# Patient Record
Sex: MALE | Race: WHITE | NOT HISPANIC OR LATINO | Employment: FULL TIME | URBAN - METROPOLITAN AREA
[De-identification: names, ages, dates, MRNs, and addresses within clinical notes are randomized per-mention and may not be internally consistent; named-entity substitution may affect disease eponyms.]

---

## 2017-05-02 ENCOUNTER — RADIANT APPOINTMENT (OUTPATIENT)
Dept: GENERAL RADIOLOGY | Facility: CLINIC | Age: 57
End: 2017-05-02
Attending: INTERNAL MEDICINE
Payer: COMMERCIAL

## 2017-05-02 ENCOUNTER — OFFICE VISIT (OUTPATIENT)
Dept: FAMILY MEDICINE | Facility: CLINIC | Age: 57
End: 2017-05-02
Payer: COMMERCIAL

## 2017-05-02 VITALS
HEART RATE: 76 BPM | OXYGEN SATURATION: 97 % | BODY MASS INDEX: 26.95 KG/M2 | DIASTOLIC BLOOD PRESSURE: 86 MMHG | SYSTOLIC BLOOD PRESSURE: 139 MMHG | WEIGHT: 199 LBS | HEIGHT: 72 IN | TEMPERATURE: 98.1 F

## 2017-05-02 DIAGNOSIS — M25.461 PAIN AND SWELLING OF KNEE, RIGHT: Primary | ICD-10-CM

## 2017-05-02 DIAGNOSIS — M25.561 PAIN AND SWELLING OF KNEE, RIGHT: Primary | ICD-10-CM

## 2017-05-02 DIAGNOSIS — M54.50 ACUTE MIDLINE LOW BACK PAIN WITHOUT SCIATICA: ICD-10-CM

## 2017-05-02 PROCEDURE — 72100 X-RAY EXAM L-S SPINE 2/3 VWS: CPT

## 2017-05-02 PROCEDURE — 73562 X-RAY EXAM OF KNEE 3: CPT | Mod: RT

## 2017-05-02 PROCEDURE — 99214 OFFICE O/P EST MOD 30 MIN: CPT | Performed by: INTERNAL MEDICINE

## 2017-05-02 RX ORDER — PIROXICAM 10 MG/1
10 CAPSULE ORAL
Qty: 30 CAPSULE | Refills: 5 | Status: SHIPPED | OUTPATIENT
Start: 2017-05-02 | End: 2017-11-07

## 2017-05-02 RX ORDER — HYDROCODONE BITARTRATE AND ACETAMINOPHEN 5; 325 MG/1; MG/1
1 TABLET ORAL DAILY PRN
Qty: 30 TABLET | Refills: 0 | Status: SHIPPED | OUTPATIENT
Start: 2017-05-02 | End: 2018-01-17

## 2017-05-02 RX ORDER — METHYLPREDNISOLONE 4 MG
TABLET, DOSE PACK ORAL
Qty: 21 TABLET | Refills: 1 | Status: SHIPPED | OUTPATIENT
Start: 2017-05-02 | End: 2017-08-28

## 2017-05-02 RX ORDER — BACLOFEN 10 MG/1
10 TABLET ORAL AT BEDTIME
Qty: 90 TABLET | Refills: 5 | Status: SHIPPED | OUTPATIENT
Start: 2017-05-02 | End: 2017-11-07

## 2017-05-02 NOTE — PATIENT INSTRUCTIONS
This summary includes the important diagnoses, test, medications and other important parts of your medical history.  Below are a few good we sites you can use to learn more about these.     Www.[email or web address] : Up to date and easily searchable information on multiple topics.  [email or web address] : [facility] $4.99 medications  Www.medlineplus.gov : medication info, interactive tutorials, watch real surgeries online  Www.familydoctor.org : good info from the Academy of Family Physicians  Www.[facility].com : good info from the [facility]  Www.cdc.gov : public health info, travel advisories, epidemics (H1N1)  Www.aap.org : children's health info, normal development, vaccinations  Www.health.[facility].[place].us : [place] dept of heat, public health issues in [place], N1N1    Based on your medical history and these are the current health maintenance or preventive care services that you are due for (some may have been done at this visit:)  Health Maintenance Due   Topic Date Due     ADVANCE DIRECTIVE PLANNING Q5 YRS (NO INBASKET)  09/09/1978     =================================================================================  Normal Values   Blood pressure  <140/90 for most adults    <130/80 for some chronic diseases (ask your care team about yours)    BMI (body mass index)  18.5-25 kg/m2 (based on height and weight)     Thank you for visiting [facility]    Normal or non-critical lab and imaging results will be communicated to you by MyChart, letter or phone within 7 days.  If you do not hear from us within 10 days, please call the clinic. If you have a critical or abnormal lab result, we will notify you by phone as soon as possible.     If you have any questions regarding your visit please contact:     Team Comfort:   Clinic Hours Telephone Number   Dr. Lázaro Minor   7am-5pm  Monday - Friday  [phone]  [facility]   Pharmacy 8am-8pm Monday-Thursday      8am-6pm Friday  9am-5pm Saturday-Sunday [phone]   Urgent Care 11am-8pm Monday-Friday        9am-5pm Saturday-Sunday [phone]     After hours, weekend or if you need to make an appointment with your primary provider please call [phone].   After Hours nurse advise: call [facility]: [phone]    Medication Refills:  Call your pharmacy and they will forward the refill to us. Please allow 3 business days for your refills to be completed.    Use HitchedPic (secure email communication and access to your chart) to send your primary care provider a message or make an appointment. Ask someone on your Team how to sign up for HitchedPic. To log on to [employer] or for more information in [employer] please visit the website at [email or web address].  As of October 8, 2013, all password changes, disabled accounts, or ID changes in HitchedPic/MyHealth will be done by our Access Services Department.   If you need help with your account or password, call: [phone]. Clinic staff no longer has the ability to change passwords.     Back Care Tips    Caring for your back  These are things you can do to prevent a recurrence of acute back pain and to reduce symptoms from chronic back pain:    Maintain a healthy weight. If you are overweight, losing weight will help most types of back pain.    Exercise is an important part of recovery from most types of back pain. The back is supported by the muscles behind and in front of the spine. This means both the back muscles and the abdominal muscles must be strengthened to provide better support for your spine.     Swimming and brisk walking are good overall exercises to improve your fitness level.    Practice safe lifting methods (below).    Practice good posture when sitting, standing and walking. Avoid prolonged sitting. This puts more stress on the lower back than standing or walking.    Wear quality  shoes with sufficient arch support. Foot and ankle alignment can affect back symptoms. Women should avoid high heels.    Therapeutic massage can help  relax the back muscles without stretching them.    During the first 24 to 72 hours after an acute injury or flare-up of chronic back pain, apply an ice pack to the painful area for 20 minutes and then remove it for 20 minutes over a period of 60 to 90 minutes or several times a day. As a safety precaution, do not use a heating pad at bedtime. Sleeping on a heating pad can lead to skin burns or tissue damage.    Ice and heat therapies can be alternated.  Medications  Talk to your doctor before using medications, especially if you have other medical problems or are taking other medicines.    You may use acetaminophen or ibuprofen to control pain, unless other pain medicine was prescribed. If you have chronic conditions like diabetes, liver or kidney disease, stomach ulcers or gastrointestinal bleeding, or are taking blood thinners, talk with your doctor before taking any meidcations.    Be careful if you are given prescription pain medicines, narcotics, or medication for muscle spasm. They can cause drowsiness, affect your coordination, reflexes and judgment. Do not drive or operate heavy machinery.  Lumbar stretch  Here is a simple stretching exercise that will help relax muscle spasm and keep your back more limber. If exercise makes your back pain worse, don t do it.    Lie on your back with your knees bent and both feet on the ground.    Slowly raise your left knee to your chest as you flatten your lower back against the floor. Hold for 5 seconds.    Relax and repeat the exercise with your right knee.    Do 10 of these exercises for each leg.  Safe lifting method    Don t bend over at the waist to lift an object off the floor.  Instead, bend your knees and hips in a squat.     Keep your back and head upright    Hold the object close to your body, directly in front of  you.    Straighten your legs to lift the object.     Lower the object to the floor in the reverse fashion.    If you must slide something across the floor, push it.  Posture tips  Sitting  Sit in chairs with straight backs or low-back support. rKeep your k nees lower than your hip, with your feet flat on the floor.  When driving, sit up straight. Adjust the seat forward so you are not leaning toward the steering wheel.  A small pillow or rolled towel behind your lower back may help if you are driving long distances.   Standing  When standing for long periods, shift most of your weight to one leg at a time. Alternate legs every few minutes.   Sleeping  The best way to sleep is on your side with your knees bent. Put a low pillow under your head to support your neck in a neutral spine position. Avoid thick pillows that bend your neck to one side. Put a pillow between your legs to further relax your lower back. If you sleep on your back, put pillows under your knees to support your legs in a slightly flexed position. Use a firm mattress. If your mattress sags, replace it, or use a 1/2-inch plywood board under the mattress to add support.  Follow-up care  Follow up with your health care provider or as directed by our staff.  If X-rays, a CT scan or an MRI scan were taken, they will be reviewed by a radiologist. You will be notified of any new findings that may affect your care.  Call 911  Seek emergency medical care if any of the following occur:    Trouble breathing    Confusion    Very drowsy or trouble breathing    Fainting or loss of consciousness    Rapid or very slow heart rate    Loss of  bwel or bladder control  When to seek medical care  Call your health care provider if any of the following occur:    Pain becomes worse or spreads to your arms or legs    Weakness or numbness in one or both arms or legs    Numbness in the groin area    5510-9838 The [facility]. [address] [place] [address], [place]  [address]. All rights reserved. This information is not intended as a substitute for professional medical care. Always follow your healthcare professional's instructions.        Back Basics: A Healthy Spine  A healthy spine supports the body while letting it move freely. It does this with the help of three natural curves. Strong, flexible muscles help, too. They support the spine by keeping its curves properly aligned. The disks that cushion the bones of your spine also play a role in back fitness.    Three natural curves  The spine is made of bones (vertebrae) and pads of soft tissue (disks). These parts are arranged in three curves: cervical, thoracic, and lumbar. When properly aligned, these curves keep your body balanced. They also support your body when you move. By distributing your weight throughout your spine, the curves make back injuries less likely.  Strong, flexible muscles  Strong, flexible back muscles help support the three curves of the spine. They do so by holding the vertebrae and disks in proper alignment. Strong, flexible abdominal, hip, and leg muscles also reduce strain on the back.  The lumbar curve  The lumbar curve is the hardest-working part of the spine. It carries more weight and moves the most. Aligning this curve helps prevent damage to vertebrae, disks, and other parts of the spine.  Cushioning disks  Disks are the soft pads of tissue between the vertebrae. The disks absorb shock caused by movement. Each disk has a spongy center (nucleus) and a tougher outer ring (annulus). Movement within the nucleus allows the vertebrae to rock back and forth on the disks. This provides the flexibility needed to bend and move.         2566-6365 The [employer]. [address] [place] [address], [place] [address]. All rights reserved. This information is not intended as a substitute for professional medical care. Always follow your healthcare professional's instructions.        Back Exercises: Lower Back  Stretch                        To start, sit in a chair with your feet flat on the floor. Shift your weight slightly forward. Relax, and keep your ears, shoulders, and hips aligned.    Sit with your feet well apart.    Bend forward and touch the floor with the backs of your hands. Relax and let your body drop.    Hold for 20 seconds. Return to starting position.    Repeat 2 times.     7021-8450 The [employer]. [address] [place] [address]. All rights reserved. This information is not intended as a substitute for professional medical care. Always follow your healthcare professional's instructions.        Back Exercises: Lower Back Rotation    To start, lie on your back with your knees bent and feet flat on the floor. Don t press your neck or lower back to the floor. Breathe deeply. You should feel comfortable and relaxed in this position.    Drop both knees to one side. Turn your head to the other side. Keep your shoulders flat on the floor.    Do not push through pain.    Hold for 20 seconds.    Slowly switch sides.    Repeat 2 to 5 times.    6043-8525 [employer]. [address] [place] [address]. All rights reserved. This information is not intended as a substitute for professional medical care. Always follow your healthcare professional's instructions.        Back Exercises: Seated Rotation  To start, sit in a chair with your feet flat on the floor. Shift your weight slightly forward to avoid rounding your back. Relax, and keep your ears, shoulders, and hips aligned:    Fold your arms and elbows just below shoulder height.    Turn from the waist with hips forward. Turn your head last. Do not push through the pain.    Hold for a count of 10 to 30. Return to starting position.    Repeat 3 to 5 times on one side. Then switch sides.      6878-3241 [employer]. [address] [place] [address]. All rights reserved. This information is not intended  as a substitute for professional medical care. Always follow your healthcare professional's instructions.        Back Exercises: Side Stretch      To start, sit in a chair with your feet flat on the floor. Shift your weight slightly forward to avoid rounding your back. Relax. Keep your ears, shoulders, and hips aligned:    Stretch your right arm overhead.    Slowly bend to the left. Don t twist your torso. Stay within your pain limits.    Hold for 20 seconds. Return to starting position.    Repeat 2 to 5 times. Then, switch to the other side.    3915-1482 [employer]. [address] [place] [address]. All rights reserved. This information is not intended as a substitute for professional medical care. Always follow your healthcare professional's instructions.        Quad Set for Leg and Knee    This exercise is designed to stretch and strengthen your knee. Before beginning, read through all the instructions. While exercising, breathe normally and use smooth movements. If you feel any pain, stop the exercise. If pain persists, call your healthcare provider.  1.  Sit on the floor with one leg straight, the other bent.  2.  Flex the foot of your straight leg by pointing your toes toward you. Press the back of your knee into the floor while tightening the muscle on the top of your thigh. Hold for ______ seconds. Then relax.  3.  Repeat ______ times. Do ______ sets a day.  Caution    Don t arch your back.    Don t hunch your shoulders.    3983-1944 The [employer]. [address] [place] [address]. All rights reserved. This information is not intended as a substitute for professional medical care. Always follow your healthcare professional's instructions.        Reducing Knee Pain and Swelling    Many treatments can help reduce pain and swelling in your knee. Your healthcare provider or physical therapist may suggest one or more of the following treatments:    Icing your knee  helps reduce swelling. You may be asked to ice your knee once a day or more. Apply ice for about 15 to 20 minutes at a time, with at least 40 minutes between sessions. Always keep a towel between the ice and your skin.     Keeping your leg raised above your heart helps excess fluid flow out of your knee joint. This reduces swelling.    Compression means wrapping an elastic bandage or neoprene sleeve snugly around your knees. This keeps fluid from collecting in your knee joint.    Electrical stimulation, done by a physical therapist or , can help reduce excess fluid in your knee joint.    Anti-inflammatory medicines may be prescribed by your healthcare provider. You may take pills or receive injections in your knee.    Isometric (iesha) exercises strengthen the muscles that support your knee joint. They also help reduce excess fluid in your knee.    Massage helps fluid drain away from your knee.    4804-8937 The [employer]. [address] [place] [address]. All rights reserved. This information is not intended as a substitute for professional medical care. Always follow your healthcare professional's instructions.        Knee Pain with Possible Torn Meniscus    The meniscus is a tough cartilage pad that cushions the inside of the knee joint. It helps absorb the shock from movement. It also spreads the weight of your body evenly across the knee joint. This prevents excess wear and tear to the bones of that joint.  The most common causes of meniscal tears are injuries, especially related to sports and degenerative disease that happens with aging.  A meniscus tear commonly happens during a twisting injury when the knee is bent. This causes pain, swelling, reduced movement of the knee, and trouble walking. There may be popping, clicking, joint locking or inability to completely straighten the knee. Ligaments of the knee may also be injured.  A torn meniscus is diagnosed by  physical exam and X-rays. In the case of a severe injury, the knee may be too painful to examine fully. A more accurate exam can be done after the initial swelling goes down. An MRI may be ordered to make a final diagnosis.  If your healthcare provider suspects a meniscal injury, you will treat your knee with ice and rest and preventing movement of the knee. A splint or knee brace that keeps your leg straight may be put on to protect the joint. Depending on the severity of the injury, surgery may be needed. A cartilage injury may take 4-12 weeks to heal depending on how bad it is.  Home care    Stay off the injured leg as much as possible until you can walk on it without pain. If you have a lot of pain when walking, crutches or a walker may be prescribed. (These can be rented or bought at many pharmacies and surgical or orthopedic supply stores). Follow your healthcare provider's advice about when to begin putting weight on that leg.    Keep your leg elevated to reduce pain and swelling. When sleeping, place a pillow under the injured leg. When sitting, support the injured leg so it is level with your waist. This is very important during the first 48 hours.    Apply an ice pack over the injured area for 15 to 20 minutes every 3 to 6 hours. You should do this for the first 24 to 48 hours. You can make an ice pack by filling a plastic bag that seals at the top with ice cubes and then wrapping it with a thin towel. Continue to use ice packs for relief of pain and swelling as needed. As the ice melts, be careful to avoid getting your wrap, splint, or cast wet. After 48 hours, apply heat (warm shower or warm bath) for 15 to 20 minutes several times a day, or alternate ice and heat. You can place the ice pack directly over the splint. If you have to wear a hook-and-loop knee brace, you can open it to apply the ice pack, or heat, directly to the knee. Never put ice directly on the skin. Always wrap the ice in a towel or  other type of cloth.    You may use over-the-counter pain medicine to control pain, unless another pain medicine was prescribed. If you have chronic liver or kidney disease or ever had a stomach ulcer, talk with your healthcare provider before using these medicines.    If you were given a splint, keep it dry at all times. Bathe with your splint out of the water. Protect it with a large plastic bag that is rubber-banded at the top end. If a fiberglass splint gets wet, you can dry it with a hair dryer. If you have a hook-and-loop knee brace, you can remove this to bathe, unless told otherwise.    Check with your healthcare provider before returning to sports or full work duties.  Follow-up care  Follow up with your healthcare provider, or as advised. This is usually within 1-2 weeks. Further testing may be required to check the extent of your injury.  If X-rays were taken, you will be told of any new findings that may affect your care.  Call 911  Call 911 if you have:     Shortness of breath    Chest pain  When to seek medical advice  Call your healthcare provider right away if any of these occur:    Toes or foot gets swollen, cold, blue, numb, or tingly    Pain or swelling spreads over the knee or calf    Warmth or redness appears over the knee or calf    Fever of 100.4 F (38 C) or above lasting for 24 to 48 hours    5401-4348 [employer]. [address] [place] [address]. All rights reserved. This information is not intended as a substitute for professional medical care. Always follow your healthcare professional's instructions.        Knee Pain of Uncertain Cause    There are several common causes for knee pain. These can include:    A sprain of the ligaments that support the joint    An injury to the cartilage lining of the joint    Arthritis from wear-and-tear or inflammation  There are other causes as well. There may also be swelling, reduced movement of the knee joint, and pain with  walking. A definite diagnosis will still need to be made. If your symptoms do not improve, further follow-up and testing may be needed.  Home care    Stay off the injured leg as much as possible until pain improves.    Apply an ice pack over the injured area for 15 to 20 minutes every 3 to 6 hours. You should do this for the first 24 to 48 hours. You can make an ice pack by filling a plastic bag that seals at the top with ice cubes and then wrapping it with a thin towel. Continue to use ice packs for relief of pain and swelling as needed. As the ice melts, be careful to avoid getting your wrap, splint, or cast wet. After 48 hours, apply heat (warm shower or warm bath) for 15 to 20 minutes several times a day, or alternate ice and heat. If you have to wear a hook-and-loop knee brace, you can open it to apply the ice pack, or heat, directly to the knee. Never put ice directly on the skin. Always wrap the ice in a towel or other type of cloth.    You may use over-the-counter pain medicine to control pain, unless another pain medicine was prescribed. If you have chronic liver or kidney disease or ever had a stomach ulcer or GI bleeding, talk with your healthcare provider using these medicines.    If crutches or a walker have been recommended, do not put weight on the injured leg until you can do so without pain. Check with your healthcare provider before returning to sports or full work duties.    If you have a hook-and-loop knee brace, you can remove it to bathe and sleep, unless told otherwise.  Follow-up care  Follow up with your healthcare provider as advised. This is usually within 1-2 weeks.  If X-rays were taken, you will be told of any new findings that may affect your care.  Call 911  Call 911 if you have:     Shortness of breath    Chest pain  When to seek medical advice  Call your healthcare provider right away if any of these occur:    Toes or foot becomes swollen, cold, blue, numb, or tingly    Pain or  swelling spreads over the knee or calf    Warmth or redness appears over the knee or calf    Other joints become painful    Rash appears    Fever of 100.4 F (38 C) or above lasting for 24 to 48 hours    7296-6215 [employer]. [address] [place] [address], [place] [address]. All rights reserved. This information is not intended as a substitute for professional medical care. Always follow your healthcare professional's instructions.

## 2017-05-02 NOTE — MR AVS SNAPSHOT
After Visit Summary   5/2/2017    Geovanni Jasso    MRN: [id number]           Patient Information     Date Of Birth          1960        Visit Information        Provider Department      5/2/2017 5:00 PM Paddy Holly MD [facility]        Today's Diagnoses     Pain and swelling of knee, right    -  1    Acute midline low back pain without sciatica          Care Instructions    This summary includes the important diagnoses, test, medications and other important parts of your medical history.  Below are a few good we sites you can use to learn more about these.     Www.Leaky.org : Up to date and easily searchable information on multiple topics.  [email or web address] : [facility] $4.99 medications  Www.medlineAzure Solutions.gov : medication info, interactive tutorials, watch real surgeries online  Www.familydoctor.org : good info from the Academy of Family Physicians  Www.KiteBit.CardioVIP : good info from the [facility]  Www.cdc.gov : public health info, travel advisories, epidemics (H1N1)  Www.aap.org : children's health info, normal development, vaccinations  Www.health.state.[place].us : [place] dept of heatlh, public health issues in [place], N1N1    Based on your medical history and these are the current health maintenance or preventive care services that you are due for (some may have been done at this visit:)  Health Maintenance Due   Topic Date Due     ADVANCE DIRECTIVE PLANNING Q5 YRS (NO INBASKET)  09/09/1976     =================================================================================  Normal Values   Blood pressure  <140/90 for most adults    <130/80 for some chronic diseases (ask your care team about yours)    BMI (body mass index)  18.5-25 kg/m2 (based on height and weight)     Thank you for visiting [facility]    Normal or non-critical lab and imaging results will be communicated to you by MyChart, letter or phone within 7  days.  If you do not hear from us within 10 days, please call the clinic. If you have a critical or abnormal lab result, we will notify you by phone as soon as possible.     If you have any questions regarding your visit please contact:     Team Comfort:   Clinic Hours Telephone Number   Dr. Lázaro Roy  [facility]   7am-5pm  Monday - Friday [phone]  [facility]   Pharmacy 8am-8pm Monday-Thursday      8am-6pm Friday  9am-5pm Saturday-Sunday [phone]   Urgent Care 11am-8pm Monday-Friday        9am-5pm Saturday-Sunday [phone]     After hours, weekend or if you need to make an appointment with your primary provider please call [phone].   After Hours nurse advise: call [facility]: [phone]    Medication Refills:  Call your pharmacy and they will forward the refill to us. Please allow 3 business days for your refills to be completed.    Use Respi (secure email communication and access to your chart) to send your primary care provider a message or make an appointment. Ask someone on your Team how to sign up for Respi. To log on to Stilnest or for more information in Networked Insights please visit the website at [email or web address].  As of October 8, 2013, all password changes, disabled accounts, or ID changes in Respi/MyHealth will be done by our Access Services Department.   If you need help with your account or password, call: [phone]. Clinic staff no longer has the ability to change passwords.     Back Care Tips    Caring for your back  These are things you can do to prevent a recurrence of acute back pain and to reduce symptoms from chronic back pain:    Maintain a healthy weight. If you are overweight, losing weight will help most types of back pain.    Exercise is an important part of recovery from most types of back pain. The back is supported by the muscles behind and in front of the spine. This means  both the back muscles and the abdominal muscles must be strengthened to provide better support for your spine.     Swimming and brisk walking are good overall exercises to improve your fitness level.    Practice safe lifting methods (below).    Practice good posture when sitting, standing and walking. Avoid prolonged sitting. This puts more stress on the lower back than standing or walking.    Wear quality shoes with sufficient arch support. Foot and ankle alignment can affect back symptoms. Women should avoid high heels.    Therapeutic massage can help  relax the back muscles without stretching them.    During the first 24 to 72 hours after an acute injury or flare-up of chronic back pain, apply an ice pack to the painful area for 20 minutes and then remove it for 20 minutes over a period of 60 to 90 minutes or several times a day. As a safety precaution, do not use a heating pad at bedtime. Sleeping on a heating pad can lead to skin burns or tissue damage.    Ice and heat therapies can be alternated.  Medications  Talk to your doctor before using medications, especially if you have other medical problems or are taking other medicines.    You may use acetaminophen or ibuprofen to control pain, unless other pain medicine was prescribed. If you have chronic conditions like diabetes, liver or kidney disease, stomach ulcers or gastrointestinal bleeding, or are taking blood thinners, talk with your doctor before taking any meidcations.    Be careful if you are given prescription pain medicines, narcotics, or medication for muscle spasm. They can cause drowsiness, affect your coordination, reflexes and judgment. Do not drive or operate heavy machinery.  Lumbar stretch  Here is a simple stretching exercise that will help relax muscle spasm and keep your back more limber. If exercise makes your back pain worse, don t do it.    Lie on your back with your knees bent and both feet on the ground.    Slowly raise your left knee  to your chest as you flatten your lower back against the floor. Hold for 5 seconds.    Relax and repeat the exercise with your right knee.    Do 10 of these exercises for each leg.  Safe lifting method    Don t bend over at the waist to lift an object off the floor.  Instead, bend your knees and hips in a squat.     Keep your back and head upright    Hold the object close to your body, directly in front of you.    Straighten your legs to lift the object.     Lower the object to the floor in the reverse fashion.    If you must slide something across the floor, push it.  Posture tips  Sitting  Sit in chairs with straight backs or low-back support. rKeep your k nees lower than your hip, with your feet flat on the floor.  When driving, sit up straight. Adjust the seat forward so you are not leaning toward the steering wheel.  A small pillow or rolled towel behind your lower back may help if you are driving long distances.   Standing  When standing for long periods, shift most of your weight to one leg at a time. Alternate legs every few minutes.   Sleeping  The best way to sleep is on your side with your knees bent. Put a low pillow under your head to support your neck in a neutral spine position. Avoid thick pillows that bend your neck to one side. Put a pillow between your legs to further relax your lower back. If you sleep on your back, put pillows under your knees to support your legs in a slightly flexed position. Use a firm mattress. If your mattress sags, replace it, or use a 1/2-inch plywood board under the mattress to add support.  Follow-up care  Follow up with your health care provider or as directed by our staff.  If X-rays, a CT scan or an MRI scan were taken, they will be reviewed by a radiologist. You will be notified of any new findings that may affect your care.  Call 911  Seek emergency medical care if any of the following occur:    Trouble breathing    Confusion    Very drowsy or trouble  breathing    Fainting or loss of consciousness    Rapid or very slow heart rate    Loss of  bwel or bladder control  When to seek medical care  Call your health care provider if any of the following occur:    Pain becomes worse or spreads to your arms or legs    Weakness or numbness in one or both arms or legs    Numbness in the groin area    3460-8343 [employer]. [address] [place] [address]. All rights reserved. This information is not intended as a substitute for professional medical care. Always follow your healthcare professional's instructions.        Back Basics: A Healthy Spine  A healthy spine supports the body while letting it move freely. It does this with the help of three natural curves. Strong, flexible muscles help, too. They support the spine by keeping its curves properly aligned. The disks that cushion the bones of your spine also play a role in back fitness.    Three natural curves  The spine is made of bones (vertebrae) and pads of soft tissue (disks). These parts are arranged in three curves: cervical, thoracic, and lumbar. When properly aligned, these curves keep your body balanced. They also support your body when you move. By distributing your weight throughout your spine, the curves make back injuries less likely.  Strong, flexible muscles  Strong, flexible back muscles help support the three curves of the spine. They do so by holding the vertebrae and disks in proper alignment. Strong, flexible abdominal, hip, and leg muscles also reduce strain on the back.  The lumbar curve  The lumbar curve is the hardest-working part of the spine. It carries more weight and moves the most. Aligning this curve helps prevent damage to vertebrae, disks, and other parts of the spine.  Cushioning disks  Disks are the soft pads of tissue between the vertebrae. The disks absorb shock caused by movement. Each disk has a spongy center (nucleus) and a tougher outer ring (annulus).  Movement within the nucleus allows the vertebrae to rock back and forth on the disks. This provides the flexibility needed to bend and move.         6664-0473 [employer]. [address] [place] [address]. All rights reserved. This information is not intended as a substitute for professional medical care. Always follow your healthcare professional's instructions.        Back Exercises: Lower Back Stretch                        To start, sit in a chair with your feet flat on the floor. Shift your weight slightly forward. Relax, and keep your ears, shoulders, and hips aligned.    Sit with your feet well apart.    Bend forward and touch the floor with the backs of your hands. Relax and let your body drop.    Hold for 20 seconds. Return to starting position.    Repeat 2 times.     1292-7412 [employer]. [address] [place] [address]. All rights reserved. This information is not intended as a substitute for professional medical care. Always follow your healthcare professional's instructions.        Back Exercises: Lower Back Rotation    To start, lie on your back with your knees bent and feet flat on the floor. Don t press your neck or lower back to the floor. Breathe deeply. You should feel comfortable and relaxed in this position.    Drop both knees to one side. Turn your head to the other side. Keep your shoulders flat on the floor.    Do not push through pain.    Hold for 20 seconds.    Slowly switch sides.    Repeat 2 to 5 times.    2815-4611 [employer]. [address] [place] [address]. All rights reserved. This information is not intended as a substitute for professional medical care. Always follow your healthcare professional's instructions.        Back Exercises: Seated Rotation  To start, sit in a chair with your feet flat on the floor. Shift your weight slightly forward to avoid rounding your back. Relax, and keep your ears, shoulders,  and hips aligned:    Fold your arms and elbows just below shoulder height.    Turn from the waist with hips forward. Turn your head last. Do not push through the pain.    Hold for a count of 10 to 30. Return to starting position.    Repeat 3 to 5 times on one side. Then switch sides.      8740-1066 [employer]. [address] [place] [address]. All rights reserved. This information is not intended as a substitute for professional medical care. Always follow your healthcare professional's instructions.        Back Exercises: Side Stretch      To start, sit in a chair with your feet flat on the floor. Shift your weight slightly forward to avoid rounding your back. Relax. Keep your ears, shoulders, and hips aligned:    Stretch your right arm overhead.    Slowly bend to the left. Don t twist your torso. Stay within your pain limits.    Hold for 20 seconds. Return to starting position.    Repeat 2 to 5 times. Then, switch to the other side.    1410-2725 [employer]. [address] [place] [address]. All rights reserved. This information is not intended as a substitute for professional medical care. Always follow your healthcare professional's instructions.        Quad Set for Leg and Knee    This exercise is designed to stretch and strengthen your knee. Before beginning, read through all the instructions. While exercising, breathe normally and use smooth movements. If you feel any pain, stop the exercise. If pain persists, call your healthcare provider.  1.  Sit on the floor with one leg straight, the other bent.  2.  Flex the foot of your straight leg by pointing your toes toward you. Press the back of your knee into the floor while tightening the muscle on the top of your thigh. Hold for ______ seconds. Then relax.  3.  Repeat ______ times. Do ______ sets a day.  Caution    Don t arch your back.    Don t hunch your shoulders.    3636-9850 [employer]. [facility] [place] [address]. All rights reserved. This information is not intended as a substitute for professional medical care. Always follow your healthcare professional's instructions.              Follow-ups after your visit        Additional Services     ORTHOPEDICS ADULT REFERRAL       Your provider has referred you to: ERING: [facility] [phone]   [email or web address]    Please be aware that coverage of these services is subject to the terms and limitations of your health insurance plan.  Call member services at your health plan with any benefit or coverage questions.      Please bring the following to your appointment:    >>   Any x-rays, CTs or MRIs which have been performed.  Contact the facility where they were done to arrange for  prior to your scheduled appointment.    >>   List of current medications   >>   This referral request   >>   Any documents/labs given to you for this referral            PHYSICAL THERAPY REFERRAL       *This therapy referral will be filtered to a centralized scheduling office at [facility] and the patient will receive a call to schedule an appointment at a [place] location most convenient for them. *     [facility] provides Physical Therapy evaluation and treatment and many specialty services across the [place] system.  If requesting a specialty program, please choose from the list below.    If you have not heard from the scheduling office within 2 business days, please call [phone] for all locations, with the exception of [place], please call [phone].  Treatment: Evaluation & Treatment  Special Instructions/Modalities: Alleviate low back pain  Special Programs: None    Please be aware that coverage of these services is subject to the terms and limitations of your health insurance plan.  Call member services at your health [facility] with any benefit  or coverage questions.      **Note to Provider:  If you are referring outside of [place] for the therapy appointment, please list the name of the location in the  special instructions  above, print the referral and give to the patient to schedule the appointment.                  Your next 10 appointments already scheduled     May 02, 2017  6:00 PM CDT   XR KNEE RIGHT 3 VIEWS with [id number]   [facility]    [address] [place] [id number] [phone]           Please bring a list of your current medicines to your exam. (Include vitamins, minerals and over-thecounter medicines.) Leave your valuables at home.  Tell your doctor if there is a chance you may be pregnant.  You do not need to do anything special for this exam.              Future tests that were ordered for you today     Open Future Orders        Priority Expected Expires Ordered    PHYSICAL THERAPY REFERRAL Routine  5/2/2018 5/2/2017    MR Knee Right w/o Contrast Routine  5/2/2018 5/2/2017    ORTHOPEDICS ADULT REFERRAL Routine  5/2/2018 5/2/2017            Who to contact     If you have questions or need follow up information about today's clinic visit or your schedule please contact [facility] directly at [phone].  Normal or non-critical lab and imaging results will be communicated to you by PeerSpacehart, letter or phone within 4 business days after the clinic has received the results. If you do not hear from us within 7 days, please contact the clinic through PeerSpacehart or phone. If you have a critical or abnormal lab result, we will notify you by phone as soon as possible.  Submit refill requests through ReDigi or call your pharmacy and they will forward the refill request to us. Please allow 3 business days for your refill to be completed.          Additional Information About Your Visit        ReDigi Information     ReDigi gives you secure access to your  electronic health record. If you see a primary care provider, you can also send messages to your care team and make appointments. If you have questions, please call your primary care clinic.  If you do not have a primary care provider, please call [phone] and they will assist you.        Care EveryWhere ID     This is your Care EveryWhere ID. This could be used by other organizations to access your [place] medical records  [id number]        Your Vitals Were     Pulse Temperature Height Pulse Oximetry BMI (Body Mass Index)       76 98.1  F (36.7  C) (Oral) 6' (1.829 m) 97% 26.99 kg/m2        Blood Pressure from Last 3 Encounters:   05/02/17 139/86   11/30/16 116/72   11/22/16 128/69    Weight from Last 3 Encounters:   05/02/17 199 lb (90.3 kg)   11/30/16 199 lb 9.6 oz (90.5 kg)   11/22/16 202 lb 6.4 oz (91.8 kg)              We Performed the Following     XR Knee Right 3 Views     XR Lumbar Spine 2/3 Views          Today's Medication Changes          These changes are accurate as of: 5/2/17  5:57 PM.  If you have any questions, ask your nurse or doctor.               Start taking these medicines.        Dose/Directions    baclofen 10 MG tablet   Commonly known as:  LIORESAL   Used for:  Acute midline low back pain without sciatica   Started by:  Paddy Holly MD        Dose:  10 mg   Take 1 tablet (10 mg) by mouth At Bedtime   Quantity:  90 tablet   Refills:  5       HYDROcodone-acetaminophen 5-325 MG per tablet   Commonly known as:  NORCO   Used for:  Acute midline low back pain without sciatica   Started by:  Paddy Holly MD        Dose:  1 tablet   Take 1 tablet by mouth daily as needed for moderate to severe pain   Quantity:  30 tablet   Refills:  0       methylPREDNISolone 4 MG tablet   Commonly known as:  MEDROL DOSEPAK   Used for:  Pain and swelling of knee, right, Acute midline low back pain without sciatica   Started by:  Paddy Holly MD        Follow package instructions    Quantity:  21 tablet   Refills:  1       piroxicam 10 MG capsule   Commonly known as:  FELDENE   Used for:  Pain and swelling of knee, right, Acute midline low back pain without sciatica   Started by:  Paddy Holly MD        Dose:  10 mg   Take 1 capsule (10 mg) by mouth daily (with breakfast)   Quantity:  30 capsule   Refills:  5            Where to get your medicines      These medications were sent to [id number] - [place], [place] - [address]  [address] [address]     Phone:  [phone]     baclofen 10 MG tablet    methylPREDNISolone 4 MG tablet    piroxicam 10 MG capsule         Some of these will need a paper prescription and others can be bought over the counter.  Ask your nurse if you have questions.     Bring a paper prescription for each of these medications     HYDROcodone-acetaminophen 5-325 MG per tablet                Primary Care Provider Office Phone # Fax #    Madhavi Ishmael García [phone]       [address] [address]        Thank you!     Thank you for choosing [facility]  for your care. Our goal is always to provide you with excellent care. Hearing back from our patients is one way we can continue to improve our services. Please take a few minutes to complete the written survey that you may receive in the mail after your visit with us. Thank you!             Your Updated Medication List - Protect others around you: Learn how to safely use, store and throw away your medicines at www.disposemymeds.org.          This list is accurate as of: 5/2/17  5:57 PM.  Always use your most recent med list.                   Brand Name Dispense Instructions for use    acetaminophen 325 MG tablet    TYLENOL     Take 325-650 mg by mouth 4 times daily as needed       baclofen 10 MG tablet    LIORESAL    90 tablet    Take 1 tablet (10 mg) by mouth At Bedtime        FEXOFENADINE HCL PO      Daily       fluticasone 50 MCG/ACT spray    FLONASE    16 g    Spray 1-2 sprays into both nostrils daily       HYDROcodone-acetaminophen 5-325 MG per tablet    NORCO    30 tablet    Take 1 tablet by mouth daily as needed for moderate to severe pain       ibuprofen 200 MG tablet    ADVIL/MOTRIN     Take 200 mg by mouth every 6 hours as needed       methylPREDNISolone 4 MG tablet    MEDROL DOSEPAK    21 tablet    Follow package instructions       omeprazole 20 MG tablet     90 tablet    Take 1 tablet (20 mg) by mouth daily Take 30-60 minutes before a meal.       piroxicam 10 MG capsule    FELDENE    30 capsule    Take 1 capsule (10 mg) by mouth daily (with breakfast)

## 2017-05-02 NOTE — PROGRESS NOTES
SUBJECTIVE:                                                    Geovanni Jasso is a 56 year old male who presents to clinic today for the following health issues:    Joint Pain     Onset: 1 week    Description:   Location: right knee  Character: Sharp and Dull ache    Intensity: moderate, severe    Progression of Symptoms: worse    Accompanying Signs & Symptoms:  Other symptoms: swelling   History:   Previous similar pain: YES  Knee surgery in the past 1992    Precipitating factors:   Trauma or overuse: no     Alleviating factors:  Improved by: nothing       Therapies Tried and outcome: advil and tylenol, little relief      Back Pain  Duration of complaint: acute   Specific cause: unknown  Description:   Location of pain: low back bilateral  Character of pain: dull ache  Pain radiation:none  Intensity: moderate  Accompanying Signs & Symptoms:  Fever: no   Numbness or weakness in legs:  no   Dysuria or Hematuria: no   Bowel or bladder incontinence: no   History:   Any injury (lifting, bending, twisting): no   Work Injury: no  History of back problems: no prior back problems  Any previous MRI or X-rays: no  Any history of back surgery: no   Any cancer history: no   Precipitating factors:   Worsened by: Lying Flat.  Alleviating factors:  Improved by: none  Therapies Tried and outcome: NSAIDS    Problem list and histories reviewed & adjusted, as indicated.  Additional history: as documented    Allergies   Allergen Reactions     Penicillins      BP Readings from Last 3 Encounters:   05/02/17 139/86   11/30/16 116/72   11/22/16 128/69    Wt Readings from Last 3 Encounters:   05/02/17 199 lb (90.3 kg)   11/30/16 199 lb 9.6 oz (90.5 kg)   11/22/16 202 lb 6.4 oz (91.8 kg)                    ROS:  C: NEGATIVE for fever, chills, change in weight  I: NEGATIVE for worrisome rashes, moles or lesions  E: NEGATIVE for vision changes or irritation  E/M: NEGATIVE for ear, mouth and throat problems  R: NEGATIVE for significant cough  or SOB  CV: NEGATIVE for chest pain, palpitations or peripheral edema  GI: NEGATIVE for nausea, abdominal pain, heartburn, or change in bowel habits  : NEGATIVE for frequency, dysuria, or hematuria  MUSCULOSKELETAL:As above.  N: NEGATIVE for weakness, dizziness or paresthesias  E: NEGATIVE for temperature intolerance, skin/hair changes  H: NEGATIVE for bleeding problems  P: NEGATIVE for changes in mood or affect    OBJECTIVE:                                                    /86 (BP Location: Left arm, Patient Position: Chair, Cuff Size: Adult Regular)  Pulse 76  Temp 98.1  F (36.7  C) (Oral)  Ht 6' (1.829 m)  Wt 199 lb (90.3 kg)  SpO2 97%  BMI 26.99 kg/m2  Body mass index is 26.99 kg/(m^2).  GENERAL: healthy, alert and no distress  EYES: Eyes grossly normal to inspection  MS: Swelling of right knee.  SKIN: no suspicious lesions, no rashes  NEURO: strength and tone- normal, sensory exam- grossly normal, mentation- intact, speech- normal, reflexes- symmetric  Comprehensive back pain exam:  Tenderness of midline low back area., Range of motion not limited by pain, Lower extremity strength functional and equal on both sides, Lower extremity reflexes within normal limits bilaterally, Lower extremity sensation normal and equal on both sides and Straight leg raise negative bilaterally  PSYCH: Alert and oriented times 3; speech- coherent , normal rate and volume; able to articulate logical thoughts, able to abstract reason, no tangential thoughts, no hallucinations or delusions, affect- normal    Diagnostic test results:  Results for orders placed or performed in visit on 05/02/17   XR Lumbar Spine 2/3 Views    Narrative    XR LUMBAR SPINE 2-3 VIEWS 5/2/2017 6:04 PM    HISTORY: Low back pain.    COMPARISON: None.    FINDINGS: Lumbar alignment is anatomic. Vertebral body heights and  disc spaces are preserved.      Impression    IMPRESSION: Normal lumbar spine.    SUKI AG MD   XR Knee Right 3 Views     Narrative    KNEE RIGHT THREE VIEW  5/2/2017 6:03 PM      HISTORY: Pain in right knee. Effusion, right knee.    COMPARISON: None.      Impression    IMPRESSION: No acute fracture or dislocation. Previous ACL repair.  Mild osteoarthritis. Joint effusion.    SIMEON RAMOS MD        ASSESSMENT/PLAN:                                                        ICD-10-CM    1. Pain and swelling of knee, right M25.561 XR Knee Right 3 Views    M25.461 methylPREDNISolone (MEDROL DOSEPAK) 4 MG tablet     piroxicam (FELDENE) 10 MG capsule     MR Knee Right w/o Contrast     ORTHOPEDICS ADULT REFERRAL   2. Acute midline low back pain without sciatica M54.5 XR Lumbar Spine 2/3 Views     methylPREDNISolone (MEDROL DOSEPAK) 4 MG tablet     HYDROcodone-acetaminophen (NORCO) 5-325 MG per tablet     piroxicam (FELDENE) 10 MG capsule     baclofen (LIORESAL) 10 MG tablet     PHYSICAL THERAPY REFERRAL       Follow-up visit if condition worsens.    Paddy Holly MD  [facility]

## 2017-05-02 NOTE — NURSING NOTE
Chief Complaint   Patient presents with     Musculoskeletal Problem     right knee pain       Initial /86 (BP Location: Left arm, Patient Position: Chair, Cuff Size: Adult Regular)  Pulse 76  Temp 98.1  F (36.7  C) (Oral)  Ht 6' (1.829 m)  Wt 199 lb (90.3 kg)  SpO2 97%  BMI 26.99 kg/m2 Estimated body mass index is 26.99 kg/(m^2) as calculated from the following:    Height as of this encounter: 6' (1.829 m).    Weight as of this encounter: 199 lb (90.3 kg).  Medication Reconciliation: complete     Jake Ayala MA

## 2017-05-05 ENCOUNTER — RADIANT APPOINTMENT (OUTPATIENT)
Dept: MRI IMAGING | Facility: CLINIC | Age: 57
End: 2017-05-05
Attending: INTERNAL MEDICINE
Payer: COMMERCIAL

## 2017-05-05 DIAGNOSIS — M25.461 PAIN AND SWELLING OF KNEE, RIGHT: ICD-10-CM

## 2017-05-05 DIAGNOSIS — M25.561 PAIN AND SWELLING OF KNEE, RIGHT: ICD-10-CM

## 2017-05-05 PROCEDURE — 73721 MRI JNT OF LWR EXTRE W/O DYE: CPT | Mod: RT | Performed by: RADIOLOGY

## 2017-05-07 PROBLEM — M25.461 PAIN AND SWELLING OF KNEE, RIGHT: Status: ACTIVE | Noted: 2017-05-07

## 2017-05-07 PROBLEM — M25.561 PAIN AND SWELLING OF KNEE, RIGHT: Status: ACTIVE | Noted: 2017-05-07

## 2017-05-07 PROBLEM — M54.50 ACUTE MIDLINE LOW BACK PAIN WITHOUT SCIATICA: Status: ACTIVE | Noted: 2017-05-07

## 2017-05-09 ENCOUNTER — OFFICE VISIT (OUTPATIENT)
Dept: ORTHOPEDICS | Facility: CLINIC | Age: 57
End: 2017-05-09
Payer: COMMERCIAL

## 2017-05-09 VITALS — RESPIRATION RATE: 18 BRPM | TEMPERATURE: 98.4 F | WEIGHT: 198 LBS | HEIGHT: 72 IN | BODY MASS INDEX: 26.82 KG/M2

## 2017-05-09 DIAGNOSIS — Z98.890 S/P ACL RECONSTRUCTION: ICD-10-CM

## 2017-05-09 DIAGNOSIS — M22.41 CHONDROMALACIA PATELLAE, RIGHT: Primary | ICD-10-CM

## 2017-05-09 PROCEDURE — 99243 OFF/OP CNSLTJ NEW/EST LOW 30: CPT | Performed by: ORTHOPAEDIC SURGERY

## 2017-05-09 NOTE — PROGRESS NOTES
Geovanni Jasso is a 56 year old male who is seen in consultation at the request of Dr. Paddy Holly  for right knee pain.    Past Medical History:   Diagnosis Date     Chronic rhinitis 10/23/2015     Esophageal reflux 12/26/2013       History reviewed. No pertinent surgical history.    History reviewed. No pertinent family history.    Social History     Social History     Marital status:      Spouse name: N/A     Number of children: N/A     Years of education: N/A     Occupational History     Not on file.     Social History Main Topics     Smoking status: Never Smoker     Smokeless tobacco: Not on file     Alcohol use Yes      Comment: occasionally     Drug use: No     Sexual activity: Yes     Other Topics Concern     Not on file     Social History Narrative       Current Outpatient Prescriptions   Medication Sig Dispense Refill     methylPREDNISolone (MEDROL DOSEPAK) 4 MG tablet Follow package instructions 21 tablet 1     HYDROcodone-acetaminophen (NORCO) 5-325 MG per tablet Take 1 tablet by mouth daily as needed for moderate to severe pain 30 tablet 0     piroxicam (FELDENE) 10 MG capsule Take 1 capsule (10 mg) by mouth daily (with breakfast) 30 capsule 5     baclofen (LIORESAL) 10 MG tablet Take 1 tablet (10 mg) by mouth At Bedtime 90 tablet 5     omeprazole 20 MG tablet Take 1 tablet (20 mg) by mouth daily Take 30-60 minutes before a meal. 90 tablet 1     fluticasone (FLONASE) 50 MCG/ACT nasal spray Spray 1-2 sprays into both nostrils daily 16 g 11     FEXOFENADINE HCL PO Daily       acetaminophen (TYLENOL) 325 MG tablet Take 325-650 mg by mouth 4 times daily as needed       ibuprofen (ADVIL,MOTRIN) 200 MG tablet Take 200 mg by mouth every 6 hours as needed         Allergies   Allergen Reactions     Penicillins        REVIEW OF SYSTEMS:  CONSTITUTIONAL:  NEGATIVE for fever, chills, change in weight, not feeling tired  SKIN:  NEGATIVE for worrisome rashes, no skin lumps, no skin ulcers and no non-healing  wounds  EYES:  NEGATIVE for vision changes or irritation.  ENT/MOUTH:  NEGATIVE.  No hearing loss, no hoarseness, no difficulty swallowing.  RESP:  NEGATIVE. No cough or shortness of breath.  CV:  NEGATIVE for chest pain, palpitations or peripheral edema  GI:  NEGATIVE for nausea, abdominal pain, heartburn, or change in bowel habits  :  Negative. No dysuria, no hematuria  MUSCULOSKELETAL:  See HPI above  NEURO:  NEGATIVE . No headaches, no dizziness,  no numbness  ENDOCRINE:  NEGATIVE for temperature intolerance, skin/hair changes  HEME/ALLERGY/IMMUNE:  NEGATIVE for bleeding problems  PSYCHIATRIC:  NEGATIVE. no anxiety, no depression.      Exam:  Vitals: Temp 98.4  F (36.9  C)  Resp 18  Ht 1.829 m (6')  Wt 89.8 kg (198 lb)  BMI 26.85 kg/m2  BMI= Body mass index is 26.85 kg/(m^2).  Constitutional:  healthy, alert and no distress  Neuro: Alert and Oriented x 3, Gait normal. Sensation grossly WNL.  HEENT:  Atraumatic, EOMI  Neck:  Neck supple with no tenderness.  Psych: Affect normal   Respiratory: Breathing not labored.  Cardiovascular: normal peripheral pulses  Lymph: no adenopathy  Skin: No rashes,worrisome lesions or skin problems  Spine: straight, no straight leg raising pain.  Hips show full range of motion.  There is no tenderness over the sacro-iliac joints, sciatic notch, or greater trochanters.

## 2017-05-09 NOTE — NURSING NOTE
Chief Complaint   Patient presents with     Consult     Right knee pain since March 7-8th of 2017 pivoted his knee to get into a little car in [place]. Pain level 7/10 shooting and occasional. Nothing helps the pain and bending and pressure makes the pain worse. Patient states he had arthroscopic surgery in 1988 and in 1992 he had ACL repair.       Initial Temp 98.4  F (36.9  C)  Resp 18  Ht 1.829 m (6')  Wt 89.8 kg (198 lb)  BMI 26.85 kg/m2 Estimated body mass index is 26.85 kg/(m^2) as calculated from the following:    Height as of this encounter: 1.829 m (6').    Weight as of this encounter: 89.8 kg (198 lb).  Medication Reconciliation: pancho Reddy MA

## 2017-05-09 NOTE — PROGRESS NOTES
DATE OF VISIT:  05/09/2017.      HISTORY OF PRESENT ILLNESS:  Mr. Geovanni Jasso is a 56-year-old male referred from Dr. Holly for evaluation of right knee pain.  He has a history of ACL tear in 1988, had reconstruction in 1992 with a patellar tendon graft.  He has done well over the years with that but has not really done strengthening exercises; is having some difficulty with his back and is due to have physical therapy for this.  Recently in March 7-8, he was getting into a car which was low and bending and twisting to get into the car strained his right knee, has had pain in the knee since.  This happened in [place].  He is used to getting in and out of an SUV, but had been using a car for an extended time.  This with 11th day of getting in and out of it.  He now has occasional shooting pain rated 7/10, worse with bending the knee and putting weight on the leg.  MRI scan has been performed of the right knee.  There is considerable metal artifact from the screws in the femur and tibia but this shows no evident tear of the medial or lateral meniscus.  The anterior cruciate ligament appears intact.  There was narrowing and thinning of the cartilage on the patella.  There was a possible cyst at the proximal tib-fib joint and no other pathology was noted.      I have reviewed the MRI scan and x-rays with him.  The cartilage of the medial and lateral femur and tibia look good to me.  It is showing some thinning of the patellofemoral cartilage.  The lateral meniscus looks good.  Medial meniscus for me is harder to assess.  I cannot rule out a meniscus tear.      PHYSICAL EXAMINATION:  Shows a good range of motion of both knees.  He has no effusion.  He has some pain with full flexion on the right knee.  He has no significant patellofemoral pain with grind test or patellar apprehension.  He has mild laxity of ACL on both sides but is fairly symmetric.  No laxity of MCL or LCL.  He had negative medial and lateral Clint  on both knees.  There is no increased warmth or erythema.  Sensation and circulation are intact.      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  We discussed options and will have him work on quad strengthening, hip abduction strengthening, avoid kneeling and crawling and consider glucosamine and chondroitin sulfate.  RTC melodie TILLMAN MD             D: 2017 10:01   T: 2017 10:33   MT: TARA      Name:     KOREY ZAZUETA   MRN:      [id number]        Account:      [id number]   :      1960           Visit Date:   2017      Document: [id number]

## 2017-05-09 NOTE — LETTER
5/9/2017       RE: Geovanni Jasso  [address]  [facility] [address]           Dear Colleague,    Thank you for referring your patient, Geovanni Jasso, to the [facility]. Please see a copy of my visit note below.    Geovanni Jasso is a 56 year old male who is seen in consultation at the request of Dr. Paddy Holyl  for right knee pain.    Past Medical History:   Diagnosis Date     Chronic rhinitis 10/23/2015     Esophageal reflux 12/26/2013       History reviewed. No pertinent surgical history.    History reviewed. No pertinent family history.    Social History     Social History     Marital status:      Spouse name: N/A     Number of children: N/A     Years of education: N/A     Occupational History     Not on file.     Social History Main Topics     Smoking status: Never Smoker     Smokeless tobacco: Not on file     Alcohol use Yes      Comment: occasionally     Drug use: No     Sexual activity: Yes     Other Topics Concern     Not on file     Social History Narrative       Current Outpatient Prescriptions   Medication Sig Dispense Refill     methylPREDNISolone (MEDROL DOSEPAK) 4 MG tablet Follow package instructions 21 tablet 1     HYDROcodone-acetaminophen (NORCO) 5-325 MG per tablet Take 1 tablet by mouth daily as needed for moderate to severe pain 30 tablet 0     piroxicam (FELDENE) 10 MG capsule Take 1 capsule (10 mg) by mouth daily (with breakfast) 30 capsule 5     baclofen (LIORESAL) 10 MG tablet Take 1 tablet (10 mg) by mouth At Bedtime 90 tablet 5     omeprazole 20 MG tablet Take 1 tablet (20 mg) by mouth daily Take 30-60 minutes before a meal. 90 tablet 1     fluticasone (FLONASE) 50 MCG/ACT nasal spray Spray 1-2 sprays into both nostrils daily 16 g 11     FEXOFENADINE HCL PO Daily       acetaminophen (TYLENOL) 325 MG tablet Take 325-650 mg by mouth 4 times daily as needed       ibuprofen (ADVIL,MOTRIN) 200 MG tablet Take 200 mg by mouth every 6 hours as needed          Allergies   Allergen Reactions     Penicillins        REVIEW OF SYSTEMS:  CONSTITUTIONAL:  NEGATIVE for fever, chills, change in weight, not feeling tired  SKIN:  NEGATIVE for worrisome rashes, no skin lumps, no skin ulcers and no non-healing wounds  EYES:  NEGATIVE for vision changes or irritation.  ENT/MOUTH:  NEGATIVE.  No hearing loss, no hoarseness, no difficulty swallowing.  RESP:  NEGATIVE. No cough or shortness of breath.  CV:  NEGATIVE for chest pain, palpitations or peripheral edema  GI:  NEGATIVE for nausea, abdominal pain, heartburn, or change in bowel habits  :  Negative. No dysuria, no hematuria  MUSCULOSKELETAL:  See HPI above  NEURO:  NEGATIVE . No headaches, no dizziness,  no numbness  ENDOCRINE:  NEGATIVE for temperature intolerance, skin/hair changes  HEME/ALLERGY/IMMUNE:  NEGATIVE for bleeding problems  PSYCHIATRIC:  NEGATIVE. no anxiety, no depression.      Exam:  Vitals: Temp 98.4  F (36.9  C)  Resp 18  Ht 1.829 m (6')  Wt 89.8 kg (198 lb)  BMI 26.85 kg/m2  BMI= Body mass index is 26.85 kg/(m^2).  Constitutional:  healthy, alert and no distress  Neuro: Alert and Oriented x 3, Gait normal. Sensation grossly WNL.  HEENT:  Atraumatic, EOMI  Neck:  Neck supple with no tenderness.  Psych: Affect normal   Respiratory: Breathing not labored.  Cardiovascular: normal peripheral pulses  Lymph: no adenopathy  Skin: No rashes,worrisome lesions or skin problems  Spine: straight, no straight leg raising pain.  Hips show full range of motion.  There is no tenderness over the sacro-iliac joints, sciatic notch, or greater trochanters.       Again, thank you for allowing me to participate in the care of your patient.        Sincerely,              Laron Cowan MD

## 2017-05-09 NOTE — MR AVS SNAPSHOT
After Visit Summary   5/9/2017    Geovanni Jasso    MRN: [id number]           Patient Information     Date Of Birth          1960        Visit Information        Provider Department      5/9/2017 8:45 AM Laron Cowan MD [facility] Instructions    Diagnosis  Chondromalacia patella.  Glucosamine 1500 mg/day and chondroitin sulfate 1200 mg/day advised.  Avoid kneeling and crawling.  Do quadriceps strengthening (especially VMO) .  Do hip abduction strengthening.  Use anti-inflammatories as needed.                    Patellofemoral Pain Syndrome (Runner's Knee)             What is patellofemoral pain syndrome?   Patellofemoral pain syndrome is pain behind the kneecap. It may also be called patellofemoral disorder, patellar malalignment, runner's knee, and chondromalacia.   How does it occur?   Patellofemoral pain syndrome can occur from overuse of the knee in sports and activities such as running, walking, jumping, or bicycling.   The kneecap (patella) is attached to the large group of muscles in the thigh called the quadriceps. It is also attached to the shin bone by the patellar tendon. The kneecap fits into grooves in the end of the thigh bone (femur) called the femoral condyle. With repeated bending and straightening of the knee, you can irritate the inside surface of the kneecap and cause pain.   Patellofemoral pain syndrome also may result from the way your hips, legs, knees, or feet are aligned. For example, if you have wide hips or underdeveloped thigh muscles, or if you are knock-kneed You may also have this problem if your foot flattens too much when you walk or run (a condition called over-pronation).   What are the symptoms?   The main symptom is pain behind the kneecap. You may have pain when you walk, run, or sit for a long time. The pain is usually worse when you walk downhill or down stairs. Your knee may swell at times. You may feel or hear  snapping, popping, or grinding in the knee.   How is it diagnosed?   Your healthcare provider will review your symptoms and examine your knee. You will have knee X-rays. You may have an MRI to check for damage to the surface of the patella or femur or another injury.   How is it treated?   Treatment includes the following:   Put an ice pack, gel pack, or package of frozen vegetables, wrapped in a cloth on the area every 3 to 4 hours, for up to 20 minutes at a time.   Raise the knee on a pillow when you sit or lie down.   Take an anti-inflammatory medicine such as ibuprofen, or other medicine as directed by your provider. Nonsteroidal anti-inflammatory medicines (NSAIDs) may cause stomach bleeding and other problems. These risks increase with age. Read the label and take as directed. Unless recommended by your healthcare provider, do not take for more than 10 days.   Follow your provider's instructions for doing exercises to help you recover. Your healthcare provider will show you exercises to help decrease the pain behind your kneecap.   If you over-pronate, your healthcare provider may recommend shoe inserts, called orthotics. You can buy orthotics at a pharmacy or athletic shoe store or they can be custom-made.   Use an infrapatellar strap, a strap placed below the kneecap over the patellar tendon.   Wear a neoprene knee sleeve, which will give support to your knee and patella.   While you recover from your injury, you will need to change your sport or activity to one that does not make your condition worse. For example, you may need to bicycle or swim instead of run.   In cases of severe patellofemoral pain syndrome, surgery may be recommended.   How long will the effects last?   Patellofemoral pain often lasts a long time and can come back after symptoms were better for a while. Treatment requires proper rehabilitation exercises that are done regularly.   When can I return to my normal activities?   Everyone  recovers from an injury at a different rate. Return to your activities depends on how soon your knee recovers, not by how many days or weeks it has been since your injury has occurred. In general, the longer you have symptoms before you start treatment, the longer it will take to get better. The goal is to return you to your normal activities as soon as is safely possible. If you return too soon you may worsen your injury.   You may safely return to your normal activities when, starting from the top of the list and progressing to the end, each of the following is true:   Your injured knee can be fully straightened and bent without pain.   Your knee and leg have regained normal strength compared to the uninjured knee and leg.   You are able to walk, bend, and squat without pain.   How can I prevent runner's knee?   Runner's knee can best be prevented by strengthening your thigh muscles, particularly the inside part of this muscle group. It is also important to wear shoes that fit well and that have good arch supports.     Published by [employer].  This content is reviewed periodically and is subject to change as new health information becomes available. The information is intended to inform and educate and is not a replacement for medical evaluation, advice, diagnosis or treatment by a healthcare professional.   Written by Roshan Harmon MD, for [employer]   ? 2010 [employer] and/or its affiliates. All Rights Reserved.   Copyright   [employer] 2011  Adult Health Advisor    Patellofemoral Pain Syndrome (Runner's Knee) Rehabilitation Exercises              You can do the hamstring stretch right away. When the pain in your knee has decreased, you can do the quadriceps stretch and start strengthening the thigh muscles using the rest of the exercises.   Standing hamstring stretch: Put the heel of the leg on your injured side on a stool about 15 inches high. Keep your leg straight. Lean forward, bending  at the hips, until you feel a mild stretch in the back of your thigh. Make sure you don't roll your shoulders or bend at the waist when doing this or you will stretch your lower back instead of your leg. Hold the stretch for 15 to 30 seconds. Repeat 3 times.   Quadriceps stretch: Stand an arm's length away from the wall with your injured leg farthest from the wall. Facing straight ahead, brace yourself by keeping one hand against the wall. With your other hand, grasp the ankle of your injured leg and pull your heel toward your buttocks. Don't arch or twist your back. Keep your knees together. Hold this stretch for 15 to 30 seconds.   Side-lying leg lift: Lie on your uninjured side. Tighten the front thigh muscles on your injured leg and lift that leg 8 to 10 inches away from the other leg. Keep the leg straight and lower it slowly. Do 3 sets of 10.   Quad sets: Sit on the floor with your injured leg straight and your other leg bent. Press the back of the knee of your injured leg against the floor by tightening the muscles on the top of your thigh. Hold this position 10 seconds. Relax. Do 3 sets of 10.   Straight leg raise: Lie on your back with your legs straight out in front of you. Bend the knee on your uninjured side and place the foot flat on the floor. Tighten the thigh muscle on your injured side and lift your leg about 8 inches off the floor. Keep your leg straight and your thigh muscle tight. Slowly lower your leg back down to the floor. Do 3 sets of 10.   Step-up: Stand with the foot of your injured leg on a support 3 to 5 inches high (like a small step or block of wood). Keep your other foot flat on the floor. Shift your weight onto the injured leg on the support. Straighten your injured leg as the other leg comes off the floor. Return to the starting position by bending your injured leg and slowly lowering your uninjured leg back to the floor. Do 3 sets of 10.   Wall squat with a ball: Stand with your  back, shoulders, and head against a wall. Look straight ahead. Keep your shoulders relaxed and your feet 3 feet from the wall and shoulder's width apart. Place a soccer or basketball-sized ball behind your back. Keeping your back against the wall, slowly squat down to a 45-degree angle. Your thighs will not yet be parallel to the floor. Hold this position for 10 seconds and then slowly slide back up the wall. Repeat 10 times. Build up to 3 sets of 10.   Knee stabilization: Wrap a piece of elastic tubing around the ankle of the uninjured leg. Tie a knot in the other end of the tubing and close it in a door.   0. Stand facing the door on the leg without tubing and bend your knee slightly, keeping your thigh muscles tight. While maintaining this position, move the leg with the tubing straight back behind you. Do 3 sets of 10.   0. Turn 90 degrees so the leg without tubing is closest to the door. Move the leg with tubing away from your body. Do 3 sets of 10.   0. Turn 90 degrees again so your back is to the door. Move the leg with tubing straight out in front of you. Do 3 sets of 10.   0. Turn your body 90 degrees again so the leg with tubing is closest to the door. Move the leg with tubing across your body. Do 3 sets of 10.   Hold onto a chair if you need help balancing. This exercise can be made even more challenging by standing on a pillow while you move the leg with tubing.   Resisted terminal knee extension: Make a loop from a piece of elastic tubing by tying a knot in both ends. Close both knots in a door. Step into the loop so the tubing is around the back of your injured leg. Lift the other foot off the ground. Hold onto a chair for balance, if needed. Bend the knee on the leg with tubing about 45 degrees. Slowly straighten your leg, keeping your thigh muscle tight as you do this. Do this 10 times. Do 3 sets. An easier way to do this is to stand on both legs for better support while you do the exercise.    Standing calf stretch: Stand facing a wall with your hands on the wall at about eye level. Keep your injured leg back with your heel on the floor. Keep the other leg forward with the knee bent. Turn your back foot slightly inward (as if you were pigeon-toed). Slowly lean into the wall until you feel a stretch in the back of your calf. Hold the stretch for 15 to 30 seconds. Return to the starting position. Repeat 3 times. Do this exercise several times each day.   Clam exercise: Lie on your uninjured side with your hips and knees bent and feet together. Slowly raise your top leg toward the ceiling while keeping your heels touching each other. Hold for 2 seconds and lower slowly. Do 3 sets of 10 repetitions.   Iliotibial band stretch: Side-bending: Cross one leg in front of the other leg and lean in the opposite direction from the front leg. Reach the arm on the side of the back leg over your head while you do this. Hold this position for 15 to 30 seconds. Return to the starting position. Repeat 3 times and then switch legs and repeat the exercise.   Published by [employer].  This content is reviewed periodically and is subject to change as new health information becomes available. The information is intended to inform and educate and is not a replacement for medical evaluation, advice, diagnosis or treatment by a healthcare professional.   Written by Georgia Polk MS, PT, and Lesa Coley PT, [facility], [facility], for [employer].   ? 2010 [facility] and/or its affiliates. All Rights Reserved.   Copyright   [employer] Reference Systems 2011  Adult Health Advisor                                                    Follow-ups after your visit        Who to contact     If you have questions or need follow up information about today's clinic visit or your schedule please contact [facility] directly at [phone].  Normal or non-critical lab and imaging results will be communicated to you by [employer], letter  or phone within 4 business days after the clinic has received the results. If you do not hear from us within 7 days, please contact the clinic through [employer] or phone. If you have a critical or abnormal lab result, we will notify you by phone as soon as possible.  Submit refill requests through [employer] or call your pharmacy and they will forward the refill request to us. Please allow 3 business days for your refill to be completed.          Additional Information About Your Visit        [facility] Information     [employer] gives you secure access to your electronic health record. If you see a primary care provider, you can also send messages to your care team and make appointments. If you have questions, please call your primary care clinic.  If you do not have a primary care provider, please call [phone] and they will assist you.        Care EveryWhere ID     This is your Care EveryWhere ID. This could be used by other organizations to access your [facility] medical records  [id number]        Your Vitals Were     Temperature Respirations Height BMI (Body Mass Index)          98.4  F (36.9  C) 18 1.829 m (6') 26.85 kg/m2         Blood Pressure from Last 3 Encounters:   05/02/17 139/86   11/30/16 116/72   11/22/16 128/69    Weight from Last 3 Encounters:   05/09/17 89.8 kg (198 lb)   05/02/17 90.3 kg (199 lb)   11/30/16 90.5 kg (199 lb 9.6 oz)              Today, you had the following     No orders found for display       Primary Care Provider Office Phone # Fax #    Madhavi Ishmael García [phone]       [address] [address]        Thank you!     Thank you for choosing [facility]  for your care. Our goal is always to provide you with excellent care. Hearing back from our patients is one way we can continue to improve our services. Please take a few minutes to complete the written survey that you may receive in the mail after  your visit with us. Thank you!             Your Updated Medication List - Protect others around you: Learn how to safely use, store and throw away your medicines at www.disposemymeds.org.          This list is accurate as of: 5/9/17  9:26 AM.  Always use your most recent med list.                   Brand Name Dispense Instructions for use    acetaminophen 325 MG tablet    TYLENOL     Take 325-650 mg by mouth 4 times daily as needed       baclofen 10 MG tablet    LIORESAL    90 tablet    Take 1 tablet (10 mg) by mouth At Bedtime       FEXOFENADINE HCL PO      Daily       fluticasone 50 MCG/ACT spray    FLONASE    16 g    Spray 1-2 sprays into both nostrils daily       HYDROcodone-acetaminophen 5-325 MG per tablet    NORCO    30 tablet    Take 1 tablet by mouth daily as needed for moderate to severe pain       ibuprofen 200 MG tablet    ADVIL/MOTRIN     Take 200 mg by mouth every 6 hours as needed       methylPREDNISolone 4 MG tablet    MEDROL DOSEPAK    21 tablet    Follow package instructions       omeprazole 20 MG tablet     90 tablet    Take 1 tablet (20 mg) by mouth daily Take 30-60 minutes before a meal.       piroxicam 10 MG capsule    FELDENE    30 capsule    Take 1 capsule (10 mg) by mouth daily (with breakfast)

## 2017-05-09 NOTE — PATIENT INSTRUCTIONS
Diagnosis  Chondromalacia patella.  Glucosamine 1500 mg/day and chondroitin sulfate 1200 mg/day advised.  Avoid kneeling and crawling.  Do quadriceps strengthening (especially VMO) .  Do hip abduction strengthening.  Use anti-inflammatories as needed.                    Patellofemoral Pain Syndrome (Runner's Knee)             What is patellofemoral pain syndrome?   Patellofemoral pain syndrome is pain behind the kneecap. It may also be called patellofemoral disorder, patellar malalignment, runner's knee, and chondromalacia.   How does it occur?   Patellofemoral pain syndrome can occur from overuse of the knee in sports and activities such as running, walking, jumping, or bicycling.   The kneecap (patella) is attached to the large group of muscles in the thigh called the quadriceps. It is also attached to the shin bone by the patellar tendon. The kneecap fits into grooves in the end of the thigh bone (femur) called the femoral condyle. With repeated bending and straightening of the knee, you can irritate the inside surface of the kneecap and cause pain.   Patellofemoral pain syndrome also may result from the way your hips, legs, knees, or feet are aligned. For example, if you have wide hips or underdeveloped thigh muscles, or if you are knock-kneed You may also have this problem if your foot flattens too much when you walk or run (a condition called over-pronation).   What are the symptoms?   The main symptom is pain behind the kneecap. You may have pain when you walk, run, or sit for a long time. The pain is usually worse when you walk downhill or down stairs. Your knee may swell at times. You may feel or hear snapping, popping, or grinding in the knee.   How is it diagnosed?   Your healthcare provider will review your symptoms and examine your knee. You will have knee X-rays. You may have an MRI to check for damage to the surface of the patella or femur or another injury.   How is it treated?   Treatment includes  the following:   Put an ice pack, gel pack, or package of frozen vegetables, wrapped in a cloth on the area every 3 to 4 hours, for up to 20 minutes at a time.   Raise the knee on a pillow when you sit or lie down.   Take an anti-inflammatory medicine such as ibuprofen, or other medicine as directed by your provider. Nonsteroidal anti-inflammatory medicines (NSAIDs) may cause stomach bleeding and other problems. These risks increase with age. Read the label and take as directed. Unless recommended by your healthcare provider, do not take for more than 10 days.   Follow your provider's instructions for doing exercises to help you recover. Your healthcare provider will show you exercises to help decrease the pain behind your kneecap.   If you over-pronate, your healthcare provider may recommend shoe inserts, called orthotics. You can buy orthotics at a pharmacy or athletic shoe store or they can be custom-made.   Use an infrapatellar strap, a strap placed below the kneecap over the patellar tendon.   Wear a neoprene knee sleeve, which will give support to your knee and patella.   While you recover from your injury, you will need to change your sport or activity to one that does not make your condition worse. For example, you may need to bicycle or swim instead of run.   In cases of severe patellofemoral pain syndrome, surgery may be recommended.   How long will the effects last?   Patellofemoral pain often lasts a long time and can come back after symptoms were better for a while. Treatment requires proper rehabilitation exercises that are done regularly.   When can I return to my normal activities?   Everyone recovers from an injury at a different rate. Return to your activities depends on how soon your knee recovers, not by how many days or weeks it has been since your injury has occurred. In general, the longer you have symptoms before you start treatment, the longer it will take to get better. The goal is to  return you to your normal activities as soon as is safely possible. If you return too soon you may worsen your injury.   You may safely return to your normal activities when, starting from the top of the list and progressing to the end, each of the following is true:   Your injured knee can be fully straightened and bent without pain.   Your knee and leg have regained normal strength compared to the uninjured knee and leg.   You are able to walk, bend, and squat without pain.   How can I prevent runner's knee?   Runner's knee can best be prevented by strengthening your thigh muscles, particularly the inside part of this muscle group. It is also important to wear shoes that fit well and that have good arch supports.     Published by [employer].  This content is reviewed periodically and is subject to change as new health information becomes available. The information is intended to inform and educate and is not a replacement for medical evaluation, advice, diagnosis or treatment by a healthcare professional.   Written by Roshan Harmon MD, for [employer]   ? 2010 [employer] and/or its affiliates. All Rights Reserved.   Copyright   [employer] 2011  Adult Health Advisor    Patellofemoral Pain Syndrome (Runner's Knee) Rehabilitation Exercises              You can do the hamstring stretch right away. When the pain in your knee has decreased, you can do the quadriceps stretch and start strengthening the thigh muscles using the rest of the exercises.   Standing hamstring stretch: Put the heel of the leg on your injured side on a stool about 15 inches high. Keep your leg straight. Lean forward, bending at the hips, until you feel a mild stretch in the back of your thigh. Make sure you don't roll your shoulders or bend at the waist when doing this or you will stretch your lower back instead of your leg. Hold the stretch for 15 to 30 seconds. Repeat 3 times.   Quadriceps stretch: Stand an arm's length away  from the wall with your injured leg farthest from the wall. Facing straight ahead, brace yourself by keeping one hand against the wall. With your other hand, grasp the ankle of your injured leg and pull your heel toward your buttocks. Don't arch or twist your back. Keep your knees together. Hold this stretch for 15 to 30 seconds.   Side-lying leg lift: Lie on your uninjured side. Tighten the front thigh muscles on your injured leg and lift that leg 8 to 10 inches away from the other leg. Keep the leg straight and lower it slowly. Do 3 sets of 10.   Quad sets: Sit on the floor with your injured leg straight and your other leg bent. Press the back of the knee of your injured leg against the floor by tightening the muscles on the top of your thigh. Hold this position 10 seconds. Relax. Do 3 sets of 10.   Straight leg raise: Lie on your back with your legs straight out in front of you. Bend the knee on your uninjured side and place the foot flat on the floor. Tighten the thigh muscle on your injured side and lift your leg about 8 inches off the floor. Keep your leg straight and your thigh muscle tight. Slowly lower your leg back down to the floor. Do 3 sets of 10.   Step-up: Stand with the foot of your injured leg on a support 3 to 5 inches high (like a small step or block of wood). Keep your other foot flat on the floor. Shift your weight onto the injured leg on the support. Straighten your injured leg as the other leg comes off the floor. Return to the starting position by bending your injured leg and slowly lowering your uninjured leg back to the floor. Do 3 sets of 10.   Wall squat with a ball: Stand with your back, shoulders, and head against a wall. Look straight ahead. Keep your shoulders relaxed and your feet 3 feet from the wall and shoulder's width apart. Place a soccer or basketball-sized ball behind your back. Keeping your back against the wall, slowly squat down to a 45-degree angle. Your thighs will not  yet be parallel to the floor. Hold this position for 10 seconds and then slowly slide back up the wall. Repeat 10 times. Build up to 3 sets of 10.   Knee stabilization: Wrap a piece of elastic tubing around the ankle of the uninjured leg. Tie a knot in the other end of the tubing and close it in a door.   0. Stand facing the door on the leg without tubing and bend your knee slightly, keeping your thigh muscles tight. While maintaining this position, move the leg with the tubing straight back behind you. Do 3 sets of 10.   0. Turn 90 degrees so the leg without tubing is closest to the door. Move the leg with tubing away from your body. Do 3 sets of 10.   0. Turn 90 degrees again so your back is to the door. Move the leg with tubing straight out in front of you. Do 3 sets of 10.   0. Turn your body 90 degrees again so the leg with tubing is closest to the door. Move the leg with tubing across your body. Do 3 sets of 10.   Hold onto a chair if you need help balancing. This exercise can be made even more challenging by standing on a pillow while you move the leg with tubing.   Resisted terminal knee extension: Make a loop from a piece of elastic tubing by tying a knot in both ends. Close both knots in a door. Step into the loop so the tubing is around the back of your injured leg. Lift the other foot off the ground. Hold onto a chair for balance, if needed. Bend the knee on the leg with tubing about 45 degrees. Slowly straighten your leg, keeping your thigh muscle tight as you do this. Do this 10 times. Do 3 sets. An easier way to do this is to stand on both legs for better support while you do the exercise.   Standing calf stretch: Stand facing a wall with your hands on the wall at about eye level. Keep your injured leg back with your heel on the floor. Keep the other leg forward with the knee bent. Turn your back foot slightly inward (as if you were pigeon-toed). Slowly lean into the wall until you feel a stretch in  the back of your calf. Hold the stretch for 15 to 30 seconds. Return to the starting position. Repeat 3 times. Do this exercise several times each day.   Clam exercise: Lie on your uninjured side with your hips and knees bent and feet together. Slowly raise your top leg toward the ceiling while keeping your heels touching each other. Hold for 2 seconds and lower slowly. Do 3 sets of 10 repetitions.   Iliotibial band stretch: Side-bending: Cross one leg in front of the other leg and lean in the opposite direction from the front leg. Reach the arm on the side of the back leg over your head while you do this. Hold this position for 15 to 30 seconds. Return to the starting position. Repeat 3 times and then switch legs and repeat the exercise.   Published by [employer].  This content is reviewed periodically and is subject to change as new health information becomes available. The information is intended to inform and educate and is not a replacement for medical evaluation, advice, diagnosis or treatment by a healthcare professional.   Written by Georgia Polk, MS, PT, and Lesa Coley PT, [facility], [facility], for [employer].   ? 2010 [employer] [facility] and/or its affiliates. All Rights Reserved.   Copyright   [employer] 2011  Adult Health Advisor

## 2017-05-15 DIAGNOSIS — K21.9 GASTROESOPHAGEAL REFLUX DISEASE, ESOPHAGITIS PRESENCE NOT SPECIFIED: ICD-10-CM

## 2017-05-15 NOTE — TELEPHONE ENCOUNTER
omeprazole 20 MG tablet      Last Written Prescription Date: 11/21/16  Last Fill Quantity: 90,  # refills: 1   Last Office Visit with [facility] or [facility] prescribing provider: 5/9/17 Dr. García

## 2017-05-16 ENCOUNTER — MYC REFILL (OUTPATIENT)
Dept: FAMILY MEDICINE | Facility: CLINIC | Age: 57
End: 2017-05-16

## 2017-05-16 DIAGNOSIS — K21.9 GASTROESOPHAGEAL REFLUX DISEASE, ESOPHAGITIS PRESENCE NOT SPECIFIED: ICD-10-CM

## 2017-05-16 RX ORDER — NICOTINE POLACRILEX 4 MG/1
20 GUM, CHEWING ORAL DAILY
Qty: 90 TABLET | Refills: 1 | Status: CANCELLED | OUTPATIENT
Start: 2017-05-16

## 2017-05-16 NOTE — TELEPHONE ENCOUNTER
omeprazole 20 MG tablet      Last Written Prescription Date: 11/21/16  Last Fill Quantity: 90,  # refills: 1   Last Office Visit with [facility], [facility] or [facility] prescribing provider: 5/2/17

## 2017-05-16 NOTE — TELEPHONE ENCOUNTER
[facility] from [facility]:  Original authorizing provider: MD Bill Wong Louisa would like a refill of the following medications:  omeprazole 20 MG tablet [Madhavi García MD]    Preferred pharmacy: [id number] - [facility] [address]    Comment:  Dr. García, I have one tablet remaining and the [facility] is waiting on a call from you to fill the prescription. will you please contact the [facility] on [address]? Thank you, Bill

## 2017-05-18 RX ORDER — NICOTINE POLACRILEX 4 MG/1
20 GUM, CHEWING ORAL DAILY
Qty: 90 TABLET | Refills: 1 | Status: SHIPPED | OUTPATIENT
Start: 2017-05-18 | End: 2017-11-14

## 2017-05-18 NOTE — TELEPHONE ENCOUNTER
See 5/15/17 Rx that was sent to pharmacy.    Daniela Rincon RN, [facility]

## 2017-05-18 NOTE — TELEPHONE ENCOUNTER
Prescription approved per [facility] Refill Protocol.  Jacqueline Hall RN

## 2017-06-15 ENCOUNTER — MYC REFILL (OUTPATIENT)
Dept: FAMILY MEDICINE | Facility: CLINIC | Age: 57
End: 2017-06-15

## 2017-06-15 DIAGNOSIS — J31.0 CHRONIC RHINITIS: ICD-10-CM

## 2017-06-15 RX ORDER — FLUTICASONE PROPIONATE 50 MCG
1-2 SPRAY, SUSPENSION (ML) NASAL DAILY
Qty: 16 G | Refills: 11 | Status: CANCELLED | OUTPATIENT
Start: 2017-06-15

## 2017-06-15 NOTE — TELEPHONE ENCOUNTER
Message from [facility]:  Original authorizing provider: JAGUAR Bethe would like a refill of the following medications:  fluticasone (FLONASE) 50 MCG/ACT nasal spray [Benita Jordan PA-C]    Preferred pharmacy: [id number] - [facility] [address]    Comment:

## 2017-06-15 NOTE — TELEPHONE ENCOUNTER
fluticasone (FLONASE) 50 MCG/ACT nasal spray      Last Written Prescription Date: 10/23/15  Last Fill Quantity: 16g, # refills: 11  Last Office Visit with [facility], [facility] or [facility] prescribing provider: 11/30/16       BP Readings from Last 3 Encounters:   05/02/17 139/86   11/30/16 116/72   11/22/16 128/69     Date of last Breast Exam:

## 2017-06-17 ENCOUNTER — MYC MEDICAL ADVICE (OUTPATIENT)
Dept: FAMILY MEDICINE | Facility: CLINIC | Age: 57
End: 2017-06-17

## 2017-06-17 DIAGNOSIS — J31.0 CHRONIC RHINITIS: ICD-10-CM

## 2017-06-19 RX ORDER — FLUTICASONE PROPIONATE 50 MCG
1-2 SPRAY, SUSPENSION (ML) NASAL DAILY
Qty: 16 G | Refills: 11 | Status: SHIPPED | OUTPATIENT
Start: 2017-06-19 | End: 2018-08-31

## 2017-06-19 NOTE — TELEPHONE ENCOUNTER
Routing refill request to provider for review/approval because:  A break in medication  Ryann Bob RN

## 2017-06-19 NOTE — TELEPHONE ENCOUNTER
fluticasone (FLONASE) 50 MCG/ACT nasal spray      Last Written Prescription Date: 10/26/15  Last Fill Quantity: 16g,  # refills: 11   Last Office Visit with [facility], [facility] or [facility] prescribing provider: 5/2/17

## 2017-08-28 ENCOUNTER — OFFICE VISIT (OUTPATIENT)
Dept: FAMILY MEDICINE | Facility: CLINIC | Age: 57
End: 2017-08-28
Payer: COMMERCIAL

## 2017-08-28 VITALS
BODY MASS INDEX: 27.09 KG/M2 | TEMPERATURE: 97.3 F | SYSTOLIC BLOOD PRESSURE: 116 MMHG | WEIGHT: 200 LBS | HEIGHT: 72 IN | OXYGEN SATURATION: 96 % | DIASTOLIC BLOOD PRESSURE: 72 MMHG | HEART RATE: 69 BPM

## 2017-08-28 DIAGNOSIS — M54.50 RIGHT-SIDED LOW BACK PAIN WITHOUT SCIATICA, UNSPECIFIED CHRONICITY: Primary | ICD-10-CM

## 2017-08-28 PROCEDURE — 99214 OFFICE O/P EST MOD 30 MIN: CPT | Performed by: INTERNAL MEDICINE

## 2017-08-28 RX ORDER — HYDROCODONE BITARTRATE AND IBUPROFEN 7.5; 2 MG/1; MG/1
1 TABLET, FILM COATED ORAL
Qty: 30 TABLET | Refills: 0 | Status: SHIPPED | OUTPATIENT
Start: 2017-08-28 | End: 2018-01-17

## 2017-08-28 NOTE — PROGRESS NOTES
[facility] Internal Medicine Progress Note           Assessment and Plan:       (M54.5) Right-sided low back pain without sciatica, unspecified chronicity  (primary encounter diagnosis)  Comment: No alarming symptoms such as motor weakness, loss of urinary/bowel control nor saddle anesthesia. No radicular pain. Use of mild opiates justified to severe pain. Once MRI of lumbar spine is available, then specific treatment plan will be initiated. The patient will be referred accordingly.  Plan: HYDROcodone-ibuprofen (VICOPROFEN) 7.5-200 MG         per tablet, MR Lumbar Spine w/o Contrast           Interval History:   Reason for visit: Low back pain  Onset: Four days  Description: Sharp, right,  lower back pain that is not relieved by any change in position while supine.  Course: Worse  Intensity: Severe  Associated symptoms: Denies any lower extremity weakness, urinary incontinence or saddle anesthesia.  History: YES, seen by this provider 3 months ago.  Evaluation: X-ray of lumbar spine last 5/2/2017 is normal.  Precipitating factor: Unknown.  Alleviating factor: None  Complications: None  Therapies tried and outcome: Tylenol (not effective)            Significant Problems:   Patient Active Problem List   Diagnosis     Esophageal reflux     CARDIOVASCULAR SCREENING; LDL GOAL LESS THAN 160     Chronic rhinitis     Acute midline low back pain without sciatica     Pain and swelling of knee, right     Chondromalacia patellae, right     S/P ACL reconstruction              Review of Systems:   C: NEGATIVE for fever, chills, change in weight  I: NEGATIVE for worrisome rashes, moles or lesions  E: NEGATIVE for vision changes or irritation  E/M: NEGATIVE for ear, mouth and throat problems  R: NEGATIVE for significant cough or SOB  CV: NEGATIVE for chest pain, palpitations or peripheral edema  GI: NEGATIVE for nausea, abdominal pain, heartburn, or change in bowel habits  : NEGATIVE for frequency, dysuria, or  hematuria  MUSCULOSKELETAL:As above.  N: NEGATIVE for weakness, dizziness or paresthesias  E: NEGATIVE for temperature intolerance, skin/hair changes  H: NEGATIVE for bleeding problems  P: NEGATIVE for changes in mood or affect            Medications:     Current Outpatient Prescriptions   Medication Sig     HYDROcodone-ibuprofen (VICOPROFEN) 7.5-200 MG per tablet Take 1 tablet by mouth nightly as needed for moderate to severe pain     fluticasone (FLONASE) 50 MCG/ACT spray Spray 1-2 sprays into both nostrils daily     omeprazole 20 MG tablet Take 1 tablet (20 mg) by mouth daily Take 30-60 minutes before a meal.     HYDROcodone-acetaminophen (NORCO) 5-325 MG per tablet Take 1 tablet by mouth daily as needed for moderate to severe pain     piroxicam (FELDENE) 10 MG capsule Take 1 capsule (10 mg) by mouth daily (with breakfast)     FEXOFENADINE HCL PO Daily     acetaminophen (TYLENOL) 325 MG tablet Take 325-650 mg by mouth 4 times daily as needed     ibuprofen (ADVIL,MOTRIN) 200 MG tablet Take 200 mg by mouth every 6 hours as needed     baclofen (LIORESAL) 10 MG tablet Take 1 tablet (10 mg) by mouth At Bedtime (Patient not taking: Reported on 8/28/2017)     No current facility-administered medications for this visit.              Physical Exam:   /72 (BP Location: Left arm, Patient Position: Chair, Cuff Size: Adult Regular)  Pulse 69  Temp 97.3  F (36.3  C) (Oral)  Ht 6' (1.829 m)  Wt 200 lb (90.7 kg)  SpO2 96%  BMI 27.12 kg/m2  Constitutional: awake, alert, cooperative, no apparent distress, and appears stated age  Eyes: sclera clear and conjunctiva normal  ENT: normocepalic, without obvious abnormality  Back: No tenderness on palpation of lumbar nor sacroiliac area; negative straight leg test.  Musculoskeletal: No redness, warmth, or swelling of the joints.  Full range of motion noted.  Motor strength is 5 out of 5 all extremities bilaterally.  Tone is normal.  Neurologic: Awake, alert, oriented to name,  place and time.  Cranial nerves II-XII are grossly intact.  Motor is 5 out of 5 bilaterally.   Sensory is intact.   and gait is normal.  Neuropsychiatric: Normal affect, mood, orientation, memory and insight.  Skin: No rashes, erythema, pallor, petechia or purpura.            Data:   XR LUMBAR SPINE 2-3 VIEWS 5/2/2017 6:04 PM     HISTORY: Low back pain.     COMPARISON: None.     FINDINGS: Lumbar alignment is anatomic. Vertebral body heights and  disc spaces are preserved.         IMPRESSION: Normal lumbar spine.     SUKI AG MD       Disposition: Follow-up visit if condition worsens.      Paddy Holly MD  Internal Medicine  [facility] Team

## 2017-08-28 NOTE — MR AVS SNAPSHOT
After Visit Summary   8/28/2017    Geovanni Jasso    MRN: [id number]           Patient Information     Date Of Birth          1960        Visit Information        Provider Department      8/28/2017 8:00 AM Paddy Holly MD [facility]        Today's Diagnoses     Right-sided low back pain without sciatica, unspecified chronicity    -  1      Care Instructions    This summary includes the important diagnoses, test, medications and other important parts of your medical history.  Below are a few good we sites you can use to learn more about these.     Www.Intergloss.org : Up to date and easily searchable information on multiple topics.  Www.[facility].[employer]/Pharmacy/c_[email or web address].asp : [facility] $4.99 medications  Www.[employer].gov : medication info, interactive tutorials, watch real surgeries online  Www.familydoctor.org : good info from the Academy of Family Physicians  Www.[employer]ura.[place] : good info from the [facility]  Www.cdc.gov : public health info, travel advisories, epidemics (H1N1)  Www.aap.org : children's health info, normal development, vaccinations  Www.health.[facility].[place].us : [place] dept of heatlh, public health issues in [place], N1N1    Based on your medical history and these are the current health maintenance or preventive care services that you are due for (some may have been done at this visit:)  Health Maintenance Due   Topic Date Due     ADVANCE DIRECTIVE PLANNING Q5 YRS  09/09/2013     =================================================================================  Normal Values   Blood pressure  <140/90 for most adults    <130/80 for some chronic diseases (ask your care team about yours)    BMI (body mass index)  18.5-25 kg/m2 (based on height and weight)     Thank you for visiting [facility]    Normal or non-critical lab and imaging results will be communicated to you by MyChart, letter or phone within 7 days.  If you do not hear from us  within 10 days, please call the clinic. If you have a critical or abnormal lab result, we will notify you by phone as soon as possible.     If you have any questions regarding your visit please contact:     Team Comfort:   Clinic Hours Telephone Number   Dr. Lázaro Roy  [facility]   7am-5pm  Monday - Friday [phone]  [facility]   Pharmacy 8am-8pm Monday-Thursday      8am-6pm Friday  9am-5pm Saturday-Sunday [phone]   Urgent Care 11am-8pm Monday-Friday        9am-5pm Saturday-Sunday [phone]     After hours, weekend or if you need to make an appointment with your primary provider please call [phone].   After Hours nurse advise: call [facility]: [phone]    Medication Refills:  Call your pharmacy and they will forward the refill to us. Please allow 3 business days for your refills to be completed.    Use [email or web address] (secure email communication and access to your chart) to send your primary care provider a message or make an appointment. Ask someone on your Team how to sign up for [email or web address]. To log on to [employer] or for more information in [employer] please visit the website at [email or web address].  As of October 8, 2013, all password changes, disabled accounts, or ID changes in [email or web address] will be done by our Access Services Department.   If you need help with your account or password, call: [phone]. Clinic staff no longer has the ability to change passwords.     What Is Lumbar Epidural Injection?  A lumbar epidural injection, which contains a numbing medicine and a steroid, won t stop all low back and leg pain. But it can reduce pain and break the pain cycle. This cycle may begin when back pain makes it hard to move. Lack of movement can then slow down the healing process. By getting you back on your feet, the injection can help speed your recovery. Some people may feel more relief from an injection than  others. And some people may need more than one injection to get relief. Usually, no more than 3 injections are done in a 12 month period. If the first injection did not help, it is less likely that another one will help.  A tool for diagnosis  An injection can help locate the source of pain. Also called a selective nerve block or a selective epidural, it numbs the roots of specific nerves. The effect lasts only briefly, but if you feel relief, it may indicate the source of the pain. If you feel no relief, it may mean that the pain s source is at another level in your spine. Or it may mean that something other than inflammation is causing the pain. Injection results also may be used to help plan back surgery, if needed.  Possible risks and complications  Here are some risks of lumbar epidural:    Spinal headache    Bleeding (rare)    Infection (rare)   Date Last Reviewed: 10/31/2015    4171-4224 [employer]. [address] [place] [address]. All rights reserved. This information is not intended as a substitute for professional medical care. Always follow your healthcare professional's instructions.        Back Pain (Acute or Chronic)    Back pain is one of the most common problems. The good news is that most people feel better in 1 to 2 weeks, and most of the rest in 1 to 2 months. Most people can remain active.  People experience and describe pain differently; not everyone is the same.    The pain can be sharp, stabbing, shooting, aching, cramping or burning.    Movement, standing, bending, lifting, sitting, or walking may worsen pain.    It can be localized to one spot or area, or it can be more generalized.    It can spread or radiate upwards, to the front, or go down your arms or legs (sciatica).    It can cause muscle spasm.  Most of the time, mechanical problems with the muscles or spine cause the pain. Mechanical problems are usually caused by an injury to the muscles or ligaments. While  illness can cause back pain, it is usually not caused by a serious illness. Mechanical problems include:     Physical activity such as sports, exercise, work, or normal activity    Overexertion, lifting, pushing, pulling incorrectly or too aggressively    Sudden twisting, bending, or stretching from an accident, or accidental movement    Poor posture    Stretching or moving wrong, without noticing pain at the time    Poor coordination, lack of regular exercise (check with your doctor about this)    Spinal disc disease or arthritis    Stress  Pain can also be related to pregnancy, or illness like appendicitis, bladder or kidney infections, pelvic infections, and many other things.  Acute back pain usually gets better in 1 to 2 weeks. Back pain related to disk disease, arthritis in the spinal joints or spinal stenosis (narrowing of the spinal canal) can become chronic and last for months or years.  Unless you had a physical injury (for example, a car accident or fall) X-rays are usually not needed for the initial evaluation of back pain. If pain continues and does not respond to medical treatment, X-rays and other tests may be needed.  Home care  Try these home care recommendations:    When in bed, try to find a position of comfort. A firm mattress is best. Try lying flat on your back with pillows under your knees. You can also try lying on your side with your knees bent up towards your chest and a pillow between your knees.    At first, do not try to stretch out the sore spots. If there is a strain, it is not like the good soreness you get after exercising without an injury. In this case, stretching may make it worse.    Avoid prolong sitting, long car rides, or travel. This puts more stress on the lower back than standing or walking.    During the first 24 to 72 hours after an acute injury or flare up of chronic back pain, apply an ice pack to the painful area for 20 minutes and then remove it for 20 minutes. Do  this over a period of 60 to 90 minutes or several times a day. This will reduce swelling and pain. Wrap the ice pack in a thin towel or plastic to protect your skin.    You can start with ice, then switch to heat. Heat (hot shower, hot bath, or heating pad) reduces pain and works well for muscle spasms. Heat can be applied to the painful area for 20 minutes then remove it for 20 minutes. Do this over a period of 60 to 90 minutes or several times a day. Do not sleep on a heating pad. It can lead to skin burns or tissue damage.    You can alternate ice and heat therapy. Talk with your doctor about the best treatment for your back pain.    Therapeutic massage can help relax the back muscles without stretching them.    Be aware of safe lifting methods and do not lift anything without stretching first.  Medicines  Talk to your doctor before using medicine, especially if you have other medical problems or are taking other medicines.    You may use over-the-counter medicine as directed on the bottle to control pain, unless another pain medicine was prescribed. If you have chronic conditions like diabetes, liver or kidney disease, stomach ulcers, or gastrointestinal bleeding, or are taking blood thinners, talk to your doctor before taking any medicine.    Be careful if you are given a prescription medicines, narcotics, or medicine for muscle spasms. They can cause drowsiness, affect your coordination, reflexes, and judgement. Do not drive or operate heavy machinery.  Follow-up care  Follow up with your healthcare provider, or as advised.   A radiologist will review any X-rays that were taken. Your provide will notify you of any new findings that may affect your care.  Call 911  Call emergency services if any of the following occur:    Trouble breathing    Confusion    Very drowsy or trouble awakening    Fainting or loss of consciousness    Rapid or very slow heart rate    Loss of bowel or bladder control  When to seek  medical advice  Call your healthcare provider right away if any of these occur:     Pain becomes worse or spreads to your legs    Weakness or numbness in one or both legs    Numbness in the groin or genital area  Date Last Reviewed: 7/1/2016 2000-2017 [employer]. [address] [place] [address]. All rights reserved. This information is not intended as a substitute for professional medical care. Always follow your healthcare professional's instructions.                Follow-ups after your visit        Future tests that were ordered for you today     Open Future Orders        Priority Expected Expires Ordered    MR Lumbar Spine w/o Contrast Routine  8/28/2018 8/28/2017            Who to contact     If you have questions or need follow up information about today's clinic visit or your schedule please contact [facility] directly at [phone].  Normal or non-critical lab and imaging results will be communicated to you by LilyMediahart, letter or phone within 4 business days after the clinic has received the results. If you do not hear from us within 7 days, please contact the clinic through LilyMediahart or phone. If you have a critical or abnormal lab result, we will notify you by phone as soon as possible.  Submit refill requests through [facility] or call your pharmacy and they will forward the refill request to us. Please allow 3 business days for your refill to be completed.          Additional Information About Your Visit        LilyMediahart Information     [employer]s gives you secure access to your electronic health record. If you see a primary care provider, you can also send messages to your care team and make appointments. If you have questions, please call your primary care clinic.  If you do not have a primary care provider, please call [phone] and they will assist you.        Care EveryWhere ID     This is your Care EveryWhere ID. This could be used by other organizations to  access your [facility] medical records  [id number]        Your Vitals Were     Pulse Temperature Height Pulse Oximetry BMI (Body Mass Index)       69 97.3  F (36.3  C) (Oral) 6' (1.829 m) 96% 27.12 kg/m2        Blood Pressure from Last 3 Encounters:   08/28/17 116/72   05/02/17 139/86   11/30/16 116/72    Weight from Last 3 Encounters:   08/28/17 200 lb (90.7 kg)   05/09/17 198 lb (89.8 kg)   05/02/17 199 lb (90.3 kg)                 Today's Medication Changes          These changes are accurate as of: 8/28/17  8:33 AM.  If you have any questions, ask your nurse or doctor.               Start taking these medicines.        Dose/Directions    HYDROcodone-ibuprofen 7.5-200 MG per tablet   Commonly known as:  VICOPROFEN   Used for:  Right-sided low back pain without sciatica, unspecified chronicity   Started by:  Paddy Holly MD        Dose:  1 tablet   Take 1 tablet by mouth nightly as needed for moderate to severe pain   Quantity:  30 tablet   Refills:  0            Where to get your medicines      Some of these will need a paper prescription and others can be bought over the counter.  Ask your nurse if you have questions.     Bring a paper prescription for each of these medications     HYDROcodone-ibuprofen 7.5-200 MG per tablet                Primary Care Provider Office Phone # Fax #    Madhavi Ishamel García [phone] [address] [facility] [address]        Equal Access to Services     [facility] AH: Hadii nelsy ku hadasho Soomaali, waaxda luqadaha, qaybta kaalmada adeegyada, waxay idiin hayaan adeeg kharash la'aan . So [facility] [phone].    ATENCIÓN: Si habla español, tiene a fisher disposición servicios gratuitos de asistencia lingüística. Llame al [phone].    We comply with applicable federal civil rights laws and [place] laws. We do not discriminate on the basis of race, color, national origin, age, disability sex, sexual orientation or gender identity.             Thank you!     Thank you for choosing [facility]  for your care. Our goal is always to provide you with excellent care. Hearing back from our patients is one way we can continue to improve our services. Please take a few minutes to complete the written survey that you may receive in the mail after your visit with us. Thank you!             Your Updated Medication List - Protect others around you: Learn how to safely use, store and throw away your medicines at www.disposemymeds.org.          This list is accurate as of: 8/28/17  8:33 AM.  Always use your most recent med list.                   Brand Name Dispense Instructions for use Diagnosis    acetaminophen 325 MG tablet    TYLENOL     Take 325-650 mg by mouth 4 times daily as needed        baclofen 10 MG tablet    LIORESAL    90 tablet    Take 1 tablet (10 mg) by mouth At Bedtime    Acute midline low back pain without sciatica       FEXOFENADINE HCL PO      Daily        fluticasone 50 MCG/ACT spray    FLONASE    16 g    Spray 1-2 sprays into both nostrils daily    Chronic rhinitis       HYDROcodone-acetaminophen 5-325 MG per tablet    NORCO    30 tablet    Take 1 tablet by mouth daily as needed for moderate to severe pain    Acute midline low back pain without sciatica       HYDROcodone-ibuprofen 7.5-200 MG per tablet    VICOPROFEN    30 tablet    Take 1 tablet by mouth nightly as needed for moderate to severe pain    Right-sided low back pain without sciatica, unspecified chronicity       ibuprofen 200 MG tablet    ADVIL/MOTRIN     Take 200 mg by mouth every 6 hours as needed        omeprazole 20 MG tablet     90 tablet    Take 1 tablet (20 mg) by mouth daily Take 30-60 minutes before a meal.    Gastroesophageal reflux disease, esophagitis presence not specified       piroxicam 10 MG capsule    FELDENE    30 capsule    Take 1 capsule (10 mg) by mouth daily (with breakfast)    Pain and swelling of knee, right, Acute midline low back pain  without sciatica

## 2017-08-28 NOTE — PATIENT INSTRUCTIONS
This summary includes the important diagnoses, test, medications and other important parts of your medical history.  Below are a few good we sites you can use to learn more about these.     Www.Beisen.org : Up to date and easily searchable information on multiple topics.  Www.Beisen.org/Pharmacy/c_[email or web address].asp : [facility] $4.99 medications  Www.medlineplus.gov : medication info, interactive tutorials, watch real surgeries online  Www.familydoctor.org : good info from the Academy of Family Physicians  Www.mayoO[facility]otinic.com : good info from the [facility]  Www.cdc.gov : public health info, travel advisories, epidemics (H1N1)  Www.aap.org : children's health info, normal development, vaccinations  Www.health.[facility].[place].us : [place] dept of heat, public health issues in [place], N1N1    Based on your medical history and these are the current health maintenance or preventive care services that you are due for (some may have been done at this visit:)  Health Maintenance Due   Topic Date Due     ADVANCE DIRECTIVE PLANNING Q5 YRS  09/09/2013     =================================================================================  Normal Values   Blood pressure  <140/90 for most adults    <130/80 for some chronic diseases (ask your care team about yours)    BMI (body mass index)  18.5-25 kg/m2 (based on height and weight)     Thank you for visiting [facility]    Normal or non-critical lab and imaging results will be communicated to you by MyChart, letter or phone within 7 days.  If you do not hear from us within 10 days, please call the clinic. If you have a critical or abnormal lab result, we will notify you by phone as soon as possible.     If you have any questions regarding your visit please contact:     Team Comfort:   Clinic Hours Telephone Number   Dr. Lázaro Minor   7am-5pm  Monday - Friday [phone]  [facility]    Pharmacy 8am-8pm Monday-Thursday      8am-6pm Friday  9am-5pm Saturday-Sunday [phone]   Urgent Care 11am-8pm Monday-Friday        9am-5pm Saturday-Sunday [phone]     After hours, weekend or if you need to make an appointment with your primary provider please call [phone].   After Hours nurse advise: call [facility]: [phone]    Medication Refills:  Call your pharmacy and they will forward the refill to us. Please allow 3 business days for your refills to be completed.    Use [employer] (secure email communication and access to your chart) to send your primary care provider a message or make an appointment. Ask someone on your Team how to sign up for [employer]. To log on to [employer]odo or for more information in [employer] please visit the website at [email or web address].  As of October 8, 2013, all password changes, disabled accounts, or ID changes in [employer]/MyHealth will be done by our Access Services Department.   If you need help with your account or password, call: [phone]. Clinic staff no longer has the ability to change passwords.     What Is Lumbar Epidural Injection?  A lumbar epidural injection, which contains a numbing medicine and a steroid, won t stop all low back and leg pain. But it can reduce pain and break the pain cycle. This cycle may begin when back pain makes it hard to move. Lack of movement can then slow down the healing process. By getting you back on your feet, the injection can help speed your recovery. Some people may feel more relief from an injection than others. And some people may need more than one injection to get relief. Usually, no more than 3 injections are done in a 12 month period. If the first injection did not help, it is less likely that another one will help.  A tool for diagnosis  An injection can help locate the source of pain. Also called a selective nerve block or a selective epidural, it numbs the roots of specific nerves. The effect  lasts only briefly, but if you feel relief, it may indicate the source of the pain. If you feel no relief, it may mean that the pain s source is at another level in your spine. Or it may mean that something other than inflammation is causing the pain. Injection results also may be used to help plan back surgery, if needed.  Possible risks and complications  Here are some risks of lumbar epidural:    Spinal headache    Bleeding (rare)    Infection (rare)   Date Last Reviewed: 10/31/2015    7299-5341 The [employer]. [address] [place] [address]. All rights reserved. This information is not intended as a substitute for professional medical care. Always follow your healthcare professional's instructions.        Back Pain (Acute or Chronic)    Back pain is one of the most common problems. The good news is that most people feel better in 1 to 2 weeks, and most of the rest in 1 to 2 months. Most people can remain active.  People experience and describe pain differently; not everyone is the same.    The pain can be sharp, stabbing, shooting, aching, cramping or burning.    Movement, standing, bending, lifting, sitting, or walking may worsen pain.    It can be localized to one spot or area, or it can be more generalized.    It can spread or radiate upwards, to the front, or go down your arms or legs (sciatica).    It can cause muscle spasm.  Most of the time, mechanical problems with the muscles or spine cause the pain. Mechanical problems are usually caused by an injury to the muscles or ligaments. While illness can cause back pain, it is usually not caused by a serious illness. Mechanical problems include:     Physical activity such as sports, exercise, work, or normal activity    Overexertion, lifting, pushing, pulling incorrectly or too aggressively    Sudden twisting, bending, or stretching from an accident, or accidental movement    Poor posture    Stretching or moving wrong, without noticing  pain at the time    Poor coordination, lack of regular exercise (check with your doctor about this)    Spinal disc disease or arthritis    Stress  Pain can also be related to pregnancy, or illness like appendicitis, bladder or kidney infections, pelvic infections, and many other things.  Acute back pain usually gets better in 1 to 2 weeks. Back pain related to disk disease, arthritis in the spinal joints or spinal stenosis (narrowing of the spinal canal) can become chronic and last for months or years.  Unless you had a physical injury (for example, a car accident or fall) X-rays are usually not needed for the initial evaluation of back pain. If pain continues and does not respond to medical treatment, X-rays and other tests may be needed.  Home care  Try these home care recommendations:    When in bed, try to find a position of comfort. A firm mattress is best. Try lying flat on your back with pillows under your knees. You can also try lying on your side with your knees bent up towards your chest and a pillow between your knees.    At first, do not try to stretch out the sore spots. If there is a strain, it is not like the good soreness you get after exercising without an injury. In this case, stretching may make it worse.    Avoid prolong sitting, long car rides, or travel. This puts more stress on the lower back than standing or walking.    During the first 24 to 72 hours after an acute injury or flare up of chronic back pain, apply an ice pack to the painful area for 20 minutes and then remove it for 20 minutes. Do this over a period of 60 to 90 minutes or several times a day. This will reduce swelling and pain. Wrap the ice pack in a thin towel or plastic to protect your skin.    You can start with ice, then switch to heat. Heat (hot shower, hot bath, or heating pad) reduces pain and works well for muscle spasms. Heat can be applied to the painful area for 20 minutes then remove it for 20 minutes. Do this over  a period of 60 to 90 minutes or several times a day. Do not sleep on a heating pad. It can lead to skin burns or tissue damage.    You can alternate ice and heat therapy. Talk with your doctor about the best treatment for your back pain.    Therapeutic massage can help relax the back muscles without stretching them.    Be aware of safe lifting methods and do not lift anything without stretching first.  Medicines  Talk to your doctor before using medicine, especially if you have other medical problems or are taking other medicines.    You may use over-the-counter medicine as directed on the bottle to control pain, unless another pain medicine was prescribed. If you have chronic conditions like diabetes, liver or kidney disease, stomach ulcers, or gastrointestinal bleeding, or are taking blood thinners, talk to your doctor before taking any medicine.    Be careful if you are given a prescription medicines, narcotics, or medicine for muscle spasms. They can cause drowsiness, affect your coordination, reflexes, and judgement. Do not drive or operate heavy machinery.  Follow-up care  Follow up with your healthcare provider, or as advised.   A radiologist will review any X-rays that were taken. Your provide will notify you of any new findings that may affect your care.  Call 911  Call emergency services if any of the following occur:    Trouble breathing    Confusion    Very drowsy or trouble awakening    Fainting or loss of consciousness    Rapid or very slow heart rate    Loss of bowel or bladder control  When to seek medical advice  Call your healthcare provider right away if any of these occur:     Pain becomes worse or spreads to your legs    Weakness or numbness in one or both legs    Numbness in the groin or genital area  Date Last Reviewed: 7/1/2016 2000-2017 [employer]. [address] [place] [address], [place] [address]. All rights reserved. This information is not intended as a substitute for  professional medical care. Always follow your healthcare professional's instructions.

## 2017-08-28 NOTE — NURSING NOTE
Chief Complaint   Patient presents with     Back Pain       Initial /72 (BP Location: Left arm, Patient Position: Chair, Cuff Size: Adult Regular)  Pulse 69  Temp 97.3  F (36.3  C) (Oral)  Ht 6' (1.829 m)  Wt 200 lb (90.7 kg)  SpO2 96%  BMI 27.12 kg/m2 Estimated body mass index is 27.12 kg/(m^2) as calculated from the following:    Height as of this encounter: 6' (1.829 m).    Weight as of this encounter: 200 lb (90.7 kg).  Medication Reconciliation: complete     Jake Ayala MA

## 2017-09-06 ENCOUNTER — RADIANT APPOINTMENT (OUTPATIENT)
Dept: MRI IMAGING | Facility: CLINIC | Age: 57
End: 2017-09-06
Attending: INTERNAL MEDICINE
Payer: COMMERCIAL

## 2017-09-06 DIAGNOSIS — M54.50 RIGHT-SIDED LOW BACK PAIN WITHOUT SCIATICA, UNSPECIFIED CHRONICITY: ICD-10-CM

## 2017-09-06 PROCEDURE — 72148 MRI LUMBAR SPINE W/O DYE: CPT | Performed by: RADIOLOGY

## 2017-10-17 ENCOUNTER — OFFICE VISIT (OUTPATIENT)
Dept: OTOLARYNGOLOGY | Facility: CLINIC | Age: 57
End: 2017-10-17
Payer: COMMERCIAL

## 2017-10-17 ENCOUNTER — OFFICE VISIT (OUTPATIENT)
Dept: AUDIOLOGY | Facility: CLINIC | Age: 57
End: 2017-10-17
Payer: COMMERCIAL

## 2017-10-17 VITALS — RESPIRATION RATE: 12 BRPM | BODY MASS INDEX: 27.09 KG/M2 | HEIGHT: 72 IN | WEIGHT: 200 LBS

## 2017-10-17 DIAGNOSIS — H90.3 BILATERAL SENSORINEURAL HEARING LOSS: Primary | ICD-10-CM

## 2017-10-17 DIAGNOSIS — H90.3 SENSORINEURAL HEARING LOSS, BILATERAL: Primary | ICD-10-CM

## 2017-10-17 PROCEDURE — 92557 COMPREHENSIVE HEARING TEST: CPT | Performed by: AUDIOLOGIST

## 2017-10-17 PROCEDURE — 99207 ZZC NO CHARGE LOS: CPT | Performed by: AUDIOLOGIST

## 2017-10-17 PROCEDURE — 99204 OFFICE O/P NEW MOD 45 MIN: CPT | Performed by: OTOLARYNGOLOGY

## 2017-10-17 PROCEDURE — 92567 TYMPANOMETRY: CPT | Performed by: AUDIOLOGIST

## 2017-10-17 NOTE — MR AVS SNAPSHOT
After Visit Summary   10/17/2017    Geovanni Jasso    MRN: [id number]           Patient Information     Date Of Birth          1960        Visit Information        Provider Department      10/17/2017 8:30 AM Freddy Juarez MD [facility]        Today's Diagnoses     Bilateral sensorineural hearing loss    -  1      Care Instructions    Scheduling Information  To schedule your CT/MRI scan, please contact [facility] at [phone] OR [facility] at [phone]    To schedule your Surgery, please contact our Specialty Schedulers at [phone]      ENT Clinic Locations Clinic Hours Telephone Number     [facility] [place]  [address]  [place] [address]   Monday:           1:00pm -- 5:00pm    Friday:              8:00am - 12:00pm   To schedule/reschedule an appointment with   Dr. Juarez,   please contact our   Specialty Scheduling Department at:     [phone]       [facility]  [address] [place] [address] Tuesday:          8:00am -- 2:00pm         Urgent Care Locations Clinic Hours Telephone Numbers     [facility]  [address] [place] [address]     Monday-Friday:     11:00am - 9:00pm    Saturday-Sunday:  9:00am - 5:00pm   [phone]     [facility]  [address] [place] [address]     Monday-Friday:      5:00pm - 9:00pm     Saturday-Sunday:  9:00am - 5:00pm   [phone]                 Follow-ups after your visit        Additional Services     AUDIOLOGY ADULT REFERRAL       ENT visit                  Who to contact     If you have questions or need follow up information about today's clinic visit or your schedule please contact [facility] directly at [phone].  Normal or non-critical lab and imaging results will be communicated to you by MyChart, letter or phone within 4 business days after the clinic has received the results. If you do not hear from us within  7 days, please contact the clinic through Attendify or phone. If you have a critical or abnormal lab result, we will notify you by phone as soon as possible.  Submit refill requests through Attendify or call your pharmacy and they will forward the refill request to us. Please allow 3 business days for your refill to be completed.          Additional Information About Your Visit        [facility] Labshart Information     [employer] gives you secure access to your electronic health record. If you see a primary care provider, you can also send messages to your care team and make appointments. If you have questions, please call your primary care clinic.  If you do not have a primary care provider, please call [phone] and they will assist you.        Care EveryWhere ID     This is your Care EveryWhere ID. This could be used by other organizations to access your [facility] medical records  [id number]        Your Vitals Were     Respirations Height BMI (Body Mass Index)             12 1.829 m (6') 27.12 kg/m2          Blood Pressure from Last 3 Encounters:   08/28/17 116/72   05/02/17 139/86   11/30/16 116/72    Weight from Last 3 Encounters:   10/17/17 90.7 kg (200 lb)   08/28/17 90.7 kg (200 lb)   05/09/17 89.8 kg (198 lb)              We Performed the Following     AUDIOLOGY ADULT REFERRAL        Primary Care Provider Office Phone # Fax #    Madhavi Ishmael García [phone] [address] [facility] [address]        Equal Access to Services     [facility]: Hadii aad ku hadasho Soomaali, waaxda luqadaha, qaybta kaalmada adeegyada, christiano presley . So [facility] [phone].    ATENCIÓN: Si habla español, tiene a fisher disposición servicios gratuitos de asistencia lingüística. Llame al [phone].    We comply with applicable federal civil rights laws and [place] laws. We do not discriminate on the basis of race, color, national origin, age, disability, sex, sexual orientation,  or gender identity.            Thank you!     Thank you for choosing [facility]  for your care. Our goal is always to provide you with excellent care. Hearing back from our patients is one way we can continue to improve our services. Please take a few minutes to complete the written survey that you may receive in the mail after your visit with us. Thank you!             Your Updated Medication List - Protect others around you: Learn how to safely use, store and throw away your medicines at www.disposemymeds.org.          This list is accurate as of: 10/17/17  9:15 AM.  Always use your most recent med list.                   Brand Name Dispense Instructions for use Diagnosis    acetaminophen 325 MG tablet    TYLENOL     Take 325-650 mg by mouth 4 times daily as needed        baclofen 10 MG tablet    LIORESAL    90 tablet    Take 1 tablet (10 mg) by mouth At Bedtime    Acute midline low back pain without sciatica       FEXOFENADINE HCL PO      Daily        fluticasone 50 MCG/ACT spray    FLONASE    16 g    Spray 1-2 sprays into both nostrils daily    Chronic rhinitis       HYDROcodone-acetaminophen 5-325 MG per tablet    NORCO    30 tablet    Take 1 tablet by mouth daily as needed for moderate to severe pain    Acute midline low back pain without sciatica       HYDROcodone-ibuprofen 7.5-200 MG per tablet    VICOPROFEN    30 tablet    Take 1 tablet by mouth nightly as needed for moderate to severe pain    Right-sided low back pain without sciatica, unspecified chronicity       ibuprofen 200 MG tablet    ADVIL/MOTRIN     Take 200 mg by mouth every 6 hours as needed        omeprazole 20 MG tablet     90 tablet    Take 1 tablet (20 mg) by mouth daily Take 30-60 minutes before a meal.    Gastroesophageal reflux disease, esophagitis presence not specified       piroxicam 10 MG capsule    FELDENE    30 capsule    Take 1 capsule (10 mg) by mouth daily (with breakfast)    Pain and swelling of knee, right,  Acute midline low back pain without sciatica

## 2017-10-17 NOTE — PROGRESS NOTES
AUDIOLOGY REPORT     SUMMARY: Audiology visit completed. See audiogram for results.     RECOMMENDATIONS: Follow-up with ENT    Dayne Bustos Licensed Audiologist #[id number]

## 2017-10-17 NOTE — MR AVS SNAPSHOT
After Visit Summary   10/17/2017    Geovanni Jasso    MRN: [id number]           Patient Information     Date Of Birth          1960        Visit Information        Provider Department      10/17/2017 8:00 AM Alexandra Ortiz AuD [facility]        Today's Diagnoses     Sensorineural hearing loss, bilateral    -  1       Follow-ups after your visit        Who to contact     If you have questions or need follow up information about today's clinic visit or your schedule please contact [facility] directly at [phone].  Normal or non-critical lab and imaging results will be communicated to you by Solutohart, letter or phone within 4 business days after the clinic has received the results. If you do not hear from us within 7 days, please contact the clinic through Solutohart or phone. If you have a critical or abnormal lab result, we will notify you by phone as soon as possible.  Submit refill requests through [email or web address] or call your pharmacy and they will forward the refill request to us. Please allow 3 business days for your refill to be completed.          Additional Information About Your Visit        MyChart Information     [email or web address] gives you secure access to your electronic health record. If you see a primary care provider, you can also send messages to your care team and make appointments. If you have questions, please call your primary care clinic.  If you do not have a primary care provider, please call [phone] and they will assist you.        Care EveryWhere ID     This is your Care EveryWhere ID. This could be used by other organizations to access your [facility] medical records  [id number]         Blood Pressure from Last 3 Encounters:   08/28/17 116/72   05/02/17 139/86   11/30/16 116/72    Weight from Last 3 Encounters:   10/17/17 200 lb (90.7 kg)   08/28/17 200 lb (90.7 kg)   05/09/17 198 lb (89.8 kg)              We Performed the Following      AUDIOGRAM/TYMPANOGRAM - INTERFACE     COMPREHENSIVE HEARING TEST     TYMPANOMETRY        Primary Care Provider Office Phone # Fax #    Madhavi Ishmael García [phone] [address] [facility] [address]        Equal Access to Services     LIUDMILA MAYS : Hadii nelsy ku hadpranavo Soomaali, waaxda luqadaha, qaybta kaalmada adeegyada, waxkyle idiin hayaan adeanil bonilla sakina maradiaga. So [facility] [phone].    ATENCIÓN: Si habla español, tiene a fisher disposición servicios gratuitos de asistencia lingüística. Llame al [phone].    We comply with applicable federal civil rights laws and [place] laws. We do not discriminate on the basis of race, color, national origin, age, disability, sex, sexual orientation, or gender identity.            Thank you!     Thank you for choosing [facility]  for your care. Our goal is always to provide you with excellent care. Hearing back from our patients is one way we can continue to improve our services. Please take a few minutes to complete the written survey that you may receive in the mail after your visit with us. Thank you!             Your Updated Medication List - Protect others around you: Learn how to safely use, store and throw away your medicines at www.disposemymeds.org.          This list is accurate as of: 10/17/17  9:57 AM.  Always use your most recent med list.                   Brand Name Dispense Instructions for use Diagnosis    acetaminophen 325 MG tablet    TYLENOL     Take 325-650 mg by mouth 4 times daily as needed        baclofen 10 MG tablet    LIORESAL    90 tablet    Take 1 tablet (10 mg) by mouth At Bedtime    Acute midline low back pain without sciatica       FEXOFENADINE HCL PO      Daily        fluticasone 50 MCG/ACT spray    FLONASE    16 g    Spray 1-2 sprays into both nostrils daily    Chronic rhinitis       HYDROcodone-acetaminophen 5-325 MG per tablet    NORCO    30 tablet    Take 1 tablet by mouth daily as  needed for moderate to severe pain    Acute midline low back pain without sciatica       HYDROcodone-ibuprofen 7.5-200 MG per tablet    VICOPROFEN    30 tablet    Take 1 tablet by mouth nightly as needed for moderate to severe pain    Right-sided low back pain without sciatica, unspecified chronicity       ibuprofen 200 MG tablet    ADVIL/MOTRIN     Take 200 mg by mouth every 6 hours as needed        omeprazole 20 MG tablet     90 tablet    Take 1 tablet (20 mg) by mouth daily Take 30-60 minutes before a meal.    Gastroesophageal reflux disease, esophagitis presence not specified       piroxicam 10 MG capsule    FELDENE    30 capsule    Take 1 capsule (10 mg) by mouth daily (with breakfast)    Pain and swelling of knee, right, Acute midline low back pain without sciatica

## 2017-10-17 NOTE — PATIENT INSTRUCTIONS
Scheduling Information  To schedule your CT/MRI scan, please contact [facility] at [phone] OR [facility] at [phone]    To schedule your Surgery, please contact our Specialty Schedulers at [phone]      ENT Clinic Locations Clinic Hours Telephone Number     [facility]  [address] [place] [address]   Monday:           1:00pm -- 5:00pm    Friday:              8:00am - 12:00pm   To schedule/reschedule an appointment with   Dr. Juarez,   please contact our   Specialty Scheduling Department at:     [phone]       [facility]  [address] [place] [address] Tuesday:          8:00am -- 2:00pm         Urgent Care Locations Clinic Hours Telephone Numbers     [facility]  [address] [place] [address]     Monday-Friday:     11:00am - 9:00pm    Saturday-Sunday:  9:00am - 5:00pm   [phone]     [facility]  [address] [place] [address]     Monday-Friday:      5:00pm - 9:00pm     Saturday-Sunday:  9:00am - 5:00pm   [phone]

## 2017-10-17 NOTE — PROGRESS NOTES
History of Present Illness - Geovanni Jasso is a 57 year old male is here to see me for the first time due to hearing loss.    His history goes back a long time.  In 1978, he had a sudden sensorineural hearing loss of the RIGHT ear following a flu.  It barely recovered, and he has had profound sensorineural hearing loss in the RIGHT ear.  He has historically been followed by Dr. Howard's group, and had exploratory surgery by him without benefit, and has tried a CROS hearing aid in the RIGHT side as well.    He continues to have tinnitus in the RIGHT ear.  But otherwise, no pain or infections, and he had no previous history of ear disease.  He has gotten to the point where there is some hearing loss in the LEFT ear that is really starting to effect his ability to listen and communicate.    Past Medical History -   Patient Active Problem List   Diagnosis     Esophageal reflux     CARDIOVASCULAR SCREENING; LDL GOAL LESS THAN 160     Chronic rhinitis     Acute midline low back pain without sciatica     Pain and swelling of knee, right     Chondromalacia patellae, right     S/P ACL reconstruction       Current Medications -   Current Outpatient Prescriptions:      HYDROcodone-ibuprofen (VICOPROFEN) 7.5-200 MG per tablet, Take 1 tablet by mouth nightly as needed for moderate to severe pain, Disp: 30 tablet, Rfl: 0     fluticasone (FLONASE) 50 MCG/ACT spray, Spray 1-2 sprays into both nostrils daily, Disp: 16 g, Rfl: 11     omeprazole 20 MG tablet, Take 1 tablet (20 mg) by mouth daily Take 30-60 minutes before a meal., Disp: 90 tablet, Rfl: 1     HYDROcodone-acetaminophen (NORCO) 5-325 MG per tablet, Take 1 tablet by mouth daily as needed for moderate to severe pain, Disp: 30 tablet, Rfl: 0     piroxicam (FELDENE) 10 MG capsule, Take 1 capsule (10 mg) by mouth daily (with breakfast), Disp: 30 capsule, Rfl: 5     baclofen (LIORESAL) 10 MG tablet, Take 1 tablet (10 mg) by mouth At Bedtime (Patient not taking: Reported on  8/28/2017), Disp: 90 tablet, Rfl: 5     FEXOFENADINE HCL PO, Daily, Disp: , Rfl:      acetaminophen (TYLENOL) 325 MG tablet, Take 325-650 mg by mouth 4 times daily as needed, Disp: , Rfl:      ibuprofen (ADVIL,MOTRIN) 200 MG tablet, Take 200 mg by mouth every 6 hours as needed, Disp: , Rfl:     Allergies -   Allergies   Allergen Reactions     Penicillins        Social History -   Social History     Social History     Marital status:      Spouse name: N/A     Number of children: N/A     Years of education: N/A     Social History Main Topics     Smoking status: Never Smoker     Smokeless tobacco: Never Used     Alcohol use Yes      Comment: occasionally     Drug use: No     Sexual activity: Yes     Other Topics Concern     Not on file     Social History Narrative       Family History - No family history on file.    Review of Systems - As per HPI and PMHx, otherwise 10+ system review of the head and neck, and general constitution is negative.    Physical Exam  Resp 12  Ht 1.829 m (6')  Wt 90.7 kg (200 lb)  BMI 27.12 kg/m2    General - The patient is well nourished and well developed, and appears to have good nutritional status.  Alert and oriented to person and place, answers questions and cooperates with examination appropriately.   Head and Face - Normocephalic and atraumatic, with no gross asymmetry noted of the contour of the facial features.  The facial nerve is intact, with strong symmetric movements.  Voice and Breathing - The patient was breathing comfortably without the use of accessory muscles. There was no wheezing, stridor, or stertor.  The patients voice was clear and strong, and had appropriate pitch and quality.  Ears - The tympanic membranes are normal in appearance, bony landmarks are intact.  No retraction, perforation, or masses.  Normal mobility on valsalva maneuver, with no reports of dizziness on insuflation.  No fluid or purulence was seen in the external canal or the middle ear. No  evidence of infection of the middle ear or external canal, cerumen was normal in appearance.  Eyes - Extraocular movements intact, and the pupils were reactive to light.  Sclera were not icteric or injected, conjunctiva were pink and moist.  Mouth - Examination of the oral cavity showed pink, healthy oral mucosa. No lesions or ulcerations noted.  The tongue was mobile and midline, and the dentition were in good condition.    Throat - The walls of the oropharynx were smooth, pink, moist, symmetric, and had no lesions or ulcerations.  The tonsillar pillars and soft palate were symmetric.  The uvula was midline on elevation.    Neck - Normal midline excursion of the laryngotracheal complex during swallowing.  Full range of motion on passive movement.  Palpation of the occipital, submental, submandibular, internal jugular chain, and supraclavicular nodes did not demonstrate any abnormal lymph nodes or masses.  The carotid pulse was palpable bilaterally.  Palpation of the thyroid was soft and smooth, with no nodules or goiter appreciated.  The trachea was mobile and midline.  Nose - External contour is symmetric, no gross deflection or scars.  Nasal mucosa is pink and moist with no abnormal mucus.  The septum was midline and non-obstructive, turbinates of normal size and position.  No polyps, masses, or purulence noted on examination.    Audiologic Studies - An audiogram and tympanogram were performed today as part of the evaluation and personally reviewed. The tympanogram shows a normal Type A curve, with normal canal volume and middle ear pressure.  There is no sign of eustachian tube dysfunction or middle ear effusion.  The audiogram shows a profound RIGHT ear sensorineural hearing loss.  The LEFT ear shows moderate downsloping LEFT sensorineural hearing loss.  No conductive hearing loss bilaterally.      A/P - Geovanni Jasso is a 57 year old male  (H90.3) Bilateral sensorineural hearing loss  (primary encounter  diagnosis)  Comment: Bill is definitely at a point of hearing disability with the natural degradation of the LEFT ear.  The RIGHT ear is a dead ear.    I think that he should consider either the newer versions of CROS aids, or at the very least try a hearing aid in the LEFT ear.

## 2017-10-17 NOTE — NURSING NOTE
Chief Complaint   Patient presents with     Consult     hearing loss       Initial Resp 12  Ht 1.829 m (6')  Wt 90.7 kg (200 lb)  BMI 27.12 kg/m2 Estimated body mass index is 27.12 kg/(m^2) as calculated from the following:    Height as of this encounter: 1.829 m (6').    Weight as of this encounter: 90.7 kg (200 lb).  Medication Reconciliation: complete     Shaquille Kelly, CMA

## 2017-10-25 ENCOUNTER — OFFICE VISIT (OUTPATIENT)
Dept: AUDIOLOGY | Facility: CLINIC | Age: 57
End: 2017-10-25
Payer: COMMERCIAL

## 2017-10-25 DIAGNOSIS — H90.3 SNHL (SENSORY-NEURAL HEARING LOSS), ASYMMETRICAL: Primary | ICD-10-CM

## 2017-10-25 PROCEDURE — 92591 HC HEARING AID EXAM BINAURAL: CPT | Performed by: AUDIOLOGIST

## 2017-10-25 NOTE — PROGRESS NOTES
AUDIOLOGY REPORT    SUBJECTIVE: Geovanni Jasso is a 57 year old male was seen in the Audiology Clinic at  [facility] on 10/25/17 to discuss concerns with hearing and functional communication difficulties. Geovanni has been seen previously on 10/17/17, and results revealed an asymmetrical sensorineural hearing loss with much worse hearing in the right ear.  The patient was medically evaluated and determined to be cleared for binaural hearing aids by Dr. Juarez. Geovanni notes difficulty with communication in a variety of listening situations.    OBJECTIVE:  Patient is a hearing aid candidate. Patient would like to move forward with a hearing aid evaluation today. Therefore, the patient was presented with different options for amplification to help aid in communication. Discussed styles, levels of technology and monaural vs. binaural fitting.     The hearing aid(s) mutually chosen were:  Binaural: Phonak BiCROS BTE (Right: CROS B 312, Left: [place] )  COLOR: silver gray  BATTERY SIZE: 312  CANAL/ LENGTH: 1    ASSESSMENT:     ICD-10-CM    1. SNHL (sensory-neural hearing loss), asymmetrical H90.5 HEARING AID EXAM BINAURAL       Reviewed purchase information and warranty information with patient. The 45 day trial period was explained to patient. The patient was given a copy of the [place] Department of Health consumer brochure on purchasing hearing instruments. Patient risk factors have been provided to the patient in writing prior to the sale of the hearing aid per FDA regulation. The risk factors are also available in the User Instructional Booklet to be presented on the day of the hearing aid fitting. Hearing aid(s) ordered. Hearing aid evaluation completed.    PLAN: Geovanni is scheduled to return in 2-3 weeks for a hearing aid fitting and programming. Purchase agreement will be completed on that date. Please contact this clinic with any questions or concerns.      Ray Matamoros,  [place]  Doctor of Audiology  [place] License # [id number]

## 2017-10-25 NOTE — MR AVS SNAPSHOT
After Visit Summary   10/25/2017    Geovanni Jasso    MRN: [id number]           Patient Information     Date Of Birth          1960        Visit Information        Provider Department      10/25/2017 8:00 AM Ray Matamoros AuD M [facility]        Today's Diagnoses     SNHL (sensory-neural hearing loss), asymmetrical    -  1       Follow-ups after your visit        Your next 10 appointments already scheduled     Nov 07, 2017  9:40 AM CST   PHYSICAL with Paddy Holly MD   [facility]    [address] [place] [id number]   [phone]            Nov 07, 2017 11:00 AM CST   Return Visit with Sandra Galeana [facility] ([facility])    [address] [place] [id number]   [phone]            Nov 08, 2017  8:00 AM CST   Return Visit with Sandra Galeana [facility] ([facility])    [address] [place] [id number]   [phone]            Dec 06, 2017  8:30 AM CST   New Visit with Sandra Ramirez   [facility]    [address] [place] [id number] [phone]              Who to contact     If you have questions or need follow up information about today's clinic visit or your schedule please contact [facility] directly at [phone].  Normal or non-critical lab and imaging results will be communicated to you by MyChart, letter or phone within 4 business days after the clinic has received the results. If you do not hear from us within 7 days, please contact the clinic through MyChart or phone. If you have a critical or abnormal lab result, we will notify you by phone as soon as possible.  Submit refill requests through VisualOn or call your pharmacy and they will forward the refill request to us. Please allow 3  business days for your refill to be completed.          Additional Information About Your Visit        avolutionhart Information     HyperWeek gives you secure access to your electronic health record. If you see a primary care provider, you can also send messages to your care team and make appointments. If you have questions, please call your primary care clinic.  If you do not have a primary care provider, please call [phone] and they will assist you.      HyperWeek is an electronic gateway that provides easy, online access to your medical records. With HyperWeek, you can request a clinic appointment, read your test results, renew a prescription or communicate with your care team.     To access your existing account, please contact your [facility] or call [phone] for assistance.        Care EveryWhere ID     This is your Care EveryWhere ID. This could be used by other organizations to access your [facility] medical records  [id number]         Blood Pressure from Last 3 Encounters:   08/28/17 116/72   05/02/17 139/86   11/30/16 116/72    Weight from Last 3 Encounters:   10/17/17 200 lb (90.7 kg)   08/28/17 200 lb (90.7 kg)   05/09/17 198 lb (89.8 kg)              We Performed the Following     HEARING AID EXAM BINAURAL        Primary Care Provider Office Phone # Fax #    Madhavi Ishmael García [phone] [address] [facility] [address]        Equal Access to Services     : Hadii aad ku hadasho Soomaali, waaxda luqadaha, qaybta kaalmada adeegyada, waxay rogelio hayjasvir presley . So [facility] [phone].    ATENCIÓN: Si habla español, tiene a fisher disposición servicios gratuitos de asistencia lingüística. Llame al [phone].    We comply with applicable federal civil rights laws and [place] laws. We do not discriminate on the basis of race, color, national origin, age, disability, sex, sexual orientation, or gender identity.             Thank you!     Thank you for choosing [facility]  for your care. Our goal is always to provide you with excellent care. Hearing back from our patients is one way we can continue to improve our services. Please take a few minutes to complete the written survey that you may receive in the mail after your visit with us. Thank you!             Your Updated Medication List - Protect others around you: Learn how to safely use, store and throw away your medicines at www.disposemymeds.org.          This list is accurate as of: 10/25/17  9:06 AM.  Always use your most recent med list.                   Brand Name Dispense Instructions for use Diagnosis    acetaminophen 325 MG tablet    TYLENOL     Take 325-650 mg by mouth 4 times daily as needed        baclofen 10 MG tablet    LIORESAL    90 tablet    Take 1 tablet (10 mg) by mouth At Bedtime    Acute midline low back pain without sciatica       FEXOFENADINE HCL PO      Daily        fluticasone 50 MCG/ACT spray    FLONASE    16 g    Spray 1-2 sprays into both nostrils daily    Chronic rhinitis       HYDROcodone-acetaminophen 5-325 MG per tablet    NORCO    30 tablet    Take 1 tablet by mouth daily as needed for moderate to severe pain    Acute midline low back pain without sciatica       HYDROcodone-ibuprofen 7.5-200 MG per tablet    VICOPROFEN    30 tablet    Take 1 tablet by mouth nightly as needed for moderate to severe pain    Right-sided low back pain without sciatica, unspecified chronicity       ibuprofen 200 MG tablet    ADVIL/MOTRIN     Take 200 mg by mouth every 6 hours as needed        omeprazole 20 MG tablet     90 tablet    Take 1 tablet (20 mg) by mouth daily Take 30-60 minutes before a meal.    Gastroesophageal reflux disease, esophagitis presence not specified       piroxicam 10 MG capsule    FELDENE    30 capsule    Take 1 capsule (10 mg) by mouth daily (with breakfast)    Pain and swelling of knee, right, Acute midline low back pain  without sciatica

## 2017-10-30 ENCOUNTER — DOCUMENTATION ONLY (OUTPATIENT)
Dept: FAMILY MEDICINE | Facility: CLINIC | Age: 57
End: 2017-10-30

## 2017-10-30 DIAGNOSIS — Z12.5 SCREENING FOR PROSTATE CANCER: ICD-10-CM

## 2017-10-30 DIAGNOSIS — Z13.6 CARDIOVASCULAR SCREENING; LDL GOAL LESS THAN 160: ICD-10-CM

## 2017-10-30 DIAGNOSIS — Z13.6 CARDIOVASCULAR SCREENING; LDL GOAL LESS THAN 160: Primary | ICD-10-CM

## 2017-10-30 LAB
CHOLEST SERPL-MCNC: 211 MG/DL
GLUCOSE SERPL-MCNC: 99 MG/DL (ref 70–99)
HDLC SERPL-MCNC: 37 MG/DL
LDLC SERPL CALC-MCNC: 146 MG/DL
NONHDLC SERPL-MCNC: 174 MG/DL
PSA SERPL-ACNC: 1.11 UG/L (ref 0–4)
TRIGL SERPL-MCNC: 139 MG/DL

## 2017-10-30 PROCEDURE — 80061 LIPID PANEL: CPT | Performed by: FAMILY MEDICINE

## 2017-10-30 PROCEDURE — 82947 ASSAY GLUCOSE BLOOD QUANT: CPT | Performed by: FAMILY MEDICINE

## 2017-10-30 PROCEDURE — G0103 PSA SCREENING: HCPCS | Performed by: FAMILY MEDICINE

## 2017-10-30 PROCEDURE — 36415 COLL VENOUS BLD VENIPUNCTURE: CPT | Performed by: FAMILY MEDICINE

## 2017-11-07 ENCOUNTER — OFFICE VISIT (OUTPATIENT)
Dept: FAMILY MEDICINE | Facility: CLINIC | Age: 57
End: 2017-11-07
Payer: COMMERCIAL

## 2017-11-07 VITALS
HEART RATE: 60 BPM | SYSTOLIC BLOOD PRESSURE: 123 MMHG | TEMPERATURE: 98 F | HEIGHT: 72 IN | DIASTOLIC BLOOD PRESSURE: 74 MMHG | BODY MASS INDEX: 27.09 KG/M2 | WEIGHT: 200 LBS | OXYGEN SATURATION: 96 %

## 2017-11-07 DIAGNOSIS — E78.5 HYPERLIPIDEMIA LDL GOAL <70: ICD-10-CM

## 2017-11-07 DIAGNOSIS — R06.09 EXERTIONAL DYSPNEA: ICD-10-CM

## 2017-11-07 DIAGNOSIS — I25.10 ATHEROSCLEROSIS OF NATIVE CORONARY ARTERY OF NATIVE HEART WITHOUT ANGINA PECTORIS: ICD-10-CM

## 2017-11-07 DIAGNOSIS — L82.1 SEBORRHEIC KERATOSIS: ICD-10-CM

## 2017-11-07 DIAGNOSIS — Z00.00 ROUTINE GENERAL MEDICAL EXAMINATION AT A HEALTH CARE FACILITY: Primary | ICD-10-CM

## 2017-11-07 LAB
BASOPHILS # BLD AUTO: 0 10E9/L (ref 0–0.2)
BASOPHILS NFR BLD AUTO: 0.7 %
DIFFERENTIAL METHOD BLD: NORMAL
EOSINOPHIL # BLD AUTO: 0.2 10E9/L (ref 0–0.7)
EOSINOPHIL NFR BLD AUTO: 4 %
ERYTHROCYTE [DISTWIDTH] IN BLOOD BY AUTOMATED COUNT: 13.5 % (ref 10–15)
HCT VFR BLD AUTO: 45.5 % (ref 40–53)
HGB BLD-MCNC: 15.4 G/DL (ref 13.3–17.7)
LYMPHOCYTES # BLD AUTO: 1.6 10E9/L (ref 0.8–5.3)
LYMPHOCYTES NFR BLD AUTO: 27.2 %
MCH RBC QN AUTO: 32.4 PG (ref 26.5–33)
MCHC RBC AUTO-ENTMCNC: 33.8 G/DL (ref 31.5–36.5)
MCV RBC AUTO: 96 FL (ref 78–100)
MONOCYTES # BLD AUTO: 0.6 10E9/L (ref 0–1.3)
MONOCYTES NFR BLD AUTO: 10.2 %
NEUTROPHILS # BLD AUTO: 3.5 10E9/L (ref 1.6–8.3)
NEUTROPHILS NFR BLD AUTO: 57.9 %
PLATELET # BLD AUTO: 222 10E9/L (ref 150–450)
RBC # BLD AUTO: 4.75 10E12/L (ref 4.4–5.9)
WBC # BLD AUTO: 6 10E9/L (ref 4–11)

## 2017-11-07 PROCEDURE — 85025 COMPLETE CBC W/AUTO DIFF WBC: CPT | Performed by: INTERNAL MEDICINE

## 2017-11-07 PROCEDURE — 36415 COLL VENOUS BLD VENIPUNCTURE: CPT | Performed by: INTERNAL MEDICINE

## 2017-11-07 PROCEDURE — 99213 OFFICE O/P EST LOW 20 MIN: CPT | Mod: 25 | Performed by: INTERNAL MEDICINE

## 2017-11-07 PROCEDURE — 99396 PREV VISIT EST AGE 40-64: CPT | Performed by: INTERNAL MEDICINE

## 2017-11-07 NOTE — PROGRESS NOTES
SUBJECTIVE:   CC: Geovanni Jasso is an 57 year old male who presents for preventative health visit.     Physical   Annual:     Getting at least 3 servings of Calcium per day::  NO    Bi-annual eye exam::  Yes    Dental care twice a year::  Yes    Sleep apnea or symptoms of sleep apnea::  Daytime drowsiness and Excessive snoring    Diet::  Regular (no restrictions)    Frequency of exercise::  1 day/week    Duration of exercise::  Less than 15 minutes    Taking medications regularly::  Yes    Medication side effects::  None    Additional concerns today::  YES            -------------------------------------    Today's PHQ-2 Score: PHQ-2 ( 1999 Pfizer) 11/5/2017   Q1: Little interest or pleasure in doing things 1   Q2: Feeling down, depressed or hopeless 1   PHQ-2 Score 2   Q1: Little interest or pleasure in doing things Several days   Q2: Feeling down, depressed or hopeless Several days   PHQ-2 Score 2       Abuse: Current or Past(Physical, Sexual or Emotional)- No  Do you feel safe in your environment - Yes    Social History   Substance Use Topics     Smoking status: Never Smoker     Smokeless tobacco: Never Used     Alcohol use Yes      Comment: occasionally     The patient does not drink >3 drinks per day nor >7 drinks per week.    Last PSA:   PSA   Date Value Ref Range Status   10/30/2017 1.11 0 - 4 ug/L Final     Comment:     Assay Method:  Chemiluminescence using Siemens Vista analyzer       Reviewed orders with patient. Reviewed health maintenance and updated orders accordingly - Yes  Labs reviewed in EPIC  BP Readings from Last 3 Encounters:   11/07/17 123/74   08/28/17 116/72   05/02/17 139/86    Wt Readings from Last 3 Encounters:   11/07/17 200 lb (90.7 kg)   10/17/17 200 lb (90.7 kg)   08/28/17 200 lb (90.7 kg)                  Patient Active Problem List   Diagnosis     Esophageal reflux     Hyperlipidemia LDL goal <100     Chronic rhinitis     Bilateral low back pain without sciatica     Chondromalacia  patellae, right     S/P ACL reconstruction     History reviewed. No pertinent surgical history.    Social History   Substance Use Topics     Smoking status: Never Smoker     Smokeless tobacco: Never Used     Alcohol use Yes      Comment: occasionally     History reviewed. No pertinent family history.      Allergies   Allergen Reactions     Penicillins      Recent Labs   Lab Test  10/30/17   0820  11/30/16   0850  12/07/15   1126   LDL  146*  158*  166*   HDL  37*  33*  36*   TRIG  139  175*  117              Reviewed and updated as needed this visit by clinical staff         Reviewed and updated as needed this visit by Provider        Review of Systems  C: NEGATIVE for fever, chills, change in weight  INTEGUMENTARY/SKIN: POSITIVE for brownish lesions on chest and abdomen.  E: NEGATIVE for vision changes or irritation  ENT: NEGATIVE for ear, mouth and throat problems  RESP:POSITIVE for dyspnea on exertion and NEGATIVE for hemoptysis, pleurisy and SOB/dyspnea  CV: POSITIVE for fleeting episodes of exertional chest pain. NEGATIVE for lower extremity edema, orthopnea, palpitations and paroxysmal nocturnal dyspnea  GI: NEGATIVE for nausea, abdominal pain, heartburn, or change in bowel habits   male: negative for dysuria, hematuria, decreased urinary stream, erectile dysfunction, urethral discharge  M: NEGATIVE for significant arthralgias or myalgia  N: NEGATIVE for weakness, dizziness or paresthesias  ENDOCRINE: POSITIVE  for untreated hyperlipidemia. and NEGATIVE for HX diabetes and HX thyroid disease  H: NEGATIVE for bleeding problems  P: NEGATIVE for changes in mood or affect    OBJECTIVE:   /74 (BP Location: Left arm, Patient Position: Chair, Cuff Size: Adult Regular)  Pulse 60  Temp 98  F (36.7  C) (Oral)  Ht 6' (1.829 m)  Wt 200 lb (90.7 kg)  SpO2 96%  BMI 27.12 kg/m2  Physical Exam  GENERAL: healthy, alert and no distress  EYES: Eyes grossly normal to inspection and conjunctivae and sclerae  normal  HENT: normal cephalic/atraumatic and oral mucous membranes moist  NECK: no adenopathy and thyroid normal to palpation  RESP: lungs clear to auscultation - no rales, rhonchi or wheezes  CV: regular rate and rhythm, normal S1 S2, no S3 or S4, no murmur, click or rub, no peripheral edema and peripheral pulses strong  ABDOMEN: soft, nontender, no hepatosplenomegaly, no masses and bowel sounds normal  MS: no gross musculoskeletal defects noted, no edema  SKIN: no suspicious lesions or rashes  NEURO: Normal strength and tone, mentation intact and speech normal  PSYCH: mentation appears normal, affect normal/bright    ASSESSMENT/PLAN:   (Z00.00) Routine general medical examination at a health care facility  (primary encounter diagnosis)  Comment: No family history of premature atherosclerotic heart disease or early-onset colon cancer.  Plan: CBC with platelets and differential            (E78.5) Hyperlipidemia LDL goal <100  Comment: Untreated. With recent symptoms of exertional dyspnea and chest pain, associated with 10 year risk of 9.8%. CT coronary calcium scan necessary to stratify risk and determine ideal LDL goal.  Plan: CT Coronary Calcium Scan            (L82.1) Seborrheic keratosis  Comment: Noted on chest and abdomen.  Plan: Patient reassured about benign nature of these lesions.    COUNSELING:   Special attention given to:        Regular exercise       Healthy diet/nutrition       The 10-year ASCVD risk score (Jeanerette CHANDRIKA Jr, et al., 2013) is: 9.8%    Values used to calculate the score:      Age: 57 years      Sex: Male      Is Non- : No      Diabetic: No      Tobacco smoker: No      Systolic Blood Pressure: 123 mmHg      Is BP treated: No      HDL Cholesterol: 37 mg/dL      Total Cholesterol: 211 mg/dL    BP Screening:   Last 3 BP Readings:    BP Readings from Last 3 Encounters:   11/07/17 123/74   08/28/17 116/72   05/02/17 139/86       The following was recommended to the  patient:  Re-screen BP within a year and recommended lifestyle modifications       reports that he has never smoked. He has never used smokeless tobacco.      Estimated body mass index is 27.12 kg/(m^2) as calculated from the following:    Height as of 10/17/17: 6' (1.829 m).    Weight as of 10/17/17: 200 lb (90.7 kg).   Weight management plan: diet and exercise.    Counseling Resources:  ATP IV Guidelines  Pooled Cohorts Equation Calculator  FRAX Risk Assessment  ICSI Preventive Guidelines  Dietary Guidelines for Americans, 2010  USDA's MyPlate  ASA Prophylaxis  Lung CA Screening    Paddy Holly MD  [facility]  Answers for HPI/ROS submitted by the patient on 11/5/2017   PHQ-2 Score: 2

## 2017-11-07 NOTE — NURSING NOTE
Chief Complaint   Patient presents with     Physical       Initial /74 (BP Location: Left arm, Patient Position: Chair, Cuff Size: Adult Regular)  Pulse 60  Temp 98  F (36.7  C) (Oral)  Ht 6' (1.829 m)  Wt 200 lb (90.7 kg)  SpO2 96%  BMI 27.12 kg/m2 Estimated body mass index is 27.12 kg/(m^2) as calculated from the following:    Height as of this encounter: 6' (1.829 m).    Weight as of this encounter: 200 lb (90.7 kg).  Medication Reconciliation: complete     Jake Ayala MA

## 2017-11-07 NOTE — PATIENT INSTRUCTIONS
At [facility], we strive to deliver an exceptional experience to you, every time we see you.  If you receive a survey in the mail, please send us back your thoughts. We really do value your feedback.    Based on your medical history, these are the current health maintenance/preventive care services that you are due for (some may have been done at this visit.)  Health Maintenance Due   Topic Date Due     ADVANCE DIRECTIVE PLANNING Q5 YRS  09/09/2013     INFLUENZA VACCINE (SYSTEM ASSIGNED)  09/01/2017         Suggested websites for health information:  Www.IES.[employer] : Up to date and easily searchable information on multiple topics.  Www.ABC Live.gov : medication info, interactive tutorials, watch real surgeries online  Www.familydoctor.org : good info from the Academy of Family Physicians  Www.cdc.gov : public health info, travel advisories, epidemics (H1N1)  Www.aap.org : children's health info, normal development, vaccinations  Www.health.[facility].[place].us : [place] dept of health, public health issues in [place], N1N1    Your care team:                            Family Medicine Internal Medicine   MD Paddy Merrill MD Shantel Branch-Fleming, MD Katya Georgiev PA-C Nam Ho, MD Pediatrics   JAGUAR Berman, GLADYS Brooke APRN CNP   MD Dedra Capone MD Deborah Mielke, MD Kim Thein, APRN [facility]      Clinic hours: Monday - Thursday 7 am-7 pm; Fridays 7 am-5 pm.   Urgent care: Monday - Friday 11 am-9 pm; Saturday and Sunday 9 am-5 pm.  Pharmacy : Monday -Thursday 8 am-8 pm; Friday 8 am-6 pm; Saturday and Sunday 9 am-5 pm.     Clinic: [phone]   Pharmacy: [phone]    Understanding Coronary Calcium Scan    A coronary calcium scan is a test that looks at the arteries that supply blood to the heart muscle. These are called the coronary arteries. The scan checks for plaque buildup along the walls of these arteries. Plaque is made up of  calcium, fat, cholesterol, and other substances. A buildup of plaque narrows the arteries. This affects the flow of blood to the heart muscle. If a coronary artery becomes completely blocked, a heart attack (death of part of the heart muscle) can result. Knowing how much plaque you have in your arteries can help figure out your risk for a heart attack.  Why do I need a coronary calcium scan?  A coronary calcium scan measures the amount of calcium in the arteries. The presence of calcium deposits in an artery means that plaque is starting to build up.  The results of the test are given as a calcium score. The scan can help predict your risk of having a heart attack, even before symptoms appear.  This scan isn t for everyone. It is most useful when considered along with other risk factors for a heart attack. These include family history of heart disease, high blood pressure, and unhealthy cholesterol.  What happens during a coronary calcium scan?  The scan is done using computed tomography (CT). This test uses X-rays and computers to create detailed images of the heart.  For the test, you lie on a platform that slides into a tube. During the scan:    You will need to stay very still.    You may be asked to hold your breath for short periods of time.    You may have small sticky discs attached to wires (electrodes) on your chest to record your heartbeat.  The test will likely take about 10 minutes. Once the test is done and your appointment is finished, you can go back to your normal routine.  What are the risks of coronary calcium scan?  A CT scan exposes you to a certain amount of radiation. The benefits of the scan likely outweigh the risks for you. You and your healthcare provider can discuss this further.  Date Last Reviewed: 5/1/2016 2000-2017 [employer]. [address] [place] [address], [place] [address]. All rights reserved. This information is not intended as a substitute for professional medical  care. Always follow your healthcare professional's instructions.

## 2017-11-07 NOTE — MR AVS SNAPSHOT
After Visit Summary   11/7/2017    Geovanni Jasso    MRN: [id number]           Patient Information     Date Of Birth          1960        Visit Information        Provider Department      11/7/2017 9:40 AM Paddy Holly MD [facility]        Today's Diagnoses     Routine general medical examination at a health care facility    -  1    Hyperlipidemia LDL goal <100        Atypical chest pain        Seborrheic keratosis          Care Instructions    At [facility], we strive to deliver an exceptional experience to you, every time we see you.  If you receive a survey in the mail, please send us back your thoughts. We really do value your feedback.    Based on your medical history, these are the current health maintenance/preventive care services that you are due for (some may have been done at this visit.)  Health Maintenance Due   Topic Date Due     ADVANCE DIRECTIVE PLANNING Q5 YRS  09/09/2013     INFLUENZA VACCINE (SYSTEM ASSIGNED)  09/01/2017         Suggested websites for health information:  Www.ShopClues.com : Up to date and easily searchable information on multiple topics.  Www.medlineplus.gov : medication info, interactive tutorials, watch real surgeries online  Www.familydoctor.org : good info from the Academy of Family Physicians  Www.cdc.gov : public health info, travel advisories, epidemics (H1N1)  Www.aap.org : children's health info, normal development, vaccinations  Www.health.[facility].[place].us : [place] dept of health, public health issues in [place], N1N1    Your care team:                            Family Medicine Internal Medicine   MD Paddy Merrill MD Shantel Branch-Fleming, MD Katya Georgiev PA-C Nam Ho, MD Pediatrics   JAGUAR Berman, MD Dedra Ramirez CNP, MD Deborah Mielke, MD Kim Thein, APRN CNP      Clinic hours: Monday - Thursday 7 am-7 pm; Fridays 7  am-5 pm.   Urgent care: Monday - Friday 11 am-9 pm; Saturday and Sunday 9 am-5 pm.  Pharmacy : Monday -Thursday 8 am-8 pm; Friday 8 am-6 pm; Saturday and Sunday 9 am-5 pm.     Clinic: [phone]   Pharmacy: [phone]    Understanding Coronary Calcium Scan    A coronary calcium scan is a test that looks at the arteries that supply blood to the heart muscle. These are called the coronary arteries. The scan checks for plaque buildup along the walls of these arteries. Plaque is made up of calcium, fat, cholesterol, and other substances. A buildup of plaque narrows the arteries. This affects the flow of blood to the heart muscle. If a coronary artery becomes completely blocked, a heart attack (death of part of the heart muscle) can result. Knowing how much plaque you have in your arteries can help figure out your risk for a heart attack.  Why do I need a coronary calcium scan?  A coronary calcium scan measures the amount of calcium in the arteries. The presence of calcium deposits in an artery means that plaque is starting to build up.  The results of the test are given as a calcium score. The scan can help predict your risk of having a heart attack, even before symptoms appear.  This scan isn t for everyone. It is most useful when considered along with other risk factors for a heart attack. These include family history of heart disease, high blood pressure, and unhealthy cholesterol.  What happens during a coronary calcium scan?  The scan is done using computed tomography (CT). This test uses X-rays and computers to create detailed images of the heart.  For the test, you lie on a platform that slides into a tube. During the scan:    You will need to stay very still.    You may be asked to hold your breath for short periods of time.    You may have small sticky discs attached to wires (electrodes) on your chest to record your heartbeat.  The test will likely take about 10 minutes. Once the test is done and your  appointment is finished, you can go back to your normal routine.  What are the risks of coronary calcium scan?  A CT scan exposes you to a certain amount of radiation. The benefits of the scan likely outweigh the risks for you. You and your healthcare provider can discuss this further.  Date Last Reviewed: 5/1/2016 2000-2017 [employer]. [address] [place] [address]. All rights reserved. This information is not intended as a substitute for professional medical care. Always follow your healthcare professional's instructions.                Follow-ups after your visit        Your next 10 appointments already scheduled     Nov 08, 2017  8:00 AM CST   Return Visit with Sandra Galeana   [facility]    [address] [place] [address] [phone]              Future tests that were ordered for you today     Open Future Orders        Priority Expected Expires Ordered    CT Coronary Calcium Scan Routine  11/7/2018 11/7/2017            Who to contact     If you have questions or need follow up information about today's clinic visit or your schedule please contact [facility] directly at [phone].  Normal or non-critical lab and imaging results will be communicated to you by MyChart, letter or phone within 4 business days after the clinic has received the results. If you do not hear from us within 7 days, please contact the clinic through iLyngohart or phone. If you have a critical or abnormal lab result, we will notify you by phone as soon as possible.  Submit refill requests through [employer] or call your pharmacy and they will forward the refill request to us. Please allow 3 business days for your refill to be completed.          Additional Information About Your Visit        MyChart Information     [employer] gives you secure access to your electronic health record. If you see a primary care provider, you can  also send messages to your care team and make appointments. If you have questions, please call your primary care clinic.  If you do not have a primary care provider, please call [phone] and they will assist you.        Care EveryWhere ID     This is your Care EveryWhere ID. This could be used by other organizations to access your [facility] medical records  [id number]        Your Vitals Were     Pulse Temperature Height Pulse Oximetry BMI (Body Mass Index)       60 98  F (36.7  C) (Oral) 6' (1.829 m) 96% 27.12 kg/m2        Blood Pressure from Last 3 Encounters:   11/07/17 123/74   08/28/17 116/72   05/02/17 139/86    Weight from Last 3 Encounters:   11/07/17 200 lb (90.7 kg)   10/17/17 200 lb (90.7 kg)   08/28/17 200 lb (90.7 kg)              We Performed the Following     CBC with platelets and differential        Primary Care Provider Office Phone # Fax #    Paddy Holly [phone]       [address] [place]  [facility] [address]        Equal Access to Services     [facility]: Hadii nelsy ku hadasho Soomaali, waaxda luqadaha, qaybta kaalmada adeegyada, christiano presley . So [facility] [phone].    ATENCIÓN: Si habla español, tiene a fisher disposición servicios gratuitos de asistencia lingüística. [facility] [phone].    We comply with applicable federal civil rights laws and [place] laws. We do not discriminate on the basis of race, color, national origin, age, disability, sex, sexual orientation, or gender identity.            Thank you!     Thank you for choosing [facility]  for your care. Our goal is always to provide you with excellent care. Hearing back from our patients is one way we can continue to improve our services. Please take a few minutes to complete the written survey that you may receive in the mail after your visit with us. Thank you!             Your Updated Medication List - Protect others around you: Learn how to safely  use, store and throw away your medicines at www.disposemymeds.org.          This list is accurate as of: 11/7/17 10:27 AM.  Always use your most recent med list.                   Brand Name Dispense Instructions for use Diagnosis    acetaminophen 325 MG tablet    TYLENOL     Take 325-650 mg by mouth 4 times daily as needed        ASPIRIN PO      Take 81 mg by mouth daily (with dinner)        FEXOFENADINE HCL PO      Daily        fluticasone 50 MCG/ACT spray    FLONASE    16 g    Spray 1-2 sprays into both nostrils daily    Chronic rhinitis       HYDROcodone-acetaminophen 5-325 MG per tablet    NORCO    30 tablet    Take 1 tablet by mouth daily as needed for moderate to severe pain    Acute midline low back pain without sciatica       HYDROcodone-ibuprofen 7.5-200 MG per tablet    VICOPROFEN    30 tablet    Take 1 tablet by mouth nightly as needed for moderate to severe pain    Right-sided low back pain without sciatica, unspecified chronicity       ibuprofen 200 MG tablet    ADVIL/MOTRIN     Take 200 mg by mouth every 6 hours as needed        omeprazole 20 MG tablet     90 tablet    Take 1 tablet (20 mg) by mouth daily Take 30-60 minutes before a meal.    Gastroesophageal reflux disease, esophagitis presence not specified

## 2017-11-08 ENCOUNTER — OFFICE VISIT (OUTPATIENT)
Dept: AUDIOLOGY | Facility: CLINIC | Age: 57
End: 2017-11-08

## 2017-11-08 DIAGNOSIS — H90.3 SNHL (SENSORY-NEURAL HEARING LOSS), ASYMMETRICAL: Primary | ICD-10-CM

## 2017-11-08 PROBLEM — M25.561 PAIN AND SWELLING OF KNEE, RIGHT: Status: RESOLVED | Noted: 2017-05-07 | Resolved: 2017-11-08

## 2017-11-08 PROBLEM — M25.461 PAIN AND SWELLING OF KNEE, RIGHT: Status: RESOLVED | Noted: 2017-05-07 | Resolved: 2017-11-08

## 2017-11-08 PROCEDURE — 92592 HC HEARING AID CHECK, MONAURAL: CPT | Performed by: AUDIOLOGIST

## 2017-11-08 PROCEDURE — V5020 CONFORMITY EVALUATION: HCPCS | Performed by: AUDIOLOGIST

## 2017-11-08 PROCEDURE — V5257 HEARING AID, DIGIT, MON, BTE: HCPCS | Performed by: AUDIOLOGIST

## 2017-11-08 PROCEDURE — V5011 HEARING AID FITTING/CHECKING: HCPCS | Performed by: AUDIOLOGIST

## 2017-11-08 PROCEDURE — V5220 BEHIND EAR BICROS HEARING AI: HCPCS | Performed by: AUDIOLOGIST

## 2017-11-08 PROCEDURE — V5240 DISP FEE CONTRALATERAL BINAU: HCPCS | Performed by: AUDIOLOGIST

## 2017-11-08 NOTE — MR AVS SNAPSHOT
After Visit Summary   11/8/2017    Geovanni Jasso    MRN: [id number]           Patient Information     Date Of Birth          1960        Visit Information        Provider Department      11/8/2017 8:00 AM Ray Matamoros AuD [facility]        Today's Diagnoses     SNHL (sensory-neural hearing loss), asymmetrical    -  1       Follow-ups after your visit        Your next 10 appointments already scheduled     Nov 10, 2017  1:15 PM CST   CT CALCIUM SCREENING with MGCT1, MG IMAGING NURSE   [facility] ([facility])    [address] [place] [address] [phone]           It is best to avoid caffeine on the day of your test.  Be sure to tell your doctor:   If there s any chance you are pregnant.   If you have any special needs.  Please wear loose clothing, such as a sweat suit or jogging clothes. Avoid snaps, zippers and other metal. We may ask you to undress and put on a hospital gown.  If you have any questions, please call the Imaging Department where you will have your exam.            Nov 27, 2017  8:00 AM CST   Return Visit with Sandra Galeana   [facility] ([facility])    [address] [place] [address] [phone]              Future tests that were ordered for you today     Open Future Orders        Priority Expected Expires Ordered    Radiologist Consult For Cardiology Routine 11/7/2017 11/7/2018 11/7/2017    CT Coronary Calcium Scan Routine  11/7/2018 11/7/2017            Who to contact     If you have questions or need follow up information about today's clinic visit or your schedule please contact [facility] directly at [phone].  Normal or non-critical lab and imaging results will be communicated to you by MyChart, letter or phone within 4 business days after the clinic has received the results. If you do not hear from us within 7 days,  please contact the clinic through TwoFish or phone. If you have a critical or abnormal lab result, we will notify you by phone as soon as possible.  Submit refill requests through [employer] or call your pharmacy and they will forward the refill request to us. Please allow 3 business days for your refill to be completed.          Additional Information About Your Visit        [employer] Information     [employer] gives you secure access to your electronic health record. If you see a primary care provider, you can also send messages to your care team and make appointments. If you have questions, please call your primary care clinic.  If you do not have a primary care provider, please call [phone] and they will assist you.      TwoFish is an electronic gateway that provides easy, online access to your medical records. With Two[employer], you can request a clinic appointment, read your test results, renew a prescription or communicate with your care team.     To access your existing account, please contact your [facility] or call [phone] for assistance.        Care EveryWhere ID     This is your Care EveryWhere ID. This could be used by other organizations to access your [facility] medical records  [id number]         Blood Pressure from Last 3 Encounters:   11/07/17 123/74   08/28/17 116/72   05/02/17 139/86    Weight from Last 3 Encounters:   11/07/17 200 lb (90.7 kg)   10/17/17 200 lb (90.7 kg)   08/28/17 200 lb (90.7 kg)              We Performed the Following     BEHIND EAR BICROS HEARING AI     DISPENSING FEE BICROS     HEARING AID BTE DIGITAL, MONAURAL     HEARING AID CHECK, MONAURAL     HEARING AID CONFORMITY EVALUATION     HEARING AID FIT/ORIENTATION/CHECK        Primary Care Provider Office Phone # Fax #    Paddy Holly [phone]       [address]  [facility] [address]        Equal Access to Services     LIUDMILA MAYS AH: Abel Morales  wachrisda luhueybinh, qaybta kacatherine milan, christiano alejandroaaguido ah. So [facility] [phone].    ATENCIÓN: Si michelle tracey, tiene a fisher disposición servicios gratuitos de asistencia lingüística. Miroslava al [phone].    We comply with applicable federal civil rights laws and [place] laws. We do not discriminate on the basis of race, color, national origin, age, disability, sex, sexual orientation, or gender identity.            Thank you!     Thank you for choosing [facility]  for your care. Our goal is always to provide you with excellent care. Hearing back from our patients is one way we can continue to improve our services. Please take a few minutes to complete the written survey that you may receive in the mail after your visit with us. Thank you!             Your Updated Medication List - Protect others around you: Learn how to safely use, store and throw away your medicines at www.disposemymeds.org.          This list is accurate as of: 11/8/17  9:50 AM.  Always use your most recent med list.                   Brand Name Dispense Instructions for use Diagnosis    acetaminophen 325 MG tablet    TYLENOL     Take 325-650 mg by mouth 4 times daily as needed        ASPIRIN PO      Take 81 mg by mouth daily (with dinner)        FEXOFENADINE HCL PO      Daily        fluticasone 50 MCG/ACT spray    FLONASE    16 g    Spray 1-2 sprays into both nostrils daily    Chronic rhinitis       HYDROcodone-acetaminophen 5-325 MG per tablet    NORCO    30 tablet    Take 1 tablet by mouth daily as needed for moderate to severe pain    Acute midline low back pain without sciatica       HYDROcodone-ibuprofen 7.5-200 MG per tablet    VICOPROFEN    30 tablet    Take 1 tablet by mouth nightly as needed for moderate to severe pain    Right-sided low back pain without sciatica, unspecified chronicity       ibuprofen 200 MG tablet    ADVIL/MOTRIN     Take 200 mg by mouth every 6 hours as needed         omeprazole 20 MG tablet     90 tablet    Take 1 tablet (20 mg) by mouth daily Take 30-60 minutes before a meal.    Gastroesophageal reflux disease, esophagitis presence not specified

## 2017-11-08 NOTE — PROGRESS NOTES
AUDIOLOGY REPORT    SUBJECTIVE: Geovanni Jasso, a 57 year old male, was seen in the Audiology Clinic at [facility] today for a binaural hearing aid fitting. Previous results have revealed an asymmetrical  sensorineural hearing loss. The patient was given medical clearance to pursue amplification by  Dr. Juarez.      OBJECTIVE:  Prior to fitting, a hearing aid check was performed to ensure device functionality. The hearing aid conformity evaluation was completed.The hearing aids were placed and they provided a good fit. Real-ear-probe-microphone measurements were completed on the OwlTing ??? system and were a good match to NAL-NL2 target with soft sounds audible, moderate sounds comfortable, and loud sounds below discomfort. UCLs are verified through maximum power output measures and demonstrate appropriate limiting of loud inputs. Mr. Jasso was oriented to proper hearing aid use, care, cleaning (no water, dry brush), batteries (size 312, insertion/removal, toxicity, low-battery signal), aid insertion/removal, user booklet, warranty information, storage cases, and other hearing aid details. The patient confirmed understanding of hearing aid use and care, and showed proper insertion of hearing aid and batteries while in the office today. Mr. Jasso reported good volume and sound quality today.    EAR(S) FIT: Binaural  HEARING AID MODEL NAME:  Phonak BiCROS BTE hearing aid [facility] Audeo B90  HEARING AID STYLE: BTE  SERIAL NUMBERS: Right: [id number]; Left: [id number]  WARRANTY END DATE: 1/22/2012 (Audeo ), 1/22/2019 (CROS B-312)    CHARGES: .[self pay]  78732 Hearing aid check, monaural $49   Hearing aid conformity $87   Fit orientation $162   Dispensing fee, BiCROS $400   Hearing aid BTE, monaural $2602   BiCROS BTE $680    ASSESSMENT: Binaural hearing aid fitting completed today. Verification measures were performed. The 45 day trial period was explained to patient, and  they expressed understanding. Mr. Jasso signed the Hearing Aid Purchase Agreement and was given a copy, as well as details on his hearing aids.      PLAN: Mr. Jasso will return for follow-up in 2-3 weeks for a hearing aid review appointment. Please call this clinic with questions regarding today s appointment.    Michelle Interiano.  Doctor of Audiology  [place] License # [id number]

## 2017-11-10 ENCOUNTER — RADIANT APPOINTMENT (OUTPATIENT)
Dept: CT IMAGING | Facility: CLINIC | Age: 57
End: 2017-11-10
Attending: INTERNAL MEDICINE
Payer: COMMERCIAL

## 2017-11-10 DIAGNOSIS — R07.89 ATYPICAL CHEST PAIN: ICD-10-CM

## 2017-11-10 DIAGNOSIS — E78.5 HYPERLIPIDEMIA LDL GOAL <100: ICD-10-CM

## 2017-11-10 PROCEDURE — 75571 CT HRT W/O DYE W/CA TEST: CPT | Performed by: INTERNAL MEDICINE

## 2017-11-11 DIAGNOSIS — K21.9 GASTROESOPHAGEAL REFLUX DISEASE, ESOPHAGITIS PRESENCE NOT SPECIFIED: ICD-10-CM

## 2017-11-12 PROBLEM — I25.10 ATHEROSCLEROSIS OF NATIVE CORONARY ARTERY OF NATIVE HEART WITHOUT ANGINA PECTORIS: Status: ACTIVE | Noted: 2017-11-12

## 2017-11-12 RX ORDER — ATORVASTATIN CALCIUM 20 MG/1
20 TABLET, FILM COATED ORAL
Qty: 90 TABLET | Refills: 3 | Status: SHIPPED | OUTPATIENT
Start: 2017-11-12 | End: 2018-12-13

## 2017-11-14 ENCOUNTER — MYC MEDICAL ADVICE (OUTPATIENT)
Dept: FAMILY MEDICINE | Facility: CLINIC | Age: 57
End: 2017-11-14

## 2017-11-14 DIAGNOSIS — K21.9 GASTROESOPHAGEAL REFLUX DISEASE, ESOPHAGITIS PRESENCE NOT SPECIFIED: ICD-10-CM

## 2017-11-14 RX ORDER — NICOTINE POLACRILEX 4 MG/1
20 GUM, CHEWING ORAL DAILY
Qty: 90 TABLET | Refills: 1 | Status: SHIPPED | OUTPATIENT
Start: 2017-11-14 | End: 2018-05-14

## 2017-11-14 NOTE — TELEPHONE ENCOUNTER
omeprazole      Last Written Prescription Date: 5/18/17  Last Fill Quantity: 90,  # refills: 1   Last Office Visit with [facility], [facility] or [facility] prescribing provider: 11/7/17                                         Next 5 appointments (look out 90 days)     Nov 27, 2017  8:00 AM CST   [facility] with Sandra Galeana   [facility]    [address] [place] [address] [phone]                  Prescription approved per [facility] Refill Protocol.  Kelsi Tafoya RN

## 2017-11-27 ENCOUNTER — OFFICE VISIT (OUTPATIENT)
Dept: AUDIOLOGY | Facility: CLINIC | Age: 57
End: 2017-11-27
Payer: COMMERCIAL

## 2017-11-27 DIAGNOSIS — H90.3 SNHL (SENSORY-NEURAL HEARING LOSS), ASYMMETRICAL: Primary | ICD-10-CM

## 2017-11-27 PROCEDURE — V5299 HEARING SERVICE: HCPCS | Performed by: AUDIOLOGIST

## 2017-11-27 NOTE — PROGRESS NOTES
AUDIOLOGY REPORT    BACKGROUND INFORMATION: Geovanni Jasso was seen in the Audiology Clinic  at [facility] on 11/27/2017 for follow-up.  The patient has been seen previously on 10/17/17 and results revealed an asymmetrical hearing loss with worse hearing in the right ear.  The patient was fit with a Phonak BiCROS BTE hearing aid on 11/07/17.  The patient reports good sound quality with the hearing aid(s).    TEST RESULTS AND PROCEDURES: A first follow-up was performed. The hearing aid conformity evaluation was completed. This includes initially evaluating the devices electroacoustically and later matching NAL-NL1 target with soft sounds audible, moderate sounds comfortable and clear, and loud sounds below discomfort.     SUMMARY AND RECOMMENDATIONS: A first follow-up was performed today and the patient reports that he intends to keep his devices.  Adjustments were made as noted above and the patient will return as needed or at least every 4-6 months for cleaning and assessment of hearing aid.  Call this clinic with questions regarding today s visit.    Michelle Interiano.  Doctor of Audiology  [place] License # [id number]

## 2017-11-27 NOTE — MR AVS SNAPSHOT
After Visit Summary   11/27/2017    Geovanni Jasso    MRN: [id number]           Patient Information     Date Of Birth          1960        Visit Information        Provider Department      11/27/2017 8:00 AM Ray Matamoros AuD [facility]        Today's Diagnoses     SNHL (sensory-neural hearing loss), asymmetrical    -  1       Follow-ups after your visit        Who to contact     If you have questions or need follow up information about today's clinic visit or your schedule please contact [facility] directly at [phone].  Normal or non-critical lab and imaging results will be communicated to you by Sangarthart, letter or phone within 4 business days after the clinic has received the results. If you do not hear from us within 7 days, please contact the clinic through Sangarthart or phone. If you have a critical or abnormal lab result, we will notify you by phone as soon as possible.  Submit refill requests through IQMS or call your pharmacy and they will forward the refill request to us. Please allow 3 business days for your refill to be completed.          Additional Information About Your Visit        MyChart Information     IQMS gives you secure access to your electronic health record. If you see a primary care provider, you can also send messages to your care team and make appointments. If you have questions, please call your primary care clinic.  If you do not have a primary care provider, please call [phone] and they will assist you.      IQMS is an electronic gateway that provides easy, online access to your medical records. With IQMS, you can request a clinic appointment, read your test results, renew a prescription or communicate with your care team.     To access your existing account, please contact your [facility] or call [phone] for assistance.        Care EveryWhere ID     This is your Care  EveryWhere ID. This could be used by other organizations to access your [facility] medical records  [id number]         Blood Pressure from Last 3 Encounters:   11/07/17 123/74   08/28/17 116/72   05/02/17 139/86    Weight from Last 3 Encounters:   11/07/17 200 lb (90.7 kg)   10/17/17 200 lb (90.7 kg)   08/28/17 200 lb (90.7 kg)              We Performed the Following     HEARING AID CHECK/NO CHARGE        Primary Care Provider Office Phone # Fax #    Paddy Holly [phone]       [address]  [facility] [address]        Equal Access to Services     LIUDMILA MAYS : Abel bentley Sozurdo, wachrisda teresaadaha, qaybta kaalmada adeanilyada, christiano presley . So [facility] [phone].    ATENCIÓN: Si habla español, tiene a fisher disposición servicios gratuitos de asistencia lingüística. Llame al [phone].    We comply with applicable federal civil rights laws and [place] laws. We do not discriminate on the basis of race, color, national origin, age, disability, sex, sexual orientation, or gender identity.            Thank you!     Thank you for choosing [facility]  for your care. Our goal is always to provide you with excellent care. Hearing back from our patients is one way we can continue to improve our services. Please take a few minutes to complete the written survey that you may receive in the mail after your visit with us. Thank you!             Your Updated Medication List - Protect others around you: Learn how to safely use, store and throw away your medicines at www.disposemymeds.org.          This list is accurate as of: 11/27/17  8:55 AM.  Always use your most recent med list.                   Brand Name Dispense Instructions for use Diagnosis    acetaminophen 325 MG tablet    TYLENOL     Take 325-650 mg by mouth 4 times daily as needed        ASPIRIN PO      Take 81 mg by mouth daily (with dinner)        atorvastatin 20 MG tablet     LIPITOR    90 tablet    Take 1 tablet (20 mg) by mouth daily (with dinner)    Hyperlipidemia LDL goal <70, Atherosclerosis of native coronary artery of native heart without angina pectoris       FEXOFENADINE HCL PO      Daily        fluticasone 50 MCG/ACT spray    FLONASE    16 g    Spray 1-2 sprays into both nostrils daily    Chronic rhinitis       HYDROcodone-acetaminophen 5-325 MG per tablet    NORCO    30 tablet    Take 1 tablet by mouth daily as needed for moderate to severe pain    Acute midline low back pain without sciatica       HYDROcodone-ibuprofen 7.5-200 MG per tablet    VICOPROFEN    30 tablet    Take 1 tablet by mouth nightly as needed for moderate to severe pain    Right-sided low back pain without sciatica, unspecified chronicity       ibuprofen 200 MG tablet    ADVIL/MOTRIN     Take 200 mg by mouth every 6 hours as needed        omeprazole 20 MG tablet     90 tablet    Take 1 tablet (20 mg) by mouth daily Take 30-60 minutes before a meal.    Gastroesophageal reflux disease, esophagitis presence not specified

## 2018-01-17 ENCOUNTER — RADIANT APPOINTMENT (OUTPATIENT)
Dept: GENERAL RADIOLOGY | Facility: CLINIC | Age: 58
End: 2018-01-17
Attending: PHYSICIAN ASSISTANT
Payer: COMMERCIAL

## 2018-01-17 ENCOUNTER — OFFICE VISIT (OUTPATIENT)
Dept: FAMILY MEDICINE | Facility: CLINIC | Age: 58
End: 2018-01-17
Payer: COMMERCIAL

## 2018-01-17 VITALS
RESPIRATION RATE: 18 BRPM | HEIGHT: 71 IN | SYSTOLIC BLOOD PRESSURE: 118 MMHG | BODY MASS INDEX: 27.86 KG/M2 | HEART RATE: 62 BPM | WEIGHT: 199 LBS | TEMPERATURE: 98 F | DIASTOLIC BLOOD PRESSURE: 72 MMHG

## 2018-01-17 DIAGNOSIS — Z20.828 EXPOSURE TO INFLUENZA: ICD-10-CM

## 2018-01-17 DIAGNOSIS — R07.89 CHEST WALL PAIN: ICD-10-CM

## 2018-01-17 DIAGNOSIS — R19.7 DIARRHEA, UNSPECIFIED TYPE: ICD-10-CM

## 2018-01-17 DIAGNOSIS — R07.89 CHEST WALL PAIN: Primary | ICD-10-CM

## 2018-01-17 LAB
BASOPHILS # BLD AUTO: 0.1 10E9/L (ref 0–0.2)
BASOPHILS NFR BLD AUTO: 0.7 %
DIFFERENTIAL METHOD BLD: NORMAL
EOSINOPHIL # BLD AUTO: 0.3 10E9/L (ref 0–0.7)
EOSINOPHIL NFR BLD AUTO: 4.1 %
ERYTHROCYTE [DISTWIDTH] IN BLOOD BY AUTOMATED COUNT: 13.6 % (ref 10–15)
FLUAV+FLUBV AG SPEC QL: NEGATIVE
FLUAV+FLUBV AG SPEC QL: NEGATIVE
HCT VFR BLD AUTO: 45.4 % (ref 40–53)
HGB BLD-MCNC: 15.3 G/DL (ref 13.3–17.7)
LYMPHOCYTES # BLD AUTO: 1.9 10E9/L (ref 0.8–5.3)
LYMPHOCYTES NFR BLD AUTO: 26.1 %
MCH RBC QN AUTO: 32.5 PG (ref 26.5–33)
MCHC RBC AUTO-ENTMCNC: 33.7 G/DL (ref 31.5–36.5)
MCV RBC AUTO: 96 FL (ref 78–100)
MONOCYTES # BLD AUTO: 0.9 10E9/L (ref 0–1.3)
MONOCYTES NFR BLD AUTO: 12 %
NEUTROPHILS # BLD AUTO: 4.2 10E9/L (ref 1.6–8.3)
NEUTROPHILS NFR BLD AUTO: 57.1 %
PLATELET # BLD AUTO: 232 10E9/L (ref 150–450)
RBC # BLD AUTO: 4.71 10E12/L (ref 4.4–5.9)
SPECIMEN SOURCE: NORMAL
WBC # BLD AUTO: 7.4 10E9/L (ref 4–11)

## 2018-01-17 PROCEDURE — 99000 SPECIMEN HANDLING OFFICE-LAB: CPT | Performed by: PHYSICIAN ASSISTANT

## 2018-01-17 PROCEDURE — 71101 X-RAY EXAM UNILAT RIBS/CHEST: CPT | Mod: RT | Performed by: FAMILY MEDICINE

## 2018-01-17 PROCEDURE — 87804 INFLUENZA ASSAY W/OPTIC: CPT | Mod: 59 | Performed by: PHYSICIAN ASSISTANT

## 2018-01-17 PROCEDURE — 85025 COMPLETE CBC W/AUTO DIFF WBC: CPT | Performed by: PHYSICIAN ASSISTANT

## 2018-01-17 PROCEDURE — 99213 OFFICE O/P EST LOW 20 MIN: CPT | Performed by: PHYSICIAN ASSISTANT

## 2018-01-17 PROCEDURE — 36415 COLL VENOUS BLD VENIPUNCTURE: CPT | Performed by: PHYSICIAN ASSISTANT

## 2018-01-17 PROCEDURE — 86710 INFLUENZA VIRUS ANTIBODY: CPT | Mod: 90 | Performed by: PHYSICIAN ASSISTANT

## 2018-01-17 RX ORDER — HYDROCODONE BITARTRATE AND IBUPROFEN 7.5; 2 MG/1; MG/1
1 TABLET, FILM COATED ORAL
Qty: 30 TABLET | Refills: 0 | Status: SHIPPED | OUTPATIENT
Start: 2018-01-17 | End: 2019-06-18

## 2018-01-17 ASSESSMENT — PAIN SCALES - GENERAL: PAINLEVEL: MODERATE PAIN (5)

## 2018-01-17 NOTE — PATIENT INSTRUCTIONS
I will notify you of influenza A blood test result via MyChart as soon as available  Take pain medication at bedtime as needed for chest wall pain.   Return urgently if any change in symptoms like shortness of breath, increasing pain, abdominal pain,  vomiting, fever or other change in symptoms .      At [facility], we strive to deliver an exceptional experience to you, every time we see you.  If you receive a survey in the mail, please send us back your thoughts. We really do value your feedback.      Suggested websites for health information:  Www.Nativoo.org : Up to date and easily searchable information on multiple topics.  Www.medlineplus.gov : medication info, interactive tutorials, watch real surgeries online  Www.familydoctor.org : good info from the Academy of Family Physicians  Www.cdc.gov : public health info, travel advisories, epidemics (H1N1)  Www.aap.org : children's health info, normal development, vaccinations  Www.health.[facility].[place].us : [place] dept of health, public health issues in [place], N1N1    Your care team:     Family Medicine   JAGUAR Kern MD Emily Bunt, APRN CNP   S. MD Florina Weeks MD Angela Wermerskirchen, MD         Clinic hours: Monday - Wednesday 7 am-7 pm   Thursdays and Fridays 7 am-5 pm.     [facility]: Monday - Friday 11 am-9 pm,   Saturday and Sunday 9 am-5 pm.    [facility]: Monday -Thursday 8 am-8 pm; Friday 8 am-6 pm; Saturday and Sunday 9 am-5 pm.     [facility]: Monday - Thursday 8 am - 7 pm; Friday 8 am - 6 pm    Clinic: [phone]   [facility]: [phone]   [facility]: [phone]

## 2018-01-17 NOTE — NURSING NOTE
"Chief Complaint   Patient presents with     Musculoskeletal Problem       Initial /72 (BP Location: Right arm, Patient Position: Chair, Cuff Size: Adult Large)  Pulse 62  Temp 98  F (36.7  C) (Oral)  Resp 18  Ht 1.803 m (5' 11\")  Wt 90.3 kg (199 lb)  BMI 27.75 kg/m2 Estimated body mass index is 27.75 kg/(m^2) as calculated from the following:    Height as of this encounter: 1.803 m (5' 11\").    Weight as of this encounter: 90.3 kg (199 lb).  Medication Reconciliation: complete     Stacy Berg MA       "

## 2018-01-17 NOTE — MR AVS SNAPSHOT
After Visit Summary   1/17/2018    Geovanni Jasso    MRN: [id number]           Patient Information     Date Of Birth          1960        Visit Information        Provider Department      1/17/2018 5:00 PM Nelly Morrison PA-C [facility]        Today's Diagnoses     Chest wall pain    -  1    Diarrhea, unspecified type        Exposure to influenza          Care Instructions    I will notify you of influenza A blood test result via MyChart as soon as available  Take pain medication at bedtime as needed for chest wall pain.   Return urgently if any change in symptoms like shortness of breath, increasing pain, abdominal pain,  vomiting, fever or other change in symptoms .      At [facility], we strive to deliver an exceptional experience to you, every time we see you.  If you receive a survey in the mail, please send us back your thoughts. We really do value your feedback.      Suggested websites for health information:  Www."[employer] : Up to date and easily searchable information on multiple topics.  Www.medlineplus.gov : medication info, interactive tutorials, watch real surgeries online  Www.familydoctor.org : good info from the Academy of Family Physicians  Www.cdc.gov : public health info, travel advisories, epidemics (H1N1)  Www.aap.org : children's health info, normal development, vaccinations  Www.health.state.[place].us : [place] dept of health, public health issues in [place], N1N1    Your care team:     Family Medicine   JAGUAR Kern MD Emily Bunt, APRN CNP   S. MD Florina Weeks MD Angela Wermerskirchen, MD         Clinic hours: Monday - Wednesday 7 am-7 pm   Thursdays and Fridays 7 am-5 pm.     [facility]: Monday - Friday 11 am-9 pm,   Saturday and Sunday 9 am-5 pm.    [facility]: Monday -Thursday 8 am-8 pm; Friday 8 am-6 pm; Saturday and Sunday 9 am-5 pm.     [facility]  [facility]: Monday - Thursday 8 am - 7 pm; Friday 8 am - 6 pm    Clinic: [phone]   [facility]: [phone]   [facility]: [phone]                  Follow-ups after your visit        Who to contact     If you have questions or need follow up information about today's clinic visit or your schedule please contact [facility] directly at [phone].  Normal or non-critical lab and imaging results will be communicated to you by oboxohart, letter or phone within 4 business days after the clinic has received the results. If you do not hear from us within 7 days, please contact the clinic through Boosted Boardst or phone. If you have a critical or abnormal lab result, we will notify you by phone as soon as possible.  Submit refill requests through Ac[employer]ensing [employer] or call your pharmacy and they will forward the refill request to us. Please allow 3 business days for your refill to be completed.          Additional Information About Your Visit        Ac[employer] Sensing [employer] Information     [employer] gives you secure access to your electronic health record. If you see a primary care provider, you can also send messages to your care team and make appointments. If you have questions, please call your primary care clinic.  If you do not have a primary care provider, please call [phone] and they will assist you.        Care EveryWhere ID     This is your Care EveryWhere ID. This could be used by other organizations to access your [facility] medical records  [id number]        Your Vitals Were     Pulse Temperature Respirations Height BMI (Body Mass Index)       62 98  F (36.7  C) (Oral) 18 1.803 m (5' 11\") 27.75 kg/m2        Blood Pressure from Last 3 Encounters:   01/17/18 118/72   11/07/17 123/74   08/28/17 116/72    Weight from Last 3 Encounters:   01/17/18 90.3 kg (199 lb)   11/07/17 90.7 kg (200 lb)   10/17/17 90.7 kg (200 lb)              We Performed the Following     CBC with platelets " differential     Influenza A Virus Antibody IgM     Influenza A/B antigen          Where to get your medicines      Some of these will need a paper prescription and others can be bought over the counter.  Ask your nurse if you have questions.     Bring a paper prescription for each of these medications     HYDROcodone-ibuprofen 7.5-200 MG per tablet          Primary Care Provider Office Phone # Fax #    Paddy Holly [phone] [address]  [place] [facility] [address]        Equal Access to Services     [facility] : Hadii aad ku hadasho Soomaali, waaxda luqadaha, qaybta kaalmada adeegyada, waxay idiin hayaan adeeg kharajosue presley . So [facility] [place] [facility] [phone].    ATENCIÓN: Si habla español, tiene a fisher disposición servicios gratuitos de asistencia lingüística. [facility] [phone].    We comply with applicable federal civil rights laws and [place] laws. We do not discriminate on the basis of race, color, national origin, age, disability, sex, sexual orientation, or gender identity.            Thank you!     Thank you for choosing [facility]  for your care. Our goal is always to provide you with excellent care. Hearing back from our patients is one way we can continue to improve our services. Please take a few minutes to complete the written survey that you may receive in the mail after your visit with us. Thank you!             Your Updated Medication List - Protect others around you: Learn how to safely use, store and throw away your medicines at www.disposemymeds.org.          This list is accurate as of: 1/17/18  5:54 PM.  Always use your most recent med list.                   Brand Name Dispense Instructions for use Diagnosis    acetaminophen 325 MG tablet    TYLENOL     Take 325-650 mg by mouth 4 times daily as needed        ASPIRIN PO      Take 81 mg by mouth daily (with dinner)        atorvastatin 20 MG tablet    LIPITOR    90 tablet    Take 1 tablet (20 mg) by  mouth daily (with dinner)    Hyperlipidemia LDL goal <70, Atherosclerosis of native coronary artery of native heart without angina pectoris       FEXOFENADINE HCL PO      Daily        fluticasone 50 MCG/ACT spray    FLONASE    16 g    Spray 1-2 sprays into both nostrils daily    Chronic rhinitis       HYDROcodone-ibuprofen 7.5-200 MG per tablet    VICOPROFEN    30 tablet    Take 1 tablet by mouth nightly as needed for moderate to severe pain    Chest wall pain       ibuprofen 200 MG tablet    ADVIL/MOTRIN     Take 200 mg by mouth every 6 hours as needed        omeprazole 20 MG tablet     90 tablet    Take 1 tablet (20 mg) by mouth daily Take 30-60 minutes before a meal.    Gastroesophageal reflux disease, esophagitis presence not specified

## 2018-01-17 NOTE — PROGRESS NOTES
SUBJECTIVE:   Geovanni Jasso is a 57 year old male who presents to clinic today for the following health issues:      Joint Pain    Onset: couple days    Description:   Location: right shoulder and ribs  Character: Dull ache    Intensity: moderate    Progression of Symptoms: same    Accompanying Signs & Symptoms:  Other symptoms: troubles sleeping     History:   Previous similar pain: no       Precipitating factors:   Trauma or overuse: YES- tripped and fell    Alleviating factors:  Improved by: nothing    Therapies Tried and outcome: tylenol      Was going into store and jumped over snow and tripped and tucked right arm under right side of ribs and injured right shoulder and right ribs as fell to ground     Rolling over in bed has severe pain right ribs.  No shortness of breath.    Has some abdominal pain and would like blood testing for influenza A.  Wife ill since christmas and saw Dr. Holly and positive blood test for influenza A.  Patient has been chilled.  7-8 bowel movement earlier today. No bowel movement since approximately 1 pm.  Has been chilled. No cough.  Has been fatigued.         Problem list and histories reviewed & adjusted, as indicated.  Additional history: as documented    Patient Active Problem List   Diagnosis     Esophageal reflux     Hyperlipidemia LDL goal <70     Chronic rhinitis     Bilateral low back pain without sciatica     Chondromalacia patellae, right     S/P ACL reconstruction     Atherosclerosis of native coronary artery of native heart without angina pectoris     History reviewed. No pertinent surgical history.    Social History   Substance Use Topics     Smoking status: Never Smoker     Smokeless tobacco: Never Used     Alcohol use Yes      Comment: occasionally     History reviewed. No pertinent family history.      Current Outpatient Prescriptions   Medication Sig Dispense Refill            omeprazole 20 MG tablet Take 1 tablet (20 mg) by mouth daily Take 30-60 minutes before  "a meal. 90 tablet 1     atorvastatin (LIPITOR) 20 MG tablet Take 1 tablet (20 mg) by mouth daily (with dinner) 90 tablet 3     ASPIRIN PO Take 81 mg by mouth daily (with dinner)       fluticasone (FLONASE) 50 MCG/ACT spray Spray 1-2 sprays into both nostrils daily 16 g 11     FEXOFENADINE HCL PO Daily       acetaminophen (TYLENOL) 325 MG tablet Take 325-650 mg by mouth 4 times daily as needed       ibuprofen (ADVIL,MOTRIN) 200 MG tablet Take 200 mg by mouth every 6 hours as needed           Reviewed and updated as needed this visit by clinical staffTobacco  Allergies  Meds  Problems  Med Hx  Surg Hx  Fam Hx  Soc Hx        Reviewed and updated as needed this visit by Provider  Tobacco  Allergies  Meds  Problems  Med Hx  Surg Hx  Fam Hx  Soc Hx          ROS:  Constitutional, HEENT, cardiovascular, pulmonary, gi and gu systems are negative, except as otherwise noted.      OBJECTIVE:   /72 (BP Location: Right arm, Patient Position: Chair, Cuff Size: Adult Large)  Pulse 62  Temp 98  F (36.7  C) (Oral)  Resp 18  Ht 1.803 m (5' 11\")  Wt 90.3 kg (199 lb)  BMI 27.75 kg/m2  Body mass index is 27.75 kg/(m^2).  GENERAL: healthy, alert and no distress  EYES: Eyes grossly normal to inspection, PERRL and conjunctivae and sclerae normal  HENT: ear canals and TM's normal, nose and mouth without ulcers or lesions  NECK: no adenopathy, no asymmetry, masses, or scars and thyroid normal to palpation  RESP: lungs clear to auscultation - no rales, rhonchi or wheezes  CV: regular rate and rhythm, normal S1 S2, no S3 or S4, no murmur, click or rub, no peripheral edema and peripheral pulses strong  ABDOMEN: soft, nontender, no hepatosplenomegaly, no masses and bowel sounds normal  MS: right shoulder with normal range of motion.  Tender right lateral chest wall without obvious deformity. No ecchymoses    Diagnostic Test Results:  Results for orders placed or performed in visit on 01/17/18   CBC with platelets " differential   Result Value Ref Range    WBC 7.4 4.0 - 11.0 10e9/L    RBC Count 4.71 4.4 - 5.9 10e12/L    Hemoglobin 15.3 13.3 - 17.7 g/dL    Hematocrit 45.4 40.0 - 53.0 %    MCV 96 78 - 100 fl    MCH 32.5 26.5 - 33.0 pg    MCHC 33.7 31.5 - 36.5 g/dL    RDW 13.6 10.0 - 15.0 %    Platelet Count 232 150 - 450 10e9/L    Diff Method Automated Method     % Neutrophils 57.1 %    % Lymphocytes 26.1 %    % Monocytes 12.0 %    % Eosinophils 4.1 %    % Basophils 0.7 %    Absolute Neutrophil 4.2 1.6 - 8.3 10e9/L    Absolute Lymphocytes 1.9 0.8 - 5.3 10e9/L    Absolute Monocytes 0.9 0.0 - 1.3 10e9/L    Absolute Eosinophils 0.3 0.0 - 0.7 10e9/L    Absolute Basophils 0.1 0.0 - 0.2 10e9/L   Influenza A/B antigen   Result Value Ref Range    Influenza A/B Agn Specimen Nasal     Influenza A Negative NEG^Negative    Influenza B Negative NEG^Negative   rib detail xray negative     ASSESSMENT/PLAN:             1. Chest wall pain  No evidence of fracture- difficulty with sleep due to pain- treat with vicoprofen at night   - XR Ribs & Chest Right G/E 3 Views; Future  - HYDROcodone-ibuprofen (VICOPROFEN) 7.5-200 MG per tablet; Take 1 tablet by mouth nightly as needed for moderate to severe pain  Dispense: 30 tablet; Refill: 0    2. Diarrhea, unspecified type  Normal cbc and negative influenza antigen- will verify with IGM  - CBC with platelets differential  - Influenza A Virus Antibody IgM  - Influenza A/B antigen    3. Exposure to influenza  Wife with influenza A   - CBC with platelets differential  - Influenza A Virus Antibody IgM  - Influenza A/B antigen    CC chart to PCP for fyi    Patient Instructions     I will notify you of influenza A blood test result via MyChart as soon as available  Take pain medication at bedtime as needed for chest wall pain.   Return urgently if any change in symptoms like shortness of breath, increasing pain, abdominal pain,  vomiting, fever or other change in symptoms .      At [facility], we strive to deliver an exceptional experience to you, every time we see you.  If you receive a survey in the mail, please send us back your thoughts. We really do value your feedback.      Suggested websites for health information:  Www.ThetaRay.org : Up to date and easily searchable information on multiple topics.  Www.medlineplus.gov : medication info, interactive tutorials, watch real surgeries online  Www.familydoctor.org : good info from the Academy of Family Physicians  Www.cdc.gov : public health info, travel advisories, epidemics (H1N1)  Www.aap.org : children's health info, normal development, vaccinations  Www.health.[facility].us : [place] dept of health, public health issues in [place], N1N1    Your care team:     Family Medicine   JAGUAR Kern MD Emily Bunt, PURNIMA [facility]   S. MD Florina Weeks MD Angela Wermerskirchen, MD         Clinic hours: Monday - Wednesday 7 am-7 pm   Thursdays and Fridays 7 am-5 pm.     [facility]: Monday - Friday 11 am-9 pm,   Saturday and Sunday 9 am-5 pm.    [facility]: Monday -Thursday 8 am-8 pm; Friday 8 am-6 pm; Saturday and Sunday 9 am-5 pm.     [facility]: Monday - Thursday 8 am - 7 pm; Friday 8 am - 6 pm    Clinic: [phone]   [facility]: [phone]   [facility]: [phone]               Nelly Morrison PA-C  [facility]

## 2018-01-19 ENCOUNTER — MYC MEDICAL ADVICE (OUTPATIENT)
Dept: FAMILY MEDICINE | Facility: CLINIC | Age: 58
End: 2018-01-19

## 2018-01-19 ENCOUNTER — TELEPHONE (OUTPATIENT)
Dept: FAMILY MEDICINE | Facility: CLINIC | Age: 58
End: 2018-01-19

## 2018-01-19 NOTE — TELEPHONE ENCOUNTER
Received fax that insurance only covers 7 days of hydrocodone.  Spoke with Nelly, she states ok to only dispense 7 days.      Pharmacy notified and will dispense 7 days and void remaining prescription.

## 2018-01-22 LAB — FLUAV IGM SER IA-ACNC: 1.39 IV

## 2018-01-23 DIAGNOSIS — J10.1 INFLUENZA A: Primary | ICD-10-CM

## 2018-01-23 RX ORDER — OSELTAMIVIR PHOSPHATE 75 MG/1
75 CAPSULE ORAL 2 TIMES DAILY
Qty: 10 CAPSULE | Refills: 0 | Status: SHIPPED | OUTPATIENT
Start: 2018-01-23 | End: 2018-04-11

## 2018-01-23 NOTE — PROGRESS NOTES
Guerita Khan  Your influenza A test was positive.   I have sent over prescription for tamiflu to your [facility] pharmacy.  Please call or MyChart my office with any questions or concerns.    Nelly Morrison, PAC

## 2018-04-11 ENCOUNTER — OFFICE VISIT (OUTPATIENT)
Dept: FAMILY MEDICINE | Facility: CLINIC | Age: 58
End: 2018-04-11
Payer: COMMERCIAL

## 2018-04-11 VITALS
DIASTOLIC BLOOD PRESSURE: 84 MMHG | HEIGHT: 71 IN | HEART RATE: 68 BPM | SYSTOLIC BLOOD PRESSURE: 136 MMHG | TEMPERATURE: 97.5 F | BODY MASS INDEX: 27.58 KG/M2 | OXYGEN SATURATION: 99 % | WEIGHT: 197 LBS

## 2018-04-11 DIAGNOSIS — D22.5 MELANOCYTIC NEVUS OF TRUNK: ICD-10-CM

## 2018-04-11 DIAGNOSIS — L82.1 SEBORRHEIC KERATOSIS: Primary | ICD-10-CM

## 2018-04-11 PROCEDURE — 99213 OFFICE O/P EST LOW 20 MIN: CPT | Performed by: INTERNAL MEDICINE

## 2018-04-11 ASSESSMENT — PAIN SCALES - GENERAL: PAINLEVEL: NO PAIN (0)

## 2018-04-11 NOTE — PROGRESS NOTES
SUBJECTIVE:   Geovanni Jasso is a 57 year old male who presents to clinic today for the following health issues:    Concern - skin  Onset: ongoing    Description:   Spot on right side and chest    Intensity: moderate    Progression of Symptoms:  worsening    Accompanying Signs & Symptoms:  none    Previous history of similar problem:   none    Precipitating factors:   Worsened by: sun exposure    Alleviating factors:  Improved by: none  Therapies Tried and outcome: none        Problem list and histories reviewed & adjusted, as indicated.  Additional history: as documented    Patient Active Problem List   Diagnosis     Esophageal reflux     Hyperlipidemia LDL goal <70     Chronic rhinitis     Bilateral low back pain without sciatica     Chondromalacia patellae, right     S/P ACL reconstruction     Atherosclerosis of native coronary artery of native heart without angina pectoris     History reviewed. No pertinent surgical history.    Social History   Substance Use Topics     Smoking status: Never Smoker     Smokeless tobacco: Never Used     Alcohol use Yes      Comment: occasionally     History reviewed. No pertinent family history.      Current Outpatient Prescriptions   Medication Sig Dispense Refill     HYDROcodone-ibuprofen (VICOPROFEN) 7.5-200 MG per tablet Take 1 tablet by mouth nightly as needed for moderate to severe pain 30 tablet 0     omeprazole 20 MG tablet Take 1 tablet (20 mg) by mouth daily Take 30-60 minutes before a meal. 90 tablet 1     atorvastatin (LIPITOR) 20 MG tablet Take 1 tablet (20 mg) by mouth daily (with dinner) 90 tablet 3     ASPIRIN PO Take 81 mg by mouth daily (with dinner)       fluticasone (FLONASE) 50 MCG/ACT spray Spray 1-2 sprays into both nostrils daily 16 g 11     FEXOFENADINE HCL PO Daily       acetaminophen (TYLENOL) 325 MG tablet Take 325-650 mg by mouth 4 times daily as needed       ibuprofen (ADVIL,MOTRIN) 200 MG tablet Take 200 mg by mouth every 6 hours as needed    "    Allergies   Allergen Reactions     Penicillins      Recent Labs   Lab Test  10/30/17   0820  11/30/16   0850  12/07/15   1126   LDL  146*  158*  166*   HDL  37*  33*  36*   TRIG  139  175*  117      BP Readings from Last 3 Encounters:   04/11/18 136/84   01/17/18 118/72   11/07/17 123/74    Wt Readings from Last 3 Encounters:   04/11/18 197 lb (89.4 kg)   01/17/18 199 lb (90.3 kg)   11/07/17 200 lb (90.7 kg)               ROS:  CONSTITUTIONAL: NEGATIVE for fever, chills, change in weight  INTEGUMENTARY/SKIN: As above.  EYES: NEGATIVE for vision changes or irritation  ENT/MOUTH: NEGATIVE for ear, mouth and throat problems  RESP: NEGATIVE for significant cough or SOB  CV: NEGATIVE for chest pain, palpitations or peripheral edema  GI: NEGATIVE for nausea, abdominal pain, heartburn, or change in bowel habits  : NEGATIVE for frequency, dysuria, or hematuria  MUSCULOSKELETAL: NEGATIVE for significant arthralgias or myalgia  NEURO: NEGATIVE for weakness, dizziness or paresthesias  ENDOCRINE: NEGATIVE for temperature intolerance, skin/hair changes  HEME: NEGATIVE for bleeding problems  PSYCHIATRIC: NEGATIVE for changes in mood or affect    OBJECTIVE:     /84 (BP Location: Left arm, Patient Position: Chair, Cuff Size: Adult Regular)  Pulse 68  Temp 97.5  F (36.4  C) (Oral)  Ht 5' 11\" (1.803 m)  Wt 197 lb (89.4 kg)  SpO2 99%  BMI 27.48 kg/m2  Body mass index is 27.48 kg/(m^2).  GENERAL: healthy, alert and no distress  EYES: Eyes grossly normal to inspection and conjunctivae and sclerae normal  HENT: normal cephalic/atraumatic  MS: no gross musculoskeletal defects noted, no edema  SKIN: Single, oval-shaped, brownish lesion, located at the right midaxillary line of the lower right right rib area. Multiple nevi that are not raised with smooth borders.  NEURO: Normal strength and tone, mentation intact and speech normal  PSYCH: mentation appears normal, affect normal/bright    Diagnostic Test Results:  none "     ASSESSMENT/PLAN:     (L82.1) Seborrheic keratosis  (primary encounter diagnosis)  Comment: Patient reassured that his right-sided rib lesion is not malignant.  He was also advised to apply over-the-counter Hydrocortisone ointment.  Plan: DERMATOLOGY REFERRAL            (D22.5) Melanocytic nevus of trunk  Comment: Skin check performed, no potential malignant lesion though will defer to Dermatology for more extensive evaluation.  Plan: DERMATOLOGY REFERRAL         Follow-up visit if condition worsens.          Paddy Holly MD  [facility]

## 2018-04-11 NOTE — PATIENT INSTRUCTIONS
At [facility], we strive to deliver an exceptional experience to you, every time we see you.  If you receive a survey in the mail, please send us back your thoughts. We really do value your feedback.    Based on your medical history, these are the current health maintenance/preventive care services that you are due for (some may have been done at this visit.)  Health Maintenance Due   Topic Date Due     ADVANCE DIRECTIVE PLANNING Q5 YRS  09/09/2013         Suggested websites for health information:  Www.PayNearMe.org : Up to date and easily searchable information on multiple topics.  Www.medlineplus.gov : medication info, interactive tutorials, watch real surgeries online  Www.familydoctor.org : good info from the Academy of Family Physicians  Www.cdc.gov : public health info, travel advisories, epidemics (H1N1)  Www.aap.org : children's health info, normal development, vaccinations  Www.health.[facility].[place].us : [place] dept of health, public health issues in [place], N1N1    Your care team:                            Family Medicine Internal Medicine   MD Paddy Merrill MD Shantel Branch-Fleming, MD Katya Georgiev PA-C Nam Ho, MD Pediatrics   Hussain Scott, PAPREET Gann, CNP Odessa Brooke APRN CNP   MD Dedra Capone MD Deborah Mielke, MD Kim Thein, APRN CNP      Clinic hours: Monday - Thursday 7 am-7 pm; Fridays 7 am-5 pm.   Urgent care: Monday - Friday 11 am-9 pm; Saturday and Sunday 9 am-5 pm.  Pharmacy : Monday -Thursday 8 am-8 pm; Friday 8 am-6 pm; Saturday and Sunday 9 am-5 pm.     Clinic: [phone]   Pharmacy: [phone]    Understanding Seborrheic Keratosis  Seborrheic keratoses are wart-like growths on the skin. These growths are not cancer. Many people get them, especially after age 30.  How to say it  [id number]   What causes seborrheic keratoses?  Doctors do not know what causes seborrheic keratoses. They may run in  families. In addition, they become more common as people get older.  What are the symptoms of seborrheic keratoses?  Here is what seborrheic keratoses often look like:    They tend to be round or oval in shape. They can be very small or about as big across as a quarter.    They can appear singly or in clusters.    They tend to be tan, brown, or black in color.    The edges may be scalloped or uneven-looking.    They can have a waxy or scaly look.    They can be a bit raised, appearing to be  stuck on  the skin.    They may become red and irritated if scratched or rubbed by clothing  Sebhorreic keratoses are not painful, but they may be itchy. They can become irritated if they are continually rubbed by skin or clothing. Seborrheic keratoses most often appear on the face, arms, chest, back, or belly.  How are seborrheic keratoses treated?  Seborrheic keratoses don t need to be treated unless they bother you. You may choose to have them removed because you find them unattractive. You may also want them removed because they get irritated and uncomfortable. A healthcare provider can remove them in an office visit. Ways that seborrheic keratoses can be removed include:    Freezing them off with liquid nitrogen    Cutting them off with a scalpel    Burning them off with electricity  What are the complications of seborrheic keratoses?  Seborrheic keratoses are not cancer, but they can look like some types of skin cancer. So it s a good idea to ask your healthcare provider to check any new skin growths. Ask your healthcare provider about any skin problem that concerns you.  When should I call my healthcare provider?  Call your healthcare provider right away if any of these occur:    You develop a lot of seborrheic keratoses very quickly    You have a sore that does not heal within a few weeks, or heals and then comes back    You have a mole or skin growth that is changing in size, shape, or color    You have a mole or skin  growth that looks different on one side from the other    You have a mole or skin growth that is not the same color throughout   Date Last Reviewed: 5/1/2016 2000-2017 [employer]. [address] [place] [address], [place] [address]. All rights reserved. This information is not intended as a substitute for professional medical care. Always follow your healthcare professional's instructions.        Monitoring Moles     Don't forget to check your feet.   Moles, also called nevi, are small, colored (pigmented) marks on the skin. They have no known purpose. Many moles appear before age 30, but they also increase frequently as people age. Moles most often are not cancer (benign) and are harmless. But some become cancerous (malignant). That s why you need to watch the moles on your body and tell your healthcare provider about any that concern you.  What are moles?  Moles are a type of pigmented juan jose. Freckles are another type of pigmented juan jose. They are often sprinkled across the bridge of the nose, the cheeks, and the arms. Moles can appear on any part of the body. There are many types, sizes, and shapes of moles. Most moles are solid brown. In most cases they are flat or dome-shaped, smooth, and have well-defined edges.  Why worry about moles?  Most moles are benign and don t require treatment. You can have moles removed if you don t like the way they look or feel. But moles may become a problem if they appear after you are 30, or if they change in certain ways. These moles may turn into melanoma, a type of skin cancer. Melanoma is one of the fastest growing cancers in the [place] It is often curable if caught early. But this disease can be life-threatening, particularly when not diagnosed early. Your risk for melanoma is higher if you:    Have a lot of moles    Have had more lifetime exposure to the sun    Have had severe blistering sunburns    Use tanning beds    Have a personal or family history of skin  cancer  To manage your risk, it s smart to check your moles for changes and ask your healthcare provider to do a thorough skin exam when you have a physical exam. To do this, you first need to learn where your moles are. Then, be sure to check your moles each month.  Checking your moles  You can check many of your moles each month. You can do this right after you shower and before you get dressed. Check your body from head to toe. Then, make a list of your moles. If you find any new moles or changes in your moles, call your healthcare provider. To check your moles, you ll need:    A full-length mirror    A stool or chair to sit on while you check your feet  If you have a lot of moles, take digital photos of them. Make sure to take photos both up close and from a distance. These can help you see if any moles change over time.  When to seek medical treatment  See your healthcare provider if your moles hurt, itch, ooze, bleed, thicken, become crusty, or show other changes. Also, be sure to call your health care provider if your moles show any of the following signs of melanoma:    A change in size, shape, color, or height    The sides don t match (asymmetry)    Ragged, notched, or blurred borders    Different colors within the same mole    Size is larger than 5 mm or 6 mm in diameter (the size of a pencil eraser)  Date Last Reviewed: 2/1/2017 2000-2017 [employer]. [address] [place] [address], [place] [address]. All rights reserved. This information is not intended as a substitute for professional medical care. Always follow your healthcare professional's instructions.

## 2018-04-11 NOTE — MR AVS SNAPSHOT
After Visit Summary   4/11/2018    Geovanni Jasso    MRN: [id number]           Patient Information     Date Of Birth          1960        Visit Information        Provider Department      4/11/2018 9:00 AM Paddy Holly MD [facility]        Today's Diagnoses     Melanocytic nevus of trunk    -  1    Seborrheic keratosis          Care Instructions    At [facility], we strive to deliver an exceptional experience to you, every time we see you.  If you receive a survey in the mail, please send us back your thoughts. We really do value your feedback.    Based on your medical history, these are the current health maintenance/preventive care services that you are due for (some may have been done at this visit.)  Health Maintenance Due   Topic Date Due     ADVANCE DIRECTIVE PLANNING Q5 YRS  09/09/2013         Suggested websites for health information:  Www.dondeEstaâ„¢ : Up to date and easily searchable information on multiple topics.  Www.[employer].gov : medication info, interactive tutorials, watch real surgeries online  Www.familydoctor.org : good info from the Academy of Family Physicians  Www.cdc.gov : public health info, travel advisories, epidemics (H1N1)  Www.aap.org : children's health info, normal development, vaccinations  Www.health.[facility].[place].us : [place] dept of health, public health issues in [place], N1N1    Your care team:                            Family Medicine Internal Medicine   MD Paddy Merrill MD Shantel Branch-Fleming, MD Katya Georgiev PA-C Nam Ho, MD Pediatrics   JAGUAR Berman, GLADYS FELTON CNP   MD Dedra Capone MD Deborah Mielke, MD Kim Thein, APRN CNP      Clinic hours: Monday - Thursday 7 am-7 pm; Fridays 7 am-5 pm.   Urgent care: Monday - Friday 11 am-9 pm; Saturday and Sunday 9 am-5 pm.  Pharmacy : Monday -Thursday 8 am-8 pm; Friday 8 am-6 pm; Saturday and  Sunday 9 am-5 pm.     Clinic: [phone]   Pharmacy: [phone]    Understanding Seborrheic Keratosis  Seborrheic keratoses are wart-like growths on the skin. These growths are not cancer. Many people get them, especially after age 30.  How to say it  lfk-psn-XR-ik uuo-pi-RYT-sis   What causes seborrheic keratoses?  Doctors do not know what causes seborrheic keratoses. They may run in families. In addition, they become more common as people get older.  What are the symptoms of seborrheic keratoses?  Here is what seborrheic keratoses often look like:    They tend to be round or oval in shape. They can be very small or about as big across as a quarter.    They can appear singly or in clusters.    They tend to be tan, brown, or black in color.    The edges may be scalloped or uneven-looking.    They can have a waxy or scaly look.    They can be a bit raised, appearing to be  stuck on  the skin.    They may become red and irritated if scratched or rubbed by clothing  Sebhorreic keratoses are not painful, but they may be itchy. They can become irritated if they are continually rubbed by skin or clothing. Seborrheic keratoses most often appear on the face, arms, chest, back, or belly.  How are seborrheic keratoses treated?  Seborrheic keratoses don t need to be treated unless they bother you. You may choose to have them removed because you find them unattractive. You may also want them removed because they get irritated and uncomfortable. A healthcare provider can remove them in an office visit. Ways that seborrheic keratoses can be removed include:    Freezing them off with liquid nitrogen    Cutting them off with a scalpel    Burning them off with electricity  What are the complications of seborrheic keratoses?  Seborrheic keratoses are not cancer, but they can look like some types of skin cancer. So it s a good idea to ask your healthcare provider to check any new skin growths. Ask your healthcare provider  about any skin problem that concerns you.  When should I call my healthcare provider?  Call your healthcare provider right away if any of these occur:    You develop a lot of seborrheic keratoses very quickly    You have a sore that does not heal within a few weeks, or heals and then comes back    You have a mole or skin growth that is changing in size, shape, or color    You have a mole or skin growth that looks different on one side from the other    You have a mole or skin growth that is not the same color throughout   Date Last Reviewed: 5/1/2016 2000-2017 [employer]. [address] [place] [address]. All rights reserved. This information is not intended as a substitute for professional medical care. Always follow your healthcare professional's instructions.        Monitoring Moles     Don't forget to check your feet.   Moles, also called nevi, are small, colored (pigmented) marks on the skin. They have no known purpose. Many moles appear before age 30, but they also increase frequently as people age. Moles most often are not cancer (benign) and are harmless. But some become cancerous (malignant). That s why you need to watch the moles on your body and tell your healthcare provider about any that concern you.  What are moles?  Moles are a type of pigmented juan jose. Freckles are another type of pigmented juan jose. They are often sprinkled across the bridge of the nose, the cheeks, and the arms. Moles can appear on any part of the body. There are many types, sizes, and shapes of moles. Most moles are solid brown. In most cases they are flat or dome-shaped, smooth, and have well-defined edges.  Why worry about moles?  Most moles are benign and don t require treatment. You can have moles removed if you don t like the way they look or feel. But moles may become a problem if they appear after you are 30, or if they change in certain ways. These moles may turn into melanoma, a type of skin cancer.  Melanoma is one of the fastest growing cancers in the [place] It is often curable if caught early. But this disease can be life-threatening, particularly when not diagnosed early. Your risk for melanoma is higher if you:    Have a lot of moles    Have had more lifetime exposure to the sun    Have had severe blistering sunburns    Use tanning beds    Have a personal or family history of skin cancer  To manage your risk, it s smart to check your moles for changes and ask your healthcare provider to do a thorough skin exam when you have a physical exam. To do this, you first need to learn where your moles are. Then, be sure to check your moles each month.  Checking your moles  You can check many of your moles each month. You can do this right after you shower and before you get dressed. Check your body from head to toe. Then, make a list of your moles. If you find any new moles or changes in your moles, call your healthcare provider. To check your moles, you ll need:    A full-length mirror    A stool or chair to sit on while you check your feet  If you have a lot of moles, take digital photos of them. Make sure to take photos both up close and from a distance. These can help you see if any moles change over time.  When to seek medical treatment  See your healthcare provider if your moles hurt, itch, ooze, bleed, thicken, become crusty, or show other changes. Also, be sure to call your health care provider if your moles show any of the following signs of melanoma:    A change in size, shape, color, or height    The sides don t match (asymmetry)    Ragged, notched, or blurred borders    Different colors within the same mole    Size is larger than 5 mm or 6 mm in diameter (the size of a pencil eraser)  Date Last Reviewed: 2/1/2017 2000-2017 [employer]. [address] [place] [address], [place] [address]. All rights reserved. This information is not intended as a substitute for professional medical care. Always  follow your healthcare professional's instructions.                Follow-ups after your visit        Additional Services     DERMATOLOGY REFERRAL       Your provider has referred you to: [facility]: [facility] [place] [phone]   [email or web address]    Please be aware that coverage of these services is subject to the terms and limitations of your health insurance plan.  Call member services at your health plan with any benefit or coverage questions.      Please bring the following with you to your appointment:    (1) Any X-Rays, CTs or MRIs which have been performed.  Contact the facility where they were done to arrange for  prior to your scheduled appointment.    (2) List of current medications  (3) This referral request   (4) Any documents/labs given to you for this referral                  Who to contact     If you have questions or need follow up information about today's clinic visit or your schedule please contact [facility] directly at [phone].  Normal or non-critical lab and imaging results will be communicated to you by MyChart, letter or phone within 4 business days after the clinic has received the results. If you do not hear from us within 7 days, please contact the clinic through MyChart or phone. If you have a critical or abnormal lab result, we will notify you by phone as soon as possible.  Submit refill requests through [employer] or call your pharmacy and they will forward the refill request to us. Please allow 3 business days for your refill to be completed.          Additional Information About Your Visit        [email or web address] Information     [employer] gives you secure access to your electronic health record. If you see a primary care provider, you can also send messages to your care team and make appointments. If you have questions, please call your primary care clinic.  If you do not have a primary  "care provider, please call [phone] and they will assist you.        Care EveryWhere ID     This is your Care EveryWhere ID. This could be used by other organizations to access your [facility] medical records  [id number]        Your Vitals Were     Pulse Temperature Height Pulse Oximetry BMI (Body Mass Index)       68 97.5  F (36.4  C) (Oral) 5' 11\" (1.803 m) 99% 27.48 kg/m2        Blood Pressure from Last 3 Encounters:   04/11/18 136/84   01/17/18 118/72   11/07/17 123/74    Weight from Last 3 Encounters:   04/11/18 197 lb (89.4 kg)   01/17/18 199 lb (90.3 kg)   11/07/17 200 lb (90.7 kg)              We Performed the Following     DERMATOLOGY REFERRAL        Primary Care Provider Office Phone # Fax #    Paddy Holly [phone]       [address]  [facility] [address]        Equal Access to Services     [facility]: Hadii aad ku hadasho Soomaali, waaxda luqadaha, qaybta kaalmada adeegyada, waxay idiin hayjasvir presley . So [facility] [phone].    ATENCIÓN: Si habla español, tiene a fisher disposición servicios gratuitos de asistencia lingüística. [facility] [phone].    We comply with applicable federal civil rights laws and [place] laws. We do not discriminate on the basis of race, color, national origin, age, disability, sex, sexual orientation, or gender identity.            Thank you!     Thank you for choosing [facility]  for your care. Our goal is always to provide you with excellent care. Hearing back from our patients is one way we can continue to improve our services. Please take a few minutes to complete the written survey that you may receive in the mail after your visit with us. Thank you!             Your Updated Medication List - Protect others around you: Learn how to safely use, store and throw away your medicines at www.disposemymeds.org.          This list is accurate as of 4/11/18  9:47 AM.  Always use your most recent med list. "                   Brand Name Dispense Instructions for use Diagnosis    acetaminophen 325 MG tablet    TYLENOL     Take 325-650 mg by mouth 4 times daily as needed        ASPIRIN PO      Take 81 mg by mouth daily (with dinner)        atorvastatin 20 MG tablet    LIPITOR    90 tablet    Take 1 tablet (20 mg) by mouth daily (with dinner)    Hyperlipidemia LDL goal <70, Atherosclerosis of native coronary artery of native heart without angina pectoris       FEXOFENADINE HCL PO      Daily        fluticasone 50 MCG/ACT spray    FLONASE    16 g    Spray 1-2 sprays into both nostrils daily    Chronic rhinitis       HYDROcodone-ibuprofen 7.5-200 MG per tablet    VICOPROFEN    30 tablet    Take 1 tablet by mouth nightly as needed for moderate to severe pain    Chest wall pain       ibuprofen 200 MG tablet    ADVIL/MOTRIN     Take 200 mg by mouth every 6 hours as needed        omeprazole 20 MG tablet     90 tablet    Take 1 tablet (20 mg) by mouth daily Take 30-60 minutes before a meal.    Gastroesophageal reflux disease, esophagitis presence not specified

## 2018-05-14 ENCOUNTER — MYC REFILL (OUTPATIENT)
Dept: FAMILY MEDICINE | Facility: CLINIC | Age: 58
End: 2018-05-14

## 2018-05-14 DIAGNOSIS — K21.9 GASTROESOPHAGEAL REFLUX DISEASE, ESOPHAGITIS PRESENCE NOT SPECIFIED: ICD-10-CM

## 2018-05-14 NOTE — TELEPHONE ENCOUNTER
"Requested Prescriptions   Pending Prescriptions Disp Refills     omeprazole 20 MG tablet  Last Written Prescription Date:  11/14/17  Last Fill Quantity: 90,  # refills: 1   Last Office Visit with G, P or [facility] prescribing provider:  04/11/18   Future Office Visit:    90 tablet 1     Sig: Take 1 tablet (20 mg) by mouth daily Take 30-60 minutes before a meal.    PPI Protocol Passed    5/14/2018 10:14 AM       Passed - Not on Clopidogrel (unless Pantoprazole ordered)       Passed - No diagnosis of osteoporosis on record       Passed - Recent (12 mo) or future (30 days) visit within the authorizing provider's specialty    Patient had office visit in the last 12 months or has a visit in the next 30 days with authorizing provider or within the authorizing provider's specialty.  See \"Patient Info\" tab in inbasket, or \"Choose Columns\" in Meds & Orders section of the refill encounter.           Passed - Patient is age 18 or older          "

## 2018-05-14 NOTE — TELEPHONE ENCOUNTER
Message from [employer]:  Original authorizing provider: MD Bill De La Vega would like a refill of the following medications:  omeprazole 20 MG tablet [Paddy Holly MD]    Preferred pharmacy: [id number] - [facility] [address] [facility]    Comment:  Dr. Holly, Would you please renew my prescription for omeprazole? I only have 3 doses remaining and the label indicates there are not anymore refills available. I still use the [facility] pharmacy on [address]. Thank you, Bill

## 2018-05-15 RX ORDER — NICOTINE POLACRILEX 4 MG/1
20 GUM, CHEWING ORAL DAILY
Qty: 90 TABLET | Refills: 3 | Status: SHIPPED | OUTPATIENT
Start: 2018-05-15 | End: 2018-08-31

## 2018-08-29 ENCOUNTER — TELEPHONE (OUTPATIENT)
Dept: ALLERGY | Facility: CLINIC | Age: 58
End: 2018-08-29

## 2018-08-29 NOTE — TELEPHONE ENCOUNTER
Called the patient directly, as there is no authorization scanned into the chart to speak with the patient's wife, informed the patient that I am not aware of a way that we can e-mail the questionnaire to him, but if he does not receive the questionnaire in the mail by tomorrow night, he can arrive to his appointment 10-15 minutes early and the  will provide him with one at check in. Patient OK with this plan. Closing encounter.    Colleen Rutledge CMA on 8/29/2018 at 2:54 PM

## 2018-08-29 NOTE — TELEPHONE ENCOUNTER
Reason for Call:  Other appointment    Detailed comments: Wife is calling. Patient is scheduled for an appointment this Friday but has not yet received the allergy packet in the mail. Would like to know if you can email the information to patient. Thank you.    Phone Number Patient can be reached at: Other phone number:  [phone] (S) if questions    Best Time: any    Can we leave a detailed message on this number? YES    Call taken on 8/29/2018 at 1:44 PM by Susan Arellano

## 2018-08-31 ENCOUNTER — OFFICE VISIT (OUTPATIENT)
Dept: ALLERGY | Facility: CLINIC | Age: 58
End: 2018-08-31
Payer: COMMERCIAL

## 2018-08-31 VITALS
RESPIRATION RATE: 16 BRPM | HEART RATE: 74 BPM | SYSTOLIC BLOOD PRESSURE: 112 MMHG | TEMPERATURE: 98 F | DIASTOLIC BLOOD PRESSURE: 73 MMHG | BODY MASS INDEX: 27.87 KG/M2 | OXYGEN SATURATION: 96 % | WEIGHT: 199.8 LBS

## 2018-08-31 DIAGNOSIS — R06.02 SOB (SHORTNESS OF BREATH): Primary | ICD-10-CM

## 2018-08-31 DIAGNOSIS — K21.9 GASTROESOPHAGEAL REFLUX DISEASE, ESOPHAGITIS PRESENCE NOT SPECIFIED: ICD-10-CM

## 2018-08-31 DIAGNOSIS — J31.0 NON-ALLERGIC RHINITIS: ICD-10-CM

## 2018-08-31 PROBLEM — H10.9 RHINOCONJUNCTIVITIS: Status: ACTIVE | Noted: 2018-08-31

## 2018-08-31 LAB
FEF 25/75: NORMAL
FEV-1: NORMAL
FEV1/FVC: NORMAL
FVC: NORMAL

## 2018-08-31 PROCEDURE — 94010 BREATHING CAPACITY TEST: CPT | Performed by: ALLERGY & IMMUNOLOGY

## 2018-08-31 PROCEDURE — 99204 OFFICE O/P NEW MOD 45 MIN: CPT | Mod: 25 | Performed by: ALLERGY & IMMUNOLOGY

## 2018-08-31 PROCEDURE — 95004 PERQ TESTS W/ALRGNC XTRCS: CPT | Performed by: ALLERGY & IMMUNOLOGY

## 2018-08-31 RX ORDER — IPRATROPIUM BROMIDE 42 UG/1
2 SPRAY, METERED NASAL 4 TIMES DAILY PRN
Qty: 1 BOX | Refills: 3 | Status: SHIPPED | OUTPATIENT
Start: 2018-08-31 | End: 2021-08-20

## 2018-08-31 RX ORDER — FLUTICASONE PROPIONATE 50 MCG
1-2 SPRAY, SUSPENSION (ML) NASAL DAILY
Qty: 16 G | Refills: 11 | Status: SHIPPED | OUTPATIENT
Start: 2018-08-31 | End: 2018-08-31

## 2018-08-31 RX ORDER — ALBUTEROL SULFATE 90 UG/1
2 AEROSOL, METERED RESPIRATORY (INHALATION) EVERY 4 HOURS PRN
Qty: 1 INHALER | Refills: 3 | Status: SHIPPED | OUTPATIENT
Start: 2018-08-31 | End: 2020-05-04

## 2018-08-31 RX ORDER — OMEPRAZOLE 40 MG/1
40 CAPSULE, DELAYED RELEASE ORAL DAILY
Qty: 30 CAPSULE | Refills: 3 | Status: SHIPPED | OUTPATIENT
Start: 2018-08-31 | End: 2018-12-13

## 2018-08-31 NOTE — ASSESSMENT & PLAN NOTE
History of nasal and ocular symptoms.  Symptoms present day and night.  Symptoms worse first thing in the morning.  Flonase used inconsistently.  Fexofenadine has been beneficial.  No history of allergy evaluation.  No history of chronic sinusitis.  Some decreased sense of smell.    Skin testing:  Negative.     -Ipratropium 2 sprays/nostril four times daily as needed.   -Ketotifen (Zaditor, [place]) 1 drop/eye twice daily as needed.   -If still symptomatic when returns will refer to ENT.

## 2018-08-31 NOTE — PROGRESS NOTES
Geovanni Jasso is a 57 year old White male with previous medical history significant for gerd, and hyperlipidemia. Geovanni Jasso is being seen today for evaluation of seasonal allergies and shortness of breath.     The patient reports that beginning in his late 50s he began having postnasal drainage, sneezing, rhinorrhea, ocular itching, ocular watering and ocular redness.  He is a snorer at night.  He has symptoms during the day and night.  Symptoms are worse first thing in the morning.  Decreased sense of smell.  Possible sinus headaches.  Symptoms spring, summer and fall.  Increased symptoms around dust, mowing grass, raking leaves, smoke.  He additionally will clear his throat.  Throat clearing is worse after eating.  He does have a history of reflux.  He is on omeprazole 20 mg by mouth daily.  He describes GERD as not controlled.  The patient has used Flonase in the past but been inconsistent with using.  Fexofenadine has been beneficial.  Afrin has been used on an as-needed basis.  No use of ocular antihistamines.  No history of allergy testing.  Denies history of asthma.  However, he does report shortness of breath with exertion.  Denies tightness in chest, wheezing or coughing.  No prior history of asthma.  Non-smoker.  Denies other triggers for chest symptoms.    The patient has no history of asthma, eczema, food allergies, medications allergies or hives.     ENVIRONMENTAL HISTORY: [place] The home is heated with a forced air. They does have central air conditioning. The patient's bedroom is furnished with carpeting in bedroom, allergen mattress cover and fabric window coverings.  Pets inside the house include 0 pets. There is not history of cockroach or mice infestation. There is/are 0 smokers in the house.  The house does have a damp basement.       Past Medical History:   Diagnosis Date     Chronic rhinitis 10/23/2015     Esophageal reflux 12/26/2013      History reviewed. No pertinent family history.  History reviewed. No pertinent surgical history.    REVIEW OF SYSTEMS:  General: positive  for weight gain. negative for weight loss. positive  for changes in sleep.   Ears: negative for fullness. positive  for hearing loss. negative for dizziness.   Nose: positive  for snoring.negative for changes in smell. positive  for drainage.   Eyes: positive  for eye watering. positive  for eye itching. negative for vision changes. positive  for eye redness.  Throat: positive  for hoarseness. negative for sore throat. negative for trouble swallowing.   Lungs: positive  for shortness of breath.negative for wheezing. positive  for sputum production.   Cardiovascular: negative for chest pain. negative for swelling of ankles. negative for fast or irregular heartbeat.   Gastrointestinal: negative for nausea. negative for heartburn. positive  for acid reflux.   Musculoskeletal: negative for joint pain. positive  for joint stiffness. negative for joint swelling.   Neurologic: negative for seizures. negative for fainting. negative for weakness.   Psychiatric: negative for changes in mood. negative for anxiety.   Endocrine: positive  for cold intolerance. negative for heat intolerance. negative for tremors.   Lymphatic: negative for lower extremity swelling. negative for lymph node swelling.   Hematologic: negative for easy bruising. negative for easy bleeding.  Integumentary: negative for rash. negative for scaling. negative for nail changes.       Current Outpatient Prescriptions:      acetaminophen (TYLENOL) 325 MG tablet, Take 325-650 mg by mouth 4 times daily as needed, Disp: , Rfl:      albuterol (PROAIR HFA/PROVENTIL HFA/VENTOLIN HFA) 108 (90 Base) MCG/ACT inhaler, Inhale 2 puffs into the lungs every 4 hours as needed, Disp: 1 Inhaler, Rfl: 3     ASPIRIN PO, Take 81 mg by mouth daily (with dinner), Disp: , Rfl:      atorvastatin (LIPITOR) 20 MG tablet, Take 1 tablet (20 mg) by  mouth daily (with dinner), Disp: 90 tablet, Rfl: 3     HYDROcodone-ibuprofen (VICOPROFEN) 7.5-200 MG per tablet, Take 1 tablet by mouth nightly as needed for moderate to severe pain, Disp: 30 tablet, Rfl: 0     ibuprofen (ADVIL,MOTRIN) 200 MG tablet, Take 200 mg by mouth every 6 hours as needed, Disp: , Rfl:      ipratropium (ATROVENT) 0.06 % spray, Spray 2 sprays into both nostrils 4 times daily as needed for rhinitis, Disp: 1 Box, Rfl: 3     ketotifen (ZADITOR) 0.025 % SOLN ophthalmic solution, Place 1 drop into both eyes every 12 hours, Disp: 1 Bottle, Rfl: 3     omeprazole (PRILOSEC) 40 MG capsule, Take 1 capsule (40 mg) by mouth daily Take 30-60 minutes before a meal., Disp: 30 capsule, Rfl: 3     FEXOFENADINE HCL PO, Daily, Disp: , Rfl:   Immunization History   Administered Date(s) Administered     Influenza Vaccine IM 3yrs+ 4 Valent IIV4 10/31/2014, 12/07/2015, 11/22/2016, 11/15/2017     TDAP Vaccine (Adacel) 12/07/2015     Allergies   Allergen Reactions     Penicillins          EXAM:   Constitutional:  Appears well-developed and well-nourished. No distress.   HEENT:   Head: Normocephalic.   Mouth/Throat:   Cobblestoning of posterior oropharynx.   Boggy nasal tissue and pale.    Eyes: Conjunctivae are non-erythematous   No maxillary or frontal sinus tenderness to palpation.   Cardiovascular: Normal rate, regular rhythm and normal heart sounds. Exam reveals no gallop and no friction rub.   No murmur heard.  Respiratory: Effort normal and breath sounds normal. No respiratory distress. No wheezes. No rales.   Musculoskeletal: Normal range of motion.   Lymphadenopathy:   No cervical adenopathy.   No lower extremity edema.   Neuro: Oriented to person, place, and time.  Skin: Skin is warm and dry. No rash noted.   Psychiatric: Normal mood and affect.     Nursing note and vitals reviewed.      WORKUP:   Skin testing:  Negative.     ASSESSMENT/PLAN:  Problem List Items Addressed This Visit        Respiratory    SOB  (shortness of breath) - Primary     Shortness of breath with minimal exertion.  Denies coughing, tightness in chest or wheezing.  No use of albuterol.  No history of asthma diagnosed.  Non-smoker.    Spirometry:  FVC % pred:103  FEV1 % pred:107  FEV1/FVC % act:79  FEF 25-75% pred:123      -Trial of albuterol. Albuterol 2-4 puffs inhaled (use a spacer unless using a Proair Respiclick device) every 4 hours as needed for chest tightness, wheezing, shortness of breath and/or coughing.              Relevant Medications    albuterol (PROAIR HFA/PROVENTIL HFA/VENTOLIN HFA) 108 (90 Base) MCG/ACT inhaler    ipratropium (ATROVENT) 0.06 % spray    Other Relevant Orders    Spirometry, Breathing Capacity (Completed)    Non-allergic rhinitis     History of nasal and ocular symptoms.  Symptoms present day and night.  Symptoms worse first thing in the morning.  Flonase used inconsistently.  Fexofenadine has been beneficial.  No history of allergy evaluation.  No history of chronic sinusitis.  Some decreased sense of smell.    Skin testing:  Negative.     -Ipratropium 2 sprays/nostril four times daily as needed.   -Ketotifen (Zaditor, Alaway) 1 drop/eye twice daily as needed.   -If still symptomatic when returns will refer to ENT.           Relevant Medications    ketotifen (ZADITOR) 0.025 % SOLN ophthalmic solution    albuterol (PROAIR HFA/PROVENTIL HFA/VENTOLIN HFA) 108 (90 Base) MCG/ACT inhaler    ipratropium (ATROVENT) 0.06 % spray    Other Relevant Orders    ALLERGY SKIN TESTS,ALLERGENS (Completed)       Digestive    Esophageal reflux     History of GERD on omeprazole 20 mill grams by mouth daily.  Increased throat clearing and mucus in the back of his throat after eating.    -Increase omeprazole to 40 mg by mouth daily.         Relevant Medications    omeprazole (PRILOSEC) 40 MG capsule        Return in 4 weeks.  Chart documentation with Dragon Voice recognition Software. Although reviewed after completion, some words and  grammatical errors may remain.    Dex Bloom DO   Allergy/Immunology  [facility]3]-[place], [place] and [place], [place]

## 2018-08-31 NOTE — PATIENT INSTRUCTIONS
Allergy Staff Appt Hours Shot Hours Locations    Physician     Dex Bloom, DO       Support Staff     Liz WRAY RN      Colleen WRAY, [facility]  Monday:                      [place] 8-7     Tuesday:         [place] 8-5     Wednesday:        [place]: 7-5     Friday:        [place] 7-5   [place]        Monday: 9-5:50        Wednesday: 2-5:50        Friday: 7-12:50     [place]        Tuesday: 7-10:50        Thursday: 1:30-6:30     [place] Monday: 7:10-4:50        Tuesday: 12:30-6:30        Thursday: 7-11:50 [facility]  [address] [place] [address]  Appt Line: [phone]  Allergy RN (Monday):  [phone]    [facility]  [address] [place] [address]  Appt Line: [phone]  Allergy RN (Tues & Wed):  [phone]    [facility]  [address] [place] [address]  Appt Line: [phone]  Allergy RN (Friday):  [phone]       Important Scheduling Information  Aspirin Desensitization: Appt will last 2 clinic days. Please call the Allergy RN line for your clinic to schedule. Discontinue antihistamines 7 days prior to the appointment.     Food Challenges: Appt will last 3-4 hours. Please call the Allergy RN line for your clinic to schedule. Discontinue antihistamines 7 days prior to the appointment.     Penicillin Testing: Appt will last 2-3 hours. Please call the Allergy RN line for your clinic to schedule. Discontinue antihistamines 7 days prior to the appointment.     Skin Testing: Appt will about 40 minutes. Call the appointment line for your clinic to schedule. Discontinue antihistamines 7 days prior to the appointment.     Venom Testing: Appt will last 2-3 hours. Please call the Allergy RN line for your clinic to schedule. Discontinue antihistamines 7 days prior to the appointment.     Thank you for trusting us with your Allergy, Asthma, and Immunology care. Please feel free to contact us with any questions or concerns you may have.      -Ipratropium 2  sprays/nostril four times daily as needed.   -Ketotifen (Zaditor, Alaway) 1 drop/eye twice daily as needed.   -Increase omeprazole to 40 mg by mouth daily.  Take 30 minutes prior to eating.  -Trial of albuterol. Albuterol 2-4 puffs inhaled (use a spacer unless using a Proair Respiclick device) every 4 hours as needed for chest tightness, wheezing, shortness of breath and/or coughing.

## 2018-08-31 NOTE — MR AVS SNAPSHOT
After Visit Summary   8/31/2018    Geovanni Jasso    MRN: [id number]           Patient Information     Date Of Birth          1960        Visit Information        Provider Department      8/31/2018 4:00 PM Dex Bloom DO [facility]        Today's Diagnoses     SOB (shortness of breath)    -  1    Gastroesophageal reflux disease, esophagitis presence not specified        Rhinoconjunctivitis          Care Instructions    Allergy Staff Appt Hours Shot Hours Locations    Physician     Dex Bloom DO       Support Staff     GRISELDA Lora, PEDRO  Monday:                      [place] 8-7     Tuesday:         [place] 8-5     Wednesday:        [place]: 7-5     Friday:        [place] 7-5   [place]        Monday: 9-5:50        Wednesday: 2-5:50        Friday: 7-12:50     [place]        Tuesday: 7-10:50        Thursday: 1:30-6:30     [place] Monday: 7:10-4:50        Tuesday: 12:30-6:30        Thursday: 7-11:50 [facility]  [address] [place] [address]  Appt Line: [phone]  Allergy RN (Monday):  [phone]    [facility]  [address] [place] [address]  Appt Line: [phone]  Allergy RN (Tues & Wed):  [phone]    [facility]  [address] [place] [address]  Appt Line: [phone]  Allergy RN (Friday):  [phone]       Important Scheduling Information  Aspirin Desensitization: Appt will last 2 clinic days. Please call the Allergy RN line for your clinic to schedule. Discontinue antihistamines 7 days prior to the appointment.     Food Challenges: Appt will last 3-4 hours. Please call the Allergy RN line for your clinic to schedule. Discontinue antihistamines 7 days prior to the appointment.     Penicillin Testing: Appt will last 2-3 hours. Please call the Allergy RN line for your clinic to schedule. Discontinue antihistamines 7 days prior to the appointment.     Skin Testing: Appt will about 40  minutes. Call the appointment line for your clinic to schedule. Discontinue antihistamines 7 days prior to the appointment.     Venom Testing: Appt will last 2-3 hours. Please call the Allergy RN line for your clinic to schedule. Discontinue antihistamines 7 days prior to the appointment.     Thank you for trusting us with your Allergy, Asthma, and Immunology care. Please feel free to contact us with any questions or concerns you may have.      -Ipratropium 2 sprays/nostril four times daily as needed.   -Ketotifen (Zaditor, Alaway) 1 drop/eye twice daily as needed.   -Increase omeprazole to 40 mg by mouth daily.  Take 30 minutes prior to eating.  -Trial of albuterol. Albuterol 2-4 puffs inhaled (use a spacer unless using a Proair Respiclick device) every 4 hours as needed for chest tightness, wheezing, shortness of breath and/or coughing.             Follow-ups after your visit        Follow-up notes from your care team     Return in about 4 weeks (around 9/28/2018).      Your next 10 appointments already scheduled     Oct 04, 2018 12:45 PM CDT   New Visit with Marbella Castro MD   [facility]    [address] [place] [address] [phone]              Who to contact     If you have questions or need follow up information about today's clinic visit or your schedule please contact [facility] directly at [phone].  Normal or non-critical lab and imaging results will be communicated to you by MyChart, letter or phone within 4 business days after the clinic has received the results. If you do not hear from us within 7 days, please contact the clinic through Front [employer] or phone. If you have a critical or abnormal lab result, we will notify you by phone as soon as possible.  Submit refill requests through [employer] or call your pharmacy and they will forward the refill request to us. Please allow 3 business days for your refill to be completed.           Additional Information About Your Visit        [employer] Information     [employer] gives you secure access to your electronic health record. If you see a primary care provider, you can also send messages to your care team and make appointments. If you have questions, please call your primary care clinic.  If you do not have a primary care provider, please call [phone] and they will assist you.        Care EveryWhere ID     This is your Care EveryWhere ID. This could be used by other organizations to access your [place] medical records  [id number]        Your Vitals Were     Pulse Temperature Respirations Pulse Oximetry BMI (Body Mass Index)       74 98  F (36.7  C) (Oral) 16 96% 27.87 kg/m2        Blood Pressure from Last 3 Encounters:   08/31/18 112/73   04/11/18 136/84   01/17/18 118/72    Weight from Last 3 Encounters:   08/31/18 90.6 kg (199 lb 12.8 oz)   04/11/18 89.4 kg (197 lb)   01/17/18 90.3 kg (199 lb)              We Performed the Following     ALLERGY SKIN TESTS,ALLERGENS     Spirometry, Breathing Capacity          Today's Medication Changes          These changes are accurate as of 8/31/18  5:16 PM.  If you have any questions, ask your nurse or doctor.               Start taking these medicines.        Dose/Directions    albuterol 108 (90 Base) MCG/ACT inhaler   Commonly known as:  PROAIR HFA/PROVENTIL HFA/VENTOLIN HFA   Used for:  SOB (shortness of breath)   Started by:  Dex Bloom DO        Dose:  2 puff   Inhale 2 puffs into the lungs every 4 hours as needed   Quantity:  1 Inhaler   Refills:  3       ipratropium 0.06 % spray   Commonly known as:  ATROVENT   Used for:  Rhinoconjunctivitis   Started by:  Dex Bloom DO        Dose:  2 spray   Spray 2 sprays into both nostrils 4 times daily as needed for rhinitis   Quantity:  1 Box   Refills:  3       ketotifen 0.025 % Soln ophthalmic solution   Commonly known as:  ZADITOR   Used for:  Rhinoconjunctivitis   Started by:   Dex Bloom DO        Dose:  1 drop   Place 1 drop into both eyes every 12 hours   Quantity:  1 Bottle   Refills:  3       omeprazole 40 MG capsule   Commonly known as:  priLOSEC   Used for:  Gastroesophageal reflux disease, esophagitis presence not specified   Replaces:  omeprazole 20 MG tablet   Started by:  Dex Bloom DO        Dose:  40 mg   Take 1 capsule (40 mg) by mouth daily Take 30-60 minutes before a meal.   Quantity:  30 capsule   Refills:  3         Stop taking these medicines if you haven't already. Please contact your care team if you have questions.     fluticasone 50 MCG/ACT spray   Commonly known as:  FLONASE   Stopped by:  Dex Bloom DO           omeprazole 20 MG tablet   Replaced by:  omeprazole 40 MG capsule   Stopped by:  Dex Bloom DO                Where to get your medicines      These medications were sent to [id number] - [place], [place] - [address] [facility]  [address] [place] [address]     Phone:  [phone]     albuterol 108 (90 Base) MCG/ACT inhaler    ipratropium 0.06 % spray    ketotifen 0.025 % Soln ophthalmic solution    omeprazole 40 MG capsule                Primary Care Provider Office Phone # Fax #    Paddy Holly [phone]       [address]  [facility] [address]        Equal Access to Services     [facility]: Hadii aad ku hadasho Soomaali, waaxda luqadaha, qaybta kaalmada adeegyada, christiano maradiaga. So [facility] [place] [phone].    ATENCIÓN: Si habla español, tiene a fisher disposición servicios gratuitos de asistencia lingüística. Miroslava al [phone].    We comply with applicable federal civil rights laws and [place] laws. We do not discriminate on the basis of race, color, national origin, age, disability, sex, sexual orientation, or gender identity.            Thank you!     Thank you for choosing [facility] [place]  for your care. Our goal is always to  provide you with excellent care. Hearing back from our patients is one way we can continue to improve our services. Please take a few minutes to complete the written survey that you may receive in the mail after your visit with us. Thank you!             Your Updated Medication List - Protect others around you: Learn how to safely use, store and throw away your medicines at www.disposemymeds.org.          This list is accurate as of 8/31/18  5:16 PM.  Always use your most recent med list.                   Brand Name Dispense Instructions for use Diagnosis    acetaminophen 325 MG tablet    TYLENOL     Take 325-650 mg by mouth 4 times daily as needed        albuterol 108 (90 Base) MCG/ACT inhaler    PROAIR HFA/PROVENTIL HFA/VENTOLIN HFA    1 Inhaler    Inhale 2 puffs into the lungs every 4 hours as needed    SOB (shortness of breath)       ASPIRIN PO      Take 81 mg by mouth daily (with dinner)        atorvastatin 20 MG tablet    LIPITOR    90 tablet    Take 1 tablet (20 mg) by mouth daily (with dinner)    Hyperlipidemia LDL goal <70, Atherosclerosis of native coronary artery of native heart without angina pectoris       FEXOFENADINE HCL PO      Daily        HYDROcodone-ibuprofen 7.5-200 MG per tablet    VICOPROFEN    30 tablet    Take 1 tablet by mouth nightly as needed for moderate to severe pain    Chest wall pain       ibuprofen 200 MG tablet    ADVIL/MOTRIN     Take 200 mg by mouth every 6 hours as needed        ipratropium 0.06 % spray    ATROVENT    1 Box    Spray 2 sprays into both nostrils 4 times daily as needed for rhinitis    Rhinoconjunctivitis       ketotifen 0.025 % Soln ophthalmic solution    ZADITOR    1 Bottle    Place 1 drop into both eyes every 12 hours    Rhinoconjunctivitis       omeprazole 40 MG capsule    priLOSEC    30 capsule    Take 1 capsule (40 mg) by mouth daily Take 30-60 minutes before a meal.    Gastroesophageal reflux disease, esophagitis presence not specified

## 2018-08-31 NOTE — ASSESSMENT & PLAN NOTE
Shortness of breath with minimal exertion.  Denies coughing, tightness in chest or wheezing.  No use of albuterol.  No history of asthma diagnosed.  Non-smoker.    Spirometry:  FVC % pred:103  FEV1 % pred:107  FEV1/FVC % act:79  FEF 25-75% pred:123      -Trial of albuterol. Albuterol 2-4 puffs inhaled (use a spacer unless using a Proair Respiclick device) every 4 hours as needed for chest tightness, wheezing, shortness of breath and/or coughing.

## 2018-08-31 NOTE — LETTER
8/31/2018         RE: Geovanni Jasso  [address]  [place] [address]        Dear Colleague,    Thank you for referring your patient, Geovanni Jasso, to the [facility]. Please see a copy of my visit note below.    Geovanni Jasso is a 57 year old White male with previous medical history significant for gerd, and hyperlipidemia. Geovanni Jasso is being seen today for evaluation of seasonal allergies and shortness of breath.     The patient reports that beginning in his late 50s he began having postnasal drainage, sneezing, rhinorrhea, ocular itching, ocular watering and ocular redness.  He is a snorer at night.  He has symptoms during the day and night.  Symptoms are worse first thing in the morning.  Decreased sense of smell.  Possible sinus headaches.  Symptoms spring, summer and fall.  Increased symptoms around dust, mowing grass, raking leaves, smoke.  He additionally will clear his throat.  Throat clearing is worse after eating.  He does have a history of reflux.  He is on omeprazole 20 mg by mouth daily.  He describes GERD as not controlled.  The patient has used Flonase in the past but been inconsistent with using.  Fexofenadine has been beneficial.  Afrin has been used on an as-needed basis.  No use of ocular antihistamines.  No history of allergy testing.  Denies history of asthma.  However, he does report shortness of breath with exertion.  Denies tightness in chest, wheezing or coughing.  No prior history of asthma.  Non-smoker.  Denies other triggers for chest symptoms.    The patient has no history of asthma, eczema, food allergies, medications allergies or hives.     ENVIRONMENTAL HISTORY: [place] The home is heated with a forced air. They does have central air conditioning. The patient's bedroom is furnished with carpeting in bedroom, allergen mattress cover and fabric window coverings.  Pets inside the house include 0 pets. There is  not history of cockroach or mice infestation. There is/are 0 smokers in the house.  The house does have a damp basement.       Past Medical History:   Diagnosis Date     Chronic rhinitis 10/23/2015     Esophageal reflux 12/26/2013     History reviewed. No pertinent family history.  History reviewed. No pertinent surgical history.    REVIEW OF SYSTEMS:  General: positive  for weight gain. negative for weight loss. positive  for changes in sleep.   Ears: negative for fullness. positive  for hearing loss. negative for dizziness.   Nose: positive  for snoring.negative for changes in smell. positive  for drainage.   Eyes: positive  for eye watering. positive  for eye itching. negative for vision changes. positive  for eye redness.  Throat: positive  for hoarseness. negative for sore throat. negative for trouble swallowing.   Lungs: positive  for shortness of breath.negative for wheezing. positive  for sputum production.   Cardiovascular: negative for chest pain. negative for swelling of ankles. negative for fast or irregular heartbeat.   Gastrointestinal: negative for nausea. negative for heartburn. positive  for acid reflux.   Musculoskeletal: negative for joint pain. positive  for joint stiffness. negative for joint swelling.   Neurologic: negative for seizures. negative for fainting. negative for weakness.   Psychiatric: negative for changes in mood. negative for anxiety.   Endocrine: positive  for cold intolerance. negative for heat intolerance. negative for tremors.   Lymphatic: negative for lower extremity swelling. negative for lymph node swelling.   Hematologic: negative for easy bruising. negative for easy bleeding.  Integumentary: negative for rash. negative for scaling. negative for nail changes.       Current Outpatient Prescriptions:      acetaminophen (TYLENOL) 325 MG tablet, Take 325-650 mg by mouth 4 times daily as needed, Disp: , Rfl:      albuterol (PROAIR HFA/PROVENTIL HFA/VENTOLIN HFA) 108 (90 Base)  MCG/ACT inhaler, Inhale 2 puffs into the lungs every 4 hours as needed, Disp: 1 Inhaler, Rfl: 3     ASPIRIN PO, Take 81 mg by mouth daily (with dinner), Disp: , Rfl:      atorvastatin (LIPITOR) 20 MG tablet, Take 1 tablet (20 mg) by mouth daily (with dinner), Disp: 90 tablet, Rfl: 3     HYDROcodone-ibuprofen (VICOPROFEN) 7.5-200 MG per tablet, Take 1 tablet by mouth nightly as needed for moderate to severe pain, Disp: 30 tablet, Rfl: 0     ibuprofen (ADVIL,MOTRIN) 200 MG tablet, Take 200 mg by mouth every 6 hours as needed, Disp: , Rfl:      ipratropium (ATROVENT) 0.06 % spray, Spray 2 sprays into both nostrils 4 times daily as needed for rhinitis, Disp: 1 Box, Rfl: 3     ketotifen (ZADITOR) 0.025 % SOLN ophthalmic solution, Place 1 drop into both eyes every 12 hours, Disp: 1 Bottle, Rfl: 3     omeprazole (PRILOSEC) 40 MG capsule, Take 1 capsule (40 mg) by mouth daily Take 30-60 minutes before a meal., Disp: 30 capsule, Rfl: 3     FEXOFENADINE HCL PO, Daily, Disp: , Rfl:   Immunization History   Administered Date(s) Administered     Influenza Vaccine IM 3yrs+ 4 Valent IIV4 10/31/2014, 12/07/2015, 11/22/2016, 11/15/2017     TDAP Vaccine (Adacel) 12/07/2015     Allergies   Allergen Reactions     Penicillins          EXAM:   Constitutional:  Appears well-developed and well-nourished. No distress.   HEENT:   Head: Normocephalic.   Mouth/Throat:   Cobblestoning of posterior oropharynx.   Boggy nasal tissue and pale.    Eyes: Conjunctivae are non-erythematous   No maxillary or frontal sinus tenderness to palpation.   Cardiovascular: Normal rate, regular rhythm and normal heart sounds. Exam reveals no gallop and no friction rub.   No murmur heard.  Respiratory: Effort normal and breath sounds normal. No respiratory distress. No wheezes. No rales.   Musculoskeletal: Normal range of motion.   Lymphadenopathy:   No cervical adenopathy.   No lower extremity edema.   Neuro: Oriented to person, place, and time.  Skin: Skin is  warm and dry. No rash noted.   Psychiatric: Normal mood and affect.     Nursing note and vitals reviewed.      WORKUP:   Skin testing:  Negative.     ASSESSMENT/PLAN:  Problem List Items Addressed This Visit        Respiratory    SOB (shortness of breath) - Primary     Shortness of breath with minimal exertion.  Denies coughing, tightness in chest or wheezing.  No use of albuterol.  No history of asthma diagnosed.  Non-smoker.    Spirometry:  FVC % pred:103  FEV1 % pred:107  FEV1/FVC % act:79  FEF 25-75% pred:123      -Trial of albuterol. Albuterol 2-4 puffs inhaled (use a spacer unless using a Proair Respiclick device) every 4 hours as needed for chest tightness, wheezing, shortness of breath and/or coughing.              Relevant Medications    albuterol (PROAIR HFA/PROVENTIL HFA/VENTOLIN HFA) 108 (90 Base) MCG/ACT inhaler    ipratropium (ATROVENT) 0.06 % spray    Other Relevant Orders    Spirometry, Breathing Capacity (Completed)    Non-allergic rhinitis     History of nasal and ocular symptoms.  Symptoms present day and night.  Symptoms worse first thing in the morning.  Flonase used inconsistently.  Fexofenadine has been beneficial.  No history of allergy evaluation.  No history of chronic sinusitis.  Some decreased sense of smell.    Skin testing:  Negative.     -Ipratropium 2 sprays/nostril four times daily as needed.   -Ketotifen (Zaditor, [place]) 1 drop/eye twice daily as needed.   -If still symptomatic when returns will refer to ENT.           Relevant Medications    ketotifen (ZADITOR) 0.025 % SOLN ophthalmic solution    albuterol (PROAIR HFA/PROVENTIL HFA/VENTOLIN HFA) 108 (90 Base) MCG/ACT inhaler    ipratropium (ATROVENT) 0.06 % spray    Other Relevant Orders    ALLERGY SKIN TESTS,ALLERGENS (Completed)       Digestive    Esophageal reflux     History of GERD on omeprazole 20 mill grams by mouth daily.  Increased throat clearing and mucus in the back of his throat after eating.    -Increase omeprazole  to 40 mg by mouth daily.         Relevant Medications    omeprazole (PRILOSEC) 40 MG capsule        Return in 4 weeks.  Chart documentation with Dragon Voice recognition Software. Although reviewed after completion, some words and grammatical errors may remain.    Dex Bloom,    Allergy/Immunology  [facility], [place] and [place], [place]      Again, thank you for allowing me to participate in the care of your patient.        Sincerely,        Dex Bloom, DO

## 2018-09-24 ENCOUNTER — MYC MEDICAL ADVICE (OUTPATIENT)
Dept: DERMATOLOGY | Facility: CLINIC | Age: 58
End: 2018-09-24

## 2018-09-25 ENCOUNTER — OFFICE VISIT (OUTPATIENT)
Dept: FAMILY MEDICINE | Facility: CLINIC | Age: 58
End: 2018-09-25
Payer: COMMERCIAL

## 2018-09-25 VITALS
SYSTOLIC BLOOD PRESSURE: 117 MMHG | WEIGHT: 197 LBS | HEART RATE: 72 BPM | TEMPERATURE: 98.6 F | BODY MASS INDEX: 27.58 KG/M2 | HEIGHT: 71 IN | OXYGEN SATURATION: 97 % | RESPIRATION RATE: 16 BRPM | DIASTOLIC BLOOD PRESSURE: 78 MMHG

## 2018-09-25 DIAGNOSIS — Z23 NEED FOR PROPHYLACTIC VACCINATION AND INOCULATION AGAINST INFLUENZA: ICD-10-CM

## 2018-09-25 DIAGNOSIS — M25.641 MORNING JOINT STIFFNESS OF HAND, RIGHT: Primary | ICD-10-CM

## 2018-09-25 DIAGNOSIS — R76.8 POSITIVE ANA (ANTINUCLEAR ANTIBODY): ICD-10-CM

## 2018-09-25 PROCEDURE — 86431 RHEUMATOID FACTOR QUANT: CPT | Performed by: INTERNAL MEDICINE

## 2018-09-25 PROCEDURE — 86200 CCP ANTIBODY: CPT | Performed by: INTERNAL MEDICINE

## 2018-09-25 PROCEDURE — 99213 OFFICE O/P EST LOW 20 MIN: CPT | Mod: 25 | Performed by: INTERNAL MEDICINE

## 2018-09-25 PROCEDURE — 90471 IMMUNIZATION ADMIN: CPT | Performed by: INTERNAL MEDICINE

## 2018-09-25 PROCEDURE — 86039 ANTINUCLEAR ANTIBODIES (ANA): CPT | Performed by: INTERNAL MEDICINE

## 2018-09-25 PROCEDURE — 86038 ANTINUCLEAR ANTIBODIES: CPT | Performed by: INTERNAL MEDICINE

## 2018-09-25 PROCEDURE — 36415 COLL VENOUS BLD VENIPUNCTURE: CPT | Performed by: INTERNAL MEDICINE

## 2018-09-25 PROCEDURE — 90682 RIV4 VACC RECOMBINANT DNA IM: CPT | Performed by: INTERNAL MEDICINE

## 2018-09-25 ASSESSMENT — PAIN SCALES - GENERAL: PAINLEVEL: NO PAIN (1)

## 2018-09-25 NOTE — PATIENT INSTRUCTIONS
At [facility], we strive to deliver an exceptional experience to you, every time we see you.  If you receive a survey in the mail, please send us back your thoughts. We really do value your feedback.    Your care team:                            Family Medicine Internal Medicine   MD Paddy Merrill MD Shantel Branch-Fleming, MD Katya Georgiev PA-C Megan Hill, APRN CNP    Bishnu Sawant MD Pediatrics   Hussain Scott, JAGUAR Gann, MD Odessa Viramontes APRN CNP   MD Dedra Capone MD Deborah Mielke, MD Hailey Persaud, APRN [facility]      Clinic hours: Monday - Thursday 7 am-7 pm; Fridays 7 am-5 pm.   Urgent care: Monday - Friday 11 am-9 pm; Saturday and Sunday 9 am-5 pm.  Pharmacy : Monday -Thursday 8 am-8 pm; Friday 8 am-6 pm; Saturday and Sunday 9 am-5 pm.     Clinic: [phone]   Pharmacy: [phone]        What is Rheumatoid Arthritis?  Rheumatoid arthritis (RA) is a disease that affects the synovium, the lining of the joints. This causes pain, swelling, and stiffness. Left untreated, rheumatoid arthritis may damage joints so badly that they no longer function. This disease appears most often in young-adult to middle-age women.      Rheumatoid arthritis affects the lining (synovium) of the joints, sometimes causing swelling.   What causes RA?  RA is an autoimmune disorder. This means the immune system, which normally protects the body, actually causes harm. In RA, the immune system attacks the joint lining. The reason for this is unknown. Researchers believe it is a combination of genes (what is inherited from parents) and environment (for example, having infections from viruses or bacteria). Also, people who smoke or are overweight have an increased risk of developing RA.  What are the symptoms of RA?  Rheumatoid arthritis can affect most joints. The hands, wrists, elbows, knees, and balls of the feet are common sites. This disease  often affects the same joint on both sides of the body. Symptoms may include:    Tender, swollen inflamed joints. They may also look red and feel warm.    Stiff joints. Long periods of rest or using a joint too long or too hard can make stiffness worse.    Joints that have lost normal shape and motion.    Feeling tired.  How is RA diagnosed?  To diagnose rheumatoid arthritis, your healthcare provider will ask you a lot of questions about your medical history and current symptoms. He or she will examine you, paying close attention to your joints. You will also have blood tests and X-rays. Your provider will likely recommend that you see a rheumatologist, an arthritis specialist.  Date Last Reviewed: 2/14/2016 2000-2017 [employer]. [address] [place] [address], [place] [address]. All rights reserved. This information is not intended as a substitute for professional medical care. Always follow your healthcare professional's instructions.        Living with Rheumatoid Arthritis    Rheumatoid arthritis (RA) is a chronic disease, but it doesn't have to keep you from being active. It is an autoimmune disease and your immune system attacks the lining of your joints. You can help control RA with exercise and a healthy lifestyle. Be sure to see your healthcare provider for scheduled checkups and lab work. At some point, you may be referred to a rheumatologist (a healthcare provider who specializes in arthritis and related diseases).  Make exercise part of your life  Gentle exercise can help lessen your pain. Keep the following in mind:    Choose exercises that improve joint motion and make your muscles stronger. Your healthcare provider or a physical therapist may suggest a few.    Most people should exercise for at least 30 minutes a day on most days of the week. This can be broken up into shorter periods throughout the day.    Try walking, riding a bike, or doing exercises in a warm pool. Look for programs in  your community for people with arthritis.     Don t push yourself too hard at first. Slowly build up over time.    Make sure you warm up for 5 to 10 minutes each time you exercise. Stretching and flexibility exercises are often helpful.     If pain and stiffness increase, don't exercise as hard or as long.  Watch your weight  If you weigh more than you should, your weight-bearing joints are under extra pressure. This makes your symptoms worse. To reduce pain and stiffness, try shedding a few of those extra pounds. The tips below may help:    Start a weight-loss program with the help of your healthcare provider.    Ask your friends and family for support.    Join a weight-loss group.  Learn ways to cope  Most people with long-term conditions find it a challenge to deal with the emotions that often go along with their conditions. With rheumatoid arthritis, there is also pain.     Work with your healthcare provider on ways to lessen pain. Medicines, use of heat and cold, and other methods are available.    Learn to relax. Although it may not be easy, it does help lessen stress, anxiety, and pain. Simple deep-breathing exercises, meditation, and yoga are examples of relaxation techniques.    Depression is common with long-term conditions. If you feel depressed, make sure you talk with your healthcare provider. Again, treatments, like medicine and counseling, are available.  Try to make your day easier  There are things you can do every day to protect your joints:    Learn to balance rest with activity. Even on days when you have few symptoms, rest is still important.    Ask friends and family members for help. Help with simple things can make a big difference for you. For example, you might ask someone to change a light bulb, or take out your weekly garbage.    Use assistive devices, which are special tools that reduce strain and protect joints. For example:  ? Long-handled reachers or grabbers for reaching high and  low  ? Jar-openers, two-handled cups, and button --all of these devices help to protect your fingers, hands, and wrists  ? Large  for pencils, pens, kitchen and garden tools  The Arthritis Foundation has many additional suggestions about protecting your joints. Go to their website at http://www.arthritis.org.  Use mobility and other aids  People with arthritis and other problems affecting the joints often use mobility aids, to help with walking. For example, they may use canes or walkers. They may also use splints or braces to support joints. Talk with your healthcare provider or therapist about these aids. For instance, you might benefit from:    Use a cane to ease knee or hip pain and help prevent falls    Splints for your wrists or other joints    A brace to support a weak knee joint  Date Last Reviewed: 2/14/2016 2000-2017 [employer]. [address] [place] [address], [place] [address]. All rights reserved. This information is not intended as a substitute for professional medical care. Always follow your healthcare professional's instructions.

## 2018-09-25 NOTE — PROGRESS NOTES
SUBJECTIVE:   Geovanni Jasso is a 58 year old male who presents to clinic today for the following health issues:      Joint Pain    Onset: a couple months    Description:   Location: Right Hand  Character: Sharp, Dull ache and Stiff    Intensity: 9/10 in the morning     Progression of Symptoms: worse, intermittent    Accompanying Signs & Symptoms:  Other symptoms: none    History:   Previous similar pain: no       Precipitating factors:   Trauma or overuse: no     Alleviating factors:  Improved by: nothing  Therapies Tried and outcome: none            Problem list and histories reviewed & adjusted, as indicated.  Additional history: as documented    Patient Active Problem List   Diagnosis     Esophageal reflux     Hyperlipidemia LDL goal <70     Chronic rhinitis     Bilateral low back pain without sciatica     Chondromalacia patellae, right     S/P ACL reconstruction     Atherosclerosis of native coronary artery of native heart without angina pectoris     SOB (shortness of breath)     Non-allergic rhinitis     Positive CLAIRE (antinuclear antibody)     History reviewed. No pertinent surgical history.    Social History   Substance Use Topics     Smoking status: Never Smoker     Smokeless tobacco: Never Used     Alcohol use Yes      Comment: occasionally     History reviewed. No pertinent family history.      Allergies   Allergen Reactions     Penicillins      Recent Labs   Lab Test  10/30/17   0820  11/30/16   0850  12/07/15   1126   LDL  146*  158*  166*   HDL  37*  33*  36*   TRIG  139  175*  117      BP Readings from Last 3 Encounters:   09/25/18 117/78   08/31/18 112/73   04/11/18 136/84    Wt Readings from Last 3 Encounters:   09/25/18 197 lb (89.4 kg)   08/31/18 199 lb 12.8 oz (90.6 kg)   04/11/18 197 lb (89.4 kg)                  Labs reviewed in EPIC    Reviewed and updated as needed this visit by clinical staff  Tobacco  Allergies  Meds  Med Hx  Surg Hx  Fam Hx  Soc Hx      Reviewed and updated as needed  "this visit by Provider         ROS:  Constitutional, HEENT, cardiovascular, pulmonary, gi and gu systems are negative, except as otherwise noted.    OBJECTIVE:     /78 (BP Location: Left arm, Patient Position: Chair, Cuff Size: Adult Large)  Pulse 72  Temp 98.6  F (37  C) (Oral)  Resp 16  Ht 5' 11\" (1.803 m)  Wt 197 lb (89.4 kg)  SpO2 97%  BMI 27.48 kg/m2  Body mass index is 27.48 kg/(m^2).  GENERAL: healthy, alert and no distress  EYES: Eyes grossly normal to inspection, PERRL and conjunctivae and sclerae normal  HENT: ear canals and TM's normal, nose and mouth without ulcers or lesions  NECK: no adenopathy, no asymmetry, masses, or scars and thyroid normal to palpation  RESP: lungs clear to auscultation - no rales, rhonchi or wheezes  CV: regular rate and rhythm, normal S1 S2, no S3 or S4, no murmur, click or rub, no peripheral edema and peripheral pulses strong  ABDOMEN: soft, nontender, no hepatosplenomegaly, no masses and bowel sounds normal  MS: no gross musculoskeletal defects noted, no edema  SKIN: no suspicious lesions or rashes  NEURO: Normal strength and tone, mentation intact and speech normal  PSYCH: mentation appears normal, affect normal/bright    Diagnostic Test Results:  Results for orders placed or performed in visit on 09/25/18   Cyclic Citrullinated Peptide Antibody IgG   Result Value Ref Range    Cyclic Citrullinated Peptide Antibody, IgG 1 <7 U/mL   Anti Nuclear Hillary IgG by IFA with Reflex   Result Value Ref Range    CLAIRE interpretation Positive (A) NEG^Negative    CLAIRE pattern 1 SPECKLED     CLAIRE titer 1 1:160    Rheumatoid factor   Result Value Ref Range    Rheumatoid Factor <20 <20 IU/mL       ASSESSMENT/PLAN:       (M25.641) Morning joint stiffness of hand, right  (primary encounter diagnosis)  Comment:   Plan: Cyclic Citrullinated Peptide Antibody IgG, Anti        Nuclear Hillary IgG by IFA with Reflex, Rheumatoid         factor, RHEUMATOLOGY REFERRAL            (Z23) Need for " prophylactic vaccination and inoculation against influenza  Comment:   Plan: Vaccine Administration, Initial [39204], FLU         VACCINE, (RIV4) RECOMBINANT HA  , IM (FluBlok,         egg free) [65132]- >18 YRS (FMG recommended          50-64 YRS)            (R76.8) Positive CLAIRE (antinuclear antibody)  Comment:   Plan: RHEUMATOLOGY REFERRAL            Follow-up visit if condition worsens.    Paddy Holly MD  [facility]    Injectable Influenza Immunization Documentation    1.  Is the person to be vaccinated sick today?   No    2. Does the person to be vaccinated have an allergy to a component   of the vaccine?   No  Egg Allergy Algorithm Link    3. Has the person to be vaccinated ever had a serious reaction   to influenza vaccine in the past?   No    4. Has the person to be vaccinated ever had Guillain-Barré syndrome?   No    Form completed by CLOVER Hicks MA

## 2018-09-25 NOTE — MR AVS SNAPSHOT
After Visit Summary   9/25/2018    Geovanni Jasso    MRN: [id number]           Patient Information     Date Of Birth          1960        Visit Information        Provider Department      9/25/2018 6:40 PM Paddy Holly MD [facility]        Today's Diagnoses     Morning joint stiffness of hand, right    -  1    Need for prophylactic vaccination and inoculation against influenza          Care Instructions    At [facility], we strive to deliver an exceptional experience to you, every time we see you.  If you receive a survey in the mail, please send us back your thoughts. We really do value your feedback.    Your care team:                            Family Medicine Internal Medicine   MD Paddy Merrill MD Shantel Branch-Fleming, MD Katya Georgiev PA-C Megan Hill, APRN [facility]    Bishnu Sawant MD Pediatrics   Hussain Scott, JAGUAR Gann, CNP MD Odessa Isaac APRN CNP   MD Dedra Capone MD Deborah Mielke, MD Kim Thein, APRN CNP      Clinic hours: Monday - Thursday 7 am-7 pm; Fridays 7 am-5 pm.   Urgent care: Monday - Friday 11 am-9 pm; Saturday and Sunday 9 am-5 pm.  Pharmacy : Monday -Thursday 8 am-8 pm; Friday 8 am-6 pm; Saturday and Sunday 9 am-5 pm.     Clinic: [phone]   Pharmacy: [phone]        What is Rheumatoid Arthritis?  Rheumatoid arthritis (RA) is a disease that affects the synovium, the lining of the joints. This causes pain, swelling, and stiffness. Left untreated, rheumatoid arthritis may damage joints so badly that they no longer function. This disease appears most often in young-adult to middle-age women.      Rheumatoid arthritis affects the lining (synovium) of the joints, sometimes causing swelling.   What causes RA?  RA is an autoimmune disorder. This means the immune system, which normally protects the body, actually causes harm. In RA, the immune system attacks  the joint lining. The reason for this is unknown. Researchers believe it is a combination of genes (what is inherited from parents) and environment (for example, having infections from viruses or bacteria). Also, people who smoke or are overweight have an increased risk of developing RA.  What are the symptoms of RA?  Rheumatoid arthritis can affect most joints. The hands, wrists, elbows, knees, and balls of the feet are common sites. This disease often affects the same joint on both sides of the body. Symptoms may include:    Tender, swollen inflamed joints. They may also look red and feel warm.    Stiff joints. Long periods of rest or using a joint too long or too hard can make stiffness worse.    Joints that have lost normal shape and motion.    Feeling tired.  How is RA diagnosed?  To diagnose rheumatoid arthritis, your healthcare provider will ask you a lot of questions about your medical history and current symptoms. He or she will examine you, paying close attention to your joints. You will also have blood tests and X-rays. Your provider will likely recommend that you see a rheumatologist, an arthritis specialist.  Date Last Reviewed: 2/14/2016 2000-2017 [employer]. [address] [place] [address]. All rights reserved. This information is not intended as a substitute for professional medical care. Always follow your healthcare professional's instructions.        Living with Rheumatoid Arthritis    Rheumatoid arthritis (RA) is a chronic disease, but it doesn't have to keep you from being active. It is an autoimmune disease and your immune system attacks the lining of your joints. You can help control RA with exercise and a healthy lifestyle. Be sure to see your healthcare provider for scheduled checkups and lab work. At some point, you may be referred to a rheumatologist (a healthcare provider who specializes in arthritis and related diseases).  Make exercise part of your  life  Gentle exercise can help lessen your pain. Keep the following in mind:    Choose exercises that improve joint motion and make your muscles stronger. Your healthcare provider or a physical therapist may suggest a few.    Most people should exercise for at least 30 minutes a day on most days of the week. This can be broken up into shorter periods throughout the day.    Try walking, riding a bike, or doing exercises in a warm pool. Look for programs in your community for people with arthritis.     Don t push yourself too hard at first. Slowly build up over time.    Make sure you warm up for 5 to 10 minutes each time you exercise. Stretching and flexibility exercises are often helpful.     If pain and stiffness increase, don't exercise as hard or as long.  Watch your weight  If you weigh more than you should, your weight-bearing joints are under extra pressure. This makes your symptoms worse. To reduce pain and stiffness, try shedding a few of those extra pounds. The tips below may help:    Start a weight-loss program with the help of your healthcare provider.    Ask your friends and family for support.    Join a weight-loss group.  Learn ways to cope  Most people with long-term conditions find it a challenge to deal with the emotions that often go along with their conditions. With rheumatoid arthritis, there is also pain.     Work with your healthcare provider on ways to lessen pain. Medicines, use of heat and cold, and other methods are available.    Learn to relax. Although it may not be easy, it does help lessen stress, anxiety, and pain. Simple deep-breathing exercises, meditation, and yoga are examples of relaxation techniques.    Depression is common with long-term conditions. If you feel depressed, make sure you talk with your healthcare provider. Again, treatments, like medicine and counseling, are available.  Try to make your day easier  There are things you can do every day to protect your  joints:    Learn to balance rest with activity. Even on days when you have few symptoms, rest is still important.    Ask friends and family members for help. Help with simple things can make a big difference for you. For example, you might ask someone to change a light bulb, or take out your weekly garbage.    Use assistive devices, which are special tools that reduce strain and protect joints. For example:  ? Long-handled reachers or grabbers for reaching high and low  ? Jar-openers, two-handled cups, and button --all of these devices help to protect your fingers, hands, and wrists  ? Large  for pencils, pens, kitchen and garden tools  The Arthritis Foundation has many additional suggestions about protecting your joints. Go to their website at http://www.arthritis.org.  Use mobility and other aids  People with arthritis and other problems affecting the joints often use mobility aids, to help with walking. For example, they may use canes or walkers. They may also use splints or braces to support joints. Talk with your healthcare provider or therapist about these aids. For instance, you might benefit from:    Use a cane to ease knee or hip pain and help prevent falls    Splints for your wrists or other joints    A brace to support a weak knee joint  Date Last Reviewed: 2/14/2016 2000-2017 [employer]. [address] [place] [address]. All rights reserved. This information is not intended as a substitute for professional medical care. Always follow your healthcare professional's instructions.                Follow-ups after your visit        Your next 10 appointments already scheduled     Oct 04, 2018 12:45 PM CDT   New Visit with [facility]ella Castro [facility]   [facility]    [address] [place] [address] [phone]              Who to contact     If you have questions or need follow up information about today's clinic visit  "or your schedule please contact [facility] directly at [phone].  Normal or non-critical lab and imaging results will be communicated to you by MyChart, letter or phone within 4 business days after the clinic has received the results. If you do not hear from us within 7 days, please contact the clinic through [employer] or phone. If you have a critical or abnormal lab result, we will notify you by phone as soon as possible.  Submit refill requests through [employer] or call your pharmacy and they will forward the refill request to us. Please allow 3 business days for your refill to be completed.          Additional Information About Your Visit        [employer] Information     [employer] gives you secure access to your electronic health record. If you see a primary care provider, you can also send messages to your care team and make appointments. If you have questions, please call your primary care clinic.  If you do not have a primary care provider, please call [phone] and they will assist you.        Care EveryWhere ID     This is your Care EveryWhere ID. This could be used by other organizations to access your [facility] medical records  [id number]        Your Vitals Were     Pulse Temperature Respirations Height Pulse Oximetry BMI (Body Mass Index)    72 98.6  F (37  C) (Oral) 16 5' 11\" (1.803 m) 97% 27.48 kg/m2       Blood Pressure from Last 3 Encounters:   09/25/18 117/78   08/31/18 112/73   04/11/18 136/84    Weight from Last 3 Encounters:   09/25/18 197 lb (89.4 kg)   08/31/18 199 lb 12.8 oz (90.6 kg)   04/11/18 197 lb (89.4 kg)              We Performed the Following     Anti Nuclear Hillary IgG by IFA with Reflex     Cyclic Citrullinated Peptide Antibody IgG     FLU VACCINE, (RIV4) RECOMBINANT HA  , IM (FluBlok, egg free) [06500]- >18 YRS (FMG recommended  50-64 YRS)     Vaccine Administration, Initial [26185]        Primary Care Provider Office Phone # Fax #    Paddy Holly MD " [phone]       [address]  [facility] [address]        Equal Access to Services     LIUDMILA MAYS : Hadii aad ku hadgustabo Sozurdo, waaxda luqadaha, qaybta kaalmada adejulia, christiano hillguido maradiaga. So [facility] [phone].    ATENCIÓN: Si habla español, tiene a fisher disposición servicios gratuitos de asistencia lingüística. Llame al [phone].    We comply with applicable federal civil rights laws and [place] laws. We do not discriminate on the basis of race, color, national origin, age, disability, sex, sexual orientation, or gender identity.            Thank you!     Thank you for choosing [facility]  for your care. Our goal is always to provide you with excellent care. Hearing back from our patients is one way we can continue to improve our services. Please take a few minutes to complete the written survey that you may receive in the mail after your visit with us. Thank you!             Your Updated Medication List - Protect others around you: Learn how to safely use, store and throw away your medicines at www.disposemymeds.org.          This list is accurate as of 9/25/18  7:13 PM.  Always use your most recent med list.                   Brand Name Dispense Instructions for use Diagnosis    acetaminophen 325 MG tablet    TYLENOL     Take 325-650 mg by mouth 4 times daily as needed        albuterol 108 (90 Base) MCG/ACT inhaler    PROAIR HFA/PROVENTIL HFA/VENTOLIN HFA    1 Inhaler    Inhale 2 puffs into the lungs every 4 hours as needed    SOB (shortness of breath)       ASPIRIN PO      Take 81 mg by mouth daily (with dinner)        atorvastatin 20 MG tablet    LIPITOR    90 tablet    Take 1 tablet (20 mg) by mouth daily (with dinner)    Hyperlipidemia LDL goal <70, Atherosclerosis of native coronary artery of native heart without angina pectoris       FEXOFENADINE HCL PO      Daily        HYDROcodone-ibuprofen 7.5-200 MG per tablet    VICOPROFEN     30 tablet    Take 1 tablet by mouth nightly as needed for moderate to severe pain    Chest wall pain       ibuprofen 200 MG tablet    ADVIL/MOTRIN     Take 200 mg by mouth every 6 hours as needed        ipratropium 0.06 % spray    ATROVENT    1 Box    Spray 2 sprays into both nostrils 4 times daily as needed for rhinitis    Non-allergic rhinitis       ketotifen 0.025 % Soln ophthalmic solution    ZADITOR    1 Bottle    Place 1 drop into both eyes every 12 hours    Non-allergic rhinitis       omeprazole 40 MG capsule    priLOSEC    30 capsule    Take 1 capsule (40 mg) by mouth daily Take 30-60 minutes before a meal.    Gastroesophageal reflux disease, esophagitis presence not specified

## 2018-09-27 PROBLEM — R76.8 POSITIVE ANA (ANTINUCLEAR ANTIBODY): Status: ACTIVE | Noted: 2018-09-27

## 2018-09-27 LAB
ANA PAT SER IF-IMP: ABNORMAL
ANA SER QL IF: POSITIVE
ANA TITR SER IF: ABNORMAL {TITER}
CCP AB SER IA-ACNC: 1 U/ML

## 2018-09-28 ENCOUNTER — MYC MEDICAL ADVICE (OUTPATIENT)
Dept: FAMILY MEDICINE | Facility: CLINIC | Age: 58
End: 2018-09-28

## 2018-09-28 LAB — RHEUMATOID FACT SER NEPH-ACNC: <20 IU/ML (ref 0–20)

## 2018-10-04 ENCOUNTER — OFFICE VISIT (OUTPATIENT)
Dept: DERMATOLOGY | Facility: CLINIC | Age: 58
End: 2018-10-04
Payer: COMMERCIAL

## 2018-10-04 DIAGNOSIS — L82.1 SEBORRHEIC KERATOSIS: ICD-10-CM

## 2018-10-04 DIAGNOSIS — L60.8 NAIL DISCOLORATION: Primary | ICD-10-CM

## 2018-10-04 DIAGNOSIS — L91.8 SKIN TAG: ICD-10-CM

## 2018-10-04 PROCEDURE — 99203 OFFICE O/P NEW LOW 30 MIN: CPT | Mod: 25 | Performed by: DERMATOLOGY

## 2018-10-04 PROCEDURE — 87101 SKIN FUNGI CULTURE: CPT | Performed by: DERMATOLOGY

## 2018-10-04 PROCEDURE — 11200 RMVL SKIN TAGS UP TO&INC 15: CPT | Performed by: DERMATOLOGY

## 2018-10-04 RX ORDER — CICLOPIROX 80 MG/ML
SOLUTION TOPICAL
Qty: 13.2 ML | Refills: 5 | Status: SHIPPED | OUTPATIENT
Start: 2018-10-04 | End: 2019-11-21

## 2018-10-04 ASSESSMENT — PAIN SCALES - GENERAL: PAINLEVEL: NO PAIN (0)

## 2018-10-04 NOTE — NURSING NOTE
"Geovanni Jasso's goals for this visit include:   Chief Complaint   Patient presents with     Derm Problem     Melanocytic nevus of trunk/ Seborrheic keratosis   RP: Avni  Mother with \"skin cancer\"       He requests these members of his care team be copied on today's visit information: no    PCP: Paddy Holly    Referring Provider:  No referring provider defined for this encounter.    There were no vitals taken for this visit.    Do you need any medication refills at today's visit? No    Sabina Serna LPN    "

## 2018-10-04 NOTE — MR AVS SNAPSHOT
After Visit Summary   10/4/2018    Geovanni Jasso    MRN: [id number]           Patient Information     Date Of Birth          1960        Visit Information        Provider Department      10/4/2018 12:45 PM Marbella Castro MD [facility]        Today's Diagnoses     Nail discoloration    -  1    Skin tag        Seborrheic keratosis          Care Instructions    Cryotherapy    What is it?    Use of a very cold liquid, such as liquid nitrogen, to freeze and destroy abnormal skin cells that need to be removed    What should I expect?    Tenderness and redness    A small blister that might grow and fill with dark purple blood. There may be crusting.    More than one treatment may be needed if the lesions do not go away.    How do I care for the treated area?    Gently wash the area with your hands when bathing.    Use a thin layer of Vaseline to help with healing. You may use a Band-Aid.     The area should heal within 7-10 days and may leave behind a pink or lighter color.     Do not use an antibiotic or Neosporin ointment.     You may take acetaminophen (Tylenol) for pain.     Call your Doctor if you have:    Severe pain    Signs of infection (warmth, redness, cloudy yellow drainage, and or a bad smell)    Questions or concerns    Who should I call with questions?       [facility]: [phone]       [facility]: [phone]       For urgent needs outside of business hours call the [facility] at [phone]        and ask for the dermatology resident on call            Follow-ups after your visit        Your next 10 appointments already scheduled     Nov 12, 2018 11:00 AM CST   New Visit with Beth Elizondo MD   [facility] ([facility])    [address] [place] [address] [phone]              Who to contact     If you have questions or need follow up  information about today's clinic visit or your schedule please contact [facility] directly at [phone].  Normal or non-critical lab and imaging results will be communicated to you by [employer], letter or phone within 4 business days after the clinic has received the results. If you do not hear from us within 7 days, please contact the clinic through [email or web address] or phone. If you have a critical or abnormal lab result, we will notify you by phone as soon as possible.  Submit refill requests through [employer] or call your pharmacy and they will forward the refill request to us. Please allow 3 business days for your refill to be completed.          Additional Information About Your Visit        [email or web address] Information     [employer] gives you secure access to your electronic health record. If you see a primary care provider, you can also send messages to your care team and make appointments. If you have questions, please call your primary care clinic.  If you do not have a primary care provider, please call [phone] and they will assist you.      [employer] is an electronic gateway that provides easy, online access to your medical records. With [employer], you can request a clinic appointment, read your test results, renew a prescription or communicate with your care team.     To access your existing account, please contact your [facility] or call [phone] for assistance.        Care EveryWhere ID     This is your Care EveryWhere ID. This could be used by other organizations to access your [facility] medical records  [id number]         Blood Pressure from Last 3 Encounters:   09/25/18 117/78   08/31/18 112/73   04/11/18 136/84    Weight from Last 3 Encounters:   09/25/18 89.4 kg (197 lb)   08/31/18 90.6 kg (199 lb 12.8 oz)   04/11/18 89.4 kg (197 lb)              We Performed the Following     Fungus skin hair nail culture     REMOVAL OF SKIN TAGS, FIRST 15          Today's  Medication Changes          These changes are accurate as of 10/4/18  1:34 PM.  If you have any questions, ask your nurse or doctor.               Start taking these medicines.        Dose/Directions    ciclopirox 8 % Soln   Used for:  Nail discoloration   Started by:  Marbella Castro MD        Apply to adjacent skin and affected nails daily. Remove with alcohol every 7 days   Quantity:  13.2 mL   Refills:  5            Where to get your medicines      These medications were sent to [id number] - [place], [place] - [address] [facility]  [address] [facility] [address]     Phone:  [phone]     ciclopirox 8 % Soln                Primary Care Provider Office Phone # Fax #    Paddy Holly [phone]       [address]  [facility] [address]        Equal Access to Services     [facility]: Hadii nelsy cortes hadasho Soomaali, waaxda luqadaha, qaybta kaalmada adeanilyaanali, christiano presley . So [facility] [phone].    ATENCIÓN: Si habla español, tiene a fisher disposición servicios gratuitos de asistencia lingüística. Llame al [phone].    We comply with applicable federal civil rights laws and [place] laws. We do not discriminate on the basis of race, color, national origin, age, disability, sex, sexual orientation, or gender identity.            Thank you!     Thank you for choosing [facility]  for your care. Our goal is always to provide you with excellent care. Hearing back from our patients is one way we can continue to improve our services. Please take a few minutes to complete the written survey that you may receive in the mail after your visit with us. Thank you!             Your Updated Medication List - Protect others around you: Learn how to safely use, store and throw away your medicines at www.disposemymeds.org.          This list is accurate as of 10/4/18  1:34 PM.  Always use your most recent med list.                    Brand Name Dispense Instructions for use Diagnosis    acetaminophen 325 MG tablet    TYLENOL     Take 325-650 mg by mouth 4 times daily as needed        albuterol 108 (90 Base) MCG/ACT inhaler    PROAIR HFA/PROVENTIL HFA/VENTOLIN HFA    1 Inhaler    Inhale 2 puffs into the lungs every 4 hours as needed    SOB (shortness of breath)       ALEVE PO           ASPIRIN PO      Take 81 mg by mouth daily (with dinner)        atorvastatin 20 MG tablet    LIPITOR    90 tablet    Take 1 tablet (20 mg) by mouth daily (with dinner)    Hyperlipidemia LDL goal <70, Atherosclerosis of native coronary artery of native heart without angina pectoris       ciclopirox 8 % Soln     13.2 mL    Apply to adjacent skin and affected nails daily. Remove with alcohol every 7 days    Nail discoloration       FEXOFENADINE HCL PO      Daily        HYDROcodone-ibuprofen 7.5-200 MG per tablet    VICOPROFEN    30 tablet    Take 1 tablet by mouth nightly as needed for moderate to severe pain    Chest wall pain       ibuprofen 200 MG tablet    ADVIL/MOTRIN     Take 200 mg by mouth every 6 hours as needed        ipratropium 0.06 % spray    ATROVENT    1 Box    Spray 2 sprays into both nostrils 4 times daily as needed for rhinitis    Non-allergic rhinitis       ketotifen 0.025 % Soln ophthalmic solution    ZADITOR    1 Bottle    Place 1 drop into both eyes every 12 hours    Non-allergic rhinitis       omeprazole 40 MG capsule    priLOSEC    30 capsule    Take 1 capsule (40 mg) by mouth daily Take 30-60 minutes before a meal.    Gastroesophageal reflux disease, esophagitis presence not specified

## 2018-10-04 NOTE — LETTER
"    10/4/2018         RE: Geovanni Jasso  [address]  [place] [address]        Dear Colleague,    Thank you for referring your patient, Geovanni Jasso, to the [facility]. Please see a copy of my visit note below.    [facility] Dermatology Note      Dermatology Problem List:  1. Onychomycosis s/p trebinafine, improved but not resolved, negative KOH    Encounter Date: Oct 4, 2018    CC:  Chief Complaint   Patient presents with     Derm Problem     Melanocytic nevus of trunk/ Seborrheic keratosis   RP: Avni  Mother with \"skin cancer\"         History of Present Illness:  Mr. Geovanni Jasso is a 58 year old male who presents as a referral from [facility] for a lesion of concern on the trunk as well as seborrheic keratoses. The patient has never seen a dermatologist before and denies a personal hx of skin cancer. The pt's mother had skin cancer of an unknown type. He has a few spots of concern, including on the back and one on the right side of the trunk. Finally, there is a pimple-like lesion on the central chest.     The pt presents with his wife.     Past Medical History:   Patient Active Problem List   Diagnosis     Esophageal reflux     Hyperlipidemia LDL goal <70     Chronic rhinitis     Bilateral low back pain without sciatica     Chondromalacia patellae, right     S/P ACL reconstruction     Atherosclerosis of native coronary artery of native heart without angina pectoris     SOB (shortness of breath)     Non-allergic rhinitis     Positive CLAIRE (antinuclear antibody)     Past Medical History:   Diagnosis Date     Chronic rhinitis 10/23/2015     Esophageal reflux 12/26/2013     No past surgical history on file.    Social History:   reports that he has never smoked. He has never used smokeless tobacco. He reports that he drinks alcohol. He reports that he does not use illicit drugs. The pt. Is .  He is a  of finance    Family History:  No family history on file. " No family history of skin cancer.     Medications:  Current Outpatient Prescriptions   Medication Sig Dispense Refill     acetaminophen (TYLENOL) 325 MG tablet Take 325-650 mg by mouth 4 times daily as needed       ASPIRIN PO Take 81 mg by mouth daily (with dinner)       atorvastatin (LIPITOR) 20 MG tablet Take 1 tablet (20 mg) by mouth daily (with dinner) 90 tablet 3     ibuprofen (ADVIL,MOTRIN) 200 MG tablet Take 200 mg by mouth every 6 hours as needed       ipratropium (ATROVENT) 0.06 % spray Spray 2 sprays into both nostrils 4 times daily as needed for rhinitis 1 Box 3     ketotifen (ZADITOR) 0.025 % SOLN ophthalmic solution Place 1 drop into both eyes every 12 hours 1 Bottle 3     Naproxen Sodium (ALEVE PO)        omeprazole (PRILOSEC) 40 MG capsule Take 1 capsule (40 mg) by mouth daily Take 30-60 minutes before a meal. 30 capsule 3     albuterol (PROAIR HFA/PROVENTIL HFA/VENTOLIN HFA) 108 (90 Base) MCG/ACT inhaler Inhale 2 puffs into the lungs every 4 hours as needed (Patient not taking: Reported on 10/4/2018) 1 Inhaler 3     FEXOFENADINE HCL PO Daily       HYDROcodone-ibuprofen (VICOPROFEN) 7.5-200 MG per tablet Take 1 tablet by mouth nightly as needed for moderate to severe pain (Patient not taking: Reported on 10/4/2018) 30 tablet 0       Allergies   Allergen Reactions     Penicillins        Review of Systems:  -Constitutional: The patient is feeling generally well.   -Uro- no sores on th genitals  -Skin: As above in HPI. No additional skin concerns.    Physical exam:  Vitals: There were no vitals taken for this visit.  GEN: This is a well developed, well-nourished male in no acute distress, in a pleasant mood.    SKIN: Total skin excluding the undergarment areas was performed. The exam included the head/face, neck, both arms, chest, back, abdomen, both legs, digits and/or nails, including the buttocks.   -There are waxy stuck on tan to brown papules on the back, chest, right axilla  -There is(are) skin  colored pedunculated papules on the right axilla.   -Scattered brown macules on sun exposed areas.  - Discoloration and thickening distally of the left great toenail  -No other lesions of concern on areas examined.       Impression/Plan:  1. Seborrheic keratoses, solar lentigines    Recommend sunscreens SPF #30 or greater, protective clothing and avoidance of tanning beds.    2. Skin tag, right axilla  Cryotherapy procedure note: After verbal consent and discussion of risks and benefits including but no limited to dyspigmentation/scar, blister, and pain, 1 was(were) treated with a forceps after the forceps was immersed in liquid nitrogen. Post cryotherapy instructions were provided.     3. Possible onychomycosis, left great toenail    Start ciclopirox. Apply to the nail once daily.     Nail clipping obtained today for fungal culture.     Follow-up 3-6 months for nail      Staff Involved:  Scribe/Staff    Scribe Disclosure  I, Luigi Reed, am serving as a scribe to document services personally performed by Dr. Marbella Castro MD, based on data collection and the provider's statements to me.     Provider Disclosure:   The documentation recorded by the scribe accurately reflects the services I personally performed and the decisions made by me.    Marbella Castro MD    Department of Dermatology  [facility]: Phone: [phone], Fax:[phone]  [facility]: Phone: [phone], Fax: [phone]        Again, thank you for allowing me to participate in the care of your patient.        Sincerely,        Marbella Castro MD

## 2018-10-04 NOTE — PROGRESS NOTES
"[facility] Dermatology Note      Dermatology Problem List:  1. Onychomycosis s/p trebinafine, improved but not resolved, negative KOH    Encounter Date: Oct 4, 2018    CC:  Chief Complaint   Patient presents with     Derm Problem     Melanocytic nevus of trunk/ Seborrheic keratosis   RP: Avni  Mother with \"skin cancer\"         History of Present Illness:  Mr. Geovanni Jasso is a 58 year old male who presents as a referral from [facility] for a lesion of concern on the trunk as well as seborrheic keratoses. The patient has never seen a dermatologist before and denies a personal hx of skin cancer. The pt's mother had skin cancer of an unknown type. He has a few spots of concern, including on the back and one on the right side of the trunk. Finally, there is a pimple-like lesion on the central chest.     The pt presents with his wife.     Past Medical History:   Patient Active Problem List   Diagnosis     Esophageal reflux     Hyperlipidemia LDL goal <70     Chronic rhinitis     Bilateral low back pain without sciatica     Chondromalacia patellae, right     S/P ACL reconstruction     Atherosclerosis of native coronary artery of native heart without angina pectoris     SOB (shortness of breath)     Non-allergic rhinitis     Positive CLAIRE (antinuclear antibody)     Past Medical History:   Diagnosis Date     Chronic rhinitis 10/23/2015     Esophageal reflux 12/26/2013     No past surgical history on file.    Social History:   reports that he has never smoked. He has never used smokeless tobacco. He reports that he drinks alcohol. He reports that he does not use illicit drugs. The pt. Is .  He is a  of finance    Family History:  No family history on file. No family history of skin cancer.     Medications:  Current Outpatient Prescriptions   Medication Sig Dispense Refill     acetaminophen (TYLENOL) 325 MG tablet Take 325-650 mg by mouth 4 times daily as needed       ASPIRIN PO Take 81 mg by " mouth daily (with dinner)       atorvastatin (LIPITOR) 20 MG tablet Take 1 tablet (20 mg) by mouth daily (with dinner) 90 tablet 3     ibuprofen (ADVIL,MOTRIN) 200 MG tablet Take 200 mg by mouth every 6 hours as needed       ipratropium (ATROVENT) 0.06 % spray Spray 2 sprays into both nostrils 4 times daily as needed for rhinitis 1 Box 3     ketotifen (ZADITOR) 0.025 % SOLN ophthalmic solution Place 1 drop into both eyes every 12 hours 1 Bottle 3     Naproxen Sodium (ALEVE PO)        omeprazole (PRILOSEC) 40 MG capsule Take 1 capsule (40 mg) by mouth daily Take 30-60 minutes before a meal. 30 capsule 3     albuterol (PROAIR HFA/PROVENTIL HFA/VENTOLIN HFA) 108 (90 Base) MCG/ACT inhaler Inhale 2 puffs into the lungs every 4 hours as needed (Patient not taking: Reported on 10/4/2018) 1 Inhaler 3     FEXOFENADINE HCL PO Daily       HYDROcodone-ibuprofen (VICOPROFEN) 7.5-200 MG per tablet Take 1 tablet by mouth nightly as needed for moderate to severe pain (Patient not taking: Reported on 10/4/2018) 30 tablet 0       Allergies   Allergen Reactions     Penicillins        Review of Systems:  -Constitutional: The patient is feeling generally well.   -Uro- no sores on th genitals  -Skin: As above in HPI. No additional skin concerns.    Physical exam:  Vitals: There were no vitals taken for this visit.  GEN: This is a well developed, well-nourished male in no acute distress, in a pleasant mood.    SKIN: Total skin excluding the undergarment areas was performed. The exam included the head/face, neck, both arms, chest, back, abdomen, both legs, digits and/or nails, including the buttocks.   -There are waxy stuck on tan to brown papules on the back, chest, right axilla  -There is(are) skin colored pedunculated papules on the right axilla.   -Scattered brown macules on sun exposed areas.  - Discoloration and thickening distally of the left great toenail  -No other lesions of concern on areas examined.        Impression/Plan:  1. Seborrheic keratoses, solar lentigines    Recommend sunscreens SPF #30 or greater, protective clothing and avoidance of tanning beds.    2. Skin tag, right axilla  Cryotherapy procedure note: After verbal consent and discussion of risks and benefits including but no limited to dyspigmentation/scar, blister, and pain, 1 was(were) treated with a forceps after the forceps was immersed in liquid nitrogen. Post cryotherapy instructions were provided.     3. Possible onychomycosis, left great toenail    Start ciclopirox. Apply to the nail once daily.     Nail clipping obtained today for fungal culture.     Follow-up 3-6 months for nail      Staff Involved:  Scribe/Staff    Scribe Disclosure  I, Luigi Reed, am serving as a scribe to document services personally performed by Dr. Marbella Castro MD, based on data collection and the provider's statements to me.     Provider Disclosure:   The documentation recorded by the scribe accurately reflects the services I personally performed and the decisions made by me.    Marbella Castro MD    Department of Dermatology  [facility]: Phone: [phone], Fax:[phone]  [facility]: Phone: [phone], Fax: [phone]

## 2018-10-04 NOTE — PATIENT INSTRUCTIONS
Cryotherapy    What is it?    Use of a very cold liquid, such as liquid nitrogen, to freeze and destroy abnormal skin cells that need to be removed    What should I expect?    Tenderness and redness    A small blister that might grow and fill with dark purple blood. There may be crusting.    More than one treatment may be needed if the lesions do not go away.    How do I care for the treated area?    Gently wash the area with your hands when bathing.    Use a thin layer of Vaseline to help with healing. You may use a Band-Aid.     The area should heal within 7-10 days and may leave behind a pink or lighter color.     Do not use an antibiotic or Neosporin ointment.     You may take acetaminophen (Tylenol) for pain.     Call your Doctor if you have:    Severe pain    Signs of infection (warmth, redness, cloudy yellow drainage, and or a bad smell)    Questions or concerns    Who should I call with questions?       [facility]: [phone]       [facility]: [phone]       For urgent needs outside of business hours call the [facility] at [phone]        and ask for the dermatology resident on call

## 2018-10-05 ENCOUNTER — TELEPHONE (OUTPATIENT)
Dept: DERMATOLOGY | Facility: CLINIC | Age: 58
End: 2018-10-05

## 2018-10-08 NOTE — TELEPHONE ENCOUNTER
Central Prior Authorization Team   Phone: [phone]    PA Initiation    Medication: ciclopirox 8 % SOLN  Insurance Company: [employer] - Phone [phone] Fax [phone]  Pharmacy Filling the Rx: [id number] - [place], [place] - [address]  Filling Pharmacy Phone: [phone]  Filling Pharmacy Fax:    Start Date: 10/8/2018    Waiting for questions to generate/COMPLETED VIA CMM

## 2018-10-08 NOTE — TELEPHONE ENCOUNTER
Prior Authorization Approval    Authorization Effective Date: 10/8/2018  Authorization Expiration Date: 10/8/2019  Medication: ciclopirox 8 % SOLN - approved  Approved Dose/Quantity:   Reference #: [id number]   Insurance Company: [employer] - Phone [phone] Fax [phone]  Expected CoPay: n/a     CoPay Card Available:      Foundation Assistance Needed:    Which Pharmacy is filling the prescription (Not needed for infusion/clinic administered): [id number] - [place] - [address]  Pharmacy Notified: Yes  Patient Notified: Yes

## 2018-10-15 ENCOUNTER — TELEPHONE (OUTPATIENT)
Dept: FAMILY MEDICINE | Facility: CLINIC | Age: 58
End: 2018-10-15

## 2018-10-15 DIAGNOSIS — Z12.11 COLON CANCER SCREENING: Primary | ICD-10-CM

## 2018-10-15 NOTE — TELEPHONE ENCOUNTER
Panel Management Review      BP Readings from Last 1 Encounters:   09/25/18 117/78    , No results found for: A1C, 9/25/2018  Last Office Visit with this department: 9/25/2018    Fail List measure: colon cancer       Patient is due/failing the following:   COLONOSCOPY    Action needed:   none    Type of outreach:    Phone, spoke to patient.  he will make appt.     Questions for provider review:    None                                                                                                                                    Nakita Branch      Chart routed to n/a .

## 2018-11-01 LAB
BACTERIA SPEC CULT: NORMAL
SPECIMEN SOURCE: NORMAL

## 2018-11-12 ENCOUNTER — RADIANT APPOINTMENT (OUTPATIENT)
Dept: GENERAL RADIOLOGY | Facility: CLINIC | Age: 58
End: 2018-11-12
Attending: STUDENT IN AN ORGANIZED HEALTH CARE EDUCATION/TRAINING PROGRAM
Payer: COMMERCIAL

## 2018-11-12 ENCOUNTER — OFFICE VISIT (OUTPATIENT)
Dept: RHEUMATOLOGY | Facility: CLINIC | Age: 58
End: 2018-11-12
Attending: INTERNAL MEDICINE
Payer: COMMERCIAL

## 2018-11-12 VITALS
DIASTOLIC BLOOD PRESSURE: 80 MMHG | HEART RATE: 65 BPM | OXYGEN SATURATION: 94 % | HEIGHT: 71 IN | SYSTOLIC BLOOD PRESSURE: 121 MMHG | WEIGHT: 194.2 LBS | BODY MASS INDEX: 27.19 KG/M2

## 2018-11-12 DIAGNOSIS — M25.641 HAND JOINT STIFF, RIGHT: Primary | ICD-10-CM

## 2018-11-12 DIAGNOSIS — M25.641 HAND JOINT STIFF, RIGHT: ICD-10-CM

## 2018-11-12 LAB
CK SERPL-CCNC: 115 U/L (ref 30–300)
CRP SERPL-MCNC: <2.9 MG/L (ref 0–8)
ERYTHROCYTE [SEDIMENTATION RATE] IN BLOOD BY WESTERGREN METHOD: 7 MM/H (ref 0–20)
TSH SERPL DL<=0.005 MIU/L-ACNC: 2.01 MU/L (ref 0.4–4)

## 2018-11-12 PROCEDURE — 86235 NUCLEAR ANTIGEN ANTIBODY: CPT | Performed by: STUDENT IN AN ORGANIZED HEALTH CARE EDUCATION/TRAINING PROGRAM

## 2018-11-12 PROCEDURE — 36415 COLL VENOUS BLD VENIPUNCTURE: CPT | Performed by: STUDENT IN AN ORGANIZED HEALTH CARE EDUCATION/TRAINING PROGRAM

## 2018-11-12 PROCEDURE — 86140 C-REACTIVE PROTEIN: CPT | Performed by: STUDENT IN AN ORGANIZED HEALTH CARE EDUCATION/TRAINING PROGRAM

## 2018-11-12 PROCEDURE — 73130 X-RAY EXAM OF HAND: CPT | Mod: RT | Performed by: RADIOLOGY

## 2018-11-12 PROCEDURE — 86160 COMPLEMENT ANTIGEN: CPT | Performed by: STUDENT IN AN ORGANIZED HEALTH CARE EDUCATION/TRAINING PROGRAM

## 2018-11-12 PROCEDURE — 82306 VITAMIN D 25 HYDROXY: CPT | Performed by: STUDENT IN AN ORGANIZED HEALTH CARE EDUCATION/TRAINING PROGRAM

## 2018-11-12 PROCEDURE — 82550 ASSAY OF CK (CPK): CPT | Performed by: STUDENT IN AN ORGANIZED HEALTH CARE EDUCATION/TRAINING PROGRAM

## 2018-11-12 PROCEDURE — 84443 ASSAY THYROID STIM HORMONE: CPT | Performed by: STUDENT IN AN ORGANIZED HEALTH CARE EDUCATION/TRAINING PROGRAM

## 2018-11-12 PROCEDURE — 99204 OFFICE O/P NEW MOD 45 MIN: CPT | Performed by: STUDENT IN AN ORGANIZED HEALTH CARE EDUCATION/TRAINING PROGRAM

## 2018-11-12 PROCEDURE — 85652 RBC SED RATE AUTOMATED: CPT | Performed by: STUDENT IN AN ORGANIZED HEALTH CARE EDUCATION/TRAINING PROGRAM

## 2018-11-12 PROCEDURE — 86225 DNA ANTIBODY NATIVE: CPT | Performed by: STUDENT IN AN ORGANIZED HEALTH CARE EDUCATION/TRAINING PROGRAM

## 2018-11-12 ASSESSMENT — PAIN SCALES - GENERAL: PAINLEVEL: MODERATE PAIN (4)

## 2018-11-12 NOTE — MR AVS SNAPSHOT
After Visit Summary   11/12/2018    Geovanni Jasso    MRN: [id number]           Patient Information     Date Of Birth          1960        Visit Information        Provider Department      11/12/2018 11:00 AM Beth Elizondo MD [facility]        Today's Diagnoses     Hand joint stiff, right    -  1       Follow-ups after your visit        Your next 10 appointments already scheduled     Jan 16, 2019 11:30 AM CST   Return Visit with Beth Elizondo MD   [facility] ([facility])    [address] [place] [id number] [phone]              Future tests that were ordered for you today     Open Future Orders        Priority Expected Expires Ordered    X-ray rt Hand G/E 3 vws Routine 11/12/2018 6/10/2019 11/12/2018            Who to contact     If you have questions or need follow up information about today's clinic visit or your schedule please contact [facility] directly at [phone].  Normal or non-critical lab and imaging results will be communicated to you by [employer], letter or phone within 4 business days after the clinic has received the results. If you do not hear from us within 7 days, please contact the clinic through [employer] or phone. If you have a critical or abnormal lab result, we will notify you by phone as soon as possible.  Submit refill requests through [employer] or call your pharmacy and they will forward the refill request to us. Please allow 3 business days for your refill to be completed.          Additional Information About Your Visit        [employer] Information     [employer] gives you secure access to your electronic health record. If you see a primary care provider, you can also send messages to your care team and make appointments. If you have questions, please call your primary care clinic.  If you do not have a primary care provider, please call [phone] and they will assist you.       "[employer] is an electronic gateway that provides easy, online access to your medical records. With [employer], you can request a clinic appointment, read your test results, renew a prescription or communicate with your care team.     To access your existing account, please contact your [facility] or call [phone] for assistance.        Care EveryWhere ID     This is your Care EveryWhere ID. This could be used by other organizations to access your [facility] medical records  [id number]        Your Vitals Were     Pulse Height Pulse Oximetry BMI (Body Mass Index)          65 1.803 m (5' 11\") 94% 27.09 kg/m2         Blood Pressure from Last 3 Encounters:   11/12/18 121/80   09/25/18 117/78   08/31/18 112/73    Weight from Last 3 Encounters:   11/12/18 88.1 kg (194 lb 3.2 oz)   09/25/18 89.4 kg (197 lb)   08/31/18 90.6 kg (199 lb 12.8 oz)              We Performed the Following     Centromere Antibody IgG     CK total     Complement C3     Complement C4     CRP inflammation     DNA double stranded antibodies     ILDEFONSO antibody panel     ESR     TSH with free T4 reflex     Vitamin D Deficiency        Primary Care Provider Office Phone # Fax #    Paddy [facility] [phone]       [address] [facility] [address]        Equal Access to Services     LIUDMILA MAYS : Hadii aad ku hadasho Soomaali, waaxda luqadaha, qaybta kaalmada adeegyada, waxay idiin hayaan rissa kharajosue presley . So [facility] [phone].    ATENCIÓN: Si habla español, tiene a fisher disposición servicios gratuitos de asistencia lingüística. Llame al [phone].    We comply with applicable federal civil rights laws and [place] laws. We do not discriminate on the basis of race, color, national origin, age, disability, sex, sexual orientation, or gender identity.            Thank you!     Thank you for choosing [facility]  for your care. Our goal is always to provide you with excellent " care. Hearing back from our patients is one way we can continue to improve our services. Please take a few minutes to complete the written survey that you may receive in the mail after your visit with us. Thank you!             Your Updated Medication List - Protect others around you: Learn how to safely use, store and throw away your medicines at www.disposemymeds.org.          This list is accurate as of 11/12/18 12:04 PM.  Always use your most recent med list.                   Brand Name Dispense Instructions for use Diagnosis    acetaminophen 325 MG tablet    TYLENOL     Take 325-650 mg by mouth 4 times daily as needed        albuterol 108 (90 Base) MCG/ACT inhaler    PROAIR HFA/PROVENTIL HFA/VENTOLIN HFA    1 Inhaler    Inhale 2 puffs into the lungs every 4 hours as needed    SOB (shortness of breath)       ALEVE PO           ASPIRIN PO      Take 81 mg by mouth daily (with dinner)        atorvastatin 20 MG tablet    LIPITOR    90 tablet    Take 1 tablet (20 mg) by mouth daily (with dinner)    Hyperlipidemia LDL goal <70, Atherosclerosis of native coronary artery of native heart without angina pectoris       ciclopirox 8 % Soln     13.2 mL    Apply to adjacent skin and affected nails daily. Remove with alcohol every 7 days    Nail discoloration       FEXOFENADINE HCL PO      Daily        HYDROcodone-ibuprofen 7.5-200 MG per tablet    VICOPROFEN    30 tablet    Take 1 tablet by mouth nightly as needed for moderate to severe pain    Chest wall pain       ibuprofen 200 MG tablet    ADVIL/MOTRIN     Take 200 mg by mouth every 6 hours as needed        ipratropium 0.06 % spray    ATROVENT    1 Box    Spray 2 sprays into both nostrils 4 times daily as needed for rhinitis    Non-allergic rhinitis       ketotifen 0.025 % Soln ophthalmic solution    ZADITOR    1 Bottle    Place 1 drop into both eyes every 12 hours    Non-allergic rhinitis       omeprazole 40 MG capsule    priLOSEC    30 capsule    Take 1 capsule (40 mg)  by mouth daily Take 30-60 minutes before a meal.    Gastroesophageal reflux disease, esophagitis presence not specified

## 2018-11-12 NOTE — PROGRESS NOTES
Rheumatology Clinic Visit     Geovanni Jasso MRN# [id number]   YOB: 1960 Age: 58 year old     Date of Visit: Nov 12, 2018  Primary care provider: Paddy Holly          Assessment and Plan:   Assessment     -- Right hand stiffness   -- Lumbar degenerative disc disease   -- + CLAIRE - 1:160     Mr Jasso is 57 yo female seen in clinic for evaluation of right hand pain.     Right hand pain and stiffness : He complains of right hand stiffness for the last 1 year.  It is not getting better.  Stiffness is worse in the morning when he wakes up.  His hand is curled up in a fist and he has to slowly open his hand.  He denies any trigger finger.  He does not have any raynaud's.  Denies any numbness tingling in his hand.  Once his hand is open stiffness lasts for about 5-10 minutes and he feels better.  Rest of the day he feels mild stiffness in right hand as compared to other joints.  He does not have any pain in other joints, no history of gout no synovitis and tenderness.  He denies any history of diabetes.  On exam he does not have a trigger finger, Dupuytren's contractures.  His Tinel's and Phalen's signs are negative.    On exam since there is no symmetric polyarthritis, rheumatoid arthritis seems less likely.  He has normal blood flow, good radial pulse.  Does not seem to be related to the poor perfusion or raynaud's.    I will get his inflammatory markers, muscle enzymes, vitamin D and thyroid test to evaluate for any metabolic causes of hand arthralgia and stiffness.  He can take Tylenol or ibuprofen on as-needed basis to see if that helps.  Since he does not have numbness tingling in his hand I did not recommend EMG or wrist splint.  But in case any neurologic symptoms appear then EMG will be the next option.    Positive CLAIRE: He has low titer positive CLAIRE.  But on review of symptoms he does not have any raynaud's, skin rash, pleurisy, oral/nasal mucosal sores, sicca symptoms.  My suspicion for  autoimmune connective tissue disease is low.  To complete workup will get the specific serologies.    Plan    1.  Blood test: ILDEFONSO panel, complement levels, double-stranded DNA, ESR, CRP, CK, TSH, vitamin D level    2.  Use Tylenol or ibuprofen on as-needed basis for joint stiffness.    3.  Physical exam is not consistent with inflammatory arthritis.  Suspicion for autoimmune connective tissue disease is low.  His symptoms can be related to tenosynovitis.    4. Return to clinic in 2 months            Active Problem List:     Patient Active Problem List    Diagnosis Date Noted     Positive CLAIRE (antinuclear antibody) 09/27/2018     Priority: Medium     SOB (shortness of breath) 08/31/2018     Priority: Medium     Non-allergic rhinitis 08/31/2018     Priority: Medium     Atherosclerosis of native coronary artery of native heart without angina pectoris 11/12/2017     Priority: Medium     Chondromalacia patellae, right 05/09/2017     Priority: Medium     S/P ACL reconstruction 05/09/2017     Priority: Medium     Bilateral low back pain without sciatica 05/07/2017     Priority: Medium     Chronic rhinitis 10/23/2015     Priority: Medium     Esophageal reflux 12/26/2013     Priority: Medium     Hyperlipidemia LDL goal <70 12/26/2013     Priority: Medium            History of Present Illness:   Geovanni Jasso is a 58 year old male with PMH of lumbar DDD, GERD seen in the clinic in consultation at request of Paddy Holly MD  for evaluation of right hand stiffness.    He complains of stiffness in his hands for almost a year.  States worse in the morning.  He has to force open his hand.  He denies any problem in other joints.  Throughout the day he has some stiffness in his right hand as compared to the other hand.  Does not have any numbness tingling.  Denies any weakness.  No history of any raynaud's or color changes.  He does not take Aleve or Tylenol for pain relief.    He has history of lumbar degenerative disc  disease and has chronic low back pain.  Has on and off pain in his knees for many years.  Denies any pain in his feet.  He has some neck soreness.    He had blood work done by his primary care provider Dr. Holly which revealed low titer positive CLAIRE 1: 160, negative rheumatoid factor and anti-CCP antibody.    He does not have any family history of autoimmune connective tissue disease or rheumatoid arthritis.  He denies any history of psoriasis, dactylitis, plantar fasciitis, enthesitis or inflammatory bowel disease.    He denies raynaud's, malar rash, photosensitivity, recurrent/nasal mucosal sores, sicca symptoms, pleurisy, arterial venous thrombosis.  Denies any history of tick bite.           Review of Systems:   Review Of Systems  Constitutional: denies fever, chills, night sweats and weight loss.  Skin: No skin rash.  Eyes: No dryness or irritation in eyes. No episode of eye inflammation or redness.   Ears/Nose/Throat: no recurrent sinus infections.  Respiratory: No shortness of breath, dyspnea on exertion, cough, or hemoptysis  Cardiovascular: no chest pain or palpitations.  Gastrointestinal: no nausea, vomiting, abdominal pain.  Normal bowel movements.  Genitourinary: no dysuria, frequency  or hematuria.  Musculoskeletal: as in HPI  Neurologic: no numbness, tingling.  Psychiatric: no mood disorders.  Hematologic/Lymphatic/Immunologic: no history of easy bruising, petechia or purpura.  No abnormal bleeding.   Endocrine: no h/o thyroid disease or Diabetes.                  Past Medical History:     Past Medical History:   Diagnosis Date     Chronic rhinitis 10/23/2015     Esophageal reflux 12/26/2013     No past surgical history on file.         Social History:     Social History     Occupational History     Not on file.     Social History Main Topics     Smoking status: Never Smoker     Smokeless tobacco: Never Used     Alcohol use Yes      Comment: occasionally     Drug use: No     Sexual activity: Yes      "Partners: Female            Family History:   No family history on file.         Allergies:     Allergies   Allergen Reactions     Penicillins             Medications:     Current Outpatient Prescriptions   Medication Sig Dispense Refill     acetaminophen (TYLENOL) 325 MG tablet Take 325-650 mg by mouth 4 times daily as needed       albuterol (PROAIR HFA/PROVENTIL HFA/VENTOLIN HFA) 108 (90 Base) MCG/ACT inhaler Inhale 2 puffs into the lungs every 4 hours as needed 1 Inhaler 3     ASPIRIN PO Take 81 mg by mouth daily (with dinner)       atorvastatin (LIPITOR) 20 MG tablet Take 1 tablet (20 mg) by mouth daily (with dinner) 90 tablet 3     ciclopirox 8 % SOLN Apply to adjacent skin and affected nails daily. Remove with alcohol every 7 days 13.2 mL 5     HYDROcodone-ibuprofen (VICOPROFEN) 7.5-200 MG per tablet Take 1 tablet by mouth nightly as needed for moderate to severe pain 30 tablet 0     ibuprofen (ADVIL,MOTRIN) 200 MG tablet Take 200 mg by mouth every 6 hours as needed       ipratropium (ATROVENT) 0.06 % spray Spray 2 sprays into both nostrils 4 times daily as needed for rhinitis 1 Box 3     ketotifen (ZADITOR) 0.025 % SOLN ophthalmic solution Place 1 drop into both eyes every 12 hours 1 Bottle 3     Naproxen Sodium (ALEVE PO)        omeprazole (PRILOSEC) 40 MG capsule Take 1 capsule (40 mg) by mouth daily Take 30-60 minutes before a meal. 30 capsule 3     FEXOFENADINE HCL PO Daily              Physical Exam:   Blood pressure 121/80, pulse 65, height 1.803 m (5' 11\"), weight 88.1 kg (194 lb 3.2 oz), SpO2 94 %.  Wt Readings from Last 4 Encounters:   11/12/18 88.1 kg (194 lb 3.2 oz)   09/25/18 89.4 kg (197 lb)   08/31/18 90.6 kg (199 lb 12.8 oz)   04/11/18 89.4 kg (197 lb)       Constitutional: well-developed, appearing stated age; cooperative  Eyes: nl EOM, PERRLA, vision, conjunctiva, sclera  ENT: nl external ears, nose, hearing, lips, teeth, gums, throat  No mucous membrane lesions, normal saliva pool  Neck: no " mass or thyroid enlargement  Resp: lungs clear to auscultation, nl to palpation  CV: RRR, no murmurs, rubs or gallops, no edema  GI: no ABD mass or tenderness, no HSM  : not tested  Lymph: no cervical, supraclavicular, inguinal or epitrochlear nodes    MS: All TMJ, neck, shoulder, elbow, wrist, MCP/PIP/DIP, spine, hip, knee, ankle, and foot MTP/IP joints were examined.   -- He has no trigger finger, no synovitis and tenderness over his right hand. No synovitis over other joints.   -- Normal right radial pulse. Negative Tinel's and Phalen's sign.   -- No dactylitis,  tenosynovitis, enthesopathy.    Skin: no nail pitting, alopecia, rash, nodules or lesions  Neuro: nl cranial nerves, strength, sensation, DTRs.   Psych: nl judgement, orientation, memory, affect.         Data:     Results for orders placed or performed in visit on 10/04/18   Fungus skin hair nail culture   Result Value Ref Range    Specimen Description Toenail Left Great     Culture Micro No growth after 4 weeks        Recent Labs   Lab Test  01/17/18   1742  11/07/17   1036   WBC  7.4  6.0   RBC  4.71  4.75   HGB  15.3  15.4   HCT  45.4  45.5   MCV  96  96   RDW  13.6  13.5   PLT  232  222      No results for input(s): TSH, T4 in the last 58316 hours.  Hemoglobin   Date Value Ref Range Status   01/17/2018 15.3 13.3 - 17.7 g/dL Final   11/07/2017 15.4 13.3 - 17.7 g/dL Final     Rheumatoid Factor   Date Value Ref Range Status   09/25/2018 <20 <20 IU/mL Final     Recent Labs   Lab Test  01/17/18   1742  11/07/17   1036   WBC  7.4  6.0   HGB  15.3  15.4   HCT  45.4  45.5   MCV  96  96   PLT  232  222     Other studies:   Component      Latest Ref Rng & Units 9/25/2018   CLAIRE interpretation      NEG:Negative Positive (A)   CLAIRE pattern 1       SPECKLED   CLAIRE titer 1       1:160   Cyclic Citrullinated Peptide Antibody, IgG      <7 U/mL 1   Rheumatoid Factor      <20 IU/mL <20         Reviewed Rheumatology lab flowsheet    Beth Elizondo MD  [facility]  [facility]  Department of Rheumatology & Autoimmune Disorders  [place]: [phone]   Pager - [phone]

## 2018-11-12 NOTE — NURSING NOTE
"Geovanni Jasso's goals for this visit include:   He requests these members of his care team be copied on today's visit information:     PCP: Paddy Holly    Referring Provider:  Paddy Holly MD  [id number] [place] [address]    /80  Pulse 65  Ht 1.803 m (5' 11\")  Wt 88.1 kg (194 lb 3.2 oz)  SpO2 94%  BMI 27.09 kg/m2   "

## 2018-11-13 LAB
C3 SERPL-MCNC: 108 MG/DL (ref 76–169)
C4 SERPL-MCNC: 15 MG/DL (ref 15–50)
CENTROMERE IGG SER-ACNC: <0.2 AI (ref 0–0.9)
DEPRECATED CALCIDIOL+CALCIFEROL SERPL-MC: 31 UG/L (ref 20–75)
ENA RNP IGG SER IA-ACNC: 0.4 AI (ref 0–0.9)
ENA SCL70 IGG SER IA-ACNC: <0.2 AI (ref 0–0.9)
ENA SM IGG SER-ACNC: <0.2 AI (ref 0–0.9)
ENA SS-A IGG SER IA-ACNC: <0.2 AI (ref 0–0.9)
ENA SS-B IGG SER IA-ACNC: 0.3 AI (ref 0–0.9)

## 2018-11-14 LAB — DSDNA AB SER-ACNC: 1 IU/ML

## 2018-12-12 DIAGNOSIS — Z12.5 SCREENING FOR PROSTATE CANCER: ICD-10-CM

## 2018-12-12 DIAGNOSIS — Z13.6 CARDIOVASCULAR SCREENING; LDL GOAL LESS THAN 130: ICD-10-CM

## 2018-12-12 LAB
CHOLEST SERPL-MCNC: 207 MG/DL
HDLC SERPL-MCNC: 37 MG/DL
LDLC SERPL CALC-MCNC: 140 MG/DL
NONHDLC SERPL-MCNC: 170 MG/DL
PSA SERPL-ACNC: 1.2 UG/L (ref 0–4)
TRIGL SERPL-MCNC: 148 MG/DL

## 2018-12-12 PROCEDURE — 80061 LIPID PANEL: CPT | Performed by: INTERNAL MEDICINE

## 2018-12-12 PROCEDURE — G0103 PSA SCREENING: HCPCS | Performed by: INTERNAL MEDICINE

## 2018-12-12 PROCEDURE — 36415 COLL VENOUS BLD VENIPUNCTURE: CPT | Performed by: INTERNAL MEDICINE

## 2018-12-12 PROCEDURE — 86141 C-REACTIVE PROTEIN HS: CPT | Performed by: INTERNAL MEDICINE

## 2018-12-13 ENCOUNTER — OFFICE VISIT (OUTPATIENT)
Dept: FAMILY MEDICINE | Facility: CLINIC | Age: 58
End: 2018-12-13
Payer: COMMERCIAL

## 2018-12-13 ENCOUNTER — TELEPHONE (OUTPATIENT)
Dept: INTERNAL MEDICINE | Facility: CLINIC | Age: 58
End: 2018-12-13

## 2018-12-13 VITALS
DIASTOLIC BLOOD PRESSURE: 72 MMHG | WEIGHT: 190 LBS | OXYGEN SATURATION: 98 % | TEMPERATURE: 98.4 F | BODY MASS INDEX: 26.6 KG/M2 | RESPIRATION RATE: 18 BRPM | HEART RATE: 70 BPM | HEIGHT: 71 IN | SYSTOLIC BLOOD PRESSURE: 119 MMHG

## 2018-12-13 DIAGNOSIS — K21.9 GASTROESOPHAGEAL REFLUX DISEASE, ESOPHAGITIS PRESENCE NOT SPECIFIED: ICD-10-CM

## 2018-12-13 DIAGNOSIS — Z00.00 ANNUAL PHYSICAL EXAM: Primary | ICD-10-CM

## 2018-12-13 DIAGNOSIS — Z13.6 CARDIOVASCULAR SCREENING; LDL GOAL LESS THAN 160: ICD-10-CM

## 2018-12-13 DIAGNOSIS — Z12.11 SCREEN FOR COLON CANCER: ICD-10-CM

## 2018-12-13 PROCEDURE — 99396 PREV VISIT EST AGE 40-64: CPT | Performed by: INTERNAL MEDICINE

## 2018-12-13 ASSESSMENT — PAIN SCALES - GENERAL: PAINLEVEL: NO PAIN (0)

## 2018-12-13 ASSESSMENT — MIFFLIN-ST. JEOR: SCORE: 1693.96

## 2018-12-13 NOTE — TELEPHONE ENCOUNTER
Panel Management Review        Last Office Visit with this department:     Fail List measure:       Patient is due/failing the following:   COLONOSCOPY    Action needed:   none    Type of outreach:    Sent siXis message.    Questions for provider review:    None                                                                                                                                    Theo Hicks MA

## 2018-12-13 NOTE — PROGRESS NOTES
SUBJECTIVE:   CC: Geovanni Jasso is an 58 year old male who presents for preventive health visit.     Healthy Habits:    Do you get at least three servings of calcium containing foods daily (dairy, green leafy vegetables, etc.)? no    Amount of exercise or daily activities, outside of work: none    Problems taking medications regularly No    Medication side effects: No    Have you had an eye exam in the past two years? yes    Do you see a dentist twice per year? yes    Do you have sleep apnea, excessive snoring or daytime drowsiness?yes        1) Gerd/Heartburn follow-up  Duration of complaint: chronic   Description of symptoms:  Nocturnal.  food getting stuck: no   nausea/vomiting: YES  abdominal pain: no   black/tarry or bloody stools: no :  worse with no particular food or drink.  current NSAID/Aspirin use: YES  Therapies tried and outcome: Omeprazole (Prilosec), effective, but concerned about long-term use.      2) Hyperlipidemia Follow-Up      Rate your low fat/cholesterol diet?: good    Taking statin?  No    Other lipid medications/supplements?:  none      Today's PHQ-2 Score:   PHQ-2 ( 1999 [employer]) 11/12/2018 10/4/2018   Q1: Little interest or pleasure in doing things 0 0   Q2: Feeling down, depressed or hopeless 0 0   PHQ-2 Score 0 0   Q1: Little interest or pleasure in doing things - -   Q2: Feeling down, depressed or hopeless - -   PHQ-2 Score - -       Abuse: Current or Past(Physical, Sexual or Emotional)- NO  Do you feel safe in your environment? YES    Social History     Tobacco Use     Smoking status: Never Smoker     Smokeless tobacco: Never Used   Substance Use Topics     Alcohol use: Yes     Comment: occasionally     If you drink alcohol do you typically have >3 drinks per day or >7 drinks per week? No                      Last PSA:   PSA   Date Value Ref Range Status   12/12/2018 1.20 0 - 4 ug/L Final     Comment:     Assay Method:  Chemiluminescence using Siemens Vista analyzer       Reviewed  orders with patient. Reviewed health maintenance and updated orders accordingly - Yes  Labs reviewed in EPIC  BP Readings from Last 3 Encounters:   12/13/18 119/72   11/12/18 121/80   09/25/18 117/78    Wt Readings from Last 3 Encounters:   12/13/18 86.2 kg (190 lb)   11/12/18 88.1 kg (194 lb 3.2 oz)   09/25/18 89.4 kg (197 lb)                  Patient Active Problem List   Diagnosis     Esophageal reflux     Hyperlipidemia LDL goal <70     Chronic rhinitis     Bilateral low back pain without sciatica     Chondromalacia patellae, right     S/P ACL reconstruction     Atherosclerosis of native coronary artery of native heart without angina pectoris     SOB (shortness of breath)     Non-allergic rhinitis     Positive CLAIRE (antinuclear antibody)     Hand joint stiff, right     Past Surgical History:   Procedure Laterality Date     NO HISTORY OF SURGERY         Social History     Tobacco Use     Smoking status: Never Smoker     Smokeless tobacco: Never Used   Substance Use Topics     Alcohol use: Yes     Comment: occasionally     History reviewed. No pertinent family history.      Allergies   Allergen Reactions     Penicillins      Recent Labs   Lab Test 12/12/18  0728 11/12/18  1215 10/30/17  0820 11/30/16  0850   *  --  146* 158*   HDL 37*  --  37* 33*   TRIG 148  --  139 175*   TSH  --  2.01  --   --         Reviewed and updated as needed this visit by clinical staff  Tobacco  Allergies  Meds         Reviewed and updated as needed this visit by Provider        ROS:  CONSTITUTIONAL: NEGATIVE for fever, chills, change in weight  INTEGUMENTARY/SKIN: NEGATIVE for worrisome rashes, moles or lesions  EYES: NEGATIVE for vision changes or irritation  ENT: NEGATIVE for ear, mouth and throat problems  RESP: NEGATIVE for significant cough or SOB  CV: NEGATIVE for chest pain, palpitations or peripheral edema  GI: NEGATIVE for nausea, abdominal pain, heartburn, or change in bowel habits   male: negative for dysuria,  "hematuria, decreased urinary stream, erectile dysfunction, urethral discharge  MUSCULOSKELETAL: NEGATIVE for significant arthralgias or myalgia  NEURO: NEGATIVE for weakness, dizziness or paresthesias  ENDOCRINE: NEGATIVE for temperature intolerance, skin/hair changes  HEME/ALLERGY/IMMUNE: NEGATIVE for bleeding problems  PSYCHIATRIC: NEGATIVE for changes in mood or affect    OBJECTIVE:   /72 (BP Location: Left arm, Patient Position: Chair, Cuff Size: Adult Large)   Pulse 70   Temp 98.4  F (36.9  C) (Oral)   Resp 18   Ht 1.803 m (5' 11\")   Wt 86.2 kg (190 lb)   SpO2 98%   BMI 26.50 kg/m    EXAM:  GENERAL: healthy, alert and no distress  EYES: Eyes grossly normal to inspection, PERRL and conjunctivae and sclerae normal  HENT: ear canals and TM's normal, nose and mouth without ulcers or lesions  NECK: no adenopathy, no asymmetry, masses, or scars and thyroid normal to palpation  RESP: lungs clear to auscultation - no rales, rhonchi or wheezes  CV: regular rate and rhythm, normal S1 S2, no S3 or S4, no murmur, click or rub, no peripheral edema and peripheral pulses strong  ABDOMEN: soft, nontender, no hepatosplenomegaly, no masses and bowel sounds normal  MS: no gross musculoskeletal defects noted, no edema  SKIN: no suspicious lesions or rashes  NEURO: Normal strength and tone, mentation intact and speech normal  PSYCH: mentation appears normal, affect normal/bright    Diagnostic Test Results:  Results for orders placed or performed in visit on 12/13/18   CRP cardiac risk   Result Value Ref Range    CRP Cardiac Risk 0.8 mg/L     Cholesterol 207 Abnormally high   H  <200 mg/dL Final 12/12/2018  7:28 AM MG   Comment:   Desirable:       <200 mg/dl   Triglycerides 148   <150 mg/dL Final 12/12/2018  7:28 AM MG   Comment:   Fasting specimen   HDL Cholesterol 37 Abnormally low   L  >39 mg/dL Final 12/12/2018  7:28 AM MG   LDL Cholesterol Calculated 140 Abnormally high   H  <100 mg/dL Final 12/12/2018  7:28 AM  " "    PSA 1.20   0 - 4 ug/L Final 12/12/2018  7:28 AM         ASSESSMENT/PLAN:   1. Annual physical exam  Comments: No family history of premature atherosclerotic heart disease or early-onset colon cancer.Normal PSA.  - CRP cardiac risk  - Lipid profile  - PSA    2. Gastroesophageal reflux disease, esophagitis presence not specified  Comments: Symptoms are worse at night. Start H2 receptor blockers at bedtime.  - ranitidine (ZANTAC) 300 MG tablet; Take 1 tablet (300 mg) by mouth At Bedtime  Dispense: 90 tablet; Refill: 3    3. Screen for colon cancer  Comment: Due for test.  - GASTROENTEROLOGY ADULT REF PROCEDURE ONLY [facility] [phone]; No Provider Preference; Future    4. Hyperlipidemia LDL goal <100  Comment: No reason to take statins due to normal CRP.Diet and exercise are sufficient.  - STATIN NOT PRESCRIBED (INTENTIONAL); Please choose reason not prescribed, below  - CRP cardiac risk    COUNSELING:  Special attention given to:        Regular exercise       Healthy diet/nutrition       Colon cancer screening       Prostate cancer screening       The 10-year ASCVD risk score (Nohemy COBOS Jr., et al., 2013) is: 10.4%    Values used to calculate the score:      Age: 58 years      Sex: Male      Is Non- : No      Diabetic: No      Tobacco smoker: No      Systolic Blood Pressure: 124 mmHg      Is BP treated: No      HDL Cholesterol: 37 mg/dL      Total Cholesterol: 207 mg/dL    BP Readings from Last 1 Encounters:   12/13/18 119/72     Estimated body mass index is 26.5 kg/m  as calculated from the following:    Height as of this encounter: 1.803 m (5' 11\").    Weight as of this encounter: 86.2 kg (190 lb).           reports that  has never smoked. he has never used smokeless tobacco.      Counseling Resources:  ATP IV Guidelines  Pooled Cohorts Equation Calculator  FRAX Risk Assessment  ICSI Preventive Guidelines  Dietary Guidelines for Americans, 2010  [employer] MyPlate  ASA " Prophylaxis  Lung CA Screening    Paddy Holly MD  [facility]

## 2018-12-13 NOTE — PATIENT INSTRUCTIONS
================================================================================  Normal Values   Blood pressure  <140/90 for most adults    <130/80 for some chronic diseases (ask your care team about yours)    BMI (body mass index)  18.5-25 kg/m2 (based on height and weight)     Thank you for visiting [facility]    Normal or non-critical lab and imaging results will be communicated to you by MyChart, letter or phone within 7 days.  If you do not hear from us within 10 days, please call the clinic. If you have a critical or abnormal lab result, we will notify you by phone as soon as possible.     If you have any questions regarding your visit please contact:     Team Comfort:   Clinic Hours Telephone Number   Dr. Lázaro Sawant Dr[address] [place] 7am-5pm  Monday - Friday [phone]  Luis M RN  Raegan Garzon RN   Pharmacy 8:00am-8pm Monday-Friday    9am-5pm Saturday-Sunday [phone]   Urgent Care 11am-9pm Monday-Friday        9am-5pm Saturday-Sunday [phone]     After hours, weekend or if you need to make an appointment with your primary provider please call [phone].   After Hours nurse advise: call [facility]: [phone]    Medication Refills:  Call your pharmacy and they will forward the refill to us. Please allow 3 business days for your refills to be completed.          Preventive Health Recommendations  Male Ages 50 - 64    Yearly exam:             See your health care provider every year in order to  o   Review health changes.   o   Discuss preventive care.    o   Review your medicines if your doctor has prescribed any.     Have a cholesterol test every 5 years, or more frequently if you are at risk for high cholesterol/heart disease.     Have a diabetes test (fasting glucose) every three years. If you are at risk for diabetes, you should have this test more often.     Have a colonoscopy at age 50, or have a yearly FIT test  (stool test). These exams will check for colon cancer.      Talk with your health care provider about whether or not a prostate cancer screening test (PSA) is right for you.    You should be tested each year for STDs (sexually transmitted diseases), if you re at risk.     Shots: Get a flu shot each year. Get a tetanus shot every 10 years.     Nutrition:    Eat at least 5 servings of fruits and vegetables daily.     Eat whole-grain bread, whole-wheat pasta and brown rice instead of white grains and rice.     Get adequate Calcium and Vitamin D.     Lifestyle    Exercise for at least 150 minutes a week (30 minutes a day, 5 days a week). This will help you control your weight and prevent disease.     Limit alcohol to one drink per day.     No smoking.     Wear sunscreen to prevent skin cancer.     See your dentist every six months for an exam and cleaning.     See your eye doctor every 1 to 2 years.

## 2018-12-14 LAB — CRP SERPL HS-MCNC: 0.8 MG/L

## 2018-12-23 ENCOUNTER — OFFICE VISIT (OUTPATIENT)
Dept: URGENT CARE | Facility: URGENT CARE | Age: 58
End: 2018-12-23
Payer: COMMERCIAL

## 2018-12-23 VITALS
WEIGHT: 193 LBS | BODY MASS INDEX: 27.02 KG/M2 | TEMPERATURE: 98.2 F | HEART RATE: 70 BPM | DIASTOLIC BLOOD PRESSURE: 78 MMHG | RESPIRATION RATE: 18 BRPM | HEIGHT: 71 IN | OXYGEN SATURATION: 97 % | SYSTOLIC BLOOD PRESSURE: 124 MMHG

## 2018-12-23 DIAGNOSIS — B02.9 HERPES ZOSTER WITHOUT COMPLICATION: Primary | ICD-10-CM

## 2018-12-23 PROCEDURE — 99213 OFFICE O/P EST LOW 20 MIN: CPT | Performed by: PHYSICIAN ASSISTANT

## 2018-12-23 RX ORDER — PREDNISONE 20 MG/1
20 TABLET ORAL 2 TIMES DAILY
Qty: 10 TABLET | Refills: 0 | Status: SHIPPED | OUTPATIENT
Start: 2018-12-23 | End: 2019-06-18

## 2018-12-23 RX ORDER — VALACYCLOVIR HYDROCHLORIDE 1 G/1
1000 TABLET, FILM COATED ORAL 3 TIMES DAILY
Qty: 270 TABLET | Refills: 3 | Status: SHIPPED | OUTPATIENT
Start: 2018-12-23 | End: 2019-06-18

## 2018-12-23 ASSESSMENT — MIFFLIN-ST. JEOR: SCORE: 1707.57

## 2018-12-23 NOTE — PATIENT INSTRUCTIONS
Patient Education     Shingles  Shingles is a viral infection caused by the same virus that causes chicken pox. Anyone who has had chicken pox may get shingles later in life. The virus stays in the body, but remains asleep (dormant). Shingles often occurs in older persons or persons with lowered immunity. But it can affect anyone at any age.  Shingles starts as a tingling patch of skin on one side of the body. Small, painful blisters may then appear. The rash rarely spreads to other parts of the body.  Exposure to shingles can't cause shingles. However, it can cause chicken pox in anyone who has not had chicken pox or has not been vaccinated. The contagious period ends when all blisters have crusted over, generally 1 to 2 weeks after the illness starts.  After the blisters heal, the affected skin may be sensitive or painful for weeks or months, gradually resolving over time. But, sometimes this can last longer and be permanent (called postherpetic neuralgia.)  Shingles vaccines are available. Vaccination can help prevent shingles or make it less painful. It is generally recommended for adults older than 50, even if you've had singles in the past. Talk with your healthcare provider about when to get vaccinated and which vaccine is best for you.  Home care    Medicines may be prescribed to help relieve pain. Take these medicines as directed. Ask your healthcare provider or pharmacist before using over-the-counter medicines for helping treat pain and itching.    In certain cases, antiviral medicines may be prescribed to reduce pain, shorten the illness, and prevent neuralgia. Take these medicines as directed.    Compresses made from a solution of cool water mixed with cornstarch or baking soda may help relieve pain and itching.     Gently wash skin daily with soap and water to help prevent infection. Be certain to rinse off all of the soap, which can be irritating.    Trim fingernails and try not to scratch.  Scratching the sores may leave scars.    Stay home from work or school until all blisters have formed a crust and you are no longer contagious.  Follow-up care  Follow up with your healthcare provider, or as directed.  When to seek medical advice    Fever of 100.4 F (38 C) or higher, or as directed by your healthcare provider    Affected skin is on the face or neck and any of the following occur:  ? Headache  ? Eye pain  ? Changes in vision  ? Sores near the eye  ? Weakness of facial muscles    Blisters occurring on new areas of the body    Pain, redness, or swelling of a joint    Signs of skin infection: colored drainage from the sores, warmth, increasing redness, fever, or increasing pain   Date Last Reviewed: 4/1/2018 2000-2018 [employer]. [address] [place] [address], [place] [address]. All rights reserved. This information is not intended as a substitute for professional medical care. Always follow your healthcare professional's instructions.

## 2018-12-23 NOTE — PROGRESS NOTES
S: 58-year-old male presents for rash that started last night but right thoracic pain for 5 days.  No itching.  He did have chickenpox as a child.  No fever.  No family members with similar rash.  Lots of stress at work.  No numbness or tingling.  No cough or sore throat.    Allergies   Allergen Reactions     Penicillins        Past Medical History:   Diagnosis Date     Chronic rhinitis 10/23/2015     Esophageal reflux 12/26/2013     Hand joint stiff, right          Current Outpatient Medications on File Prior to Visit:  acetaminophen (TYLENOL) 325 MG tablet Take 325-650 mg by mouth 4 times daily as needed   albuterol (PROAIR HFA/PROVENTIL HFA/VENTOLIN HFA) 108 (90 Base) MCG/ACT inhaler Inhale 2 puffs into the lungs every 4 hours as needed   ASPIRIN PO Take 81 mg by mouth daily (with dinner)   ciclopirox 8 % SOLN Apply to adjacent skin and affected nails daily. Remove with alcohol every 7 days   HYDROcodone-ibuprofen (VICOPROFEN) 7.5-200 MG per tablet Take 1 tablet by mouth nightly as needed for moderate to severe pain   ibuprofen (ADVIL,MOTRIN) 200 MG tablet Take 200 mg by mouth every 6 hours as needed   ipratropium (ATROVENT) 0.06 % spray Spray 2 sprays into both nostrils 4 times daily as needed for rhinitis   ketotifen (ZADITOR) 0.025 % SOLN ophthalmic solution Place 1 drop into both eyes every 12 hours   Naproxen Sodium (ALEVE PO)    ranitidine (ZANTAC) 300 MG tablet Take 1 tablet (300 mg) by mouth At Bedtime   STATIN NOT PRESCRIBED (INTENTIONAL) Please choose reason not prescribed, below     No current facility-administered medications on file prior to visit.     Social History     Tobacco Use     Smoking status: Never Smoker     Smokeless tobacco: Never Used   Substance Use Topics     Alcohol use: Yes     Comment: occasionally       ROS:  CONSTITUTIONAL: Negative for fatigue or fever.  EYES: Negative for eye problems.  ENT: Negative .  RESP: Negative   CV: Negative for chest pains.  GI: Negative for  "vomiting.  MUSCULOSKELETAL: Positive for right thoracic pain   NEUROLOGIC: Negative for headaches.  SKIN: As above     OBJECTIVE:  /78   Pulse 70   Temp 98.2  F (36.8  C) (Oral)   Resp 18   Ht 1.803 m (5' 11\")   Wt 87.5 kg (193 lb)   SpO2 97%   BMI 26.92 kg/m    GENERAL APPEARANCE: Healthy, alert and no distress.  EYES:Conjunctiva/sclera clear.  EARS: No cerumen.   Ear canals w/o erythema.  TM's intact w/o erythema.    THROAT: No erythema w/o tonsillar enlargement . No exudates.  NECK: Supple, nontender, no lymphadenopathy.  RESP: Lungs clear to auscultation - no rales, rhonchi or wheezes  CV: Regular rate and rhythm, normal S1 S2, no murmur noted.  NEURO: Awake, alert    SKIN: Right posterior thoracic trunk is with 2 clusters of red papules.  Also one cluster on the right anterior lower rib cage.  No blisters         ASSESSMENT:     ICD-10-CM    1. Herpes zoster without complication B02.9 valACYclovir (VALTREX) 1000 mg tablet     predniSONE (DELTASONE) 20 MG tablet         PLAN: Avoid pregnant females, those under the age of the of 2, those who have never been vaccinated, and those who have never had chickenpox.  Given information pamphlet on shingles.  Contagious until these are crusted over.  Follow-up with primary as needed.    Kaye Vicente PA-C    "

## 2018-12-26 ENCOUNTER — MYC MEDICAL ADVICE (OUTPATIENT)
Dept: FAMILY MEDICINE | Facility: CLINIC | Age: 58
End: 2018-12-26

## 2018-12-26 ENCOUNTER — NURSE TRIAGE (OUTPATIENT)
Dept: NURSING | Facility: CLINIC | Age: 58
End: 2018-12-26

## 2018-12-26 DIAGNOSIS — B02.29 POST HERPETIC NEURALGIA: Primary | ICD-10-CM

## 2018-12-26 RX ORDER — LIDOCAINE 50 MG/G
OINTMENT TOPICAL PRN
Qty: 50 G | Refills: 11 | Status: SHIPPED | OUTPATIENT
Start: 2018-12-26 | End: 2018-12-27

## 2018-12-27 ENCOUNTER — TELEPHONE (OUTPATIENT)
Dept: INTERNAL MEDICINE | Facility: CLINIC | Age: 58
End: 2018-12-27

## 2018-12-27 DIAGNOSIS — B02.29 POST HERPETIC NEURALGIA: ICD-10-CM

## 2018-12-27 RX ORDER — LIDOCAINE 50 MG/G
OINTMENT TOPICAL 3 TIMES DAILY
Qty: 50 G | Refills: 11 | Status: SHIPPED | OUTPATIENT
Start: 2018-12-27 | End: 2019-07-17

## 2018-12-27 NOTE — TELEPHONE ENCOUNTER
[facility] ([facility]) [phone] calls requesting additional information on a medication ordered today.   This RN reviewed Epic chart and no additional information available (Lidocaine 5% external ointment).  [facility] plans to follow up with the clinic tomorrow.     Protocol- Medication Call- Adult  Care advice reviewed.   Disposition-Call PCP within 24 hours  Caller states understanding of the recommended disposition.   Advised to call back if further questions or concerns.     LULU Ortiz RN  [facility]       Reason for Disposition    Caller has NON-URGENT medication question about med that PCP prescribed and triager unable to answer question    Protocols used: MEDICATION QUESTION CALL-ADULT-

## 2018-12-27 NOTE — TELEPHONE ENCOUNTER
Reason for Call:  Other prescription    Detailed comments: pharmacy asking for how many times/frequency to use medication    Phone Number Patient can be reached at: Other phone number:  [phone]    Best Time: any    Can we leave a detailed message on this number? YES    Call taken on 12/27/2018 at 8:48 AM by Mery Bello

## 2018-12-27 NOTE — TELEPHONE ENCOUNTER
Medication Detail      Disp Refills Start End JULIO C   lidocaine (XYLOCAINE) 5 % external ointment 50 g 11 12/26/2018 12/26/2019 --   Sig - Route: Apply topically as needed for moderate pain - Topical   Sent to pharmacy as: lidocaine (XYLOCAINE) 5 % external ointment   Class: E-Prescribe   Order: [id number]   E-Prescribing Status: Receipt confirmed by pharmacy (12/26/2018  5:04 PM CST)     Pharmacy needing specificity for PRN use, up to how many times per day? Range of times per day?    Janay Ruiz, RN

## 2019-01-11 ENCOUNTER — OFFICE VISIT (OUTPATIENT)
Dept: RHEUMATOLOGY | Facility: CLINIC | Age: 59
End: 2019-01-11
Payer: COMMERCIAL

## 2019-01-11 VITALS
OXYGEN SATURATION: 97 % | SYSTOLIC BLOOD PRESSURE: 122 MMHG | DIASTOLIC BLOOD PRESSURE: 79 MMHG | BODY MASS INDEX: 26.64 KG/M2 | WEIGHT: 191 LBS | HEART RATE: 74 BPM

## 2019-01-11 DIAGNOSIS — M79.641 PAIN OF RIGHT HAND: Primary | ICD-10-CM

## 2019-01-11 DIAGNOSIS — G56.01 CARPAL TUNNEL SYNDROME OF RIGHT WRIST: ICD-10-CM

## 2019-01-11 PROCEDURE — 99213 OFFICE O/P EST LOW 20 MIN: CPT | Performed by: STUDENT IN AN ORGANIZED HEALTH CARE EDUCATION/TRAINING PROGRAM

## 2019-01-11 ASSESSMENT — PAIN SCALES - GENERAL: PAINLEVEL: MILD PAIN (2)

## 2019-01-11 NOTE — NURSING NOTE
Geovanni Jasso's goals for this visit include: return  He requests these members of his care team be copied on today's visit information:     PCP: Paddy Holly    Referring Provider:  No referring provider defined for this encounter.    /79   Pulse 74   Wt 86.6 kg (191 lb)   SpO2 97%   BMI 26.64 kg/m      Do you need any medication refills at today's visit? n

## 2019-01-11 NOTE — PATIENT INSTRUCTIONS
-- Your symptoms might be related to carpal tunnel syndrome , use wrist splint at night. If it does not improve then get EMG to evaluate for severity    -- Voltaren gel     -- RTC on as needed basis

## 2019-01-11 NOTE — PROGRESS NOTES
Rheumatology Clinic Visit     Geovanni Jasso MRN# [id number]   YOB: 1960 Age: 58 year old     Date of Visit:January 11, 2019  Primary care provider: Paddy Holly          Assessment and Plan:   Assessment     -- Right hand stiffness - possibly related to carpal tunnel syndrome  -- Lumbar degenerative disc disease   -- + CLAIRE - 1:160, neg ILDEFONSO,  Normal c3/c4, normal ESR, CRP, dsDNA, centromere    Mr Jasso is 57 yo female seen in clinic for evaluation of right hand pain.     Right hand pain and stiffness : He complains of right hand stiffness for the last 1 year. Slightly better than before. His hand is curled up in a fist and he has to slowly open his hand in the morning.  He denies any trigger finger.  He does not have any raynaud's.  Denies any numbness tingling in his hand.  Once his hand is open stiffness lasts for about 5-10 minutes and he feels better.  Rest of the day he feels mild stiffness in right hand as compared to other joints.  He does not have any pain in other joints, no history of gout no synovitis and tenderness.  He denies any history of diabetes.  On exam he does not have a trigger finger, Dupuytren's contractures.  His Tinel's sign is negative but his Phalen's test was positive today.     On exam since there is no symmetric polyarthritis, rheumatoid arthritis is unlikely.  He has normal blood flow, good radial pulse.  Does not seem to be related to the poor perfusion or raynaud's. Most likely pain in his wrist and cramping in the hand is related to carpal tunnel syndrome. I gave him right wrist splint to use at night. If it does not improve than he can get EMG to look at severity of the median nerve entrapment.    His autoimmune work up done on the last visit was normal which included his ILDEFONSO panel, dsDNA, c3/c4,  inflammatory markers, muscle enzymes, vitamin D,  thyroid test.      Positive CLAIRE: He has low titer positive CLAIRE.  But on review of symptoms he does not have any  raynaud's, skin rash, pleurisy, oral/nasal mucosal sores, sicca symptoms.  My suspicion for autoimmune connective tissue disease is low.     Plan    -- Symptoms related to carpal tunnel syndrome,  use wrist splint at night. If it does not improve then get EMG to evaluate for severity    -- Voltaren gel     -- RTC on as needed basis            Active Problem List:     Patient Active Problem List    Diagnosis Date Noted     Carpal tunnel syndrome      Priority: Medium     Hand joint stiff, right      Priority: Medium     Positive CLAIRE (antinuclear antibody) 09/27/2018     Priority: Medium     SOB (shortness of breath) 08/31/2018     Priority: Medium     Non-allergic rhinitis 08/31/2018     Priority: Medium     Atherosclerosis of native coronary artery of native heart without angina pectoris 11/12/2017     Priority: Medium     Chondromalacia patellae, right 05/09/2017     Priority: Medium     S/P ACL reconstruction 05/09/2017     Priority: Medium     Bilateral low back pain without sciatica 05/07/2017     Priority: Medium     Chronic rhinitis 10/23/2015     Priority: Medium     Esophageal reflux 12/26/2013     Priority: Medium     Hyperlipidemia LDL goal <70 12/26/2013     Priority: Medium            History of Present Illness:   Geovanni Jasso is a 58 year old male with PMH of lumbar DDD, GERD seen in the clinic in consultation at request of Paddy Holly MD  for evaluation of right hand stiffness.    January 11, 2019 - He got shingles episode over his right chest. He has soreness in his right hand and nothing in the other hand. His hand is not that bad as it was before. He has noticed pain in his elbows occasionally. He is currently having shingles episode over the right side of the chest.     History from initial visit : He complains of stiffness in his hands for almost a year.  States worse in the morning.  He has to force open his hand.  He denies any problem in other joints.  Throughout the day he has some  stiffness in his right hand as compared to the other hand.  Does not have any numbness tingling.  Denies any weakness.  No history of any raynaud's or color changes.  He does not take Aleve or Tylenol for pain relief.    He has history of lumbar degenerative disc disease and has chronic low back pain.  Has on and off pain in his knees for many years.  Denies any pain in his feet.  He has some neck soreness.    He had blood work done by his primary care provider Dr. Holly which revealed low titer positive CLAIRE 1: 160, negative rheumatoid factor and anti-CCP antibody.    He does not have any family history of autoimmune connective tissue disease or rheumatoid arthritis.  He denies any history of psoriasis, dactylitis, plantar fasciitis, enthesitis or inflammatory bowel disease.    He denies raynaud's, malar rash, photosensitivity, recurrent/nasal mucosal sores, sicca symptoms, pleurisy, arterial venous thrombosis.  Denies any history of tick bite.           Review of Systems:   Review Of Systems  Constitutional: denies fever, chills, night sweats and weight loss.  Skin: No skin rash.  Eyes: No dryness or irritation in eyes. No episode of eye inflammation or redness.   Ears/Nose/Throat: no recurrent sinus infections.  Respiratory: No shortness of breath, dyspnea on exertion, cough, or hemoptysis  Cardiovascular: no chest pain or palpitations.  Gastrointestinal: no nausea, vomiting, abdominal pain.  Normal bowel movements.  Genitourinary: no dysuria, frequency  or hematuria.  Musculoskeletal: as in HPI  Neurologic: no numbness, tingling.   Psychiatric: no mood disorders.  Hematologic/Lymphatic/Immunologic: no history of easy bruising, petechia or purpura.  No abnormal bleeding.   Endocrine: no h/o thyroid disease or Diabetes.                  Past Medical History:     Past Medical History:   Diagnosis Date     Carpal tunnel syndrome      Chronic rhinitis 10/23/2015     Esophageal reflux 12/26/2013     Hand joint stiff,  right      Past Surgical History:   Procedure Laterality Date     NO HISTORY OF SURGERY              Social History:     Social History     Occupational History     Not on file   Tobacco Use     Smoking status: Never Smoker     Smokeless tobacco: Never Used   Substance and Sexual Activity     Alcohol use: Yes     Comment: occasionally     Drug use: No     Sexual activity: Yes     Partners: Female            Family History:     Family History   Problem Relation Age of Onset     Autoimmune Disease No family hx of             Allergies:     Allergies   Allergen Reactions     Penicillins             Medications:     Current Outpatient Medications   Medication Sig Dispense Refill     acetaminophen (TYLENOL) 325 MG tablet Take 325-650 mg by mouth 4 times daily as needed       albuterol (PROAIR HFA/PROVENTIL HFA/VENTOLIN HFA) 108 (90 Base) MCG/ACT inhaler Inhale 2 puffs into the lungs every 4 hours as needed 1 Inhaler 3     ASPIRIN PO Take 81 mg by mouth daily (with dinner)       ciclopirox 8 % SOLN Apply to adjacent skin and affected nails daily. Remove with alcohol every 7 days 13.2 mL 5     diclofenac (VOLTAREN) 1 % topical gel Apply topically 4 times daily 1 Tube 1     HYDROcodone-ibuprofen (VICOPROFEN) 7.5-200 MG per tablet Take 1 tablet by mouth nightly as needed for moderate to severe pain 30 tablet 0     ibuprofen (ADVIL,MOTRIN) 200 MG tablet Take 200 mg by mouth every 6 hours as needed       ipratropium (ATROVENT) 0.06 % spray Spray 2 sprays into both nostrils 4 times daily as needed for rhinitis 1 Box 3     ketotifen (ZADITOR) 0.025 % SOLN ophthalmic solution Place 1 drop into both eyes every 12 hours 1 Bottle 3     lidocaine (XYLOCAINE) 5 % external ointment Apply topically 3 times daily 50 g 11     Naproxen Sodium (ALEVE PO) Take by mouth as needed        order for DME 1 Device daily Equipment being ordered: right wrist Splint 1 Device 0     ranitidine (ZANTAC) 300 MG tablet Take 1 tablet (300 mg) by mouth At  Bedtime 90 tablet 3     STATIN NOT PRESCRIBED (INTENTIONAL) Please choose reason not prescribed, below       valACYclovir (VALTREX) 1000 mg tablet Take 1 tablet (1,000 mg) by mouth 3 times daily 270 tablet 3            Physical Exam:   Blood pressure 122/79, pulse 74, weight 86.6 kg (191 lb), SpO2 97 %.  Wt Readings from Last 4 Encounters:   01/11/19 86.6 kg (191 lb)   12/23/18 87.5 kg (193 lb)   12/13/18 86.2 kg (190 lb)   11/12/18 88.1 kg (194 lb 3.2 oz)       Constitutional: well-developed, appearing stated age; cooperative  Eyes: nl EOM, PERRLA, vision, conjunctiva, sclera  ENT: nl external ears, nose, hearing, lips, teeth, gums, throat  No mucous membrane lesions, normal saliva pool  Neck: no mass or thyroid enlargement  Resp: lungs clear to auscultation, nl to palpation  CV: RRR, no murmurs, rubs or gallops, no edema  GI: no ABD mass or tenderness, no HSM  : not tested  Lymph: no cervical, supraclavicular, inguinal or epitrochlear nodes    MS: All TMJ, neck, shoulder, elbow, wrist, MCP/PIP/DIP, spine, hip, knee, ankle, and foot MTP/IP joints were examined.   -- He has no trigger finger, no synovitis and tenderness over his right hand. No synovitis over other joints.   -- Normal right radial pulse. Negative Tinel's, but + Phalen's sign.   -- No dactylitis,  tenosynovitis, enthesopathy.    Skin: no nail pitting, alopecia, rash, nodules or lesions  Neuro: nl cranial nerves, strength, sensation, DTRs.   Psych: nl judgement, orientation, memory, affect.         Data:     Results for orders placed or performed in visit on 12/13/18   CRP cardiac risk   Result Value Ref Range    CRP Cardiac Risk 0.8 mg/L       Recent Labs   Lab Test  01/17/18   1742  11/07/17   1036   WBC  7.4  6.0   RBC  4.71  4.75   HGB  15.3  15.4   HCT  45.4  45.5   MCV  96  96   RDW  13.6  13.5   PLT  232  222      No results for input(s): TSH, T4 in the last 10437 hours.  Hemoglobin   Date Value Ref Range Status   01/17/2018 15.3 13.3 - 17.7  g/dL Final   11/07/2017 15.4 13.3 - 17.7 g/dL Final     Sed Rate   Date Value Ref Range Status   11/12/2018 7 0 - 20 mm/h Final     CRP Cardiac Risk   Date Value Ref Range Status   12/12/2018 0.8 mg/L Final     Comment:     Reference Values:  Low Risk:           <1.0 mg/L  Average Risk:       1.0-3.0 mg/L  High Risk:          >3.0 mg/L  Acute Inflammation: >8.0 mg/L       CRP Inflammation   Date Value Ref Range Status   11/12/2018 <2.9 0.0 - 8.0 mg/L Final     Rheumatoid Factor   Date Value Ref Range Status   09/25/2018 <20 <20 IU/mL Final     Recent Labs   Lab Test 11/12/18  1215 01/17/18  1742 11/07/17  1036   WBC  --  7.4 6.0   HGB  --  15.3 15.4   HCT  --  45.4 45.5   MCV  --  96 96   PLT  --  232 222   TSH 2.01  --   --      Other studies:   Component      Latest Ref Rng & Units 9/25/2018   CLAIRE interpretation      NEG:Negative Positive (A)   CLAIRE pattern 1       SPECKLED   CLAIRE titer 1       1:160   Cyclic Citrullinated Peptide Antibody, IgG      <7 U/mL 1   Rheumatoid Factor      <20 IU/mL <20         Reviewed Rheumatology lab flowsheet    Beth Elizondo MD  [facility]  Department of Rheumatology & Autoimmune Disorders  [facility] [place]: [phone]   Pager - [phone]

## 2019-02-25 PROBLEM — M17.9 OSTEOARTHRITIS OF KNEE, UNSPECIFIED LATERALITY, UNSPECIFIED OSTEOARTHRITIS TYPE: Status: ACTIVE | Noted: 2019-02-25

## 2019-05-13 ENCOUNTER — TELEPHONE (OUTPATIENT)
Dept: FAMILY MEDICINE | Facility: CLINIC | Age: 59
End: 2019-05-13

## 2019-05-13 NOTE — TELEPHONE ENCOUNTER
Panel Management Review   One phone call and send letter if unable to reach them or MyChart message and send letter if not read after 2 weeks (You will get a message to your inbasket)      12/13/2018    Fail List measure: colonoscopy         Patient is due/failing the following:   COLONOSCOPY    Action needed:   Colonoscopy     Type of outreach:    Sent [employer] message.    Questions for provider review:    None                                                                                                                                    Devi Pederson MA      Chart routed to  .

## 2019-06-18 ENCOUNTER — OFFICE VISIT (OUTPATIENT)
Dept: FAMILY MEDICINE | Facility: CLINIC | Age: 59
End: 2019-06-18
Payer: COMMERCIAL

## 2019-06-18 ENCOUNTER — ANCILLARY PROCEDURE (OUTPATIENT)
Dept: GENERAL RADIOLOGY | Facility: CLINIC | Age: 59
End: 2019-06-18
Attending: INTERNAL MEDICINE
Payer: COMMERCIAL

## 2019-06-18 VITALS
HEIGHT: 71 IN | RESPIRATION RATE: 16 BRPM | HEART RATE: 64 BPM | BODY MASS INDEX: 27.86 KG/M2 | DIASTOLIC BLOOD PRESSURE: 76 MMHG | SYSTOLIC BLOOD PRESSURE: 112 MMHG | WEIGHT: 199 LBS | OXYGEN SATURATION: 96 % | TEMPERATURE: 98.3 F

## 2019-06-18 DIAGNOSIS — H92.02 MASTOID PAIN, LEFT: Primary | ICD-10-CM

## 2019-06-18 DIAGNOSIS — Z12.11 SCREEN FOR COLON CANCER: ICD-10-CM

## 2019-06-18 DIAGNOSIS — G56.11 RIGHT MEDIAN NERVE NEUROPATHY: ICD-10-CM

## 2019-06-18 DIAGNOSIS — K64.8 INTERNAL HEMORRHOIDS: ICD-10-CM

## 2019-06-18 DIAGNOSIS — J34.89 SINUS PRESSURE: ICD-10-CM

## 2019-06-18 DIAGNOSIS — M54.14 THORACIC RADICULOPATHY: ICD-10-CM

## 2019-06-18 DIAGNOSIS — R51.9 LEFT-SIDED HEADACHE: ICD-10-CM

## 2019-06-18 DIAGNOSIS — K57.30 DIVERTICULOSIS OF LARGE INTESTINE WITHOUT HEMORRHAGE: ICD-10-CM

## 2019-06-18 PROCEDURE — 70130 X-RAY EXAM OF MASTOIDS: CPT

## 2019-06-18 PROCEDURE — 99214 OFFICE O/P EST MOD 30 MIN: CPT | Performed by: INTERNAL MEDICINE

## 2019-06-18 PROCEDURE — 70220 X-RAY EXAM OF SINUSES: CPT

## 2019-06-18 ASSESSMENT — PAIN SCALES - GENERAL: PAINLEVEL: MILD PAIN (2)

## 2019-06-18 ASSESSMENT — MIFFLIN-ST. JEOR: SCORE: 1734.79

## 2019-06-18 NOTE — PROGRESS NOTES
Subjective     Geovanni Jasso is a 58 year old male who presents to clinic today for the following health issues:    HPI   ENT Symptoms             Symptoms: cc Present Absent Comment   Fever/Chills   x    Fatigue   x    Muscle Aches   x    Eye Irritation   x    Sneezing   x    Nasal [place]/Drg  x  Congestion sometimes in the morning but recent negative tests with allergist   Sinus Pressure/Pain   x    Loss of smell   x    Dental pain   x    Sore Throat   x    Swollen Glands   x    Ear Pain/Fullness  x  Pain behind left ear, indentation above left ear unsure if related to glasses or not   Cough   x    Wheeze   x    Chest Pain   x    Shortness of breath   x    Rash   x    Other  x  Headaches frontal, twinging pain left axillary 6/18/19 intermittent throughout the day, pain bilateral upper forearm muscle with lifting     Symptom duration:  >1month   Symptom severity:  Moderate, pain worse with pressure   Treatments tried:  Tylenol for headache helps pain, Advil   Contacts:  None           Patient Active Problem List   Diagnosis     Esophageal reflux     Hyperlipidemia LDL goal <70     Chronic rhinitis     Bilateral low back pain without sciatica     Chondromalacia patellae, right     S/P ACL reconstruction     Atherosclerosis of native coronary artery of native heart without angina pectoris     SOB (shortness of breath)     Non-allergic rhinitis     Positive CLAIRE (antinuclear antibody)     Hand joint stiff, right     Carpal tunnel syndrome     Osteoarthritis of knee, unspecified laterality, unspecified osteoarthritis type     Past Surgical History:   Procedure Laterality Date     NO HISTORY OF SURGERY         Social History     Tobacco Use     Smoking status: Never Smoker     Smokeless tobacco: Never Used   Substance Use Topics     Alcohol use: Yes     Comment: occasionally     Family History   Problem Relation Age of Onset     Autoimmune Disease No family hx of          Allergies   Allergen Reactions     Penicillins   "    Recent Labs   Lab Test 12/12/18  0728 11/12/18  1215 10/30/17  0820 11/30/16  0850   *  --  146* 158*   HDL 37*  --  37* 33*   TRIG 148  --  139 175*   TSH  --  2.01  --   --       BP Readings from Last 3 Encounters:   06/18/19 112/76   01/11/19 122/79   12/23/18 124/78    Wt Readings from Last 3 Encounters:   06/18/19 90.3 kg (199 lb)   01/11/19 86.6 kg (191 lb)   12/23/18 87.5 kg (193 lb)                    Reviewed and updated as needed this visit by Provider         Review of Systems   ROS COMP: CONSTITUTIONAL: NEGATIVE for fever, chills, change in weight  INTEGUMENTARY/SKIN: NEGATIVE for worrisome rashes, moles or lesions  EYES: NEGATIVE for vision changes or irritation  ENT/MOUTH: NEGATIVE for ear, mouth and throat problems  RESP: NEGATIVE for significant cough or SOB  CV: NEGATIVE for chest pain, palpitations or peripheral edema  GI: NEGATIVE for nausea, abdominal pain, heartburn, or change in bowel habits  : NEGATIVE for frequency, dysuria, or hematuria  MUSCULOSKELETAL: NEGATIVE for significant arthralgias or myalgia  NEURO: NEGATIVE for weakness, dizziness or paresthesias  ENDOCRINE: NEGATIVE for temperature intolerance, skin/hair changes  HEME: NEGATIVE for bleeding problems  PSYCHIATRIC: NEGATIVE for changes in mood or affect      Objective    /76 (BP Location: Left arm, Patient Position: Chair, Cuff Size: Adult Large)   Pulse 64   Temp 98.3  F (36.8  C) (Oral)   Resp 16   Ht 1.803 m (5' 11\")   Wt 90.3 kg (199 lb)   SpO2 96%   BMI 27.75 kg/m    Body mass index is 27.75 kg/m .  Physical Exam   GENERAL: healthy, alert and no distress  EYES: Eyes grossly normal to inspection, PERRL and conjunctivae and sclerae normal  HENT: ear canals and TM's normal, nose and mouth without ulcers or lesions  NECK: no adenopathy, no asymmetry, masses, or scars and thyroid normal to palpation  RESP: lungs clear to auscultation - no rales, rhonchi or wheezes  CV: regular rate and rhythm, normal S1 " "S2, no S3 or S4, no murmur, click or rub, no peripheral edema and peripheral pulses strong  ABDOMEN: soft, nontender, no hepatosplenomegaly, no masses and bowel sounds normal  MS: no gross musculoskeletal defects noted, no edema  SKIN: no suspicious lesions or rashes  NEURO: Normal strength and tone, mentation intact and speech normal  PSYCH: mentation appears normal, affect normal/bright    Diagnostic Test Results:  Results for orders placed or performed in visit on 06/18/19   XR Mastoid G/E 3 Views    Narrative    XR MASTOID G/E 3 VWS   6/18/2019 1:32 PM     HISTORY: Mastoid pain, left    COMPARISON: None.      Impression    IMPRESSION: Normal. Mastoids are normally aerated.    FELIBERTO RAMOS MD   XR Sinus Complete G/E 3 Views    Narrative    SINUS THREE VIEWS COMPLETE  6/18/2019 1:31 PM    HISTORY: Sinus pressure    COMPARISON: None.      Impression    IMPRESSION: Negative. No significant mucosal thickening, no air-fluid  levels.    FELIBERTO RAMOS MD           Assessment & Plan     1. Mastoid pain, left    - XR Mastoid G/E 3 Views  - OTOLARYNGOLOGY REFERRAL    2. Sinus pressure    - XR Sinus Complete G/E 3 Views    3. Thoracic radiculopathy    - XR Thoracic Spine 3 Views; Future  - XR Chest 2 Views; Future    4. Screen for colon cancer    - GASTROENTEROLOGY ADULT REF PROCEDURE ONLY; Future    5. Right median nerve neuropathy    - NEUROLOGY ADULT REFERRAL; Future  - EMG; Future    6. Left-sided headache    - CT Head w/o Contrast; Future     BMI:   Estimated body mass index is 27.75 kg/m  as calculated from the following:    Height as of this encounter: 1.803 m (5' 11\").    Weight as of this encounter: 90.3 kg (199 lb).   Weight management plan: diet and exercise          Return in about 1 month (around 7/16/2019).    Paddy Holly MD  [facility]    "

## 2019-06-18 NOTE — PATIENT INSTRUCTIONS
At [facility], we strive to deliver an exceptional experience to you, every time we see you.  If you receive a survey in the mail, please send us back your thoughts. We really do value your feedback.    Based on your medical history, these are the current health maintenance/preventive care services that you are due for (some may have been done at this visit.)  Health Maintenance Due   Topic Date Due     ADVANCE CARE PLANNING  1960     HIV SCREENING  09/09/1973     ZOSTER IMMUNIZATION (1 of 2) 09/09/2008     COLONOSCOPY  09/09/2018         Suggested websites for health information:  Www.eefoof.com.Shmoop : Up to date and easily searchable information on multiple topics.  Www.medlineplus.gov : medication info, interactive tutorials, watch real surgeries online  Www.familydoctor.org : good info from the Academy of Family Physicians  Www.cdc.gov : public health info, travel advisories, epidemics (H1N1)  Www.aap.org : children's health info, normal development, vaccinations  Www.health.[facility].[place].us : [place] dept of health, public health issues in [place], N1N1    Your care team:                            Family Medicine Internal Medicine   MD Paddy Merrill MD Shantel Branch-Fleming, MD Katya Georgiev PA-C Nam Ho, MD Pediatrics   JAGUAR Berman, MD Dedra Ramirez CNP, MD Deborah Mielke, MD Kim Thein, APRN CNP      Clinic hours: Monday - Thursday 7 am-7 pm; Fridays 7 am-5 pm.   Urgent care: Monday - Friday 11 am-9 pm; Saturday and Sunday 9 am-5 pm.  Pharmacy : Monday -Thursday 8 am-8 pm; Friday 8 am-6 pm; Saturday and Sunday 9 am-5 pm.     Clinic: [phone]   Pharmacy: [phone]

## 2019-06-20 ENCOUNTER — ANCILLARY PROCEDURE (OUTPATIENT)
Dept: CT IMAGING | Facility: CLINIC | Age: 59
End: 2019-06-20
Attending: INTERNAL MEDICINE
Payer: COMMERCIAL

## 2019-06-20 DIAGNOSIS — R51.9 LEFT-SIDED HEADACHE: ICD-10-CM

## 2019-06-20 PROCEDURE — 70450 CT HEAD/BRAIN W/O DYE: CPT | Performed by: RADIOLOGY

## 2019-07-17 ENCOUNTER — SURGERY (OUTPATIENT)
Age: 59
End: 2019-07-17
Payer: COMMERCIAL

## 2019-07-17 ENCOUNTER — HOSPITAL ENCOUNTER (OUTPATIENT)
Facility: AMBULATORY SURGERY CENTER | Age: 59
Discharge: HOME | End: 2019-07-17
Attending: SURGERY | Admitting: SURGERY
Payer: COMMERCIAL

## 2019-07-17 VITALS
HEART RATE: 69 BPM | DIASTOLIC BLOOD PRESSURE: 75 MMHG | OXYGEN SATURATION: 95 % | RESPIRATION RATE: 16 BRPM | TEMPERATURE: 97.6 F | SYSTOLIC BLOOD PRESSURE: 142 MMHG

## 2019-07-17 PROBLEM — K64.8 INTERNAL HEMORRHOIDS: Status: ACTIVE | Noted: 2019-07-17

## 2019-07-17 PROBLEM — K57.30 DIVERTICULOSIS OF LARGE INTESTINE: Status: ACTIVE | Noted: 2019-07-17

## 2019-07-17 LAB — COLONOSCOPY: NORMAL

## 2019-07-17 PROCEDURE — G8907 PT DOC NO EVENTS ON DISCHARG: HCPCS

## 2019-07-17 PROCEDURE — G8918 PT W/O PREOP ORDER IV AB PRO: HCPCS

## 2019-07-17 PROCEDURE — 45378 DIAGNOSTIC COLONOSCOPY: CPT | Performed by: INTERNAL MEDICINE

## 2019-07-17 PROCEDURE — 45378 DIAGNOSTIC COLONOSCOPY: CPT

## 2019-07-17 RX ORDER — LIDOCAINE 40 MG/G
CREAM TOPICAL
Status: DISCONTINUED | OUTPATIENT
Start: 2019-07-17 | End: 2019-07-18 | Stop reason: HOSPADM

## 2019-07-17 RX ORDER — FENTANYL CITRATE 50 UG/ML
INJECTION, SOLUTION INTRAMUSCULAR; INTRAVENOUS PRN
Status: DISCONTINUED | OUTPATIENT
Start: 2019-07-17 | End: 2019-07-17 | Stop reason: HOSPADM

## 2019-07-17 RX ORDER — ONDANSETRON 2 MG/ML
4 INJECTION INTRAMUSCULAR; INTRAVENOUS
Status: DISCONTINUED | OUTPATIENT
Start: 2019-07-17 | End: 2019-07-18 | Stop reason: HOSPADM

## 2019-07-17 RX ADMIN — FENTANYL CITRATE 25 MCG: 50 INJECTION, SOLUTION INTRAMUSCULAR; INTRAVENOUS at 08:48

## 2019-07-17 RX ADMIN — FENTANYL CITRATE 50 MCG: 50 INJECTION, SOLUTION INTRAMUSCULAR; INTRAVENOUS at 08:43

## 2019-07-17 RX ADMIN — FENTANYL CITRATE 25 MCG: 50 INJECTION, SOLUTION INTRAMUSCULAR; INTRAVENOUS at 08:57

## 2019-07-17 NOTE — LETTER
July 17, 2019      Geovanni Jasso  [address]  [place] [address]        Mr. Jasso,                        Your colonoscopy shows no evidence of any malignancy nor polyps. It mainly shows diverticulosis and internal hemorrhoids. Best way to avoid complications from either diverticulosis nor hemorrhoids is to increase oral intake of fiber. For any questions, you may call my office at [phone].     Sincerely,     Paddy Holly MD   Internal Medicine     Resulted Orders   COLONOSCOPY   Result Value Ref Range    COLONOSCOPY       [facility]  Endoscopy Department-[place]  _______________________________________________________________________________  Patient Name: Geovanni Jasso             Procedure Date: 7/17/2019 8:35 AM  MRN: [id number]                       YOB: 1960  Admit Type: Outpatient                Age: 58  Gender: Male                          Note Status: Finalized  Attending MD: Jaison Hammer MD  Instrument Name: [id number]  _______________________________________________________________________________     Procedure:                Colonoscopy  Indications:              Screening for colorectal malignant neoplasm, Last                             colonoscopy: 2008  Providers:                Jaison Hammer MD, Corinne Jerez RN  Referring MD:             Paddy Holly MD  Medicines:                Fentanyl 100 micrograms IV, Midazolam 4 mg IV  Complications:            No immediate complications.  ______________________________________ _________________________________________  Procedure:                Pre-Anesthesia Assessment:                            - Prior to the procedure, a History and Physical                             was performed, and patient medications and                             allergies were reviewed. The patient is competent.                             The risks and benefits of the procedure  and the                             sedation options and risks were discussed with the                             patient. All questions were answered and informed                             consent was obtained. Patient identification and                             proposed procedure were verified by the physician                             and the nurse in the pre-procedure area in the                             endoscopy suite. Mental Status Examination: alert                             and oriented. Airway Examination: normal                             oropharyngeal airway and neck mobility a nd                             Mallampati Class I (tonsillar pillars visualized).                             Respiratory Examination: clear to auscultation. CV                             Examination: normal. Prophylactic Antibiotics: The                             patient does not require prophylactic antibiotics.                             Prior Anticoagulants: The patient has taken no                             previous anticoagulant or antiplatelet agents. ASA                             Grade Assessment: II - A patient with mild systemic                             disease. After reviewing the risks and benefits,                             the patient was deemed in satisfactory condition to                             undergo the procedure. The anesthesia plan was to                             use moderate sedation / analgesia (conscious                             sedation). Immediately prior to administration of                             medications, the patient was re-a ssessed for                             adequacy to receive sedatives. The heart rate,                             respiratory rate, oxygen saturations, blood                             pressure, adequacy of pulmonary ventilation, and                             response to care were monitored throughout the                              procedure. The physical status of the patient was                             re-assessed after the procedure.                            After obtaining informed consent, the colonoscope                             was passed under direct vision. Throughout the                             procedure, the patient's blood pressure, pulse, and                             oxygen saturations were monitored continuously. The                             Colonoscope was introduced through the anus and                             advanced to the terminal ileum, with identification                             of the appendiceal orifice and IC valve. The                              colonoscopy was performed without difficulty. The                             patient tolerated the procedure well. The quality                             of the bowel preparation was evaluated using the                             BBPS (Temple Bowel Preparation Scale) with scores                             of: Right Colon = 2 (minor amount of residual                             staining, small fragments of stool and/or opaque                             liquid, but mucosa seen well), Transverse Colon = 2                             (minor amount of residual staining, small fragments                             of stool and/or opaque liquid, but mucosa seen                             well) and Left Colon = 2 (minor amount of residual                             staining, small fragments of stool and/or opaque                             liquid, but mucosa seen well). The total BBPS score                             equals 6. The quality of the bowel preparation w as                             good. Scope insertion time was 9 minutes. Scope                             withdrawal time was 14 minutes.                                                                                   Findings:       The perianal and digital rectal examinations  were normal.       The terminal ileum appeared normal.       Many small and large-mouthed diverticula were found in the sigmoid colon        and descending colon.       Non-bleeding internal hemorrhoids were found during retroflexion. The        hemorrhoids were small and Grade I (internal hemorrhoids that do not        prolapse).       There is no endoscopic evidence of mass or polyps in the entire colon.       No other significant abnormalities were identified in a careful        examination of the remainder of the colon.                                                                                   Moderate Sedation:       Moderate (conscious) sedation was administered by the endoscopy nurse        and  supervised by the endoscopist. The patient's oxygen saturation,        heart rate, blood pressure and response to care were monitored. Total        physician intraservice time was 26 minutes.  Impression:               - The examined portion of the ileum was normal.                            - Diverticulosis in the sigmoid colon and in the                             descending colon.                            - Non-bleeding internal hemorrhoids.                            - No specimens collected.                            - No polyps on this exam.  Recommendation:           - Discharge patient to home (ambulatory).                            - Patient has a contact number available for                             emergencies. The signs and symptoms of potential                             delayed complications were discussed with the                             patient. Return to normal activities tomorrow.                             Written discharge instructions were provided t o the                             patient.                            - Resume aspirin at prior dose today.                            - Repeat colonoscopy in 10 years for screening                             purposes.                             - Return to referring physician.                            - Use Citrucel one tablespoon PO daily.                                                                                       ________________________  Jaison Hammer MD  7/17/2019 9:15:42 AM  I was physically present for the entire viewing portion of the exam.Jaison Hammer MD  Number of Addenda: 0    Note Initiated On: 7/17/2019 8:35 AM  Scope In:  Scope Out:

## 2019-09-16 ENCOUNTER — OFFICE VISIT (OUTPATIENT)
Dept: NEUROLOGY | Facility: CLINIC | Age: 59
End: 2019-09-16
Attending: INTERNAL MEDICINE
Payer: COMMERCIAL

## 2019-09-16 DIAGNOSIS — M79.644 PAIN IN FINGER OF BOTH HANDS: Primary | ICD-10-CM

## 2019-09-16 DIAGNOSIS — G56.11 RIGHT MEDIAN NERVE NEUROPATHY: ICD-10-CM

## 2019-09-16 DIAGNOSIS — M79.645 PAIN IN FINGER OF BOTH HANDS: Primary | ICD-10-CM

## 2019-09-16 PROCEDURE — 95885 MUSC TST DONE W/NERV TST LIM: CPT | Performed by: PSYCHIATRY & NEUROLOGY

## 2019-09-16 PROCEDURE — 95910 NRV CNDJ TEST 7-8 STUDIES: CPT | Performed by: PSYCHIATRY & NEUROLOGY

## 2019-09-16 NOTE — LETTER
9/16/2019         RE: Geovanni Jasso  [address]  [place] [address]        Dear Colleague,    Thank you for referring your patient, Geovanni Jasso, to the [facility]. Please see a copy of my visit note below.    Nerve Conduction & EMG Report          Patient:       Geovanni Jasso  Patient ID:    [id number]  Gender:        Male  YOB: 1960  Age:           59 Years 0 Months      History and Examination:  Geovanni Jasso is a 59 year old man who reports hand pain and difficulty flexing or extending the fingers of both hands upon awakening. He is referred for evaluation of possible median neuropathy at the wrist.      Techniques:  Motor conduction studies were done with surface recording electrodes. Sensory conduction studies were performed with surface electrodes, unless indicated otherwise by (n), designating the use of subdermal recording electrodes. Temperature was monitored and recorded throughout the study. Upper extremities were maintained at a temperature of 32 degrees Centigrade or higher. Limited EMG was done with a concentric needle electrode for the purpose of screening for myotonic potentials or features of excess motor unit excitability.      Results:  Right median and ulnar motor and sensory conduction studies were normal. A right radial sensory conduction study was normal. Screening electromyography was normal.      Interpretation:  This is a normal study. In particular, there is no electrophysiologic evidence of median neuropathy at the wrist, other entrapment neuropathy, polyneuropathy, or needle electromyographic findings that might result in difficulty relaxing striated muscle.       Laron Mcduffie MD          Sensory NCS      Nerve / Sites Rec. Site Onset Peak Ref. NP Amp Ref. PP Amp Dist Kar Ref. Temp     ms ms ms  V  V  V cm m/s m/s  C   R MEDIAN - Dig II      Dig II Wrist 2.40 3.02  11.2 10.0 14.8 14 58.4 48.0 34.3   R ULNAR - Dig V      Dig V Wrist  2.24 2.86  8.4 8.0 6.1 12.5 55.8 48.0 34.1   R RADIAL - Snuff      Forearm Snuff 2.14 2.66  23.8 15.0 27.5 12.5 58.5 48.0 34.1   R MEDIAN - Ulnar - Palmar      Median Wrist 1.20 1.67 2.40 48.5  78.0 8 66.8  34.1      Ulnar Wrist 1.35 1.88 2.40 13.0  18.6 8 59.1  34.1       Motor NCS      Nerve / Sites Rec. Site Lat Ref. Amp Ref. Rel Amp Dist Kar Ref. Dur. Area Temp.     ms ms mV mV % cm m/s m/s ms %  C   R MEDIAN - APB      Wrist APB 3.23 4.40 7.1 5.0 100 8   5.68 100 34.1      Elbow APB 7.45  6.9  97.2 23 54.5 48.0 6.41 103 34.1   R ULNAR - ADM      Wrist ADM 2.60 3.50 13.2 5.0 100 8   6.30 100 33.9      B.Elbow ADM 6.61  12.2  92.8 22 54.9 48.0 6.25 102 33.8      A.Elbow ADM 8.28  12.1  91.6 9 54.0 48.0 6.72 92.9 33.8   R MEDIAN - II Lumb      Median II Lumb 2.86  3.1  100 10   6.67 100 32.7      Ulnar Palm Int 2.50  4.4  143 10   5.36 95 33       F  Wave      Nerve Min F Lat Max F Lat Mean FLat Temp.    ms ms ms  C   R ULNAR 28.33 29.58 29.10 33.8       EMG Summary Table     Spontaneous MUAP Recruitment    IA Fib PSW Fasc H.F. Amp Dur. PPP Pattern   R. FIRST D INTEROSS N None None None None N N N N   R. EXT DIG COMM N None None None None N N N N   R. PRON TERES N None None None None N N N N                            Again, thank you for allowing me to participate in the care of your patient.        Sincerely,        Laron Mcduffie MD

## 2019-09-16 NOTE — PROGRESS NOTES
Nerve Conduction & EMG Report          Patient:       Geovanni Jasso  Patient ID:    [id number]  Gender:        Male  YOB: 1960  Age:           59 Years 0 Months      History and Examination:  Geovanni Jasso is a 59 year old man who reports hand pain and difficulty flexing or extending the fingers of both hands upon awakening. He is referred for evaluation of possible median neuropathy at the wrist.      Techniques:  Motor conduction studies were done with surface recording electrodes. Sensory conduction studies were performed with surface electrodes, unless indicated otherwise by (n), designating the use of subdermal recording electrodes. Temperature was monitored and recorded throughout the study. Upper extremities were maintained at a temperature of 32 degrees Centigrade or higher. Limited EMG was done with a concentric needle electrode for the purpose of screening for myotonic potentials or features of excess motor unit excitability.      Results:  Right median and ulnar motor and sensory conduction studies were normal. A right radial sensory conduction study was normal. Screening electromyography was normal.      Interpretation:  This is a normal study. In particular, there is no electrophysiologic evidence of median neuropathy at the wrist, other entrapment neuropathy, polyneuropathy, or needle electromyographic findings that might result in difficulty relaxing striated muscle.       Laron Mcduffie MD          Sensory NCS      Nerve / Sites Rec. Site Onset Peak Ref. NP Amp Ref. PP Amp Dist Kar Ref. Temp     ms ms ms  V  V  V cm m/s m/s  C   R MEDIAN - Dig II      Dig II Wrist 2.40 3.02  11.2 10.0 14.8 14 58.4 48.0 34.3   R ULNAR - Dig V      Dig V Wrist 2.24 2.86  8.4 8.0 6.1 12.5 55.8 48.0 34.1   R RADIAL - Snuff      Forearm Snuff 2.14 2.66  23.8 15.0 27.5 12.5 58.5 48.0 34.1   R MEDIAN - Ulnar - Palmar      Median Wrist 1.20 1.67 2.40 48.5  78.0 8 66.8  34.1      Ulnar Wrist 1.35 1.88 2.40 13.0  18.6 8  59.1  34.1       Motor NCS      Nerve / Sites Rec. Site Lat Ref. Amp Ref. Rel Amp Dist Kar Ref. Dur. Area Temp.     ms ms mV mV % cm m/s m/s ms %  C   R MEDIAN - APB      Wrist APB 3.23 4.40 7.1 5.0 100 8   5.68 100 34.1      Elbow APB 7.45  6.9  97.2 23 54.5 48.0 6.41 103 34.1   R ULNAR - ADM      Wrist ADM 2.60 3.50 13.2 5.0 100 8   6.30 100 33.9      B.Elbow ADM 6.61  12.2  92.8 22 54.9 48.0 6.25 102 33.8      A.Elbow ADM 8.28  12.1  91.6 9 54.0 48.0 6.72 92.9 33.8   R MEDIAN - II Lumb      Median II Lumb 2.86  3.1  100 10   6.67 100 32.7      Ulnar Palm Int 2.50  4.4  143 10   5.36 95 33       F  Wave      Nerve Min F Lat Max F Lat Mean FLat Temp.    ms ms ms  C   R ULNAR 28.33 29.58 29.10 33.8       EMG Summary Table     Spontaneous MUAP Recruitment    IA Fib PSW Fasc H.F. Amp Dur. PPP Pattern   R. FIRST D INTEROSS N None None None None N N N N   R. EXT DIG COMM N None None None None N N N N   R. PRON TERES N None None None None N N N N

## 2019-09-20 ENCOUNTER — MYC MEDICAL ADVICE (OUTPATIENT)
Dept: FAMILY MEDICINE | Facility: CLINIC | Age: 59
End: 2019-09-20

## 2019-09-20 DIAGNOSIS — R76.8 POSITIVE ANA (ANTINUCLEAR ANTIBODY): ICD-10-CM

## 2019-09-20 DIAGNOSIS — M25.541 ARTHRALGIA OF BOTH HANDS: Primary | ICD-10-CM

## 2019-09-20 DIAGNOSIS — M25.542 ARTHRALGIA OF BOTH HANDS: Primary | ICD-10-CM

## 2019-10-01 ENCOUNTER — HEALTH MAINTENANCE LETTER (OUTPATIENT)
Age: 59
End: 2019-10-01

## 2019-10-23 ENCOUNTER — ANCILLARY PROCEDURE (OUTPATIENT)
Dept: MRI IMAGING | Facility: CLINIC | Age: 59
End: 2019-10-23
Attending: INTERNAL MEDICINE
Payer: COMMERCIAL

## 2019-10-23 DIAGNOSIS — M25.541 ARTHRALGIA OF HANDS, BILATERAL: Primary | ICD-10-CM

## 2019-10-23 DIAGNOSIS — M25.542 ARTHRALGIA OF HANDS, BILATERAL: Primary | ICD-10-CM

## 2019-10-23 DIAGNOSIS — M25.542 ARTHRALGIA OF BOTH HANDS: ICD-10-CM

## 2019-10-23 DIAGNOSIS — R76.8 POSITIVE ANA (ANTINUCLEAR ANTIBODY): ICD-10-CM

## 2019-10-23 DIAGNOSIS — M25.541 ARTHRALGIA OF BOTH HANDS: ICD-10-CM

## 2019-10-23 PROCEDURE — 73221 MRI JOINT UPR EXTREM W/O DYE: CPT | Mod: LT | Performed by: RADIOLOGY

## 2019-10-23 PROCEDURE — 73221 MRI JOINT UPR EXTREM W/O DYE: CPT | Mod: RT | Performed by: RADIOLOGY

## 2019-11-21 ENCOUNTER — OFFICE VISIT (OUTPATIENT)
Dept: FAMILY MEDICINE | Facility: CLINIC | Age: 59
End: 2019-11-21
Payer: COMMERCIAL

## 2019-11-21 VITALS
HEIGHT: 71 IN | OXYGEN SATURATION: 98 % | SYSTOLIC BLOOD PRESSURE: 118 MMHG | HEART RATE: 66 BPM | WEIGHT: 201 LBS | TEMPERATURE: 98 F | BODY MASS INDEX: 28.14 KG/M2 | DIASTOLIC BLOOD PRESSURE: 77 MMHG | RESPIRATION RATE: 16 BRPM

## 2019-11-21 DIAGNOSIS — J01.80 OTHER ACUTE SINUSITIS, RECURRENCE NOT SPECIFIED: Primary | ICD-10-CM

## 2019-11-21 DIAGNOSIS — M77.8 RIGHT SHOULDER TENDONITIS: ICD-10-CM

## 2019-11-21 DIAGNOSIS — L30.9 DERMATITIS: ICD-10-CM

## 2019-11-21 PROCEDURE — 99214 OFFICE O/P EST MOD 30 MIN: CPT | Performed by: INTERNAL MEDICINE

## 2019-11-21 RX ORDER — NAPROXEN 250 MG/1
250 TABLET ORAL 2 TIMES DAILY WITH MEALS
Qty: 20 TABLET | Refills: 2 | Status: SHIPPED | OUTPATIENT
Start: 2019-11-21 | End: 2019-12-01

## 2019-11-21 RX ORDER — FEXOFENADINE HCL 60 MG/1
60 TABLET, FILM COATED ORAL 2 TIMES DAILY
Qty: 20 TABLET | Refills: 1 | Status: SHIPPED | OUTPATIENT
Start: 2019-11-21 | End: 2020-05-04

## 2019-11-21 RX ORDER — NYSTATIN AND TRIAMCINOLONE ACETONIDE 100000; 1 [USP'U]/G; MG/G
CREAM TOPICAL 2 TIMES DAILY
Qty: 30 G | Refills: 5 | Status: SHIPPED | OUTPATIENT
Start: 2019-11-21 | End: 2023-02-14

## 2019-11-21 RX ORDER — DOXYCYCLINE 100 MG/1
100 CAPSULE ORAL 2 TIMES DAILY
Qty: 20 CAPSULE | Refills: 1 | Status: SHIPPED | OUTPATIENT
Start: 2019-11-21 | End: 2020-05-04

## 2019-11-21 ASSESSMENT — MIFFLIN-ST. JEOR: SCORE: 1738.86

## 2019-11-21 ASSESSMENT — PAIN SCALES - GENERAL: PAINLEVEL: NO PAIN (0)

## 2019-11-21 NOTE — PATIENT INSTRUCTIONS
At [facility], we strive to deliver an exceptional experience to you, every time we see you.  If you receive a survey in the mail, please send us back your thoughts. We really do value your feedback.    Based on your medical history, these are the current health maintenance/preventive care services that you are due for (some may have been done at this visit.)  Health Maintenance Due   Topic Date Due     ADVANCE CARE PLANNING  1960     HIV SCREENING  09/09/1973     ZOSTER IMMUNIZATION (1 of 2) 09/09/2008     INFLUENZA VACCINE (1) 09/01/2019     PREVENTIVE CARE VISIT  12/13/2019         Suggested websites for health information:  Www.[employer].org : Up to date and easily searchable information on multiple topics.  Www.medlineplus.gov : medication info, interactive tutorials, watch real surgeries online  Www.familydoctor.org : good info from the Academy of Family Physicians  Www.cdc.gov : public health info, travel advisories, epidemics (H1N1)  Www.aap.org : children's health info, normal development, vaccinations  Www.health.[facility].[place].us : [place] dept of health, public health issues in [place], N1N1    Your care team:                            Family Medicine Internal Medicine   MD Paddy Merrill MD Shantel Branch-Fleming, MD Katya Georgiev PA-C Nam Ho, MD Pediatrics   JAGUAR Berman, GLADYS Brooke APRMD Dedra Zhao CNP, MD Deborah Mielke, MD Kim Thein, APRN [facility]      Clinic hours: Monday - Thursday 7 am-7 pm; Fridays 7 am-5 pm.   Urgent care: Monday - Friday 11 am-9 pm; Saturday and Sunday 9 am-5 pm.  Pharmacy : Monday -Thursday 8 am-8 pm; Friday 8 am-6 pm; Saturday and Sunday 9 am-5 pm.     Clinic: [phone]   Pharmacy: [phone]

## 2019-11-21 NOTE — PROGRESS NOTES
Subjective     Geovanni Jasso is a 59 year old male who presents to clinic today for the following health issues:    HPI   Acute Illness   Acute illness concerns: Sinus Problem  Onset: 2-3 weeks ago    Fever: no     Chills/Sweats: YES- chills    Headache (location?): YES    Sinus Pressure:YES    Conjunctivitis:  no    Ear Pain: no    Rhinorrhea: YES    Congestion: YES    Sore Throat: no      Cough: no    Wheeze: no     Decreased Appetite: YES    Nausea: no     Vomiting: no    Diarrhea:  no    Dysuria/Freq.: no    Fatigue/Achiness: YES    Sick/Strep Exposure: no      Therapies Tried and outcome: Advil and Tylenol: relieved headache        Patient Active Problem List   Diagnosis     Esophageal reflux     Hyperlipidemia LDL goal <70     Chronic rhinitis     Bilateral low back pain without sciatica     Chondromalacia patellae, right     S/P ACL reconstruction     Atherosclerosis of native coronary artery of native heart without angina pectoris     SOB (shortness of breath)     Non-allergic rhinitis     Positive CLAIRE (antinuclear antibody)     Hand joint stiff, right     Carpal tunnel syndrome     Osteoarthritis of knee, unspecified laterality, unspecified osteoarthritis type     Diverticulosis of large intestine     Internal hemorrhoids     Past Surgical History:   Procedure Laterality Date     COLONOSCOPY WITH CO2 INSUFFLATION N/A 7/17/2019    Procedure: COLONOSCOPY, WITH CO2 INSUFFLATION;  Surgeon: Jaison Hammer MD;  Location: [facility] OR     NO HISTORY OF SURGERY         Social History     Tobacco Use     Smoking status: Never Smoker     Smokeless tobacco: Never Used   Substance Use Topics     Alcohol use: Yes     Comment: occasionally     Family History   Problem Relation Age of Onset     Autoimmune Disease No family hx of          Allergies   Allergen Reactions     Penicillins      Recent Labs   Lab Test 12/12/18  0728 11/12/18  1215 10/30/17  0820 11/30/16  0850   *  --  146* 158*   HDL 37*  --  37*  "33*   TRIG 148  --  139 175*   TSH  --  2.01  --   --       BP Readings from Last 3 Encounters:   11/21/19 118/77   07/17/19 142/75   06/18/19 112/76    Wt Readings from Last 3 Encounters:   11/21/19 91.2 kg (201 lb)   06/18/19 90.3 kg (199 lb)   01/11/19 86.6 kg (191 lb)                      Reviewed and updated as needed this visit by Provider         Review of Systems   ROS COMP: CONSTITUTIONAL: NEGATIVE for fever, chills, change in weight  INTEGUMENTARY/SKIN: POSITIVE for genital skin changes  EYES: NEGATIVE for vision changes or irritation  ENT/MOUTH: NEGATIVE for ear, mouth and throat problems  RESP: NEGATIVE for significant cough or SOB  CV: NEGATIVE for chest pain, palpitations or peripheral edema  GI: NEGATIVE for nausea, abdominal pain, heartburn, or change in bowel habits  : NEGATIVE for frequency, dysuria, or hematuria  MUSCULOSKELETAL: POSITIVE for right shoulder pain after performing chores at home with limited range of motion.  NEURO: NEGATIVE for weakness, dizziness or paresthesias  ENDOCRINE: NEGATIVE for temperature intolerance, skin/hair changes  HEME: NEGATIVE for bleeding problems  PSYCHIATRIC: NEGATIVE for changes in mood or affect      Objective    /77 (BP Location: Right arm, Patient Position: Chair, Cuff Size: Adult Large)   Pulse 66   Temp 98  F (36.7  C) (Oral)   Resp 16   Ht 1.803 m (5' 11\")   Wt 91.2 kg (201 lb)   SpO2 98%   BMI 28.03 kg/m    Body mass index is 28.03 kg/m .  Physical Exam   GENERAL: healthy, alert and no distress  EYES: Eyes grossly normal to inspection, PERRL and conjunctivae and sclerae normal  HENT: ear canals and TM's normal, nose and mouth without ulcers or lesions  NECK: no adenopathy, no asymmetry, masses, or scars and thyroid normal to palpation  RESP: lungs clear to auscultation - no rales, rhonchi or wheezes  CV: regular rate and rhythm, normal S1 S2, no S3 or S4, no murmur, click or rub, no peripheral edema and peripheral pulses strong  MS: " "Tenderness on palpation of right shoulder  SKIN: no suspicious lesions or rashes  NEURO: Normal strength and tone, mentation intact and speech normal  PSYCH: mentation appears normal, affect normal/bright    Diagnostic Test Results:  No results found for any visits on 11/21/19.        Assessment & Plan     1. Other acute sinusitis, recurrence not specified  Precipitating factor includes non-allergic rhinitis.  - fexofenadine (ALLEGRA) 60 MG tablet; Take 1 tablet (60 mg) by mouth 2 times daily for 10 days  Dispense: 20 tablet; Refill: 1  - naproxen (NAPROSYN) 250 MG tablet; Take 1 tablet (250 mg) by mouth 2 times daily (with meals) for 10 days  Dispense: 20 tablet; Refill: 2  - doxycycline monohydrate (MONODOX) 100 MG capsule; Take 1 capsule (100 mg) by mouth 2 times daily for 10 days  Dispense: 20 capsule; Refill: 1    2. Right shoulder tendonitis  Associated with limited range of motion.  - naproxen (NAPROSYN) 250 MG tablet; Take 1 tablet (250 mg) by mouth 2 times daily (with meals) for 10 days  Dispense: 20 tablet; Refill: 2    3. Dermatitis  Suspected to be fungal infection affecting genitals.  - nystatin-triamcinolone (MYCOLOG II) 275018-2.1 UNIT/GM-% external cream; Apply topically 2 times daily  Dispense: 30 g; Refill: 5     BMI:   Estimated body mass index is 28.03 kg/m  as calculated from the following:    Height as of this encounter: 1.803 m (5' 11\").    Weight as of this encounter: 91.2 kg (201 lb).   Weight management plan: diet and exercise.        FUTURE APPOINTMENTS:       - Follow-up visit in 4 weeks.      Paddy Holly MD  [facility]      "

## 2019-12-10 DIAGNOSIS — K21.9 GASTROESOPHAGEAL REFLUX DISEASE, ESOPHAGITIS PRESENCE NOT SPECIFIED: ICD-10-CM

## 2019-12-10 NOTE — TELEPHONE ENCOUNTER
"Requested Prescriptions   Pending Prescriptions Disp Refills     ranitidine (ZANTAC) 300 MG tablet [Pharmacy Med Name: raNITIdine HCl Oral Tablet 300 MG]  Last Written Prescription Date:  12/13/18  Last Fill Quantity: 90,  # refills: 3   Last Office Visit with G, P or [facility] prescribing provider:  11/21/19-Vocal   Future Office Visit:    90 tablet 2     Sig: Take 1 tablet (300 mg) by mouth At Bedtime       H2 Blockers Protocol Passed - 12/10/2019  7:03 AM        Passed - Patient is age 12 or older        Passed - Recent (12 mo) or future (30 days) visit within the authorizing provider's specialty     Patient has had an office visit with the authorizing provider or a provider within the authorizing providers department within the previous 12 mos or has a future within next 30 days. See \"Patient Info\" tab in inbasket, or \"Choose Columns\" in Meds & Orders section of the refill encounter.              Passed - Medication is active on med list          "

## 2019-12-12 NOTE — TELEPHONE ENCOUNTER
Prescription approved per [facility] Refill Protocol.    Veronica Stephens RN  [facility]

## 2020-03-08 DIAGNOSIS — K21.9 GASTROESOPHAGEAL REFLUX DISEASE, ESOPHAGITIS PRESENCE NOT SPECIFIED: ICD-10-CM

## 2020-03-08 NOTE — TELEPHONE ENCOUNTER
"Requested Prescriptions   Pending Prescriptions Disp Refills     ranitidine (ZANTAC) 300 MG tablet [Pharmacy Med Name: raNITIdine HCl Oral Tablet 300 MG] 90 tablet 0     Sig: Take 1 tablet (300 mg) by mouth At Bedtime       H2 Blockers Protocol Passed - 3/8/2020  7:05 AM        Passed - Patient is age 12 or older        Passed - Recent (12 mo) or future (30 days) visit within the authorizing provider's specialty     Patient has had an office visit with the authorizing provider or a provider within the authorizing providers department within the previous 12 mos or has a future within next 30 days. See \"Patient Info\" tab in inbasket, or \"Choose Columns\" in Meds & Orders section of the refill encounter.              Passed - Medication is active on med list           Last Written Prescription Date:  12/12/19  Last Fill Quantity: 90,  # refills: 0   Last office visit: 11/21/2019 with prescribing provider:     Future Office Visit:      "

## 2020-03-10 NOTE — TELEPHONE ENCOUNTER
Prescription approved per [facility] Refill Protocol.  Raegan Salas RN  [facility] / [facility]

## 2020-03-22 ENCOUNTER — E-VISIT (OUTPATIENT)
Dept: FAMILY MEDICINE | Facility: CLINIC | Age: 60
End: 2020-03-22
Payer: COMMERCIAL

## 2020-03-22 ENCOUNTER — HEALTH MAINTENANCE LETTER (OUTPATIENT)
Age: 60
End: 2020-03-22

## 2020-03-22 DIAGNOSIS — J01.80 OTHER ACUTE SINUSITIS, RECURRENCE NOT SPECIFIED: Primary | ICD-10-CM

## 2020-03-22 PROCEDURE — 99421 OL DIG E/M SVC 5-10 MIN: CPT | Mod: 95 | Performed by: INTERNAL MEDICINE

## 2020-03-22 RX ORDER — CEFDINIR 300 MG/1
300 CAPSULE ORAL 2 TIMES DAILY
Qty: 20 CAPSULE | Refills: 1 | Status: SHIPPED | OUTPATIENT
Start: 2020-03-22 | End: 2020-05-04

## 2020-03-22 RX ORDER — PREDNISONE 20 MG/1
40 TABLET ORAL DAILY
Qty: 14 TABLET | Refills: 1 | Status: SHIPPED | OUTPATIENT
Start: 2020-03-22 | End: 2020-03-27

## 2020-05-04 ENCOUNTER — TELEPHONE (OUTPATIENT)
Dept: FAMILY MEDICINE | Facility: CLINIC | Age: 60
End: 2020-05-04

## 2020-05-04 DIAGNOSIS — K21.9 GASTROESOPHAGEAL REFLUX DISEASE WITHOUT ESOPHAGITIS: Primary | ICD-10-CM

## 2020-05-04 DIAGNOSIS — K21.9 GASTROESOPHAGEAL REFLUX DISEASE, ESOPHAGITIS PRESENCE NOT SPECIFIED: ICD-10-CM

## 2020-05-04 RX ORDER — FAMOTIDINE 40 MG/1
40 TABLET, FILM COATED ORAL
Qty: 90 TABLET | Refills: 0 | Status: SHIPPED | OUTPATIENT
Start: 2020-05-04 | End: 2021-08-20

## 2020-05-04 NOTE — TELEPHONE ENCOUNTER
ranitidine (ZANTAC) 300 MG tablet  90 tablet  1  3/10/2020     This med has been recalled, please send alternative.

## 2020-09-03 ENCOUNTER — E-VISIT (OUTPATIENT)
Dept: FAMILY MEDICINE | Facility: CLINIC | Age: 60
End: 2020-09-03
Payer: COMMERCIAL

## 2020-09-03 DIAGNOSIS — R21 RASH AND NONSPECIFIC SKIN ERUPTION: Primary | ICD-10-CM

## 2020-09-03 PROCEDURE — 99421 OL DIG E/M SVC 5-10 MIN: CPT | Performed by: INTERNAL MEDICINE

## 2020-09-03 RX ORDER — TRIAMCINOLONE ACETONIDE 1 MG/G
OINTMENT TOPICAL 2 TIMES DAILY
Qty: 30 G | Refills: 2 | Status: SHIPPED | OUTPATIENT
Start: 2020-09-03 | End: 2020-09-17

## 2020-10-20 NOTE — ASSESSMENT & PLAN NOTE
History of GERD on omeprazole 20 mill grams by mouth daily.  Increased throat clearing and mucus in the back of his throat after eating.    -Increase omeprazole to 40 mg by mouth daily.   PATIENT CALLED AGAIN TO REQUEST UPDATE ON THIS MEDICATION.  HE STATES THEY ARE LEAVING ON A TRIP TO [place] TOMORROW EVENING, SO HE'S HOPING TO GET IT TAKEN CARE OF AS QUICKLY AS POSSIBLE.    BEST CALL BACK: [phone]    [facility]  [address]  [phone]

## 2020-12-07 ENCOUNTER — E-VISIT (OUTPATIENT)
Dept: FAMILY MEDICINE | Facility: CLINIC | Age: 60
End: 2020-12-07
Payer: COMMERCIAL

## 2020-12-07 DIAGNOSIS — K29.00 OTHER ACUTE GASTRITIS WITHOUT HEMORRHAGE: Primary | ICD-10-CM

## 2020-12-07 DIAGNOSIS — Z12.11 SCREEN FOR COLON CANCER: ICD-10-CM

## 2020-12-07 PROCEDURE — 99421 OL DIG E/M SVC 5-10 MIN: CPT | Performed by: INTERNAL MEDICINE

## 2020-12-08 RX ORDER — LANSOPRAZOLE 30 MG/1
30 CAPSULE, DELAYED RELEASE ORAL
Qty: 90 CAPSULE | Refills: 1 | Status: SHIPPED | OUTPATIENT
Start: 2020-12-08 | End: 2021-06-11

## 2020-12-09 ENCOUNTER — TELEPHONE (OUTPATIENT)
Dept: FAMILY MEDICINE | Facility: CLINIC | Age: 60
End: 2020-12-09

## 2020-12-09 NOTE — TELEPHONE ENCOUNTER
LANsoprazole (PREVACID) 30 MG DR capsule not covered for patient's age.    Plan does not cover this medication. Please call plan at [phone] to initiate prior authorization or call/fax pharmacy to change medication at [phone]. Patient ID # is [id number].        Mateusz Faarax  [facility]

## 2020-12-10 DIAGNOSIS — K29.00 OTHER ACUTE GASTRITIS WITHOUT HEMORRHAGE: ICD-10-CM

## 2020-12-10 PROCEDURE — 87338 HPYLORI STOOL AG IA: CPT | Performed by: INTERNAL MEDICINE

## 2020-12-11 LAB — H PYLORI AG STL QL IA: NEGATIVE

## 2020-12-13 RX ORDER — OMEPRAZOLE 40 MG/1
40 CAPSULE, DELAYED RELEASE ORAL DAILY
Qty: 90 CAPSULE | Refills: 1 | Status: SHIPPED | OUTPATIENT
Start: 2020-12-13 | End: 2023-02-14

## 2020-12-14 ENCOUNTER — APPOINTMENT (OUTPATIENT)
Dept: GENERAL RADIOLOGY | Facility: CLINIC | Age: 60
End: 2020-12-14
Attending: PHYSICIAN ASSISTANT
Payer: COMMERCIAL

## 2020-12-14 ENCOUNTER — HOSPITAL ENCOUNTER (EMERGENCY)
Facility: CLINIC | Age: 60
Discharge: HOME | End: 2020-12-14
Attending: PHYSICIAN ASSISTANT | Admitting: PHYSICIAN ASSISTANT
Payer: COMMERCIAL

## 2020-12-14 VITALS
SYSTOLIC BLOOD PRESSURE: 136 MMHG | DIASTOLIC BLOOD PRESSURE: 80 MMHG | HEIGHT: 71 IN | RESPIRATION RATE: 20 BRPM | HEART RATE: 66 BPM | BODY MASS INDEX: 27.3 KG/M2 | WEIGHT: 195 LBS | TEMPERATURE: 97.4 F | OXYGEN SATURATION: 97 %

## 2020-12-14 DIAGNOSIS — M25.511 RIGHT SHOULDER PAIN: ICD-10-CM

## 2020-12-14 PROCEDURE — G0463 HOSPITAL OUTPT CLINIC VISIT: HCPCS | Performed by: PHYSICIAN ASSISTANT

## 2020-12-14 PROCEDURE — 73030 X-RAY EXAM OF SHOULDER: CPT | Mod: RT

## 2020-12-14 PROCEDURE — 99214 OFFICE O/P EST MOD 30 MIN: CPT | Performed by: PHYSICIAN ASSISTANT

## 2020-12-14 ASSESSMENT — ENCOUNTER SYMPTOMS
HEADACHES: 0
NECK STIFFNESS: 0
EYE REDNESS: 0
DIFFICULTY URINATING: 0
SHORTNESS OF BREATH: 0
WEAKNESS: 0
CONFUSION: 0
FEVER: 0
ABDOMINAL PAIN: 0
NUMBNESS: 0
NECK PAIN: 0
COLOR CHANGE: 0

## 2020-12-14 ASSESSMENT — MIFFLIN-ST. JEOR: SCORE: 1706.64

## 2020-12-14 NOTE — ED AVS SNAPSHOT
[facility]  [address] [address]  Phone: [phone]  Fax: [phone]                                    Geovanni Jasso   MRN: [id number]    Department: [facility]   Date of Visit: 12/14/2020           After Visit Summary Signature Page    I have received my discharge instructions, and my questions have been answered. I have discussed any challenges I see with this plan with the nurse or doctor.    ..........................................................................................................................................  Patient/Patient Representative Signature      ..........................................................................................................................................  Patient Representative Print Name and Relationship to Patient    ..................................................               ................................................  Date                                   Time    ..........................................................................................................................................  Reviewed by Signature/Title    ...................................................              ..............................................  Date                                               Time          22EPIC Rev 08/18

## 2020-12-14 NOTE — DISCHARGE INSTRUCTIONS
Heat/ice, rest, tylenol over the counter as needed for pain.     Follow up with Orthopedic specialist for further evaluation and management.     Voltaren gel topically to the area as needed for pain.

## 2020-12-15 NOTE — ED PROVIDER NOTES
History     Chief Complaint   Patient presents with     Shoulder Pain     getting worse     HPI  Geovanni Jasso is a 60 year old male who presents today with right shoulder pain that has been ongoing over 1 year. Patient states he injured his right shoulder 1 year ago and has had pain on and off since, but over the past 1-1.5 months it has progressively worsened. Patient denies any new injury, swelling, bruising, redness, or decreased range of motion. Patient states he has had a harder time sleeping due to the pain over the past month. Patient would like repeat x-ray today.  Patient states he has not been taking anything consistently for his pain.     Allergies:  Allergies   Allergen Reactions     Penicillins        Problem List:    Patient Active Problem List    Diagnosis Date Noted     Diverticulosis of large intestine 07/17/2019     Priority: Medium     Internal hemorrhoids 07/17/2019     Priority: Medium     Osteoarthritis of knee, unspecified laterality, unspecified osteoarthritis type 02/25/2019     Priority: Medium     Carpal tunnel syndrome      Priority: Medium     Hand joint stiff, right      Priority: Medium     Positive CLAIRE (antinuclear antibody) 09/27/2018     Priority: Medium     SOB (shortness of breath) 08/31/2018     Priority: Medium     Non-allergic rhinitis 08/31/2018     Priority: Medium     Atherosclerosis of native coronary artery of native heart without angina pectoris 11/12/2017     Priority: Medium     Chondromalacia patellae, right 05/09/2017     Priority: Medium     S/P ACL reconstruction 05/09/2017     Priority: Medium     Bilateral low back pain without sciatica 05/07/2017     Priority: Medium     Chronic rhinitis 10/23/2015     Priority: Medium     Esophageal reflux 12/26/2013     Priority: Medium     Hyperlipidemia LDL goal <70 12/26/2013     Priority: Medium        Past Medical History:    Past Medical History:   Diagnosis Date     Carpal tunnel syndrome      Chronic rhinitis  "10/23/2015     Esophageal reflux 12/26/2013     Hand joint stiff, right        Past Surgical History:    Past Surgical History:   Procedure Laterality Date     COLONOSCOPY WITH CO2 INSUFFLATION N/A 7/17/2019    Procedure: COLONOSCOPY, WITH CO2 INSUFFLATION;  Surgeon: Jaison Hammer MD;  Location: [facility] OR     NO HISTORY OF SURGERY         Family History:    Family History   Problem Relation Age of Onset     Autoimmune Disease No family hx of        Social History:  Marital Status:   [2]  Social History     Tobacco Use     Smoking status: Never Smoker     Smokeless tobacco: Never Used   Substance Use Topics     Alcohol use: Yes     Comment: occasionally     Drug use: No        Medications:         diclofenac (VOLTAREN) 1 % topical gel       acetaminophen (TYLENOL) 325 MG tablet       ASPIRIN PO       famotidine (PEPCID) 40 MG tablet       fluticasone (FLONASE) 50 MCG/ACT nasal spray       ibuprofen (ADVIL,MOTRIN) 200 MG tablet       ipratropium (ATROVENT) 0.06 % spray       LANsoprazole (PREVACID) 30 MG DR capsule       nystatin-triamcinolone (MYCOLOG II) 263496-0.1 UNIT/GM-% external cream       omeprazole (PRILOSEC) 40 MG DR capsule          Review of Systems   Constitutional: Negative for fever.   HENT: Negative for congestion.    Eyes: Negative for redness.   Respiratory: Negative for shortness of breath.    Cardiovascular: Negative for chest pain.   Gastrointestinal: Negative for abdominal pain.   Genitourinary: Negative for difficulty urinating.   Musculoskeletal: Negative for neck pain and neck stiffness.        Right shoulder pain.    Skin: Negative.  Negative for color change.   Neurological: Negative for weakness, numbness and headaches.   Psychiatric/Behavioral: Negative for confusion.   All other systems reviewed and are negative.      Physical Exam   BP: 136/80  Pulse: 66  Temp: 97.4  F (36.3  C)  Resp: 20  Height: 180.3 cm (5' 11\")  Weight: 88.5 kg (195 lb)  SpO2: 97 %      Physical " Exam  Constitutional:       General: He is not in acute distress.     Appearance: Normal appearance. He is normal weight. He is not diaphoretic.   HENT:      Head: Atraumatic.   Eyes:      General: No scleral icterus.     Pupils: Pupils are equal, round, and reactive to light.   Cardiovascular:      Rate and Rhythm: Normal rate and regular rhythm.      Heart sounds: Normal heart sounds.   Pulmonary:      Effort: Pulmonary effort is normal. No respiratory distress.      Breath sounds: Normal breath sounds.   Musculoskeletal:         General: No tenderness.      Right shoulder: He exhibits decreased range of motion and pain. He exhibits no tenderness, no bony tenderness, no swelling, no effusion, no crepitus, no deformity, no laceration, no spasm, normal pulse and normal strength.      Comments: Positive painful range of motion with full abduction of arm. Patient has some limitations with lifting arm completely above head compared to left shoulder.  Patient neurovascularly intact bilateral upper extremity.  Muscle strength 5 out of 5 bilaterally throughout upper extremities.    Skin:     General: Skin is warm.      Capillary Refill: Capillary refill takes less than 2 seconds.      Findings: No bruising, erythema or rash.   Neurological:      General: No focal deficit present.      Mental Status: He is alert and oriented to person, place, and time.      Sensory: No sensory deficit.      Motor: No weakness.      Deep Tendon Reflexes: Reflexes normal.   Psychiatric:         Mood and Affect: Mood normal.         Behavior: Behavior normal.         Thought Content: Thought content normal.         Judgment: Judgment normal.         ED Course        Procedures              Critical Care time:  none                  Results for orders placed or performed during the hospital encounter of 12/14/20 (from the past 24 hour(s))   Shoulder XR, 2 view, right    Narrative    XR RIGHT SHOULDER TWO VIEWS 12/14/2020 2:03 PM     HISTORY:  Right shoulder injury 1 year ago with progressively worsening  pain to right shoulder over past 1.5 months.    COMPARISON: None.      Impression    IMPRESSION: Mild acromioclavicular degenerative changes. Glenohumeral  joint is unremarkable. No fracture or subluxation.    SUKI CORRALES MD       Medications - No data to display    Assessments & Plan (with Medical Decision Making)     I have reviewed the nursing notes.    I have reviewed the findings, diagnosis, plan and need for follow up with the patient.  Geovanni Jasso is a 60 year old male who presents today with right shoulder pain that has been ongoing over 1 year. Patient states he injured his right shoulder 1 year ago and has had pain on and off since, but over the past 1-1.5 months it has progressively worsened. Patient denies any new injury, swelling, bruising, redness, or decreased range of motion. Patient states he has had a harder time sleeping due to the pain over the past month. Patient would like repeat x-ray today. See exam findings above. X-ray obtained today and shows mild acromial clavicle degenerative changes.  Glenohumeral joint is unremarkable and no fracture or subluxation noted.  Patient given referral to orthopedic specialist and informed to take Tylenol over-the-counter for his pain and given prescription diclofenac to apply topically to see if this helps with the symptoms.  Patient return the emergency department if symptoms worsen or change these were discussed with patient and given a discharge paperwork.  Patient discharged in stable condition.  We did discuss that if symptoms persist he may want a follow-up with his primary care doctor or orthopedic specialist to see if physical therapy or MRI imaging would be beneficial.     Discharge Medication List as of 12/14/2020  2:59 PM      START taking these medications    Details   diclofenac (VOLTAREN) 1 % topical gel Apply small amount topically to right shoulder 3-4 times daily as needed  for pain., Disp-50 g, R-0, E-Prescribe             Final diagnoses:   Right shoulder pain - acute on chronic       12/14/2020   [facility] EMERGENCY DEPT     Yuko Bowling PA-C  12/14/20 1953

## 2020-12-28 ENCOUNTER — TELEPHONE (OUTPATIENT)
Dept: FAMILY MEDICINE | Facility: CLINIC | Age: 60
End: 2020-12-28

## 2020-12-28 ENCOUNTER — DOCUMENTATION ONLY (OUTPATIENT)
Dept: LAB | Facility: CLINIC | Age: 60
End: 2020-12-28

## 2020-12-28 ENCOUNTER — APPOINTMENT (OUTPATIENT)
Dept: LAB | Facility: CLINIC | Age: 60
End: 2020-12-28
Payer: COMMERCIAL

## 2020-12-28 ENCOUNTER — VIRTUAL VISIT (OUTPATIENT)
Dept: FAMILY MEDICINE | Facility: CLINIC | Age: 60
End: 2020-12-28
Payer: COMMERCIAL

## 2020-12-28 DIAGNOSIS — S46.001S ROTATOR CUFF INJURY, RIGHT, SEQUELA: Primary | ICD-10-CM

## 2020-12-28 PROCEDURE — 99213 OFFICE O/P EST LOW 20 MIN: CPT | Mod: 95 | Performed by: INTERNAL MEDICINE

## 2020-12-28 NOTE — PROGRESS NOTES
"Geovanni Jasso is a 60 year old male who is being evaluated via a billable video visit.      The patient has been notified of following:     \"This video visit will be conducted via a call between you and your physician/provider. We have found that certain health care needs can be provided without the need for a physical exam.  This service lets us provide the care you need with a video visit.  If a prescription is necessary we can send it directly to your pharmacy.  If lab work is needed we can place an order for that and you can then stop by our lab to have the test done at a later time.    Video visits are billed at different rates depending on your insurance coverage. During this emergency period, for some insurers they may be billed the same as an in-person visit.  Please reach out to your insurance provider with any questions.    If during the course of the call the physician/provider feels a video visit is not appropriate, you will not be charged for this service.\"    Patient has given verbal consent for Video visit?  Yes    What phone number would you like to be contacted at? [phone]    How would you like to obtain your AVS? Pjt    Maria Del Carmen     Geovanni Jasso is a 60 year old male who presents via phone visit today for the following health issues:      Joint or Musculoskeletal Pain  Duration of complaint: one year, but worse in the past 6 weeks   Description:   Location: right shoulder  Character: Sharp and Dull ache  Intensity: moderate  Progression of Symptoms: worse  Accompanying Signs & Symptoms: Other symptoms: reduced range of motion on external rotation   History: Previous similar pain: YES    Precipitating factors: Trauma: Yes. Patient tried to break his fall after playing with his grandchildren.                                     Overuse: no   Alleviating factors: Improved by: rest  Therapies Tried and outcome: Over-the-counter medications and ice (not effective).        ASSESSMENT/PLAN  1. " Rotator cuff injury, right, sequela  - MR Shoulder Right w/o Contrast; Future    Disposition: Follow-up in 4 weeks or as needed.       Video-Visit Details     Type of service:  Video Visit     Video End Time: 12:50 PM  Video Start Time: 12:41 PM    Originating Location (pt. Location): Home     Distant Location (provider location):  [facility]      Platform used for Video Visit: [email or web address]

## 2020-12-28 NOTE — PROGRESS NOTES
There are no orders since patient does not require ANOTHER stool test for H. Pylori since he tested negative for H. Pylori last 12/10/2020.    Patient needs to submit a FIT test sample (ordered last 12/10/2020).

## 2020-12-28 NOTE — PROGRESS NOTES
Today (12-28) Bill came into [facility] with a stool sample for (in the patient's word) H. Pylori.     There are no orders. Bill had this test done on 12-10 with negative results. Did you want a repeat sample?    Please order future lab so that [facility] may process.     Thanks, Briseida SYKES

## 2020-12-28 NOTE — TELEPHONE ENCOUNTER
MR Shoulder Right w/o Contrast [[id number]] (Order [id number])  Imaging  Date: 12/28/2020 Department: [facility] Ordering/Authorizing: Paddy Holly MD   Order Information    Order Date/Time Release Date/Time Start Date/Time End Date/Time   12/28/20 04:44 PM None 12/28/2020 None   Order Details    Frequency Duration Priority Order Class   None None Routine [facility]   Order Providers    Authorizing Provider Encounter Provider   Paddy Holly MD Vocal, Arvin Mangasi, MD     Ordering Provider's NPI: [id number]  Paddy Holly    MR Shoulder Right w/o Contrast [[id number]]    Electronically signed by: Paddy Holly MD on 12/28/20 1644 Status: Active   Ordering user: Paddy Holly MD 12/28/20 1644   Indications of use: Shoulder replaced, rotator cuff tear suspected, xray done, next study   Order History  Outpatient  Date/Time Action Taken User Additional Information   12/28/20 1644 Sign Paddy Holly MD    Future Order Information    Expected Expires      1/4/2021 (Approximate) 12/28/2021             Associated Diagnoses    Rotator cuff injury, right, sequela [S46.001S]  - Primary

## 2021-01-13 ENCOUNTER — ANCILLARY PROCEDURE (OUTPATIENT)
Dept: MRI IMAGING | Facility: CLINIC | Age: 61
End: 2021-01-13
Attending: INTERNAL MEDICINE
Payer: COMMERCIAL

## 2021-01-13 DIAGNOSIS — S46.001S ROTATOR CUFF INJURY, RIGHT, SEQUELA: ICD-10-CM

## 2021-01-13 PROCEDURE — 73221 MRI JOINT UPR EXTREM W/O DYE: CPT | Mod: RT | Performed by: RADIOLOGY

## 2021-01-20 ENCOUNTER — VIRTUAL VISIT (OUTPATIENT)
Dept: ORTHOPEDICS | Facility: CLINIC | Age: 61
End: 2021-01-20
Payer: COMMERCIAL

## 2021-01-20 DIAGNOSIS — S46.001S ROTATOR CUFF INJURY, RIGHT, SEQUELA: ICD-10-CM

## 2021-01-20 PROCEDURE — 99214 OFFICE O/P EST MOD 30 MIN: CPT | Mod: 95 | Performed by: PREVENTIVE MEDICINE

## 2021-01-20 RX ORDER — MELOXICAM 15 MG/1
15 TABLET ORAL DAILY
Qty: 30 TABLET | Refills: 0 | Status: SHIPPED | OUTPATIENT
Start: 2021-01-20 | End: 2021-08-20

## 2021-01-20 NOTE — LETTER
1/20/2021         RE: Geovanni Jasso  [address]  [place] [address]        Dear Colleague,    Thank you for referring your patient, Geovanni Jasso, to the [facility] [place]. Please see a copy of my visit note below.    Bill is a 60 year old who is being evaluated via a billable video visit.      How would you like to obtain your AVS? MyChart  If the video visit is dropped, the invitation should be resent by: Text to cell phone: 22  Will anyone else be joining your video visit? No    Video Start Time: 10:07 AM  Bill presents to discuss his right shoulder pain  Reports an injury about 1.5 years ago when he was playing catch with is grandchildren and had his shoulder/arm hyperextended  Bothered him a little until the past 2 months when it got worse  Had MRI on shoulder and would like to discuss  Has pain with reaching and internal rotation    Subjective     HPI       Review of Systems   Constitutional, HEENT, cardiovascular, pulmonary, gi and gu systems are negative, except as otherwise noted.      Objective           Vitals:  No vitals were obtained today due to virtual visit.    Physical Exam   GENERAL: Healthy, alert and no distress  EYES: Eyes grossly normal to inspection.  No discharge or erythema, or obvious scleral/conjunctival abnormalities.  RESP: No audible wheeze, cough, or visible cyanosis.  No visible retractions or increased work of breathing.    SKIN: Visible skin clear. No significant rash, abnormal pigmentation or lesions.  NEURO: Cranial nerves grossly intact.  Mentation and speech appropriate for age.  PSYCH: Mentation appears normal, affect normal/bright, judgement and insight intact, normal speech and appearance well-groomed.    Right shoulder: pain with extension and internal rotation    Assessment:   59 yo male with right shoulder rotator cuff arthropathy  Reviewed MRI shoulder: shows some degenerative tears in rotator cuff and  bursitis  Will have him start HEP and try mobic and f/u in 3 weeks and consider PT and injection at that time  F/u in 3 weeks  Dr Castillo          Video-Visit Details    Type of service:  Video Visit    Video End Time:10:20 AM    Originating Location (pt. Location): Home    Distant Location (provider location):  [facility]     Platform used for Video Visit: [facility]      Again, thank you for allowing me to participate in the care of your patient.        Sincerely,        Laron Castillo MD

## 2021-01-20 NOTE — PROGRESS NOTES
Bill is a 60 year old who is being evaluated via a billable video visit.      How would you like to obtain your AVS? MyChart  If the video visit is dropped, the invitation should be resent by: Text to cell phone: 22  Will anyone else be joining your video visit? No    Video Start Time: 10:07 AM  Bill presents to discuss his right shoulder pain  Reports an injury about 1.5 years ago when he was playing catch with is grandchildren and had his shoulder/arm hyperextended  Bothered him a little until the past 2 months when it got worse  Had MRI on shoulder and would like to discuss  Has pain with reaching and internal rotation    Subjective     HPI       Review of Systems   Constitutional, HEENT, cardiovascular, pulmonary, gi and gu systems are negative, except as otherwise noted.      Objective           Vitals:  No vitals were obtained today due to virtual visit.    Physical Exam   GENERAL: Healthy, alert and no distress  EYES: Eyes grossly normal to inspection.  No discharge or erythema, or obvious scleral/conjunctival abnormalities.  RESP: No audible wheeze, cough, or visible cyanosis.  No visible retractions or increased work of breathing.    SKIN: Visible skin clear. No significant rash, abnormal pigmentation or lesions.  NEURO: Cranial nerves grossly intact.  Mentation and speech appropriate for age.  PSYCH: Mentation appears normal, affect normal/bright, judgement and insight intact, normal speech and appearance well-groomed.    Right shoulder: pain with extension and internal rotation    Assessment:   61 yo male with right shoulder rotator cuff arthropathy  Reviewed MRI shoulder: shows some degenerative tears in rotator cuff and bursitis  Will have him start HEP and try mobic and f/u in 3 weeks and consider PT and injection at that time  F/u in 3 weeks  Dr Castillo          Video-Visit Details    Type of service:  Video Visit    Video End Time:10:20 AM    Originating Location (pt. Location): Home    Distant  Location (provider location):  [facility] [place]     Platform used for Video Visit: [place]

## 2021-01-20 NOTE — LETTER
1/20/2021         RE: Geovanni Jasso  [address]  [place] [address]        Dear Colleague,    Thank you for referring your patient, Geovanni Jasso, to the [facility] [place]. Please see a copy of my visit note below.    Bill is a 60 year old who is being evaluated via a billable video visit.      How would you like to obtain your AVS? MyChart  If the video visit is dropped, the invitation should be resent by: Text to cell phone: 22  Will anyone else be joining your video visit? No    Video Start Time: 10:07 AM  Bill presents to discuss his right shoulder pain  Reports an injury about 1.5 years ago when he was playing catch with is grandchildren and had his shoulder/arm hyperextended  Bothered him a little until the past 2 months when it got worse  Had MRI on shoulder and would like to discuss  Has pain with reaching and internal rotation    Subjective     HPI       Review of Systems   Constitutional, HEENT, cardiovascular, pulmonary, gi and gu systems are negative, except as otherwise noted.      Objective           Vitals:  No vitals were obtained today due to virtual visit.    Physical Exam   GENERAL: Healthy, alert and no distress  EYES: Eyes grossly normal to inspection.  No discharge or erythema, or obvious scleral/conjunctival abnormalities.  RESP: No audible wheeze, cough, or visible cyanosis.  No visible retractions or increased work of breathing.    SKIN: Visible skin clear. No significant rash, abnormal pigmentation or lesions.  NEURO: Cranial nerves grossly intact.  Mentation and speech appropriate for age.  PSYCH: Mentation appears normal, affect normal/bright, judgement and insight intact, normal speech and appearance well-groomed.    Right shoulder: pain with extension and internal rotation    Assessment:   61 yo male with right shoulder rotator cuff arthropathy  Reviewed MRI shoulder: shows some degenerative tears in rotator cuff and  bursitis  Will have him start HEP and try mobic and f/u in 3 weeks and consider PT and injection at that time  F/u in 3 weeks  Dr Castillo          Video-Visit Details    Type of service:  Video Visit    Video End Time:10:20 AM    Originating Location (pt. Location): Home    Distant Location (provider location):  [facility] MEDIC[facility]     Platform used for Video Visit: [facility]      Again, thank you for allowing me to participate in the care of your patient.        Sincerely,        Laron Castillo MD

## 2021-02-09 ENCOUNTER — OFFICE VISIT (OUTPATIENT)
Dept: ORTHOPEDICS | Facility: CLINIC | Age: 61
End: 2021-02-09
Payer: COMMERCIAL

## 2021-02-09 VITALS
HEIGHT: 71 IN | WEIGHT: 195 LBS | DIASTOLIC BLOOD PRESSURE: 75 MMHG | HEART RATE: 74 BPM | SYSTOLIC BLOOD PRESSURE: 120 MMHG | BODY MASS INDEX: 27.3 KG/M2

## 2021-02-09 DIAGNOSIS — M75.51 SUBACROMIAL BURSITIS OF RIGHT SHOULDER JOINT: Primary | ICD-10-CM

## 2021-02-09 PROCEDURE — 99207 PR DROP WITH A PROCEDURE: CPT | Performed by: PREVENTIVE MEDICINE

## 2021-02-09 PROCEDURE — 20610 DRAIN/INJ JOINT/BURSA W/O US: CPT | Mod: RT | Performed by: PREVENTIVE MEDICINE

## 2021-02-09 RX ADMIN — METHYLPREDNISOLONE ACETATE 40 MG: 40 INJECTION, SUSPENSION INTRA-ARTICULAR; INTRALESIONAL; INTRAMUSCULAR; SOFT TISSUE at 08:39

## 2021-02-09 ASSESSMENT — PAIN SCALES - GENERAL: PAINLEVEL: NO PAIN (1)

## 2021-02-09 ASSESSMENT — MIFFLIN-ST. JEOR: SCORE: 1706.64

## 2021-02-09 NOTE — PROGRESS NOTES
"Geovanni Saeede's chief complaint for this visit includes:  Chief Complaint   Patient presents with     RECHECK     Follow up for right shoulder, discuss possible injection for shoulder     PCP: Paddy Holly    Referring Provider:  No referring provider defined for this encounter.    /75   Pulse 74   Ht 1.803 m (5' 11\")   Wt 88.5 kg (195 lb)   BMI 27.20 kg/m    No Pain (1)     Do you need any medication refills at today's visit?    Large Joint Injection/Arthocentesis: R subacromial bursa    Date/Time: 2/9/2021 8:39 AM  Performed by: Laron Castillo MD  Authorized by: Laron Castillo MD     Indications:  Pain, diagnostic evaluation and osteoarthritis  Needle Size:  22 G  Guidance: landmark guided    Approach:  Lateral  Location:  Shoulder      Site:  R subacromial bursa  Medications:  40 mg methylPREDNISolone 40 MG/ML  Procedure discussed: discussed risks, benefits, and alternatives    Consent Given by:  Patient  Timeout: timeout called immediately prior to procedure    Prep: patient was prepped and draped in usual sterile fashion     NDC: 1132-8379-35    Bill presents for right shoulder injection as previously discussed.   Diagnosis: right shoulder GH arthritis and rotator cuff arthropathy and subacromial bursitis    PROCEDURE: right SHOULDER joint injection     The patient was apprised of the risks and the benefits of the procedure and consented and a written consent was signed by the patient.   The patient s shoulder was sterilely prepped with chloraprep.   40 mg of  depo suspension was drawn up into a 5 mL syringe with 4 mL of 1% lidocaine   The patient was injected with a 1.5-inch 22-gauge needle at posterior gleno-humeral joint in the subacromial space  There were no complications. The patient tolerated the procedure well. There was minimal bleeding.   The patient was instructed to ice the shoulder upon leaving clinic and refrain from overuse over the next 3 days.   The patient was " instructed to go to the emergency room with any usual pain, swelling, or redness occurred in the injected area.   The patient was given a followup appointment to evaluate response to the injection to their increased range of motion and reduction of pain.    followup PRN  Dr Laron Castillo

## 2021-02-09 NOTE — LETTER
"    2/9/2021         RE: Geovanni Jasso  [address]  [place] [address]        Dear Colleague,    Thank you for referring your patient, Geovanni Jasso, to the [facility] [place]. Please see a copy of my visit note below.    Geovanni Jasso's chief complaint for this visit includes:  Chief Complaint   Patient presents with     RECHECK     Follow up for right shoulder, discuss possible injection for shoulder     PCP: Paddy Holly    Referring Provider:  No referring provider defined for this encounter.    /75   Pulse 74   Ht 1.803 m (5' 11\")   Wt 88.5 kg (195 lb)   BMI 27.20 kg/m    No Pain (1)     Do you need any medication refills at today's visit?    Large Joint Injection/Arthocentesis: R subacromial bursa    Date/Time: 2/9/2021 8:39 AM  Performed by: Laron Castillo MD  Authorized by: Laron Castillo MD     Indications:  Pain, diagnostic evaluation and osteoarthritis  Needle Size:  22 G  Guidance: landmark guided    Approach:  Lateral  Location:  Shoulder      Site:  R subacromial bursa  Medications:  40 mg methylPREDNISolone 40 MG/ML  Procedure discussed: discussed risks, benefits, and alternatives    Consent Given by:  Patient  Timeout: timeout called immediately prior to procedure    Prep: patient was prepped and draped in usual sterile fashion     NDC: 6550-9310-74    Bill presents for right shoulder injection as previously discussed.   Diagnosis: right shoulder GH arthritis and rotator cuff arthropathy and subacromial bursitis    PROCEDURE: right SHOULDER joint injection     The patient was apprised of the risks and the benefits of the procedure and consented and a written consent was signed by the patient.   The patient s shoulder was sterilely prepped with chloraprep.   40 mg of  depo suspension was drawn up into a 5 mL syringe with 4 mL of 1% lidocaine   The patient was injected with a 1.5-inch 22-gauge needle at " posterior gleno-humeral joint in the subacromial space  There were no complications. The patient tolerated the procedure well. There was minimal bleeding.   The patient was instructed to ice the shoulder upon leaving clinic and refrain from overuse over the next 3 days.   The patient was instructed to go to the emergency room with any usual pain, swelling, or redness occurred in the injected area.   The patient was given a followup appointment to evaluate response to the injection to their increased range of motion and reduction of pain.    followup PRN  Dr Laron Castillo      Again, thank you for allowing me to participate in the care of your patient.        Sincerely,        Laron Castillo MD

## 2021-03-05 RX ORDER — METHYLPREDNISOLONE ACETATE 40 MG/ML
40 INJECTION, SUSPENSION INTRA-ARTICULAR; INTRALESIONAL; INTRAMUSCULAR; SOFT TISSUE
Status: DISCONTINUED | OUTPATIENT
Start: 2021-02-09 | End: 2023-02-14

## 2021-03-23 ENCOUNTER — IMMUNIZATION (OUTPATIENT)
Dept: PEDIATRICS | Facility: CLINIC | Age: 61
End: 2021-03-23
Payer: COMMERCIAL

## 2021-03-23 PROCEDURE — 91300 PR COVID VAC PFIZER DIL RECON 30 MCG/0.3 ML IM: CPT

## 2021-03-23 PROCEDURE — 0001A PR COVID VAC PFIZER DIL RECON 30 MCG/0.3 ML IM: CPT

## 2021-03-31 ENCOUNTER — VIRTUAL VISIT (OUTPATIENT)
Dept: FAMILY MEDICINE | Facility: CLINIC | Age: 61
End: 2021-03-31
Payer: COMMERCIAL

## 2021-03-31 ENCOUNTER — MYC MEDICAL ADVICE (OUTPATIENT)
Dept: FAMILY MEDICINE | Facility: CLINIC | Age: 61
End: 2021-03-31

## 2021-03-31 ENCOUNTER — NURSE TRIAGE (OUTPATIENT)
Dept: NURSING | Facility: CLINIC | Age: 61
End: 2021-03-31

## 2021-03-31 DIAGNOSIS — B02.29 HERPES ZOSTER VIRUS INFECTION OF FACE AND EAR NERVES: Primary | ICD-10-CM

## 2021-03-31 DIAGNOSIS — J01.80 OTHER ACUTE SINUSITIS, RECURRENCE NOT SPECIFIED: ICD-10-CM

## 2021-03-31 PROCEDURE — 99214 OFFICE O/P EST MOD 30 MIN: CPT | Mod: TEL | Performed by: INTERNAL MEDICINE

## 2021-03-31 RX ORDER — VALACYCLOVIR HYDROCHLORIDE 1 G/1
1000 TABLET, FILM COATED ORAL 3 TIMES DAILY
Qty: 30 TABLET | Refills: 2 | Status: SHIPPED | OUTPATIENT
Start: 2021-03-31 | End: 2021-04-22

## 2021-03-31 RX ORDER — VALACYCLOVIR HYDROCHLORIDE 1 G/1
1000 TABLET, FILM COATED ORAL 3 TIMES DAILY
Qty: 20 TABLET | Refills: 2 | Status: SHIPPED | OUTPATIENT
Start: 2021-03-31 | End: 2021-03-31

## 2021-03-31 RX ORDER — AZITHROMYCIN 250 MG/1
TABLET, FILM COATED ORAL
Qty: 6 TABLET | Refills: 2 | Status: SHIPPED | OUTPATIENT
Start: 2021-03-31 | End: 2021-04-12

## 2021-03-31 NOTE — TELEPHONE ENCOUNTER
Dr Holly wrote an order for Valacyclovir written as  1 tablet by mouth THREE times a day for ten days but wrote for quantity of 20.  Please call and clarify to [facility] pharmacy by calling them at: [phone].  The quantity should be 30, not 20 with 2 refills, right?  Thank you,  Kaley Mix, RN  [facility]    Reason for Disposition    Pharmacy calling with prescription questions and triager unable to answer question    Additional Information    Negative: Drug overdose and triager unable to answer question    Negative: Caller requesting information unrelated to medicine    Negative: Caller requesting a prescription for Strep throat and has a positive culture result    Negative: Rash while taking a medication or within 3 days of stopping it    Negative: Immunization reaction suspected    Negative: Asthma and having symptoms of asthma (cough, wheezing, etc.)    Negative: Breastfeeding questions about mother's medicines and diet    Negative: MORE THAN A DOUBLE DOSE of a prescription or over-the-counter (OTC) drug    Negative: DOUBLE DOSE (an extra dose or lesser amount) of over-the-counter (OTC) drug and any symptoms (e.g., dizziness, nausea, pain, sleepiness)    Negative: DOUBLE DOSE (an extra dose or lesser amount) of prescription drug and any symptoms (e.g., dizziness, nausea, pain, sleepiness)    Negative: Took another person's prescription drug    Negative: DOUBLE DOSE (an extra dose or lesser amount) of prescription drug and NO symptoms (Exception: a double dose of antibiotics)    Negative: Diabetes drug error or overdose (e.g., took wrong type of insulin or took extra dose)    Negative: Caller has medication question about med not prescribed by PCP and triager unable to answer question (e.g., compatibility with other med, storage)    Negative: Prescription not at pharmacy and was prescribed today by PCP    Negative: Request for URGENT new prescription or refill of 'essential' medication (i.e.,  likelihood of harm to patient if not taken) and triager unable to fill per department policy    Protocols used: MEDICATION QUESTION CALL-A-[place]

## 2021-03-31 NOTE — PROGRESS NOTES
Bill is a 60 year old who is being evaluated via a billable telephone visit.      What phone number would you like to be contacted at? [phone]  How would you like to obtain your AVS? Addy    [facility] Internal Medicine Progress Note           Assessment and Plan:   1. Herpes zoster virus infection of face and ear nerves  Most probable cause of left-sided painful facial rash.  Start Valacyclovir.    2. Other acute sinusitis, recurrence not specified  Contributing factor to left-sided facial pain. Treat empirically with Azithromycin.     Phone call duration: 15 minutes  Start: 5:40 PM  End: 5:45 PM      30 minutes spent on the date of the encounter doing chart review, patient visit and documentation       Interval History:     Bill is a 60 year old who presents for the following health issues    Rash    Duration/timing: less than one week    Description  Location:left cheek  Itching: no   Pain: yes  Discharge: no  Redness: yes  Scaling: no    Severity:  Mild-moderate    Accompanying signs and symptoms:            Fever/chills: no           Abdominal pain: no           Morning stiffness: no           Weakness: no    History (similar episodes/previous evaluation):             Trauma: no            Surgery: no            Allergic reaction:no            Skin cancer: no    Precipitating or alleviating factors:  New exposures:  no  Recent travel: no    Therapies tried and outcome:            Topical preparations: no           Antihistamines: no           Steroids:no           UV light treatment: no                Sinus issues      Duration: one week    Description (location/character/radiation): left-sided frontal headache associated with foul-smelling nasal drainage (according to patient's wife, as relayed by patient himself).    Intensity:  mild    Accompanying signs and symptoms: Reduced hearing from left ear but patient denies any fever, chills, nausea nor postnasal drainage    History (similar  episodes/previous evaluation): none prior to this visit.    Precipitating or alleviating factors: season allergies    Therapies tried and outcome: None                 Significant Problems:   Patient Active Problem List   Diagnosis     Esophageal reflux     Hyperlipidemia LDL goal <70     Chronic rhinitis     Bilateral low back pain without sciatica     Chondromalacia patellae, right     S/P ACL reconstruction     Atherosclerosis of native coronary artery of native heart without angina pectoris     SOB (shortness of breath)     Non-allergic rhinitis     Positive CLAIRE (antinuclear antibody)     Hand joint stiff, right     Carpal tunnel syndrome     Osteoarthritis of knee, unspecified laterality, unspecified osteoarthritis type     Diverticulosis of large intestine     Internal hemorrhoids              Review of Systems:   CONSTITUTIONAL: NEGATIVE for fever, chills, change in weight  INTEGUMENTARY/SKIN: As above.  EYES: NEGATIVE for vision changes or irritation  ENT/MOUTH: NEGATIVE for epistaxis, sore throat and vertigo  RESP: NEGATIVE for significant cough or SOB  BREAST: NEGATIVE for masses, tenderness or discharge  CV: NEGATIVE for chest pain, palpitations or peripheral edema  GI: NEGATIVE for nausea, abdominal pain, heartburn, or change in bowel habits  : NEGATIVE for frequency, dysuria, or hematuria  MUSCULOSKELETAL: NEGATIVE for significant arthralgias or myalgia  NEURO: NEGATIVE for weakness, dizziness or paresthesias  ENDOCRINE: NEGATIVE for temperature intolerance, skin/hair changes  HEME: NEGATIVE for bleeding problems  PSYCHIATRIC: NEGATIVE for changes in mood or affect             Physical Exam:   Vital signs not obtained due to nature of visit.    Remainder of exam not performed due to nature of visit.          Data:   Epic reviewed.     Disposition:  Follow-up in 4 weeks.    Phone call duration: 15 minutes  Start: 5:40 PM  End: 5:55 PM    Paddy Holly MD  Internal Medicine  [facility] Team

## 2021-04-13 ENCOUNTER — IMMUNIZATION (OUTPATIENT)
Dept: PEDIATRICS | Facility: CLINIC | Age: 61
End: 2021-04-13
Attending: INTERNAL MEDICINE
Payer: COMMERCIAL

## 2021-04-13 PROCEDURE — 91300 PR COVID VAC PFIZER DIL RECON 30 MCG/0.3 ML IM: CPT

## 2021-04-13 PROCEDURE — 0002A PR COVID VAC PFIZER DIL RECON 30 MCG/0.3 ML IM: CPT

## 2021-04-16 ENCOUNTER — OFFICE VISIT (OUTPATIENT)
Dept: DERMATOLOGY | Facility: CLINIC | Age: 61
End: 2021-04-16
Payer: COMMERCIAL

## 2021-04-16 DIAGNOSIS — L71.9 ACNE ROSACEA: ICD-10-CM

## 2021-04-16 DIAGNOSIS — D22.9 MULTIPLE BENIGN NEVI: ICD-10-CM

## 2021-04-16 DIAGNOSIS — L82.1 SK (SEBORRHEIC KERATOSIS): Primary | ICD-10-CM

## 2021-04-16 DIAGNOSIS — B49 FUNGUS INFECTION: ICD-10-CM

## 2021-04-16 DIAGNOSIS — L82.1 SEBORRHEIC KERATOSES: ICD-10-CM

## 2021-04-16 PROCEDURE — 99213 OFFICE O/P EST LOW 20 MIN: CPT | Performed by: DERMATOLOGY

## 2021-04-16 PROCEDURE — 87101 SKIN FUNGI CULTURE: CPT | Performed by: DERMATOLOGY

## 2021-04-16 RX ORDER — KETOCONAZOLE 20 MG/G
CREAM TOPICAL
Qty: 60 G | Refills: 3 | Status: SHIPPED | OUTPATIENT
Start: 2021-04-16 | End: 2023-11-28

## 2021-04-16 NOTE — LETTER
4/16/2021         RE: Geovanni Jasso  [address]  [place] [address]        Dear Colleague,    Thank you for referring your patient, Geovanni Jasso, to the [facility]. Please see a copy of my visit note below.    [facility] Dermatology Note  Encounter Date: Apr 16, 2021  Office Visit     Dermatology Problem List:  1. Possible onychomycosis  -nail clipping completed today, will start terbinafine pending fungal culture  -previous tx: terbinafine with improvement    ____________________________________________    Assessment & Plan:    # Possible onychomycosis, left great toenail - counseled patient on treatment options  - Nail clipping obtained today for fungal culture.   - Will start terbinafine pending nail clipping results    # Multiple clinically benign nevi.   - No further intervention needed.     # Seborrheic keratosis, non irritated.   - No further intervention needed.     # Facial Rash - patient has brought in photos to be reviewed of a rash on his face. Ddx acne vulgaris.   - Resolved. He has completed his valtrex for possible shingles    # Acne  Rosacea   - Apply metrocream BID to the face        Procedures Performed:   None.    Follow-up: pending fungal culture results    Staff and Scribe:     Scribe Disclosure:   I, Cecil Dunn, am serving as a scribe to document services personally performed by this physician, Dr. Marbella Castro, based on data collection and the provider's statements to me.     Provider Disclosure:   The documentation recorded by the scribe accurately reflects the services I personally performed and the decisions made by me.    Marbella Castro MD    Department of Dermatology  [facility]: Phone: [phone], Fax:[phone]  [facility]: Phone: [phone], Fax:  "[phone]      ____________________________________________    CC: Skin Check (areas of concern are forehead, toenail and back, no personal or family hx of skin cancer)    HPI:  Mr. Geovanni Jasso is a(n) 60 year old male who presents today as a new patient for a skin check.    Self referred.    Today, he has an area of concern on the forehead, toenails and back. Toenail has been discolored since 2000 after hiking. He had been given pills in the past for onychomycosis, and was on Lamisil for 3 months. He is unsure if he's had any ciclopirox in the past. He does recall having improvement in the past. He does have questions about nail avulsion. His wife has taken pictures of these spots. He denies a personal or family history of skin cancer.    He also has concerns about a rash on the cheeks and temples that he would like examined. Rash on the face began about a year ago. He has had some tingling. He notes his PCP believed this was shingles. He was started on valacyclovir on 04/10/20. He is unsure if this rash waxes and wanes. He notes he \"doesn't really pay attention to it\".  Denies pain or blisters.    Patient is otherwise feeling well, without additional skin concerns.    Labs Reviewed:  N/A    Physical Exam:  Vitals: There were no vitals taken for this visit.  SKIN: Total skin excluding the undergarment areas was performed. The exam included the head/face, neck, both arms, chest, back, abdomen, both legs, digits and/or nails.declines genital exam  - Acneiform macules left temple, left cheek, right nose. Pustule on the R cheek  - Fleshy mole, right posterior neck  - Nevus left chest and left trunk  - There are waxy stuck on tan to brown papules on the left base of neck and trunk.   - Toenail is yellow and discolored - left great toenail  - Left temple erythematous macule and acneiform papule on the R temple in a photo taken  - No other lesions of concern on areas examined.     Medications:  Current Outpatient " Medications   Medication     acetaminophen (TYLENOL) 325 MG tablet     ASPIRIN PO     azithromycin (ZITHROMAX) 250 MG tablet     diclofenac (VOLTAREN) 1 % topical gel     famotidine (PEPCID) 40 MG tablet     fluticasone (FLONASE) 50 MCG/ACT nasal spray     ibuprofen (ADVIL,MOTRIN) 200 MG tablet     ipratropium (ATROVENT) 0.06 % spray     LANsoprazole (PREVACID) 30 MG DR capsule     meloxicam (MOBIC) 15 MG tablet     nystatin-triamcinolone (MYCOLOG II) 076353-2.1 UNIT/GM-% external cream     omeprazole (PRILOSEC) 40 MG DR capsule     valACYclovir (VALTREX) 1000 mg tablet     Current Facility-Administered Medications   Medication     methylPREDNISolone (DEPO-MEDROL) injection 40 mg      Past Medical History:   Patient Active Problem List   Diagnosis     Esophageal reflux     Hyperlipidemia LDL goal <70     Chronic rhinitis     Bilateral low back pain without sciatica     Chondromalacia patellae, right     S/P ACL reconstruction     Atherosclerosis of native coronary artery of native heart without angina pectoris     SOB (shortness of breath)     Non-allergic rhinitis     Positive CLAIRE (antinuclear antibody)     Hand joint stiff, right     Carpal tunnel syndrome     Osteoarthritis of knee, unspecified laterality, unspecified osteoarthritis type     Diverticulosis of large intestine     Internal hemorrhoids     Past Medical History:   Diagnosis Date     Carpal tunnel syndrome      Chronic rhinitis 10/23/2015     Esophageal reflux 12/26/2013     Hand joint stiff, right         CC No referring provider defined for this encounter. on close of this encounter.      Again, thank you for allowing me to participate in the care of your patient.        Sincerely,        Marbella Castro MD

## 2021-04-16 NOTE — NURSING NOTE
Geovanni Jasso's goals for this visit include:   Chief Complaint   Patient presents with     Skin Check     areas of concern are forehead, toenail and back, no personal or family hx of skin cancer       He requests these members of his care team be copied on today's visit information:     PCP: Paddy Holly    Referring Provider:  No referring provider defined for this encounter.    There were no vitals taken for this visit.    Do you need any medication refills at today's visit?       Tamika Jordan LPN on 4/16/2021 at 12:48 PM

## 2021-04-16 NOTE — PATIENT INSTRUCTIONS
[facility] Dermatology Visit    Thank you for allowing us to participate in your care. Your findings, instructions and follow-up plan are as follows:         When should I call my doctor?    If you are worsening or not improving, please, contact us or seek urgent care as noted below.     Who should I call with questions (adults)?    [facility] (adult and pediatric): [phone]     [facility] (adult): [phone]    For urgent needs outside of business hours call the [facility] at [phone] and ask for the dermatology resident on call    If this is a medical emergency and you are unable to reach an ER, Call 911      Who should I call with questions (pediatric)?  [facility]- Pediatric Dermatology  Dr. Faby Culver, Dr. Karri Gibson, Dr. Nidhi Waddell, Isabel Brewer, PA  Dr. Georgette Garcia, Dr. Dilma Longoria & Dr. Laron Jacobson  Non Urgent  Nurse Triage Line; [phone]- Cecily and Jeri RN Care Coordinators   Karmen (/Complex ) [phone]    If you need a prescription refill, please contact your pharmacy. Refills are approved or denied by our Physicians during normal business hours, Monday through Fridays  Per office policy, refills will not be granted if you have not been seen within the past year (or sooner depending on your child's condition)    Scheduling Information:  Pediatric Appointment Scheduling and Call Center [phone]  Radiology Scheduling- [phone]  Sedation Unit Scheduling- [phone]  [place] Scheduling- General [phone]; Pediatric Dermatology [phone]  Main  Services: [phone]  Portuguese: [phone]  Chadian: [phone]  Hmong/Arabic/Faroese: [phone]  Preadmission Nursing Department Fax Number: [phone] (Fax all pre-operative paperwork to this number)    For urgent matters arising during evenings,  weekends, or holidays that cannot wait for normal business hours please call [phone] and ask for the Dermatology Resident On-Call to be paged.

## 2021-04-16 NOTE — PROGRESS NOTES
[facility] Dermatology Note  Encounter Date: Apr 16, 2021  Office Visit     Dermatology Problem List:  1. Possible onychomycosis  -nail clipping completed today, will start terbinafine pending fungal culture  -previous tx: terbinafine with improvement    ____________________________________________    Assessment & Plan:    # Possible onychomycosis, left great toenail - counseled patient on treatment options  - Nail clipping obtained today for fungal culture.   - Will start terbinafine pending nail clipping results    # Multiple clinically benign nevi.   - No further intervention needed.     # Seborrheic keratosis, non irritated.   - No further intervention needed.     # Facial Rash - patient has brought in photos to be reviewed of a rash on his face. Ddx acne vulgaris.   - Resolved. He has completed his valtrex for possible shingles    # Acne  Rosacea   - Apply metrocream BID to the face        Procedures Performed:   None.    Follow-up: pending fungal culture results    Staff and Scribe:     Scribe Disclosure:   I, Cecil Dunn, am serving as a scribe to document services personally performed by this physician, Dr. Marbella Castro, based on data collection and the provider's statements to me.     Provider Disclosure:   The documentation recorded by the scribe accurately reflects the services I personally performed and the decisions made by me.    Marbella Castro MD    Department of Dermatology  [facility]: Phone: [phone], Fax:[phone]  [facility]: Phone: [phone], Fax: [phone]      ____________________________________________    CC: Skin Check (areas of concern are forehead, toenail and back, no personal or family hx of skin cancer)    HPI:  Mr. Geovanni Jasso is a(n) 60 year old male who presents today as a new patient for a skin check.    Self  "referred.    Today, he has an area of concern on the forehead, toenails and back. Toenail has been discolored since 2000 after hiking. He had been given pills in the past for onychomycosis, and was on Lamisil for 3 months. He is unsure if he's had any ciclopirox in the past. He does recall having improvement in the past. He does have questions about nail avulsion. His wife has taken pictures of these spots. He denies a personal or family history of skin cancer.    He also has concerns about a rash on the cheeks and temples that he would like examined. Rash on the face began about a year ago. He has had some tingling. He notes his PCP believed this was shingles. He was started on valacyclovir on 04/10/20. He is unsure if this rash waxes and wanes. He notes he \"doesn't really pay attention to it\".  Denies pain or blisters.    Patient is otherwise feeling well, without additional skin concerns.    Labs Reviewed:  N/A    Physical Exam:  Vitals: There were no vitals taken for this visit.  SKIN: Total skin excluding the undergarment areas was performed. The exam included the head/face, neck, both arms, chest, back, abdomen, both legs, digits and/or nails.declines genital exam  - Acneiform macules left temple, left cheek, right nose. Pustule on the R cheek  - Fleshy mole, right posterior neck  - Nevus left chest and left trunk  - There are waxy stuck on tan to brown papules on the left base of neck and trunk.   - Toenail is yellow and discolored - left great toenail  - Left temple erythematous macule and acneiform papule on the R temple in a photo taken  - No other lesions of concern on areas examined.     Medications:  Current Outpatient Medications   Medication     acetaminophen (TYLENOL) 325 MG tablet     ASPIRIN PO     azithromycin (ZITHROMAX) 250 MG tablet     diclofenac (VOLTAREN) 1 % topical gel     famotidine (PEPCID) 40 MG tablet     fluticasone (FLONASE) 50 MCG/ACT nasal spray     ibuprofen (ADVIL,MOTRIN) 200 MG " tablet     ipratropium (ATROVENT) 0.06 % spray     LANsoprazole (PREVACID) 30 MG DR capsule     meloxicam (MOBIC) 15 MG tablet     nystatin-triamcinolone (MYCOLOG II) 456229-1.1 UNIT/GM-% external cream     omeprazole (PRILOSEC) 40 MG DR capsule     valACYclovir (VALTREX) 1000 mg tablet     Current Facility-Administered Medications   Medication     methylPREDNISolone (DEPO-MEDROL) injection 40 mg      Past Medical History:   Patient Active Problem List   Diagnosis     Esophageal reflux     Hyperlipidemia LDL goal <70     Chronic rhinitis     Bilateral low back pain without sciatica     Chondromalacia patellae, right     S/P ACL reconstruction     Atherosclerosis of native coronary artery of native heart without angina pectoris     SOB (shortness of breath)     Non-allergic rhinitis     Positive CLAIRE (antinuclear antibody)     Hand joint stiff, right     Carpal tunnel syndrome     Osteoarthritis of knee, unspecified laterality, unspecified osteoarthritis type     Diverticulosis of large intestine     Internal hemorrhoids     Past Medical History:   Diagnosis Date     Carpal tunnel syndrome      Chronic rhinitis 10/23/2015     Esophageal reflux 12/26/2013     Hand joint stiff, right         CC No referring provider defined for this encounter. on close of this encounter.

## 2021-05-14 LAB
BACTERIA SPEC CULT: NORMAL
SPECIMEN SOURCE: NORMAL

## 2021-05-15 ENCOUNTER — HEALTH MAINTENANCE LETTER (OUTPATIENT)
Age: 61
End: 2021-05-15

## 2021-06-02 ENCOUNTER — TELEPHONE (OUTPATIENT)
Dept: DERMATOLOGY | Facility: CLINIC | Age: 61
End: 2021-06-02

## 2021-06-02 NOTE — TELEPHONE ENCOUNTER
Aileen Matamoros LPN   6/2/2021 10:03 AM CDT      Discussed results with patient. Scheduled for nurse visit at the end of June, as he just trimmed his nails.  PONCE Ibarra Jessica L, CMA   5/26/2021 10:36 AM CDT      Left message to call back clinic regarding results/ need to schedule nurse only visit.   Benita Paul CMA on 5/26/2021 at 10:35 AM          Benita Paul CMA   5/21/2021  4:08 PM CDT      Left message to call back clinic regarding results.   Benita Paul CMA on 5/21/2021 at 4:08 PM          Marbella Castro MD   5/21/2021  2:39 PM CDT      Need to reculture the nail, no growth, okay for RN visit

## 2021-06-10 ENCOUNTER — MYC MEDICAL ADVICE (OUTPATIENT)
Dept: FAMILY MEDICINE | Facility: CLINIC | Age: 61
End: 2021-06-10

## 2021-06-10 DIAGNOSIS — J30.89 SEASONAL ALLERGIC RHINITIS DUE TO OTHER ALLERGIC TRIGGER: Primary | ICD-10-CM

## 2021-06-10 DIAGNOSIS — K29.00 OTHER ACUTE GASTRITIS WITHOUT HEMORRHAGE: ICD-10-CM

## 2021-06-11 RX ORDER — FEXOFENADINE HCL 60 MG/1
60 TABLET, FILM COATED ORAL 2 TIMES DAILY
Qty: 60 TABLET | Refills: 2 | Status: SHIPPED | OUTPATIENT
Start: 2021-06-11 | End: 2021-09-14

## 2021-06-11 RX ORDER — LANSOPRAZOLE 30 MG/1
30 CAPSULE, DELAYED RELEASE ORAL
Qty: 90 CAPSULE | Refills: 2 | Status: SHIPPED | OUTPATIENT
Start: 2021-06-11 | End: 2022-03-04

## 2021-06-11 NOTE — TELEPHONE ENCOUNTER
Outpatient Medication Detail     Disp Refills Start End JULIO C   fexofenadine (ALLEGRA) 60 MG tablet 60 tablet 2 6/11/2021  --   Sig - Route: Take 1 tablet (60 mg) by mouth 2 times daily - Oral   Sent to pharmacy as: Fexofenadine HCl 60 MG Oral Tablet (ALLEGRA)   Class: E-Prescribe   Order: [id number]   E-Prescribing Status: Receipt confirmed by pharmacy (6/11/2021  9:07 AM CDT)

## 2021-06-11 NOTE — TELEPHONE ENCOUNTER
Lansoprazole refill sent.    Pt requesting fexofenadine.    Routing refill request to provider for review/approval because:  Drug not active on patient's medication list  Josephine ARANDAN, RN

## 2021-06-17 ENCOUNTER — THERAPY VISIT (OUTPATIENT)
Dept: PHYSICAL THERAPY | Facility: CLINIC | Age: 61
End: 2021-06-17
Attending: PREVENTIVE MEDICINE
Payer: COMMERCIAL

## 2021-06-17 DIAGNOSIS — M25.511 RIGHT SHOULDER PAIN: ICD-10-CM

## 2021-06-17 DIAGNOSIS — S46.001S ROTATOR CUFF INJURY, RIGHT, SEQUELA: ICD-10-CM

## 2021-06-17 PROCEDURE — 97161 PT EVAL LOW COMPLEX 20 MIN: CPT | Mod: GP | Performed by: PHYSICAL THERAPIST

## 2021-06-17 PROCEDURE — 97110 THERAPEUTIC EXERCISES: CPT | Mod: GP | Performed by: PHYSICAL THERAPIST

## 2021-06-17 NOTE — PROGRESS NOTES
Physical Therapy Initial Evaluation  Subjective:  The history is provided by the patient. No  was used.   Patient Health History  Geovanni Jasso being seen for Pain in right shoulder/arm. also pain in left shoulder/arm.     Problem began: 11/14/2019.   Problem occurred: Strained shoulder playing with grandchildren   Pain is reported as 2/10 and 9/10 on pain scale.  General health as reported by patient is good.  Pertinent medical history includes: pain at night/rest.     Medical allergies: other. Other medical allergies details: Penicillin.   Surgeries include:  Orthopedic surgery. Other surgery history details: ACO repair rt knee.    Current medications:  Other. Other medications details: Acid reflux, allergy.    Current occupation is /.   Primary job tasks include:  Computer work, driving and prolonged sitting.                  Therapist Generated HPI Evaluation  Problem details: Completed on injection in about 3/2021 which had limited to no effect..         Type of problem:  Right shoulder.    This is a chronic condition.  Condition occurred with:  Other.  Where condition occurred: at home.  Patient reports pain:  Anterior, lateral, upper arm and in the joint.  Pain is described as sharp and aching and is intermittent.  Pain is the same all the time.  Since onset symptoms are unchanged.  Symptoms are exacerbated by lying on extremity, using arm behind back and using arm overhead    Special tests included:  MRI and x-ray.  There was none improvement following previous treatment.  Barriers include:  None as reported by patient.                        Objective:  System                   Shoulder Evaluation:  ROM:  AROM:    Flexion:  Left:  162    Right:  145  Extension: Left: 65Right: 55  Abduction:  Left: 105   Right:  130    Internal Rotation:  Left:  T12    Right:  L3  External Rotation:  Left:  65    Right:  65                      Strength:    Flexion: Left:4/5    Pain:    Right: 4/5     Pain:     Abduction:  Left: 4/5  Pain:    Right: 4/5     Pain:    Internal Rotation:  Left:5/5     Pain:    Right: 4+/5     Pain:  External Rotation:   Left:4+/5     Pain:   Right:4-/5     Pain:    Horizontal Abduction:  Right:4/5    Pain:  Horizontal Adduction:  Left:4/5     Pain:                Mobility Tests:      Glenohumeral posterior right:  Hypomobile  Glenohumeral inferior right:  Hypomobile                                             General     ROS    Assessment/Plan:    Patient is a 60 year old male with right side shoulder complaints.    Patient has the following significant findings with corresponding treatment plan.                Diagnosis 1:  Right shoulder pain  Pain -  manual therapy, self management and education  Decreased ROM/flexibility - manual therapy and therapeutic exercise  Decreased joint mobility - manual therapy and therapeutic exercise  Decreased strength - therapeutic exercise and therapeutic activities  Impaired muscle performance - neuro re-education  Decreased function - therapeutic activities        Previous and current functional limitations:  (See Goal Flow Sheet for this information)    Short term and Long term goals: (See Goal Flow Sheet for this information)     Communication ability:  Patient appears to be able to clearly communicate and understand verbal and written communication and follow directions correctly.  Treatment Explanation - The following has been discussed with the patient:   RX ordered/plan of care  Anticipated outcomes  Possible risks and side effects  This patient would benefit from PT intervention to resume normal activities.   Rehab potential is good.    Frequency:  1 X week, once daily  Duration:  for 8 weeks  Discharge Plan:  Achieve all LTG.  Independent in home treatment program.  Reach maximal therapeutic benefit.    Please refer to the daily flowsheet for treatment today, total treatment time and time spent performing 1:1  timed codes.

## 2021-06-24 ENCOUNTER — THERAPY VISIT (OUTPATIENT)
Dept: PHYSICAL THERAPY | Facility: CLINIC | Age: 61
End: 2021-06-24
Payer: COMMERCIAL

## 2021-06-24 DIAGNOSIS — M25.511 RIGHT SHOULDER PAIN: ICD-10-CM

## 2021-06-24 PROCEDURE — 97110 THERAPEUTIC EXERCISES: CPT | Mod: GP | Performed by: PHYSICAL THERAPIST

## 2021-06-28 ENCOUNTER — DOCUMENTATION ONLY (OUTPATIENT)
Dept: DERMATOLOGY | Facility: CLINIC | Age: 61
End: 2021-06-28

## 2021-06-28 DIAGNOSIS — Z12.11 SCREEN FOR COLON CANCER: ICD-10-CM

## 2021-06-28 LAB — HEMOCCULT STL QL IA: NEGATIVE

## 2021-06-28 PROCEDURE — 82274 ASSAY TEST FOR BLOOD FECAL: CPT | Performed by: INTERNAL MEDICINE

## 2021-06-28 NOTE — PROGRESS NOTES
Patient came in today for a right great toe clipping. LPN attempted to get a sample but the nail was too short. Patient stated he cut the nail 2 weeks ago and it has not grown. LPN asked RN to assess and she agreed. Patient rescheduled in 3 weeks to attempt again once the nail has grown and we can get an adequate sample.      Haley Live LPN

## 2021-07-01 ENCOUNTER — OFFICE VISIT (OUTPATIENT)
Dept: ORTHOPEDICS | Facility: CLINIC | Age: 61
End: 2021-07-01
Payer: COMMERCIAL

## 2021-07-01 ENCOUNTER — THERAPY VISIT (OUTPATIENT)
Dept: PHYSICAL THERAPY | Facility: CLINIC | Age: 61
End: 2021-07-01
Payer: COMMERCIAL

## 2021-07-01 ENCOUNTER — ANCILLARY PROCEDURE (OUTPATIENT)
Dept: GENERAL RADIOLOGY | Facility: CLINIC | Age: 61
End: 2021-07-01
Attending: PREVENTIVE MEDICINE
Payer: COMMERCIAL

## 2021-07-01 VITALS — SYSTOLIC BLOOD PRESSURE: 115 MMHG | DIASTOLIC BLOOD PRESSURE: 76 MMHG | HEART RATE: 76 BPM

## 2021-07-01 DIAGNOSIS — M25.512 LEFT SHOULDER PAIN, UNSPECIFIED CHRONICITY: ICD-10-CM

## 2021-07-01 DIAGNOSIS — M50.30 DDD (DEGENERATIVE DISC DISEASE), CERVICAL: ICD-10-CM

## 2021-07-01 DIAGNOSIS — M25.511 RIGHT SHOULDER PAIN: ICD-10-CM

## 2021-07-01 DIAGNOSIS — M62.838 CERVICAL PARASPINAL MUSCLE SPASM: ICD-10-CM

## 2021-07-01 DIAGNOSIS — M25.512 LEFT SHOULDER PAIN, UNSPECIFIED CHRONICITY: Primary | ICD-10-CM

## 2021-07-01 LAB — RADIOLOGIST FLAGS: NORMAL

## 2021-07-01 PROCEDURE — 97110 THERAPEUTIC EXERCISES: CPT | Mod: GP | Performed by: PHYSICAL THERAPIST

## 2021-07-01 PROCEDURE — 73030 X-RAY EXAM OF SHOULDER: CPT | Mod: LT | Performed by: RADIOLOGY

## 2021-07-01 PROCEDURE — 99214 OFFICE O/P EST MOD 30 MIN: CPT | Performed by: PREVENTIVE MEDICINE

## 2021-07-01 PROCEDURE — 72040 X-RAY EXAM NECK SPINE 2-3 VW: CPT | Mod: GC | Performed by: STUDENT IN AN ORGANIZED HEALTH CARE EDUCATION/TRAINING PROGRAM

## 2021-07-01 PROCEDURE — 97140 MANUAL THERAPY 1/> REGIONS: CPT | Mod: GP | Performed by: PHYSICAL THERAPIST

## 2021-07-01 RX ORDER — METHOCARBAMOL 500 MG/1
500 TABLET, FILM COATED ORAL
Qty: 30 TABLET | Refills: 0 | Status: SHIPPED | OUTPATIENT
Start: 2021-07-01 | End: 2024-03-14

## 2021-07-01 RX ORDER — CELECOXIB 100 MG/1
100 CAPSULE ORAL 2 TIMES DAILY
Qty: 40 CAPSULE | Refills: 1 | Status: SHIPPED | OUTPATIENT
Start: 2021-07-01 | End: 2023-02-14

## 2021-07-01 ASSESSMENT — PAIN SCALES - GENERAL: PAINLEVEL: NO PAIN (0)

## 2021-07-01 NOTE — PATIENT INSTRUCTIONS
Thanks for coming today.  Ortho/Sports Medicine Clinic  [address] [place] [address]    To schedule future appointments in Ortho Clinic, you may call [phone].    To schedule ordered imaging by your provider:   Call [facility] [phone]    To schedule an injection ordered by your provider:  Call [facility] Injection scheduling line: [phone]  [employer] available online at:  [email or web address]    Please call if any further questions or concerns ([phone]).  Clinic hours 8 am to 5 pm.    Return to clinic (call) if symptoms worsen or fail to improve.

## 2021-07-01 NOTE — LETTER
7/1/2021         RE: Geovanni Jasso  [address]  [place] [address]        Dear Colleague,    Thank you for referring your patient, Geovanni Jasso, to the [facility] [place]. Please see a copy of my visit note below.          [facility] FOLLOW-UP VISIT 7/1/2021    Geovanni Jasso's chief complaint for this visit includes:  Chief Complaint   Patient presents with     RECHECK     Left arm/shoulder pain. Reporting pain at night and wish use.      PCP: Paddy Holly    Referring Provider:  No referring provider defined for this encounter.    /76   Pulse 76   No Pain (0)       Interval History:     Follow up reason: Left arm pain/follow up for right shoulder     Date of injury: no injury     Date last seen: 2/9/21    Following Therapeutic Plan: Yes     Pain: Worsening    Function: Worsening    Interval History: Has been doing PT     Medical History:    Any recent changes to your medical history? No    Any new medication prescribed since last visit? No    Review of Systems:    Do you have fever, chills, weight loss? No    Do you have any vision problems? No    Do you have any chest pain or edema? No    Do you have any shortness of breath or wheezing?  No    Do you have stomach problems? No    Do you have any urinary track issues? No    Do you have any numbness or focal weakness? No    Do you have diabetes? No    Do you have problems with bleeding or clotting? No    Do you have an rashes or other skin lesions? No      HISTORY OF PRESENT ILLNESS  Mr. Jasso is a pleasant 60 year old year old male who presents to clinic today with neck and bilateral shoulder pain  Geovanni explains that his shoulders continue to bother him and has pain that radiates into both shoulders and arms  Location: neck and shoulders  Quality:  achy pain    Severity: 6/10 at worst    Duration: worse over past several months  Timing: occurs intermittently  Context:  occurs while exercising and lifting arms  Modifying factors:  resting and non-use makes it better, movement and use makes it worse  Associated signs & symptoms: radiation from neck into arms  Exercise: lifting weights and cardiovascular on machine  Additional history: as documented    MEDICAL HISTORY  Patient Active Problem List   Diagnosis     Esophageal reflux     Hyperlipidemia LDL goal <70     Chronic rhinitis     Bilateral low back pain without sciatica     Chondromalacia patellae, right     S/P ACL reconstruction     Atherosclerosis of native coronary artery of native heart without angina pectoris     SOB (shortness of breath)     Non-allergic rhinitis     Positive CLAIRE (antinuclear antibody)     Hand joint stiff, right     Carpal tunnel syndrome     Osteoarthritis of knee, unspecified laterality, unspecified osteoarthritis type     Diverticulosis of large intestine     Internal hemorrhoids     Right shoulder pain       Current Outpatient Medications   Medication Sig Dispense Refill     acetaminophen (TYLENOL) 325 MG tablet Take 325-650 mg by mouth 4 times daily as needed       ASPIRIN PO Take 81 mg by mouth daily (with dinner)       azithromycin (ZITHROMAX) 250 MG tablet Take 2 tablets by mouth once daily on day 1, then 1 tablet daily thereafter 6 tablet 1     diclofenac (VOLTAREN) 1 % topical gel Apply small amount topically to right shoulder 3-4 times daily as needed for pain. 50 g 0     famotidine (PEPCID) 40 MG tablet Take 1 tablet (40 mg) by mouth nightly as needed for heartburn 90 tablet 0     fexofenadine (ALLEGRA) 60 MG tablet Take 1 tablet (60 mg) by mouth 2 times daily 60 tablet 2     fluticasone (FLONASE) 50 MCG/ACT nasal spray Spray 1-2 sprays into both nostrils daily 16 g 11     ibuprofen (ADVIL,MOTRIN) 200 MG tablet Take 200 mg by mouth every 6 hours as needed       ipratropium (ATROVENT) 0.06 % spray Spray 2 sprays into both nostrils 4 times daily as needed for rhinitis 1 Box 3     ketoconazole  (NIZORAL) 2 % external cream Apply twice daily for 8 weeks to feet 60 g 3     LANsoprazole (PREVACID) 30 MG DR capsule Take 1 capsule (30 mg) by mouth every morning (before breakfast) 90 capsule 2     meloxicam (MOBIC) 15 MG tablet Take 1 tablet (15 mg) by mouth daily 30 tablet 0     metroNIDAZOLE (METROCREAM) 0.75 % external cream Apply topically 2 times daily 45 g 1     nystatin-triamcinolone (MYCOLOG II) 153835-3.1 UNIT/GM-% external cream Apply topically 2 times daily 30 g 5     omeprazole (PRILOSEC) 40 MG DR capsule Take 1 capsule (40 mg) by mouth daily Take 30 minutes before breakfast. 90 capsule 1     valACYclovir (VALTREX) 1000 mg tablet Take 1 tablet (1,000 mg) by mouth 3 times daily 30 tablet 5       Allergies   Allergen Reactions     Penicillins        Family History   Problem Relation Age of Onset     Autoimmune Disease No family hx of      Social History     Socioeconomic History     Marital status:      Spouse name: Not on file     Number of children: Not on file     Years of education: Not on file     Highest education level: Not on file   Occupational History     Not on file   Social Needs     Financial resource strain: Not on file     Food insecurity     Worry: Not on file     Inability: Not on file     Transportation needs     Medical: Not on file     Non-medical: Not on file   Tobacco Use     Smoking status: Never Smoker     Smokeless tobacco: Never Used   Substance and Sexual Activity     Alcohol use: Yes     Comment: occasionally     Drug use: No     Sexual activity: Yes     Partners: Female   Lifestyle     Physical activity     Days per week: Not on file     Minutes per session: Not on file     Stress: Not on file   Relationships     Social connections     Talks on phone: Not on file     Gets together: Not on file     Attends Rastafari service: Not on file     Active member of club or organization: Not on file     Attends meetings of clubs or organizations: Not on file     Relationship  status: Not on file     Intimate partner violence     Fear of current or ex partner: Not on file     Emotionally abused: Not on file     Physically abused: Not on file     Forced sexual activity: Not on file   Other Topics Concern     Parent/sibling w/ CABG, MI or angioplasty before 65F 55M? Not Asked   Social History Narrative     Not on file       Additional medical/Social/Surgical histories reviewed in [facility] and updated as appropriate.     REVIEW OF SYSTEMS (7/1/2021)  10 point ROS of systems including Constitutional, Eyes, Respiratory, Cardiovascular, Gastroenterology, Genitourinary, Integumentary, Musculoskeletal, Psychiatric, Allergic/Immunologic were all negative except for pertinent positives noted in my HPI.     PHYSICAL EXAM  Vitals:    07/01/21 0832   BP: 115/76   Pulse: 76     Vital Signs: /76   Pulse 76  Patient declined being weighed. There is no height or weight on file to calculate BMI.    General  - normal appearance, in no obvious distress  HEENT  - conjunctivae not injected, moist mucous membranes, normocephalic/atraumatic head, ears normal appearance, no lesions, mouth normal appearance, no scars, normal dentition and teeth present  CV  - normal radial pulse  Pulm  - normal respiratory pattern, non-labored  Musculoskeletal - bilateral shoulder  - inspection: normal bone and joint alignment, no obvious deformity, no scapular winging, no AC step-off  - palpation: mildly tender RC insertion, normal clavicle, non-tender AC  - ROM:  painful and limited flexion and ER at end range, limited IR  - strength: 5/5  strength, 5/5 in all shoulder planes  - special tests:  (-) Speed's  (+) Neer  (+) Hawkin's  (+) Aureliano's  (-) [place]'s  (-) apprehension  (-) subscap lift-off  Neuro  - no sensory or motor deficit, grossly normal coordination, normal muscle tone  Skin  - no ecchymosis, erythema, warmth, or induration, no obvious rash  Psych  - interactive, appropriate, normal mood and affect  Neck: has  some pain with flexion and extension, and positive spulrings  ASSESSMENT & PLAN  61 yo male with neck and bilateral shoulder pain due to cervical ddd, radicular pain, and bilateral rotator cuff arthropathy    I independently reviewed the following imaging studies:  Neck xrays: shows ddd  Shoulder xrays: shows some GH arthritis and Rc ARthropathy changes    Can consider shoulder injection  However will refer to Dr Duque for further discussion of options for shoulder problems at patient's request  Discussed and ordered cervical MRI  F/u after MRI  RX for celebrex and robaxin and given HEP and ordered physical therapy for neck and shoulders  Will consider cervical VILLA as well  Appropriate PPE was utilized for prevention of spread of Covid-19.  Laron Castillo MD, CAM        Again, thank you for allowing me to participate in the care of your patient.        Sincerely,        Laron Castillo MD

## 2021-07-01 NOTE — PROGRESS NOTES
HISTORY OF PRESENT ILLNESS  Mr. Jasso is a pleasant 60 year old year old male who presents to clinic today with neck and bilateral shoulder pain  Geovanni explains that his shoulders continue to bother him and has pain that radiates into both shoulders and arms  Location: neck and shoulders  Quality:  achy pain    Severity: 6/10 at worst    Duration: worse over past several months  Timing: occurs intermittently  Context: occurs while exercising and lifting arms  Modifying factors:  resting and non-use makes it better, movement and use makes it worse  Associated signs & symptoms: radiation from neck into arms  Exercise: lifting weights and cardiovascular on machine  Additional history: as documented    MEDICAL HISTORY  Patient Active Problem List   Diagnosis     Esophageal reflux     Hyperlipidemia LDL goal <70     Chronic rhinitis     Bilateral low back pain without sciatica     Chondromalacia patellae, right     S/P ACL reconstruction     Atherosclerosis of native coronary artery of native heart without angina pectoris     SOB (shortness of breath)     Non-allergic rhinitis     Positive CLAIRE (antinuclear antibody)     Hand joint stiff, right     Carpal tunnel syndrome     Osteoarthritis of knee, unspecified laterality, unspecified osteoarthritis type     Diverticulosis of large intestine     Internal hemorrhoids     Right shoulder pain       Current Outpatient Medications   Medication Sig Dispense Refill     acetaminophen (TYLENOL) 325 MG tablet Take 325-650 mg by mouth 4 times daily as needed       ASPIRIN PO Take 81 mg by mouth daily (with dinner)       azithromycin (ZITHROMAX) 250 MG tablet Take 2 tablets by mouth once daily on day 1, then 1 tablet daily thereafter 6 tablet 1     diclofenac (VOLTAREN) 1 % topical gel Apply small amount topically to right shoulder 3-4 times daily as needed for pain. 50 g 0     famotidine (PEPCID) 40 MG tablet Take 1 tablet (40 mg) by mouth nightly as needed for heartburn 90 tablet 0      fexofenadine (ALLEGRA) 60 MG tablet Take 1 tablet (60 mg) by mouth 2 times daily 60 tablet 2     fluticasone (FLONASE) 50 MCG/ACT nasal spray Spray 1-2 sprays into both nostrils daily 16 g 11     ibuprofen (ADVIL,MOTRIN) 200 MG tablet Take 200 mg by mouth every 6 hours as needed       ipratropium (ATROVENT) 0.06 % spray Spray 2 sprays into both nostrils 4 times daily as needed for rhinitis 1 Box 3     ketoconazole (NIZORAL) 2 % external cream Apply twice daily for 8 weeks to feet 60 g 3     LANsoprazole (PREVACID) 30 MG DR capsule Take 1 capsule (30 mg) by mouth every morning (before breakfast) 90 capsule 2     meloxicam (MOBIC) 15 MG tablet Take 1 tablet (15 mg) by mouth daily 30 tablet 0     metroNIDAZOLE (METROCREAM) 0.75 % external cream Apply topically 2 times daily 45 g 1     nystatin-triamcinolone (MYCOLOG II) 829982-7.1 UNIT/GM-% external cream Apply topically 2 times daily 30 g 5     omeprazole (PRILOSEC) 40 MG DR capsule Take 1 capsule (40 mg) by mouth daily Take 30 minutes before breakfast. 90 capsule 1     valACYclovir (VALTREX) 1000 mg tablet Take 1 tablet (1,000 mg) by mouth 3 times daily 30 tablet 5       Allergies   Allergen Reactions     Penicillins        Family History   Problem Relation Age of Onset     Autoimmune Disease No family hx of      Social History     Socioeconomic History     Marital status:      Spouse name: Not on file     Number of children: Not on file     Years of education: Not on file     Highest education level: Not on file   Occupational History     Not on file   Social Needs     Financial resource strain: Not on file     Food insecurity     Worry: Not on file     Inability: Not on file     Transportation needs     Medical: Not on file     Non-medical: Not on file   Tobacco Use     Smoking status: Never Smoker     Smokeless tobacco: Never Used   Substance and Sexual Activity     Alcohol use: Yes     Comment: occasionally     Drug use: No     Sexual activity: Yes      Partners: Female   Lifestyle     Physical activity     Days per week: Not on file     Minutes per session: Not on file     Stress: Not on file   Relationships     Social connections     Talks on phone: Not on file     Gets together: Not on file     Attends Gnosticist service: Not on file     Active member of club or organization: Not on file     Attends meetings of clubs or organizations: Not on file     Relationship status: Not on file     Intimate partner violence     Fear of current or ex partner: Not on file     Emotionally abused: Not on file     Physically abused: Not on file     Forced sexual activity: Not on file   Other Topics Concern     Parent/sibling w/ CABG, MI or angioplasty before 65F 55M? Not Asked   Social History Narrative     Not on file       Additional medical/Social/Surgical histories reviewed in [facility] and updated as appropriate.     REVIEW OF SYSTEMS (7/1/2021)  10 point ROS of systems including Constitutional, Eyes, Respiratory, Cardiovascular, Gastroenterology, Genitourinary, Integumentary, Musculoskeletal, Psychiatric, Allergic/Immunologic were all negative except for pertinent positives noted in my HPI.     PHYSICAL EXAM  Vitals:    07/01/21 0832   BP: 115/76   Pulse: 76     Vital Signs: /76   Pulse 76  Patient declined being weighed. There is no height or weight on file to calculate BMI.    General  - normal appearance, in no obvious distress  HEENT  - conjunctivae not injected, moist mucous membranes, normocephalic/atraumatic head, ears normal appearance, no lesions, mouth normal appearance, no scars, normal dentition and teeth present  CV  - normal radial pulse  Pulm  - normal respiratory pattern, non-labored  Musculoskeletal - bilateral shoulder  - inspection: normal bone and joint alignment, no obvious deformity, no scapular winging, no AC step-off  - palpation: mildly tender RC insertion, normal clavicle, non-tender AC  - ROM:  painful and limited flexion and ER at end range,  limited IR  - strength: 5/5  strength, 5/5 in all shoulder planes  - special tests:  (-) Speed's  (+) Neer  (+) Hawkin's  (+) Aureliano's  (-) McKean's  (-) apprehension  (-) subscap lift-off  Neuro  - no sensory or motor deficit, grossly normal coordination, normal muscle tone  Skin  - no ecchymosis, erythema, warmth, or induration, no obvious rash  Psych  - interactive, appropriate, normal mood and affect  Neck: has some pain with flexion and extension, and positive spulrings  ASSESSMENT & PLAN  61 yo male with neck and bilateral shoulder pain due to cervical ddd, radicular pain, and bilateral rotator cuff arthropathy    I independently reviewed the following imaging studies:  Neck xrays: shows ddd  Shoulder xrays: shows some GH arthritis and Rc ARthropathy changes    Can consider shoulder injection  However will refer to Dr Duque for further discussion of options for shoulder problems at patient's request  Discussed and ordered cervical MRI  F/u after MRI  RX for celebrex and robaxin and given HEP and ordered physical therapy for neck and shoulders  Will consider cervical VILLA as well  Appropriate PPE was utilized for prevention of spread of Covid-19.  Laron Castillo MD, CAM

## 2021-07-01 NOTE — PROGRESS NOTES
[facility] FOLLOW-UP VISIT 7/1/2021    Geovanni Jasso's chief complaint for this visit includes:  Chief Complaint   Patient presents with     RECHECK     Left arm/shoulder pain. Reporting pain at night and wish use.      PCP: Paddy Holly    Referring Provider:  No referring provider defined for this encounter.    /76   Pulse 76   No Pain (0)       Interval History:     Follow up reason: Left arm pain/follow up for right shoulder     Date of injury: no injury     Date last seen: 2/9/21    Following Therapeutic Plan: Yes     Pain: Worsening    Function: Worsening    Interval History: Has been doing PT     Medical History:    Any recent changes to your medical history? No    Any new medication prescribed since last visit? No    Review of Systems:    Do you have fever, chills, weight loss? No    Do you have any vision problems? No    Do you have any chest pain or edema? No    Do you have any shortness of breath or wheezing?  No    Do you have stomach problems? No    Do you have any urinary track issues? No    Do you have any numbness or focal weakness? No    Do you have diabetes? No    Do you have problems with bleeding or clotting? No    Do you have an rashes or other skin lesions? No

## 2021-07-02 ENCOUNTER — TELEPHONE (OUTPATIENT)
Dept: ORTHOPEDICS | Facility: CLINIC | Age: 61
End: 2021-07-02

## 2021-07-02 DIAGNOSIS — M25.512 LEFT SHOULDER PAIN: ICD-10-CM

## 2021-07-02 DIAGNOSIS — M25.511 CHRONIC RIGHT SHOULDER PAIN: Primary | ICD-10-CM

## 2021-07-02 DIAGNOSIS — G89.29 CHRONIC RIGHT SHOULDER PAIN: Primary | ICD-10-CM

## 2021-07-02 NOTE — TELEPHONE ENCOUNTER
PREVISIT INFORMATION                                                    Geovanin Jasso scheduled for future visit at [facility].    Patient is scheduled to see Dr. Duque on 07/08/2021  Reason for visit: Right shoulder pain  Referring provider Daniela Dutton   Has patient seen previous specialist? No  Medical Records:  All records within [place]    REVIEW                                                      New patient packet mailed to patient: Yes - mailed on 6/18      PLAN/FOLLOW-UP NEEDED                                                      The following is needed before upcoming appointment. Patient will need to come early to have additional x-ray views taken.  Need to verify that patient has also received new patient paperwork.  Remind patient to complete forms and bring with them to the appointment.      Patient Reminders Given:  Please, make sure you bring an updated list of your medications.   If you are having a procedure, please, present 15 minutes early.  If you need to cancel or reschedule,please call [phone].    Kenzie Corral, [facility]

## 2021-07-06 ENCOUNTER — TRANSFERRED RECORDS (OUTPATIENT)
Dept: HEALTH INFORMATION MANAGEMENT | Facility: CLINIC | Age: 61
End: 2021-07-06

## 2021-07-08 ENCOUNTER — ANCILLARY PROCEDURE (OUTPATIENT)
Dept: GENERAL RADIOLOGY | Facility: CLINIC | Age: 61
End: 2021-07-08
Attending: ORTHOPAEDIC SURGERY
Payer: COMMERCIAL

## 2021-07-08 ENCOUNTER — OFFICE VISIT (OUTPATIENT)
Dept: ORTHOPEDICS | Facility: CLINIC | Age: 61
End: 2021-07-08
Payer: COMMERCIAL

## 2021-07-08 ENCOUNTER — VIRTUAL VISIT (OUTPATIENT)
Dept: ORTHOPEDICS | Facility: CLINIC | Age: 61
End: 2021-07-08
Payer: COMMERCIAL

## 2021-07-08 VITALS
HEART RATE: 52 BPM | DIASTOLIC BLOOD PRESSURE: 77 MMHG | HEIGHT: 73 IN | BODY MASS INDEX: 26.09 KG/M2 | WEIGHT: 196.9 LBS | SYSTOLIC BLOOD PRESSURE: 122 MMHG | OXYGEN SATURATION: 97 %

## 2021-07-08 DIAGNOSIS — M67.919 DISORDER OF BURSAE AND TENDONS IN SHOULDER REGION: Primary | ICD-10-CM

## 2021-07-08 DIAGNOSIS — M50.30 DDD (DEGENERATIVE DISC DISEASE), CERVICAL: Primary | ICD-10-CM

## 2021-07-08 DIAGNOSIS — M25.511 PAIN IN JOINT OF RIGHT SHOULDER: ICD-10-CM

## 2021-07-08 DIAGNOSIS — M54.12 CERVICAL RADICULAR PAIN: ICD-10-CM

## 2021-07-08 DIAGNOSIS — M48.9 CERVICAL SPINE DISEASE: ICD-10-CM

## 2021-07-08 DIAGNOSIS — G89.29 CHRONIC RIGHT SHOULDER PAIN: ICD-10-CM

## 2021-07-08 DIAGNOSIS — M71.9 DISORDER OF BURSAE AND TENDONS IN SHOULDER REGION: Primary | ICD-10-CM

## 2021-07-08 DIAGNOSIS — M25.511 CHRONIC RIGHT SHOULDER PAIN: ICD-10-CM

## 2021-07-08 PROCEDURE — 99213 OFFICE O/P EST LOW 20 MIN: CPT | Mod: TEL | Performed by: PREVENTIVE MEDICINE

## 2021-07-08 PROCEDURE — 99204 OFFICE O/P NEW MOD 45 MIN: CPT | Performed by: ORTHOPAEDIC SURGERY

## 2021-07-08 PROCEDURE — 73030 X-RAY EXAM OF SHOULDER: CPT | Mod: RT | Performed by: RADIOLOGY

## 2021-07-08 ASSESSMENT — PAIN SCALES - GENERAL: PAINLEVEL: SEVERE PAIN (6)

## 2021-07-08 ASSESSMENT — MIFFLIN-ST. JEOR: SCORE: 1747.5

## 2021-07-08 NOTE — LETTER
7/8/2021       RE: Geovanni Jasso  [address]  [place] [address]     Dear Colleague,    Thank you for referring your patient, Geovanni Jasso, to the [facility] [place] [facility]. Please see a copy of my visit note below.    Patient is a  60   year old who is being evaluated via a billable telephone visit.      What phone number would you like to be contacted at? CELL  How would you like to obtain your AVS? MYCHART        Subjective   Patient is a    60 year old who presents by phone call visit for the following:   Continued neck and right shoulder pain  Not resovled    HPI       Review of Systems   Constitutional, HEENT, cardiovascular, pulmonary, gi and gu systems are negative, except as otherwise noted.      Objective           Vitals:  No vitals were obtained today due to virtual visit.    Physical Exam   healthy, alert and no distress  PSYCH: Alert and oriented times 3; coherent speech, normal   rate and volume, able to articulate logical thoughts, able   to abstract reason, no tangential thoughts, no hallucinations   or delusions  His affect is normal  RESP: No cough, no audible wheezing, able to talk in full sentences  Remainder of exam unable to be completed due to telephone visits    Assessment/Plan  61 yo male with cervical ddd, disc herniations, radicular pain    I independently reviewed the following imaging studies and discussed with patient:  Cervical MRI: shows ddd, disc herniations  Discussed and will consider VILLA  F/u in a few weeks  Cont. medications    Phone call duration: 20 minutes  Phone call start: 5:15pm  Phone call end: 5:35pm  Dr Castillo      Again, thank you for allowing me to participate in the care of your patient.      Sincerely,    Laron Castillo MD

## 2021-07-08 NOTE — LETTER
7/8/2021      RE: Geovanni Jasso  [address]  [place] [address]       Patient is a  60   year old who is being evaluated via a billable telephone visit.      What phone number would you like to be contacted at? CELL  How would you like to obtain your AVS? LESLEY        Subjective   Patient is a    60 year old who presents by phone call visit for the following:   Continued neck and right shoulder pain  Not resovled    HPI       Review of Systems   Constitutional, HEENT, cardiovascular, pulmonary, gi and gu systems are negative, except as otherwise noted.      Objective           Vitals:  No vitals were obtained today due to virtual visit.    Physical Exam   healthy, alert and no distress  PSYCH: Alert and oriented times 3; coherent speech, normal   rate and volume, able to articulate logical thoughts, able   to abstract reason, no tangential thoughts, no hallucinations   or delusions  His affect is normal  RESP: No cough, no audible wheezing, able to talk in full sentences  Remainder of exam unable to be completed due to telephone visits    Assessment/Plan  59 yo male with cervical ddd, disc herniations, radicular pain    I independently reviewed the following imaging studies and discussed with patient:  Cervical MRI: shows ddd, disc herniations  Discussed and will consider VILLA  F/u in a few weeks  Cont. medications    Phone call duration: 20 minutes  Phone call start: 5:15pm  Phone call end: 5:35pm  Dr Jonathan Castillo MD

## 2021-07-08 NOTE — PROGRESS NOTES
CHIEF CONCERN:  B shoulder pain    HISTORY OF PRESENT ILLNESS:  Mr. Jasso is a 60 year old RHD man who is seen for B shoulder pain. He has had some trouble wince Nov 2019 when he had an injury. He has seen Dr. Castillo and in Feb this year had a R subacromial injection. He feels that did not relieve the pain and in fact he felt worse. He has been doing PT with Daniela. Both Dr. Castillo and Daniela felt the C spine could be a source of his pain. He just had an MRI done. His pain is worst at night and interrupts his sleep.     Past Medical History:   Diagnosis Date     Carpal tunnel syndrome      Chronic rhinitis 10/23/2015     Esophageal reflux 12/26/2013     Hand joint stiff, right        Past Surgical History:   Procedure Laterality Date     COLONOSCOPY WITH CO2 INSUFFLATION N/A 7/17/2019    Procedure: COLONOSCOPY, WITH CO2 INSUFFLATION;  Surgeon: Jaison Hammer MD;  Location: [facility] OR     NO HISTORY OF SURGERY         Current Outpatient Medications   Medication Sig Dispense Refill     acetaminophen (TYLENOL) 325 MG tablet Take 325-650 mg by mouth 4 times daily as needed       ASPIRIN PO Take 81 mg by mouth daily (with dinner)       azithromycin (ZITHROMAX) 250 MG tablet Take 2 tablets by mouth once daily on day 1, then 1 tablet daily thereafter 6 tablet 1     celecoxib (CELEBREX) 100 MG capsule Take 1 capsule (100 mg) by mouth 2 times daily 40 capsule 1     diclofenac (VOLTAREN) 1 % topical gel Apply small amount topically to right shoulder 3-4 times daily as needed for pain. 50 g 0     famotidine (PEPCID) 40 MG tablet Take 1 tablet (40 mg) by mouth nightly as needed for heartburn 90 tablet 0     fexofenadine (ALLEGRA) 60 MG tablet Take 1 tablet (60 mg) by mouth 2 times daily 60 tablet 2     fluticasone (FLONASE) 50 MCG/ACT nasal spray Spray 1-2 sprays into both nostrils daily 16 g 11     ibuprofen (ADVIL,MOTRIN) 200 MG tablet Take 200 mg by mouth every 6 hours as needed       ipratropium (ATROVENT) 0.06  % spray Spray 2 sprays into both nostrils 4 times daily as needed for rhinitis 1 Box 3     ketoconazole (NIZORAL) 2 % external cream Apply twice daily for 8 weeks to feet 60 g 3     LANsoprazole (PREVACID) 30 MG DR capsule Take 1 capsule (30 mg) by mouth every morning (before breakfast) 90 capsule 2     meloxicam (MOBIC) 15 MG tablet Take 1 tablet (15 mg) by mouth daily 30 tablet 0     methocarbamol (ROBAXIN) 500 MG tablet Take 1 tablet (500 mg) by mouth nightly as needed for muscle spasms 30 tablet 0     metroNIDAZOLE (METROCREAM) 0.75 % external cream Apply topically 2 times daily 45 g 1     nystatin-triamcinolone (MYCOLOG II) 659640-8.1 UNIT/GM-% external cream Apply topically 2 times daily 30 g 5     omeprazole (PRILOSEC) 40 MG DR capsule Take 1 capsule (40 mg) by mouth daily Take 30 minutes before breakfast. 90 capsule 1     valACYclovir (VALTREX) 1000 mg tablet Take 1 tablet (1,000 mg) by mouth 3 times daily 30 tablet 5          Allergies   Allergen Reactions     Penicillins        SOCIAL HISTORY:    Social History     Socioeconomic History     Marital status:      Spouse name: Not on file     Number of children: Not on file     Years of education: Not on file     Highest education level: Not on file   Occupational History     Not on file   Social Needs     Financial resource strain: Not on file     Food insecurity     Worry: Not on file     Inability: Not on file     Transportation needs     Medical: Not on file     Non-medical: Not on file   Tobacco Use     Smoking status: Never Smoker     Smokeless tobacco: Never Used   Substance and Sexual Activity     Alcohol use: Yes     Comment: occasionally     Drug use: No     Sexual activity: Yes     Partners: Female   Lifestyle     Physical activity     Days per week: Not on file     Minutes per session: Not on file     Stress: Not on file   Relationships     Social connections     Talks on phone: Not on file     Gets together: Not on file     Attends  Jainism service: Not on file     Active member of club or organization: Not on file     Attends meetings of clubs or organizations: Not on file     Relationship status: Not on file     Intimate partner violence     Fear of current or ex partner: Not on file     Emotionally abused: Not on file     Physically abused: Not on file     Forced sexual activity: Not on file   Other Topics Concern     Parent/sibling w/ CABG, MI or angioplasty before 65F 55M? Not Asked   Social History Narrative     Not on file       FAMILY HISTORY: Reviewed in EMR      REVIEW OF SYSTEMS: Positive for that noted in past medical history and history of present illness and otherwise reviewed in EMR     PHYSICAL EXAM:    Adult male in no acute distress. Articulates and communicates with normal affect.  Respirations even and unlabored  Focused upper extremity exam: Skin intact. No erythema. Sensation intact all dermatomes into the hand to light touch. EPL, FPL, and Intrinsics intact. Right shoulder active motion is FE to 165, ER at side to 65, and IR to L5. Left shoulder active motion is FE to 170, ER to 60, and IR to L2. He has the most pain bilaterally with IR which is restricted and reproduces pain anterior shoulder.  Bilateral negative Neer and Back. No pain on palpation over the AC joint. No focal pain on palpation over the long head of the biceps. He has a positive OBriens on the R but negative on the L. Speeds negative bilaterally. FE and ER strength 5/5 B.    IMAGING:  B shoulder XRs reviewed and demonstrate the GH joint is well preserved. No superior migration.     Right shoulder MRI dated 1/13/21 was reviewed and I agree with the Impression below:  Impression:  1a. Low-grade partial-thickness intrasubstance tearing of the junctional fibers of the supraspinatus and infraspinatus with superimposed mild to moderate chronic tendinopathy.  1b. Mild bursal sided fraying of the supraspinatus tendon.   2. Mild to moderate subscapularis  tendinopathy.  3. Mild biceps tendinopathy at the bicipital labral anchor.    C-spine MRI images dated 7/6/21 reviewed independently and agree with the [facility] report below:      ASSESSMENT:    1. Right shoulder SLAP tear  2. Left shoulder pain  3. C-spine DDD    PLAN:  I reviewed the imaging and exam findings with the patient.   I discussed w Dr. Castillo re: Cspine MRI and my shoulder findings. R SLAP tear may be one pain generator but would not rule out C spine as additional etiology. May consider differential injections. Will review with Daniela regarding shoulder pain generators. Encourage additional shoulder PT. Dr. Castillo planning to discuss C spine with patient later today.     Zulma Duque MD

## 2021-07-08 NOTE — NURSING NOTE
"Reason For Visit:   Chief Complaint   Patient presents with     Shoulder right     slipped and fell while hanging onto a bar, injection made things worse       PCP: Paddy Holly  Ref: Dr. Castillo    ?  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  Work status?  Full time.  Date of injury: November 2019  Type of injury: Fall while hanging onto bar.  Date of surgery: No surgeries  Type of surgery: No surgeries.  Smoker: No  Request smoking cessation information: No    Right hand dominant    SANE score  Affected shoulder: Right  Right shoulder SANE: 45  Left shoulder SANE: 55    /77   Pulse 52   Ht 1.855 m (6' 1.03\")   Wt 89.3 kg (196 lb 14.4 oz)   SpO2 97%   BMI 25.96 kg/m             Sophia Do ATC      " normal...

## 2021-07-08 NOTE — LETTER
7/8/2021         RE: Geovanni Jasso  [address]  [facility] [address]        Dear Colleague,    Thank you for referring your patient, Geovanni Jasso, to the [facility]. Please see a copy of my visit note below.    CHIEF CONCERN:  B shoulder pain    HISTORY OF PRESENT ILLNESS:  Mr. Jasso is a 60 year old RHD man who is seen for B shoulder pain. He has had some trouble wince Nov 2019 when he had an injury. He has seen Dr. Castillo and in Feb this year had a R subacromial injection. He feels that did not relieve the pain and in fact he felt worse. He has been doing PT with Daniela. Both Dr. Castillo and Daniela felt the C spine could be a source of his pain. He just had an MRI done. His pain is worst at night and interrupts his sleep.     Past Medical History:   Diagnosis Date     Carpal tunnel syndrome      Chronic rhinitis 10/23/2015     Esophageal reflux 12/26/2013     Hand joint stiff, right        Past Surgical History:   Procedure Laterality Date     COLONOSCOPY WITH CO2 INSUFFLATION N/A 7/17/2019    Procedure: COLONOSCOPY, WITH CO2 INSUFFLATION;  Surgeon: Jaison Hammer MD;  Location: [facility] OR     NO HISTORY OF SURGERY         Current Outpatient Medications   Medication Sig Dispense Refill     acetaminophen (TYLENOL) 325 MG tablet Take 325-650 mg by mouth 4 times daily as needed       ASPIRIN PO Take 81 mg by mouth daily (with dinner)       azithromycin (ZITHROMAX) 250 MG tablet Take 2 tablets by mouth once daily on day 1, then 1 tablet daily thereafter 6 tablet 1     celecoxib (CELEBREX) 100 MG capsule Take 1 capsule (100 mg) by mouth 2 times daily 40 capsule 1     diclofenac (VOLTAREN) 1 % topical gel Apply small amount topically to right shoulder 3-4 times daily as needed for pain. 50 g 0     famotidine (PEPCID) 40 MG tablet Take 1 tablet (40 mg) by mouth nightly as needed for heartburn 90 tablet 0     fexofenadine (ALLEGRA) 60 MG tablet Take 1  tablet (60 mg) by mouth 2 times daily 60 tablet 2     fluticasone (FLONASE) 50 MCG/ACT nasal spray Spray 1-2 sprays into both nostrils daily 16 g 11     ibuprofen (ADVIL,MOTRIN) 200 MG tablet Take 200 mg by mouth every 6 hours as needed       ipratropium (ATROVENT) 0.06 % spray Spray 2 sprays into both nostrils 4 times daily as needed for rhinitis 1 Box 3     ketoconazole (NIZORAL) 2 % external cream Apply twice daily for 8 weeks to feet 60 g 3     LANsoprazole (PREVACID) 30 MG DR capsule Take 1 capsule (30 mg) by mouth every morning (before breakfast) 90 capsule 2     meloxicam (MOBIC) 15 MG tablet Take 1 tablet (15 mg) by mouth daily 30 tablet 0     methocarbamol (ROBAXIN) 500 MG tablet Take 1 tablet (500 mg) by mouth nightly as needed for muscle spasms 30 tablet 0     metroNIDAZOLE (METROCREAM) 0.75 % external cream Apply topically 2 times daily 45 g 1     nystatin-triamcinolone (MYCOLOG II) 892130-7.1 UNIT/GM-% external cream Apply topically 2 times daily 30 g 5     omeprazole (PRILOSEC) 40 MG DR capsule Take 1 capsule (40 mg) by mouth daily Take 30 minutes before breakfast. 90 capsule 1     valACYclovir (VALTREX) 1000 mg tablet Take 1 tablet (1,000 mg) by mouth 3 times daily 30 tablet 5          Allergies   Allergen Reactions     Penicillins        SOCIAL HISTORY:    Social History     Socioeconomic History     Marital status:      Spouse name: Not on file     Number of children: Not on file     Years of education: Not on file     Highest education level: Not on file   Occupational History     Not on file   Social Needs     Financial resource strain: Not on file     Food insecurity     Worry: Not on file     Inability: Not on file     Transportation needs     Medical: Not on file     Non-medical: Not on file   Tobacco Use     Smoking status: Never Smoker     Smokeless tobacco: Never Used   Substance and Sexual Activity     Alcohol use: Yes     Comment: occasionally     Drug use: No     Sexual activity:  Yes     Partners: Female   Lifestyle     Physical activity     Days per week: Not on file     Minutes per session: Not on file     Stress: Not on file   Relationships     Social connections     Talks on phone: Not on file     Gets together: Not on file     Attends Anabaptism service: Not on file     Active member of club or organization: Not on file     Attends meetings of clubs or organizations: Not on file     Relationship status: Not on file     Intimate partner violence     Fear of current or ex partner: Not on file     Emotionally abused: Not on file     Physically abused: Not on file     Forced sexual activity: Not on file   Other Topics Concern     Parent/sibling w/ CABG, MI or angioplasty before 65F 55M? Not Asked   Social History Narrative     Not on file       FAMILY HISTORY: Reviewed in EMR      REVIEW OF SYSTEMS: Positive for that noted in past medical history and history of present illness and otherwise reviewed in EMR     PHYSICAL EXAM:    Adult male in no acute distress. Articulates and communicates with normal affect.  Respirations even and unlabored  Focused upper extremity exam: Skin intact. No erythema. Sensation intact all dermatomes into the hand to light touch. EPL, FPL, and Intrinsics intact. Right shoulder active motion is FE to 165, ER at side to 65, and IR to L5. Left shoulder active motion is FE to 170, ER to 60, and IR to L2. He has the most pain bilaterally with IR which is restricted and reproduces pain anterior shoulder.  Bilateral negative Neer and Back. No pain on palpation over the AC joint. No focal pain on palpation over the long head of the biceps. He has a positive OBriens on the R but negative on the L. Speeds negative bilaterally. FE and ER strength 5/5 B.    IMAGING:  B shoulder XRs reviewed and demonstrate the GH joint is well preserved. No superior migration.     Right shoulder MRI dated 1/13/21 was reviewed and I agree with the Impression below:  Impression:  1a.  Low-grade partial-thickness intrasubstance tearing of the junctional fibers of the supraspinatus and infraspinatus with superimposed mild to moderate chronic tendinopathy.  1b. Mild bursal sided fraying of the supraspinatus tendon.   2. Mild to moderate subscapularis tendinopathy.  3. Mild biceps tendinopathy at the bicipital labral anchor.    C-spine MRI images dated 7/6/21 reviewed independently and agree with the [facility] Radiology report below:      ASSESSMENT:    1. Right shoulder SLAP tear  2. Left shoulder pain  3. C-spine DDD    PLAN:  I reviewed the imaging and exam findings with the patient.   I discussed w Dr. Castillo re: Cspine MRI and my shoulder findings. R SLAP tear may be one pain generator but would not rule out C spine as additional etiology. May consider differential injections. Will review with Daniela regarding shoulder pain generators. Encourage additional shoulder PT. Dr. Castillo planning to discuss C spine with patient later today.     Zulma Duque MD        Again, thank you for allowing me to participate in the care of your patient.        Sincerely,        Zulma Duque MD

## 2021-07-15 ENCOUNTER — THERAPY VISIT (OUTPATIENT)
Dept: PHYSICAL THERAPY | Facility: CLINIC | Age: 61
End: 2021-07-15
Attending: PREVENTIVE MEDICINE
Payer: COMMERCIAL

## 2021-07-15 DIAGNOSIS — M62.838 CERVICAL PARASPINAL MUSCLE SPASM: ICD-10-CM

## 2021-07-15 DIAGNOSIS — M50.30 DDD (DEGENERATIVE DISC DISEASE), CERVICAL: ICD-10-CM

## 2021-07-15 DIAGNOSIS — M54.2 NECK PAIN: ICD-10-CM

## 2021-07-15 DIAGNOSIS — M25.511 RIGHT SHOULDER PAIN: ICD-10-CM

## 2021-07-15 PROCEDURE — 97014 ELECTRIC STIMULATION THERAPY: CPT | Performed by: PHYSICAL THERAPIST

## 2021-07-15 PROCEDURE — 97110 THERAPEUTIC EXERCISES: CPT | Mod: GP | Performed by: PHYSICAL THERAPIST

## 2021-07-15 PROCEDURE — 97161 PT EVAL LOW COMPLEX 20 MIN: CPT | Mod: GP | Performed by: PHYSICAL THERAPIST

## 2021-07-15 NOTE — PROGRESS NOTES
Physical Therapy Initial Evaluation  Subjective:  The history is provided by the patient. No  was used.   Patient Health History  Geovanni Jasso being seen for Neck.     Problem began: 7/8/2021.   Problem occurred: Unknown     General health as reported by patient is good.  Pertinent medical history includes: none.   Red flags:  Pain at rest/night.  Medical allergies: none.   Surgeries include:  Other.     Other medications details: Allergy.    Current occupation is Finance.                     Therapist Generated HPI Evaluation  Problem details: Due to ongoing bilateral shoulder issues Dr Castillo and Dr Duque would like to trial a round of PT for there cervical spine as well as complete an injection at his next convenience. .         Type of problem:  Cervical spine.    This is a new condition.  Condition occurred with:  Insidious onset.  Where condition occurred: for unknown reasons.  Patient reports pain:  Other (stiffness; bilateral shoulder pain).  Pain is described as aching, sharp and shooting and is intermittent.  Pain radiates to:  Upper arm left and upper arm right. Pain is the same all the time.  Since onset symptoms are gradually worsening.  Symptoms are exacerbated by other, carrying and driving (moving his arms)  and relieved by nothing.  Special tests included:  MRI and x-ray.    Restrictions due to condition include:  Working in normal job without restrictions.  Barriers include:  None as reported by patient.                        Objective:  System    Physical Exam    [place] Cervical Evaluation      Movement Loss:    Flexion (Flex): nil  Retraction (RET): mod  Extension (EXT): min  Lateral Flexion Right (LF R): min  Lateral Flexion Left (LF L): min  Rotation Right (ROT R): min  Rotation Left (ROT L): min                                                 ROS    Assessment/Plan:    Patient is a 60 year old male with cervical complaints.    Patient has the following significant  findings with corresponding treatment plan.                Diagnosis 1:  Neck pain  Pain -  manual therapy, self management, education and directional preference exercise  Decreased ROM/flexibility - manual therapy and therapeutic exercise  Decreased strength - therapeutic exercise and therapeutic activities  Impaired muscle performance - neuro re-education  Decreased function - therapeutic activities      Previous and current functional limitations:  (See Goal Flow Sheet for this information)    Short term and Long term goals: (See Goal Flow Sheet for this information)     Communication ability:  Patient appears to be able to clearly communicate and understand verbal and written communication and follow directions correctly.  Treatment Explanation - The following has been discussed with the patient:   RX ordered/plan of care  Anticipated outcomes  Possible risks and side effects  This patient would benefit from PT intervention to resume normal activities.   Rehab potential is good.    Frequency:  1 X week, once daily  Duration:  for 8 weeks  Discharge Plan:  Achieve all LTG.  Independent in home treatment program.  Reach maximal therapeutic benefit.    Please refer to the daily flowsheet for treatment today, total treatment time and time spent performing 1:1 timed codes.

## 2021-07-21 ENCOUNTER — ALLIED HEALTH/NURSE VISIT (OUTPATIENT)
Dept: DERMATOLOGY | Facility: CLINIC | Age: 61
End: 2021-07-21
Payer: COMMERCIAL

## 2021-07-21 DIAGNOSIS — B49 FUNGUS INFECTION: Primary | ICD-10-CM

## 2021-07-21 PROCEDURE — 99207 PR NO CHARGE NURSE ONLY: CPT | Performed by: DERMATOLOGY

## 2021-07-21 PROCEDURE — 87101 SKIN FUNGI CULTURE: CPT | Performed by: DERMATOLOGY

## 2021-07-21 NOTE — NURSING NOTE
Geovanni Jasso's goals for this visit include:   Chief Complaint   Patient presents with     Derm Problem     nail fungal culture left 1st toe       He requests these members of his care team be copied on today's visit information: no    PCP: Paddy Holly    Referring Provider:  No referring provider defined for this encounter.    There were no vitals taken for this visit.    Do you need any medication refills at today's visit? No    Geovanni Jasso comes into clinic today at the request of Dr. Castro Ordering Provider for fungal toenail culture.        This service provided today was under the supervising provider of the day Dr. Kumar, who was available if needed.    Haley Live LPN

## 2021-07-22 ENCOUNTER — THERAPY VISIT (OUTPATIENT)
Dept: PHYSICAL THERAPY | Facility: CLINIC | Age: 61
End: 2021-07-22
Payer: COMMERCIAL

## 2021-07-22 DIAGNOSIS — M54.2 NECK PAIN: ICD-10-CM

## 2021-07-22 PROCEDURE — 97110 THERAPEUTIC EXERCISES: CPT | Mod: GP | Performed by: PHYSICAL THERAPIST

## 2021-07-22 PROCEDURE — 97140 MANUAL THERAPY 1/> REGIONS: CPT | Mod: GP | Performed by: PHYSICAL THERAPIST

## 2021-07-23 DIAGNOSIS — Z11.59 ENCOUNTER FOR SCREENING FOR OTHER VIRAL DISEASES: ICD-10-CM

## 2021-07-26 ENCOUNTER — THERAPY VISIT (OUTPATIENT)
Dept: PHYSICAL THERAPY | Facility: CLINIC | Age: 61
End: 2021-07-26
Payer: COMMERCIAL

## 2021-07-26 DIAGNOSIS — M54.2 NECK PAIN: ICD-10-CM

## 2021-07-26 PROCEDURE — 97140 MANUAL THERAPY 1/> REGIONS: CPT | Mod: GP | Performed by: PHYSICAL THERAPIST

## 2021-07-26 PROCEDURE — 97110 THERAPEUTIC EXERCISES: CPT | Mod: GP | Performed by: PHYSICAL THERAPIST

## 2021-07-26 NOTE — PROGRESS NOTES
Patient is a  60   year old who is being evaluated via a billable telephone visit.      What phone number would you like to be contacted at? CELL  How would you like to obtain your AVS? LESLEY        Subjective   Patient is a    60 year old who presents by phone call visit for the following:   Continued neck and right shoulder pain  Not resovled    HPI       Review of Systems   Constitutional, HEENT, cardiovascular, pulmonary, gi and gu systems are negative, except as otherwise noted.      Objective           Vitals:  No vitals were obtained today due to virtual visit.    Physical Exam   healthy, alert and no distress  PSYCH: Alert and oriented times 3; coherent speech, normal   rate and volume, able to articulate logical thoughts, able   to abstract reason, no tangential thoughts, no hallucinations   or delusions  His affect is normal  RESP: No cough, no audible wheezing, able to talk in full sentences  Remainder of exam unable to be completed due to telephone visits    Assessment/Plan  61 yo male with cervical ddd, disc herniations, radicular pain    I independently reviewed the following imaging studies and discussed with patient:  Cervical MRI: shows ddd, disc herniations  Discussed and will consider VILLA  F/u in a few weeks  Cont. medications    Phone call duration: 20 minutes  Phone call start: 5:15pm  Phone call end: 5:35pm  Dr Castillo

## 2021-08-05 ENCOUNTER — THERAPY VISIT (OUTPATIENT)
Dept: PHYSICAL THERAPY | Facility: CLINIC | Age: 61
End: 2021-08-05
Payer: COMMERCIAL

## 2021-08-05 DIAGNOSIS — M54.2 NECK PAIN: ICD-10-CM

## 2021-08-05 PROCEDURE — 97140 MANUAL THERAPY 1/> REGIONS: CPT | Mod: GP | Performed by: PHYSICAL THERAPIST

## 2021-08-05 PROCEDURE — 97110 THERAPEUTIC EXERCISES: CPT | Mod: GP | Performed by: PHYSICAL THERAPIST

## 2021-08-11 ENCOUNTER — THERAPY VISIT (OUTPATIENT)
Dept: PHYSICAL THERAPY | Facility: CLINIC | Age: 61
End: 2021-08-11
Payer: COMMERCIAL

## 2021-08-11 DIAGNOSIS — M54.2 NECK PAIN: ICD-10-CM

## 2021-08-11 PROCEDURE — 97140 MANUAL THERAPY 1/> REGIONS: CPT | Mod: GP | Performed by: PHYSICAL THERAPIST

## 2021-08-11 PROCEDURE — 97110 THERAPEUTIC EXERCISES: CPT | Mod: GP | Performed by: PHYSICAL THERAPIST

## 2021-08-18 LAB — BACTERIA SPEC CULT: NO GROWTH

## 2021-08-24 ENCOUNTER — TELEPHONE (OUTPATIENT)
Dept: DERMATOLOGY | Facility: CLINIC | Age: 61
End: 2021-08-24

## 2021-08-24 DIAGNOSIS — B35.1 ONYCHOMYCOSIS: Primary | ICD-10-CM

## 2021-08-24 NOTE — TELEPHONE ENCOUNTER
RN--please send prescription to pharmacy.    Marisela Adhikari   8/24/2021  4:02 PM CDT Back to Top      Spoke with patient and he was given results. Directions and info on the Jublia sent to his MyChart.      RN--please send prescription to pharmacy.         Marisela Adhikari on 8/24/2021 at 4:02 PM    Jennifer Denson LPN   8/20/2021 10:21 AM CDT       Attempted to call patient, no answer. LVM for patient to call back, number was provided.     PONCE Meier Ronda, MD   8/19/2021  6:12 PM CDT       Patient has 2 negative nail fungul cultures. Please start jublia on him and lets see if this improved over 4 months. Have him take before photos and send in also  JUBLIA (efinaconazole) Topical Solution, 10% is specifically formulated to reach the site of onychomycosis, to treat toenail fungus. JUBLIA is applied on, around, and under the nail to get to the site of infection.Your toenails should be clean and dry before you apply JUBLIA.     Step 1:  remove the cap from the JUBLIA bottle.      Step 2 squeeze to apply one drop  Hold the bottle upside down directly over the affected toenail. By turning the bottle upside down, the entire brush will become moistened with the solution.  Apply one drop of JUBLIA onto the toenail.  Do not squeeze the bottle, or press or rub the brush firmly against the toenail while applying JUBLIA.     Visit [email or web address] for cost savings card     Apply once daily to nail    Marbella Castro MD   8/13/2021  5:16 PM CDT       Prelim no growth, awaiting final         Marisela Adhikari on 8/24/2021 at 4:05 PM

## 2021-08-25 ENCOUNTER — THERAPY VISIT (OUTPATIENT)
Dept: PHYSICAL THERAPY | Facility: CLINIC | Age: 61
End: 2021-08-25
Payer: COMMERCIAL

## 2021-08-25 DIAGNOSIS — M25.511 RIGHT SHOULDER PAIN: ICD-10-CM

## 2021-08-25 DIAGNOSIS — M54.2 NECK PAIN: ICD-10-CM

## 2021-08-25 PROCEDURE — 97110 THERAPEUTIC EXERCISES: CPT | Mod: GP | Performed by: PHYSICAL THERAPIST

## 2021-08-25 PROCEDURE — 97140 MANUAL THERAPY 1/> REGIONS: CPT | Mod: GP | Performed by: PHYSICAL THERAPIST

## 2021-08-25 RX ORDER — EFINACONAZOLE 100 MG/ML
SOLUTION TOPICAL DAILY
Qty: 8 ML | Refills: 1 | Status: SHIPPED | OUTPATIENT
Start: 2021-08-25 | End: 2021-10-12

## 2021-09-03 ENCOUNTER — LAB (OUTPATIENT)
Dept: LAB | Facility: CLINIC | Age: 61
End: 2021-09-03
Payer: COMMERCIAL

## 2021-09-03 DIAGNOSIS — Z11.59 ENCOUNTER FOR SCREENING FOR OTHER VIRAL DISEASES: ICD-10-CM

## 2021-09-03 PROCEDURE — U0003 INFECTIOUS AGENT DETECTION BY NUCLEIC ACID (DNA OR RNA); SEVERE ACUTE RESPIRATORY SYNDROME CORONAVIRUS 2 (SARS-COV-2) (CORONAVIRUS DISEASE [COVID-19]), AMPLIFIED PROBE TECHNIQUE, MAKING USE OF HIGH THROUGHPUT TECHNOLOGIES AS DESCRIBED BY CMS-2020-01-R: HCPCS

## 2021-09-03 PROCEDURE — U0005 INFEC AGEN DETEC AMPLI PROBE: HCPCS

## 2021-09-04 ENCOUNTER — HEALTH MAINTENANCE LETTER (OUTPATIENT)
Age: 61
End: 2021-09-04

## 2021-09-04 LAB — SARS-COV-2 RNA RESP QL NAA+PROBE: NEGATIVE

## 2021-09-07 ENCOUNTER — ANCILLARY PROCEDURE (OUTPATIENT)
Dept: GENERAL RADIOLOGY | Facility: CLINIC | Age: 61
End: 2021-09-07
Attending: PREVENTIVE MEDICINE
Payer: COMMERCIAL

## 2021-09-07 DIAGNOSIS — M50.20 CERVICAL DISC HERNIATION: ICD-10-CM

## 2021-09-07 DIAGNOSIS — M50.30 DDD (DEGENERATIVE DISC DISEASE), CERVICAL: ICD-10-CM

## 2021-09-07 PROCEDURE — 62321 NJX INTERLAMINAR CRV/THRC: CPT | Performed by: RADIOLOGY

## 2021-09-07 RX ORDER — BETAMETHASONE SODIUM PHOSPHATE AND BETAMETHASONE ACETATE 3; 3 MG/ML; MG/ML
18 INJECTION, SUSPENSION INTRA-ARTICULAR; INTRALESIONAL; INTRAMUSCULAR; SOFT TISSUE ONCE
Status: COMPLETED | OUTPATIENT
Start: 2021-09-07 | End: 2021-09-07

## 2021-09-07 RX ORDER — IOPAMIDOL 408 MG/ML
10 INJECTION, SOLUTION INTRATHECAL ONCE
Status: COMPLETED | OUTPATIENT
Start: 2021-09-07 | End: 2021-09-07

## 2021-09-07 RX ADMIN — IOPAMIDOL 2 ML: 408 INJECTION, SOLUTION INTRATHECAL at 15:44

## 2021-09-07 RX ADMIN — BETAMETHASONE SODIUM PHOSPHATE AND BETAMETHASONE ACETATE 18 MG: 3; 3 INJECTION, SUSPENSION INTRA-ARTICULAR; INTRALESIONAL; INTRAMUSCULAR; SOFT TISSUE at 15:43

## 2021-09-07 NOTE — PROGRESS NOTES
Bill Jasso was seen in X-ray today for a cervical epidural injection. Patient rated pain before procedure 2/10. After procedure patient rated pain 1/10.   This pain level is acceptable to patient. Patient discharged home with .

## 2021-09-07 NOTE — PROGRESS NOTES
AFTER YOU GO HOME    ? DO relax; minimize your activity for 24 hours  ? You may resume normal activity tomorrow  ? You may remove the bandage in the evening or next morning  ? You may resume bathing the next day  ? Drink at least 4 extra glasses of fluid today if not on fluid restrictions  ? DO NOT drive or operate machinery at home or at work for at least 24 hours      VISIT THE EMERGENCY ROOM OR URGENT CARE IF:    ? There is redness or swelling at the injection site  ? There is discharge from the injection site  ? You develop a temperature of 101  F or greater      ADDITIONAL INSTRUCTIONS:     ? You may resume your Coumadin or other blood thinner at your regular dose today.  Follow up with your physician to have your INR rechecked if indicated.  ? If you gain no relief from the injection after two (2) weeks, follow-up with your provider for your options.        Contacts:    During business hours from 8 to 5 pm, you may call [phone] to reach a nurse advisor at [facility].  After hours, call [facility]  [phone].  Ask for the Radiologist on-call.  Someone is on-call 24 hrs/day.  [facility] Toll Free Number   [phone]

## 2021-09-13 DIAGNOSIS — J30.89 SEASONAL ALLERGIC RHINITIS DUE TO OTHER ALLERGIC TRIGGER: ICD-10-CM

## 2021-09-14 RX ORDER — FEXOFENADINE HCL 60 MG/1
60 TABLET, FILM COATED ORAL 2 TIMES DAILY
Qty: 180 TABLET | Refills: 0 | Status: SHIPPED | OUTPATIENT
Start: 2021-09-14 | End: 2023-11-28

## 2021-09-21 ENCOUNTER — OFFICE VISIT (OUTPATIENT)
Dept: ORTHOPEDICS | Facility: CLINIC | Age: 61
End: 2021-09-21
Payer: COMMERCIAL

## 2021-09-21 DIAGNOSIS — M75.21 BICEPS TENDINITIS OF RIGHT SHOULDER: Primary | ICD-10-CM

## 2021-09-21 PROCEDURE — 99214 OFFICE O/P EST MOD 30 MIN: CPT | Mod: 25 | Performed by: PREVENTIVE MEDICINE

## 2021-09-21 PROCEDURE — 20611 DRAIN/INJ JOINT/BURSA W/US: CPT | Mod: RT | Performed by: PREVENTIVE MEDICINE

## 2021-09-21 RX ADMIN — METHYLPREDNISOLONE ACETATE 40 MG: 40 INJECTION, SUSPENSION INTRA-ARTICULAR; INTRALESIONAL; INTRAMUSCULAR; SOFT TISSUE at 08:30

## 2021-09-21 NOTE — NURSING NOTE
[facility]   ORTHOPEDICS & SPORTS MEDICINE  [address] [place] [address]  Dept: [phone]  ______________________________________________________________________________    Patient: Geovanni Jasso   : 1960   MRN: [id number]   2021    INVASIVE PROCEDURE SAFETY CHECKLIST    Date: 21  Procedure:Left shoulder biceps tendon injection   Patient Name: Geovanni Jasso  MRN: [id number]  YOB: 1960    Action: Complete sections as appropriate. Any discrepancy results in a HARD COPY until resolved.     PRE PROCEDURE:  Patient ID verified with 2 identifiers (name and  or MRN): Yes  Procedure and site verified with patient/designee (when able): Yes  Accurate consent documentation in medical record: Yes  H&P (or appropriate assessment) documented in medical record: NA  H&P must be up to 20 days prior to procedure and updates within 24 hours of procedure as applicable: NA  Relevant diagnostic and radiology test results appropriately labeled and displayed as applicable: NA  Procedure site(s) marked with provider initials: NA    TIMEOUT:  Time-Out performed immediately prior to starting procedure, including verbal and active participation of all team members addressing the following:Yes  * Correct patient identify  * Confirmed that the correct side and site are marked  * An accurate procedure consent form  * Agreement on the procedure to be done  * Correct patient position  * Relevant images and results are properly labeled and appropriately displayed  * The need to administer antibiotics or fluids for irrigation purposes during the procedure as applicable   * Safety precautions based on patient history or medication use    DURING PROCEDURE: Verification of correct person, site, and procedures any time the responsibility for care of the patient is transferred to another member of the care team.       Prior to injection, verified patient identity using patient's name and  date of birth.  Due to injection administration, patient instructed to remain in clinic for 15 minutes  afterwards, and to report any adverse reaction to me immediately.    Tendon sheath injection was performed.     Drug Amount Wasted:  None.  Vial/Syringe: Single dose vial  Expiration Date:  09/2022      Magdalene Mandel, ATC  September 21, 2021

## 2021-09-21 NOTE — LETTER
9/21/2021         RE: Geovanni Jasso  [id number]  [facility] [address]        Dear Colleague,    Thank you for referring your patient, Geovanni Jasso, to the [facility] [place]. Please see a copy of my visit note below.    HISTORY OF PRESENT ILLNESS  Mr. Jasso is a pleasant 61 year old year old male who presents to clinic today with right shoulder pain  Geovanni explains that he had his neck injection and got some improvement  But his shoulder still bother him  Location: right shoulder  Quality:  achy pain    Severity: 6/10 at worst    Duration: months worse  Timing: occurs intermittently  Context: occurs while exercising and lifting  Modifying factors:  resting and non-use makes it better, movement and use makes it worse  Associated signs & symptoms: lifting things with his shoulder    MEDICAL HISTORY  Patient Active Problem List   Diagnosis     Esophageal reflux     Hyperlipidemia LDL goal <70     Neck pain     Chronic rhinitis     Bilateral low back pain without sciatica     Chondromalacia patellae, right     S/P ACL reconstruction     Atherosclerosis of native coronary artery of native heart without angina pectoris     SOB (shortness of breath)     Non-allergic rhinitis     Positive CLAIRE (antinuclear antibody)     Hand joint stiff, right     Carpal tunnel syndrome     Osteoarthritis of knee, unspecified laterality, unspecified osteoarthritis type     Diverticulosis of large intestine     Internal hemorrhoids     Right shoulder pain       Current Outpatient Medications   Medication Sig Dispense Refill     acetaminophen (TYLENOL) 325 MG tablet Take 325-650 mg by mouth 4 times daily as needed       celecoxib (CELEBREX) 100 MG capsule Take 1 capsule (100 mg) by mouth 2 times daily 40 capsule 1     diclofenac (VOLTAREN) 1 % topical gel Apply small amount topically to right shoulder 3-4 times daily as needed for pain. 50 g 0     Efinaconazole (JUBLIA) 10 % SOLN Externally apply  topically daily to nail. 8 mL 1     fexofenadine (ALLEGRA) 60 MG tablet Take 1 tablet (60 mg) by mouth 2 times daily 180 tablet 0     fluticasone (FLONASE) 50 MCG/ACT nasal spray Spray 1-2 sprays into both nostrils daily 16 g 11     ibuprofen (ADVIL,MOTRIN) 200 MG tablet Take 200 mg by mouth every 6 hours as needed       ketoconazole (NIZORAL) 2 % external cream Apply twice daily for 8 weeks to feet 60 g 3     LANsoprazole (PREVACID) 30 MG DR capsule Take 1 capsule (30 mg) by mouth every morning (before breakfast) 90 capsule 2     methocarbamol (ROBAXIN) 500 MG tablet Take 1 tablet (500 mg) by mouth nightly as needed for muscle spasms 30 tablet 0     metroNIDAZOLE (METROCREAM) 0.75 % external cream Apply topically 2 times daily 45 g 1     nystatin-triamcinolone (MYCOLOG II) 093497-0.1 UNIT/GM-% external cream Apply topically 2 times daily 30 g 5     omeprazole (PRILOSEC) 40 MG DR capsule Take 1 capsule (40 mg) by mouth daily Take 30 minutes before breakfast. 90 capsule 1       Allergies   Allergen Reactions     Penicillins        Family History   Problem Relation Age of Onset     Autoimmune Disease No family hx of      Social History     Socioeconomic History     Marital status:      Spouse name: Not on file     Number of children: Not on file     Years of education: Not on file     Highest education level: Not on file   Occupational History     Not on file   Tobacco Use     Smoking status: Never Smoker     Smokeless tobacco: Never Used   Substance and Sexual Activity     Alcohol use: Yes     Comment: occasionally     Drug use: No     Sexual activity: Yes     Partners: Female   Other Topics Concern     Parent/sibling w/ CABG, MI or angioplasty before 65F 55M? Not Asked   Social History Narrative     Not on file     Social Determinants of Health     Financial Resource Strain:      Difficulty of Paying Living Expenses:    Food Insecurity:      Worried About Running Out of Food in the Last Year:      Ran Out of  Food in the Last Year:    Transportation Needs:      Lack of Transportation (Medical):      Lack of Transportation (Non-Medical):    Physical Activity:      Days of Exercise per Week:      Minutes of Exercise per Session:    Stress:      Feeling of Stress :    Social Connections:      Frequency of Communication with Friends and Family:      Frequency of Social Gatherings with Friends and Family:      Attends Taoism Services:      Active Member of Clubs or Organizations:      Attends Club or Organization Meetings:      Marital Status:    Intimate Partner Violence:      Fear of Current or Ex-Partner:      Emotionally Abused:      Physically Abused:      Sexually Abused:        Additional medical/Social/Surgical histories reviewed in [facility] and updated as appropriate.     REVIEW OF SYSTEMS (9/21/2021)  10 point ROS of systems including Constitutional, Eyes, Respiratory, Cardiovascular, Gastroenterology, Genitourinary, Integumentary, Musculoskeletal, Psychiatric, Allergic/Immunologic were all negative except for pertinent positives noted in my HPI.     PHYSICAL EXAM  VSS  General  - normal appearance, in no obvious distress  HEENT  - conjunctivae not injected, moist mucous membranes, normocephalic/atraumatic head, ears normal appearance, no lesions, mouth normal appearance, no scars, normal dentition and teeth present  CV  - normal radial pulse  Pulm  - normal respiratory pattern, non-labored  Musculoskeletal - right shoulder  - inspection: normal bone and joint alignment, no obvious deformity, no scapular winging, no AC step-off  - palpation: mildly tender RC insertion,  ttp about biceps tendon, proximally, normal clavicle, non-tender AC  - ROM: some pain with forward flexion, and pain with resisted flexion of biceps tendon  - strength: 5/5  strength, 5/5 in all shoulder planes  - special tests:  (+) Speed's  (+) Neer  (+) Hawkin's  (+) Aureliano's  (-) [place]'s  (-) apprehension  (-) subscap lift-off  Neuro  - no  sensory or motor deficit, grossly normal coordination, normal muscle tone  Skin  - no ecchymosis, erythema, warmth, or induration, no obvious rash  Psych  - interactive, appropriate, normal mood and affect  ASSESSMENT & PLAN  60 yo male with right shoulder pain due to biceps tendinitis, not resolved    I independently reviewed the following imaging studies:  Shoulder xray: shows some arthritis  After a 20 minute discussion and examination, we decided to perform a same day injection for diagnostic and therapeutic purposes for his right shoulder  Given HEP  F/u in 1-2 months    Appropriate PPE was utilized for prevention of spread of Covid-19.  Laron Castillo MD, [facility]  Right Biceps Tendon Injection - Ultrasound Guided  The patient was informed of the risks and the benefits of the procedure and a written consent was signed.  The patient s right shoulder was prepped with chlorhexidine in sterile fashion.   40 mg of depomedrol  suspension was drawn up into a 3 mL syringe with 1 mL of 1% lidocaine.  Injection was performed using sterile technique.  Under ultrasound guidance a 1.5-inch 22-gauge needle was used to enter the biceps tendon sheath.  Anterior approach was used with the patient supine and arm held in supination.  Needle placement was visualized and documented with ultrasound.  Ultrasound visualization was necessary to ensure placement in to the tendon sheath and not the biceps tendon which could potentially cause further tendon damage.  Injection performed long axis to the probe.  Injection solution visualized within the tendon sheath.  Images were permanently stored for the patient's record.  There were no complications. The patient tolerated the procedure well. There was negligible bleeding.   The patient was instructed to ice the shoulder upon leaving clinic and refrain from overuse over the next 3 days.   The patient was instructed to call or go to the emergency room with any unusual pain, swelling,  redness, or if otherwise concerned.        Large Joint Injection/Arthocentesis    Date/Time: 9/21/2021 8:30 AM  Performed by: Laron Castillo MD  Authorized by: Laron Castillo MD     Indications:  Pain  Needle Size:  25 G  Guidance: ultrasound    Location:  Shoulder   Location comment:  Left biceps tendon injection       Medications:  40 mg methylPREDNISolone 40 MG/ML  Procedure discussed: discussed risks, benefits, and alternatives    Consent Given by:  Patient  Timeout: timeout called immediately prior to procedure    Prep: patient was prepped and draped in usual sterile fashion     NDC: 6363-4675-00            Again, thank you for allowing me to participate in the care of your patient.        Sincerely,        Laron Castillo MD

## 2021-09-21 NOTE — PROGRESS NOTES
HISTORY OF PRESENT ILLNESS  Mr. Jasso is a pleasant 61 year old year old male who presents to clinic today with right shoulder pain  Geovanni explains that he had his neck injection and got some improvement  But his shoulder still bother him  Location: right shoulder  Quality:  achy pain    Severity: 6/10 at worst    Duration: months worse  Timing: occurs intermittently  Context: occurs while exercising and lifting  Modifying factors:  resting and non-use makes it better, movement and use makes it worse  Associated signs & symptoms: lifting things with his shoulder    MEDICAL HISTORY  Patient Active Problem List   Diagnosis     Esophageal reflux     Hyperlipidemia LDL goal <70     Neck pain     Chronic rhinitis     Bilateral low back pain without sciatica     Chondromalacia patellae, right     S/P ACL reconstruction     Atherosclerosis of native coronary artery of native heart without angina pectoris     SOB (shortness of breath)     Non-allergic rhinitis     Positive CLAIRE (antinuclear antibody)     Hand joint stiff, right     Carpal tunnel syndrome     Osteoarthritis of knee, unspecified laterality, unspecified osteoarthritis type     Diverticulosis of large intestine     Internal hemorrhoids     Right shoulder pain       Current Outpatient Medications   Medication Sig Dispense Refill     acetaminophen (TYLENOL) 325 MG tablet Take 325-650 mg by mouth 4 times daily as needed       celecoxib (CELEBREX) 100 MG capsule Take 1 capsule (100 mg) by mouth 2 times daily 40 capsule 1     diclofenac (VOLTAREN) 1 % topical gel Apply small amount topically to right shoulder 3-4 times daily as needed for pain. 50 g 0     Efinaconazole (JUBLIA) 10 % SOLN Externally apply topically daily to nail. 8 mL 1     fexofenadine (ALLEGRA) 60 MG tablet Take 1 tablet (60 mg) by mouth 2 times daily 180 tablet 0     fluticasone (FLONASE) 50 MCG/ACT nasal spray Spray 1-2 sprays into both nostrils daily 16 g 11     ibuprofen (ADVIL,MOTRIN) 200 MG  tablet Take 200 mg by mouth every 6 hours as needed       ketoconazole (NIZORAL) 2 % external cream Apply twice daily for 8 weeks to feet 60 g 3     LANsoprazole (PREVACID) 30 MG DR capsule Take 1 capsule (30 mg) by mouth every morning (before breakfast) 90 capsule 2     methocarbamol (ROBAXIN) 500 MG tablet Take 1 tablet (500 mg) by mouth nightly as needed for muscle spasms 30 tablet 0     metroNIDAZOLE (METROCREAM) 0.75 % external cream Apply topically 2 times daily 45 g 1     nystatin-triamcinolone (MYCOLOG II) 088183-8.1 UNIT/GM-% external cream Apply topically 2 times daily 30 g 5     omeprazole (PRILOSEC) 40 MG DR capsule Take 1 capsule (40 mg) by mouth daily Take 30 minutes before breakfast. 90 capsule 1       Allergies   Allergen Reactions     Penicillins        Family History   Problem Relation Age of Onset     Autoimmune Disease No family hx of      Social History     Socioeconomic History     Marital status:      Spouse name: Not on file     Number of children: Not on file     Years of education: Not on file     Highest education level: Not on file   Occupational History     Not on file   Tobacco Use     Smoking status: Never Smoker     Smokeless tobacco: Never Used   Substance and Sexual Activity     Alcohol use: Yes     Comment: occasionally     Drug use: No     Sexual activity: Yes     Partners: Female   Other Topics Concern     Parent/sibling w/ CABG, MI or angioplasty before 65F 55M? Not Asked   Social History Narrative     Not on file     Social Determinants of Health     Financial Resource Strain:      Difficulty of Paying Living Expenses:    Food Insecurity:      Worried About Running Out of Food in the Last Year:      Ran Out of Food in the Last Year:    Transportation Needs:      Lack of Transportation (Medical):      Lack of Transportation (Non-Medical):    Physical Activity:      Days of Exercise per Week:      Minutes of Exercise per Session:    Stress:      Feeling of Stress :     Social Connections:      Frequency of Communication with Friends and Family:      Frequency of Social Gatherings with Friends and Family:      Attends Catholic Services:      Active Member of Clubs or Organizations:      Attends Club or Organization Meetings:      Marital Status:    Intimate Partner Violence:      Fear of Current or Ex-Partner:      Emotionally Abused:      Physically Abused:      Sexually Abused:        Additional medical/Social/Surgical histories reviewed in [facility] and updated as appropriate.     REVIEW OF SYSTEMS (9/21/2021)  10 point ROS of systems including Constitutional, Eyes, Respiratory, Cardiovascular, Gastroenterology, Genitourinary, Integumentary, Musculoskeletal, Psychiatric, Allergic/Immunologic were all negative except for pertinent positives noted in my HPI.     PHYSICAL EXAM  VSS  General  - normal appearance, in no obvious distress  HEENT  - conjunctivae not injected, moist mucous membranes, normocephalic/atraumatic head, ears normal appearance, no lesions, mouth normal appearance, no scars, normal dentition and teeth present  CV  - normal radial pulse  Pulm  - normal respiratory pattern, non-labored  Musculoskeletal - right shoulder  - inspection: normal bone and joint alignment, no obvious deformity, no scapular winging, no AC step-off  - palpation: mildly tender RC insertion,  ttp about biceps tendon, proximally, normal clavicle, non-tender AC  - ROM: some pain with forward flexion, and pain with resisted flexion of biceps tendon  - strength: 5/5  strength, 5/5 in all shoulder planes  - special tests:  (+) Speed's  (+) Neer  (+) Hawkin's  (+) Aureliano's  (-) [place]'s  (-) apprehension  (-) subscap lift-off  Neuro  - no sensory or motor deficit, grossly normal coordination, normal muscle tone  Skin  - no ecchymosis, erythema, warmth, or induration, no obvious rash  Psych  - interactive, appropriate, normal mood and affect  ASSESSMENT & PLAN  62 yo male with right shoulder pain  due to biceps tendinitis, not resolved    I independently reviewed the following imaging studies:  Shoulder xray: shows some arthritis  After a 20 minute discussion and examination, we decided to perform a same day injection for diagnostic and therapeutic purposes for his right shoulder  Given HEP  F/u in 1-2 months    Appropriate PPE was utilized for prevention of spread of Covid-19.  Laron Castillo MD, [facility]  Right Biceps Tendon Injection - Ultrasound Guided  The patient was informed of the risks and the benefits of the procedure and a written consent was signed.  The patient s right shoulder was prepped with chlorhexidine in sterile fashion.   40 mg of depomedrol  suspension was drawn up into a 3 mL syringe with 1 mL of 1% lidocaine.  Injection was performed using sterile technique.  Under ultrasound guidance a 1.5-inch 22-gauge needle was used to enter the biceps tendon sheath.  Anterior approach was used with the patient supine and arm held in supination.  Needle placement was visualized and documented with ultrasound.  Ultrasound visualization was necessary to ensure placement in to the tendon sheath and not the biceps tendon which could potentially cause further tendon damage.  Injection performed long axis to the probe.  Injection solution visualized within the tendon sheath.  Images were permanently stored for the patient's record.  There were no complications. The patient tolerated the procedure well. There was negligible bleeding.   The patient was instructed to ice the shoulder upon leaving clinic and refrain from overuse over the next 3 days.   The patient was instructed to call or go to the emergency room with any unusual pain, swelling, redness, or if otherwise concerned.        Large Joint Injection/Arthocentesis    Date/Time: 9/21/2021 8:30 AM  Performed by: Laron Castillo MD  Authorized by: Laron Castillo MD     Indications:  Pain  Needle Size:  25 G  Guidance: ultrasound     Location:  Shoulder   Location comment:  Left biceps tendon injection       Medications:  40 mg methylPREDNISolone 40 MG/ML  Procedure discussed: discussed risks, benefits, and alternatives    Consent Given by:  Patient  Timeout: timeout called immediately prior to procedure    Prep: patient was prepped and draped in usual sterile fashion     NDC: 9989-2366-21

## 2021-10-04 RX ORDER — METHYLPREDNISOLONE ACETATE 40 MG/ML
40 INJECTION, SUSPENSION INTRA-ARTICULAR; INTRALESIONAL; INTRAMUSCULAR; SOFT TISSUE
Status: DISCONTINUED | OUTPATIENT
Start: 2021-09-21 | End: 2023-02-14

## 2021-10-09 NOTE — PROGRESS NOTES
Discharge Note    Progress reporting period is from initial evaluation date (please see noted date below) to Aug 25, 2021.  Linked Episodes   Type: Episode: Status: Noted: Resolved: Last update: Updated by:   PHYSICAL THERAPY Neck pain-7/15/2021 Active 7/15/2021  8/25/2021  7:47 AM Daniela Dutton PT      Comments:       Geovanni failed to follow up and current status is unknown.  Please see information below for last relevant information on current status.  Patient seen for   visits.    SUBJECTIVE  Subjective changes noted by patient:  Patient reports he has been completing his exercises a few times per day; will try to complete HEP more frequent.  .  Current pain level is 0/10.     Previous pain level was  0/10.   Changes in function:  Yes (See Goal flowsheet attached for changes in current functional level)  Adverse reaction to treatment or activity: None    OBJECTIVE  Changes noted in objective findings: CROM Flexion nil loss Ret min loss Ext min loss with stiffness  Rotation (R) nil loss (L) min loss      ASSESSMENT/PLAN      Updated problem list and treatment plan:     STG/LTGs have been met or progress has been made towards goals:  Yes, please see goal flowsheet for most current information  Assessment of Progress: current status is unknown.    Last current status: Pt is progressing slower than anticipated   Self Management Plans:  HEP  I have re-evaluated this patient and find that the nature, scope, duration and intensity of the therapy is appropriate for the medical condition of the patient.  Geoavnni continues to require the following intervention to meet STG and LTG's:  HEP.    Recommendations:  Discharge with current home program.  Patient to follow up with MD as needed.    Please refer to the daily flowsheet for treatment today, total treatment time and time spent performing 1:1 timed codes.

## 2021-10-11 NOTE — PROGRESS NOTES
[facility] Dermatology Note  Encounter Date: Oct 12, 2021  Office Visit     Dermatology Problem List:  1. Possible onychomycosis  - nail clippings 4/16/21 and 7/21/21 both negative.   - current tx: Jublia daily.  -previous tx: terbinafine with improvement  2.Acne  Rosacea   - Apply metrocream BID to the face   ____________________________________________     Assessment & Plan:    # Seborrheic keratosis, inflamed.  - See cryotherapy procedure note below.    # Possible onychomycosis, left great toenail. Nail clippings taken 4/16/21 and 7/21/21 both negative for fungus.  - Declined terbinafine.  - Continue Jublia daily.    Procedures Performed:   - Cryotherapy procedure note, location: right upper thigh. After verbal consent and discussion of risks and benefits including, but not limited to, dyspigmentation/scar, blister, and pain, 1 iSK was  treated with 1-2 mm freeze border for 1-2 cycles with liquid nitrogen. Post cryotherapy instructions were provided.    Follow-up: April 2022 in person for a skin check, sooner for concerns.    Staff and Scribe:     Scribe Disclosure:   IAngélica, am serving as a scribe to document services personally performed by this physician, Dr. Marbella Castro, based on data collection and the provider's statements to me.     ICecil, am a Medical Student who has seen and staffed this patient in the presence of attending physician, Dr. Marbella Castro, who has reviewed this note, which will serve as the attending note.    Provider Disclosure:   The documentation recorded by the scribe accurately reflects the services I personally performed and the decisions made by me.    Marbella Castro MD    Department of Dermatology  [facility]: Phone: [phone], Fax:[phone]  [facility]: Phone: [phone], Fax:  "[phone]      ____________________________________________    CC: Derm Problem (Possible onychomycosis, left great toenail, spot on upper R thigh )    HPI:  Mr. Geovanni Jasso is a(n) 61 year old male who presents today as a return patient for toenail issues and spot on inner R thigh.    Last seen 4/16/21. At that time, patient was to apply metrocream for acne rosacea. Toenail clippings were taken on 4/16/21 and 7/21/21 that were both negative. Plan was to apply Jublia daily x 4 months.    Today patient notes that great toe nail has improved with the Jublia. He is using the Jublia daily, though he forgets sometimes. No new peeling, flaking, or avulsion of the toenail.     Spot on his R thigh has been present for a couple of years. He has noticed that it has \"gotten harder\" over the last 6 months. Denies bleeding, pain, or rubbing. He notes that it may have lightened in color with a new blood vessel in the lesion.     His facial rash has improved/resolved since the last visit without recurrence. He is no longer using Metronidazole cream for acne rosacea, but says he has seen no recurrence of symptoms and is unconcerned with acne at this time.     Patient is otherwise feeling well, without additional skin concerns.    Labs Reviewed:  N/A    Physical Exam:  Vitals: There were no vitals taken for this visit.  SKIN: Focused examination of the right upper leg and right foot was performed.  - There is a tan to brown waxy stuck on papule with surrounding erythema on the right upper thigh x1.  - There is discoloration on the left great toenail and left 4th toe.  - No other lesions of concern on areas examined.     Medications:  Current Outpatient Medications   Medication     acetaminophen (TYLENOL) 325 MG tablet     celecoxib (CELEBREX) 100 MG capsule     diclofenac (VOLTAREN) 1 % topical gel     Efinaconazole (JUBLIA) 10 % SOLN     fexofenadine (ALLEGRA) 60 MG tablet     fluticasone (FLONASE) 50 MCG/ACT nasal spray     " ibuprofen (ADVIL,MOTRIN) 200 MG tablet     ketoconazole (NIZORAL) 2 % external cream     LANsoprazole (PREVACID) 30 MG DR capsule     methocarbamol (ROBAXIN) 500 MG tablet     metroNIDAZOLE (METROCREAM) 0.75 % external cream     nystatin-triamcinolone (MYCOLOG II) 477997-6.1 UNIT/GM-% external cream     omeprazole (PRILOSEC) 40 MG DR capsule     Current Facility-Administered Medications   Medication     methylPREDNISolone (DEPO-MEDROL) injection 40 mg     methylPREDNISolone (DEPO-MEDROL) injection 40 mg      Past Medical History:   Patient Active Problem List   Diagnosis     Esophageal reflux     Hyperlipidemia LDL goal <70     Chronic rhinitis     Bilateral low back pain without sciatica     Chondromalacia patellae, right     S/P ACL reconstruction     Atherosclerosis of native coronary artery of native heart without angina pectoris     SOB (shortness of breath)     Non-allergic rhinitis     Positive CLAIRE (antinuclear antibody)     Hand joint stiff, right     Carpal tunnel syndrome     Osteoarthritis of knee, unspecified laterality, unspecified osteoarthritis type     Diverticulosis of large intestine     Internal hemorrhoids     Right shoulder pain     Past Medical History:   Diagnosis Date     Carpal tunnel syndrome      Chronic rhinitis 10/23/2015     Esophageal reflux 12/26/2013     Hand joint stiff, right         CC No referring provider defined for this encounter. on close of this encounter.

## 2021-10-12 ENCOUNTER — OFFICE VISIT (OUTPATIENT)
Dept: DERMATOLOGY | Facility: CLINIC | Age: 61
End: 2021-10-12
Payer: COMMERCIAL

## 2021-10-12 DIAGNOSIS — L82.0 SEBORRHEIC KERATOSIS, INFLAMED: ICD-10-CM

## 2021-10-12 DIAGNOSIS — B35.1 ONYCHOMYCOSIS: Primary | ICD-10-CM

## 2021-10-12 PROCEDURE — 99214 OFFICE O/P EST MOD 30 MIN: CPT | Mod: 25 | Performed by: DERMATOLOGY

## 2021-10-12 PROCEDURE — 17110 DESTRUCTION B9 LES UP TO 14: CPT | Performed by: DERMATOLOGY

## 2021-10-12 RX ORDER — EFINACONAZOLE 100 MG/ML
SOLUTION TOPICAL DAILY
Qty: 8 ML | Refills: 4 | Status: SHIPPED | OUTPATIENT
Start: 2021-10-12 | End: 2023-05-19

## 2021-10-12 NOTE — PATIENT INSTRUCTIONS
Cryotherapy    What is it?    Use of a very cold liquid, such as liquid nitrogen, to freeze and destroy abnormal skin cells that need to be removed    What should I expect?    Tenderness and redness    A small blister that might grow and fill with dark purple blood. There may be crusting.    More than one treatment may be needed if the lesions do not go away.    How do I care for the treated area?    Gently wash the area with your hands when bathing.    Use a thin layer of Vaseline to help with healing. You may use a Band-Aid.     The area should heal within 7-10 days and may leave behind a pink or lighter color.     Do not use an antibiotic or Neosporin ointment.     You may take acetaminophen (Tylenol) for pain.     Call your doctor if you have:    Severe pain    Signs of infection (warmth, redness, cloudy yellow drainage, and or a bad smell)    Questions or concerns    Who should I call with questions?       [facility]: [phone]       [facility]: [phone]       For urgent needs outside of business hours call the [facility] at [phone] and ask for the dermatology resident on call

## 2021-10-12 NOTE — LETTER
10/12/2021         RE: Geovanni Jasso  [id number]  [place] [facility] [place] [address]        Dear Colleague,    Thank you for referring your patient, Geovanni Jasso, to the [facility]. Please see a copy of my visit note below.    [facility] Dermatology Note  Encounter Date: Oct 12, 2021  Office Visit     Dermatology Problem List:  1. Possible onychomycosis  - nail clippings 4/16/21 and 7/21/21 both negative.   - current tx: Jublia daily.  -previous tx: terbinafine with improvement  2.Acne  Rosacea   - Apply metrocream BID to the face   ____________________________________________     Assessment & Plan:    # Seborrheic keratosis, inflamed.  - See cryotherapy procedure note below.    # Possible onychomycosis, left great toenail. Nail clippings taken 4/16/21 and 7/21/21 both negative for fungus.  - Declined terbinafine.  - Continue Jublia daily.    Procedures Performed:   - Cryotherapy procedure note, location: right upper thigh. After verbal consent and discussion of risks and benefits including, but not limited to, dyspigmentation/scar, blister, and pain, 1 iSK was  treated with 1-2 mm freeze border for 1-2 cycles with liquid nitrogen. Post cryotherapy instructions were provided.    Follow-up: April 2022 in person for a skin check, sooner for concerns.    Staff and Scribe:     Scribe Disclosure:   I, Angélica Aburto, am serving as a scribe to document services personally performed by this physician, Dr. Marbella Castro, based on data collection and the provider's statements to me.     I, Cecil Zheng, am a Medical Student who has seen and staffed this patient in the presence of attending physician, Dr. Marbella Castro, who has reviewed this note, which will serve as the attending note.    Provider Disclosure:   The documentation recorded by the scribe accurately reflects the services I personally performed and the decisions made by me.    Marbella Catsro MD  Assistant  "Professor  Department of Dermatology  [facility]: Phone: [phone], Fax:[phone]  [facility]: Phone: [phone], Fax: [phone]      ____________________________________________    CC: Derm Problem (Possible onychomycosis, left great toenail, spot on upper R thigh )    HPI:  Mr. Geovanni Jasso is a(n) 61 year old male who presents today as a return patient for toenail issues and spot on inner R thigh.    Last seen 4/16/21. At that time, patient was to apply metrocream for acne rosacea. Toenail clippings were taken on 4/16/21 and 7/21/21 that were both negative. Plan was to apply Jublia daily x 4 months.    Today patient notes that great toe nail has improved with the Jublia. He is using the Jublia daily, though he forgets sometimes. No new peeling, flaking, or avulsion of the toenail.     Spot on his R thigh has been present for a couple of years. He has noticed that it has \"gotten harder\" over the last 6 months. Denies bleeding, pain, or rubbing. He notes that it may have lightened in color with a new blood vessel in the lesion.     His facial rash has improved/resolved since the last visit without recurrence. He is no longer using Metronidazole cream for acne rosacea, but says he has seen no recurrence of symptoms and is unconcerned with acne at this time.     Patient is otherwise feeling well, without additional skin concerns.    Labs Reviewed:  N/A    Physical Exam:  Vitals: There were no vitals taken for this visit.  SKIN: Focused examination of the right upper leg and right foot was performed.  - There is a tan to brown waxy stuck on papule with surrounding erythema on the right upper thigh x1.  - There is discoloration on the left great toenail and left 4th toe.  - No other lesions of concern on areas examined.     Medications:  Current Outpatient Medications   Medication     acetaminophen " (TYLENOL) 325 MG tablet     celecoxib (CELEBREX) 100 MG capsule     diclofenac (VOLTAREN) 1 % topical gel     Efinaconazole (JUBLIA) 10 % SOLN     fexofenadine (ALLEGRA) 60 MG tablet     fluticasone (FLONASE) 50 MCG/ACT nasal spray     ibuprofen (ADVIL,MOTRIN) 200 MG tablet     ketoconazole (NIZORAL) 2 % external cream     LANsoprazole (PREVACID) 30 MG DR capsule     methocarbamol (ROBAXIN) 500 MG tablet     metroNIDAZOLE (METROCREAM) 0.75 % external cream     nystatin-triamcinolone (MYCOLOG II) 491199-9.1 UNIT/GM-% external cream     omeprazole (PRILOSEC) 40 MG DR capsule     Current Facility-Administered Medications   Medication     methylPREDNISolone (DEPO-MEDROL) injection 40 mg     methylPREDNISolone (DEPO-MEDROL) injection 40 mg      Past Medical History:   Patient Active Problem List   Diagnosis     Esophageal reflux     Hyperlipidemia LDL goal <70     Chronic rhinitis     Bilateral low back pain without sciatica     Chondromalacia patellae, right     S/P ACL reconstruction     Atherosclerosis of native coronary artery of native heart without angina pectoris     SOB (shortness of breath)     Non-allergic rhinitis     Positive CLAIRE (antinuclear antibody)     Hand joint stiff, right     Carpal tunnel syndrome     Osteoarthritis of knee, unspecified laterality, unspecified osteoarthritis type     Diverticulosis of large intestine     Internal hemorrhoids     Right shoulder pain     Past Medical History:   Diagnosis Date     Carpal tunnel syndrome      Chronic rhinitis 10/23/2015     Esophageal reflux 12/26/2013     Hand joint stiff, right         CC No referring provider defined for this encounter. on close of this encounter.      Again, thank you for allowing me to participate in the care of your patient.        Sincerely,        Marbella Castro MD

## 2021-10-12 NOTE — NURSING NOTE
Geovanni Jasso's goals for this visit include:   Chief Complaint   Patient presents with     Derm Problem     Possible onychomycosis, left great toenail, spot on upper R thigh      He requests these members of his care team be copied on today's visit information:     PCP: Paddy Holly    Referring Provider:  No referring provider defined for this encounter.    There were no vitals taken for this visit.    Do you need any medication refills at today's visit? No    Benita Paul, PEDRO on 10/12/2021 at 8:22 AM

## 2021-11-14 PROBLEM — M25.511 RIGHT SHOULDER PAIN: Status: RESOLVED | Noted: 2021-06-17 | Resolved: 2021-11-14

## 2022-05-13 ENCOUNTER — OFFICE VISIT (OUTPATIENT)
Dept: DERMATOLOGY | Facility: CLINIC | Age: 62
End: 2022-05-13
Payer: COMMERCIAL

## 2022-05-13 DIAGNOSIS — D22.9 MULTIPLE BENIGN NEVI: Primary | ICD-10-CM

## 2022-05-13 DIAGNOSIS — L82.1 SEBORRHEIC KERATOSES: ICD-10-CM

## 2022-05-13 DIAGNOSIS — L81.0 POST-INFLAMMATORY HYPERPIGMENTATION: ICD-10-CM

## 2022-05-13 DIAGNOSIS — B35.1 ONYCHOMYCOSIS: ICD-10-CM

## 2022-05-13 PROCEDURE — 99213 OFFICE O/P EST LOW 20 MIN: CPT | Performed by: DERMATOLOGY

## 2022-05-13 ASSESSMENT — PAIN SCALES - GENERAL: PAINLEVEL: NO PAIN (0)

## 2022-05-13 NOTE — PROGRESS NOTES
[facility] Dermatology Note  Encounter Date: May 13, 2022  Office Visit     Dermatology Problem List:  1. Possible onychomycosis  - nail clippings 4/16/21 and 7/21/21 both negative.   - current tx: Jublia daily.  -previous tx: terbinafine with improvement  2.Acne Rosacea   - Apply metrocream BID to the face     ____________________________________________    Assessment & Plan:    # Seborrheic keratosis, non irritated.   - No further intervention needed.     # Multiple clinically benign nevi.    - No further intervention needed.    # right elbow PIH.   - No further intervention needed.     # 1.2 cm scaly red macule on the left elbow, patient reports site of trauma. Patient to contact clinic if not resolved in 2 weeks.   declines follow up     # Onychomycosis, left great toenail. Nail clippings taken 4/16/21 and 7/21/21 both negative for fungus. Improved from previous  - Continue Jublia daily.    Procedures Performed:   None.    Follow-up: 1 year(s) in-person, or earlier for new or changing lesions    Staff and Scribe:     Scribe Disclosure:   I, Cecil Dunn, am serving as a scribe to document services personally performed by this physician, Dr. Marbella Castro, based on data collection and the provider's statements to me.     Provider Disclosure:   The documentation recorded by the scribe accurately reflects the services I personally performed and the decisions made by me.    Marbella Castro MD    Department of Dermatology  [facility]: Phone: [phone], Fax:[phone]  [facility]: Phone: [phone], Fax: [phone]      ____________________________________________    CC: Skin Check (FBSC: right medial elbow, right flank, left upper chest. No personal Hx of skin cancer. )    HPI:  Mr. Geovanni Jasso is a(n) 61 year old male who presents today as a return patient for a  skin check.    Last seen 10/21/21 for a spot check. At that time, an inflamed SK was treated with cryo. Possible onychomycosis was noted on the left great toenail, fausto continued on Jublia.    Today, she has an area of concern on the right medial elbow, right flank and left upper chest. He had a spot on his elbow that has since resolved. He has photos of when this occurred. He reports a site of trauma on the left elbow    Patient is otherwise feeling well, without additional skin concerns.    Labs Reviewed:  N/A    Physical Exam:  Vitals: There were no vitals taken for this visit.  SKIN: Total skin excluding the undergarment areas was performed. The exam included the head/face, neck, both arms, chest, back, abdomen, both legs, digits and/or nails.  Genital examined. Scribe present.   - thickening and yellow discoloration of the entire left great toenail, improved from previous  - 1 cm patch of faint hyperpigmentation on the right elbow c/w PIH  - Well demarcated fleshy papule on the left upper chest  - Multiple regular brown pigmented macules and papules are identified on the trunk and extremities.   - There are waxy stuck on tan to brown papules on the trunk and extremities.   - 1.2 cm scaly red macule on the left elbow  - No other lesions of concern on areas examined.     Medications:  Current Outpatient Medications   Medication     acetaminophen (TYLENOL) 325 MG tablet     celecoxib (CELEBREX) 100 MG capsule     diclofenac (VOLTAREN) 1 % topical gel     Efinaconazole (JUBLIA) 10 % SOLN     fexofenadine (ALLEGRA) 60 MG tablet     fluticasone (FLONASE) 50 MCG/ACT nasal spray     ibuprofen (ADVIL,MOTRIN) 200 MG tablet     ketoconazole (NIZORAL) 2 % external cream     LANsoprazole (PREVACID) 30 MG DR capsule     methocarbamol (ROBAXIN) 500 MG tablet     metroNIDAZOLE (METROCREAM) 0.75 % external cream     nystatin-triamcinolone (MYCOLOG II) 459485-1.1 UNIT/GM-% external cream     omeprazole (PRILOSEC) 40 MG   capsule     Current Facility-Administered Medications   Medication     methylPREDNISolone (DEPO-MEDROL) injection 40 mg     methylPREDNISolone (DEPO-MEDROL) injection 40 mg      Past Medical History:   Patient Active Problem List   Diagnosis     Esophageal reflux     Hyperlipidemia LDL goal <70     Chronic rhinitis     Bilateral low back pain without sciatica     Chondromalacia patellae, right     S/P ACL reconstruction     Atherosclerosis of native coronary artery of native heart without angina pectoris     SOB (shortness of breath)     Non-allergic rhinitis     Positive CLAIRE (antinuclear antibody)     Hand joint stiff, right     Carpal tunnel syndrome     Osteoarthritis of knee, unspecified laterality, unspecified osteoarthritis type     Diverticulosis of large intestine     Internal hemorrhoids     Past Medical History:   Diagnosis Date     Carpal tunnel syndrome      Chronic rhinitis 10/23/2015     Esophageal reflux 12/26/2013     Hand joint stiff, right         CC Referred Self  No address on file on close of this encounter.

## 2022-05-13 NOTE — PATIENT INSTRUCTIONS
[facility] Dermatology Visit    Thank you for allowing us to participate in your care. Your findings, instructions and follow-up plan are as follows:         When should I call my doctor?  If you are worsening or not improving, please, contact us or seek urgent care as noted below.     Who should I call with questions (adults)?  [facility] (adult and pediatric): [phone]  [facility] (adult): [phone]  For urgent needs outside of business hours call the [facility] at [phone] and ask for the dermatology resident on call  If this is a medical emergency and you are unable to reach an ER, Call [phone]    Who should I call with questions (pediatric)?  [facility]- Pediatric Dermatology  Dr. Faby Culver, Dr. Karri Gibson, Dr. Nidhi Waddell, Isabel Brewer, PA  Dr. Georgette Garcia, Dr. Dilma Longoria & Dr. Laron Jacobson  Non Urgent  Nurse Triage Line; [phone]- Cecily and Jeri RN Care Coordinators   Karmen (/Complex ) [phone]    If you need a prescription refill, please contact your pharmacy. Refills are approved or denied by our physicians during normal business hours, Monday through Fridays  Per office policy, refills will not be granted if you have not been seen within the past year (or sooner depending on your child's condition).    Scheduling Information:  Pediatric Appointment Scheduling and Call Center [phone]  Radiology Scheduling- [phone]  Sedation Unit Scheduling- [phone]  [place] Scheduling- General [phone]; Pediatric Dermatology [phone]  Main  Services: [phone]  Tajik: [phone]  Comoran: [phone]  Hmong/Ronni/Zambian: [phone]  Preadmission Nursing Department Fax Number: [phone] (fax all pre-operative paperwork to this number)    For urgent matters arising during evenings, weekends, or  holidays that cannot wait for normal business hours please call [phone] and ask for the dermatology resident on call to be paged.         Patient Education     Checking for Skin Cancer  You can find cancer early by checking your skin each month. There are 3 kinds of skin cancer. They are melanoma, basal cell carcinoma, and squamous cell carcinoma. Doing monthly skin checks is the best way to find new marks or skin changes. Follow the instructions below for checking your skin.   The ABCDEs of checking moles for melanoma   Check your moles or growths for signs of melanoma using ABCDE:   Asymmetry: the sides of the mole or growth don t match  Border: the edges are ragged, notched, or blurred  Color: the color within the mole or growth varies  Diameter: the mole or growth is larger than 6 mm (size of a pencil eraser)  Evolving: the size, shape, or color of the mole or growth is changing (evolving is not shown in the images below)    Checking for other types of skin cancer  Basal cell carcinoma or squamous cell carcinoma have symptoms such as:     A spot or mole that looks different from all other marks on your skin  Changes in how an area feels, such as itching, tenderness, or pain  Changes in the skin's surface, such as oozing, bleeding, or scaliness  A sore that does not heal  New swelling or redness beyond the border of a mole    Who s at risk?  Anyone can get skin cancer. But you are at greater risk if you have:   Fair skin, light-colored hair, or light-colored eyes  Many moles or abnormal moles on your skin  A history of sunburns from sunlight or tanning beds  A family history of skin cancer  A history of exposure to radiation or chemicals  A weakened immune system  If you have had skin cancer in the past, you are at risk for recurring skin cancer.   How to check your skin  Do your monthly skin checkups in front of a full-length mirror. Check all parts of your body, including your:   Head (ears, face, neck,  and scalp)  Torso (front, back, and sides)  Arms (tops, undersides, upper, and lower armpits)  Hands (palms, backs, and fingers, including under the nails)  Buttocks and genitals  Legs (front, back, and sides)  Feet (tops, soles, toes, including under the nails, and between toes)  If you have a lot of moles, take digital photos of them each month. Make sure to take photos both up close and from a distance. These can help you see if any moles change over time.   Most skin changes are not cancer. But if you see any changes in your skin, call your doctor right away. Only he or she can diagnose a problem. If you have skin cancer, seeing your doctor can be the first step toward getting the treatment that could save your life.   [employer] last reviewed this educational content on 4/1/2019 2000-2020 [employer]. [address] [place] [address]. All rights reserved. This information is not intended as a substitute for professional medical care. Always follow your healthcare professional's instructions.       When should I call my doctor?  If you are worsening or not improving, please, contact us or seek urgent care as noted below.     Who should I call with questions (adults)?  [facility] (adult and pediatric): [phone]  [facility] (adult): [phone]  For urgent needs outside of business hours call the [facility] at [phone] and ask for the dermatology resident on call to be paged  If this is a medical emergency and you are unable to reach an ER, Call [phone]    Who should I call with questions (pediatric)?  [facility]- Pediatric Dermatology  Dr. Faby Culver, Dr. Karri Gibson, Dr. Nidhi Waddell, AGUILAR Benjamin, Dr. Georgette Garcia, Dr. Dilma Longoria & Dr. Laron Jacobson  Non-urgent nurse triage line; [phone]- Cecily and Jeri RN Care Coordinators   Karmen (Administrative  Assistant/Complex ) [phone]    If you need a prescription refill, please contact your pharmacy. Refills are approved or denied by our Physicians during normal business hours, Monday through Fridays  Per office policy, refills will not be granted if you have not been seen within the past year (or sooner depending on your child's condition)    Scheduling Information:  Pediatric Appointment Scheduling and Call Center [phone]  Radiology Scheduling- [phone]  Sedation Unit Scheduling- [phone]  [place] Scheduling- General [phone]; Pediatric Dermatology [phone]  Main  Services: [phone]  Belizean: [phone]  Costa Rican: [phone]  Hmong/Amharic/Tamazight: [phone]  Preadmission Nursing Department Fax Number: [phone] (Fax all pre-operative paperwork to this number)    For urgent matters arising during evenings, weekends, or holidays that cannot wait for normal business hours please call [phone] and ask for the dermatology resident on call to be paged.

## 2022-05-13 NOTE — LETTER
5/13/2022         RE: Geovanni Jasso  [id number]  [facility] [address]        Dear Colleague,    Thank you for referring your patient, Geovanni Jasso, to the [facility]. Please see a copy of my visit note below.    [facility] Dermatology Note  Encounter Date: May 13, 2022  Office Visit     Dermatology Problem List:  1. Possible onychomycosis  - nail clippings 4/16/21 and 7/21/21 both negative.   - current tx: Jublia daily.  -previous tx: terbinafine with improvement  2.Acne Rosacea   - Apply metrocream BID to the face     ____________________________________________    Assessment & Plan:    # Seborrheic keratosis, non irritated.   - No further intervention needed.     # Multiple clinically benign nevi.    - No further intervention needed.    # right elbow PIH.   - No further intervention needed.     # 1.2 cm scaly red macule on the left elbow, patient reports site of trauma. Patient to contact clinic if not resolved in 2 weeks.   declines follow up     # Onychomycosis, left great toenail. Nail clippings taken 4/16/21 and 7/21/21 both negative for fungus. Improved from previous  - Continue Jublia daily.    Procedures Performed:   None.    Follow-up: 1 year(s) in-person, or earlier for new or changing lesions    Staff and Scribe:     Scribe Disclosure:   I, Cecil Dunn, am serving as a scribe to document services personally performed by this physician, Dr. Marbella Castro, based on data collection and the provider's statements to me.     Provider Disclosure:   The documentation recorded by the scribe accurately reflects the services I personally performed and the decisions made by me.    Marbella Castro MD    Department of Dermatology  [facility]: Phone: [phone], Fax:[phone]  [facility]: Phone: [phone], Fax:  [phone]      ____________________________________________    CC: Skin Check (FBSC: right medial elbow, right flank, left upper chest. No personal Hx of skin cancer. )    HPI:  Mr. Geovanni Jasso is a(n) 61 year old male who presents today as a return patient for a skin check.    Last seen 10/21/21 for a spot check. At that time, an inflamed SK was treated with cryo. Possible onychomycosis was noted on the left great toenail, fausto continued on Jublia.    Today, she has an area of concern on the right medial elbow, right flank and left upper chest. He had a spot on his elbow that has since resolved. He has photos of when this occurred. He reports a site of trauma on the left elbow    Patient is otherwise feeling well, without additional skin concerns.    Labs Reviewed:  N/A    Physical Exam:  Vitals: There were no vitals taken for this visit.  SKIN: Total skin excluding the undergarment areas was performed. The exam included the head/face, neck, both arms, chest, back, abdomen, both legs, digits and/or nails.  Genital examined. Scribe present.   - thickening and yellow discoloration of the entire left great toenail, improved from previous  - 1 cm patch of faint hyperpigmentation on the right elbow c/w PIH  - Well demarcated fleshy papule on the left upper chest  - Multiple regular brown pigmented macules and papules are identified on the trunk and extremities.   - There are waxy stuck on tan to brown papules on the trunk and extremities.   - 1.2 cm scaly red macule on the left elbow  - No other lesions of concern on areas examined.     Medications:  Current Outpatient Medications   Medication     acetaminophen (TYLENOL) 325 MG tablet     celecoxib (CELEBREX) 100 MG capsule     diclofenac (VOLTAREN) 1 % topical gel     Efinaconazole (JUBLIA) 10 % SOLN     fexofenadine (ALLEGRA) 60 MG tablet     fluticasone (FLONASE) 50 MCG/ACT nasal spray     ibuprofen (ADVIL,MOTRIN) 200 MG tablet     ketoconazole (NIZORAL) 2 %  external cream     LANsoprazole (PREVACID) 30 MG DR capsule     methocarbamol (ROBAXIN) 500 MG tablet     metroNIDAZOLE (METROCREAM) 0.75 % external cream     nystatin-triamcinolone (MYCOLOG II) 618281-9.1 UNIT/GM-% external cream     omeprazole (PRILOSEC) 40 MG DR capsule     Current Facility-Administered Medications   Medication     methylPREDNISolone (DEPO-MEDROL) injection 40 mg     methylPREDNISolone (DEPO-MEDROL) injection 40 mg      Past Medical History:   Patient Active Problem List   Diagnosis     Esophageal reflux     Hyperlipidemia LDL goal <70     Chronic rhinitis     Bilateral low back pain without sciatica     Chondromalacia patellae, right     S/P ACL reconstruction     Atherosclerosis of native coronary artery of native heart without angina pectoris     SOB (shortness of breath)     Non-allergic rhinitis     Positive CLAIRE (antinuclear antibody)     Hand joint stiff, right     Carpal tunnel syndrome     Osteoarthritis of knee, unspecified laterality, unspecified osteoarthritis type     Diverticulosis of large intestine     Internal hemorrhoids     Past Medical History:   Diagnosis Date     Carpal tunnel syndrome      Chronic rhinitis 10/23/2015     Esophageal reflux 12/26/2013     Hand joint stiff, right         CC Referred Self  No address on file on close of this encounter.      Again, thank you for allowing me to participate in the care of your patient.        Sincerely,        Marbella Castro MD

## 2022-05-13 NOTE — NURSING NOTE
Geovanni Jasso's chief complaint for this visit includes:  Chief Complaint   Patient presents with     Skin Check     FBSC: right medial elbow, right flank, left upper chest. No personal Hx of skin cancer.      PCP: Paddy Holly    Referring Provider:  Referred Self  No address on file    There were no vitals taken for this visit.  No Pain (0)        Allergies   Allergen Reactions     Penicillins          Do you need any medication refills at today's visit? No    Kenzie Corral, [facility]

## 2022-06-01 DIAGNOSIS — K29.00 OTHER ACUTE GASTRITIS WITHOUT HEMORRHAGE: ICD-10-CM

## 2022-06-03 RX ORDER — LANSOPRAZOLE 30 MG/1
CAPSULE, DELAYED RELEASE ORAL
Qty: 90 CAPSULE | Refills: 0 | Status: SHIPPED | OUTPATIENT
Start: 2022-06-03 | End: 2022-10-26

## 2022-06-11 ENCOUNTER — HEALTH MAINTENANCE LETTER (OUTPATIENT)
Age: 62
End: 2022-06-11

## 2022-10-20 ENCOUNTER — NURSE TRIAGE (OUTPATIENT)
Dept: FAMILY MEDICINE | Facility: CLINIC | Age: 62
End: 2022-10-20

## 2022-10-20 NOTE — TELEPHONE ENCOUNTER
"Called patient at work number to further triage symptoms.    Patient states that he's been having this pain for about a month, but lately it's been worsening - he has a constant, dull pain about 2-3/10, circumferential a few inches around his belly button, but has been getting more frequent sharp, stabbing pains. Patient states these stabbing pains are intermittent, but pain levels are a reported 5-6/10. Patient states he also has a swollen [place] (about 3 inches wide, 1/8\" protruding) that he believes to be a hernia right above his belly button that has been there for a few years, but patient states he just started noticing it more with the recent pain.     Patient states that his pain does not seem to correlate with eating/drinking, but does seem to be worse when he is more stressed. Patient states that he is not having any difficulty breathing, no nausea, vomiting, chest pain, dark stools, hernia tenderness, or pain in scrotum. Patient also states that he has not tried any medications to help with the pain/discomfort.     Due to mild-moderate pain level and patient having these symptoms for the past month without any improvements but not severely worsening, recommended that patient go to urgent care today to be assessed. Informed patient that based on what urgent care needs, such as further imaging or tests that they do not have the resources for, he may need to go to the ED. Patient verbalized understanding. Also informed patient that if his pain worsens or he starts having nausea/vomiting, or his hernia becomes tender to touch, then he needs to go directly to the ED. Patient verbalized understanding. Patient plans to come to [facility] today. No further questions or concerns.      MANOJ BaughN, RN  [facility]        Reason for Disposition    Constant abdominal pain lasting > 2 hours    Additional Information    Negative: Hernia is painful or tender to touch    Negative: " "Passed out (i.e., fainted, collapsed and was not responding)    Negative: Shock suspected (e.g., cold/pale/clammy skin, too weak to stand, low BP, rapid pulse)    Negative: Sounds like a life-threatening emergency to the triager    Negative: Chest pain    Negative: Pain is mainly in upper abdomen (if needed ask: 'is it mainly above the belly button?')    Negative: SEVERE abdominal pain (e.g., excruciating)    Negative: Vomiting red blood or black (coffee ground) material    Negative: Bloody, black, or tarry bowel movements  (Exception: Chronic-unchanged black-grey bowel movements and is taking iron pills or Pepto-Bismol.)    Negative: Unable to urinate (or only a few drops) and bladder feels very full    Negative: Pain in scrotum persists > 1 hour    Answer Assessment - Initial Assessment Questions  1. LOCATION: \"Where does it hurt?\"       Pain around belly button  2. RADIATION: \"Does the pain shoot anywhere else?\" (e.g., chest, back)      No  3. ONSET: \"When did the pain begin?\" (Minutes, hours or days ago)       About a month ago  4. SUDDEN: \"Gradual or sudden onset?\"      Always constant pain but sharp stabbing pains intermittent  5. PATTERN \"Does the pain come and go, or is it constant?\"     - If constant: \"Is it getting better, staying the same, or worsening?\"       (Note: Constant means the pain never goes away completely; most serious pain is constant and it progresses)      - If intermittent: \"How long does it last?\" \"Do you have pain now?\"      (Note: Intermittent means the pain goes away completely between bouts)      Dull constant pain, few times a day getting stabbing pains (worse at work, stabbing pains can last 2 hours)  6. SEVERITY: \"How bad is the pain?\"  (e.g., Scale 1-10; mild, moderate, or severe)     - MILD (1-3): doesn't interfere with normal activities, abdomen soft and not tender to touch      - MODERATE (4-7): interferes with normal activities or awakens from sleep, abdomen tender to touch    " "  - SEVERE (8-10): excruciating pain, doubled over, unable to do any normal activities        Moderate, 5-6/10  7. RECURRENT SYMPTOM: \"Have you ever had this type of stomach pain before?\" If Yes, ask: \"When was the last time?\" and \"What happened that time?\"       Possibly, but not sure  8. CAUSE: \"What do you think is causing the stomach pain?\"      Stress, hernia  9. RELIEVING/AGGRAVATING FACTORS: \"What makes it better or worse?\" (e.g., movement, antacids, bowel movement)      Stress makes it worse; hasn't tried many things to help  10. OTHER SYMPTOMS: \"Do you have any other symptoms?\" (e.g., back pain, diarrhea, fever, urination pain, vomiting)        No    Protocols used: ABDOMINAL PAIN - MALE-A-OH, HERNIA-A-OH      "

## 2022-10-20 NOTE — TELEPHONE ENCOUNTER
"Patient's spouse calling in to ask for further advice on patient's symptoms. Informed spouse that there is no consent to communicate on file, so while I can take information from her, I am not able to discuss advice or dispositions without speaking to the patient directly. Spouse verbalized understanding.    Patient's spouse states that patient has been experiencing intermittent stabbing pains in his abdomen for the last month or so. Spouse also states that patient has a \"3 inch wide swollen [place]\" on his upper right quadrant, and it appears to be protruding about 1/8\" from his abdomen. Spouse states that patient's pain is worse when he is under a lot of stress (patient is  and Fall is a stressful time, per spouse).     Spouse asking writer to call patient at work to further triage symptoms. Writer also gave spouse our clinic number to pass on to patient if we get disconnected. No further questions at this time.      MANOJ BaughN, RN  [facility]    "

## 2022-10-21 ENCOUNTER — OFFICE VISIT (OUTPATIENT)
Dept: URGENT CARE | Facility: URGENT CARE | Age: 62
End: 2022-10-21
Payer: COMMERCIAL

## 2022-10-21 VITALS
HEART RATE: 72 BPM | OXYGEN SATURATION: 98 % | BODY MASS INDEX: 26.23 KG/M2 | WEIGHT: 199 LBS | TEMPERATURE: 97.6 F | SYSTOLIC BLOOD PRESSURE: 134 MMHG | DIASTOLIC BLOOD PRESSURE: 82 MMHG

## 2022-10-21 DIAGNOSIS — R10.84 ABDOMINAL PAIN, GENERALIZED: ICD-10-CM

## 2022-10-21 DIAGNOSIS — K43.9 VENTRAL HERNIA WITHOUT OBSTRUCTION OR GANGRENE: Primary | ICD-10-CM

## 2022-10-21 DIAGNOSIS — K42.9 UMBILICAL HERNIA WITHOUT OBSTRUCTION AND WITHOUT GANGRENE: ICD-10-CM

## 2022-10-21 PROCEDURE — 99213 OFFICE O/P EST LOW 20 MIN: CPT | Performed by: PHYSICIAN ASSISTANT

## 2022-10-21 ASSESSMENT — ENCOUNTER SYMPTOMS
COUGH: 0
MYALGIAS: 0
NAUSEA: 0
CARDIOVASCULAR NEGATIVE: 1
MUSCULOSKELETAL NEGATIVE: 1
BLOOD IN STOOL: 0
CONSTIPATION: 0
FREQUENCY: 0
PALPITATIONS: 0
DYSURIA: 0
FEVER: 0
APPETITE CHANGE: 0
HEADACHES: 0
COLOR CHANGE: 0
DIFFICULTY URINATING: 0
ABDOMINAL PAIN: 1
CHEST TIGHTNESS: 0
SORE THROAT: 0
WHEEZING: 0
SHORTNESS OF BREATH: 0
HEMATURIA: 0
CONSTITUTIONAL NEGATIVE: 1
VOMITING: 0
CHILLS: 0
ALLERGIC/IMMUNOLOGIC NEGATIVE: 1
NEUROLOGICAL NEGATIVE: 1
RESPIRATORY NEGATIVE: 1
FLANK PAIN: 0
DIARRHEA: 0

## 2022-10-21 ASSESSMENT — PAIN SCALES - GENERAL: PAINLEVEL: MODERATE PAIN (5)

## 2022-10-21 NOTE — PROGRESS NOTES
Chief Complaint:    Chief Complaint   Patient presents with     Urgent Care     To the right of belly button has a swollen area and have issue with pain in stomach, not constant pain but it dose come out pain is 5-6 level, both been there for over a month      Abdominal Pain     Medical Decision Making:    Vital signs reviewed by Hank Macdonald PA-C  /82 (BP Location: Left arm, Patient Position: Sitting, Cuff Size: Adult Large)   Pulse 72   Temp 97.6  F (36.4  C) (Tympanic)   Wt 90.3 kg (199 lb)   SpO2 98%   BMI 26.23 kg/m      Differential Diagnosis:  Ventral hernia, umbilical hernia, colorectal mass, increased abdominal pressure, bloating     ASSESSMENT:     1. Ventral hernia without obstruction or gangrene    2. Umbilical hernia without obstruction and without gangrene    3. Abdominal pain, generalized           PLAN:     Patient is in no acute distress.  Low suspicion for bowel obstruction or strangulation.  Order placed for follow up with general surgery for further evaluation for ventral ad umbilical hernia.    Worrisome symptoms discussed with instructions to go to the ED.  Patient verbalized understanding and agreed with this plan.    Labs:     No results found for any visits on 10/21/22.    Current Meds:    Current Outpatient Medications:      acetaminophen (TYLENOL) 325 MG tablet, Take 325-650 mg by mouth 4 times daily as needed, Disp: , Rfl:      celecoxib (CELEBREX) 100 MG capsule, Take 1 capsule (100 mg) by mouth 2 times daily, Disp: 40 capsule, Rfl: 1     diclofenac (VOLTAREN) 1 % topical gel, Apply small amount topically to right shoulder 3-4 times daily as needed for pain., Disp: 50 g, Rfl: 0     Efinaconazole (JUBLIA) 10 % SOLN, Externally apply topically daily to nail., Disp: 8 mL, Rfl: 4     fexofenadine (ALLEGRA) 60 MG tablet, Take 1 tablet (60 mg) by mouth 2 times daily, Disp: 180 tablet, Rfl: 0     fluticasone (FLONASE) 50 MCG/ACT nasal spray, Spray 1-2 sprays into both nostrils  daily, Disp: 16 g, Rfl: 11     ibuprofen (ADVIL,MOTRIN) 200 MG tablet, Take 200 mg by mouth every 6 hours as needed, Disp: , Rfl:      ketoconazole (NIZORAL) 2 % external cream, Apply twice daily for 8 weeks to feet, Disp: 60 g, Rfl: 3     LANsoprazole (PREVACID) 30 MG DR capsule, TAKE ONE CAPSULE BY MOUTH IN THE MORNING before breakfast., Disp: 90 capsule, Rfl: 0     methocarbamol (ROBAXIN) 500 MG tablet, Take 1 tablet (500 mg) by mouth nightly as needed for muscle spasms, Disp: 30 tablet, Rfl: 0     metroNIDAZOLE (METROCREAM) 0.75 % external cream, Apply topically 2 times daily (Patient not taking: Reported on 5/13/2022), Disp: 45 g, Rfl: 1     nystatin-triamcinolone (MYCOLOG II) 151449-5.1 UNIT/GM-% external cream, Apply topically 2 times daily (Patient not taking: Reported on 5/13/2022), Disp: 30 g, Rfl: 5     omeprazole (PRILOSEC) 40 MG DR capsule, Take 1 capsule (40 mg) by mouth daily Take 30 minutes before breakfast., Disp: 90 capsule, Rfl: 1    Current Facility-Administered Medications:      methylPREDNISolone (DEPO-MEDROL) injection 40 mg, 40 mg, , , Laron Castillo MD, 40 mg at 09/21/21 0830     methylPREDNISolone (DEPO-MEDROL) injection 40 mg, 40 mg, , , Laron Castillo MD, 40 mg at 02/09/21 0839    Allergies:  Allergies   Allergen Reactions     Penicillins        SUBJECTIVE    HPI: Geovanni Jasso is an 62 year old male who presents for evaluation and treatment of abdominal pain.  Patient states that he's been having this pain for about a month, but lately it's been worsening.  He has a constant, dull pain about around his belly button, but has been getting more frequent sharp, stabbing pains. Patient states these stabbing pains come and go with no known trigger.  Patient has also noticed a swollen protruding [place] in this area that he believes to be a hernia right above his belly button that has been there for a few years, but patient states he just started noticing it more with the recent  pain. No changes to bowel or bladder habits, no flank pain, vomiting, black stools, fever, or chills.     ROS:      Review of Systems   Constitutional: Negative.  Negative for appetite change, chills and fever.   HENT: Negative.  Negative for sore throat.    Respiratory: Negative.  Negative for cough, chest tightness, shortness of breath and wheezing.    Cardiovascular: Negative.  Negative for chest pain and palpitations.   Gastrointestinal: Positive for abdominal pain. Negative for blood in stool, constipation, diarrhea, nausea and vomiting.   Genitourinary: Negative for difficulty urinating, dysuria, flank pain, frequency, hematuria and urgency.   Musculoskeletal: Negative.  Negative for myalgias.   Skin: Negative for color change and rash.   Allergic/Immunologic: Negative.  Negative for immunocompromised state.   Neurological: Negative.  Negative for headaches.        Family History   Family History   Problem Relation Age of Onset     Autoimmune Disease No family hx of        Social History  Social History     Socioeconomic History     Marital status:      Spouse name: Not on file     Number of children: Not on file     Years of education: Not on file     Highest education level: Not on file   Occupational History     Not on file   Tobacco Use     Smoking status: Never     Smokeless tobacco: Never   Substance and Sexual Activity     Alcohol use: Yes     Comment: occasionally     Drug use: No     Sexual activity: Yes     Partners: Female   Other Topics Concern     Parent/sibling w/ CABG, MI or angioplasty before 65F 55M? Not Asked   Social History Narrative     Not on file     Social Determinants of Health     Financial Resource Strain: Not on file   Food Insecurity: Not on file   Transportation Needs: Not on file   Physical Activity: Not on file   Stress: Not on file   Social Connections: Not on file   Intimate Partner Violence: Not on file   Housing Stability: Not on file        Surgical History:  Past  Surgical History:   Procedure Laterality Date     COLONOSCOPY WITH CO2 INSUFFLATION N/A 7/17/2019    Procedure: COLONOSCOPY, WITH CO2 INSUFFLATION;  Surgeon: Jaison Hammer MD;  Location: [facility] OR     NO HISTORY OF SURGERY          Problem List:  Patient Active Problem List   Diagnosis     Esophageal reflux     Hyperlipidemia LDL goal <70     Chronic rhinitis     Bilateral low back pain without sciatica     Chondromalacia patellae, right     S/P ACL reconstruction     Atherosclerosis of native coronary artery of native heart without angina pectoris     SOB (shortness of breath)     Non-allergic rhinitis     Positive CLAIRE (antinuclear antibody)     Hand joint stiff, right     Carpal tunnel syndrome     Osteoarthritis of knee, unspecified laterality, unspecified osteoarthritis type     Diverticulosis of large intestine     Internal hemorrhoids           OBJECTIVE:     Vital signs noted and reviewed by Hank Macdonald PA-C  /82 (BP Location: Left arm, Patient Position: Sitting, Cuff Size: Adult Large)   Pulse 72   Temp 97.6  F (36.4  C) (Tympanic)   Wt 90.3 kg (199 lb)   SpO2 98%   BMI 26.23 kg/m         Physical Exam  Vitals and nursing note reviewed.   Constitutional:       General: He is not in acute distress.     Appearance: He is well-developed. He is not ill-appearing, toxic-appearing or diaphoretic.   HENT:      Head: Normocephalic and atraumatic.      Right Ear: Hearing, tympanic membrane, ear canal and external ear normal. Tympanic membrane is not perforated, erythematous, retracted or bulging.      Left Ear: Hearing, tympanic membrane, ear canal and external ear normal. Tympanic membrane is not perforated, erythematous, retracted or bulging.      Nose: Nose normal. No mucosal edema, congestion or rhinorrhea.      Mouth/Throat:      Pharynx: No oropharyngeal exudate or posterior oropharyngeal erythema.      Tonsils: No tonsillar exudate or tonsillar abscesses. 0 on the right. 0 on the  left.   Eyes:      General: No scleral icterus.        Right eye: No discharge.         Left eye: No discharge.      Extraocular Movements: Extraocular movements intact.      Conjunctiva/sclera: Conjunctivae normal.   Cardiovascular:      Rate and Rhythm: Normal rate and regular rhythm.      Heart sounds: Normal heart sounds, S1 normal and S2 normal. Heart sounds not distant. No murmur heard.    No friction rub. No gallop.   Pulmonary:      Effort: Pulmonary effort is normal. No respiratory distress.      Breath sounds: Normal breath sounds. No decreased breath sounds, wheezing, rhonchi or rales.   Abdominal:      General: Bowel sounds are normal. There is no distension.      Palpations: Abdomen is soft.      Tenderness: There is abdominal tenderness. There is no right CVA tenderness, left CVA tenderness, guarding or rebound.      Hernia: A hernia is present. Hernia is present in the umbilical area and ventral area.          Comments: Tenderness to deep palpation of bilateral lower quadrants with no rebound, free fluid, or free air appreciated, No change to skin color of abdomen. Ventral and umbilical hernia present with no sign of strangulation or incarceration.    Musculoskeletal:      Cervical back: Normal range of motion and neck supple.   Lymphadenopathy:      Cervical: No cervical adenopathy.   Skin:     General: Skin is warm and dry.      Findings: No rash.   Neurological:      General: No focal deficit present.      Mental Status: He is alert and oriented to person, place, and time.      Motor: No weakness.      Gait: Gait normal.   Psychiatric:         Attention and Perception: He is attentive.         Mood and Affect: Mood normal.         Speech: Speech normal.         Behavior: Behavior normal. Behavior is cooperative.         Thought Content: Thought content normal.         Judgment: Judgment normal.             Hank Macdonald PA-C  10/21/2022, 10:21 AM

## 2022-10-22 ENCOUNTER — HEALTH MAINTENANCE LETTER (OUTPATIENT)
Age: 62
End: 2022-10-22

## 2022-11-07 ENCOUNTER — OFFICE VISIT (OUTPATIENT)
Dept: SURGERY | Facility: CLINIC | Age: 62
End: 2022-11-07
Attending: PHYSICIAN ASSISTANT
Payer: COMMERCIAL

## 2022-11-07 VITALS
WEIGHT: 200.5 LBS | HEIGHT: 71 IN | BODY MASS INDEX: 28.07 KG/M2 | DIASTOLIC BLOOD PRESSURE: 77 MMHG | HEART RATE: 68 BPM | SYSTOLIC BLOOD PRESSURE: 134 MMHG | OXYGEN SATURATION: 97 %

## 2022-11-07 DIAGNOSIS — K43.9 VENTRAL HERNIA WITHOUT OBSTRUCTION OR GANGRENE: ICD-10-CM

## 2022-11-07 DIAGNOSIS — K42.9 UMBILICAL HERNIA WITHOUT OBSTRUCTION AND WITHOUT GANGRENE: ICD-10-CM

## 2022-11-07 PROCEDURE — 99203 OFFICE O/P NEW LOW 30 MIN: CPT | Performed by: SURGERY

## 2022-11-07 ASSESSMENT — PAIN SCALES - GENERAL: PAINLEVEL: SEVERE PAIN (6)

## 2022-11-07 NOTE — NURSING NOTE
"Geovanni Jasso's chief complaint for this visit includes:  Chief Complaint   Patient presents with     New Patient     Ventral hernia without obstruction or gangrene     PCP: Paddy Holly    Referring Provider:  Hank Macdonald PA-C  [address] [place] [address]    /77 (BP Location: Left arm, Patient Position: Sitting)   Pulse 68   Ht 1.803 m (5' 11\")   Wt 90.9 kg (200 lb 8 oz)   SpO2 97%   BMI 27.96 kg/m    Severe Pain (6)        Allergies   Allergen Reactions     Penicillins          Do you need any medication refills at today's visit? No    Janey Morrison LPN     "

## 2022-11-07 NOTE — PROGRESS NOTES
Chief Complaint: Right sided abdominal pain     History of Present Illness:   62 year old man sent in consultation by Hank SHEIKH for evaluation of an umbilical hernia. He notes a few different issues-  There has been an upper abdominal mass has been present for a few years and has been asymptomatic. He notes it when he gets up from lying down.  There has been a pain just inferior and to the right of his umbilicus which started about 6 weeks ago. He noted a constant dull pain with some recurrent stabbing pain in the area. There were no clear aggravating or alleviating factors, and he denies noting any protrusion in the area. He has not noted any pain in that area in the last 2 weeks. He denies any actual discomfort in the area of the umbilicus.       Past Medical History:   Diagnosis Date     Carpal tunnel syndrome      Chronic rhinitis 10/23/2015     Esophageal reflux 12/26/2013     Hand joint stiff, right      Past Surgical History:   Procedure Laterality Date     COLONOSCOPY WITH CO2 INSUFFLATION N/A 7/17/2019    Procedure: COLONOSCOPY, WITH CO2 INSUFFLATION;  Surgeon: Jaison Hammer MD;  Location:  OR     NO HISTORY OF SURGERY       Current Outpatient Medications   Medication     acetaminophen (TYLENOL) 325 MG tablet     celecoxib (CELEBREX) 100 MG capsule     diclofenac (VOLTAREN) 1 % topical gel     Efinaconazole (JUBLIA) 10 % SOLN     fexofenadine (ALLEGRA) 60 MG tablet     fluticasone (FLONASE) 50 MCG/ACT nasal spray     ibuprofen (ADVIL,MOTRIN) 200 MG tablet     ketoconazole (NIZORAL) 2 % external cream     LANsoprazole (PREVACID) 30 MG DR capsule     methocarbamol (ROBAXIN) 500 MG tablet     omeprazole (PRILOSEC) 40 MG DR capsule     metroNIDAZOLE (METROCREAM) 0.75 % external cream     nystatin-triamcinolone (MYCOLOG II) 319177-2.1 UNIT/GM-% external cream     Current Facility-Administered Medications   Medication     methylPREDNISolone (DEPO-MEDROL) injection 40 mg      "methylPREDNISolone (DEPO-MEDROL) injection 40 mg        Allergies   Allergen Reactions     Penicillins      Social History     Socioeconomic History     Marital status:      Spouse name: Not on file     Number of children: Not on file     Years of education: Not on file     Highest education level: Not on file   Occupational History     Not on file   Tobacco Use     Smoking status: Never     Smokeless tobacco: Never   Substance and Sexual Activity     Alcohol use: Yes     Comment: occasionally     Drug use: No     Sexual activity: Yes     Partners: Female   Other Topics Concern     Parent/sibling w/ CABG, MI or angioplasty before 65F 55M? Not Asked   Social History Narrative     Not on file     Social Determinants of Health     Financial Resource Strain: Not on file   Food Insecurity: Not on file   Transportation Needs: Not on file   Physical Activity: Not on file   Stress: Not on file   Social Connections: Not on file   Intimate Partner Violence: Not on file   Housing Stability: Not on file     Family History   Problem Relation Age of Onset     Autoimmune Disease No family hx of            Review of Systems:  No  dyspnea, weight loss, fevers or night sweats. He denies any change in his bowel habits, and specifically no diarrhea or hematochezia related to his abdominal pain  All other systems questioned and negative.     Exam:  Vital signs for exam: /77 (BP Location: Left arm, Patient Position: Sitting)   Pulse 68   Ht 1.803 m (5' 11\")   Wt 90.9 kg (200 lb 8 oz)   SpO2 97%   BMI 27.96 kg/m    Constitutional: healthy, alert and no distress.  Head: Normocephalic. No masses, lesions, tenderness or abnormalities.  ENT: ENT exam normal, no neck nodes or sinus tenderness.  Cardiovascular: Normal S1, S2, no murmurs  Respiratory: Clear to auscultation.   Gastrointestinal:  Abdomen soft, non-tender. No masses, organomegaly. Diastasis recti, small reducible umbilical hernia, no fascial defect or discomfort " noted in the region of the previous pain   : Deferred  Musculoskeletal: extremities normal- no gross deformities noted and normal muscle tone  Skin: no jaundice, rashes or petechiae.   Neurologic: Gait normal.  Psychiatric: Mentation appears normal and affect normal.    Colonoscopy 2019- diverticuli    IMPRESSION AND PLAN:  1. Diastasis recti- does not require any intervention  2. Asymptomatic umbilical hernia - does not require any intervention, small risks of acute incarceration explained to the patient  3. Right sided abdominal pain which is no longer present- unclear etiology, ? Muscle injury, if it recurs, CT scan could be ordered by PCP

## 2022-11-07 NOTE — LETTER
11/7/2022         RE: Geovanni Jasso  [id number]  [facility] [address]        Dear Colleague,    Thank you for referring your patient, Geovanni Jasso, to the [facility]. Please see a copy of my visit note below.    Chief Complaint: Right sided abdominal pain     History of Present Illness:   62 year old man sent in consultation by Hank SHEIKH for evaluation of an umbilical hernia. He notes a few different issues-  There has been an upper abdominal mass has been present for a few years and has been asymptomatic. He notes it when he gets up from lying down.  There has been a pain just inferior and to the right of his umbilicus which started about 6 weeks ago. He noted a constant dull pain with some recurrent stabbing pain in the area. There were no clear aggravating or alleviating factors, and he denies noting any protrusion in the area. He has not noted any pain in that area in the last 2 weeks. He denies any actual discomfort in the area of the umbilicus.       Past Medical History:   Diagnosis Date     Carpal tunnel syndrome      Chronic rhinitis 10/23/2015     Esophageal reflux 12/26/2013     Hand joint stiff, right      Past Surgical History:   Procedure Laterality Date     COLONOSCOPY WITH CO2 INSUFFLATION N/A 7/17/2019    Procedure: COLONOSCOPY, WITH CO2 INSUFFLATION;  Surgeon: Jaison Hammer MD;  Location: [facility] OR     NO HISTORY OF SURGERY       Current Outpatient Medications   Medication     acetaminophen (TYLENOL) 325 MG tablet     celecoxib (CELEBREX) 100 MG capsule     diclofenac (VOLTAREN) 1 % topical gel     Efinaconazole (JUBLIA) 10 % SOLN     fexofenadine (ALLEGRA) 60 MG tablet     fluticasone (FLONASE) 50 MCG/ACT nasal spray     ibuprofen (ADVIL,MOTRIN) 200 MG tablet     ketoconazole (NIZORAL) 2 % external cream     LANsoprazole (PREVACID) 30 MG DR capsule     methocarbamol (ROBAXIN) 500 MG tablet     omeprazole (PRILOSEC) 40 MG DR capsule     metroNIDAZOLE  "(METROCREAM) 0.75 % external cream     nystatin-triamcinolone (MYCOLOG II) 047707-2.1 UNIT/GM-% external cream     Current Facility-Administered Medications   Medication     methylPREDNISolone (DEPO-MEDROL) injection 40 mg     methylPREDNISolone (DEPO-MEDROL) injection 40 mg        Allergies   Allergen Reactions     Penicillins      Social History     Socioeconomic History     Marital status:      Spouse name: Not on file     Number of children: Not on file     Years of education: Not on file     Highest education level: Not on file   Occupational History     Not on file   Tobacco Use     Smoking status: Never     Smokeless tobacco: Never   Substance and Sexual Activity     Alcohol use: Yes     Comment: occasionally     Drug use: No     Sexual activity: Yes     Partners: Female   Other Topics Concern     Parent/sibling w/ CABG, MI or angioplasty before 65F 55M? Not Asked   Social History Narrative     Not on file     Social Determinants of Health     Financial Resource Strain: Not on file   Food Insecurity: Not on file   Transportation Needs: Not on file   Physical Activity: Not on file   Stress: Not on file   Social Connections: Not on file   Intimate Partner Violence: Not on file   Housing Stability: Not on file     Family History   Problem Relation Age of Onset     Autoimmune Disease No family hx of            Review of Systems:  No  dyspnea, weight loss, fevers or night sweats. He denies any change in his bowel habits, and specifically no diarrhea or hematochezia related to his abdominal pain  All other systems questioned and negative.     Exam:  Vital signs for exam: /77 (BP Location: Left arm, Patient Position: Sitting)   Pulse 68   Ht 1.803 m (5' 11\")   Wt 90.9 kg (200 lb 8 oz)   SpO2 97%   BMI 27.96 kg/m    Constitutional: healthy, alert and no distress.  Head: Normocephalic. No masses, lesions, tenderness or abnormalities.  ENT: ENT exam normal, no neck nodes or sinus " tenderness.  Cardiovascular: Normal S1, S2, no murmurs  Respiratory: Clear to auscultation.   Gastrointestinal:  Abdomen soft, non-tender. No masses, organomegaly. Diastasis recti, small reducible umbilical hernia, no fascial defect or discomfort noted in the region of the previous pain   : Deferred  Musculoskeletal: extremities normal- no gross deformities noted and normal muscle tone  Skin: no jaundice, rashes or petechiae.   Neurologic: Gait normal.  Psychiatric: Mentation appears normal and affect normal.    Colonoscopy 2019- diverticuli    IMPRESSION AND PLAN:  1. Diastasis recti- does not require any intervention  2. Asymptomatic umbilical hernia - does not require any intervention, small risks of acute incarceration explained to the patient  3. Right sided abdominal pain which is no longer present- unclear etiology, ? Muscle injury, if it recurs, CT scan could be ordered by PCP          Again, thank you for allowing me to participate in the care of your patient.        Sincerely,        Jacki Villasenor MD

## 2022-12-02 ENCOUNTER — ANCILLARY PROCEDURE (OUTPATIENT)
Dept: GENERAL RADIOLOGY | Facility: CLINIC | Age: 62
End: 2022-12-02
Attending: INTERNAL MEDICINE
Payer: COMMERCIAL

## 2022-12-02 ENCOUNTER — OFFICE VISIT (OUTPATIENT)
Dept: FAMILY MEDICINE | Facility: CLINIC | Age: 62
End: 2022-12-02
Payer: COMMERCIAL

## 2022-12-02 VITALS
BODY MASS INDEX: 28 KG/M2 | HEIGHT: 71 IN | DIASTOLIC BLOOD PRESSURE: 77 MMHG | RESPIRATION RATE: 14 BRPM | HEART RATE: 75 BPM | TEMPERATURE: 98 F | OXYGEN SATURATION: 98 % | SYSTOLIC BLOOD PRESSURE: 124 MMHG | WEIGHT: 200 LBS

## 2022-12-02 DIAGNOSIS — R10.31 ACUTE BILATERAL LOWER ABDOMINAL PAIN: ICD-10-CM

## 2022-12-02 DIAGNOSIS — K21.9 GASTROESOPHAGEAL REFLUX DISEASE WITHOUT ESOPHAGITIS: ICD-10-CM

## 2022-12-02 DIAGNOSIS — Z12.5 SCREENING FOR PROSTATE CANCER: ICD-10-CM

## 2022-12-02 DIAGNOSIS — K58.9 IRRITABLE BOWEL SYNDROME, UNSPECIFIED TYPE: Primary | ICD-10-CM

## 2022-12-02 DIAGNOSIS — R10.32 ACUTE BILATERAL LOWER ABDOMINAL PAIN: ICD-10-CM

## 2022-12-02 LAB — PSA SERPL-MCNC: 1.18 UG/L (ref 0–4)

## 2022-12-02 PROCEDURE — 74019 RADEX ABDOMEN 2 VIEWS: CPT | Mod: TC | Performed by: RADIOLOGY

## 2022-12-02 PROCEDURE — 99214 OFFICE O/P EST MOD 30 MIN: CPT | Performed by: INTERNAL MEDICINE

## 2022-12-02 PROCEDURE — 36415 COLL VENOUS BLD VENIPUNCTURE: CPT | Performed by: INTERNAL MEDICINE

## 2022-12-02 PROCEDURE — G0103 PSA SCREENING: HCPCS | Performed by: INTERNAL MEDICINE

## 2022-12-02 RX ORDER — PANTOPRAZOLE SODIUM 20 MG/1
20 TABLET, DELAYED RELEASE ORAL DAILY
Qty: 30 TABLET | Refills: 5 | Status: SHIPPED | OUTPATIENT
Start: 2022-12-02 | End: 2023-02-22

## 2022-12-02 ASSESSMENT — PAIN SCALES - GENERAL: PAINLEVEL: MILD PAIN (2)

## 2022-12-02 NOTE — PROGRESS NOTES
[facility] Internal Medicine Progress Note           Assessment and Plan:     Irritable bowel syndrome, unspecified type  - REVIEW OF HEALTH MAINTENANCE PROTOCOL ORDERS    Gastroesophageal reflux disease without esophagitis  - REVIEW OF HEALTH MAINTENANCE PROTOCOL ORDERS  - pantoprazole (PROTONIX) 20 MG EC tablet; Take 1 tablet (20 mg) by mouth daily    Acute bilateral lower abdominal pain  - CT Abdomen Pelvis w/o & w Contrast; Future  - XR Abdomen 2 Views    Screening for prostate cancer  - PSA, screen         Interval History:     Bill is a 62 year old male, presenting for the following health issues:    ED/UC Followup:    Facility:  [facility]  Date of visit: 10/21/2022 & 11/7/2022  Reason for visit: Abdominal pain  Current Status: Patient describes pain was triggered by stress at work, located at both lower quadrants.  -left anterior chest wall pain worse with coughing  -rash on back  -repeat PSA.              Significant Problems:   Patient Active Problem List   Diagnosis     Esophageal reflux     Hyperlipidemia LDL goal <70     Chronic rhinitis     Bilateral low back pain without sciatica     Chondromalacia patellae, right     S/P ACL reconstruction     Atherosclerosis of native coronary artery of native heart without angina pectoris     SOB (shortness of breath)     Non-allergic rhinitis     Positive CLAIRE (antinuclear antibody)     Hand joint stiff, right     Carpal tunnel syndrome     Osteoarthritis of knee, unspecified laterality, unspecified osteoarthritis type     Diverticulosis of large intestine     Internal hemorrhoids              Review of Systems:   CONSTITUTIONAL: NEGATIVE for fever, chills, change in weight  INTEGUMENTARY/SKIN: NEGATIVE for worrisome rashes, moles or lesions  EYES: NEGATIVE for vision changes or irritation  ENT/MOUTH: NEGATIVE for ear, mouth and throat problems  RESP: NEGATIVE for significant cough or SOB  CV: NEGATIVE for chest pain, palpitations or  "peripheral edema  GI: NEGATIVE for hematemesis, hematochezia, jaundice and melena  : NEGATIVE for frequency, dysuria, or hematuria  NEURO: NEGATIVE for weakness, dizziness or paresthesias  ENDOCRINE: NEGATIVE for temperature intolerance, skin/hair changes  HEME: NEGATIVE for bleeding problems  PSYCHIATRIC: NEGATIVE for changes in mood or affect             Physical Exam:   /77 (BP Location: Left arm, Patient Position: Sitting, Cuff Size: Adult Large)   Pulse 75   Temp 98  F (36.7  C) (Oral)   Resp 14   Ht 1.803 m (5' 10.98\")   Wt 90.7 kg (200 lb)   SpO2 98%   BMI 27.91 kg/m    Constitutional: Awake, alert, cooperative, no apparent distress, and appears stated age.  Eyes: extra-ocular muscles intact and sclera clear  ENT: normocepalic, without obvious abnormality  Lungs: No increased work of breathing, good air exchange, clear to auscultation bilaterally, no crackles or wheezing.  Cardiovascular: Regular rate and rhythm, normal S1 and S2, no S3 or S4, and no murmur noted.  Abdomen: normal bowel sounds, soft, non-distended and tenderness noted in the right lower quadrant and in the left lower quadrant  Neurologic: Mental Status Exam:  Level of Alertness:   awake  Orientation:   person, place, time  Memory:   normal  Fund of Knowledge:  normal  Attention/Concentration:  normal  Language:  normal  Neuropsychiatric: Normal affect, mood, orientation, memory and insight.          Data:     COLONOSCOPY 07/17/2019  8:35 AM [facility]   Endoscopy Department-[place]   _______________________________________________________________________________   Patient Name: Geovanni Jasso             Procedure Date: 7/17/2019 8:35 AM   MRN: [id number]                       YOB: 1960   Admit Type: Outpatient                Age: 58   Gender: Male                          Note Status: Finalized   Attending MD: Jaison Hammer MD  Instrument Name: [id number] "   _______________________________________________________________________________       Procedure:                Colonoscopy   Indications:              Screening for colorectal malignant neoplasm, Last                             colonoscopy: 2008   Providers:                Jaison Hammer MD, Corinne Jerez RN   Referring MD:             Paddy Holly MD   Medicines:                Fentanyl 100 micrograms IV, Midazolam 4 mg IV   Complications:            No immediate complications.   _______________________________________________________________________________   Procedure:                Pre-Anesthesia Assessment:                             - Prior to the procedure, a History and Physical                             was performed, and patient medications and                             allergies were reviewed. The patient is competent.                             The risks and benefits of the procedure and the                             sedation options and risks were discussed with the                             patient. All questions were answered and informed                             consent was obtained. Patient identification and                             proposed procedure were verified by the physician                             and the nurse in the pre-procedure area in the                             endoscopy suite. Mental Status Examination: alert                             and oriented. Airway Examination: normal                             oropharyngeal airway and neck mobility and                             Mallampati Class I (tonsillar pillars visualized).                             Respiratory Examination: clear to auscultation. CV                             Examination: normal. Prophylactic Antibiotics: The                             patient does not require prophylactic antibiotics.                             Prior Anticoagulants: The patient has taken no                              previous anticoagulant or antiplatelet agents. ASA                             Grade Assessment: II - A patient with mild systemic                             disease. After reviewing the risks and benefits,                             the patient was deemed in satisfactory condition to                             undergo the procedure. The anesthesia plan was to                             use moderate sedation / analgesia (conscious                             sedation). Immediately prior to administration of                             medications, the patient was re-assessed for                             adequacy to receive sedatives. The heart rate,                             respiratory rate, oxygen saturations, blood                             pressure, adequacy of pulmonary ventilation, and                             response to care were monitored throughout the                             procedure. The physical status of the patient was                             re-assessed after the procedure.                             After obtaining informed consent, the colonoscope                             was passed under direct vision. Throughout the                             procedure, the patient's blood pressure, pulse, and                             oxygen saturations were monitored continuously. The                             Colonoscope was introduced through the anus and                             advanced to the terminal ileum, with identification                             of the appendiceal orifice and IC valve. The                             colonoscopy was performed without difficulty. The                             patient tolerated the procedure well. The quality                             of the bowel preparation was evaluated using the                             BBPS ([place] Bowel Preparation Scale) with scores                             of: Right Colon = 2 (minor  amount of residual                             staining, small fragments of stool and/or opaque                             liquid, but mucosa seen well), Transverse Colon = 2                             (minor amount of residual staining, small fragments                             of stool and/or opaque liquid, but mucosa seen                             well) and Left Colon = 2 (minor amount of residual                             staining, small fragments of stool and/or opaque                             liquid, but mucosa seen well). The total BBPS score                             equals 6. The quality of the bowel preparation was                             good. Scope insertion time was 9 minutes. Scope                             withdrawal time was 14 minutes.                                                                                     Findings:        The perianal and digital rectal examinations were normal.        The terminal ileum appeared normal.        Many small and large-mouthed diverticula were found in the sigmoid colon        and descending colon.        Non-bleeding internal hemorrhoids were found during retroflexion. The        hemorrhoids were small and Grade I (internal hemorrhoids that do not        prolapse).        There is no endoscopic evidence of mass or polyps in the entire colon.        No other significant abnormalities were identified in a careful        examination of the remainder of the colon.                                                                                     Moderate Sedation:        Moderate (conscious) sedation was administered by the endoscopy nurse        and supervised by the endoscopist. The patient's oxygen saturation,        heart rate, blood pressure and response to care were monitored. Total        physician intraservice time was 26 minutes.   Impression:               - The examined portion of the ileum was normal.                              - Diverticulosis in the sigmoid colon and in the                             descending colon.                             - Non-bleeding internal hemorrhoids.                             - No specimens collected.                             - No polyps on this exam.   Recommendation:           - Discharge patient to home (ambulatory).                             - Patient has a contact number available for                             emergencies. The signs and symptoms of potential                             delayed complications were discussed with the                             patient. Return to normal activities tomorrow.                             Written discharge instructions were provided to the                             patient.                             - Resume aspirin at prior dose today.                             - Repeat colonoscopy in 10 years for screening                             purposes.                             - Return to referring physician.                             - Use Citrucel one tablespoon PO daily.                                                                                         ________________________   Jaison Hammer MD        Disposition:  Follow-up in 4 weeks.      Paddy Holly MD  Internal Medicine  [facility] Team

## 2023-01-06 ENCOUNTER — TELEPHONE (OUTPATIENT)
Dept: FAMILY MEDICINE | Facility: CLINIC | Age: 63
End: 2023-01-06

## 2023-01-06 ENCOUNTER — ANCILLARY PROCEDURE (OUTPATIENT)
Dept: CT IMAGING | Facility: CLINIC | Age: 63
End: 2023-01-06
Attending: INTERNAL MEDICINE
Payer: COMMERCIAL

## 2023-01-06 DIAGNOSIS — R10.31 ACUTE BILATERAL LOWER ABDOMINAL PAIN: ICD-10-CM

## 2023-01-06 DIAGNOSIS — R59.0 RETROPERITONEAL LYMPHADENOPATHY: Primary | ICD-10-CM

## 2023-01-06 DIAGNOSIS — R10.32 ACUTE BILATERAL LOWER ABDOMINAL PAIN: ICD-10-CM

## 2023-01-06 LAB — RADIOLOGIST FLAGS: ABNORMAL

## 2023-01-06 PROCEDURE — 74177 CT ABD & PELVIS W/CONTRAST: CPT | Mod: GC | Performed by: RADIOLOGY

## 2023-01-06 RX ORDER — IOPAMIDOL 755 MG/ML
123 INJECTION, SOLUTION INTRAVASCULAR ONCE
Status: COMPLETED | OUTPATIENT
Start: 2023-01-06 | End: 2023-01-06

## 2023-01-06 RX ADMIN — IOPAMIDOL 123 ML: 755 INJECTION, SOLUTION INTRAVASCULAR at 08:43

## 2023-01-06 NOTE — TELEPHONE ENCOUNTER
Mendy called from [facility] with urgent findings for: CT ABDOMEN PELVIS W CONTRAST,    Radiologist flags  Rad-Urgent Abnormal (Urgent)   Retroperitoneal lymphadenopathy present         IMPRESSION:   1. Retroperitoneal lymphadenopathy demonstrated at the level of the  kidneys towards the left of uncertain etiology. No additional evidence  of metastatic disease or primary tumor identified in the abdomen or  pelvis on this study. Could consider tissue sampling for further  initial workup.  2. Sigmoid diverticulosis without evidence diverticulitis  3. Cholelithiasis without evidence for cholecystitis.          To provider to advise    Daniela ARANDAN, RN

## 2023-01-09 ENCOUNTER — LAB (OUTPATIENT)
Dept: LAB | Facility: CLINIC | Age: 63
End: 2023-01-09
Payer: COMMERCIAL

## 2023-01-09 DIAGNOSIS — R59.0 RETROPERITONEAL LYMPHADENOPATHY: ICD-10-CM

## 2023-01-09 LAB
BASOPHILS # BLD AUTO: 0.1 10E3/UL (ref 0–0.2)
BASOPHILS NFR BLD AUTO: 1 %
CRP SERPL-MCNC: <2.9 MG/L (ref 0–8)
EOSINOPHIL # BLD AUTO: 0.2 10E3/UL (ref 0–0.7)
EOSINOPHIL NFR BLD AUTO: 4 %
ERYTHROCYTE [DISTWIDTH] IN BLOOD BY AUTOMATED COUNT: 12.9 % (ref 10–15)
ERYTHROCYTE [SEDIMENTATION RATE] IN BLOOD BY WESTERGREN METHOD: 8 MM/HR (ref 0–20)
HCT VFR BLD AUTO: 46 % (ref 40–53)
HGB BLD-MCNC: 15.4 G/DL (ref 13.3–17.7)
IMM GRANULOCYTES # BLD: 0 10E3/UL
IMM GRANULOCYTES NFR BLD: 0 %
LYMPHOCYTES # BLD AUTO: 1.8 10E3/UL (ref 0.8–5.3)
LYMPHOCYTES NFR BLD AUTO: 29 %
MCH RBC QN AUTO: 32.6 PG (ref 26.5–33)
MCHC RBC AUTO-ENTMCNC: 33.5 G/DL (ref 31.5–36.5)
MCV RBC AUTO: 97 FL (ref 78–100)
MONOCYTES # BLD AUTO: 0.6 10E3/UL (ref 0–1.3)
MONOCYTES NFR BLD AUTO: 10 %
NEUTROPHILS # BLD AUTO: 3.6 10E3/UL (ref 1.6–8.3)
NEUTROPHILS NFR BLD AUTO: 56 %
NRBC # BLD AUTO: 0 10E3/UL
NRBC BLD AUTO-RTO: 0 /100
PLATELET # BLD AUTO: 221 10E3/UL (ref 150–450)
RBC # BLD AUTO: 4.73 10E6/UL (ref 4.4–5.9)
RETICS # AUTO: 0.08 10E6/UL (ref 0.03–0.1)
RETICS/RBC NFR AUTO: 1.7 % (ref 0.5–2)
WBC # BLD AUTO: 6.3 10E3/UL (ref 4–11)

## 2023-01-09 PROCEDURE — 86140 C-REACTIVE PROTEIN: CPT

## 2023-01-09 PROCEDURE — 86039 ANTINUCLEAR ANTIBODIES (ANA): CPT

## 2023-01-09 PROCEDURE — 85025 COMPLETE CBC W/AUTO DIFF WBC: CPT

## 2023-01-09 PROCEDURE — 36415 COLL VENOUS BLD VENIPUNCTURE: CPT

## 2023-01-09 PROCEDURE — 85045 AUTOMATED RETICULOCYTE COUNT: CPT

## 2023-01-09 PROCEDURE — 86038 ANTINUCLEAR ANTIBODIES: CPT

## 2023-01-09 PROCEDURE — 86225 DNA ANTIBODY NATIVE: CPT

## 2023-01-09 PROCEDURE — 85652 RBC SED RATE AUTOMATED: CPT

## 2023-01-10 LAB
PATH REPORT.COMMENTS IMP SPEC: NORMAL
PATH REPORT.FINAL DX SPEC: NORMAL
PATH REPORT.MICROSCOPIC SPEC OTHER STN: NORMAL
PATH REPORT.MICROSCOPIC SPEC OTHER STN: NORMAL
PATH REPORT.RELEVANT HX SPEC: NORMAL

## 2023-01-11 LAB
ANA PAT SER IF-IMP: ABNORMAL
ANA SER QL IF: ABNORMAL
ANA TITR SER IF: ABNORMAL {TITER}

## 2023-01-13 LAB — DSDNA AB SER-ACNC: 1.1 IU/ML

## 2023-01-18 ENCOUNTER — PRE VISIT (OUTPATIENT)
Dept: ONCOLOGY | Facility: CLINIC | Age: 63
End: 2023-01-18

## 2023-01-18 ENCOUNTER — VIRTUAL VISIT (OUTPATIENT)
Dept: ONCOLOGY | Facility: CLINIC | Age: 63
End: 2023-01-18
Attending: INTERNAL MEDICINE
Payer: COMMERCIAL

## 2023-01-18 DIAGNOSIS — R10.9 ABDOMINAL PAIN, UNSPECIFIED ABDOMINAL LOCATION: ICD-10-CM

## 2023-01-18 DIAGNOSIS — R59.0 RETROPERITONEAL LYMPHADENOPATHY: ICD-10-CM

## 2023-01-18 DIAGNOSIS — R22.1 LUMP IN THROAT: Primary | ICD-10-CM

## 2023-01-18 PROCEDURE — 99204 OFFICE O/P NEW MOD 45 MIN: CPT | Mod: 95 | Performed by: INTERNAL MEDICINE

## 2023-01-18 NOTE — LETTER
1/18/2023         RE: Geovanni Jasso  [address]  [place] [address]        Dear Colleague,    Thank you for referring your patient, Geovanni Jasso, to the [facility]. Please see a copy of my visit note below.    Bill is a 62 year old who is being evaluated via a billable video visit.      How would you like to obtain your AVS? MyChart  If the video visit is dropped, the invitation should be resent by: Send to e-mail at: [email or web address]  Will anyone else be joining your video visit? No     Diana Catalino VELASQUEZ        Video-Visit Details    Type of service:  Video Visit     Originating Location (pt. Location): Home  Distant Location (provider location):  On-site  Platform used for Video Visit: proVITAL  Video start time. 11:57 AM  Video stop time. 12:22 PM      Oncology initial visit:  Date on this visit: 1/18/2023    Geovanni Jasso  is referred by Dr.Arvin Zari Holly for an oncology consultation. He requires evaluation for new diagnosis of retroperitoneal lymphadenopathy.    Primary Physician: Paddy Holly     History Of Present Illness:  Mr. Jasso is a 62 year old male who presents with new diagnosis of retroperitoneal lymphadenopathy.    This is a video visit. His wife Charleen is also available    Over the last few months, since fall of 2022, off and on he has had upper abdominal pain which lasts few minutes. He thinks stress sometimes can bring it on. No relationship to food.  No nausea or vomiting. BMs have been fine. No bleeding. No fevers.   He had a CT Abd Pelvis on 1/6/2023 which showed enlarged retroperitoneal lymphadenopathy  He has noticed some fatigue. He has also noticed some lump in the throat just above the sternum at night. This is going on for a couple of months. He denies any dysphagia.     Currently he feels well. Currently denies any abdominal pain. No nausea or vomiting. BMs are fine. No recent weight loss. No SOB. No neuropathy. No skin flushing. No  drenching night sweats.    In the summer, he noticed a rash on the face which has since resolved.  He was noted to have CLAIRE positive.     ECOG 0-1    ROS:  A comprehensive ROS was otherwise neg      Past Medical/Surgical History:  Past Medical History:   Diagnosis Date     Carpal tunnel syndrome      Chronic rhinitis 10/23/2015     Esophageal reflux 12/26/2013     Hand joint stiff, right    Shingles     Past Surgical History:   Procedure Laterality Date     COLONOSCOPY WITH CO2 INSUFFLATION N/A 7/17/2019    Procedure: COLONOSCOPY, WITH CO2 INSUFFLATION;  Surgeon: Jaison Hammer MD;  Location: [facility] OR     NO HISTORY OF SURGERY       Cancer History:   As above    Allergies:  Allergies as of 01/18/2023 - Reviewed 12/02/2022   Allergen Reaction Noted     Penicillins  12/23/2013     Current Medications:  Current Outpatient Medications   Medication Sig Dispense Refill     acetaminophen (TYLENOL) 325 MG tablet Take 325-650 mg by mouth 4 times daily as needed       ASPIRIN NOT PRESCRIBED (INTENTIONAL) Please choose reason not prescribed from choices below.       celecoxib (CELEBREX) 100 MG capsule Take 1 capsule (100 mg) by mouth 2 times daily 40 capsule 1     diclofenac (VOLTAREN) 1 % topical gel Apply small amount topically to right shoulder 3-4 times daily as needed for pain. 50 g 0     Efinaconazole (JUBLIA) 10 % SOLN Externally apply topically daily to nail. 8 mL 4     fexofenadine (ALLEGRA) 60 MG tablet Take 1 tablet (60 mg) by mouth 2 times daily 180 tablet 0     fluticasone (FLONASE) 50 MCG/ACT nasal spray Spray 1-2 sprays into both nostrils daily 16 g 11     ibuprofen (ADVIL,MOTRIN) 200 MG tablet Take 200 mg by mouth every 6 hours as needed       ketoconazole (NIZORAL) 2 % external cream Apply twice daily for 8 weeks to feet 60 g 3     LANsoprazole (PREVACID) 30 MG DR capsule Take 1 capsule (30 mg) by mouth every morning (before breakfast) 90 capsule 0     methocarbamol (ROBAXIN) 500 MG tablet Take 1  tablet (500 mg) by mouth nightly as needed for muscle spasms 30 tablet 0     metroNIDAZOLE (METROCREAM) 0.75 % external cream Apply topically 2 times daily 45 g 1     nystatin-triamcinolone (MYCOLOG II) 628854-3.1 UNIT/GM-% external cream Apply topically 2 times daily 30 g 5     omeprazole (PRILOSEC) 40 MG DR capsule Take 1 capsule (40 mg) by mouth daily Take 30 minutes before breakfast. (Patient not taking: Reported on 12/2/2022) 90 capsule 1     pantoprazole (PROTONIX) 20 MG EC tablet Take 1 tablet (20 mg) by mouth daily 30 tablet 5      Family History:  Family History   Problem Relation Age of Onset     Autoimmune Disease No family hx of      Maternal grand mother had some sort of a cancer  Dad had lung cancer  Has 4 kids- all healthy    Social History:  Social History     Socioeconomic History     Marital status:      Spouse name: Not on file     Number of children: Not on file     Years of education: Not on file     Highest education level: Not on file   Occupational History     Not on file   Tobacco Use     Smoking status: Never     Smokeless tobacco: Never   Vaping Use     Vaping Use: Never used   Substance and Sexual Activity     Alcohol use: Yes     Comment: occasionally     Drug use: No     Sexual activity: Yes     Partners: Female   Other Topics Concern     Parent/sibling w/ CABG, MI or angioplasty before 65F 55M? Not Asked   Social History Narrative     Not on file     Social Determinants of Health     Financial Resource Strain: Not on file   Food Insecurity: Not on file   Transportation Needs: Not on file   Physical Activity: Not on file   Stress: Not on file   Social Connections: Not on file   Intimate Partner Violence: Not on file   Housing Stability: Not on file   no smoking. Drinks etoh wine 2-3 times / week.       Physical Exam:  There were no vitals taken for this visit.   Wt Readings from Last 4 Encounters:   12/02/22 90.7 kg (200 lb)   11/07/22 90.9 kg (200 lb 8 oz)   10/21/22 90.3 kg  (199 lb)   07/08/21 89.3 kg (196 lb 14.4 oz)       Constitutional.  Does not seem to be in any acute distress.  Eyes.  No redness or discharge noted.  Respiratory.  Speaking in full sentences.  Breathing seems comfortable without any accessory use of muscles.    Skin.  Visualized his skin does not show any obvious rashes.  Musculoskeletal.  Range of motion for visualized areas is intact.  Neurological.  Alert and oriented x3.  Psychiatric.  Mood, mentation and affect are normal.  Decision making capacity is intact.      The rest of a comprehensive physical examination is deferred due to [facility] video visit restrictions.      Laboratory/Imaging Studies      Reviewed  1/9/2023  CBC was unremarkable.  Peripheral blood smear was unremarkable.    CLAIRE was positive-1: 80, speckled pattern  dsDNA was negative  ESR 8    1/6/2023-CT abdomen and pelvis   Retroperitoneal lymph node demonstrated in the left periaortic region at the level of the kidneys measuring 1.9 x 2.6 cm, . There is also a lobulated node anterior to the  esophagus at the level of the left kidney which appears to be 2 adjacent nodes. The largest node towards the left measures 1.9 x 2.5 cm  Sigmoid diverticulosis without evidence diverticulitis.  Cholelithiasis without evidence for cholecystitis.    ASSESSMENT/PLAN:    He was noted to have retroperitoneal lymphadenopathy when CT abdomen and pelvis was done for off and on abdominal pain.    We do not have a specific etiology of this lymphadenopathy although malignancy is on the differential and I would like to rule that out. He also has some other symptoms including a lump in the throat and some fatigue. I would recommend checking a PET/CT and potentially a biopsy after that.  But we will decide about biopsy after the PET/CT.      I also recommend checking CMP and LDH.    We will decide about further management after reviewing the above-mentioned work-up.    All of his and his wife's questions  were answered to their satisfaction.  They are agreeable and comfortable with the plan     Kiel Maurer MD              Again, thank you for allowing me to participate in the care of your patient.        Sincerely,        Kiel Maurer MD

## 2023-01-18 NOTE — PROGRESS NOTES
Bill is a 62 year old who is being evaluated via a billable video visit.      How would you like to obtain your AVS? MyChart  If the video visit is dropped, the invitation should be resent by: Send to e-mail at: [email or web address]  Will anyone else be joining your video visit? [place]     Diana Bae EVA        Video-Visit Details    Type of service:  Video Visit     Originating Location (pt. Location): Home  Distant Location (provider location):  On-site  Platform used for Video Visit: ClickFox  Video start time. 11:57 AM  Video stop time. 12:22 PM      Oncology initial visit:  Date on this visit: 1/18/2023    Geovanni Jasso  is referred by Dr.Arvin Zari Holly for an oncology consultation. He requires evaluation for new diagnosis of retroperitoneal lymphadenopathy.    Primary Physician: Paddy Holly     History Of Present Illness:  Mr. Jasso is a 62 year old male who presents with new diagnosis of retroperitoneal lymphadenopathy.    This is a video visit. His wife Charleen is also available    Over the last few months, since fall of 2022, off and on he has had upper abdominal pain which lasts few minutes. He thinks stress sometimes can bring it on. No relationship to food.  No nausea or vomiting. BMs have been fine. No bleeding. No fevers.   He had a CT Abd Pelvis on 1/6/2023 which showed enlarged retroperitoneal lymphadenopathy  He has noticed some fatigue. He has also noticed some lump in the throat just above the sternum at night. This is going on for a couple of months. He denies any dysphagia.     Currently he feels well. Currently denies any abdominal pain. No nausea or vomiting. BMs are fine. No recent weight loss. No SOB. No neuropathy. No skin flushing. No drenching night sweats.    In the summer, he noticed a rash on the face which has since resolved.  He was noted to have CLAIRE positive.     ECOG 0-1    ROS:  A comprehensive ROS was otherwise neg      Past Medical/Surgical History:  Past Medical History:    Diagnosis Date     Carpal tunnel syndrome      Chronic rhinitis 10/23/2015     Esophageal reflux 12/26/2013     Hand joint stiff, right    Shingles     Past Surgical History:   Procedure Laterality Date     COLONOSCOPY WITH CO2 INSUFFLATION N/A 7/17/2019    Procedure: COLONOSCOPY, WITH CO2 INSUFFLATION;  Surgeon: Jaison Hammer MD;  Location: [facility] OR     NO HISTORY OF SURGERY       Cancer History:   As above    Allergies:  Allergies as of 01/18/2023 - Reviewed 12/02/2022   Allergen Reaction Noted     Penicillins  12/23/2013     Current Medications:  Current Outpatient Medications   Medication Sig Dispense Refill     acetaminophen (TYLENOL) 325 MG tablet Take 325-650 mg by mouth 4 times daily as needed       ASPIRIN NOT PRESCRIBED (INTENTIONAL) Please choose reason not prescribed from choices below.       celecoxib (CELEBREX) 100 MG capsule Take 1 capsule (100 mg) by mouth 2 times daily 40 capsule 1     diclofenac (VOLTAREN) 1 % topical gel Apply small amount topically to right shoulder 3-4 times daily as needed for pain. 50 g 0     Efinaconazole (JUBLIA) 10 % SOLN Externally apply topically daily to nail. 8 mL 4     fexofenadine (ALLEGRA) 60 MG tablet Take 1 tablet (60 mg) by mouth 2 times daily 180 tablet 0     fluticasone (FLONASE) 50 MCG/ACT nasal spray Spray 1-2 sprays into both nostrils daily 16 g 11     ibuprofen (ADVIL,MOTRIN) 200 MG tablet Take 200 mg by mouth every 6 hours as needed       ketoconazole (NIZORAL) 2 % external cream Apply twice daily for 8 weeks to feet 60 g 3     LANsoprazole (PREVACID) 30 MG DR capsule Take 1 capsule (30 mg) by mouth every morning (before breakfast) 90 capsule 0     methocarbamol (ROBAXIN) 500 MG tablet Take 1 tablet (500 mg) by mouth nightly as needed for muscle spasms 30 tablet 0     metroNIDAZOLE (METROCREAM) 0.75 % external cream Apply topically 2 times daily 45 g 1     nystatin-triamcinolone (MYCOLOG II) 010904-8.1 UNIT/GM-% external cream Apply topically 2  times daily 30 g 5     omeprazole (PRILOSEC) 40 MG DR capsule Take 1 capsule (40 mg) by mouth daily Take 30 minutes before breakfast. (Patient not taking: Reported on 12/2/2022) 90 capsule 1     pantoprazole (PROTONIX) 20 MG EC tablet Take 1 tablet (20 mg) by mouth daily 30 tablet 5      Family History:  Family History   Problem Relation Age of Onset     Autoimmune Disease No family hx of      Maternal grand mother had some sort of a cancer  Dad had lung cancer  Has 4 kids- all healthy    Social History:  Social History     Socioeconomic History     Marital status:      Spouse name: Not on file     Number of children: Not on file     Years of education: Not on file     Highest education level: Not on file   Occupational History     Not on file   Tobacco Use     Smoking status: Never     Smokeless tobacco: Never   Vaping Use     Vaping Use: Never used   Substance and Sexual Activity     Alcohol use: Yes     Comment: occasionally     Drug use: No     Sexual activity: Yes     Partners: Female   Other Topics Concern     Parent/sibling w/ CABG, MI or angioplasty before 65F 55M? Not Asked   Social History Narrative     Not on file     Social Determinants of Health     Financial Resource Strain: Not on file   Food Insecurity: Not on file   Transportation Needs: Not on file   Physical Activity: Not on file   Stress: Not on file   Social Connections: Not on file   Intimate Partner Violence: Not on file   Housing Stability: Not on file   no smoking. Drinks etoh wine 2-3 times / week.       Physical Exam:  There were no vitals taken for this visit.   Wt Readings from Last 4 Encounters:   12/02/22 90.7 kg (200 lb)   11/07/22 90.9 kg (200 lb 8 oz)   10/21/22 90.3 kg (199 lb)   07/08/21 89.3 kg (196 lb 14.4 oz)       Constitutional.  Does not seem to be in any acute distress.  Eyes.  No redness or discharge noted.  Respiratory.  Speaking in full sentences.  Breathing seems comfortable without any accessory use of muscles.     Skin.  Visualized his skin does not show any obvious rashes.  Musculoskeletal.  Range of motion for visualized areas is intact.  Neurological.  Alert and oriented x3.  Psychiatric.  Mood, mentation and affect are normal.  Decision making capacity is intact.      The rest of a comprehensive physical examination is deferred due to [facility] video visit restrictions.      Laboratory/Imaging Studies      Reviewed  1/9/2023  CBC was unremarkable.  Peripheral blood smear was unremarkable.    CLAIRE was positive-1: 80, speckled pattern  dsDNA was negative  ESR 8    1/6/2023-CT abdomen and pelvis   Retroperitoneal lymph node demonstrated in the left periaortic region at the level of the kidneys measuring 1.9 x 2.6 cm, . There is also a lobulated node anterior to the  esophagus at the level of the left kidney which appears to be 2 adjacent nodes. The largest node towards the left measures 1.9 x 2.5 cm  Sigmoid diverticulosis without evidence diverticulitis.  Cholelithiasis without evidence for cholecystitis.    ASSESSMENT/PLAN:    He was noted to have retroperitoneal lymphadenopathy when CT abdomen and pelvis was done for off and on abdominal pain.    We do not have a specific etiology of this lymphadenopathy although malignancy is on the differential and I would like to rule that out. He also has some other symptoms including a lump in the throat and some fatigue. I would recommend checking a PET/CT and potentially a biopsy after that.  But we will decide about biopsy after the PET/CT.      I also recommend checking CMP and LDH.    We will decide about further management after reviewing the above-mentioned work-up.    All of his and his wife's questions were answered to their satisfaction.  They are agreeable and comfortable with the plan     Keil Maurer MD

## 2023-01-27 ENCOUNTER — ANCILLARY PROCEDURE (OUTPATIENT)
Dept: PET IMAGING | Facility: CLINIC | Age: 63
End: 2023-01-27
Attending: INTERNAL MEDICINE
Payer: COMMERCIAL

## 2023-01-27 ENCOUNTER — LAB (OUTPATIENT)
Dept: LAB | Facility: CLINIC | Age: 63
End: 2023-01-27
Payer: COMMERCIAL

## 2023-01-27 DIAGNOSIS — R22.1 LUMP IN THROAT: ICD-10-CM

## 2023-01-27 DIAGNOSIS — R59.0 RETROPERITONEAL LYMPHADENOPATHY: ICD-10-CM

## 2023-01-27 DIAGNOSIS — R10.9 ABDOMINAL PAIN, UNSPECIFIED ABDOMINAL LOCATION: ICD-10-CM

## 2023-01-27 LAB
ALBUMIN SERPL-MCNC: 3.9 G/DL (ref 3.4–5)
ALP SERPL-CCNC: 96 U/L (ref 40–150)
ALT SERPL W P-5'-P-CCNC: 23 U/L (ref 0–70)
ANION GAP SERPL CALCULATED.3IONS-SCNC: 7 MMOL/L (ref 3–14)
AST SERPL W P-5'-P-CCNC: 14 U/L (ref 0–45)
BILIRUB SERPL-MCNC: 0.7 MG/DL (ref 0.2–1.3)
BUN SERPL-MCNC: 18 MG/DL (ref 7–30)
CALCIUM SERPL-MCNC: 9.6 MG/DL (ref 8.5–10.1)
CHLORIDE BLD-SCNC: 109 MMOL/L (ref 94–109)
CO2 SERPL-SCNC: 26 MMOL/L (ref 20–32)
CREAT SERPL-MCNC: 0.99 MG/DL (ref 0.66–1.25)
GFR SERPL CREATININE-BSD FRML MDRD: 85 ML/MIN/1.73M2
GLUCOSE BLD-MCNC: 94 MG/DL (ref 70–99)
LDH SERPL L TO P-CCNC: 126 U/L (ref 85–227)
POTASSIUM BLD-SCNC: 4.1 MMOL/L (ref 3.4–5.3)
PROT SERPL-MCNC: 7.4 G/DL (ref 6.8–8.8)
SODIUM SERPL-SCNC: 142 MMOL/L (ref 133–144)

## 2023-01-27 PROCEDURE — 80053 COMPREHEN METABOLIC PANEL: CPT

## 2023-01-27 PROCEDURE — A9552 F18 FDG: HCPCS | Performed by: RADIOLOGY

## 2023-01-27 PROCEDURE — 71260 CT THORAX DX C+: CPT | Mod: GC | Performed by: RADIOLOGY

## 2023-01-27 PROCEDURE — 83615 LACTATE (LD) (LDH) ENZYME: CPT

## 2023-01-27 PROCEDURE — 70491 CT SOFT TISSUE NECK W/DYE: CPT | Mod: XU | Performed by: RADIOLOGY

## 2023-01-27 PROCEDURE — 36415 COLL VENOUS BLD VENIPUNCTURE: CPT

## 2023-01-27 PROCEDURE — 78816 PET IMAGE W/CT FULL BODY: CPT | Mod: GC | Performed by: RADIOLOGY

## 2023-01-27 PROCEDURE — 74177 CT ABD & PELVIS W/CONTRAST: CPT | Mod: GC | Performed by: RADIOLOGY

## 2023-01-27 RX ORDER — IOPAMIDOL 755 MG/ML
0-135 INJECTION, SOLUTION INTRAVASCULAR ONCE
Status: COMPLETED | OUTPATIENT
Start: 2023-01-27 | End: 2023-01-27

## 2023-01-27 RX ADMIN — IOPAMIDOL 123 ML: 755 INJECTION, SOLUTION INTRAVASCULAR at 10:00

## 2023-01-31 ENCOUNTER — TELEPHONE (OUTPATIENT)
Dept: OTOLARYNGOLOGY | Facility: CLINIC | Age: 63
End: 2023-01-31

## 2023-01-31 ENCOUNTER — TELEPHONE (OUTPATIENT)
Dept: FAMILY MEDICINE | Facility: CLINIC | Age: 63
End: 2023-01-31

## 2023-01-31 ENCOUNTER — TELEPHONE (OUTPATIENT)
Dept: ONCOLOGY | Facility: CLINIC | Age: 63
End: 2023-01-31
Payer: COMMERCIAL

## 2023-01-31 DIAGNOSIS — R59.1 GENERALIZED LYMPHADENOPATHY: Primary | ICD-10-CM

## 2023-01-31 NOTE — TELEPHONE ENCOUNTER
I called him and discussed the results of the PET scan.  He has generalized lymphadenopathy concerning for lymphoproliferative disorder.  I would like to get excisional lymph node biopsy most likely of the left supraclavicular lymph node.  We will try to get this arranged as soon as possible.  He is agreeable with this plan.    I will see him back after the biopsy.    Kiel Maurer MD

## 2023-01-31 NOTE — TELEPHONE ENCOUNTER
[facility] Call Center    Phone Message    May a detailed message be left on voicemail: no     Reason for Call: Appointment Intake    Referring Provider Name: Kiel Maurer MD  Diagnosis and/or Symptoms: R59.1 (ICD-10-CM) - Generalized lymphadenopathy    left supraclavicular lymph node excision biopsy      Sending to clinic per protocols    Action Taken: Other: ENT    Travel Screening: Not Applicable

## 2023-01-31 NOTE — TELEPHONE ENCOUNTER
Plan does not cover LANsoprazole (PREVACID) 30 MG DR capsule.  Please call [phone] to initiate Prior Auth or switch to alternative medication.    Insurance type and ID number: ID# [id number]      Additional Information: Needs PA for 13 and over.  Patient has been getting through discount card.    Yue Hamilton

## 2023-02-01 ENCOUNTER — VIRTUAL VISIT (OUTPATIENT)
Dept: ONCOLOGY | Facility: CLINIC | Age: 63
End: 2023-02-01
Payer: COMMERCIAL

## 2023-02-01 DIAGNOSIS — R59.1 GENERALIZED LYMPHADENOPATHY: Primary | ICD-10-CM

## 2023-02-01 PROCEDURE — 99214 OFFICE O/P EST MOD 30 MIN: CPT | Mod: 95 | Performed by: INTERNAL MEDICINE

## 2023-02-01 NOTE — NURSING NOTE
Patient declined individual allergy and medication review by support staff because no changes since 1/18 review. PO

## 2023-02-01 NOTE — PROGRESS NOTES
Bill is a 62 year old who is being evaluated via a billable video visit.      How would you like to obtain your AVS? MyChart  If the video visit is dropped, the invitation should be resent by: Send to e-mail at: [email or web address]  Will anyone else be joining your video visit? No     Diana Bae EVA        Video-Visit Details    Type of service:  Video Visit     Originating Location (pt. Location): Home    Distant Location (provider location):  On-site  Platform used for Video Visit: [employer]   Video start time. 3:58 PM  Video stop time. 4:18 PM    Oncology follow-up visit:  Date on this visit: 2/01/23     Diagnoses.  Diffuse lymphadenopathy.    Primary Physician: Paddy Holly     History Of Present Illness:  Mr. Jasso is a 62 year old  male who presents for follow-up of diffuse lymphadenopathy.  He initially saw me on 1/18/2022 for retroperitoneal lymphadenopathy.  Please see my previous note for details.  I have copied and updated from prior notes.        Over the last few months, since fall of 2022, off and on he has had upper abdominal pain which lasts few minutes. He thinks stress sometimes can bring it on. No relationship to food.  No nausea or vomiting. BMs have been fine. No bleeding. No fevers.   He had a CT Abd Pelvis on 1/6/2023 which showed enlarged retroperitoneal lymphadenopathy  He has noticed some fatigue. He has also noticed some lump in the throat just above the sternum at night. This is going on for a couple of months. He denies any dysphagia.     Currently he feels well. Currently denies any abdominal pain. No nausea or vomiting. BMs are fine. No recent weight loss. No SOB. No neuropathy. No skin flushing. No drenching night sweats.    In the summer, he noticed a rash on the face which has since resolved.  He was noted to have CLAIRE positive.     Interval history.  This is a video visit. This is a video visit. His wife Charleen is also available    He feels about the same as before. He notices  stiffness of the neck on the left side. Off and on gets abdominal pain which is about the same as before. He had several questions after the PET Scan so that's why he scheduled this visit.    ECOG 0-1    ROS:  A comprehensive ROS was otherwise neg    I reviewed other history in Epic as before.      Past Medical/Surgical History:  Past Medical History:   Diagnosis Date     Carpal tunnel syndrome      Chronic rhinitis 10/23/2015     Esophageal reflux 12/26/2013     Hand joint stiff, right    Shingles     Past Surgical History:   Procedure Laterality Date     COLONOSCOPY WITH CO2 INSUFFLATION N/A 7/17/2019    Procedure: COLONOSCOPY, WITH CO2 INSUFFLATION;  Surgeon: Jaison Hammer MD;  Location: [facility] OR     NO HISTORY OF SURGERY       Cancer History:   As above    Allergies:  Allergies as of 02/01/2023 - Reviewed 01/27/2023   Allergen Reaction Noted     Penicillins  12/23/2013     Current Medications:  Current Outpatient Medications   Medication Sig Dispense Refill     acetaminophen (TYLENOL) 325 MG tablet Take 325-650 mg by mouth 4 times daily as needed       ASPIRIN NOT PRESCRIBED (INTENTIONAL) Please choose reason not prescribed from choices below. (Patient not taking: Reported on 1/18/2023)       celecoxib (CELEBREX) 100 MG capsule Take 1 capsule (100 mg) by mouth 2 times daily 40 capsule 1     diclofenac (VOLTAREN) 1 % topical gel Apply small amount topically to right shoulder 3-4 times daily as needed for pain. 50 g 0     Efinaconazole (JUBLIA) 10 % SOLN Externally apply topically daily to nail. 8 mL 4     fexofenadine (ALLEGRA) 60 MG tablet Take 1 tablet (60 mg) by mouth 2 times daily 180 tablet 0     fluticasone (FLONASE) 50 MCG/ACT nasal spray Spray 1-2 sprays into both nostrils daily 16 g 11     ibuprofen (ADVIL,MOTRIN) 200 MG tablet Take 200 mg by mouth every 6 hours as needed       ketoconazole (NIZORAL) 2 % external cream Apply twice daily for 8 weeks to feet (Patient not taking: Reported on  1/18/2023) 60 g 3     LANsoprazole (PREVACID) 30 MG DR capsule Take 1 capsule (30 mg) by mouth every morning (before breakfast) 90 capsule 2     methocarbamol (ROBAXIN) 500 MG tablet Take 1 tablet (500 mg) by mouth nightly as needed for muscle spasms 30 tablet 0     metroNIDAZOLE (METROCREAM) 0.75 % external cream Apply topically 2 times daily 45 g 1     nystatin-triamcinolone (MYCOLOG II) 003377-5.1 UNIT/GM-% external cream Apply topically 2 times daily 30 g 5     omeprazole (PRILOSEC) 40 MG DR capsule Take 1 capsule (40 mg) by mouth daily Take 30 minutes before breakfast. (Patient not taking: Reported on 12/2/2022) 90 capsule 1     pantoprazole (PROTONIX) 20 MG EC tablet Take 1 tablet (20 mg) by mouth daily (Patient not taking: Reported on 1/18/2023) 30 tablet 5      Family History:  Family History   Problem Relation Age of Onset     Autoimmune Disease No family hx of      Maternal grand mother had some sort of a cancer  Dad had lung cancer  Has 4 kids- all healthy    Social History:  Social History     Socioeconomic History     Marital status:      Spouse name: Not on file     Number of children: Not on file     Years of education: Not on file     Highest education level: Not on file   Occupational History     Not on file   Tobacco Use     Smoking status: Never     Smokeless tobacco: Never   Vaping Use     Vaping Use: Never used   Substance and Sexual Activity     Alcohol use: Yes     Comment: occasionally     Drug use: No     Sexual activity: Yes     Partners: Female   Other Topics Concern     Parent/sibling w/ CABG, MI or angioplasty before 65F 55M? Not Asked   Social History Narrative     Not on file     Social Determinants of Health     Financial Resource Strain: Not on file   Food Insecurity: Not on file   Transportation Needs: Not on file   Physical Activity: Not on file   Stress: Not on file   Social Connections: Not on file   Intimate Partner Violence: Not on file   Housing Stability: Not on file    no smoking. Drinks etoh wine 2-3 times / week.       Physical Exam:  There were no vitals taken for this visit.   Wt Readings from Last 4 Encounters:   12/02/22 90.7 kg (200 lb)   11/07/22 90.9 kg (200 lb 8 oz)   10/21/22 90.3 kg (199 lb)   07/08/21 89.3 kg (196 lb 14.4 oz)         Constitutional.  Looks well and in no apparent distress.   Eyes.  Without eye redness or apparent jaundice.   Respiratory.  Non labored breathing. Speaking in full sentences.    Skin.  No concerning skin rashes on the skin visualized.   Neurological.  Is alert and oriented.  Psychiatric.  Mood and affect seem appropriate.      The rest of a comprehensive physical examination is deferred due to Public Health Emergency video visit restrictions.        Laboratory/Imaging Studies      Reviewed    1/27/2023  CMP unremarkable.  .    1/9/2023  CBC was unremarkable.  Peripheral blood smear was unremarkable.    CLAIRE was positive-1: 80, speckled pattern  dsDNA was negative  ESR 8    PET/CT 1/27/2023  CHEST:  Marked uptake in the enlarged 1.1 x 2.5 cm right axillary lymph node  (series 4, image 133) with SUV max 8.1 and prominent 1.0 x 1.0 cm left  axillary lymph node (series 4, image 123) with SUV max 4.1.     The central tracheobronchial tree is clear. No pleural effusion or  pneumothorax. No acute consolidation. Minimal bilateral dependent  atelectasis.     Heart size is within normal limits. No pericardial effusion. The  thoracic aorta and main pulmonary artery are within normal limits for  diameter. The esophagus is unremarkable.      ABDOMEN AND PELVIS:  Marked uptake in multiple enlarged retroperitoneal lymph nodes. For  example:  - 2.4 x 1.8 cm left para-aortic node (series 4, image 206) with SUV  max 6.3  - 1.7 x 2.5 cm left para-aortic node (series 4, image 217) with SUV  max 14.3     The liver is unremarkable. Radiodense gallstone without findings of  cholecystitis. No intrahepatic or extrahepatic biliary ductal  dilatation. The  pancreas is unremarkable. No pancreatic ductal  dilatation. Splenule. The spleen is unremarkable. The adrenal glands  are unremarkable.     Symmetric enhancement of the kidneys. No hydronephrosis. The urinary  bladder is unremarkable. The reproductive organs are unremarkable.     Normal caliber of the small and large bowel. Colonic diverticulosis  without diverticulitis. No free air, free fluid, or fluid collection.  Normal caliber of the abdominal aorta.     LOWER EXTREMITIES:   There is no suspicious FDG uptake in the visualized lower extremities.  Intramuscular lipomas in left rectus femoris and right soleus. Right  knee ACL repair changes.     BONES:   There is no suspicious FDG uptake in the skeleton. There are no  suspicious lytic or blastic osseous lesions. Degenerative changes of  bilateral hips.                                                                      IMPRESSION: In this patient with retroperitoneal lymphadenopathy:  1. Intense hypermetabolic uptake (Deauville 5) in multiple enlarged  and prominent axillary, left supraclavicular and retroperitoneal lymph  nodes, concerning for lymphoproliferative disease.  1a. Consider biopsying right axillary lymph node (max SUV 8) or left  supraclavicular node, largest (max SUV 14).    2. Cholelithiasis without cholecystitis.  3. Please see neck PET/CT report for head and neck findings.    PET CT neck 1/27/2023  Marked focal uptake in the enlarged left supraclavicular lymph node  measuring 3.3 x 2.3 cm (series 2, image 98) with SUV max 13.7.     Regarding evaluation of the mucosal space, there is no definite  abnormality or abnormal metabolic uptake on PET CT in the nasopharynx,  oropharynx, hypopharynx, or of the glottis. Regarding the tongue base,  no abnormality is identified. Regarding the major salivary glands, no  abnormality is identified. Regarding the thyroid gland, no abnormality  is identified.     Limited evaluation of the cervical vertebra  demonstrates that there is  no overt spinal canal stenosis. Mild mucosal thickening in bilateral  maxillary sinuses. Regarding the mastoid air cells, there is no  evidence of significant debris or fluid. Regarding the major  vasculature in the neck, no abnormality is identified.     Regarding the visualized lung apices, there is no definite  abnormality, and the lung apices appear relatively clear.                                                                      Impression:   1. Intense hypermetabolic uptake (Deauville 5) in the enlarged left  supraclavicular lymph node, concerning for lymphoproliferative  disease.   2. Please refer to the whole body PET CT performed as a separate  report, for the findings of the remainder of the body        1/6/2023-CT abdomen and pelvis   Retroperitoneal lymph node demonstrated in the left periaortic region at the level of the kidneys measuring 1.9 x 2.6 cm, . There is also a lobulated node anterior to the  esophagus at the level of the left kidney which appears to be 2 adjacent nodes. The largest node towards the left measures 1.9 x 2.5 cm  Sigmoid diverticulosis without evidence diverticulitis.  Cholelithiasis without evidence for cholecystitis.    ASSESSMENT/PLAN:    Diffuse lymphadenopathy.    He has marked FDG avid left supraclavicular, axillary, retroperitoneal lymph nodes.      This is concerning for an underlying malignancy possibly lymphoproliferative disorder.    I would like to get tissue samples and ideally an excisional biopsy of the left supraclavicular lymph node which is markedly FDG avid.     He will be meeting with Dr Boone on 2/15/2023    Further work up/management will depend on the results of the biopsy.    Both Bill and Charleen had several questions which were answered to their satisfaction.   They are agreeable and comfortable with the plan     Kiel Maurer MD

## 2023-02-01 NOTE — LETTER
2/1/2023         RE: Geovanni Jasso  [id number]  [place] [address]        Dear Colleague,    Thank you for referring your patient, Geovanni Jasso, to the [facility]. Please see a copy of my visit note below.    Bill is a 62 year old who is being evaluated via a billable video visit.      How would you like to obtain your AVS? MyChart  If the video visit is dropped, the invitation should be resent by: Send to e-mail at: [email or web address]  Will anyone else be joining your video visit? No     Diana Catalino VELAQSUEZ        Video-Visit Details    Type of service:  Video Visit     Originating Location (pt. Location): Home    Distant Location (provider location):  On-site  Platform used for Video Visit: [employer]   Video start time. 3:58 PM  Video stop time. 4:18 PM    Oncology follow-up visit:  Date on this visit: 2/01/23     Diagnoses.  Diffuse lymphadenopathy.    Primary Physician: Paddy Holly     History Of Present Illness:  Mr. Jasso is a 62 year old  male who presents for follow-up of diffuse lymphadenopathy.  He initially saw me on 1/18/2022 for retroperitoneal lymphadenopathy.  Please see my previous note for details.  I have copied and updated from prior notes.        Over the last few months, since fall of 2022, off and on he has had upper abdominal pain which lasts few minutes. He thinks stress sometimes can bring it on. No relationship to food.  No nausea or vomiting. BMs have been fine. No bleeding. No fevers.   He had a CT Abd Pelvis on 1/6/2023 which showed enlarged retroperitoneal lymphadenopathy  He has noticed some fatigue. He has also noticed some lump in the throat just above the sternum at night. This is going on for a couple of months. He denies any dysphagia.     Currently he feels well. Currently denies any abdominal pain. No nausea or vomiting. BMs are fine. No recent weight loss. No SOB. No neuropathy. No skin flushing. No drenching night sweats.    In the  summer, he noticed a rash on the face which has since resolved.  He was noted to have CLAIRE positive.     Interval history.  This is a video visit. This is a video visit. His wife Charleen is also available    He feels about the same as before. He notices stiffness of the neck on the left side. Off and on gets abdominal pain which is about the same as before. He had several questions after the PET Scan so that's why he scheduled this visit.    ECOG 0-1    ROS:  A comprehensive ROS was otherwise neg    I reviewed other history in Epic as before.      Past Medical/Surgical History:  Past Medical History:   Diagnosis Date     Carpal tunnel syndrome      Chronic rhinitis 10/23/2015     Esophageal reflux 12/26/2013     Hand joint stiff, right    Shingles     Past Surgical History:   Procedure Laterality Date     COLONOSCOPY WITH CO2 INSUFFLATION N/A 7/17/2019    Procedure: COLONOSCOPY, WITH CO2 INSUFFLATION;  Surgeon: Jaison Hammer MD;  Location: [facility] OR     NO HISTORY OF SURGERY       Cancer History:   As above    Allergies:  Allergies as of 02/01/2023 - Reviewed 01/27/2023   Allergen Reaction Noted     Penicillins  12/23/2013     Current Medications:  Current Outpatient Medications   Medication Sig Dispense Refill     acetaminophen (TYLENOL) 325 MG tablet Take 325-650 mg by mouth 4 times daily as needed       ASPIRIN NOT PRESCRIBED (INTENTIONAL) Please choose reason not prescribed from choices below. (Patient not taking: Reported on 1/18/2023)       celecoxib (CELEBREX) 100 MG capsule Take 1 capsule (100 mg) by mouth 2 times daily 40 capsule 1     diclofenac (VOLTAREN) 1 % topical gel Apply small amount topically to right shoulder 3-4 times daily as needed for pain. 50 g 0     Efinaconazole (JUBLIA) 10 % SOLN Externally apply topically daily to nail. 8 mL 4     fexofenadine (ALLEGRA) 60 MG tablet Take 1 tablet (60 mg) by mouth 2 times daily 180 tablet 0     fluticasone (FLONASE) 50 MCG/ACT nasal spray Spray  1-2 sprays into both nostrils daily 16 g 11     ibuprofen (ADVIL,MOTRIN) 200 MG tablet Take 200 mg by mouth every 6 hours as needed       ketoconazole (NIZORAL) 2 % external cream Apply twice daily for 8 weeks to feet (Patient not taking: Reported on 1/18/2023) 60 g 3     LANsoprazole (PREVACID) 30 MG DR capsule Take 1 capsule (30 mg) by mouth every morning (before breakfast) 90 capsule 2     methocarbamol (ROBAXIN) 500 MG tablet Take 1 tablet (500 mg) by mouth nightly as needed for muscle spasms 30 tablet 0     metroNIDAZOLE (METROCREAM) 0.75 % external cream Apply topically 2 times daily 45 g 1     nystatin-triamcinolone (MYCOLOG II) 342675-5.1 UNIT/GM-% external cream Apply topically 2 times daily 30 g 5     omeprazole (PRILOSEC) 40 MG DR capsule Take 1 capsule (40 mg) by mouth daily Take 30 minutes before breakfast. (Patient not taking: Reported on 12/2/2022) 90 capsule 1     pantoprazole (PROTONIX) 20 MG EC tablet Take 1 tablet (20 mg) by mouth daily (Patient not taking: Reported on 1/18/2023) 30 tablet 5      Family History:  Family History   Problem Relation Age of Onset     Autoimmune Disease No family hx of      Maternal grand mother had some sort of a cancer  Dad had lung cancer  Has 4 kids- all healthy    Social History:  Social History     Socioeconomic History     Marital status:      Spouse name: Not on file     Number of children: Not on file     Years of education: Not on file     Highest education level: Not on file   Occupational History     Not on file   Tobacco Use     Smoking status: Never     Smokeless tobacco: Never   Vaping Use     Vaping Use: Never used   Substance and Sexual Activity     Alcohol use: Yes     Comment: occasionally     Drug use: No     Sexual activity: Yes     Partners: Female   Other Topics Concern     Parent/sibling w/ CABG, MI or angioplasty before 65F 55M? Not Asked   Social History Narrative     Not on file     Social Determinants of Health     Financial Resource  Strain: Not on file   Food Insecurity: Not on file   Transportation Needs: Not on file   Physical Activity: Not on file   Stress: Not on file   Social Connections: Not on file   Intimate Partner Violence: Not on file   Housing Stability: Not on file   no smoking. Drinks etoh wine 2-3 times / week.       Physical Exam:  There were no vitals taken for this visit.   Wt Readings from Last 4 Encounters:   12/02/22 90.7 kg (200 lb)   11/07/22 90.9 kg (200 lb 8 oz)   10/21/22 90.3 kg (199 lb)   07/08/21 89.3 kg (196 lb 14.4 oz)         Constitutional.  Looks well and in no apparent distress.   Eyes.  Without eye redness or apparent jaundice.   Respiratory.  Non labored breathing. Speaking in full sentences.    Skin.  No concerning skin rashes on the skin visualized.   Neurological.  Is alert and oriented.  Psychiatric.  Mood and affect seem appropriate.      The rest of a comprehensive physical examination is deferred due to Public Health Emergency video visit restrictions.        Laboratory/Imaging Studies      Reviewed    1/27/2023  CMP unremarkable.  .    1/9/2023  CBC was unremarkable.  Peripheral blood smear was unremarkable.    CLAIRE was positive-1: 80, speckled pattern  dsDNA was negative  ESR 8    PET/CT 1/27/2023  CHEST:  Marked uptake in the enlarged 1.1 x 2.5 cm right axillary lymph node  (series 4, image 133) with SUV max 8.1 and prominent 1.0 x 1.0 cm left  axillary lymph node (series 4, image 123) with SUV max 4.1.     The central tracheobronchial tree is clear. No pleural effusion or  pneumothorax. No acute consolidation. Minimal bilateral dependent  atelectasis.     Heart size is within normal limits. No pericardial effusion. The  thoracic aorta and main pulmonary artery are within normal limits for  diameter. The esophagus is unremarkable.      ABDOMEN AND PELVIS:  Marked uptake in multiple enlarged retroperitoneal lymph nodes. For  example:  - 2.4 x 1.8 cm left para-aortic node (series 4, image 206)  with SUV  max 6.3  - 1.7 x 2.5 cm left para-aortic node (series 4, image 217) with SUV  max 14.3     The liver is unremarkable. Radiodense gallstone without findings of  cholecystitis. No intrahepatic or extrahepatic biliary ductal  dilatation. The pancreas is unremarkable. No pancreatic ductal  dilatation. Splenule. The spleen is unremarkable. The adrenal glands  are unremarkable.     Symmetric enhancement of the kidneys. No hydronephrosis. The urinary  bladder is unremarkable. The reproductive organs are unremarkable.     Normal caliber of the small and large bowel. Colonic diverticulosis  without diverticulitis. No free air, free fluid, or fluid collection.  Normal caliber of the abdominal aorta.     LOWER EXTREMITIES:   There is no suspicious FDG uptake in the visualized lower extremities.  Intramuscular lipomas in left rectus femoris and right soleus. Right  knee ACL repair changes.     BONES:   There is no suspicious FDG uptake in the skeleton. There are no  suspicious lytic or blastic osseous lesions. Degenerative changes of  bilateral hips.                                                                      IMPRESSION: In this patient with retroperitoneal lymphadenopathy:  1. Intense hypermetabolic uptake (Deauville 5) in multiple enlarged  and prominent axillary, left supraclavicular and retroperitoneal lymph  nodes, concerning for lymphoproliferative disease.  1a. Consider biopsying right axillary lymph node (max SUV 8) or left  supraclavicular node, largest (max SUV 14).    2. Cholelithiasis without cholecystitis.  3. Please see neck PET/CT report for head and neck findings.    PET CT neck 1/27/2023  Marked focal uptake in the enlarged left supraclavicular lymph node  measuring 3.3 x 2.3 cm (series 2, image 98) with SUV max 13.7.     Regarding evaluation of the mucosal space, there is no definite  abnormality or abnormal metabolic uptake on PET CT in the nasopharynx,  oropharynx, hypopharynx, or of the  glottis. Regarding the tongue base,  no abnormality is identified. Regarding the major salivary glands, no  abnormality is identified. Regarding the thyroid gland, no abnormality  is identified.     Limited evaluation of the cervical vertebra demonstrates that there is  no overt spinal canal stenosis. Mild mucosal thickening in bilateral  maxillary sinuses. Regarding the mastoid air cells, there is no  evidence of significant debris or fluid. Regarding the major  vasculature in the neck, no abnormality is identified.     Regarding the visualized lung apices, there is no definite  abnormality, and the lung apices appear relatively clear.                                                                      Impression:   1. Intense hypermetabolic uptake (Deauville 5) in the enlarged left  supraclavicular lymph node, concerning for lymphoproliferative  disease.   2. Please refer to the whole body PET CT performed as a separate  report, for the findings of the remainder of the body        1/6/2023-CT abdomen and pelvis   Retroperitoneal lymph node demonstrated in the left periaortic region at the level of the kidneys measuring 1.9 x 2.6 cm, . There is also a lobulated node anterior to the  esophagus at the level of the left kidney which appears to be 2 adjacent nodes. The largest node towards the left measures 1.9 x 2.5 cm  Sigmoid diverticulosis without evidence diverticulitis.  Cholelithiasis without evidence for cholecystitis.    ASSESSMENT/PLAN:    Diffuse lymphadenopathy.    He has marked FDG avid left supraclavicular, axillary, retroperitoneal lymph nodes.      This is concerning for an underlying malignancy possibly lymphoproliferative disorder.    I would like to get tissue samples and ideally an excisional biopsy of the left supraclavicular lymph node which is markedly FDG avid.     He will be meeting with Dr Boone on 2/15/2023    Further work up/management will depend on the results of the biopsy.    Both Bill  and Charleen had several questions which were answered to their satisfaction.   They are agreeable and comfortable with the plan     Kiel Maurer MD            Again, thank you for allowing me to participate in the care of your patient.        Sincerely,        Kiel Maurer MD

## 2023-02-02 NOTE — TELEPHONE ENCOUNTER
FUTURE VISIT INFORMATION      FUTURE VISIT INFORMATION:    Date:   2/6/23    Time:   10:20 AM    [place] [facility] [place]:    Referring provider:  Kiel Maurer MD    Referring providers clinic:  [facility]    Reason for visit/diagnosis  Generalized lymphadenopathy - Referred by Kiel Maurer MD in  MED ONC    RECORDS REQUESTED FROM:       Clinic name Comments Records Status Imaging Status   [facility] 2/1/23, 1/18/23 note from Kiel Maurer MD Epic    Imaging 1/27/23 CT neck  1/27/23 CT chest/abd/pelvis  1/27/23 PET  1/6/23 CT abd pelvis Epic Pacs   [facility] ENT - [place] Olmedo 10/17/17 OV with Freddy Juarez MD Epic

## 2023-02-06 ENCOUNTER — OFFICE VISIT (OUTPATIENT)
Dept: OTOLARYNGOLOGY | Facility: CLINIC | Age: 63
End: 2023-02-06
Payer: COMMERCIAL

## 2023-02-06 VITALS
WEIGHT: 195 LBS | HEIGHT: 70 IN | DIASTOLIC BLOOD PRESSURE: 78 MMHG | HEART RATE: 75 BPM | OXYGEN SATURATION: 98 % | TEMPERATURE: 97.8 F | SYSTOLIC BLOOD PRESSURE: 146 MMHG | BODY MASS INDEX: 27.92 KG/M2

## 2023-02-06 DIAGNOSIS — R59.1 GENERALIZED LYMPHADENOPATHY: ICD-10-CM

## 2023-02-06 PROCEDURE — 31575 DIAGNOSTIC LARYNGOSCOPY: CPT | Performed by: STUDENT IN AN ORGANIZED HEALTH CARE EDUCATION/TRAINING PROGRAM

## 2023-02-06 PROCEDURE — 88172 CYTP DX EVAL FNA 1ST EA SITE: CPT | Mod: 26 | Performed by: PATHOLOGY

## 2023-02-06 PROCEDURE — 88173 CYTOPATH EVAL FNA REPORT: CPT | Mod: 26 | Performed by: PATHOLOGY

## 2023-02-06 PROCEDURE — 88184 FLOWCYTOMETRY/ TC 1 MARKER: CPT | Performed by: STUDENT IN AN ORGANIZED HEALTH CARE EDUCATION/TRAINING PROGRAM

## 2023-02-06 PROCEDURE — 88185 FLOWCYTOMETRY/TC ADD-ON: CPT | Performed by: STUDENT IN AN ORGANIZED HEALTH CARE EDUCATION/TRAINING PROGRAM

## 2023-02-06 PROCEDURE — 10021 FNA BX W/O IMG GDN 1ST LES: CPT | Mod: GC | Performed by: PATHOLOGY

## 2023-02-06 PROCEDURE — 88189 FLOWCYTOMETRY/READ 16 & >: CPT | Performed by: PATHOLOGY

## 2023-02-06 PROCEDURE — 88172 CYTP DX EVAL FNA 1ST EA SITE: CPT | Mod: TC | Performed by: STUDENT IN AN ORGANIZED HEALTH CARE EDUCATION/TRAINING PROGRAM

## 2023-02-06 PROCEDURE — 88305 TISSUE EXAM BY PATHOLOGIST: CPT | Mod: 26 | Performed by: PATHOLOGY

## 2023-02-06 PROCEDURE — 99204 OFFICE O/P NEW MOD 45 MIN: CPT | Mod: 25 | Performed by: STUDENT IN AN ORGANIZED HEALTH CARE EDUCATION/TRAINING PROGRAM

## 2023-02-06 ASSESSMENT — PAIN SCALES - GENERAL: PAINLEVEL: NO PAIN (1)

## 2023-02-06 NOTE — PROGRESS NOTES
February 6, 2023      Kiel Maurer M.D.  [facility]  Hematology/Oncology  [address] [place] [address]      Dear Dr. Maurer,    I had the pleasure of meeting Mr. Geovanni Jasso today in clinic.      HISTORY OF PRESENT ILLNESS:  As you know, he is a pleasant 62-year-old male referred for evaluation of left cervical lymphadenopathy.  He presented with abdominal pain and CT abdomen was performed on 01/06/2023.  Retroperitoneal lymphadenopathy was noticed.  He subsequently underwent a PET/CT that noticed an enlarged FDG avid left level IV/V lymph node as well as a right axillary node.  He was referred here for workup of the cervical lymph node.  He has no head and neck complaints.  Specifically, no fevers, chills, night sweats, dysphagia, odynophagia, otalgia.    PAST MEDICAL HISTORY:  Acid reflux.    PAST SURGICAL HISTORY:  Knee surgery.    MEDICATIONS:    1.  Aspirin.  2.  Allegra.  3.  Flonase.    4.  Prevacid.    ALLERGIES:  PENICILLINS.    SOCIAL HISTORY:  He is a never smoker.  He drinks alcohol occasionally.  No drug use.    FAMILY HISTORY:  None.    REVIEW OF SYSTEMS:  A 10-point review of system was performed and negative aside of what is noted in the HPI.    PHYSICAL EXAMINATION:  On examination, he is alert, in no acute distress.  He has no concerning lesions seen in the oral cavity or oropharynx.    PROCEDURE:  Fiberoptic laryngoscopy was performed.  No concerning lesions were seen throughout the pharynx or larynx.    ASSESSMENT AND PLAN:  A 62-year-old male with left cervical adenopathy.  Given the multiple sites of involvement, this is concerning for a lymphomatous process.  However, we will start the standard workup for a neck mass, which is a fine needle aspirate.  The pathology department was able to do that today in clinic.  We will follow up on these results and determine next steps in management.    Thank you for allowing me to participate in the care of this  patient. If you have any further questions, please do not hesitate to contact me.      Sincerely,      Aung Tamayo M.D.      Head and Neck Surgical Oncology and Microvascular Reconstruction  Department of Otolaryngology - Head and Neck Surgery  [facility]        45 minutes spent on the date of the encounter in chart review, patient visit, review of tests, documentation and/or discussion with other providers about the issues documented above asides from time spent doing flexible laryngoscopy.

## 2023-02-06 NOTE — LETTER
2/6/2023       RE: Geovanni Jasso  [address]  [place] [address]     Dear Colleague,    Thank you for referring your patient, Geovanni Jasso, to the [facility] EAR NOSE AND [facility] [place] [facility]. Please see a copy of my visit note below.    February 6, 2023      Kiel Maurer M.D.  [facility]  Hematology/Oncology  [address] [id number]      Dear Dr. Maurer,    I had the pleasure of meeting Mr. Geovanni Jasso today in clinic.      HISTORY OF PRESENT ILLNESS:  As you know, he is a pleasant 62-year-old male referred for evaluation of left cervical lymphadenopathy.  He presented with abdominal pain and CT abdomen was performed on 01/06/2023.  Retroperitoneal lymphadenopathy was noticed.  He subsequently underwent a PET/CT that noticed an enlarged FDG avid left level IV/V lymph node as well as a right axillary node.  He was referred here for workup of the cervical lymph node.  He has no head and neck complaints.  Specifically, no fevers, chills, night sweats, dysphagia, odynophagia, otalgia.    PAST MEDICAL HISTORY:  Acid reflux.    PAST SURGICAL HISTORY:  Knee surgery.    MEDICATIONS:    1.  Aspirin.  2.  Allegra.  3.  Flonase.    4.  Prevacid.    ALLERGIES:  PENICILLINS.    SOCIAL HISTORY:  He is a never smoker.  He drinks alcohol occasionally.  No drug use.    FAMILY HISTORY:  None.    REVIEW OF SYSTEMS:  A 10-point review of system was performed and negative aside of what is noted in the HPI.    PHYSICAL EXAMINATION:  On examination, he is alert, in no acute distress.  He has no concerning lesions seen in the oral cavity or oropharynx.    PROCEDURE:  Fiberoptic laryngoscopy was performed.  No concerning lesions were seen throughout the pharynx or larynx.    ASSESSMENT AND PLAN:  A 62-year-old male with left cervical adenopathy.  Given the multiple sites of involvement, this is concerning for a  lymphomatous process.  However, we will start the standard workup for a neck mass, which is a fine needle aspirate.  The pathology department was able to do that today in clinic.  We will follow up on these results and determine next steps in management.    Thank you for allowing me to participate in the care of this patient. If you have any further questions, please do not hesitate to contact me.      Sincerely,      Aung Tamayo M.D.      Head and Neck Surgical Oncology and Microvascular Reconstruction  Department of Otolaryngology - Head and Neck Surgery  [facility]        45 minutes spent on the date of the encounter in chart review, patient visit, review of tests, documentation and/or discussion with other providers about the issues documented above asides from time spent doing flexible laryngoscopy.       Again, thank you for allowing me to participate in the care of your patient.      Sincerely,    Aung Tamayo MD

## 2023-02-06 NOTE — NURSING NOTE
"Chief Complaint   Patient presents with     Consult     lymphadenopathy     Blood pressure (!) 146/78, pulse 75, temperature 97.8  F (36.6  C), height 1.778 m (5' 10\"), weight 88.5 kg (195 lb), SpO2 98 %.    Brady Terry LPN    "

## 2023-02-06 NOTE — PATIENT INSTRUCTIONS
" You were seen in the clinic today by Dr. Tamayo. If you have any questions or concerns after your appointment, please call the clinic at [phone]. Press \"1\" for scheduling, press \"3\" for nurse advice.    2.   The following has been recommended for you based upon your appointment today:   -please schedule audiogram   -we will call you with the results from the biopsy      "

## 2023-02-07 ENCOUNTER — OFFICE VISIT (OUTPATIENT)
Dept: AUDIOLOGY | Facility: CLINIC | Age: 63
End: 2023-02-07
Payer: COMMERCIAL

## 2023-02-07 DIAGNOSIS — R59.1 GENERALIZED LYMPHADENOPATHY: ICD-10-CM

## 2023-02-07 DIAGNOSIS — H90.3 SNHL (SENSORY-NEURAL HEARING LOSS), ASYMMETRICAL: Primary | ICD-10-CM

## 2023-02-07 LAB
PATH REPORT.COMMENTS IMP SPEC: ABNORMAL
PATH REPORT.COMMENTS IMP SPEC: NORMAL
PATH REPORT.COMMENTS IMP SPEC: YES
PATH REPORT.FINAL DX SPEC: ABNORMAL
PATH REPORT.FINAL DX SPEC: NORMAL
PATH REPORT.GROSS SPEC: ABNORMAL
PATH REPORT.MICROSCOPIC SPEC OTHER STN: ABNORMAL
PATH REPORT.MICROSCOPIC SPEC OTHER STN: NORMAL
PATH REPORT.RELEVANT HX SPEC: ABNORMAL
PATH REPORT.RELEVANT HX SPEC: NORMAL

## 2023-02-07 PROCEDURE — 92557 COMPREHENSIVE HEARING TEST: CPT | Performed by: AUDIOLOGIST

## 2023-02-07 NOTE — PROGRESS NOTES
AUDIOLOGY REPORT    SUMMARY: Audiology visit completed. See audiogram for results.    RECOMMENDATIONS: Follow-up with ENT.    Dayne Interiano  Doctor of Audiology  [place] License # [id number]

## 2023-02-08 ENCOUNTER — PREP FOR PROCEDURE (OUTPATIENT)
Dept: OTOLARYNGOLOGY | Facility: CLINIC | Age: 63
End: 2023-02-08
Payer: COMMERCIAL

## 2023-02-08 ENCOUNTER — TELEPHONE (OUTPATIENT)
Dept: OTOLARYNGOLOGY | Facility: CLINIC | Age: 63
End: 2023-02-08
Payer: COMMERCIAL

## 2023-02-08 ENCOUNTER — TELEPHONE (OUTPATIENT)
Dept: FAMILY MEDICINE | Facility: CLINIC | Age: 63
End: 2023-02-08
Payer: COMMERCIAL

## 2023-02-08 DIAGNOSIS — R59.1 GENERALIZED LYMPHADENOPATHY: Primary | ICD-10-CM

## 2023-02-08 NOTE — TELEPHONE ENCOUNTER
Patient's wife calling to see when patient can see Dr Holly for a pre op physical-surgery is scheduled for 2/16/2023.  Please advise.  Fiona Nogueira [facility]  2nd Floor  Primary Care

## 2023-02-08 NOTE — TELEPHONE ENCOUNTER
Called and spoke with patent regarding the following pathology results:    Component Ref Range & Units    Final Diagnosis   Specimen A                 Interpretation:                  Atypical lymphoid cells present (see comment)                 Adequacy:                 Satisfactory for evaluation        Flow Interpretation   A. Neck mass, left, :  -Lambda-monotypic B cells (69%), negative for CD5 and CD10  -No definitive aberrant immunophenotype on T cells  -See comment   Electronically signed by Jaspal Andrews MD on 2/7/2023 at 11:46 A         Reviewed with patient that Dr. Tamayo reviewed with Dr. Maurer and they recommend proceed with excisional node biopsy. Patient in agreement. Reviewed the need for pre-op physical prior to surgery, reviewed pre-op instructions and need for .  Discussed with patient that we can likely proceed with surgery on Thursday 2/16. Patient in agreement but will await call from surgery scheduler to confirm. Patient encouraged to call with further questions or concerns.     Mary Hernandez RN, BSN

## 2023-02-09 ENCOUNTER — VIRTUAL VISIT (OUTPATIENT)
Dept: ONCOLOGY | Facility: CLINIC | Age: 63
End: 2023-02-09
Attending: INTERNAL MEDICINE
Payer: COMMERCIAL

## 2023-02-09 DIAGNOSIS — Z11.59 ENCOUNTER FOR SCREENING FOR OTHER VIRAL DISEASES: ICD-10-CM

## 2023-02-09 DIAGNOSIS — C85.10 B-CELL LYMPHOMA, UNSPECIFIED B-CELL LYMPHOMA TYPE, UNSPECIFIED BODY REGION (H): Primary | ICD-10-CM

## 2023-02-09 PROCEDURE — 99215 OFFICE O/P EST HI 40 MIN: CPT | Mod: VID | Performed by: INTERNAL MEDICINE

## 2023-02-09 NOTE — PROGRESS NOTES
Oncology follow-up visit:  Date on this visit: 2/09/23     Diagnoses.    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 Jasso is a 62 year old  male who presents for follow-up of diffuse lymphadenopathy.  He initially saw me on 1/18/2022 for retroperitoneal lymphadenopathy.  Please see my previous note for details.  I have copied and updated from prior notes.        Over the last few months, since fall of 2022, off and on he has had upper abdominal pain which lasts few minutes. He thinks stress sometimes can bring it on. No relationship to food.  No nausea or vomiting. BMs have been fine. No bleeding. No fevers.   He had a CT Abd Pelvis on 1/6/2023 which showed enlarged retroperitoneal lymphadenopathy  He has noticed some fatigue. He has also noticed some lump in the throat just above the sternum at night. This is going on for a couple of months. He denies any dysphagia.     Currently he feels well. Currently denies any abdominal pain. No nausea or vomiting. BMs are fine. No recent weight loss. No SOB. No neuropathy. No skin flushing. No drenching night sweats.    In the summer, he noticed a rash on the face which has since resolved.  He was noted to have CLAIRE positive.     Interval history.  This is a video visit.    His wife Charleen is also available  On 2/6/2023, he had FNA of the left supraclavicular lymph node.  Cytology showed atypical lymphoid cells.  Flow cytometry showed lambda monotypic B cells which are negative for CD5 and CD10.  This is consistent with a B-cell lymphoma.  Now there is plan to do excisional biopsy of the lymph node on 2/16/2023.  Overall he feels about the same as before with some neck pain.  Has not noticed any worsening of swelling.  No B symptoms.  He has mild fatigue.      ECOG 0-1    ROS:  A comprehensive ROS was otherwise neg    I reviewed other history in Epic as  before.      Past Medical/Surgical History:  Past Medical History:   Diagnosis Date     Carpal tunnel syndrome      Chronic rhinitis 10/23/2015     Esophageal reflux 12/26/2013     Hand joint stiff, right    Shingles     Past Surgical History:   Procedure Laterality Date     COLONOSCOPY WITH CO2 INSUFFLATION N/A 7/17/2019    Procedure: COLONOSCOPY, WITH CO2 INSUFFLATION;  Surgeon: Jaison Hammer MD;  Location: [facility] OR     NO HISTORY OF SURGERY       Cancer History:   As above    Allergies:  Allergies as of 02/09/2023 - Reviewed 02/09/2023   Allergen Reaction Noted     Penicillins  12/23/2013     Current Medications:  Current Outpatient Medications   Medication Sig Dispense Refill     acetaminophen (TYLENOL) 325 MG tablet Take 325-650 mg by mouth 4 times daily as needed       ASPIRIN NOT PRESCRIBED (INTENTIONAL) Please choose reason not prescribed from choices below.       celecoxib (CELEBREX) 100 MG capsule Take 1 capsule (100 mg) by mouth 2 times daily 40 capsule 1     diclofenac (VOLTAREN) 1 % topical gel Apply small amount topically to right shoulder 3-4 times daily as needed for pain. 50 g 0     Efinaconazole (JUBLIA) 10 % SOLN Externally apply topically daily to nail. 8 mL 4     fexofenadine (ALLEGRA) 60 MG tablet Take 1 tablet (60 mg) by mouth 2 times daily 180 tablet 0     fluticasone (FLONASE) 50 MCG/ACT nasal spray Spray 1-2 sprays into both nostrils daily 16 g 11     ibuprofen (ADVIL,MOTRIN) 200 MG tablet Take 200 mg by mouth every 6 hours as needed       ketoconazole (NIZORAL) 2 % external cream Apply twice daily for 8 weeks to feet 60 g 3     LANsoprazole (PREVACID) 30 MG DR capsule Take 1 capsule (30 mg) by mouth every morning (before breakfast) 90 capsule 2     methocarbamol (ROBAXIN) 500 MG tablet Take 1 tablet (500 mg) by mouth nightly as needed for muscle spasms 30 tablet 0     metroNIDAZOLE (METROCREAM) 0.75 % external cream Apply topically 2 times daily 45 g 1     nystatin-triamcinolone  (MYCOLOG II) 703124-7.1 UNIT/GM-% external cream Apply topically 2 times daily 30 g 5     omeprazole (PRILOSEC) 40 MG DR capsule Take 1 capsule (40 mg) by mouth daily Take 30 minutes before breakfast. 90 capsule 1     pantoprazole (PROTONIX) 20 MG EC tablet Take 1 tablet (20 mg) by mouth daily (Patient not taking: Reported on 1/18/2023) 30 tablet 5      Family History:  Family History   Problem Relation Age of Onset     Autoimmune Disease No family hx of      Maternal grand mother had some sort of a cancer  Dad had lung cancer  Has 4 kids- all healthy    Social History:  Social History     Socioeconomic History     Marital status:      Spouse name: Not on file     Number of children: Not on file     Years of education: Not on file     Highest education level: Not on file   Occupational History     Not on file   Tobacco Use     Smoking status: Never     Smokeless tobacco: Never   Vaping Use     Vaping Use: Never used   Substance and Sexual Activity     Alcohol use: Yes     Comment: occasionally     Drug use: No     Sexual activity: Yes     Partners: Female   Other Topics Concern     Parent/sibling w/ CABG, MI or angioplasty before 65F 55M? Not Asked   Social History Narrative     Not on file     Social Determinants of Health     Financial Resource Strain: Not on file   Food Insecurity: Not on file   Transportation Needs: Not on file   Physical Activity: Not on file   Stress: Not on file   Social Connections: Not on file   Intimate Partner Violence: Not on file   Housing Stability: Not on file   no smoking. Drinks etoh wine 2-3 times / week.       Physical Exam:  There were no vitals taken for this visit.   Wt Readings from Last 4 Encounters:   02/06/23 88.5 kg (195 lb)   12/02/22 90.7 kg (200 lb)   11/07/22 90.9 kg (200 lb 8 oz)   10/21/22 90.3 kg (199 lb)             Constitutional.  Does not seem to be in any acute distress.  Eyes.  No redness or discharge noted.  Respiratory.  Speaking in full sentences.   Breathing seems comfortable without any accessory use of muscles.    Skin.  Visualized his skin does not show any obvious rashes.  Musculoskeletal.  Range of motion for visualized areas is intact.  Neurological.  Alert and oriented x3.  Psychiatric.  Mood, mentation and affect are normal.  Decision making capacity is intact.      The rest of a comprehensive physical examination is deferred due to Public Health Emergency video visit restrictions.        Laboratory/Imaging Studies      Reviewed    1/27/2023  CMP unremarkable.  .    1/9/2023  CBC was unremarkable.  Peripheral blood smear was unremarkable.    CLAIRE was positive-1: 80, speckled pattern  dsDNA was negative  ESR 8    FNA Neck LN 2/6/2023  Atypical lymphoid cells present    FLOW CYTOMETRY  A. Neck mass, left, :  -Lambda-monotypic B cells (69%), negative for CD5 and CD10  -No definitive aberrant immunophenotype on T cells  -See comment    (69% B cells which express CD19 (slightly dim), CD20 (dim), CD38, and monotypic lambda immunoglobulin light chains and lack CD5 and CD10. The B cells have increased forward scatter relative to background T cells suggestive of increased size)  This immunophenotype can be seen in marginal zone lymphoma, lymphoplasmacytic lymphoma, and hairy cell leukemia, as well as rarely in follicular lymphoma, CLL/SLL, or mantle cell lymphoma.     PET/CT 1/27/2023  CHEST:  Marked uptake in the enlarged 1.1 x 2.5 cm right axillary lymph node  (series 4, image 133) with SUV max 8.1 and prominent 1.0 x 1.0 cm left  axillary lymph node (series 4, image 123) with SUV max 4.1.     The central tracheobronchial tree is clear. No pleural effusion or  pneumothorax. No acute consolidation. Minimal bilateral dependent  atelectasis.     Heart size is within normal limits. No pericardial effusion. The  thoracic aorta and main pulmonary artery are within normal limits for  diameter. The esophagus is unremarkable.      ABDOMEN AND PELVIS:  Marked  uptake in multiple enlarged retroperitoneal lymph nodes. For  example:  - 2.4 x 1.8 cm left para-aortic node (series 4, image 206) with SUV  max 6.3  - 1.7 x 2.5 cm left para-aortic node (series 4, image 217) with SUV  max 14.3     The liver is unremarkable. Radiodense gallstone without findings of  cholecystitis. No intrahepatic or extrahepatic biliary ductal  dilatation. The pancreas is unremarkable. No pancreatic ductal  dilatation. Splenule. The spleen is unremarkable. The adrenal glands  are unremarkable.     Symmetric enhancement of the kidneys. No hydronephrosis. The urinary  bladder is unremarkable. The reproductive organs are unremarkable.     Normal caliber of the small and large bowel. Colonic diverticulosis  without diverticulitis. No free air, free fluid, or fluid collection.  Normal caliber of the abdominal aorta.     LOWER EXTREMITIES:   There is no suspicious FDG uptake in the visualized lower extremities.  Intramuscular lipomas in left rectus femoris and right soleus. Right  knee ACL repair changes.     BONES:   There is no suspicious FDG uptake in the skeleton. There are no  suspicious lytic or blastic osseous lesions. Degenerative changes of  bilateral hips.                                                                      IMPRESSION: In this patient with retroperitoneal lymphadenopathy:  1. Intense hypermetabolic uptake (Deauville 5) in multiple enlarged  and prominent axillary, left supraclavicular and retroperitoneal lymph  nodes, concerning for lymphoproliferative disease.  1a. Consider biopsying right axillary lymph node (max SUV 8) or left  supraclavicular node, largest (max SUV 14).    2. Cholelithiasis without cholecystitis.  3. Please see neck PET/CT report for head and neck findings.    PET CT neck 1/27/2023  Marked focal uptake in the enlarged left supraclavicular lymph node  measuring 3.3 x 2.3 cm (series 2, image 98) with SUV max 13.7.     Regarding evaluation of the mucosal space,  there is no definite  abnormality or abnormal metabolic uptake on PET CT in the nasopharynx,  oropharynx, hypopharynx, or of the glottis. Regarding the tongue base,  no abnormality is identified. Regarding the major salivary glands, no  abnormality is identified. Regarding the thyroid gland, no abnormality  is identified.     Limited evaluation of the cervical vertebra demonstrates that there is  no overt spinal canal stenosis. Mild mucosal thickening in bilateral  maxillary sinuses. Regarding the mastoid air cells, there is no  evidence of significant debris or fluid. Regarding the major  vasculature in the neck, no abnormality is identified.     Regarding the visualized lung apices, there is no definite  abnormality, and the lung apices appear relatively clear.                                                                      Impression:   1. Intense hypermetabolic uptake (Deauville 5) in the enlarged left  supraclavicular lymph node, concerning for lymphoproliferative  disease.   2. Please refer to the whole body PET CT performed as a separate  report, for the findings of the remainder of the body        1/6/2023-CT abdomen and pelvis   Retroperitoneal lymph node demonstrated in the left periaortic region at the level of the kidneys measuring 1.9 x 2.6 cm, . There is also a lobulated node anterior to the  esophagus at the level of the left kidney which appears to be 2 adjacent nodes. The largest node towards the left measures 1.9 x 2.5 cm  Sigmoid diverticulosis without evidence diverticulitis.  Cholelithiasis without evidence for cholecystitis.    ASSESSMENT/PLAN:    B-cell non-Hodgkin's lymphoma.  CD5 and CD10 negative lambda monotypic B-cell lymphoma presenting with diffuse lymphadenopathy.    He has marked FDG avid left supraclavicular, axillary, retroperitoneal lymph nodes.      I recommend proceeding with excisional biopsy for further classification.    We also discussed that I would like to do a bone  marrow biopsy to complete the staging.  We discussed the procedure of bone marrow biopsy including its potential complications in detail.    I would also repeat CBC/differential and CMP and LDH.  We will also check uric acid.  I will screen for hepatitis B, hepatitis C and HIV.  We will check SPEP and immunoglobulin levels.  We will also check free light chains.      I would like to see him back a couple of weeks after all of the above-mentioned work-up is done so that I have all the results are available and we can figure out a definite management plan.    I answered all of their questions to their satisfaction.  They understand the situation and are agreeable with the plan.    Kiel Maurer MD

## 2023-02-09 NOTE — PATIENT INSTRUCTIONS
Proceed with neck lymph node excisional biopsy.    Arrange bone marrow biopsy and labs as soon as possible.    See me back 2 weeks after that in [facility], face-to-face

## 2023-02-09 NOTE — NURSING NOTE
Is the patient currently in the state of [place]? YES    Visit mode:VIDEO    If the visit is dropped, the patient can be reconnected by: VIDEO VISIT: Send to e-mail at: [email or web address]    Will anyone else be joining the visit? Yes, wife      How would you like to obtain your AVS? MyChart    Are changes needed to the allergy or medication list? NO  Patient verified medications and allergies are correct via eCheck-in. Patient confirms no changes at this time and/or since last reviewed by clinic staff.    Comments or concerns regarding today's visit: Review bx results.  Tammie Cheng, Virtual Visit Facilitator

## 2023-02-09 NOTE — LETTER
2/9/2023         RE: Geovanni Jasso  [id number]  [facility] [address]        Dear Colleague,    Thank you for referring your patient, Geovanni Jasso, to the [facility]. Please see a copy of my visit note below.    Video-Visit Details    Type of service:  Video Visit    Video Start Time (time video started): 3:04 PM     Video End Time (time video stopped): 3:24 PM    Originating Location (pt. Location): Home      Distant Location (provider location):  Off-site    Mode of Communication:  Video Conference via [facility]          Oncology follow-up visit:  Date on this visit: 2/09/23     Diagnoses.    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 Jasso is a 62 year old  male who presents for follow-up of diffuse lymphadenopathy.  He initially saw me on 1/18/2022 for retroperitoneal lymphadenopathy.  Please see my previous note for details.  I have copied and updated from prior notes.        Over the last few months, since fall of 2022, off and on he has had upper abdominal pain which lasts few minutes. He thinks stress sometimes can bring it on. No relationship to food.  No nausea or vomiting. BMs have been fine. No bleeding. No fevers.   He had a CT Abd Pelvis on 1/6/2023 which showed enlarged retroperitoneal lymphadenopathy  He has noticed some fatigue. He has also noticed some lump in the throat just above the sternum at night. This is going on for a couple of months. He denies any dysphagia.     Currently he feels well. Currently denies any abdominal pain. No nausea or vomiting. BMs are fine. No recent weight loss. No SOB. No neuropathy. No skin flushing. No drenching night sweats.    In the summer, he noticed a rash on the face which has since resolved.  He was noted to have CLAIRE positive.     Interval history.  This is a video visit.    His wife Charleen is also available  On  2/6/2023, he had FNA of the left supraclavicular lymph node.  Cytology showed atypical lymphoid cells.  Flow cytometry showed lambda monotypic B cells which are negative for CD5 and CD10.  This is consistent with a B-cell lymphoma.  Now there is plan to do excisional biopsy of the lymph node on 2/16/2023.  Overall he feels about the same as before with some neck pain.  Has not noticed any worsening of swelling.  No B symptoms.  He has mild fatigue.      ECOG 0-1    ROS:  A comprehensive ROS was otherwise neg    I reviewed other history in Epic as before.      Past Medical/Surgical History:  Past Medical History:   Diagnosis Date     Carpal tunnel syndrome      Chronic rhinitis 10/23/2015     Esophageal reflux 12/26/2013     Hand joint stiff, right    Shingles     Past Surgical History:   Procedure Laterality Date     COLONOSCOPY WITH CO2 INSUFFLATION N/A 7/17/2019    Procedure: COLONOSCOPY, WITH CO2 INSUFFLATION;  Surgeon: Jaison Hammer MD;  Location: [facility] OR     NO HISTORY OF SURGERY       Cancer History:   As above    Allergies:  Allergies as of 02/09/2023 - Reviewed 02/09/2023   Allergen Reaction Noted     Penicillins  12/23/2013     Current Medications:  Current Outpatient Medications   Medication Sig Dispense Refill     acetaminophen (TYLENOL) 325 MG tablet Take 325-650 mg by mouth 4 times daily as needed       ASPIRIN NOT PRESCRIBED (INTENTIONAL) Please choose reason not prescribed from choices below.       celecoxib (CELEBREX) 100 MG capsule Take 1 capsule (100 mg) by mouth 2 times daily 40 capsule 1     diclofenac (VOLTAREN) 1 % topical gel Apply small amount topically to right shoulder 3-4 times daily as needed for pain. 50 g 0     Efinaconazole (JUBLIA) 10 % SOLN Externally apply topically daily to nail. 8 mL 4     fexofenadine (ALLEGRA) 60 MG tablet Take 1 tablet (60 mg) by mouth 2 times daily 180 tablet 0     fluticasone (FLONASE) 50 MCG/ACT nasal spray Spray 1-2 sprays into both nostrils  daily 16 g 11     ibuprofen (ADVIL,MOTRIN) 200 MG tablet Take 200 mg by mouth every 6 hours as needed       ketoconazole (NIZORAL) 2 % external cream Apply twice daily for 8 weeks to feet 60 g 3     LANsoprazole (PREVACID) 30 MG DR capsule Take 1 capsule (30 mg) by mouth every morning (before breakfast) 90 capsule 2     methocarbamol (ROBAXIN) 500 MG tablet Take 1 tablet (500 mg) by mouth nightly as needed for muscle spasms 30 tablet 0     metroNIDAZOLE (METROCREAM) 0.75 % external cream Apply topically 2 times daily 45 g 1     nystatin-triamcinolone (MYCOLOG II) 171763-9.1 UNIT/GM-% external cream Apply topically 2 times daily 30 g 5     omeprazole (PRILOSEC) 40 MG DR capsule Take 1 capsule (40 mg) by mouth daily Take 30 minutes before breakfast. 90 capsule 1     pantoprazole (PROTONIX) 20 MG EC tablet Take 1 tablet (20 mg) by mouth daily (Patient not taking: Reported on 1/18/2023) 30 tablet 5      Family History:  Family History   Problem Relation Age of Onset     Autoimmune Disease No family hx of      Maternal grand mother had some sort of a cancer  Dad had lung cancer  Has 4 kids- all healthy    Social History:  Social History     Socioeconomic History     Marital status:      Spouse name: Not on file     Number of children: Not on file     Years of education: Not on file     Highest education level: Not on file   Occupational History     Not on file   Tobacco Use     Smoking status: Never     Smokeless tobacco: Never   Vaping Use     Vaping Use: Never used   Substance and Sexual Activity     Alcohol use: Yes     Comment: occasionally     Drug use: No     Sexual activity: Yes     Partners: Female   Other Topics Concern     Parent/sibling w/ CABG, MI or angioplasty before 65F 55M? Not Asked   Social History Narrative     Not on file     Social Determinants of Health     Financial Resource Strain: Not on file   Food Insecurity: Not on file   Transportation Needs: Not on file   Physical Activity: Not on  file   Stress: Not on file   Social Connections: Not on file   Intimate Partner Violence: Not on file   Housing Stability: Not on file   no smoking. Drinks etoh wine 2-3 times / week.       Physical Exam:  There were no vitals taken for this visit.   Wt Readings from Last 4 Encounters:   02/06/23 88.5 kg (195 lb)   12/02/22 90.7 kg (200 lb)   11/07/22 90.9 kg (200 lb 8 oz)   10/21/22 90.3 kg (199 lb)             Constitutional.  Does not seem to be in any acute distress.  Eyes.  No redness or discharge noted.  Respiratory.  Speaking in full sentences.  Breathing seems comfortable without any accessory use of muscles.    Skin.  Visualized his skin does not show any obvious rashes.  Musculoskeletal.  Range of motion for visualized areas is intact.  Neurological.  Alert and oriented x3.  Psychiatric.  Mood, mentation and affect are normal.  Decision making capacity is intact.      The rest of a comprehensive physical examination is deferred due to Public Health Emergency video visit restrictions.        Laboratory/Imaging Studies      Reviewed    1/27/2023  CMP unremarkable.  .    1/9/2023  CBC was unremarkable.  Peripheral blood smear was unremarkable.    CLAIRE was positive-1: 80, speckled pattern  dsDNA was negative  ESR 8    FNA Neck LN 2/6/2023  Atypical lymphoid cells present    FLOW CYTOMETRY  A. Neck mass, left, :  -Lambda-monotypic B cells (69%), negative for CD5 and CD10  -No definitive aberrant immunophenotype on T cells  -See comment    (69% B cells which express CD19 (slightly dim), CD20 (dim), CD38, and monotypic lambda immunoglobulin light chains and lack CD5 and CD10. The B cells have increased forward scatter relative to background T cells suggestive of increased size)  This immunophenotype can be seen in marginal zone lymphoma, lymphoplasmacytic lymphoma, and hairy cell leukemia, as well as rarely in follicular lymphoma, CLL/SLL, or mantle cell lymphoma.     PET/CT 1/27/2023  CHEST:  Marked uptake  in the enlarged 1.1 x 2.5 cm right axillary lymph node  (series 4, image 133) with SUV max 8.1 and prominent 1.0 x 1.0 cm left  axillary lymph node (series 4, image 123) with SUV max 4.1.     The central tracheobronchial tree is clear. No pleural effusion or  pneumothorax. No acute consolidation. Minimal bilateral dependent  atelectasis.     Heart size is within normal limits. No pericardial effusion. The  thoracic aorta and main pulmonary artery are within normal limits for  diameter. The esophagus is unremarkable.      ABDOMEN AND PELVIS:  Marked uptake in multiple enlarged retroperitoneal lymph nodes. For  example:  - 2.4 x 1.8 cm left para-aortic node (series 4, image 206) with SUV  max 6.3  - 1.7 x 2.5 cm left para-aortic node (series 4, image 217) with SUV  max 14.3     The liver is unremarkable. Radiodense gallstone without findings of  cholecystitis. No intrahepatic or extrahepatic biliary ductal  dilatation. The pancreas is unremarkable. No pancreatic ductal  dilatation. Splenule. The spleen is unremarkable. The adrenal glands  are unremarkable.     Symmetric enhancement of the kidneys. No hydronephrosis. The urinary  bladder is unremarkable. The reproductive organs are unremarkable.     Normal caliber of the small and large bowel. Colonic diverticulosis  without diverticulitis. No free air, free fluid, or fluid collection.  Normal caliber of the abdominal aorta.     LOWER EXTREMITIES:   There is no suspicious FDG uptake in the visualized lower extremities.  Intramuscular lipomas in left rectus femoris and right soleus. Right  knee ACL repair changes.     BONES:   There is no suspicious FDG uptake in the skeleton. There are no  suspicious lytic or blastic osseous lesions. Degenerative changes of  bilateral hips.                                                                      IMPRESSION: In this patient with retroperitoneal lymphadenopathy:  1. Intense hypermetabolic uptake (Deauville 5) in multiple  enlarged  and prominent axillary, left supraclavicular and retroperitoneal lymph  nodes, concerning for lymphoproliferative disease.  1a. Consider biopsying right axillary lymph node (max SUV 8) or left  supraclavicular node, largest (max SUV 14).    2. Cholelithiasis without cholecystitis.  3. Please see neck PET/CT report for head and neck findings.    PET CT neck 1/27/2023  Marked focal uptake in the enlarged left supraclavicular lymph node  measuring 3.3 x 2.3 cm (series 2, image 98) with SUV max 13.7.     Regarding evaluation of the mucosal space, there is no definite  abnormality or abnormal metabolic uptake on PET CT in the nasopharynx,  oropharynx, hypopharynx, or of the glottis. Regarding the tongue base,  no abnormality is identified. Regarding the major salivary glands, no  abnormality is identified. Regarding the thyroid gland, no abnormality  is identified.     Limited evaluation of the cervical vertebra demonstrates that there is  no overt spinal canal stenosis. Mild mucosal thickening in bilateral  maxillary sinuses. Regarding the mastoid air cells, there is no  evidence of significant debris or fluid. Regarding the major  vasculature in the neck, no abnormality is identified.     Regarding the visualized lung apices, there is no definite  abnormality, and the lung apices appear relatively clear.                                                                      Impression:   1. Intense hypermetabolic uptake (Deauville 5) in the enlarged left  supraclavicular lymph node, concerning for lymphoproliferative  disease.   2. Please refer to the whole body PET CT performed as a separate  report, for the findings of the remainder of the body        1/6/2023-CT abdomen and pelvis   Retroperitoneal lymph node demonstrated in the left periaortic region at the level of the kidneys measuring 1.9 x 2.6 cm, . There is also a lobulated node anterior to the  esophagus at the level of the left kidney which appears  to be 2 adjacent nodes. The largest node towards the left measures 1.9 x 2.5 cm  Sigmoid diverticulosis without evidence diverticulitis.  Cholelithiasis without evidence for cholecystitis.    ASSESSMENT/PLAN:    B-cell non-Hodgkin's lymphoma.  CD5 and CD10 negative lambda monotypic B-cell lymphoma presenting with diffuse lymphadenopathy.    He has marked FDG avid left supraclavicular, axillary, retroperitoneal lymph nodes.      I recommend proceeding with excisional biopsy for further classification.    We also discussed that I would like to do a bone marrow biopsy to complete the staging.  We discussed the procedure of bone marrow biopsy including its potential complications in detail.    I would also repeat CBC/differential and CMP and LDH.  We will also check uric acid.  I will screen for hepatitis B, hepatitis C and HIV.  We will check SPEP and immunoglobulin levels.  We will also check free light chains.      I would like to see him back a couple of weeks after all of the above-mentioned work-up is done so that I have all the results are available and we can figure out a definite management plan.    I answered all of their questions to their satisfaction.  They understand the situation and are agreeable with the plan.    Kiel Maurer MD

## 2023-02-09 NOTE — PROGRESS NOTES
Video-Visit Details    Type of service:  Video Visit    Video Start Time (time video started): 3:04 PM     Video End Time (time video stopped): 3:24 PM    Originating Location (pt. Location): Home      Distant Location (provider location):  Off-site    Mode of Communication:  Video Conference via [facility]

## 2023-02-10 ENCOUNTER — TELEPHONE (OUTPATIENT)
Dept: OTOLARYNGOLOGY | Facility: CLINIC | Age: 63
End: 2023-02-10
Payer: COMMERCIAL

## 2023-02-10 NOTE — TELEPHONE ENCOUNTER
Called patient to schedule surgery with Dr. Tamayo    Date of Surgery: 2/16    Location of surgery: [facility]    Pre-Op H&P: PCP    Post-Op Appt Date: 1 week    Surgery Packet Mailed: yes      Additional comments: Spoke with patient, he is aware of above dates, will reach out with any questions and await call from [facility] with surgical instructions        Anna C. Schoenecker on 2/10/2023 at 11:06 AM

## 2023-02-13 ENCOUNTER — LAB (OUTPATIENT)
Dept: LAB | Facility: CLINIC | Age: 63
End: 2023-02-13
Payer: COMMERCIAL

## 2023-02-13 DIAGNOSIS — C85.10 B-CELL LYMPHOMA, UNSPECIFIED B-CELL LYMPHOMA TYPE, UNSPECIFIED BODY REGION (H): ICD-10-CM

## 2023-02-13 DIAGNOSIS — Z11.59 ENCOUNTER FOR SCREENING FOR OTHER VIRAL DISEASES: ICD-10-CM

## 2023-02-13 LAB
ALBUMIN SERPL-MCNC: 3.8 G/DL (ref 3.4–5)
ALP SERPL-CCNC: 110 U/L (ref 40–150)
ALT SERPL W P-5'-P-CCNC: 25 U/L (ref 0–70)
ANION GAP SERPL CALCULATED.3IONS-SCNC: 8 MMOL/L (ref 3–14)
AST SERPL W P-5'-P-CCNC: 13 U/L (ref 0–45)
BASOPHILS # BLD AUTO: 0.1 10E3/UL (ref 0–0.2)
BASOPHILS NFR BLD AUTO: 1 %
BILIRUB SERPL-MCNC: 0.5 MG/DL (ref 0.2–1.3)
BUN SERPL-MCNC: 19 MG/DL (ref 7–30)
CALCIUM SERPL-MCNC: 9.5 MG/DL (ref 8.5–10.1)
CHLORIDE BLD-SCNC: 110 MMOL/L (ref 94–109)
CO2 SERPL-SCNC: 23 MMOL/L (ref 20–32)
CREAT SERPL-MCNC: 0.9 MG/DL (ref 0.66–1.25)
EOSINOPHIL # BLD AUTO: 0.2 10E3/UL (ref 0–0.7)
EOSINOPHIL NFR BLD AUTO: 2 %
ERYTHROCYTE [DISTWIDTH] IN BLOOD BY AUTOMATED COUNT: 12.9 % (ref 10–15)
GFR SERPL CREATININE-BSD FRML MDRD: >90 ML/MIN/1.73M2
GLUCOSE BLD-MCNC: 122 MG/DL (ref 70–99)
HBV CORE AB SERPL QL IA: NONREACTIVE
HBV SURFACE AB SERPL IA-ACNC: 0.28 M[IU]/ML
HBV SURFACE AB SERPL IA-ACNC: NONREACTIVE M[IU]/ML
HBV SURFACE AG SERPL QL IA: NONREACTIVE
HCT VFR BLD AUTO: 47.6 % (ref 40–53)
HCV AB SERPL QL IA: NONREACTIVE
HGB BLD-MCNC: 16 G/DL (ref 13.3–17.7)
HIV 1+2 AB+HIV1 P24 AG SERPL QL IA: NONREACTIVE
IMM GRANULOCYTES # BLD: 0 10E3/UL
IMM GRANULOCYTES NFR BLD: 0 %
LDH SERPL L TO P-CCNC: 229 U/L (ref 85–227)
LYMPHOCYTES # BLD AUTO: 1.4 10E3/UL (ref 0.8–5.3)
LYMPHOCYTES NFR BLD AUTO: 15 %
MCH RBC QN AUTO: 32.4 PG (ref 26.5–33)
MCHC RBC AUTO-ENTMCNC: 33.6 G/DL (ref 31.5–36.5)
MCV RBC AUTO: 96 FL (ref 78–100)
MONOCYTES # BLD AUTO: 0.8 10E3/UL (ref 0–1.3)
MONOCYTES NFR BLD AUTO: 9 %
NEUTROPHILS # BLD AUTO: 6.4 10E3/UL (ref 1.6–8.3)
NEUTROPHILS NFR BLD AUTO: 73 %
NRBC # BLD AUTO: 0 10E3/UL
NRBC BLD AUTO-RTO: 0 /100
PLATELET # BLD AUTO: 231 10E3/UL (ref 150–450)
POTASSIUM BLD-SCNC: 3.9 MMOL/L (ref 3.4–5.3)
PROT SERPL-MCNC: 7 G/DL (ref 6.8–8.8)
RBC # BLD AUTO: 4.94 10E6/UL (ref 4.4–5.9)
SODIUM SERPL-SCNC: 141 MMOL/L (ref 133–144)
TOTAL PROTEIN SERUM FOR ELP: 6.8 G/DL (ref 6.4–8.3)
URATE SERPL-MCNC: 4.3 MG/DL (ref 3.5–7.2)
WBC # BLD AUTO: 8.8 10E3/UL (ref 4–11)

## 2023-02-13 PROCEDURE — 86704 HEP B CORE ANTIBODY TOTAL: CPT

## 2023-02-13 PROCEDURE — 86706 HEP B SURFACE ANTIBODY: CPT

## 2023-02-13 PROCEDURE — 80053 COMPREHEN METABOLIC PANEL: CPT

## 2023-02-13 PROCEDURE — 84155 ASSAY OF PROTEIN SERUM: CPT | Mod: XU

## 2023-02-13 PROCEDURE — 83615 LACTATE (LD) (LDH) ENZYME: CPT

## 2023-02-13 PROCEDURE — 36415 COLL VENOUS BLD VENIPUNCTURE: CPT

## 2023-02-13 PROCEDURE — 83521 IG LIGHT CHAINS FREE EACH: CPT

## 2023-02-13 PROCEDURE — 82784 ASSAY IGA/IGD/IGG/IGM EACH: CPT

## 2023-02-13 PROCEDURE — 86803 HEPATITIS C AB TEST: CPT

## 2023-02-13 PROCEDURE — 84165 PROTEIN E-PHORESIS SERUM: CPT

## 2023-02-13 PROCEDURE — 85025 COMPLETE CBC W/AUTO DIFF WBC: CPT

## 2023-02-13 PROCEDURE — 87340 HEPATITIS B SURFACE AG IA: CPT

## 2023-02-13 PROCEDURE — 84550 ASSAY OF BLOOD/URIC ACID: CPT

## 2023-02-13 PROCEDURE — 87389 HIV-1 AG W/HIV-1&-2 AB AG IA: CPT

## 2023-02-14 ENCOUNTER — OFFICE VISIT (OUTPATIENT)
Dept: FAMILY MEDICINE | Facility: CLINIC | Age: 63
End: 2023-02-14
Payer: COMMERCIAL

## 2023-02-14 VITALS
HEART RATE: 82 BPM | OXYGEN SATURATION: 97 % | BODY MASS INDEX: 27.18 KG/M2 | TEMPERATURE: 97.6 F | HEIGHT: 71 IN | DIASTOLIC BLOOD PRESSURE: 76 MMHG | SYSTOLIC BLOOD PRESSURE: 122 MMHG | WEIGHT: 194.13 LBS | RESPIRATION RATE: 16 BRPM

## 2023-02-14 DIAGNOSIS — G47.00 ACUTE INSOMNIA: ICD-10-CM

## 2023-02-14 DIAGNOSIS — Z01.818 PREOP GENERAL PHYSICAL EXAM: Primary | ICD-10-CM

## 2023-02-14 LAB
ALBUMIN SERPL ELPH-MCNC: 4.1 G/DL (ref 3.7–5.1)
ALPHA1 GLOB SERPL ELPH-MCNC: 0.3 G/DL (ref 0.2–0.4)
ALPHA2 GLOB SERPL ELPH-MCNC: 0.6 G/DL (ref 0.5–0.9)
B-GLOBULIN SERPL ELPH-MCNC: 0.9 G/DL (ref 0.6–1)
GAMMA GLOB SERPL ELPH-MCNC: 0.9 G/DL (ref 0.7–1.6)
IGA SERPL-MCNC: 336 MG/DL (ref 84–499)
IGG SERPL-MCNC: 954 MG/DL (ref 610–1616)
IGM SERPL-MCNC: 132 MG/DL (ref 35–242)
KAPPA LC FREE SER-MCNC: 2.53 MG/DL (ref 0.33–1.94)
KAPPA LC FREE/LAMBDA FREE SER NEPH: 1.58 {RATIO} (ref 0.26–1.65)
LAMBDA LC FREE SERPL-MCNC: 1.6 MG/DL (ref 0.57–2.63)
M PROTEIN SERPL ELPH-MCNC: 0 G/DL
PROT PATTERN SERPL ELPH-IMP: NORMAL

## 2023-02-14 PROCEDURE — 99214 OFFICE O/P EST MOD 30 MIN: CPT | Performed by: INTERNAL MEDICINE

## 2023-02-14 RX ORDER — ZOLPIDEM TARTRATE 10 MG/1
10 TABLET ORAL
Qty: 30 TABLET | Refills: 1 | Status: SHIPPED | OUTPATIENT
Start: 2023-02-17 | End: 2023-10-04

## 2023-02-14 ASSESSMENT — PAIN SCALES - GENERAL: PAINLEVEL: MILD PAIN (2)

## 2023-02-14 NOTE — PROGRESS NOTES
[address] [address]  Phone: [phone]  Primary Provider: Paddy Holly  Pre-op Performing Provider: PADDY HOLLY    PREOPERATIVE EVALUATION:  Today's date: 2/14/2023    Geovanni Jasso is a 62 year old male who presents for a preoperative evaluation.    Surgical Information:  Surgery/Procedure: Excisional biopsy of a left cervical node  Surgery Location: [facility]  Surgeon: Aung Tamayo MD  Surgery Date: 2/16/2023  Time of Surgery: 10:10 AM  Where patient plans to recover: At home with family  Fax number for surgical facility: Note does not need to be faxed, will be available electronically in Epic.    Type of Anesthesia Anticipated: General      HPI related to upcoming procedure:             This is a 62 year old male who comes in today for a preoperative evaluation. Mr. Jsaso has possible lymphoproliferative disease.  He was first evaluated last December 2022 due to persistent abdominal pain. CT of abdomen/pelvis last January 6, 2023 shows retroperitoneal lymphadenopathy. He was referred to Oncology. PET scan shows intense hypermetabolic uptake in the enlarged left supraclavicular lymph node. Mr. Jasso is then scheduled for excisional biopsy of left cervical ymph nodes.    Preop Questions 2/10/2023   1. Have you ever had a heart attack or stroke? No   2. Have you ever had surgery on your heart or blood vessels, such as a stent placement, a coronary artery bypass, or surgery on an artery in your head, neck, heart, or legs? No   3. Do you have chest pain with activity? No   4. Do you have a history of  heart failure? No   5. Do you currently have a cold, bronchitis or symptoms of other infection? No   6. Do you have a cough, shortness of breath, or wheezing? No   7. Do you or anyone in your family have previous history of blood clots? No   8. Do you or does anyone in your family have a serious bleeding problem such as  prolonged bleeding following surgeries or cuts? No   9. Have you ever had problems with anemia or been told to take iron pills? No   10. Have you had any abnormal blood loss such as black, tarry or bloody stools? No   11. Have you ever had a blood transfusion? No   12. Are you willing to have a blood transfusion if it is medically needed before, during, or after your surgery? Yes   13. Have you or any of your relatives ever had problems with anesthesia? No   14. Do you have sleep apnea, excessive snoring or daytime drowsiness? UNKNOWN   15. Do you have any artifical heart valves or other implanted medical devices like a pacemaker, defibrillator, or continuous glucose monitor? No   16. Do you have artificial joints? No   17. Are you allergic to latex? No       Health Care Directive:  Patient does not have a Health Care Directive or Living Will: Patient wants FULL CODE.    Preoperative Review of :   reviewed - no record of controlled substances prescribed.        Review of Systems  CONSTITUTIONAL: NEGATIVE for fever, chills, change in weight  INTEGUMENTARY/SKIN: NEGATIVE for worrisome rashes, moles or lesions  EYES: NEGATIVE for vision changes or irritation  ENT/MOUTH: NEGATIVE for ear, mouth and throat problems  RESP: NEGATIVE for significant cough or SOB  CV: NEGATIVE for chest pain, palpitations or peripheral edema  GI: NEGATIVE for nausea, abdominal pain, heartburn, or change in bowel habits  : NEGATIVE for frequency, dysuria, or hematuria  MUSCULOSKELETAL: NEGATIVE for significant arthralgias or myalgia  NEURO: NEGATIVE for weakness, dizziness or paresthesias  ENDOCRINE: NEGATIVE for temperature intolerance, skin/hair changes  HEME/ALLERGY/IMMUNE: As above.  PSYCHIATRIC: NEGATIVE for changes in mood or affect    Patient Active Problem List    Diagnosis Date Noted     Diverticulosis of large intestine 07/17/2019     Priority: Medium     Internal hemorrhoids 07/17/2019     Priority: Medium      Osteoarthritis of knee, unspecified laterality, unspecified osteoarthritis type 02/25/2019     Priority: Medium     Carpal tunnel syndrome      Priority: Medium     Hand joint stiff, right      Priority: Medium     Positive CLAIRE (antinuclear antibody) 09/27/2018     Priority: Medium     SOB (shortness of breath) 08/31/2018     Priority: Medium     Non-allergic rhinitis 08/31/2018     Priority: Medium     Atherosclerosis of native coronary artery of native heart without angina pectoris 11/12/2017     Priority: Medium     Chondromalacia patellae, right 05/09/2017     Priority: Medium     S/P ACL reconstruction 05/09/2017     Priority: Medium     Bilateral low back pain without sciatica 05/07/2017     Priority: Medium     Chronic rhinitis 10/23/2015     Priority: Medium     Esophageal reflux 12/26/2013     Priority: Medium     Hyperlipidemia LDL goal <70 12/26/2013     Priority: Medium      Past Medical History:   Diagnosis Date     Carpal tunnel syndrome      Chronic rhinitis 10/23/2015     Esophageal reflux 12/26/2013     Hand joint stiff, right      Past Surgical History:   Procedure Laterality Date     COLONOSCOPY WITH CO2 INSUFFLATION N/A 7/17/2019    Procedure: COLONOSCOPY, WITH CO2 INSUFFLATION;  Surgeon: Jaison Hammer MD;  Location:  OR     NO HISTORY OF SURGERY       Current Outpatient Medications   Medication Sig Dispense Refill     acetaminophen (TYLENOL) 325 MG tablet Take 325-650 mg by mouth 4 times daily as needed       ASPIRIN NOT PRESCRIBED (INTENTIONAL) Please choose reason not prescribed from choices below.       celecoxib (CELEBREX) 100 MG capsule Take 1 capsule (100 mg) by mouth 2 times daily 40 capsule 1     diclofenac (VOLTAREN) 1 % topical gel Apply small amount topically to right shoulder 3-4 times daily as needed for pain. 50 g 0     Efinaconazole (JUBLIA) 10 % SOLN Externally apply topically daily to nail. 8 mL 4     fexofenadine (ALLEGRA) 60 MG tablet Take 1 tablet (60 mg) by  "mouth 2 times daily 180 tablet 0     fluticasone (FLONASE) 50 MCG/ACT nasal spray Spray 1-2 sprays into both nostrils daily 16 g 11     ibuprofen (ADVIL,MOTRIN) 200 MG tablet Take 200 mg by mouth every 6 hours as needed       ketoconazole (NIZORAL) 2 % external cream Apply twice daily for 8 weeks to feet 60 g 3     LANsoprazole (PREVACID) 30 MG DR capsule Take 1 capsule (30 mg) by mouth every morning (before breakfast) 90 capsule 2     methocarbamol (ROBAXIN) 500 MG tablet Take 1 tablet (500 mg) by mouth nightly as needed for muscle spasms 30 tablet 0     metroNIDAZOLE (METROCREAM) 0.75 % external cream Apply topically 2 times daily 45 g 1     nystatin-triamcinolone (MYCOLOG II) 766897-2.1 UNIT/GM-% external cream Apply topically 2 times daily 30 g 5     omeprazole (PRILOSEC) 40 MG DR capsule Take 1 capsule (40 mg) by mouth daily Take 30 minutes before breakfast. 90 capsule 1     pantoprazole (PROTONIX) 20 MG EC tablet Take 1 tablet (20 mg) by mouth daily (Patient not taking: Reported on 1/18/2023) 30 tablet 5       Allergies   Allergen Reactions     Penicillins         Social History     Tobacco Use     Smoking status: Never     Smokeless tobacco: Never   Substance Use Topics     Alcohol use: Yes     Comment: occasionally         Objective   /76 (BP Location: Left arm, Patient Position: Sitting, Cuff Size: Adult Large)   Pulse 82   Temp 97.6  F (36.4  C) (Tympanic)   Resp 16   Ht 1.796 m (5' 10.71\")   Wt 88.1 kg (194 lb 2 oz)   SpO2 97%   BMI 27.30 kg/m    Physical Exam    GENERAL APPEARANCE: healthy, alert and no distress     EYES: Eyes grossly normal to inspection     HENT: normal cephalic/atraumatic     RESP: lungs clear to auscultation - no rales, rhonchi or wheezes     CV: regular rates and rhythm     NEURO: Normal strength and tone, sensory exam grossly normal, mentation intact and speech normal     PSYCH: mentation appears normal    Recent Labs   Lab Test 02/13/23  0812 01/27/23  0929 " 01/09/23  1630   HGB 16.0  --  15.4     --  221    142  --    POTASSIUM 3.9 4.1  --    CR 0.90 0.99  --         Diagnostics:     Procedure: CT CALCIUM SCREENING      Examination Date: 11/10/2017 1:54 PM      Clinical Information: ; Hyperlipidemia LDL goal <100      Ordering Provider: Avni     PROCEDURE: High-resolution, ECG synchronized multi-slice computed  tomography was performed without incident. Coronary calcification was  analyzed using [employer] calcium scoring software. Scan protocol was  optimized to minimize radiation exposure. The total radiation exposure  was calculated to be 64 DLP and 0.896 mSv.     FINDINGS:     Overall quality of the study: Good.      CORONARY ARTERY CALCIUM SCORES:      Left main coronary artery: 0  Left anterior descending coronary artery: 50.7  Circumflex coronary artery: 0   Right coronary artery: 0     TOTAL CALCIUM SCORE: 50.7     The total Agatston calcium score is 50.7 placing the patient in the 57  percentile when compared to age and gender matched control group.     Please review Radiology report for incidental noncardiac findings that  will follow separately     CALCIUM SCORE OF 1-99: If the patient has more than one cardiac risk  factor (male age >45, female age >55, hypertension, smoking, high  cholesterol, low HDL, family history of heart disease) then the risk  of coronary heart disease, death or myocardial infarction is 0.4% per  year (Emmaneulle P. et al. [facility], 2007; 49:378-402).  The more calcium  is detected, the higher is the likelihood for an obstructive coronary  disease. However, there is no unique relationship between the amount  of detected calcium and the extent of or localization of coronary  artery disease. If there are any cardiac symptoms (for example chest  pain/pressure or shortness of breath) then immediate medical attention  is necessary.     GENERAL RECOMMENDATIONS: Adoption and maintenance of a healthy  lifestyle is recommended for  all people. This should include regular,  appropriate exercise and observance of a proper diet, to ensure  balanced nutrition and weight control. Tobacco use should be avoided.  Cholesterol has been linked to coronary atherosclerosis, and we  strongly encourage adhering to the recommendations of the National  Cholesterol Education Panel (NCEP). For primary prevention, these  include a target goal for total cholesterol of less than 200 mg/dl,  HDL cholesterol of greater than 40 mg/dl, triglycerides of less than  150 mg/dl, and LDL cholesterol of less than 100 mg/dl. However note  that these are general recommendations only, and as with all such  matters, the personal physician should be consulted regarding  recommendations and treatments appropriate for the individual.           STEVAN WESTON MD       ASSESSMENT/PLAN  Preop general physical exam  Revised Cardiac Risk Index (RCRI):  The patient has the following serious cardiovascular risks for perioperative complications:   - No serious cardiac risks = 0 points   RCRI Interpretation: 0 points: Class I (very low risk - 0.4% complication rate)  - chlorhexidine (HIBICLENS) 4 % liquid; Apply topically once for 1 dose    Acute insomnia  - zolpidem (AMBIEN) 10 MG tablet; Take 1 tablet (10 mg) by mouth nightly as needed for sleep       Signed Electronically by: Paddy Holly MD  Copy of this evaluation report is provided to requesting physician.

## 2023-02-15 ENCOUNTER — APPOINTMENT (OUTPATIENT)
Dept: LAB | Facility: CLINIC | Age: 63
End: 2023-02-15
Attending: INTERNAL MEDICINE
Payer: COMMERCIAL

## 2023-02-15 ENCOUNTER — PRE VISIT (OUTPATIENT)
Dept: OTOLARYNGOLOGY | Facility: CLINIC | Age: 63
End: 2023-02-15

## 2023-02-15 ENCOUNTER — OFFICE VISIT (OUTPATIENT)
Dept: ONCOLOGY | Facility: CLINIC | Age: 63
End: 2023-02-15
Payer: COMMERCIAL

## 2023-02-15 ENCOUNTER — ANESTHESIA EVENT (OUTPATIENT)
Dept: SURGERY | Facility: CLINIC | Age: 63
End: 2023-02-15
Payer: COMMERCIAL

## 2023-02-15 VITALS
SYSTOLIC BLOOD PRESSURE: 113 MMHG | DIASTOLIC BLOOD PRESSURE: 75 MMHG | HEART RATE: 71 BPM | OXYGEN SATURATION: 96 % | TEMPERATURE: 98 F | RESPIRATION RATE: 16 BRPM | WEIGHT: 194.3 LBS | BODY MASS INDEX: 27.32 KG/M2

## 2023-02-15 DIAGNOSIS — C85.10 B-CELL LYMPHOMA, UNSPECIFIED B-CELL LYMPHOMA TYPE, UNSPECIFIED BODY REGION (H): Primary | ICD-10-CM

## 2023-02-15 LAB
BASOPHILS # BLD AUTO: 0.1 10E3/UL (ref 0–0.2)
BASOPHILS NFR BLD AUTO: 1 %
EOSINOPHIL # BLD AUTO: 0.1 10E3/UL (ref 0–0.7)
EOSINOPHIL NFR BLD AUTO: 0 %
ERYTHROCYTE [DISTWIDTH] IN BLOOD BY AUTOMATED COUNT: 12.8 % (ref 10–15)
HCT VFR BLD AUTO: 46.1 % (ref 40–53)
HGB BLD-MCNC: 15.4 G/DL (ref 13.3–17.7)
IMM GRANULOCYTES # BLD: 0 10E3/UL
IMM GRANULOCYTES NFR BLD: 0 %
LYMPHOCYTES # BLD AUTO: 1.3 10E3/UL (ref 0.8–5.3)
LYMPHOCYTES NFR BLD AUTO: 10 %
MCH RBC QN AUTO: 32.2 PG (ref 26.5–33)
MCHC RBC AUTO-ENTMCNC: 33.4 G/DL (ref 31.5–36.5)
MCV RBC AUTO: 96 FL (ref 78–100)
MONOCYTES # BLD AUTO: 1.2 10E3/UL (ref 0–1.3)
MONOCYTES NFR BLD AUTO: 9 %
NEUTROPHILS # BLD AUTO: 10.1 10E3/UL (ref 1.6–8.3)
NEUTROPHILS NFR BLD AUTO: 80 %
NRBC # BLD AUTO: 0 10E3/UL
NRBC BLD AUTO-RTO: 0 /100
PLATELET # BLD AUTO: 231 10E3/UL (ref 150–450)
RBC # BLD AUTO: 4.78 10E6/UL (ref 4.4–5.9)
WBC # BLD AUTO: 12.8 10E3/UL (ref 4–11)

## 2023-02-15 PROCEDURE — 88237 TISSUE CULTURE BONE MARROW: CPT | Performed by: NURSE PRACTITIONER

## 2023-02-15 PROCEDURE — 36415 COLL VENOUS BLD VENIPUNCTURE: CPT | Performed by: NURSE PRACTITIONER

## 2023-02-15 PROCEDURE — 85097 BONE MARROW INTERPRETATION: CPT | Mod: GC | Performed by: STUDENT IN AN ORGANIZED HEALTH CARE EDUCATION/TRAINING PROGRAM

## 2023-02-15 PROCEDURE — 88264 CHROMOSOME ANALYSIS 20-25: CPT | Performed by: NURSE PRACTITIONER

## 2023-02-15 PROCEDURE — 88313 SPECIAL STAINS GROUP 2: CPT | Mod: 26 | Performed by: STUDENT IN AN ORGANIZED HEALTH CARE EDUCATION/TRAINING PROGRAM

## 2023-02-15 PROCEDURE — 38222 DX BONE MARROW BX & ASPIR: CPT | Performed by: NURSE PRACTITIONER

## 2023-02-15 PROCEDURE — 88311 DECALCIFY TISSUE: CPT | Mod: 26 | Performed by: STUDENT IN AN ORGANIZED HEALTH CARE EDUCATION/TRAINING PROGRAM

## 2023-02-15 PROCEDURE — 85004 AUTOMATED DIFF WBC COUNT: CPT | Performed by: NURSE PRACTITIONER

## 2023-02-15 PROCEDURE — 88342 IMHCHEM/IMCYTCHM 1ST ANTB: CPT | Mod: 26 | Performed by: STUDENT IN AN ORGANIZED HEALTH CARE EDUCATION/TRAINING PROGRAM

## 2023-02-15 PROCEDURE — 88305 TISSUE EXAM BY PATHOLOGIST: CPT | Mod: 26 | Performed by: STUDENT IN AN ORGANIZED HEALTH CARE EDUCATION/TRAINING PROGRAM

## 2023-02-15 PROCEDURE — 88341 IMHCHEM/IMCYTCHM EA ADD ANTB: CPT | Mod: 26 | Performed by: STUDENT IN AN ORGANIZED HEALTH CARE EDUCATION/TRAINING PROGRAM

## 2023-02-15 PROCEDURE — 88291 CYTO/MOLECULAR REPORT: CPT | Performed by: MEDICAL GENETICS

## 2023-02-15 PROCEDURE — 250N000011 HC RX IP 250 OP 636: Performed by: NURSE PRACTITIONER

## 2023-02-15 PROCEDURE — 88305 TISSUE EXAM BY PATHOLOGIST: CPT | Mod: TC | Performed by: NURSE PRACTITIONER

## 2023-02-15 PROCEDURE — 88368 INSITU HYBRIDIZATION MANUAL: CPT | Mod: 26 | Performed by: MEDICAL GENETICS

## 2023-02-15 PROCEDURE — 88188 FLOWCYTOMETRY/READ 9-15: CPT | Mod: GC | Performed by: STUDENT IN AN ORGANIZED HEALTH CARE EDUCATION/TRAINING PROGRAM

## 2023-02-15 PROCEDURE — 88311 DECALCIFY TISSUE: CPT | Mod: TC | Performed by: NURSE PRACTITIONER

## 2023-02-15 PROCEDURE — 88185 FLOWCYTOMETRY/TC ADD-ON: CPT | Performed by: NURSE PRACTITIONER

## 2023-02-15 PROCEDURE — 88275 CYTOGENETICS 100-300: CPT | Performed by: NURSE PRACTITIONER

## 2023-02-15 RX ADMIN — MIDAZOLAM HYDROCHLORIDE 2 MG: 1 INJECTION, SOLUTION INTRAMUSCULAR; INTRAVENOUS at 14:18

## 2023-02-15 ASSESSMENT — PAIN SCALES - GENERAL
PAINLEVEL: NO PAIN (0)
PAINLEVEL: MODERATE PAIN (4)

## 2023-02-15 NOTE — NURSING NOTE
Hx B cell lymphoma here for BMBx. VSS, A&Ox4. RN and provider explained procedure, no questions at this time. Provider obtained consent prior to procedure. Provider ordered versed prior to procedure. Patient verbally consents to the use of versed. 2 mg versed administered prior to procedure. Patient lying prone, call light within reach. Hem tech and provider at bedside.

## 2023-02-15 NOTE — PROGRESS NOTES
BMT ONC Adult Bone Marrow Biopsy Procedure Note  February 15, 2023  There were no vitals taken for this visit.     DIAGNOSIS: B cell lymphoma    PROCEDURE: Unilateral Bone Marrow Biopsy and Unilateral Aspirate    LOCATION: [facility] 2nd Floor    Patient s identification was positively verified by verbal identification and invasive procedure safety checklist was completed. Informed consent was obtained. Following the administration of 2 mg versed as pre-medication, patient was placed in the prone position and prepped and draped in a sterile manner. Approximately 10 cc of 1% Lidocaine was used over the left posterior iliac spine. Following this a 3 mm incision was made. Trephine bone marrow core(s) was (were) obtained from the [facility]. Bone marrow aspirates were obtained from the [facility]. Aspirates were sent for morphology, immunophenotyping, cytogenetics and molecular diagnostics process and hold. A total of approximately 15 ml of marrow was aspirated. Following this procedure a sterile dressing was applied to the bone marrow biopsy site(s). The patient was placed in the supine position to maintain pressure on the biopsy site. Post-procedure wound care instructions were given.     Complications: NO    Patient tolerated the procedure well    Interventions: NO    Length of procedure:20 minutes or less      Procedure performed by: Josephine Ruff CNP on 2/15/2023 at 2:49 PM

## 2023-02-15 NOTE — NURSING NOTE
Patient supine for 30 minutes following biopsy. After 30 minutes, dressing clean, dry and intact. Vital signs stable. See DOC flow sheets for details. Left ambulatory with family member.    Griselda Diehl RN

## 2023-02-16 ENCOUNTER — HOSPITAL ENCOUNTER (OUTPATIENT)
Facility: CLINIC | Age: 63
Discharge: HOME | End: 2023-02-16
Attending: STUDENT IN AN ORGANIZED HEALTH CARE EDUCATION/TRAINING PROGRAM | Admitting: STUDENT IN AN ORGANIZED HEALTH CARE EDUCATION/TRAINING PROGRAM
Payer: COMMERCIAL

## 2023-02-16 ENCOUNTER — ANESTHESIA (OUTPATIENT)
Dept: SURGERY | Facility: CLINIC | Age: 63
End: 2023-02-16
Payer: COMMERCIAL

## 2023-02-16 VITALS
HEIGHT: 71 IN | DIASTOLIC BLOOD PRESSURE: 96 MMHG | TEMPERATURE: 98.8 F | BODY MASS INDEX: 26.98 KG/M2 | WEIGHT: 192.68 LBS | SYSTOLIC BLOOD PRESSURE: 137 MMHG | HEART RATE: 93 BPM | RESPIRATION RATE: 12 BRPM | OXYGEN SATURATION: 97 %

## 2023-02-16 DIAGNOSIS — R22.1 NECK MASS: Primary | ICD-10-CM

## 2023-02-16 LAB
INTERPRETATION: NORMAL
PATH REPORT.COMMENTS IMP SPEC: NORMAL
PATH REPORT.FINAL DX SPEC: NORMAL
PATH REPORT.FINAL DX SPEC: NORMAL
PATH REPORT.GROSS SPEC: NORMAL
PATH REPORT.MICROSCOPIC SPEC OTHER STN: NORMAL
PATH REPORT.RELEVANT HX SPEC: NORMAL
PATH REPORT.RELEVANT HX SPEC: NORMAL

## 2023-02-16 PROCEDURE — 258N000003 HC RX IP 258 OP 636: Performed by: NURSE ANESTHETIST, CERTIFIED REGISTERED

## 2023-02-16 PROCEDURE — 88189 FLOWCYTOMETRY/READ 16 & >: CPT | Performed by: STUDENT IN AN ORGANIZED HEALTH CARE EDUCATION/TRAINING PROGRAM

## 2023-02-16 PROCEDURE — 88275 CYTOGENETICS 100-300: CPT | Performed by: STUDENT IN AN ORGANIZED HEALTH CARE EDUCATION/TRAINING PROGRAM

## 2023-02-16 PROCEDURE — 999N000141 HC STATISTIC PRE-PROCEDURE NURSING ASSESSMENT: Performed by: STUDENT IN AN ORGANIZED HEALTH CARE EDUCATION/TRAINING PROGRAM

## 2023-02-16 PROCEDURE — 250N000009 HC RX 250: Performed by: STUDENT IN AN ORGANIZED HEALTH CARE EDUCATION/TRAINING PROGRAM

## 2023-02-16 PROCEDURE — 88185 FLOWCYTOMETRY/TC ADD-ON: CPT | Performed by: STUDENT IN AN ORGANIZED HEALTH CARE EDUCATION/TRAINING PROGRAM

## 2023-02-16 PROCEDURE — 88365 INSITU HYBRIDIZATION (FISH): CPT | Mod: 26 | Performed by: PATHOLOGY

## 2023-02-16 PROCEDURE — 88342 IMHCHEM/IMCYTCHM 1ST ANTB: CPT | Mod: 26 | Performed by: PATHOLOGY

## 2023-02-16 PROCEDURE — 88313 SPECIAL STAINS GROUP 2: CPT | Mod: 26 | Performed by: PATHOLOGY

## 2023-02-16 PROCEDURE — 88360 TUMOR IMMUNOHISTOCHEM/MANUAL: CPT | Mod: 26 | Performed by: PATHOLOGY

## 2023-02-16 PROCEDURE — 88305 TISSUE EXAM BY PATHOLOGIST: CPT | Mod: TC | Performed by: STUDENT IN AN ORGANIZED HEALTH CARE EDUCATION/TRAINING PROGRAM

## 2023-02-16 PROCEDURE — 88369 M/PHMTRC ALYSISHQUANT/SEMIQ: CPT | Mod: 26 | Performed by: MEDICAL GENETICS

## 2023-02-16 PROCEDURE — 88341 IMHCHEM/IMCYTCHM EA ADD ANTB: CPT | Mod: 26 | Performed by: PATHOLOGY

## 2023-02-16 PROCEDURE — 710N000012 HC RECOVERY PHASE 2, PER MINUTE: Performed by: STUDENT IN AN ORGANIZED HEALTH CARE EDUCATION/TRAINING PROGRAM

## 2023-02-16 PROCEDURE — 250N000011 HC RX IP 250 OP 636: Performed by: NURSE ANESTHETIST, CERTIFIED REGISTERED

## 2023-02-16 PROCEDURE — 370N000017 HC ANESTHESIA TECHNICAL FEE, PER MIN: Performed by: STUDENT IN AN ORGANIZED HEALTH CARE EDUCATION/TRAINING PROGRAM

## 2023-02-16 PROCEDURE — 88291 CYTO/MOLECULAR REPORT: CPT | Performed by: MEDICAL GENETICS

## 2023-02-16 PROCEDURE — 38510 BIOPSY/REMOVAL LYMPH NODES: CPT | Mod: LT | Performed by: STUDENT IN AN ORGANIZED HEALTH CARE EDUCATION/TRAINING PROGRAM

## 2023-02-16 PROCEDURE — 88264 CHROMOSOME ANALYSIS 20-25: CPT | Performed by: STUDENT IN AN ORGANIZED HEALTH CARE EDUCATION/TRAINING PROGRAM

## 2023-02-16 PROCEDURE — 250N000011 HC RX IP 250 OP 636: Performed by: ANESTHESIOLOGY

## 2023-02-16 PROCEDURE — 272N000001 HC OR GENERAL SUPPLY STERILE: Performed by: STUDENT IN AN ORGANIZED HEALTH CARE EDUCATION/TRAINING PROGRAM

## 2023-02-16 PROCEDURE — 88368 INSITU HYBRIDIZATION MANUAL: CPT | Mod: 26 | Performed by: MEDICAL GENETICS

## 2023-02-16 PROCEDURE — 250N000013 HC RX MED GY IP 250 OP 250 PS 637: Performed by: ANESTHESIOLOGY

## 2023-02-16 PROCEDURE — 360N000075 HC SURGERY LEVEL 2, PER MIN: Performed by: STUDENT IN AN ORGANIZED HEALTH CARE EDUCATION/TRAINING PROGRAM

## 2023-02-16 PROCEDURE — 710N000010 HC RECOVERY PHASE 1, LEVEL 2, PER MIN: Performed by: STUDENT IN AN ORGANIZED HEALTH CARE EDUCATION/TRAINING PROGRAM

## 2023-02-16 PROCEDURE — 250N000025 HC SEVOFLURANE, PER MIN: Performed by: STUDENT IN AN ORGANIZED HEALTH CARE EDUCATION/TRAINING PROGRAM

## 2023-02-16 PROCEDURE — 88305 TISSUE EXAM BY PATHOLOGIST: CPT | Mod: 26 | Performed by: PATHOLOGY

## 2023-02-16 RX ORDER — FENTANYL CITRATE 50 UG/ML
INJECTION, SOLUTION INTRAMUSCULAR; INTRAVENOUS PRN
Status: DISCONTINUED | OUTPATIENT
Start: 2023-02-16 | End: 2023-02-16

## 2023-02-16 RX ORDER — AMOXICILLIN 250 MG
1-2 CAPSULE ORAL 2 TIMES DAILY
Qty: 30 TABLET | Refills: 0 | Status: SHIPPED | OUTPATIENT
Start: 2023-02-16 | End: 2023-11-28

## 2023-02-16 RX ORDER — SODIUM CHLORIDE, SODIUM LACTATE, POTASSIUM CHLORIDE, CALCIUM CHLORIDE 600; 310; 30; 20 MG/100ML; MG/100ML; MG/100ML; MG/100ML
INJECTION, SOLUTION INTRAVENOUS CONTINUOUS
Status: DISCONTINUED | OUTPATIENT
Start: 2023-02-16 | End: 2023-02-16 | Stop reason: HOSPADM

## 2023-02-16 RX ORDER — MEPERIDINE HYDROCHLORIDE 25 MG/ML
12.5 INJECTION INTRAMUSCULAR; INTRAVENOUS; SUBCUTANEOUS EVERY 5 MIN PRN
Status: DISCONTINUED | OUTPATIENT
Start: 2023-02-16 | End: 2023-02-16 | Stop reason: HOSPADM

## 2023-02-16 RX ORDER — LIDOCAINE HYDROCHLORIDE AND EPINEPHRINE 10; 10 MG/ML; UG/ML
INJECTION, SOLUTION INFILTRATION; PERINEURAL PRN
Status: DISCONTINUED | OUTPATIENT
Start: 2023-02-16 | End: 2023-02-16 | Stop reason: HOSPADM

## 2023-02-16 RX ORDER — DEXAMETHASONE SODIUM PHOSPHATE 4 MG/ML
10 INJECTION, SOLUTION INTRA-ARTICULAR; INTRALESIONAL; INTRAMUSCULAR; INTRAVENOUS; SOFT TISSUE ONCE
Status: DISCONTINUED | OUTPATIENT
Start: 2023-02-16 | End: 2023-02-16 | Stop reason: HOSPADM

## 2023-02-16 RX ORDER — LIDOCAINE 40 MG/G
CREAM TOPICAL
Status: DISCONTINUED | OUTPATIENT
Start: 2023-02-16 | End: 2023-02-16 | Stop reason: HOSPADM

## 2023-02-16 RX ORDER — ACETAMINOPHEN 325 MG/1
975 TABLET ORAL ONCE
Status: COMPLETED | OUTPATIENT
Start: 2023-02-16 | End: 2023-02-16

## 2023-02-16 RX ORDER — ONDANSETRON 4 MG/1
4 TABLET, ORALLY DISINTEGRATING ORAL EVERY 30 MIN PRN
Status: DISCONTINUED | OUTPATIENT
Start: 2023-02-16 | End: 2023-02-16 | Stop reason: HOSPADM

## 2023-02-16 RX ORDER — ONDANSETRON 4 MG/1
4 TABLET, ORALLY DISINTEGRATING ORAL EVERY 8 HOURS PRN
Qty: 10 TABLET | Refills: 0 | Status: SHIPPED | OUTPATIENT
Start: 2023-02-16 | End: 2023-11-28

## 2023-02-16 RX ORDER — OXYCODONE HYDROCHLORIDE 10 MG/1
10 TABLET ORAL EVERY 4 HOURS PRN
Status: DISCONTINUED | OUTPATIENT
Start: 2023-02-16 | End: 2023-02-16 | Stop reason: HOSPADM

## 2023-02-16 RX ORDER — OXYCODONE HYDROCHLORIDE 5 MG/1
5-10 TABLET ORAL EVERY 4 HOURS PRN
Qty: 12 TABLET | Refills: 0 | Status: SHIPPED | OUTPATIENT
Start: 2023-02-16 | End: 2023-04-19

## 2023-02-16 RX ORDER — SODIUM CHLORIDE, SODIUM LACTATE, POTASSIUM CHLORIDE, CALCIUM CHLORIDE 600; 310; 30; 20 MG/100ML; MG/100ML; MG/100ML; MG/100ML
INJECTION, SOLUTION INTRAVENOUS CONTINUOUS PRN
Status: DISCONTINUED | OUTPATIENT
Start: 2023-02-16 | End: 2023-02-16

## 2023-02-16 RX ORDER — HYDROMORPHONE HCL IN WATER/PF 6 MG/30 ML
0.4 PATIENT CONTROLLED ANALGESIA SYRINGE INTRAVENOUS EVERY 5 MIN PRN
Status: DISCONTINUED | OUTPATIENT
Start: 2023-02-16 | End: 2023-02-16 | Stop reason: HOSPADM

## 2023-02-16 RX ORDER — FENTANYL CITRATE 50 UG/ML
25 INJECTION, SOLUTION INTRAMUSCULAR; INTRAVENOUS EVERY 5 MIN PRN
Status: DISCONTINUED | OUTPATIENT
Start: 2023-02-16 | End: 2023-02-16 | Stop reason: HOSPADM

## 2023-02-16 RX ORDER — LABETALOL HYDROCHLORIDE 5 MG/ML
10 INJECTION, SOLUTION INTRAVENOUS
Status: DISCONTINUED | OUTPATIENT
Start: 2023-02-16 | End: 2023-02-16 | Stop reason: HOSPADM

## 2023-02-16 RX ORDER — HYDROMORPHONE HCL IN WATER/PF 6 MG/30 ML
0.2 PATIENT CONTROLLED ANALGESIA SYRINGE INTRAVENOUS EVERY 5 MIN PRN
Status: DISCONTINUED | OUTPATIENT
Start: 2023-02-16 | End: 2023-02-16 | Stop reason: HOSPADM

## 2023-02-16 RX ORDER — ONDANSETRON 2 MG/ML
INJECTION INTRAMUSCULAR; INTRAVENOUS PRN
Status: DISCONTINUED | OUTPATIENT
Start: 2023-02-16 | End: 2023-02-16

## 2023-02-16 RX ORDER — HYDRALAZINE HYDROCHLORIDE 20 MG/ML
2.5-5 INJECTION INTRAMUSCULAR; INTRAVENOUS EVERY 10 MIN PRN
Status: DISCONTINUED | OUTPATIENT
Start: 2023-02-16 | End: 2023-02-16 | Stop reason: HOSPADM

## 2023-02-16 RX ORDER — ONDANSETRON 2 MG/ML
4 INJECTION INTRAMUSCULAR; INTRAVENOUS EVERY 30 MIN PRN
Status: DISCONTINUED | OUTPATIENT
Start: 2023-02-16 | End: 2023-02-16 | Stop reason: HOSPADM

## 2023-02-16 RX ORDER — OXYCODONE HYDROCHLORIDE 5 MG/1
5 TABLET ORAL EVERY 4 HOURS PRN
Status: DISCONTINUED | OUTPATIENT
Start: 2023-02-16 | End: 2023-02-16 | Stop reason: HOSPADM

## 2023-02-16 RX ORDER — ACETAMINOPHEN 325 MG/1
650 TABLET ORAL EVERY 6 HOURS PRN
Qty: 50 TABLET | Refills: 0 | Status: SHIPPED | OUTPATIENT
Start: 2023-02-16 | End: 2024-03-14

## 2023-02-16 RX ORDER — FENTANYL CITRATE 50 UG/ML
50 INJECTION, SOLUTION INTRAMUSCULAR; INTRAVENOUS EVERY 5 MIN PRN
Status: DISCONTINUED | OUTPATIENT
Start: 2023-02-16 | End: 2023-02-16 | Stop reason: HOSPADM

## 2023-02-16 RX ORDER — PROPOFOL 10 MG/ML
INJECTION, EMULSION INTRAVENOUS PRN
Status: DISCONTINUED | OUTPATIENT
Start: 2023-02-16 | End: 2023-02-16

## 2023-02-16 RX ADMIN — ACETAMINOPHEN 975 MG: 325 TABLET ORAL at 09:15

## 2023-02-16 RX ADMIN — FENTANYL CITRATE 50 MCG: 50 INJECTION, SOLUTION INTRAMUSCULAR; INTRAVENOUS at 10:56

## 2023-02-16 RX ADMIN — ONDANSETRON 4 MG: 2 INJECTION INTRAMUSCULAR; INTRAVENOUS at 11:10

## 2023-02-16 RX ADMIN — SODIUM CHLORIDE, POTASSIUM CHLORIDE, SODIUM LACTATE AND CALCIUM CHLORIDE: 600; 310; 30; 20 INJECTION, SOLUTION INTRAVENOUS at 09:48

## 2023-02-16 RX ADMIN — PROPOFOL 160 MG: 10 INJECTION, EMULSION INTRAVENOUS at 10:01

## 2023-02-16 RX ADMIN — SUCCINYLCHOLINE CHLORIDE 100 MG: 20 INJECTION, SOLUTION INTRAMUSCULAR; INTRAVENOUS; PARENTERAL at 10:02

## 2023-02-16 RX ADMIN — FENTANYL CITRATE 100 MCG: 50 INJECTION, SOLUTION INTRAMUSCULAR; INTRAVENOUS at 10:01

## 2023-02-16 RX ADMIN — OXYCODONE HYDROCHLORIDE 10 MG: 10 TABLET ORAL at 12:07

## 2023-02-16 RX ADMIN — HYDROMORPHONE HYDROCHLORIDE 0.4 MG: 0.2 INJECTION, SOLUTION INTRAMUSCULAR; INTRAVENOUS; SUBCUTANEOUS at 12:31

## 2023-02-16 RX ADMIN — MIDAZOLAM 1 MG: 1 INJECTION INTRAMUSCULAR; INTRAVENOUS at 09:50

## 2023-02-16 RX ADMIN — FENTANYL CITRATE 50 MCG: 50 INJECTION, SOLUTION INTRAMUSCULAR; INTRAVENOUS at 10:22

## 2023-02-16 RX ADMIN — MIDAZOLAM 1 MG: 1 INJECTION INTRAMUSCULAR; INTRAVENOUS at 09:46

## 2023-02-16 ASSESSMENT — ACTIVITIES OF DAILY LIVING (ADL)
ADLS_ACUITY_SCORE: 22

## 2023-02-16 NOTE — DISCHARGE INSTRUCTIONS
[facility]  Same-Day Surgery   Adult Discharge Orders & Instructions     For 24 hours after surgery    Get plenty of rest.  A responsible adult must stay with you for at least 24 hours after you leave the hospital.   Do not drive or use heavy equipment.  If you have weakness or tingling, don't drive or use heavy equipment until this feeling goes away.  Do not drink alcohol.  Avoid strenuous or risky activities.  Ask for help when climbing stairs.   You may feel lightheaded.  IF so, sit for a few minutes before standing.  Have someone help you get up.   If you have nausea (feel sick to your stomach): Drink only clear liquids such as apple juice, ginger ale, broth or 7-Up.  Rest may also help.  Be sure to drink enough fluids.  Move to a regular diet as you feel able.  You may have a slight fever. Call the doctor if your fever is over 100 F (37.7 C) (taken under the tongue) or lasts longer than 24 hours.  You may have a dry mouth, a sore throat, muscle aches or trouble sleeping.  These should go away after 24 hours.  Do not make important or legal decisions.   Call your doctor for any of the followin.  Signs of infection (fever, growing tenderness at the surgery site, a large amount of drainage or bleeding, severe pain, foul-smelling drainage, redness, swelling).    2. It has been over 8 to 10 hours since surgery and you are still not able to urinate (pass water).    3.  Headache for over 24 hours.    4.  Numbness, tingling or weakness the day after surgery (if you had spinal anesthesia).  To contact a doctor, call DR Dockery's office at [phone]  or:    '   [phone] and ask for the resident on call for ENT (answered 24 hours a day)  '   Emergency Department:    [facility]: [phone]       (TTY for hearing impaired: [phone])    [facility]: [phone]       (TTY for hearing impaired: [phone])

## 2023-02-16 NOTE — BRIEF OP NOTE
[facility]    Brief Operative Note    Pre-operative diagnosis: Generalized lymphadenopathy [R59.1]  Post-operative diagnosis Same    Procedure: Procedure(s):  excisional biopsy of a left cervical node  Surgeon: Surgeon(s) and Role:     * Aung Tamayo MD - Primary     * Tavon Davis MD - Resident - Assisting  Anesthesia: General   Estimated Blood Loss: 10 mL from 2/16/2023  9:54 AM to 2/16/2023 11:24 AM      Drains: None  Specimens:   ID Type Source Tests Collected by Time Destination   1 : Left Level 4 Lymph Node Fresh Lymphoma Tissue Other SURGICAL PATHOLOGY EXAM Aung Tamayo MD 2/16/2023 10:46 AM      Findings:   As expected.  Complications: None  Implants: * No implants in log *

## 2023-02-16 NOTE — ANESTHESIA PROCEDURE NOTES
Airway       Patient location during procedure: OR       Procedure Start/Stop Times: 2/16/2023 10:03 AM  Staff -        CRNA: Colleen Obando APRN CRNA       Performed By: CRNA  Consent for Airway        Urgency: elective  Indications and Patient Condition       Indications for airway management: rick-procedural         Mask difficulty assessment: 1 - vent by mask    Final Airway Details       Final airway type: endotracheal airway       Successful airway: ETT - single and Oral  Endotracheal Airway Details        ETT size (mm): 7.5       Cuffed: yes       Successful intubation technique: direct laryngoscopy       DL Blade Type: Choudhury 2       Grade View of Cords: 1       Adjucts: stylet       Position: Right       Measured from: lips       Secured at (cm): 23       Bite block used: None    Post intubation assessment        Placement verified by: capnometry        Number of attempts at approach: 1       Secured with: pink tape       Ease of procedure: easy       Dentition: Intact and Unchanged    Medication(s) Administered   Medication Administration Time: 2/16/2023 10:03 AM

## 2023-02-16 NOTE — OP NOTE
Procedure Date: 02/16/2023    SURGEON:  Aung Tamayo MD    ASSISTANT:  Tavon Davis MD    PREOPERATIVE DIAGNOSIS:  Left cervical lymphadenopathy concerning for lymphoma.    POSTOPERATIVE DIAGNOSIS:  Left cervical lymphadenopathy concerning for lymphoma.    PROCEDURE PERFORMED:  Excisional lymph node biopsy of left level IV lymph node.    ANESTHESIA:  General.    ESTIMATED BLOOD LOSS:  Less than 10 mL.    SPECIMENS:  Left level IV lymph node fresh for lymphoma evaluation.    COMPLICATIONS:  None.    INDICATIONS FOR PROCEDURE:  Mr. Jasso is a pleasant 62-year-old male referred to me for cervical lymphadenopathy.  He presented to an outside Emergency Department with abdominal pain and was found to have retroperitoneal lymphadenopathy.  Subsequent PET scan showed, in addition to the retroperitoneal lymphadenopathy, axillary lymphadenopathy and cervical lymphadenopathy.  Therefore, I was asked to help with diagnosis.  Initial fine needle aspiration was concerning for lymphoma without evidence of carcinoma, and Dr. Kiel Maurer asked for excisional node biopsy.  After full discussion of risks, benefits of the procedure, informed consent was obtained.    OPERATIVE PROCEDURE:  The patient was brought to the operating room and placed on the operating table supine.  General endotracheal anesthesia was induced.  The patient was prepped and draped in standard fashion.  A timeout was performed to identify the patient and correctness of the procedure.  Incision was marked in a natural skin crease in the low neck and infiltrated with 1% lidocaine with 1:100,000 epinephrine.  A 15 blade was used to make an incision through the skin and subcutaneous tissue.  The platysma muscle was divided.  Standard subplatysmal flaps were elevated superiorly and inferiorly.  The fascia, the sternocleidomastoid muscle was opened and dissection was brought down to the mass.  The lateral border of the internal jugular vein was identified inferiorly  and the contents inferior to the mass divided over clips to reduce the risk of chylous fistula.  The lymph node was then removed in a pericapsular plane in its entirety.  This was sent fresh for lymphoma evaluation.  The wound was then irrigated and hemostasis obtained.  Multiple Valsalva maneuvers were performed and there was no further evidence of bleeding or chylous fistula.  The wound was then closed with 3-0 Vicryl for the platysmal layer and 4-0 Monocryl in a subcuticular fashion for the skin.  The wound was dressed with Steri-Strips.  The patient tolerated the procedure well.  He was subsequently turned over to Anesthesia, was awakened, extubated, and transferred to the recovery room in stable condition.    Aung Tamayo MD        D: 2023   T: 2023   MT: ARCHIE    Name:     KOREY ZAZUETA  MRN:      [id number]        Account:        [id number]   :      1960           Procedure Date: 2023     Document: [id number]

## 2023-02-16 NOTE — ANESTHESIA PREPROCEDURE EVALUATION
Anesthesia Pre-Procedure Evaluation    Patient: Geovanni Jasso   MRN: [id number] : 1960        Procedure : Procedure(s):  excisional biopsy of a left cervical node          Past Medical History:   Diagnosis Date     Cancer (H) 2023    just diagnosed with lymphona.     Carpal tunnel syndrome      Chronic rhinitis 10/23/2015     Esophageal reflux 2013     Hand joint stiff, right       Past Surgical History:   Procedure Laterality Date     BIOPSY  23     COLONOSCOPY      clean, do again in 10 years     COLONOSCOPY WITH CO2 INSUFFLATION N/A 2019    Procedure: COLONOSCOPY, WITH CO2 INSUFFLATION;  Surgeon: Jaison Hammer MD;  Location: [facility] OR     ENT SURGERY      Loss of hearing rt ear (tinnitis)     HERNIA REPAIR       NO HISTORY OF SURGERY       ORTHOPEDIC SURGERY  ,    rt knee, scope in ; ACL repair Early       Allergies   Allergen Reactions     Penicillins       Social History     Tobacco Use     Smoking status: Never     Passive exposure: Never     Smokeless tobacco: Never   Substance Use Topics     Alcohol use: Yes     Comment: occassional, 1- 2 per week      Wt Readings from Last 1 Encounters:   23 87.4 kg (192 lb 10.9 oz)        Anesthesia Evaluation   Pt has had prior anesthetic.     No history of anesthetic complications       ROS/MED HX  ENT/Pulmonary:       Neurologic:  - neg neurologic ROS     Cardiovascular:     (+) --CAD ---    METS/Exercise Tolerance: 4 - Raking leaves, gardening    Hematologic:       Musculoskeletal: Comment: Hx of carpel tunnel syndrome in , no issues since      GI/Hepatic:     (+) GERD,     Renal/Genitourinary:       Endo:       Psychiatric/Substance Use:       Infectious Disease:       Malignancy: Comment: Lymphoma s/p L hip bone bx yesterday      Other:            Physical Exam    Airway        Mallampati: II   TM distance: > 3 FB   Neck ROM: full   Mouth opening: > 3 cm    Respiratory Devices and Support          Dental     Comment: No reported loose or chipped teeth        Cardiovascular          Rhythm and rate: regular and normal     Pulmonary           breath sounds clear to auscultation           OUTSIDE LABS:  CBC:   Lab Results   Component Value Date    WBC 12.8 (H) 02/15/2023    WBC 8.8 02/13/2023    HGB 15.4 02/15/2023    HGB 16.0 02/13/2023    HCT 46.1 02/15/2023    HCT 47.6 02/13/2023     02/15/2023     02/13/2023     BMP:   Lab Results   Component Value Date     02/13/2023     01/27/2023    POTASSIUM 3.9 02/13/2023    POTASSIUM 4.1 01/27/2023    CHLORIDE 110 (H) 02/13/2023    CHLORIDE 109 01/27/2023    CO2 23 02/13/2023    CO2 26 01/27/2023    BUN 19 02/13/2023    BUN 18 01/27/2023    CR 0.90 02/13/2023    CR 0.99 01/27/2023     (H) 02/13/2023    GLC 94 01/27/2023     COAGS: No results found for: PTT, INR, FIBR  POC: No results found for: BGM, HCG, HCGS  HEPATIC:   Lab Results   Component Value Date    ALBUMIN 3.8 02/13/2023    PROTTOTAL 7.0 02/13/2023    ALT 25 02/13/2023    AST 13 02/13/2023    ALKPHOS 110 02/13/2023    BILITOTAL 0.5 02/13/2023     OTHER:   Lab Results   Component Value Date    SHIV 9.5 02/13/2023    TSH 2.01 11/12/2018    CRP <2.9 01/09/2023    SED 8 01/09/2023       Anesthesia Plan    ASA Status:  2   NPO Status:  NPO Appropriate    Anesthesia Type: General.     - Airway: ETT              Consents    Anesthesia Plan(s) and associated risks, benefits, and realistic alternatives discussed. Questions answered and patient/representative(s) expressed understanding.    - Discussed:     - Discussed with:  Patient      - Extended Intubation/Ventilatory Support Discussed: No.      - Patient is DNR/DNI Status: No    Use of blood products discussed: No .     Postoperative Care            Comments:                Gareth Delarosa MD

## 2023-02-16 NOTE — ANESTHESIA CARE TRANSFER NOTE
Patient: Geovanni Jasso    Procedure: Procedure(s):  excisional biopsy of a left cervical node       Diagnosis: Generalized lymphadenopathy [R59.1]  Diagnosis Additional Information: No value filed.    Anesthesia Type:   General     Note:    Oropharynx: spontaneously breathing  Level of Consciousness: awake and drowsy  Oxygen Supplementation: nasal cannula    Independent Airway: airway patency satisfactory and stable  Dentition: dentition unchanged  Vital Signs Stable: post-procedure vital signs reviewed and stable  Report to RN Given: handoff report given  Patient transferred to: PACU    Handoff Report: Identifed the Patient, Identified the Reponsible Provider, Reviewed the pertinent medical history, Discussed the surgical course, Reviewed Intra-OP anesthesia mangement and issues during anesthesia, Set expectations for post-procedure period and Allowed opportunity for questions and acknowledgement of understanding      Vitals:  Vitals Value Taken Time   /85 02/16/23 1130   Temp     Pulse 78 02/16/23 1133   Resp     SpO2 98 % 02/16/23 1133   Vitals shown include unvalidated device data.    Electronically Signed By: UPRNIMA Dumont CRNA  February 16, 2023  11:34 AM

## 2023-02-17 LAB
PATH REPORT.COMMENTS IMP SPEC: ABNORMAL
PATH REPORT.COMMENTS IMP SPEC: YES
PATH REPORT.FINAL DX SPEC: ABNORMAL
PATH REPORT.MICROSCOPIC SPEC OTHER STN: ABNORMAL
PATH REPORT.RELEVANT HX SPEC: ABNORMAL

## 2023-02-20 LAB
PATH REPORT.COMMENTS IMP SPEC: ABNORMAL
PATH REPORT.COMMENTS IMP SPEC: YES
PATH REPORT.FINAL DX SPEC: ABNORMAL
PATH REPORT.GROSS SPEC: ABNORMAL
PATH REPORT.MICROSCOPIC SPEC OTHER STN: ABNORMAL
PATH REPORT.RELEVANT HX SPEC: ABNORMAL
PHOTO IMAGE: ABNORMAL

## 2023-02-28 LAB — INTERPRETATION: NORMAL

## 2023-03-01 ENCOUNTER — ONCOLOGY VISIT (OUTPATIENT)
Dept: ONCOLOGY | Facility: CLINIC | Age: 63
End: 2023-03-01
Payer: COMMERCIAL

## 2023-03-01 ENCOUNTER — PATIENT OUTREACH (OUTPATIENT)
Dept: ONCOLOGY | Facility: CLINIC | Age: 63
End: 2023-03-01

## 2023-03-01 VITALS
SYSTOLIC BLOOD PRESSURE: 123 MMHG | BODY MASS INDEX: 27.16 KG/M2 | DIASTOLIC BLOOD PRESSURE: 80 MMHG | HEART RATE: 74 BPM | WEIGHT: 194 LBS | OXYGEN SATURATION: 98 % | HEIGHT: 71 IN

## 2023-03-01 DIAGNOSIS — Z51.81 ENCOUNTER FOR MONITORING CARDIOTOXIC DRUG THERAPY: Primary | ICD-10-CM

## 2023-03-01 DIAGNOSIS — C83.30 DIFFUSE LARGE B-CELL LYMPHOMA, UNSPECIFIED BODY REGION (H): ICD-10-CM

## 2023-03-01 DIAGNOSIS — C83.30 DIFFUSE LARGE B-CELL LYMPHOMA, UNSPECIFIED BODY REGION (H): Primary | ICD-10-CM

## 2023-03-01 DIAGNOSIS — Z79.899 ENCOUNTER FOR MONITORING CARDIOTOXIC DRUG THERAPY: Primary | ICD-10-CM

## 2023-03-01 PROCEDURE — 99215 OFFICE O/P EST HI 40 MIN: CPT | Performed by: INTERNAL MEDICINE

## 2023-03-01 RX ORDER — ACETAMINOPHEN 325 MG/1
650 TABLET ORAL ONCE
Status: CANCELLED
Start: 2023-03-08

## 2023-03-01 RX ORDER — DIPHENHYDRAMINE HCL 25 MG
50 CAPSULE ORAL ONCE
Status: CANCELLED
Start: 2023-03-08

## 2023-03-01 RX ORDER — HEPARIN SODIUM (PORCINE) LOCK FLUSH IV SOLN 100 UNIT/ML 100 UNIT/ML
5 SOLUTION INTRAVENOUS
Status: CANCELLED | OUTPATIENT
Start: 2023-03-08

## 2023-03-01 RX ORDER — DIPHENHYDRAMINE HYDROCHLORIDE 50 MG/ML
50 INJECTION INTRAMUSCULAR; INTRAVENOUS
Status: CANCELLED
Start: 2023-03-08

## 2023-03-01 RX ORDER — PALONOSETRON 0.05 MG/ML
0.25 INJECTION, SOLUTION INTRAVENOUS ONCE
Status: CANCELLED
Start: 2023-03-08

## 2023-03-01 RX ORDER — ALBUTEROL SULFATE 90 UG/1
1-2 AEROSOL, METERED RESPIRATORY (INHALATION)
Status: CANCELLED
Start: 2023-03-08

## 2023-03-01 RX ORDER — MEPERIDINE HYDROCHLORIDE 25 MG/ML
25 INJECTION INTRAMUSCULAR; INTRAVENOUS; SUBCUTANEOUS EVERY 30 MIN PRN
Status: CANCELLED | OUTPATIENT
Start: 2023-03-08

## 2023-03-01 RX ORDER — HEPARIN SODIUM,PORCINE 10 UNIT/ML
5 VIAL (ML) INTRAVENOUS
Status: CANCELLED | OUTPATIENT
Start: 2023-03-08

## 2023-03-01 RX ORDER — EPINEPHRINE 1 MG/ML
0.3 INJECTION, SOLUTION INTRAMUSCULAR; SUBCUTANEOUS EVERY 5 MIN PRN
Status: CANCELLED | OUTPATIENT
Start: 2023-03-08

## 2023-03-01 RX ORDER — METHYLPREDNISOLONE SODIUM SUCCINATE 125 MG/2ML
125 INJECTION, POWDER, LYOPHILIZED, FOR SOLUTION INTRAMUSCULAR; INTRAVENOUS
Status: CANCELLED
Start: 2023-03-08

## 2023-03-01 RX ORDER — ALBUTEROL SULFATE 0.83 MG/ML
2.5 SOLUTION RESPIRATORY (INHALATION)
Status: CANCELLED | OUTPATIENT
Start: 2023-03-08

## 2023-03-01 RX ORDER — MEPERIDINE HYDROCHLORIDE 25 MG/ML
25 INJECTION INTRAMUSCULAR; INTRAVENOUS; SUBCUTANEOUS
Status: CANCELLED
Start: 2023-03-08

## 2023-03-01 RX ORDER — LIDOCAINE/PRILOCAINE 2.5 %-2.5%
CREAM (GRAM) TOPICAL PRN
Qty: 30 G | Refills: 0 | Status: SHIPPED | OUTPATIENT
Start: 2023-03-01 | End: 2024-03-14

## 2023-03-01 RX ORDER — DOXORUBICIN HYDROCHLORIDE 2 MG/ML
50 INJECTION, SOLUTION INTRAVENOUS ONCE
Status: CANCELLED | OUTPATIENT
Start: 2023-03-08

## 2023-03-01 ASSESSMENT — PAIN SCALES - GENERAL: PAINLEVEL: NO PAIN (0)

## 2023-03-01 NOTE — PATIENT INSTRUCTIONS
Schedule port asap    Schedule Echo asap    Start Allopurinol daily with the start of chemo and take it for 2 weeks- Keep very well hydrated    Schedule R CHOP asap    See me before C#2

## 2023-03-01 NOTE — NURSING NOTE
"Oncology Rooming Note    March 1, 2023 1:08 PM   Geovanni Jasso is a 62 year old male who presents for:    Chief Complaint   Patient presents with     Oncology Clinic Visit     Follow up     Initial Vitals: /80 (BP Location: Left arm, Patient Position: Chair, Cuff Size: Adult Large)   Pulse 74   Ht 1.796 m (5' 10.71\")   Wt 88 kg (194 lb)   SpO2 98%   BMI 27.28 kg/m   Estimated body mass index is 27.28 kg/m  as calculated from the following:    Height as of this encounter: 1.796 m (5' 10.71\").    Weight as of this encounter: 88 kg (194 lb). Body surface area is 2.1 meters squared.  No Pain (0) Comment: Data Unavailable   No LMP for male patient.  Allergies reviewed: Yes  Medications reviewed: Yes    Medications: Medication refills not needed today.  Pharmacy name entered into [employer]: [facility] [address] [place] [address] - [address] [place] [address]    Clinical concerns: Whats next     Dr. Maurer was notified.      Tammie Aranda CMA              "

## 2023-03-01 NOTE — PROGRESS NOTES
Oncology follow-up visit:  Date on this visit: 3/01/23     Diagnoses.    Diffuse large B-cell lymphoma    Primary Physician: Paddy Holly     History Of Present Illness:  Mr. Jasso is a 62 year old  male who presents for follow-up of diffuse lymphadenopathy.  He initially saw me on 1/18/2022 for retroperitoneal lymphadenopathy.  Please see my previous note for details.  I have copied and updated from prior notes.        Over the last few months, since fall of 2022, off and on he has had upper abdominal pain which lasts few minutes. He thinks stress sometimes can bring it on. No relationship to food.  No nausea or vomiting. BMs have been fine. No bleeding. No fevers.   He had a CT Abd Pelvis on 1/6/2023 which showed enlarged retroperitoneal lymphadenopathy  He has noticed some fatigue. He has also noticed some lump in the throat just above the sternum at night. This is going on for a couple of months. He denies any dysphagia.     Currently he feels well. Currently denies any abdominal pain. No nausea or vomiting. BMs are fine. No recent weight loss. No SOB. No neuropathy. No skin flushing. No drenching night sweats.    In the summer, he noticed a rash on the face which has since resolved.  He was noted to have CLAIRE positive.     On 2/6/2023, he had FNA of the left supraclavicular lymph node.  Cytology showed atypical lymphoid cells.  Flow cytometry showed lambda monotypic B cells which are negative for CD5 and CD10.  This is consistent with a B-cell lymphoma.  We decided to go ahead with excisional lymph node biopsy as well as bone marrow biopsy for complete staging.      Interval history.     His wife Charleen is also available  Excisional lymph node biopsy showed diffuse large B-cell lymphoma.  He continues to have issues with the neck pain/soreness.  No other swellings.  No B symptoms.  He has mild stable fatigue.  No infections.  No shortness of breath.  No neuropathy.        ECOG 0-1    ROS:  A  comprehensive ROS was otherwise neg    I reviewed other history in Epic as before.      Past Medical/Surgical History:  Past Medical History:   Diagnosis Date     Cancer (H) 2-7-2023    just diagnosed with lymphona.     Carpal tunnel syndrome      Chronic rhinitis 10/23/2015     Esophageal reflux 12/26/2013     Hand joint stiff, right    Shingles     Past Surgical History:   Procedure Laterality Date     BIOPSY  2-6-23     BIOPSY LYMPH NODE CERVICAL Left 2/16/2023    Procedure: excisional biopsy of a left cervical node;  Surgeon: Aung Tamayo MD;  Location: [facility] OR     COLONOSCOPY  2019    clean, do again in 10 years     COLONOSCOPY WITH CO2 INSUFFLATION N/A 07/17/2019    Procedure: COLONOSCOPY, WITH CO2 INSUFFLATION;  Surgeon: Jaison Hammer MD;  Location: [facility] OR     ENT SURGERY  1979    Loss of hearing rt ear (tinnitis)     HERNIA REPAIR  2000     NO HISTORY OF SURGERY       ORTHOPEDIC SURGERY  1988,1992    rt knee, scope in 88; ACL repair Early 90s     Cancer History:   As above    Allergies:  Allergies as of 03/01/2023 - Reviewed 03/01/2023   Allergen Reaction Noted     Penicillins  12/23/2013     Current Medications:  Current Outpatient Medications   Medication Sig Dispense Refill     acetaminophen (TYLENOL) 325 MG tablet Take 2 tablets (650 mg) by mouth every 6 hours as needed for mild pain 50 tablet 0     acetaminophen (TYLENOL) 325 MG tablet Take 325-650 mg by mouth 4 times daily as needed       diclofenac (VOLTAREN) 1 % topical gel Apply small amount topically to right shoulder 3-4 times daily as needed for pain. 50 g 0     Efinaconazole (JUBLIA) 10 % SOLN Externally apply topically daily to nail. 8 mL 4     fexofenadine (ALLEGRA) 60 MG tablet Take 1 tablet (60 mg) by mouth 2 times daily 180 tablet 0     fluticasone (FLONASE) 50 MCG/ACT nasal spray Spray 1-2 sprays into both nostrils daily 16 g 11     ibuprofen (ADVIL,MOTRIN) 200 MG tablet Take 200 mg by mouth every 6 hours as needed        ketoconazole (NIZORAL) 2 % external cream Apply twice daily for 8 weeks to feet 60 g 3     LANsoprazole (PREVACID) 30 MG DR capsule Take 1 capsule (30 mg) by mouth every morning (before breakfast) 90 capsule 2     methocarbamol (ROBAXIN) 500 MG tablet Take 1 tablet (500 mg) by mouth nightly as needed for muscle spasms 30 tablet 0     ondansetron (ZOFRAN ODT) 4 MG ODT tab Take 1 tablet (4 mg) by mouth every 8 hours as needed for nausea 10 tablet 0     oxyCODONE (ROXICODONE) 5 MG tablet Take 1-2 tablets (5-10 mg) by mouth every 4 hours as needed for moderate to severe pain 12 tablet 0     senna-docusate (SENOKOT-S/PERICOLACE) 8.6-50 MG tablet Take 1-2 tablets by mouth 2 times daily 30 tablet 0     zolpidem (AMBIEN) 10 MG tablet Take 1 tablet (10 mg) by mouth nightly as needed for sleep 30 tablet 1      Family History:  Family History   Problem Relation Age of Onset     Hyperlipidemia Mother         medication     Diabetes Father      Other Cancer Father         Lung cancer     Other Cancer Maternal Grandmother         Unsure what type of cancer     Autoimmune Disease No family hx of      Maternal grand mother had some sort of a cancer  Dad had lung cancer  Has 4 kids- all healthy    Social History:  Social History     Socioeconomic History     Marital status:      Spouse name: Not on file     Number of children: Not on file     Years of education: Not on file     Highest education level: Not on file   Occupational History     Not on file   Tobacco Use     Smoking status: Never     Passive exposure: Never     Smokeless tobacco: Never   Vaping Use     Vaping Use: Never used   Substance and Sexual Activity     Alcohol use: Yes     Comment: occassional, 1- 2 per week     Drug use: No     Sexual activity: Yes     Partners: Female     Birth control/protection: Male Surgical, Female Surgical     Comment: vasectomy   Other Topics Concern     Parent/sibling w/ CABG, MI or angioplasty before 65F 55M? No   Social  "History Narrative     Not on file     Social Determinants of Health     Financial Resource Strain: Not on file   Food Insecurity: Not on file   Transportation Needs: Not on file   Physical Activity: Not on file   Stress: Not on file   Social Connections: Not on file   Intimate Partner Violence: Not on file   Housing Stability: Not on file   no smoking. Drinks etoh wine 2-3 times / week.       Physical Exam:  /80 (BP Location: Left arm, Patient Position: Chair, Cuff Size: Adult Large)   Pulse 74   Ht 1.796 m (5' 10.71\")   Wt 88 kg (194 lb)   SpO2 98%   BMI 27.28 kg/m     Wt Readings from Last 4 Encounters:   03/01/23 88 kg (194 lb)   02/16/23 87.4 kg (192 lb 10.9 oz)   02/15/23 88.1 kg (194 lb 4.8 oz)   02/14/23 88.1 kg (194 lb 2 oz)       CONSTITUTIONAL: No apparent distress  EYES: PERRLA, without pallor or jaundice  ENT/MOUTH: Ears unremarkable. No oral lesions  CVS: s1s2 normal  RESPIRATORY: Chest is clear  GI: Abdomen is benign  NEURO: Alert and oriented ×3  INTEGUMENT: no concerning skin rashes   LYMPHATIC: I can palpate left lower cervical/supraclavicular lymph nodes.  Excisional biopsy site looks fine.  No other palpable lymph nodes.  MUSCULOSKELETAL: Unremarkable. No bony tenderness.   EXTREMITIES: no pedal edema  PSYCH: Mentation, mood and affect are appropriate          Laboratory/Imaging Studies      Reviewed    2/15/2023.  CBC shows WBC 12.8.  Hemoglobin 15.4.  Platelets 231.  ANC 10.1.  CMP unremarkable.  .  Uric acid 4.3.    SPEP did not show any monoclonal protein.  Serum immunoglobulin levels were normal.  Laona free light chain 2.53.  Lambda 1.6.  Ratio normal 1.58.      Hepatitis B surface antibody was positive.  Hepatitis B surface antigen and core antibody were negative.  HIV negative  Hep C negative        1/9/2023  CBC was unremarkable.  Peripheral blood smear was unremarkable.    CLAIRE was positive-1: 80, speckled pattern  dsDNA was negative  ESR 8    2/16/2023 excisional biopsy " of the left level 4 cervical lymph node  Large B-cell lymphoma, non-germinal center subtype.    The neoplastic cells express CD20, BCL2, BCL6, and MUM-1 (variable staining intensity), and are negative for CD3, CD5, CD10, CD23, Cyclin D1, and ALK. CD21 and CD23 demonstrate a lack of residual follicular dendritic cell meshworks. C-Myc demonstrates variable staining, and is overall estimated at 20-30% (interpreted as negative; cutoff 40%). Ki-67 proliferative index is also variable, and is overall estimated around 60-70%.      EBV RNA by in situ hybridization (SOY-OTTO) is negative for EBV in the neoplastic cells.   A congo red stain is negative for amyloid deposition in the tissue.     FISH testing on the lymph node  RESULTS:     - Gains of BCL6 (94%); no rearrangement of BCL6  - Gains of BCL2 (94%); no rearrangement of BCL2  - No rearrangement of MYC or IGH::MYC fusion        INTERPRETATION:    Of the interphase cells examined, 94-96% had signal patterns indicative of gains of BCL6 (3q27) and BCL2 (18q21), consistent with gains of all or part of chromosomes 3 and 18. No evidence was found of rearrangements of BCL6, MYC (8q24), or BCL2, or of a t(8;14).     G pending studies pending.  I spoke with  from Molecular and we do not have the final report but as per her it seems that he will have complex cytogenetics including gains of chromosome 3 and 8    2/15/2023 bone marrow biopsy was unremarkable without evidence of involvement of lymphoma.        PET/CT 1/27/2023  CHEST:  Marked uptake in the enlarged 1.1 x 2.5 cm right axillary lymph node  (series 4, image 133) with SUV max 8.1 and prominent 1.0 x 1.0 cm left  axillary lymph node (series 4, image 123) with SUV max 4.1.     The central tracheobronchial tree is clear. No pleural effusion or  pneumothorax. No acute consolidation. Minimal bilateral dependent  atelectasis.     Heart size is within normal limits. No pericardial effusion. The  thoracic aorta and  main pulmonary artery are within normal limits for  diameter. The esophagus is unremarkable.      ABDOMEN AND PELVIS:  Marked uptake in multiple enlarged retroperitoneal lymph nodes. For  example:  - 2.4 x 1.8 cm left para-aortic node (series 4, image 206) with SUV  max 6.3  - 1.7 x 2.5 cm left para-aortic node (series 4, image 217) with SUV  max 14.3     The liver is unremarkable. Radiodense gallstone without findings of  cholecystitis. No intrahepatic or extrahepatic biliary ductal  dilatation. The pancreas is unremarkable. No pancreatic ductal  dilatation. Splenule. The spleen is unremarkable. The adrenal glands  are unremarkable.     Symmetric enhancement of the kidneys. No hydronephrosis. The urinary  bladder is unremarkable. The reproductive organs are unremarkable.     Normal caliber of the small and large bowel. Colonic diverticulosis  without diverticulitis. No free air, free fluid, or fluid collection.  Normal caliber of the abdominal aorta.     LOWER EXTREMITIES:   There is no suspicious FDG uptake in the visualized lower extremities.  Intramuscular lipomas in left rectus femoris and right soleus. Right  knee ACL repair changes.     BONES:   There is no suspicious FDG uptake in the skeleton. There are no  suspicious lytic or blastic osseous lesions. Degenerative changes of  bilateral hips.                                                                      IMPRESSION: In this patient with retroperitoneal lymphadenopathy:  1. Intense hypermetabolic uptake (Deauville 5) in multiple enlarged  and prominent axillary, left supraclavicular and retroperitoneal lymph  nodes, concerning for lymphoproliferative disease.  1a. Consider biopsying right axillary lymph node (max SUV 8) or left  supraclavicular node, largest (max SUV 14).    2. Cholelithiasis without cholecystitis.  3. Please see neck PET/CT report for head and neck findings.    PET CT neck 1/27/2023  Marked focal uptake in the enlarged left  supraclavicular lymph node  measuring 3.3 x 2.3 cm (series 2, image 98) with SUV max 13.7.     Regarding evaluation of the mucosal space, there is no definite  abnormality or abnormal metabolic uptake on PET CT in the nasopharynx,  oropharynx, hypopharynx, or of the glottis. Regarding the tongue base,  no abnormality is identified. Regarding the major salivary glands, no  abnormality is identified. Regarding the thyroid gland, no abnormality  is identified.     Limited evaluation of the cervical vertebra demonstrates that there is  no overt spinal canal stenosis. Mild mucosal thickening in bilateral  maxillary sinuses. Regarding the mastoid air cells, there is no  evidence of significant debris or fluid. Regarding the major  vasculature in the neck, no abnormality is identified.     Regarding the visualized lung apices, there is no definite  abnormality, and the lung apices appear relatively clear.                                                                      Impression:   1. Intense hypermetabolic uptake (Deauville 5) in the enlarged left  supraclavicular lymph node, concerning for lymphoproliferative  disease.   2. Please refer to the whole body PET CT performed as a separate  report, for the findings of the remainder of the body        1/6/2023-CT abdomen and pelvis   Retroperitoneal lymph node demonstrated in the left periaortic region at the level of the kidneys measuring 1.9 x 2.6 cm, . There is also a lobulated node anterior to the  esophagus at the level of the left kidney which appears to be 2 adjacent nodes. The largest node towards the left measures 1.9 x 2.5 cm  Sigmoid diverticulosis without evidence diverticulitis.  Cholelithiasis without evidence for cholecystitis.    ASSESSMENT/PLAN:    Diffuse large B-cell lymphoma presenting with diffuse lymphadenopathy.    He has marked FDG avid left supraclavicular, axillary, retroperitoneal lymph nodes.  No hepatosplenomegaly.  No evidence of extra  lymphatic involvement.  FISH testing shows gains of BCL2 and BCL6 but no rearrangement.  No rearrangement of MYC or IGH::MYC fusion  No evidence of double hit or triple hit lymphoma.  No evidence of bone marrow involvement.  LDH is mildly elevated.      IPI is 3    We discussed the situation in detail.  I would recommend starting systemic treatment with R-CHOP with curative intent.  I would plan 6 cycles.  We discussed about port placement and we discussed the rational schedule and potential side effects of chemotherapy in detail.     I would like to get baseline echocardiogram because of cardiotoxicity from Adriamycin.     Most likely I will repeat PET/CT after 3 cycles     We discussed possibility of tumor lysis syndrome and that we will give him allopurinol for 2 weeks.  He should keep himself very well-hydrated as well.      See me back before cycle #2 of chemotherapy.    I answered all of their questions to their satisfaction.  They understand the situation and are agreeable with the plan.    Kiel Maurer MD    I spent >57 minutes on this visit on the date of service, including the face to face time, reviewing records and labs and imaging and placing new orders as well as coordination of care and documentation.

## 2023-03-01 NOTE — LETTER
3/1/2023         RE: Geovanni Jasso  [address]  [facility] [address]        Dear Colleague,    Thank you for referring your patient, Geovanni Jasso, to the [facility]. Please see a copy of my visit note below.    Oncology follow-up visit:  Date on this visit: 3/01/23     Diagnoses.    Diffuse large B-cell lymphoma    Primary Physician: Paddy Holly     History Of Present Illness:  Mr. Jasso is a 62 year old  male who presents for follow-up of diffuse lymphadenopathy.  He initially saw me on 1/18/2022 for retroperitoneal lymphadenopathy.  Please see my previous note for details.  I have copied and updated from prior notes.        Over the last few months, since fall of 2022, off and on he has had upper abdominal pain which lasts few minutes. He thinks stress sometimes can bring it on. No relationship to food.  No nausea or vomiting. BMs have been fine. No bleeding. No fevers.   He had a CT Abd Pelvis on 1/6/2023 which showed enlarged retroperitoneal lymphadenopathy  He has noticed some fatigue. He has also noticed some lump in the throat just above the sternum at night. This is going on for a couple of months. He denies any dysphagia.     Currently he feels well. Currently denies any abdominal pain. No nausea or vomiting. BMs are fine. No recent weight loss. No SOB. No neuropathy. No skin flushing. No drenching night sweats.    In the summer, he noticed a rash on the face which has since resolved.  He was noted to have CLAIRE positive.     On 2/6/2023, he had FNA of the left supraclavicular lymph node.  Cytology showed atypical lymphoid cells.  Flow cytometry showed lambda monotypic B cells which are negative for CD5 and CD10.  This is consistent with a B-cell lymphoma.  We decided to go ahead with excisional lymph node biopsy as well as bone marrow biopsy for complete staging.      Interval history.     His wife Charleen is also available  Excisional lymph node biopsy  showed diffuse large B-cell lymphoma.  He continues to have issues with the neck pain/soreness.  No other swellings.  No B symptoms.  He has mild stable fatigue.  No infections.  No shortness of breath.  No neuropathy.        ECOG 0-1    ROS:  A comprehensive ROS was otherwise neg    I reviewed other history in Epic as before.      Past Medical/Surgical History:  Past Medical History:   Diagnosis Date     Cancer (H) 2-7-2023    just diagnosed with lymphona.     Carpal tunnel syndrome      Chronic rhinitis 10/23/2015     Esophageal reflux 12/26/2013     Hand joint stiff, right    Shingles     Past Surgical History:   Procedure Laterality Date     BIOPSY  2-6-23     BIOPSY LYMPH NODE CERVICAL Left 2/16/2023    Procedure: excisional biopsy of a left cervical node;  Surgeon: Aung Tamayo MD;  Location: [facility] OR     COLONOSCOPY  2019    clean, do again in 10 years     COLONOSCOPY WITH CO2 INSUFFLATION N/A 07/17/2019    Procedure: COLONOSCOPY, WITH CO2 INSUFFLATION;  Surgeon: Jaison Hammer MD;  Location: [facility] OR     ENT SURGERY  1979    Loss of hearing rt ear (tinnitis)     HERNIA REPAIR  2000     NO HISTORY OF SURGERY       ORTHOPEDIC SURGERY  1988,1992    rt knee, scope in 88; ACL repair Early 90s     Cancer History:   As above    Allergies:  Allergies as of 03/01/2023 - Reviewed 03/01/2023   Allergen Reaction Noted     Penicillins  12/23/2013     Current Medications:  Current Outpatient Medications   Medication Sig Dispense Refill     acetaminophen (TYLENOL) 325 MG tablet Take 2 tablets (650 mg) by mouth every 6 hours as needed for mild pain 50 tablet 0     acetaminophen (TYLENOL) 325 MG tablet Take 325-650 mg by mouth 4 times daily as needed       diclofenac (VOLTAREN) 1 % topical gel Apply small amount topically to right shoulder 3-4 times daily as needed for pain. 50 g 0     Efinaconazole (JUBLIA) 10 % SOLN Externally apply topically daily to nail. 8 mL 4     fexofenadine (ALLEGRA) 60 MG tablet  Take 1 tablet (60 mg) by mouth 2 times daily 180 tablet 0     fluticasone (FLONASE) 50 MCG/ACT nasal spray Spray 1-2 sprays into both nostrils daily 16 g 11     ibuprofen (ADVIL,MOTRIN) 200 MG tablet Take 200 mg by mouth every 6 hours as needed       ketoconazole (NIZORAL) 2 % external cream Apply twice daily for 8 weeks to feet 60 g 3     LANsoprazole (PREVACID) 30 MG DR capsule Take 1 capsule (30 mg) by mouth every morning (before breakfast) 90 capsule 2     methocarbamol (ROBAXIN) 500 MG tablet Take 1 tablet (500 mg) by mouth nightly as needed for muscle spasms 30 tablet 0     ondansetron (ZOFRAN ODT) 4 MG ODT tab Take 1 tablet (4 mg) by mouth every 8 hours as needed for nausea 10 tablet 0     oxyCODONE (ROXICODONE) 5 MG tablet Take 1-2 tablets (5-10 mg) by mouth every 4 hours as needed for moderate to severe pain 12 tablet 0     senna-docusate (SENOKOT-S/PERICOLACE) 8.6-50 MG tablet Take 1-2 tablets by mouth 2 times daily 30 tablet 0     zolpidem (AMBIEN) 10 MG tablet Take 1 tablet (10 mg) by mouth nightly as needed for sleep 30 tablet 1      Family History:  Family History   Problem Relation Age of Onset     Hyperlipidemia Mother         medication     Diabetes Father      Other Cancer Father         Lung cancer     Other Cancer Maternal Grandmother         Unsure what type of cancer     Autoimmune Disease No family hx of      Maternal grand mother had some sort of a cancer  Dad had lung cancer  Has 4 kids- all healthy    Social History:  Social History     Socioeconomic History     Marital status:      Spouse name: Not on file     Number of children: Not on file     Years of education: Not on file     Highest education level: Not on file   Occupational History     Not on file   Tobacco Use     Smoking status: Never     Passive exposure: Never     Smokeless tobacco: Never   Vaping Use     Vaping Use: Never used   Substance and Sexual Activity     Alcohol use: Yes     Comment: occassional, 1- 2 per week  "    Drug use: No     Sexual activity: Yes     Partners: Female     Birth control/protection: Male Surgical, Female Surgical     Comment: vasectomy   Other Topics Concern     Parent/sibling w/ CABG, MI or angioplasty before 65F 55M? No   Social History Narrative     Not on file     Social Determinants of Health     Financial Resource Strain: Not on file   Food Insecurity: Not on file   Transportation Needs: Not on file   Physical Activity: Not on file   Stress: Not on file   Social Connections: Not on file   Intimate Partner Violence: Not on file   Housing Stability: Not on file   no smoking. Drinks etoh wine 2-3 times / week.       Physical Exam:  /80 (BP Location: Left arm, Patient Position: Chair, Cuff Size: Adult Large)   Pulse 74   Ht 1.796 m (5' 10.71\")   Wt 88 kg (194 lb)   SpO2 98%   BMI 27.28 kg/m     Wt Readings from Last 4 Encounters:   03/01/23 88 kg (194 lb)   02/16/23 87.4 kg (192 lb 10.9 oz)   02/15/23 88.1 kg (194 lb 4.8 oz)   02/14/23 88.1 kg (194 lb 2 oz)       CONSTITUTIONAL: No apparent distress  EYES: PERRLA, without pallor or jaundice  ENT/MOUTH: Ears unremarkable. No oral lesions  CVS: s1s2 normal  RESPIRATORY: Chest is clear  GI: Abdomen is benign  NEURO: Alert and oriented ×3  INTEGUMENT: no concerning skin rashes   LYMPHATIC: I can palpate left lower cervical/supraclavicular lymph nodes.  Excisional biopsy site looks fine.  No other palpable lymph nodes.  MUSCULOSKELETAL: Unremarkable. No bony tenderness.   EXTREMITIES: no pedal edema  PSYCH: Mentation, mood and affect are appropriate          Laboratory/Imaging Studies      Reviewed    2/15/2023.  CBC shows WBC 12.8.  Hemoglobin 15.4.  Platelets 231.  ANC 10.1.  CMP unremarkable.  .  Uric acid 4.3.    SPEP did not show any monoclonal protein.  Serum immunoglobulin levels were normal.  Datto free light chain 2.53.  Lambda 1.6.  Ratio normal 1.58.      Hepatitis B surface antibody was positive.  Hepatitis B surface antigen " and core antibody were negative.  HIV negative  Hep C negative        1/9/2023  CBC was unremarkable.  Peripheral blood smear was unremarkable.    CLAIRE was positive-1: 80, speckled pattern  dsDNA was negative  ESR 8    2/16/2023 excisional biopsy of the left level 4 cervical lymph node  Large B-cell lymphoma, non-germinal center subtype.    The neoplastic cells express CD20, BCL2, BCL6, and MUM-1 (variable staining intensity), and are negative for CD3, CD5, CD10, CD23, Cyclin D1, and ALK. CD21 and CD23 demonstrate a lack of residual follicular dendritic cell meshworks. C-Myc demonstrates variable staining, and is overall estimated at 20-30% (interpreted as negative; cutoff 40%). Ki-67 proliferative index is also variable, and is overall estimated around 60-70%.      EBV RNA by in situ hybridization (SOY-OTTO) is negative for EBV in the neoplastic cells.   A congo red stain is negative for amyloid deposition in the tissue.     FISH testing on the lymph node  RESULTS:     - Gains of BCL6 (94%); no rearrangement of BCL6  - Gains of BCL2 (94%); no rearrangement of BCL2  - No rearrangement of MYC or IGH::MYC fusion        INTERPRETATION:    Of the interphase cells examined, 94-96% had signal patterns indicative of gains of BCL6 (3q27) and BCL2 (18q21), consistent with gains of all or part of chromosomes 3 and 18. No evidence was found of rearrangements of BCL6, MYC (8q24), or BCL2, or of a t(8;14).     G pending studies pending.  I spoke with  from Molecular and we do not have the final report but as per her it seems that he will have complex cytogenetics including gains of chromosome 3 and 8    2/15/2023 bone marrow biopsy was unremarkable without evidence of involvement of lymphoma.        PET/CT 1/27/2023  CHEST:  Marked uptake in the enlarged 1.1 x 2.5 cm right axillary lymph node  (series 4, image 133) with SUV max 8.1 and prominent 1.0 x 1.0 cm left  axillary lymph node (series 4, image 123) with SUV max  4.1.     The central tracheobronchial tree is clear. No pleural effusion or  pneumothorax. No acute consolidation. Minimal bilateral dependent  atelectasis.     Heart size is within normal limits. No pericardial effusion. The  thoracic aorta and main pulmonary artery are within normal limits for  diameter. The esophagus is unremarkable.      ABDOMEN AND PELVIS:  Marked uptake in multiple enlarged retroperitoneal lymph nodes. For  example:  - 2.4 x 1.8 cm left para-aortic node (series 4, image 206) with SUV  max 6.3  - 1.7 x 2.5 cm left para-aortic node (series 4, image 217) with SUV  max 14.3     The liver is unremarkable. Radiodense gallstone without findings of  cholecystitis. No intrahepatic or extrahepatic biliary ductal  dilatation. The pancreas is unremarkable. No pancreatic ductal  dilatation. Splenule. The spleen is unremarkable. The adrenal glands  are unremarkable.     Symmetric enhancement of the kidneys. No hydronephrosis. The urinary  bladder is unremarkable. The reproductive organs are unremarkable.     Normal caliber of the small and large bowel. Colonic diverticulosis  without diverticulitis. No free air, free fluid, or fluid collection.  Normal caliber of the abdominal aorta.     LOWER EXTREMITIES:   There is no suspicious FDG uptake in the visualized lower extremities.  Intramuscular lipomas in left rectus femoris and right soleus. Right  knee ACL repair changes.     BONES:   There is no suspicious FDG uptake in the skeleton. There are no  suspicious lytic or blastic osseous lesions. Degenerative changes of  bilateral hips.                                                                      IMPRESSION: In this patient with retroperitoneal lymphadenopathy:  1. Intense hypermetabolic uptake (Deauville 5) in multiple enlarged  and prominent axillary, left supraclavicular and retroperitoneal lymph  nodes, concerning for lymphoproliferative disease.  1a. Consider biopsying right axillary lymph node  (max SUV 8) or left  supraclavicular node, largest (max SUV 14).    2. Cholelithiasis without cholecystitis.  3. Please see neck PET/CT report for head and neck findings.    PET CT neck 1/27/2023  Marked focal uptake in the enlarged left supraclavicular lymph node  measuring 3.3 x 2.3 cm (series 2, image 98) with SUV max 13.7.     Regarding evaluation of the mucosal space, there is no definite  abnormality or abnormal metabolic uptake on PET CT in the nasopharynx,  oropharynx, hypopharynx, or of the glottis. Regarding the tongue base,  no abnormality is identified. Regarding the major salivary glands, no  abnormality is identified. Regarding the thyroid gland, no abnormality  is identified.     Limited evaluation of the cervical vertebra demonstrates that there is  no overt spinal canal stenosis. Mild mucosal thickening in bilateral  maxillary sinuses. Regarding the mastoid air cells, there is no  evidence of significant debris or fluid. Regarding the major  vasculature in the neck, no abnormality is identified.     Regarding the visualized lung apices, there is no definite  abnormality, and the lung apices appear relatively clear.                                                                      Impression:   1. Intense hypermetabolic uptake (Deauville 5) in the enlarged left  supraclavicular lymph node, concerning for lymphoproliferative  disease.   2. Please refer to the whole body PET CT performed as a separate  report, for the findings of the remainder of the body        1/6/2023-CT abdomen and pelvis   Retroperitoneal lymph node demonstrated in the left periaortic region at the level of the kidneys measuring 1.9 x 2.6 cm, . There is also a lobulated node anterior to the  esophagus at the level of the left kidney which appears to be 2 adjacent nodes. The largest node towards the left measures 1.9 x 2.5 cm  Sigmoid diverticulosis without evidence diverticulitis.  Cholelithiasis without evidence for  cholecystitis.    ASSESSMENT/PLAN:    Diffuse large B-cell lymphoma presenting with diffuse lymphadenopathy.    He has marked FDG avid left supraclavicular, axillary, retroperitoneal lymph nodes.  No hepatosplenomegaly.  No evidence of extra lymphatic involvement.  FISH testing shows gains of BCL2 and BCL6 but no rearrangement.  No rearrangement of MYC or IGH::MYC fusion  No evidence of double hit or triple hit lymphoma.  No evidence of bone marrow involvement.  LDH is mildly elevated.      IPI is 3    We discussed the situation in detail.  I would recommend starting systemic treatment with R-CHOP with curative intent.  I would plan 6 cycles.  We discussed about port placement and we discussed the rational schedule and potential side effects of chemotherapy in detail.     I would like to get baseline echocardiogram because of cardiotoxicity from Adriamycin.     Most likely I will repeat PET/CT after 3 cycles     We discussed possibility of tumor lysis syndrome and that we will give him allopurinol for 2 weeks.  He should keep himself very well-hydrated as well.      See me back before cycle #2 of chemotherapy.    I answered all of their questions to their satisfaction.  They understand the situation and are agreeable with the plan.    Kiel Maurer MD    I spent >57 minutes on this visit on the date of service, including the face to face time, reviewing records and labs and imaging and placing new orders as well as coordination of care and documentation.        Again, thank you for allowing me to participate in the care of your patient.        Sincerely,        Kiel Maurer MD

## 2023-03-01 NOTE — PROGRESS NOTES
Port order faxed to [facility] [phone] along with demographics, insurance, and office notes.    Sydni Renae RN on 3/1/2023 at 2:28 PM

## 2023-03-01 NOTE — PROGRESS NOTES
"[facility]: Cancer Care Plan of Care Education Note                                    Discussion with Patient:                                                      Performed chemotherapy education with patient and wife.   Chemotherapy Information    Chemotherapy Regimen: R-CHOP  Infusion/Treatment Schedule: Infusions every 3 weeks, and each treatment is approximately 2.5 hours long  Number of Cycles Planned:  6      Appointments/Tests to Schedule Prior to Starting Chemotherapy Treatment:  1. PORT  2. ECHO  3. Chemo    Port Information  We discussed port and EMLA  If a port has been recommended for you, you will be scheduled to have this placed before starting chemotherapy.  This is a same-day (outpatient surgery), and you should plan on setting aside about 3-4 hours total for the procedure.  Here are some guidelines for getting yourself ready for your port placement procedure:      No solid food or milk products for 8 hours prior to surgery.  You may continue to drink clear liquids until 2 hours prior to surgery.  Clear liquids include water, clear soda, black coffee and clear tea (no milk or cream), Gatorade, or Pedialyte.    You must have a .    Hold Lovenox 24 hours prior to the procedure.    If you are on oral blood thinners (warfarin, Coumadin, Xarelto, Eliquis), please check with your prescribing doctor for instructions, as these medications are often held prior to the procedure.    You will need to use a special soap (Hibiclens) prior to your procedure - please refer to instruction sheet \"Showering Before Surgery.\"    After your port is placed, it is normal to have some mild bruising and tenderness.  Please call us right away if you are noticing any signs of infection (redness, swelling, drainage, severe pain, fevers, or chills).    We will use the port for lab draws, as well as to infuse your chemotherapy medicines, with each treatment.  If you are interested, we can send in a prescription for " some port numbing cream (EMLA cream, also called lidocaine-prilocaine cream), which is applied to the middle of the port approximately 30-45 minutes before your chemotherapy appointments for that day. Some patients find this cream helpful in reducing discomfort associated with the port use - please let us know if you are interested in a prescription for this, and if so which pharmacy you would prefer to have this sent to.          Assessment:                                                      Assessment completed with:: Patient    Current living arrangement  I live in a private home with family    Plan of Care Education   Diagnosis:: Diffuse large B-cell lymphoma  Does patient understand diagnosis?: Yes  Tx plan/regimen:: R-CHOP  Does patient understand treatment plan/regimen?: Yes  Preparing for treatment:: Reviewed treatment preparation information with patient (vascular access, day of chemo, visitor policy, what to bring, etc.)  Vascular access:: Port  Side effect education:: Diarrhea/Constipation;Endocrine therapy (myalgias, arthralgias, hot flashes, vaginal dryness, mood disorder, and thinning of the bones);Fatigue;Hair loss;Infection;Infertility;Lab value monitoring (anemia, neutropenia, thrombocytopenia);Mouth sores;Mylosuppression;Nausea/Vomiting;Neuropathy;Sexual health;Skin changes;Urinary  Transportation means:: Regular car  Safety/self care at home reviewed with patient:: Yes  Informal Support system:: Children;Family;Friends;Spouse  Coping - concerns/fears reviewed with patient:: Yes  Plan of Care:: SA[facility]AB[facility] follow-up appointment;Lab appointment;Imaging;MD follow-up appointment;Treatment schedule  When to call provider:: Bleeding;Increased shortness of breath;New/worsening pain;Shaking chills;Temperature >100.4F;Uncontrolled diarrhea/constipation;Uncontrolled nausea/vomiting  Reasons for deferring treatment reviewed with patient:: Yes    Evaluation of Learning  Patient Education Provided: Yes  Readiness::  Eager;Acceptance  Method:: Booklet/Handout;Literature;Explanation  Response:: Verbalizes understanding    RNCC Initial:     Initial  Current living arrangement:: I live in a private home with family  Informal Support system:: Children;Family;Friends;Spouse  Bed or wheelchair confined:: No  Mobility Status: Independent  Transportation means:: Regular car  Medication adherence problem (GOAL):: No  Knowledgeable about how to use meds:: Yes  Medication side effects suspected:: Yes  Referrals Placed: None  Advanced Care Plans/Directives on file:: No  Type Advanced Care Plans/Directives: Other (N/A patient will look into options)  Advanced Care Plan/Directive Status: Considering Options  Patient Reported Pain?: No  Pain Score: No Pain (0)         Intervention/Education provided during outreach:                                                       Patient and wife expressed understanding and left with written information about chemotherapy regimen.    Follow up call in 1-2 weeks    Jocelyn Renae, RN, BSN  RN Care Coordinator - Oncology  [facility]

## 2023-03-02 ENCOUNTER — HOSPITAL ENCOUNTER (OUTPATIENT)
Dept: CARDIOLOGY | Facility: CLINIC | Age: 63
Discharge: HOME | End: 2023-03-02
Attending: INTERNAL MEDICINE | Admitting: INTERNAL MEDICINE
Payer: COMMERCIAL

## 2023-03-02 DIAGNOSIS — C83.30 DIFFUSE LARGE B-CELL LYMPHOMA, UNSPECIFIED BODY REGION (H): ICD-10-CM

## 2023-03-02 DIAGNOSIS — Z51.81 ENCOUNTER FOR MONITORING CARDIOTOXIC DRUG THERAPY: ICD-10-CM

## 2023-03-02 DIAGNOSIS — Z79.899 ENCOUNTER FOR MONITORING CARDIOTOXIC DRUG THERAPY: ICD-10-CM

## 2023-03-02 LAB
CULTURE HARVEST COMPLETE DATE: NORMAL
LVEF ECHO: NORMAL

## 2023-03-02 PROCEDURE — 93306 TTE W/DOPPLER COMPLETE: CPT

## 2023-03-02 PROCEDURE — 93306 TTE W/DOPPLER COMPLETE: CPT | Mod: 26 | Performed by: INTERNAL MEDICINE

## 2023-03-03 LAB — CULTURE HARVEST COMPLETE DATE: NORMAL

## 2023-03-04 LAB
INTERPRETATION: NORMAL
ISCN: NORMAL
ISCN: NORMAL
METHODS: NORMAL
METHODS: NORMAL

## 2023-03-08 ENCOUNTER — PATIENT OUTREACH (OUTPATIENT)
Dept: CARE COORDINATION | Facility: CLINIC | Age: 63
End: 2023-03-08

## 2023-03-08 ENCOUNTER — INFUSION THERAPY VISIT (OUTPATIENT)
Dept: INFUSION THERAPY | Facility: CLINIC | Age: 63
End: 2023-03-08
Payer: COMMERCIAL

## 2023-03-08 ENCOUNTER — LAB (OUTPATIENT)
Dept: ONCOLOGY | Facility: CLINIC | Age: 63
End: 2023-03-08
Payer: COMMERCIAL

## 2023-03-08 VITALS
HEIGHT: 71 IN | OXYGEN SATURATION: 98 % | HEART RATE: 65 BPM | BODY MASS INDEX: 26.85 KG/M2 | RESPIRATION RATE: 12 BRPM | DIASTOLIC BLOOD PRESSURE: 61 MMHG | SYSTOLIC BLOOD PRESSURE: 100 MMHG | WEIGHT: 191.8 LBS | TEMPERATURE: 98 F

## 2023-03-08 DIAGNOSIS — C83.30 DIFFUSE LARGE B-CELL LYMPHOMA, UNSPECIFIED BODY REGION (H): Primary | ICD-10-CM

## 2023-03-08 LAB
ALBUMIN SERPL-MCNC: 3.6 G/DL (ref 3.4–5)
ALP SERPL-CCNC: 98 U/L (ref 40–150)
ALT SERPL W P-5'-P-CCNC: 28 U/L (ref 0–70)
ANION GAP SERPL CALCULATED.3IONS-SCNC: 4 MMOL/L (ref 3–14)
AST SERPL W P-5'-P-CCNC: 15 U/L (ref 0–45)
BASOPHILS # BLD AUTO: 0 10E3/UL (ref 0–0.2)
BASOPHILS NFR BLD AUTO: 1 %
BILIRUB SERPL-MCNC: 0.7 MG/DL (ref 0.2–1.3)
BUN SERPL-MCNC: 14 MG/DL (ref 7–30)
CALCIUM SERPL-MCNC: 9.3 MG/DL (ref 8.5–10.1)
CHLORIDE BLD-SCNC: 110 MMOL/L (ref 94–109)
CO2 SERPL-SCNC: 26 MMOL/L (ref 20–32)
CREAT SERPL-MCNC: 0.85 MG/DL (ref 0.66–1.25)
EOSINOPHIL # BLD AUTO: 0.2 10E3/UL (ref 0–0.7)
EOSINOPHIL NFR BLD AUTO: 4 %
ERYTHROCYTE [DISTWIDTH] IN BLOOD BY AUTOMATED COUNT: 13.4 % (ref 10–15)
GFR SERPL CREATININE-BSD FRML MDRD: >90 ML/MIN/1.73M2
GLUCOSE BLD-MCNC: 152 MG/DL (ref 70–99)
HCT VFR BLD AUTO: 44.2 % (ref 40–53)
HGB BLD-MCNC: 15.2 G/DL (ref 13.3–17.7)
LDH SERPL L TO P-CCNC: 140 U/L (ref 85–227)
LYMPHOCYTES # BLD AUTO: 1.1 10E3/UL (ref 0.8–5.3)
LYMPHOCYTES NFR BLD AUTO: 18 %
MCH RBC QN AUTO: 33.1 PG (ref 26.5–33)
MCHC RBC AUTO-ENTMCNC: 34.4 G/DL (ref 31.5–36.5)
MCV RBC AUTO: 96 FL (ref 78–100)
MONOCYTES # BLD AUTO: 0.6 10E3/UL (ref 0–1.3)
MONOCYTES NFR BLD AUTO: 9 %
NEUTROPHILS # BLD AUTO: 4.2 10E3/UL (ref 1.6–8.3)
NEUTROPHILS NFR BLD AUTO: 68 %
PLATELET # BLD AUTO: 238 10E3/UL (ref 150–450)
POTASSIUM BLD-SCNC: 3.6 MMOL/L (ref 3.4–5.3)
PROT SERPL-MCNC: 7.1 G/DL (ref 6.8–8.8)
RBC # BLD AUTO: 4.59 10E6/UL (ref 4.4–5.9)
SODIUM SERPL-SCNC: 140 MMOL/L (ref 133–144)
WBC # BLD AUTO: 6.1 10E3/UL (ref 4–11)

## 2023-03-08 PROCEDURE — 96375 TX/PRO/DX INJ NEW DRUG ADDON: CPT | Performed by: NURSE PRACTITIONER

## 2023-03-08 PROCEDURE — 83615 LACTATE (LD) (LDH) ENZYME: CPT | Performed by: INTERNAL MEDICINE

## 2023-03-08 PROCEDURE — 99207 PR NO CHARGE LOS: CPT

## 2023-03-08 PROCEDURE — 96415 CHEMO IV INFUSION ADDL HR: CPT | Performed by: NURSE PRACTITIONER

## 2023-03-08 PROCEDURE — S0028 INJECTION, FAMOTIDINE, 20 MG: HCPCS | Performed by: NURSE PRACTITIONER

## 2023-03-08 PROCEDURE — 96367 TX/PROPH/DG ADDL SEQ IV INF: CPT | Performed by: NURSE PRACTITIONER

## 2023-03-08 PROCEDURE — 96411 CHEMO IV PUSH ADDL DRUG: CPT | Performed by: NURSE PRACTITIONER

## 2023-03-08 PROCEDURE — 85025 COMPLETE CBC W/AUTO DIFF WBC: CPT | Performed by: INTERNAL MEDICINE

## 2023-03-08 PROCEDURE — 96417 CHEMO IV INFUS EACH ADDL SEQ: CPT | Performed by: NURSE PRACTITIONER

## 2023-03-08 PROCEDURE — 80053 COMPREHEN METABOLIC PANEL: CPT | Performed by: INTERNAL MEDICINE

## 2023-03-08 PROCEDURE — 96413 CHEMO IV INFUSION 1 HR: CPT | Performed by: NURSE PRACTITIONER

## 2023-03-08 RX ORDER — PREDNISONE 50 MG/1
50 TABLET ORAL 2 TIMES DAILY
Qty: 10 TABLET | Refills: 7 | Status: SHIPPED | OUTPATIENT
Start: 2023-03-08 | End: 2023-03-13

## 2023-03-08 RX ORDER — ONDANSETRON 8 MG/1
8 TABLET, FILM COATED ORAL EVERY 8 HOURS PRN
Qty: 30 TABLET | Refills: 2 | Status: SHIPPED | OUTPATIENT
Start: 2023-03-08 | End: 2023-11-28

## 2023-03-08 RX ORDER — ALLOPURINOL 300 MG/1
300 TABLET ORAL DAILY
Qty: 14 TABLET | Refills: 7 | Status: SHIPPED | OUTPATIENT
Start: 2023-03-08 | End: 2023-03-22

## 2023-03-08 RX ORDER — DIPHENHYDRAMINE HYDROCHLORIDE 50 MG/ML
50 INJECTION INTRAMUSCULAR; INTRAVENOUS
Status: COMPLETED | OUTPATIENT
Start: 2023-03-08 | End: 2023-03-08

## 2023-03-08 RX ORDER — HEPARIN SODIUM (PORCINE) LOCK FLUSH IV SOLN 100 UNIT/ML 100 UNIT/ML
5 SOLUTION INTRAVENOUS
Status: DISCONTINUED | OUTPATIENT
Start: 2023-03-08 | End: 2023-03-08 | Stop reason: HOSPADM

## 2023-03-08 RX ORDER — DOXORUBICIN HYDROCHLORIDE 2 MG/ML
50 INJECTION, SOLUTION INTRAVENOUS ONCE
Status: COMPLETED | OUTPATIENT
Start: 2023-03-08 | End: 2023-03-08

## 2023-03-08 RX ORDER — TRAMADOL HYDROCHLORIDE 50 MG/1
50 TABLET ORAL EVERY 6 HOURS PRN
COMMUNITY
Start: 2023-03-03 | End: 2024-03-14

## 2023-03-08 RX ORDER — ACETAMINOPHEN 325 MG/1
650 TABLET ORAL ONCE
Status: COMPLETED | OUTPATIENT
Start: 2023-03-08 | End: 2023-03-08

## 2023-03-08 RX ORDER — PROCHLORPERAZINE MALEATE 10 MG
10 TABLET ORAL EVERY 6 HOURS PRN
Qty: 30 TABLET | Refills: 2 | Status: SHIPPED | OUTPATIENT
Start: 2023-03-08 | End: 2023-11-28

## 2023-03-08 RX ORDER — PALONOSETRON 0.05 MG/ML
0.25 INJECTION, SOLUTION INTRAVENOUS ONCE
Status: COMPLETED | OUTPATIENT
Start: 2023-03-08 | End: 2023-03-08

## 2023-03-08 RX ADMIN — Medication 250 ML: at 09:26

## 2023-03-08 RX ADMIN — ACETAMINOPHEN 650 MG: 325 TABLET ORAL at 10:26

## 2023-03-08 RX ADMIN — DIPHENHYDRAMINE HYDROCHLORIDE 25 MG: 50 INJECTION INTRAMUSCULAR; INTRAVENOUS at 14:00

## 2023-03-08 RX ADMIN — HEPARIN SODIUM (PORCINE) LOCK FLUSH IV SOLN 100 UNIT/ML 5 ML: 100 SOLUTION at 08:24

## 2023-03-08 RX ADMIN — HEPARIN SODIUM (PORCINE) LOCK FLUSH IV SOLN 100 UNIT/ML 5 ML: 100 SOLUTION at 17:02

## 2023-03-08 RX ADMIN — PALONOSETRON 0.25 MG: 0.05 INJECTION, SOLUTION INTRAVENOUS at 09:30

## 2023-03-08 RX ADMIN — DOXORUBICIN HYDROCHLORIDE 100 MG: 2 INJECTION, SOLUTION INTRAVENOUS at 09:59

## 2023-03-08 NOTE — PROGRESS NOTES
Social Work Intervention  [facility]    Data/Intervention:    Patient Name:  Geovanni Jasso  /Age:  1960 (62 year old)    Visit Type: in person  Referral Source: n/a  Reason for Referral:  SW introduction    Collaborated With:    Bill, wife Charleen    Psychosocial Information/Concerns:  Bill is here today for C1D1 Adriamycin, Vincristine, Cytoxan and Truxima for Diffuse large B-cell lymphoma       Intervention/Education/Resources Provided:  SW met with Bill and his wife, Charleen, in the infusion center this morning. SW introduced self and SW role, offering support and resources. Bill states he is doing well. He understandably wishes he didn't have to be in this situation, but notes that he has been very grateful for the care he has received and feels the care team has been very helpful and supportive.     They deny any insurance or financial concerns.     They reports a strong support system around them. They state their daughter's ANTHONY is a  and they have a strong prayer [place] forming around them which they really appreciate. SW did offer to connect them to our , but they report they are comfortable with things as they are at this time.     Charleen did express interest in support resources for herself, noting this is harder for her then she anticipated and she doesn't want to add to Bill's stress. Bill agreed it was important for her to be connected to supportive people. She isn't interested in groups, but was interested in mentor programs. SW provided information on [employer] and provided link to [facility] Caregiver support page.     They deny any other needs at this time. SW provided contact information and encouraged Bill and Charleen to reach out at any time.       Assessment/Plan:  SW will continue to remain available as needed and appropriate.         Mary Peterson, MSW, LICSW, OSW-C (she/her)  Clinical , Adult Oncology  Phone: [phone]    [place] (M, W, F)  [place] (edi

## 2023-03-08 NOTE — PROGRESS NOTES
Port site scrubbed with Chloraprep for 30 seconds. Accessed port using sterile technique.  See documentation flowsheet. Tolerated port access and draw without complaint.     Kristie Queen RN

## 2023-03-08 NOTE — PROGRESS NOTES
Rituxan at 250 ml/hr. Patient reported a lump in his throat that almost felt like acid reflux. Rituxan stopped. Pepcid 20- mg IV given. Denies itching, no hives or shortness of breath.  Lump reported being 50% post pepcid after about 2 minutes.  Discussed adding additional IV benadryl but both patient and wife hesitant due to him currently experiencing restless legs from IV benadryl premeds.  Compromised with giving 25 mg IV benadryl to see if we can get the lump to completely resolve.  Vitals stable.

## 2023-03-08 NOTE — PROGRESS NOTES
1425 Throat symptoms completely resolved.  Still tired from Benadryl. VS stable. Truxima restarted at 1427 at 125ml/hr (half the rate of when reaction started.) Increased by 50ml/hr every 30 minutes to a max of 400ml/hr.

## 2023-03-08 NOTE — PROGRESS NOTES
Infusion Nursing Note:  Geovanni Jasso presents today for C1D1 Adriamycin, Vincristine, Cytoxan and Truxima.    Patient seen by provider today: No   present during visit today: Not Applicable.    Note:   Oriented patient and wife, Charleen, to the infusion department, including how/when to use the call light.     Pharmacist visited with patient regarding home medications: Prednisone, Allopurinol, Compazine and Zofran.  Patient will wait to take Zofran, if needed, until day 4 of chemo due to Aloxi administration.    Developed restless legs after administration of IV Benadryl.    At 250 ml/hr had mild lump in throat. Patient attributed this to reflux. States reflux happens at home. Symptoms decreased by 50% after Pepcid administration.  Thus Benadryl 25 mg IV given and symptoms resolved.    Finished remainder of Rituxan infusion with some mild nausea that resolved with eating crackers.    Provided clarification to wife who questioned the need to wash patient's clothes and linens separately due to being contaminated with chemo.  Informed wife clothes can be washed with others as long as they are not soiled with urine, feces or emesis.    Intravenous Access:  Implanted Port.    Treatment Conditions:  Lab Results   Component Value Date    HGB 15.2 03/08/2023    WBC 6.1 03/08/2023    ANEU 4.2 01/17/2018    ANEUTAUTO 4.2 03/08/2023     03/08/2023      Lab Results   Component Value Date     03/08/2023    POTASSIUM 3.6 03/08/2023    CR 0.85 03/08/2023    SHIV 9.3 03/08/2023    BILITOTAL 0.7 03/08/2023    ALBUMIN 3.6 03/08/2023    ALT 28 03/08/2023    AST 15 03/08/2023 2/13/23 Hepatitis B status was assessed.    3/2/23 Echo EF 60-65%    Results reviewed, labs MET treatment parameters, ok to proceed with treatment.    Post Infusion Assessment:  Patient tolerated infusion poorly due to : Mild Hypersensitivity: Did patient have a hypersensitivity reaction? : Yes  Drug or Product name: Rituxan  Were  pre-meds administered?: Yes  What pre-meds were administered?: Acetaminophen (Tylenol);Diphenhydramine (Benadryl);Fosaprepitant (emend);Palonesetron (aloxi)  First or Subsequent treatment: First time receiving  Rate of infusion when patient had hypersensitivity reaction: 250 ml/hr  Time the hypersensitivity reaction was first recognized: 1344  Symptoms observed or reported (select all that apply): Other: (Comment) (lump in throat)  Interventions/treatment following reaction: Infusion stopped;Hypersensitivity medications administered  What hypersensitivity medications were administered?: DiphendydrAMINE (benadryl);Famotidine(Pepcid)           Was the patient re-challenged today?: Yes - tolerated well (rechallenged per Truxima order)  Blood return noted pre and post infusion.  Site patent and intact, free from redness, edema or discomfort.  No evidence of extravasations.  Access discontinued per protocol.     Discharge Plan:   [facility]  Patient will return 3/29/23 for next appointment.   Patient discharged in stable condition accompanied by: wife.  Departure Mode: Wheelchair.      Jesika Espino RN

## 2023-03-09 ENCOUNTER — PATIENT OUTREACH (OUTPATIENT)
Dept: ONCOLOGY | Facility: CLINIC | Age: 63
End: 2023-03-09
Payer: COMMERCIAL

## 2023-03-09 ENCOUNTER — TELEPHONE (OUTPATIENT)
Dept: ONCOLOGY | Facility: CLINIC | Age: 63
End: 2023-03-09
Payer: COMMERCIAL

## 2023-03-09 NOTE — TELEPHONE ENCOUNTER
Writer called Charleen to inform her of recommendations from Dr. Maurer.    Kiel Maurer MD  You; Sydni Renae RN 2 minutes ago (12:32 PM)     AO  If there are no other concerning signs as you mention, then it is reasonable to observe for now      Charleen verbalized understanding.

## 2023-03-09 NOTE — TELEPHONE ENCOUNTER
"Charleen calling in to report something that she had forgot to mention on previous follow up phone call with Jocelyn [facility] this morning.    She states that pt had first chemo infusion (Adriamycin, Vincristine, Cytoxan and Truxima) yesterday. She noticed that pt's face seemed flushed today \"like he had a mild sunburn\". She notes that it is primarily on his cheeks, around his eyes, and his forehead. It discoloration is pink/red in color.    She is wondering if this is a normal reaction to this type of chemo infusion or if this is something they should be concerned about.    Pt denies swelling, shortness of breath, fever, pain, itching, bleeding, or chest pain.    Will route to provider for recommendations.      "

## 2023-03-09 NOTE — PROGRESS NOTES
[facility]: Cancer Care Long Assessment    Discussion with Patient:                                                      Spoke with patient and his wife Charleen to follow up on C1D1 chemotherapy. Bill reports that he feels well. He stated that when he got home after his infusion appointment yesterday he started feeling nauseous, but this was resolved with a dose of compazine and he slept through the night. This morning he woke up feeling well. He has been working from home this morning, and was able to eat and drink coffee per his usual routine and has not experienced nausea today.   We reviewed his medication schedule and when to take compazine, prednisone, allopurinol and zofran. They have my direct number and the triage line should they need to contact us. Symptoms documented in flow sheet below.       Dates of Treament:                                                      Infusion given in last 28 days     Administered MAR Action Medication Dose Rate Visit    03/08/2023 09:59 Given DOXOrubicin (ADRIAMYCIN) injection 100 mg 100 mg   Infusion Therapy Visit on 03/08/2023 in [facility]    03/08/2023 10:14 New Bag vinCRIStine (ONCOVIN) 2 mg in sodium chloride 0.9 % 29.5 mL infusion 2 mg   Infusion Therapy Visit on 03/08/2023 in [facility]    03/08/2023 10:46 New Bag cyclophosphamide (CYTOXAN) 1,500 mg in sodium chloride 0.9 % 625 mL infusion 1,500 mg 999 mL/hr Infusion Therapy Visit on 03/08/2023 in [facility]    03/08/2023 11:38 New Bag riTUXimab-abbs (TRUXIMA) 800 mg in sodium chloride 0.9 % 800 mL infusion 800 mg   Infusion Therapy Visit on 03/08/2023 in [facility]    03/08/2023 14:27 Restarted riTUXimab-abbs (TRUXIMA) 800 mg in sodium chloride 0.9 % 800 mL infusion     Infusion Therapy Visit on 03/08/2023 in [facility]          Assessment:                                                       Assessment completed with:: Patient    Constitutional  Patient Reported Constitutional Symptoms?: No  Fatigue: Fatigue relieved by rest    Neurosensory  Patient Reported Neurosensory Symptoms?: No    Eye Disorders  Patient Reported Eye Disorders?: No    Ear Disorders  Ear Disorders  Patient Reported Ear Disorder?: No  Tinnitus: Absent or within normal limits    Cardiovascular  Patient Reported Cardiovascular Symptoms?: No    Respiratory  Patient Reported Respiratory Symptoms?: No    Gastrointestinal  Patient Reported Gastrointestinal Symptoms?: Yes  Nausea: Loss of appetite without alteration in eating habits    Genitourinary  Patient Reported Genitourinary Symptoms?: No    Lymph System Disorders  Patient Reported Lymph System Disorders?: No    Musculoskeletal and Connective Tissue Disorders  Patient Reported Musculoskeletal or Connective Tissue Disorders?: No    Integumentary  Patient Reported Integumentary Symptoms?: No    Pain    0    Patient Coping    Coping well    Other Patient Concerns    N/A      Intervention/Education provided during outreach:                                                       Patient and wife nathaly had no further questions and have direct desk line/triage number should they need anything.    Jocelyn Renae, RN, BSN  RN Care Coordinator - Oncology  [facility]

## 2023-03-15 ENCOUNTER — LAB (OUTPATIENT)
Dept: LAB | Facility: CLINIC | Age: 63
End: 2023-03-15
Payer: COMMERCIAL

## 2023-03-15 ENCOUNTER — TELEPHONE (OUTPATIENT)
Dept: ONCOLOGY | Facility: CLINIC | Age: 63
End: 2023-03-15
Payer: COMMERCIAL

## 2023-03-15 DIAGNOSIS — R35.0 URINARY FREQUENCY: ICD-10-CM

## 2023-03-15 DIAGNOSIS — R35.0 URINARY FREQUENCY: Primary | ICD-10-CM

## 2023-03-15 LAB
ALBUMIN UR-MCNC: NEGATIVE MG/DL
APPEARANCE UR: CLEAR
BILIRUB UR QL STRIP: NEGATIVE
COLOR UR AUTO: YELLOW
GLUCOSE UR STRIP-MCNC: NEGATIVE MG/DL
HGB UR QL STRIP: NEGATIVE
KETONES UR STRIP-MCNC: NEGATIVE MG/DL
LEUKOCYTE ESTERASE UR QL STRIP: NEGATIVE
NITRATE UR QL: NEGATIVE
PH UR STRIP: 5.5 [PH] (ref 5–7)
RBC #/AREA URNS AUTO: NORMAL /HPF
SP GR UR STRIP: 1.02 (ref 1–1.03)
UROBILINOGEN UR STRIP-MCNC: NORMAL MG/DL
WBC #/AREA URNS AUTO: NORMAL /HPF

## 2023-03-15 PROCEDURE — 81001 URINALYSIS AUTO W/SCOPE: CPT

## 2023-03-15 NOTE — TELEPHONE ENCOUNTER
Charleen calling in on behalf of pt to report symptoms of urinary frequency and urgency. She states that last night pt woke up 6-7 times to urinate and one of the times he almost did not make it to the bathroom.    Pt does not have a history of nocturia so this is new for pt. Pt denies discoloration of urine, malodorous urine, burning, fever, shortness of breath, or concerns with retention.    Pt ended his prednisone course this past Monday 3/13 evening. He is also taking allopurinol. Charleen wondering if the nocturia is related to stopping his prednisone or his allopurinol usage.    Will route to provider for recommendations.

## 2023-03-15 NOTE — TELEPHONE ENCOUNTER
Charleen calling in to report something that she has noticed but forgot to mention on prior phone call. She states that she has noticed pt's voice changing over the past 3-4 days. His voice is now quieter and he speaks in a more whispered tone.    She is not sure if this is treatment related, and thinks that it may be more emotion based or physically based with an occasional sore throat. She wanted to make a note in the chart regarding this symptom and to see if Dr. Maurer has any thoughts regarding it.    Writer will reach back out to Charleen if there are specific recommendations from Dr. Maurer.    Will route to provider for advisement.

## 2023-03-15 NOTE — TELEPHONE ENCOUNTER
Kiel Maurer MD Barta, Cody, RN; Sydni Renae, RN 55 minutes ago (12:13 PM)     AO  Urinalysis is fine.   Lets continue to observe for now and not do anything different   I would still encourage to keep well hydrated     Thanks      Left a message asking Charleen to call triage line back to discuss Dr. Maurer's recommendations.    Charleen returned call. Reviewed Dr. Maurer's recommendations and she verbalized understanding.

## 2023-03-15 NOTE — TELEPHONE ENCOUNTER
Writer called Charleen with recommendations from Dr. Maurer.  Kiel Maurer MD  You; Sydni Renae RN 11 minutes ago (10:06 AM)     AO  Lets check urinalysis which I have ordered           Pt answered the phone and verbalized understanding of instructions. Pt transferred to scheduling line to make apt.

## 2023-03-17 ENCOUNTER — TELEPHONE (OUTPATIENT)
Dept: ONCOLOGY | Facility: CLINIC | Age: 63
End: 2023-03-17
Payer: COMMERCIAL

## 2023-03-17 DIAGNOSIS — C83.30 DIFFUSE LARGE B-CELL LYMPHOMA, UNSPECIFIED BODY REGION (H): Primary | ICD-10-CM

## 2023-03-17 RX ORDER — DIAPER,BRIEF,INFANT-TODD,DISP
EACH MISCELLANEOUS 2 TIMES DAILY
Qty: 56 G | Refills: 0 | Status: SHIPPED | OUTPATIENT
Start: 2023-03-17 | End: 2023-11-28

## 2023-03-17 NOTE — TELEPHONE ENCOUNTER
Patient and his wife called to report symptoms that they have noticed after he takes allopurinol. They report that he gets very tired, and also looks ashen/white in the face after taking it. Last night he also developed some spots on his face (see [employer] message for pictures). The color of the spots is brownish red and some of them looked like pimples. The spots are only on his face. They are not itchy or painful. Denies chest pain, shortness of breath, or wheezing. He also had a headache last night.     Today his coloring is normal and the spots are not as pronounced.     Charleen also mentions that he had facial flushing that lasted 3 days after chemo on 3/8. It looked like a sunburn.     She also notes that he is still having urinary frequency at night and is going every hour. He had a UA on 3/15 that was negative for infection.    They are wondering if he should continue with the allopurinol given the spots he developed on his face.     Will route to pharmacy team for recommendations.    Kecia Bui, RN  Triage Nurse Advisor  [facility]

## 2023-03-17 NOTE — TELEPHONE ENCOUNTER
Jennifer Dill, DO  You; Kiel Maurer MD; Sydni Renae, RN 20 minutes ago (3:33 PM)     SK  Hello,     Please ask the patient to stop taking allopurinol, continue hydration and he can start using a steroid cream I have prescribed for him (sent to [facility] in [place])     Thanks,   Jennifer Dill      Called Bill and Charleen and gave them Dr. Dill's recommendations and they verbalized understanding.

## 2023-03-21 ENCOUNTER — NURSE TRIAGE (OUTPATIENT)
Dept: NURSING | Facility: CLINIC | Age: 63
End: 2023-03-21
Payer: COMMERCIAL

## 2023-03-22 ENCOUNTER — HOSPITAL ENCOUNTER (EMERGENCY)
Facility: CLINIC | Age: 63
Discharge: HOME | End: 2023-03-22
Attending: EMERGENCY MEDICINE | Admitting: EMERGENCY MEDICINE
Payer: COMMERCIAL

## 2023-03-22 ENCOUNTER — APPOINTMENT (OUTPATIENT)
Dept: GENERAL RADIOLOGY | Facility: CLINIC | Age: 63
End: 2023-03-22
Attending: EMERGENCY MEDICINE
Payer: COMMERCIAL

## 2023-03-22 VITALS
TEMPERATURE: 98.2 F | HEIGHT: 71 IN | HEART RATE: 115 BPM | DIASTOLIC BLOOD PRESSURE: 81 MMHG | WEIGHT: 194 LBS | SYSTOLIC BLOOD PRESSURE: 123 MMHG | OXYGEN SATURATION: 99 % | BODY MASS INDEX: 27.16 KG/M2 | RESPIRATION RATE: 16 BRPM

## 2023-03-22 DIAGNOSIS — R19.7 DIARRHEA OF PRESUMED INFECTIOUS ORIGIN: ICD-10-CM

## 2023-03-22 DIAGNOSIS — R50.9 FEVER, UNSPECIFIED FEVER CAUSE: ICD-10-CM

## 2023-03-22 DIAGNOSIS — Z11.52 ENCOUNTER FOR SCREENING LABORATORY TESTING FOR SEVERE ACUTE RESPIRATORY SYNDROME CORONAVIRUS 2 (SARS-COV-2): ICD-10-CM

## 2023-03-22 LAB
ALBUMIN SERPL BCG-MCNC: 3.5 G/DL (ref 3.5–5.2)
ALBUMIN UR-MCNC: NEGATIVE MG/DL
ALP SERPL-CCNC: 93 U/L (ref 40–129)
ALT SERPL W P-5'-P-CCNC: 18 U/L (ref 10–50)
ANION GAP SERPL CALCULATED.3IONS-SCNC: 8 MMOL/L (ref 7–15)
APPEARANCE UR: CLEAR
APTT PPP: 35 SECONDS (ref 22–38)
AST SERPL W P-5'-P-CCNC: 14 U/L (ref 10–50)
ATRIAL RATE - MUSE: 115 BPM
BASOPHILS # BLD MANUAL: 0.1 10E3/UL (ref 0–0.2)
BASOPHILS NFR BLD MANUAL: 6 %
BILIRUB SERPL-MCNC: 0.5 MG/DL
BILIRUB UR QL STRIP: NEGATIVE
BUN SERPL-MCNC: 9.8 MG/DL (ref 8–23)
BURR CELLS BLD QL SMEAR: SLIGHT
CALCIUM SERPL-MCNC: 9.1 MG/DL (ref 8.8–10.2)
CHLORIDE SERPL-SCNC: 106 MMOL/L (ref 98–107)
COLOR UR AUTO: NORMAL
CREAT SERPL-MCNC: 0.93 MG/DL (ref 0.67–1.17)
DEPRECATED HCO3 PLAS-SCNC: 23 MMOL/L (ref 22–29)
DIASTOLIC BLOOD PRESSURE - MUSE: NORMAL MMHG
EOSINOPHIL # BLD MANUAL: 0.1 10E3/UL (ref 0–0.7)
EOSINOPHIL NFR BLD MANUAL: 6 %
ERYTHROCYTE [DISTWIDTH] IN BLOOD BY AUTOMATED COUNT: 12.7 % (ref 10–15)
FLUAV RNA SPEC QL NAA+PROBE: NEGATIVE
FLUBV RNA RESP QL NAA+PROBE: NEGATIVE
GFR SERPL CREATININE-BSD FRML MDRD: >90 ML/MIN/1.73M2
GLUCOSE SERPL-MCNC: 116 MG/DL (ref 70–99)
GLUCOSE UR STRIP-MCNC: NEGATIVE MG/DL
HCT VFR BLD AUTO: 39.5 % (ref 40–53)
HGB BLD-MCNC: 13.3 G/DL (ref 13.3–17.7)
HGB UR QL STRIP: NEGATIVE
INR PPP: 1.1 (ref 0.85–1.15)
INTERPRETATION ECG - MUSE: NORMAL
KETONES UR STRIP-MCNC: NEGATIVE MG/DL
LACTATE SERPL-SCNC: 0.9 MMOL/L (ref 0.7–2)
LEUKOCYTE ESTERASE UR QL STRIP: NEGATIVE
LYMPHOCYTES # BLD MANUAL: 0.5 10E3/UL (ref 0.8–5.3)
LYMPHOCYTES NFR BLD MANUAL: 39 %
MCH RBC QN AUTO: 32.4 PG (ref 26.5–33)
MCHC RBC AUTO-ENTMCNC: 33.7 G/DL (ref 31.5–36.5)
MCV RBC AUTO: 96 FL (ref 78–100)
MONOCYTES # BLD MANUAL: 0.5 10E3/UL (ref 0–1.3)
MONOCYTES NFR BLD MANUAL: 43 %
MYELOCYTES # BLD MANUAL: 0 10E3/UL
MYELOCYTES NFR BLD MANUAL: 1 %
NEUTROPHILS # BLD MANUAL: 0.1 10E3/UL (ref 1.6–8.3)
NEUTROPHILS NFR BLD MANUAL: 5 %
NITRATE UR QL: NEGATIVE
NRBC # BLD AUTO: 0 10E3/UL
NRBC BLD MANUAL-RTO: 1 %
P AXIS - MUSE: 44 DEGREES
PH UR STRIP: 6.5 [PH] (ref 5–7)
PLAT MORPH BLD: ABNORMAL
PLATELET # BLD AUTO: 207 10E3/UL (ref 150–450)
POTASSIUM SERPL-SCNC: 3.9 MMOL/L (ref 3.4–5.3)
PR INTERVAL - MUSE: 188 MS
PROT SERPL-MCNC: 6.5 G/DL (ref 6.4–8.3)
QRS DURATION - MUSE: 88 MS
QT - MUSE: 328 MS
QTC - MUSE: 453 MS
R AXIS - MUSE: 33 DEGREES
RBC # BLD AUTO: 4.11 10E6/UL (ref 4.4–5.9)
RBC MORPH BLD: ABNORMAL
RBC URINE: 0 /HPF
RSV RNA SPEC NAA+PROBE: NEGATIVE
SARS-COV-2 RNA RESP QL NAA+PROBE: NEGATIVE
SODIUM SERPL-SCNC: 137 MMOL/L (ref 136–145)
SP GR UR STRIP: 1 (ref 1–1.03)
SYSTOLIC BLOOD PRESSURE - MUSE: NORMAL MMHG
T AXIS - MUSE: 36 DEGREES
TROPONIN T SERPL HS-MCNC: 9 NG/L
UROBILINOGEN UR STRIP-MCNC: NORMAL MG/DL
VARIANT LYMPHS BLD QL SMEAR: PRESENT
VENTRICULAR RATE- MUSE: 115 BPM
WBC # BLD AUTO: 1.2 10E3/UL (ref 4–11)
WBC URINE: 0 /HPF

## 2023-03-22 PROCEDURE — 36415 COLL VENOUS BLD VENIPUNCTURE: CPT | Performed by: EMERGENCY MEDICINE

## 2023-03-22 PROCEDURE — 83605 ASSAY OF LACTIC ACID: CPT | Performed by: EMERGENCY MEDICINE

## 2023-03-22 PROCEDURE — 99285 EMERGENCY DEPT VISIT HI MDM: CPT | Mod: 25

## 2023-03-22 PROCEDURE — 71046 X-RAY EXAM CHEST 2 VIEWS: CPT | Mod: 26 | Performed by: RADIOLOGY

## 2023-03-22 PROCEDURE — 71046 X-RAY EXAM CHEST 2 VIEWS: CPT

## 2023-03-22 PROCEDURE — 93005 ELECTROCARDIOGRAM TRACING: CPT

## 2023-03-22 PROCEDURE — 93010 ELECTROCARDIOGRAM REPORT: CPT | Performed by: EMERGENCY MEDICINE

## 2023-03-22 PROCEDURE — C9803 HOPD COVID-19 SPEC COLLECT: HCPCS

## 2023-03-22 PROCEDURE — 85730 THROMBOPLASTIN TIME PARTIAL: CPT | Performed by: EMERGENCY MEDICINE

## 2023-03-22 PROCEDURE — 250N000011 HC RX IP 250 OP 636: Performed by: EMERGENCY MEDICINE

## 2023-03-22 PROCEDURE — 87040 BLOOD CULTURE FOR BACTERIA: CPT | Performed by: EMERGENCY MEDICINE

## 2023-03-22 PROCEDURE — 85007 BL SMEAR W/DIFF WBC COUNT: CPT | Performed by: EMERGENCY MEDICINE

## 2023-03-22 PROCEDURE — 80053 COMPREHEN METABOLIC PANEL: CPT | Performed by: EMERGENCY MEDICINE

## 2023-03-22 PROCEDURE — 85027 COMPLETE CBC AUTOMATED: CPT | Performed by: EMERGENCY MEDICINE

## 2023-03-22 PROCEDURE — 84484 ASSAY OF TROPONIN QUANT: CPT | Performed by: EMERGENCY MEDICINE

## 2023-03-22 PROCEDURE — 99284 EMERGENCY DEPT VISIT MOD MDM: CPT | Mod: CS | Performed by: EMERGENCY MEDICINE

## 2023-03-22 PROCEDURE — 85610 PROTHROMBIN TIME: CPT | Performed by: EMERGENCY MEDICINE

## 2023-03-22 PROCEDURE — 87637 SARSCOV2&INF A&B&RSV AMP PRB: CPT | Performed by: EMERGENCY MEDICINE

## 2023-03-22 PROCEDURE — 81001 URINALYSIS AUTO W/SCOPE: CPT | Performed by: EMERGENCY MEDICINE

## 2023-03-22 RX ORDER — HEPARIN SODIUM (PORCINE) LOCK FLUSH IV SOLN 100 UNIT/ML 100 UNIT/ML
5 SOLUTION INTRAVENOUS
Status: DISCONTINUED | OUTPATIENT
Start: 2023-03-22 | End: 2023-03-22 | Stop reason: HOSPADM

## 2023-03-22 RX ADMIN — HEPARIN 5 ML: 100 SYRINGE at 02:45

## 2023-03-22 ASSESSMENT — ACTIVITIES OF DAILY LIVING (ADL): ADLS_ACUITY_SCORE: 35

## 2023-03-22 NOTE — TELEPHONE ENCOUNTER
Nurse Triage SBAR    Situation: Diarrhea - fever    Background: Significant Other, Charleen, vitaliy. Consent: on file in chart. Chemo on March 8th. Possibly exposed to influenza.     Assessment: Diarrhea today - 2-3 loose stools. Fever 100.5. Voiding a lot - had a UA already. Every 2-3 hours he voids. Taking imodium - seems to be helping some.     Protocol Recommended Disposition: Emergency Department    Recommendation: According to the protocol, Patient should go to the ED now. Advised Patient to wait for a call-back after nurse speaks with the on-call Provider. Care advice given. Significant Other verbalizes understanding and agrees with plan of care. Reviewed concerning symptoms and when to call back. Connected with scheduling.     Paging on call Dr Kiel Maurer. Per MD - Patient needs to be seen in the ED. He does not need to take right tylenol now.     Provider Recommendation Follow Up:   Reached patient/caregiver. Informed of provider's recommendations. Patientverbalized understanding and does not agree with the plan.     Benita Jasso RN Nursing Advisor 3/21/2023 10:57 PM     Reason for Disposition    [1] Neutropenia known or suspected (e.g., recent cancer chemotherapy) AND [2] new-onset of diarrhea    Additional Information    Negative: Shock suspected (e.g., cold/pale/clammy skin, too weak to stand, low BP, rapid pulse)    Negative: Difficult to awaken or acting confused (e.g., disoriented, slurred speech)    Negative: Sounds like a life-threatening emergency to the triager    Negative: Vomiting also present and worse than the diarrhea    Negative: Fecal impaction suspected (with leakage of watery stool around impaction)    Negative: [1] SEVERE abdominal pain (e.g., excruciating) AND [2] present > 1 hour    Negative: [1] SEVERE abdominal pain AND [2] age > 60 years    Negative: [1] Blood in the stool AND [2] moderate or large amount of blood    Negative: Black or tarry bowel movements (Exception:  chronic-unchanged black-grey bowel movements AND is taking iron pills or Pepto-bismol)    Protocols used: CANCER - DIARRHEA-A-AH

## 2023-03-22 NOTE — ED PROVIDER NOTES
"      [facility] ([facility])  March 22, 2023  History     Chief Complaint   Patient presents with     Fever     HPI  Geovanni Jasso is a 62 year old male with a past medical history of a recent lymphoma diagnosis (2/7/2023) who presents with diarrhea that began over the last couple days and a fever that he developed today.  Max fever today was 100.2.  He denies any associate abdominal pain.  He had several episodes of diarrhea which she states \"not exactly diarrhea, more so loose stools\".  States he has not had one since 6 PM but did take some Imodium.  He called his oncology team who recommended he come to the ED for evaluation.  Denies sick contacts.  No recent travel.  Last chemotherapy was on 3/8.    Past Medical History  Past Medical History:   Diagnosis Date     Cancer (H) 2-7-2023    just diagnosed with lymphona.     Carpal tunnel syndrome      Chronic rhinitis 10/23/2015     Esophageal reflux 12/26/2013     Hand joint stiff, right      Past Surgical History:   Procedure Laterality Date     BIOPSY  2-6-23     BIOPSY LYMPH NODE CERVICAL Left 2/16/2023    Procedure: excisional biopsy of a left cervical node;  Surgeon: Aung Tamayo MD;  Location: [facility] OR     COLONOSCOPY  2019    clean, do again in 10 years     COLONOSCOPY WITH CO2 INSUFFLATION N/A 07/17/2019    Procedure: COLONOSCOPY, WITH CO2 INSUFFLATION;  Surgeon: Jaison Hammer MD;  Location: [facility] OR     ENT SURGERY  1979    Loss of hearing rt ear (tinnitis)     HERNIA REPAIR  2000     NO HISTORY OF SURGERY       ORTHOPEDIC SURGERY  1988,1992    rt knee, scope in 88; ACL repair Early 90s     acetaminophen (TYLENOL) 325 MG tablet  allopurinol (ZYLOPRIM) 300 MG tablet  diclofenac (VOLTAREN) 1 % topical gel  Efinaconazole (JUBLIA) 10 % SOLN  fexofenadine (ALLEGRA) 60 MG tablet  fluticasone (FLONASE) 50 MCG/ACT nasal spray  hydrocortisone (CORTAID) 1 % external ointment  ibuprofen (ADVIL,MOTRIN) 200 MG " "tablet  ketoconazole (NIZORAL) 2 % external cream  LANsoprazole (PREVACID) 30 MG DR capsule  lidocaine-prilocaine (EMLA) 2.5-2.5 % external cream  methocarbamol (ROBAXIN) 500 MG tablet  ondansetron (ZOFRAN ODT) 4 MG ODT tab  ondansetron (ZOFRAN) 8 MG tablet  oxyCODONE (ROXICODONE) 5 MG tablet  prochlorperazine (COMPAZINE) 10 MG tablet  senna-docusate (SENOKOT-S/PERICOLACE) 8.6-50 MG tablet  traMADol (ULTRAM) 50 MG tablet  zolpidem (AMBIEN) 10 MG tablet      Allergies   Allergen Reactions     Penicillins      Family History  Family History   Problem Relation Age of Onset     Hyperlipidemia Mother         medication     Diabetes Father      Other Cancer Father         Lung cancer     Other Cancer Maternal Grandmother         Unsure what type of cancer     Autoimmune Disease No family hx of      Social History   Social History     Tobacco Use     Smoking status: Never     Passive exposure: Never     Smokeless tobacco: Never   Vaping Use     Vaping Use: Never used   Substance Use Topics     Alcohol use: Yes     Comment: occassional, 1- 2 per week     Drug use: No         A medically appropriate review of systems was performed with pertinent positives and negatives noted in the HPI, and all other systems negative.    Physical Exam   BP: (!) 146/83  Pulse: 116  Temp: 98.2  F (36.8  C)  Resp: 16  Height: 180.3 cm (5' 11\")  Weight: 88 kg (194 lb)  SpO2: 97 %  Physical Exam  Vitals and nursing note reviewed.   Constitutional:       General: He is not in acute distress.     Appearance: Normal appearance. He is not ill-appearing, toxic-appearing or diaphoretic.   HENT:      Head: Normocephalic.      Nose: Nose normal.   Eyes:      Pupils: Pupils are equal, round, and reactive to light.   Cardiovascular:      Rate and Rhythm: Normal rate and regular rhythm.   Pulmonary:      Effort: Pulmonary effort is normal.   Abdominal:      General: There is no distension.      Palpations: Abdomen is soft.      Tenderness: There is no " abdominal tenderness. There is no right CVA tenderness or left CVA tenderness.   Musculoskeletal:         General: No deformity. Normal range of motion.      Cervical back: Normal range of motion.   Skin:     General: Skin is warm.   Neurological:      Mental Status: He is alert and oriented to person, place, and time.   Psychiatric:         Mood and Affect: Mood normal.           ED Course, Procedures, & Data     ED Course as of 03/22/23 2125   Wed Mar 22, 2023   0132      EKG Interpretation:     Interpreted by Gil Mg DO  Time reviewed:0030   Symptoms at time of EKG: fever, tachy   Rhythm: sinus tach  Rate: normal  Axis: NORMAL  Ectopy: none  Conduction: normal  ST Segments/ T Waves: No ST-T wave changes  Q Waves: none  Comparison to prior: new tachycardia    Clinical Impression: sinus tach, o/w normal EKG      Procedures                      Results for orders placed or performed during the hospital encounter of 03/22/23   XR Chest 2 Views     Status: None    Narrative    EXAM: XR CHEST 2 VIEWS  LOCATION: [facility]  DATE/TIME: 3/22/2023 1:43 AM    INDICATION: fever, dyspnea  COMPARISON: 01/17/2018      Impression    IMPRESSION: Heart size is normal. Lungs are clear bilaterally. Mediastinum and bony structures are unremarkable. Right-sided Port-A-Cath terminates with its tip over the distal SVC. Multiple clips over the left lower neck.   Comprehensive metabolic panel     Status: Abnormal   Result Value Ref Range    Sodium 137 136 - 145 mmol/L    Potassium 3.9 3.4 - 5.3 mmol/L    Chloride 106 98 - 107 mmol/L    Carbon Dioxide (CO2) 23 22 - 29 mmol/L    Anion Gap 8 7 - 15 mmol/L    Urea Nitrogen 9.8 8.0 - 23.0 mg/dL    Creatinine 0.93 0.67 - 1.17 mg/dL    Calcium 9.1 8.8 - 10.2 mg/dL    Glucose 116 (H) 70 - 99 mg/dL    Alkaline Phosphatase 93 40 - 129 U/L    AST 14 10 - 50 U/L    ALT 18 10 - 50 U/L    Protein Total 6.5 6.4 - 8.3 g/dL    Albumin 3.5 3.5 - 5.2  g/dL    Bilirubin Total 0.5 <=1.2 mg/dL    GFR Estimate >90 >60 mL/min/1.73m2   Lactic acid whole blood     Status: Normal   Result Value Ref Range    Lactic Acid 0.9 0.7 - 2.0 mmol/L   UA with Microscopic reflex to Culture     Status: Normal    Specimen: Urine, Midstream   Result Value Ref Range    Color Urine Straw Colorless, Straw, Light Yellow, Yellow    Appearance Urine Clear Clear    Glucose Urine Negative Negative mg/dL    Bilirubin Urine Negative Negative    Ketones Urine Negative Negative mg/dL    Specific Gravity Urine 1.005 1.003 - 1.035    Blood Urine Negative Negative    pH Urine 6.5 5.0 - 7.0    Protein Albumin Urine Negative Negative mg/dL    Urobilinogen Urine Normal Normal, 2.0 mg/dL    Nitrite Urine Negative Negative    Leukocyte Esterase Urine Negative Negative    RBC Urine 0 <=2 /HPF    WBC Urine 0 <=5 /HPF    Narrative    Urine Culture not indicated   Asymptomatic Influenza A/B, RSV, & SARS-CoV2 PCR (COVID-19) Nasopharyngeal     Status: Normal    Specimen: Nasopharyngeal; Swab   Result Value Ref Range    Influenza A PCR Negative Negative    Influenza B PCR Negative Negative    RSV PCR Negative Negative    SARS CoV2 PCR Negative Negative    Narrative    Testing was performed using the Xpert Xpress CoV2/Flu/RSV Assay on the [employer] GeneXpert Instrument. This test should be ordered for the detection of SARS-CoV-2, influenza, and RSV viruses in individuals who meet clinical and/or epidemiological criteria. Test performance is unknown in asymptomatic patients. This test is for in vitro diagnostic use under the FDA EUA for laboratories certified under CLIA to perform high or moderate complexity testing. This test has not been FDA cleared or approved. A negative result does not rule out the presence of PCR inhibitors in the specimen or target RNA in concentration below the limit of detection for the assay. If only one viral target is positive but coinfection with multiple targets is suspected, the  sample should be re-tested with another FDA cleared, approved, or authorized test, if coinfection would change clinical management. This test was validated by the [facility] [employer] These laboratories are certified under the Clinical Laboratory Improvement Amendments of 1988 (CLIA-88) as qualified to perform high complexity laboratory testing.   INR     Status: Normal   Result Value Ref Range    INR 1.10 0.85 - 1.15   Partial thromboplastin time     Status: Normal   Result Value Ref Range    aPTT 35 22 - 38 Seconds   Troponin T, High Sensitivity     Status: Normal   Result Value Ref Range    Troponin T, High Sensitivity 9 <=22 ng/L   CBC with platelets and differential     Status: Abnormal   Result Value Ref Range    WBC Count 1.2 (L) 4.0 - 11.0 10e3/uL    RBC Count 4.11 (L) 4.40 - 5.90 10e6/uL    Hemoglobin 13.3 13.3 - 17.7 g/dL    Hematocrit 39.5 (L) 40.0 - 53.0 %    MCV 96 78 - 100 fL    MCH 32.4 26.5 - 33.0 pg    MCHC 33.7 31.5 - 36.5 g/dL    RDW 12.7 10.0 - 15.0 %    Platelet Count 207 150 - 450 10e3/uL   Manual Differential     Status: Abnormal   Result Value Ref Range    % Neutrophils 5 %    % Lymphocytes 39 %    % Monocytes 43 %    % Eosinophils 6 %    % Basophils 6 %    % Myelocytes 1 %    NRBCs per 100 WBC 1 (H) <=0 %    Absolute Neutrophils 0.1 (LL) 1.6 - 8.3 10e3/uL    Absolute Lymphocytes 0.5 (L) 0.8 - 5.3 10e3/uL    Absolute Monocytes 0.5 0.0 - 1.3 10e3/uL    Absolute Eosinophils 0.1 0.0 - 0.7 10e3/uL    Absolute Basophils 0.1 0.0 - 0.2 10e3/uL    Absolute Myelocytes 0.0 <=0.0 10e3/uL    Absolute NRBCs 0.0 <=0.0 10e3/uL    RBC Morphology Confirmed RBC Indices     Platelet Assessment  Automated Count Confirmed. Platelet morphology is normal.     Automated Count Confirmed. Platelet morphology is normal.    Parker Cells Slight (A) None Seen    Reactive Lymphocytes Present (A) None Seen   EKG 12-lead, tracing only     Status: None   Result Value Ref Range    Systolic Blood Pressure  mmHg     Diastolic Blood Pressure  mmHg    Ventricular Rate 115 BPM    Atrial Rate 115 BPM    DC Interval 188 ms    QRS Duration 88 ms     ms    QTc 453 ms    P Axis 44 degrees    R AXIS 33 degrees    T Axis 36 degrees    Interpretation ECG       Sinus tachycardia  Otherwise normal ECG    Unconfirmed report - interpretation of this ECG is computer generated - see medical record for final interpretation  Confirmed by - EMERGENCY ROOM, PHYSICIAN ([id number]),  BIJU DELAROSA ([id number]) on 3/22/2023 6:50:26 AM     Blood Culture Arm, Right     Status: Normal (Preliminary result)    Specimen: Arm, Right; Blood   Result Value Ref Range    Culture No growth after 12 hours    Blood Culture Arm, Left     Status: Normal (Preliminary result)    Specimen: Arm, Left; Blood   Result Value Ref Range    Culture No growth after 12 hours    CBC with platelets differential     Status: Abnormal    Narrative    The following orders were created for panel order CBC with platelets differential.  Procedure                               Abnormality         Status                     ---------                               -----------         ------                     CBC with platelets and d...[[id number]]  Abnormal            Final result               Manual Differential[[id number]]          Abnormal            Final result                 Please view results for these tests on the individual orders.     Medications - No data to display  Labs Ordered and Resulted from Time of ED Arrival to Time of ED Departure   COMPREHENSIVE METABOLIC PANEL - Abnormal       Result Value    Sodium 137      Potassium 3.9      Chloride 106      Carbon Dioxide (CO2) 23      Anion Gap 8      Urea Nitrogen 9.8      Creatinine 0.93      Calcium 9.1      Glucose 116 (*)     Alkaline Phosphatase 93      AST 14      ALT 18      Protein Total 6.5      Albumin 3.5      Bilirubin Total 0.5      GFR Estimate >90     LACTIC ACID WHOLE BLOOD - Normal    Lactic Acid 0.9      ROUTINE UA WITH MICROSCOPIC REFLEX TO CULTURE - Normal    Color Urine Straw      Appearance Urine Clear      Glucose Urine Negative      Bilirubin Urine Negative      Ketones Urine Negative      Specific Gravity Urine 1.005      Blood Urine Negative      pH Urine 6.5      Protein Albumin Urine Negative      Urobilinogen Urine Normal      Nitrite Urine Negative      Leukocyte Esterase Urine Negative      RBC Urine 0      WBC Urine 0     INFLUENZA A/B, RSV, & SARS-COV2 PCR - Normal    Influenza A PCR Negative      Influenza B PCR Negative      RSV PCR Negative      SARS CoV2 PCR Negative     INR - Normal    INR 1.10     PARTIAL THROMBOPLASTIN TIME - Normal    aPTT 35       XR Chest 2 Views   Final Result   IMPRESSION: Heart size is normal. Lungs are clear bilaterally. Mediastinum and bony structures are unremarkable. Right-sided Port-A-Cath terminates with its tip over the distal SVC. Multiple clips over the left lower neck.             Critical care was not performed.     Medical Decision Making  The patient's presentation was of moderate complexity (an undiagnosed new problem with uncertain diagnosis).    The patient's evaluation involved:  ordering and/or review of 3+ test(s) in this encounter (see separate area of note for details)  discussion of management or test interpretation with another health professional (see separate area of note for details)    The patient's management necessitated only low risk treatment.      Assessment & Plan    Patient presents the ED for evaluation of fever at home.  Recent diagnosis of non-Hodgkin's lymphoma, on R-CHOP, last treatment was on 3/8    On arrival, patient is well-appearing.  Slightly tachycardic.  He is afebrile here with a temp of 98.2.  He has not taken any fever suppressing medications.  Blood pressure stable at 123/81.  His abdomen is soft and nontender.  No other obvious source of infection on exam/history.    Laboratory work-up reviewed.  Does show a  leukocytosis of 1.2 with neutrophils of 0.1.  Urinalysis without evidence of infection.  Blood cultures are pending.  COVID-negative.  X-ray chest without evidence of infection.  Electrolytes are within normal limits.  Normal creatinine is 0.93.  Lactic acid is normal at 0.9.  High sensitive troponin is negative x1.    Patient has not had a bowel movement while in the ED so unable to obtain stool cultures/enteric panel.    Case discussed with the oncology team.  Patient wishes to go home if possible.  We reviewed the otherwise negative work-up.  They feel comfortable with patient discharging home with plan to call his oncology team tomorrow for expedited follow-up.  I discussed all the findings and plan of care with the patient.  Strict instructions to return with any new or worsening symptoms.        I have reviewed the nursing notes. I have reviewed the findings, diagnosis, plan and need for follow up with the patient.    Discharge Medication List as of 3/22/2023  2:33 AM          Final diagnoses:   Fever, unspecified fever cause   I, Stan Montero, am serving as a trained medical scribe to document services personally performed by Gil Mg DO, based on the provider's statements to me.     IGil DO, was physically present and have reviewed and verified the accuracy of this note documented by Stan Montero.      Gil Mg DO  [facility] EMERGENCY DEPARTMENT  3/22/2023     Gil Mg DO  03/22/23 [id number]

## 2023-03-22 NOTE — DISCHARGE INSTRUCTIONS
Your work-up in the emergency department did not reveal any significant abnormalities.  Is very important that you pay close attention to your symptoms.  If you develop an obvious source of infection or your symptoms worsening, call your oncology team right away or return to the ED.  Otherwise, call your oncology team in the morning to schedule follow-up appointment.

## 2023-03-23 ENCOUNTER — PATIENT OUTREACH (OUTPATIENT)
Dept: ONCOLOGY | Facility: CLINIC | Age: 63
End: 2023-03-23
Payer: COMMERCIAL

## 2023-03-23 NOTE — PROGRESS NOTES
[facility]: Cancer Care Long Assessment    Discussion with Patient:                                                      Discussed how Bill is doing post hospitalization. He states that he's feeling rather well. Denies having any fevers post-hospital and states that the diarrhea he's had has gone away. Symptoms as documented below.    Dates of Treament:                                                      Infusion given in last 28 days     Administered MAR Action Medication Dose Rate Visit    03/08/2023 09:59 Given DOXOrubicin (ADRIAMYCIN) injection 100 mg 100 mg   Infusion Therapy Visit on 03/08/2023 in [facility]    03/08/2023 10:14 New Bag vinCRIStine (ONCOVIN) 2 mg in sodium chloride 0.9 % 29.5 mL infusion 2 mg   Infusion Therapy Visit on 03/08/2023 in [facility]    03/08/2023 10:46 New Bag cyclophosphamide (CYTOXAN) 1,500 mg in sodium chloride 0.9 % 625 mL infusion 1,500 mg 999 mL/hr Infusion Therapy Visit on 03/08/2023 in [facility]    03/08/2023 11:38 New Bag riTUXimab-abbs (TRUXIMA) 800 mg in sodium chloride 0.9 % 800 mL infusion 800 mg   Infusion Therapy Visit on 03/08/2023 in [facility]    03/08/2023 14:27 Restarted riTUXimab-abbs (TRUXIMA) 800 mg in sodium chloride 0.9 % 800 mL infusion     Infusion Therapy Visit on 03/08/2023 in [facility]          Assessment:                                                      Assessment completed with:: Patient    Constitutional  Patient Reported Constitutional Symptoms?: No  Fatigue: Fatigue relieved by rest    Neurosensory  Patient Reported Neurosensory Symptoms?: No    Eye Disorders  Patient Reported Eye Disorders?: No    Ear Disorders  Ear Disorders  Patient Reported Ear Disorder?: No    Cardiovascular  Patient Reported Cardiovascular Symptoms?: No    Respiratory  Patient Reported Respiratory  Symptoms?: No    Gastrointestinal  Patient Reported Gastrointestinal Symptoms?: No    Genitourinary  Patient Reported Genitourinary Symptoms?: No    Lymph System Disorders  Patient Reported Lymph System Disorders?: No    Musculoskeletal and Connective Tissue Disorders  Patient Reported Musculoskeletal or Connective Tissue Disorders?: No    Integumentary  Patient Reported Integumentary Symptoms?: No    Pain  Patient Reported Pain?: No  Pain Score: No Pain (0)  (DVPRS) Pain Rating Score : No pain    Patient Coping  Accepting    Clinic Utilization  Patient Aware of Next Appointment?: Yes    Other Patient Concerns  Other Patient Reported Concerns: No    No assessment indicated    Intervention/Education provided during outreach:                                                       Patient to follow up with Dr. Maurer as scheduled next week. Patient has triage and direct desk line.    Jocelyn Renae, RN, BSN  RN Care Coordinator - Oncology  [facility]

## 2023-03-27 LAB
BACTERIA BLD CULT: NO GROWTH
BACTERIA BLD CULT: NO GROWTH

## 2023-03-29 ENCOUNTER — INFUSION THERAPY VISIT (OUTPATIENT)
Dept: INFUSION THERAPY | Facility: CLINIC | Age: 63
End: 2023-03-29
Payer: COMMERCIAL

## 2023-03-29 ENCOUNTER — LAB (OUTPATIENT)
Dept: ONCOLOGY | Facility: CLINIC | Age: 63
End: 2023-03-29
Payer: COMMERCIAL

## 2023-03-29 ENCOUNTER — ONCOLOGY VISIT (OUTPATIENT)
Dept: ONCOLOGY | Facility: CLINIC | Age: 63
End: 2023-03-29
Payer: COMMERCIAL

## 2023-03-29 VITALS
OXYGEN SATURATION: 98 % | TEMPERATURE: 98.1 F | SYSTOLIC BLOOD PRESSURE: 118 MMHG | DIASTOLIC BLOOD PRESSURE: 81 MMHG | HEART RATE: 92 BPM | HEIGHT: 71 IN | WEIGHT: 188 LBS | BODY MASS INDEX: 26.32 KG/M2

## 2023-03-29 DIAGNOSIS — T45.1X5A CHEMOTHERAPY-INDUCED NEUTROPENIA (H): Primary | ICD-10-CM

## 2023-03-29 DIAGNOSIS — C83.30 DIFFUSE LARGE B-CELL LYMPHOMA, UNSPECIFIED BODY REGION (H): Primary | ICD-10-CM

## 2023-03-29 DIAGNOSIS — R59.0 CERVICAL LYMPHADENOPATHY: ICD-10-CM

## 2023-03-29 DIAGNOSIS — T45.1X5A CHEMOTHERAPY-INDUCED NEUTROPENIA (H): ICD-10-CM

## 2023-03-29 DIAGNOSIS — C83.30 DIFFUSE LARGE B-CELL LYMPHOMA, UNSPECIFIED BODY REGION (H): ICD-10-CM

## 2023-03-29 DIAGNOSIS — D70.1 CHEMOTHERAPY-INDUCED NEUTROPENIA (H): Primary | ICD-10-CM

## 2023-03-29 DIAGNOSIS — D70.1 CHEMOTHERAPY-INDUCED NEUTROPENIA (H): ICD-10-CM

## 2023-03-29 LAB
ALBUMIN SERPL-MCNC: 3.2 G/DL (ref 3.4–5)
ALP SERPL-CCNC: 103 U/L (ref 40–150)
ALT SERPL W P-5'-P-CCNC: 31 U/L (ref 0–70)
ANION GAP SERPL CALCULATED.3IONS-SCNC: 3 MMOL/L (ref 3–14)
AST SERPL W P-5'-P-CCNC: 21 U/L (ref 0–45)
BASOPHILS # BLD AUTO: 0.1 10E3/UL (ref 0–0.2)
BASOPHILS NFR BLD AUTO: 2 %
BILIRUB SERPL-MCNC: 0.3 MG/DL (ref 0.2–1.3)
BUN SERPL-MCNC: 13 MG/DL (ref 7–30)
CALCIUM SERPL-MCNC: 8.9 MG/DL (ref 8.5–10.1)
CHLORIDE BLD-SCNC: 107 MMOL/L (ref 94–109)
CO2 SERPL-SCNC: 27 MMOL/L (ref 20–32)
CREAT SERPL-MCNC: 0.87 MG/DL (ref 0.66–1.25)
EOSINOPHIL # BLD AUTO: 0.1 10E3/UL (ref 0–0.7)
EOSINOPHIL NFR BLD AUTO: 1 %
ERYTHROCYTE [DISTWIDTH] IN BLOOD BY AUTOMATED COUNT: 12.5 % (ref 10–15)
GFR SERPL CREATININE-BSD FRML MDRD: >90 ML/MIN/1.73M2
GLUCOSE BLD-MCNC: 136 MG/DL (ref 70–99)
HCT VFR BLD AUTO: 42.3 % (ref 40–53)
HGB BLD-MCNC: 14.7 G/DL (ref 13.3–17.7)
IMM GRANULOCYTES # BLD: 0.1 10E3/UL
IMM GRANULOCYTES NFR BLD: 2 %
LDH SERPL L TO P-CCNC: 173 U/L (ref 85–227)
LYMPHOCYTES # BLD AUTO: 1 10E3/UL (ref 0.8–5.3)
LYMPHOCYTES NFR BLD AUTO: 15 %
MCH RBC QN AUTO: 32.8 PG (ref 26.5–33)
MCHC RBC AUTO-ENTMCNC: 34.8 G/DL (ref 31.5–36.5)
MCV RBC AUTO: 94 FL (ref 78–100)
MONOCYTES # BLD AUTO: 0.9 10E3/UL (ref 0–1.3)
MONOCYTES NFR BLD AUTO: 13 %
NEUTROPHILS # BLD AUTO: 4.2 10E3/UL (ref 1.6–8.3)
NEUTROPHILS NFR BLD AUTO: 67 %
NRBC # BLD AUTO: 0 10E3/UL
NRBC BLD AUTO-RTO: 0 /100
PLATELET # BLD AUTO: 318 10E3/UL (ref 150–450)
POTASSIUM BLD-SCNC: 3.9 MMOL/L (ref 3.4–5.3)
PROT SERPL-MCNC: 6.7 G/DL (ref 6.8–8.8)
RBC # BLD AUTO: 4.48 10E6/UL (ref 4.4–5.9)
SODIUM SERPL-SCNC: 137 MMOL/L (ref 133–144)
WBC # BLD AUTO: 6.4 10E3/UL (ref 4–11)

## 2023-03-29 PROCEDURE — 85025 COMPLETE CBC W/AUTO DIFF WBC: CPT | Performed by: INTERNAL MEDICINE

## 2023-03-29 PROCEDURE — 99207 PR NO CHARGE NURSE ONLY: CPT

## 2023-03-29 PROCEDURE — 96375 TX/PRO/DX INJ NEW DRUG ADDON: CPT | Performed by: NURSE PRACTITIONER

## 2023-03-29 PROCEDURE — 99207 PR NO CHARGE LOS: CPT

## 2023-03-29 PROCEDURE — 96415 CHEMO IV INFUSION ADDL HR: CPT | Performed by: NURSE PRACTITIONER

## 2023-03-29 PROCEDURE — 99215 OFFICE O/P EST HI 40 MIN: CPT | Mod: 25 | Performed by: INTERNAL MEDICINE

## 2023-03-29 PROCEDURE — 83615 LACTATE (LD) (LDH) ENZYME: CPT | Performed by: INTERNAL MEDICINE

## 2023-03-29 PROCEDURE — 96411 CHEMO IV PUSH ADDL DRUG: CPT | Performed by: NURSE PRACTITIONER

## 2023-03-29 PROCEDURE — 96367 TX/PROPH/DG ADDL SEQ IV INF: CPT | Performed by: NURSE PRACTITIONER

## 2023-03-29 PROCEDURE — 96417 CHEMO IV INFUS EACH ADDL SEQ: CPT | Performed by: NURSE PRACTITIONER

## 2023-03-29 PROCEDURE — 96413 CHEMO IV INFUSION 1 HR: CPT | Performed by: NURSE PRACTITIONER

## 2023-03-29 PROCEDURE — 80053 COMPREHEN METABOLIC PANEL: CPT | Performed by: INTERNAL MEDICINE

## 2023-03-29 PROCEDURE — 96377 APPLICATON ON-BODY INJECTOR: CPT | Mod: 59 | Performed by: NURSE PRACTITIONER

## 2023-03-29 RX ORDER — PALONOSETRON 0.05 MG/ML
0.25 INJECTION, SOLUTION INTRAVENOUS ONCE
Status: COMPLETED | OUTPATIENT
Start: 2023-03-29 | End: 2023-03-29

## 2023-03-29 RX ORDER — MEPERIDINE HYDROCHLORIDE 25 MG/ML
25 INJECTION INTRAMUSCULAR; INTRAVENOUS; SUBCUTANEOUS
Status: CANCELLED
Start: 2023-03-29

## 2023-03-29 RX ORDER — METHYLPREDNISOLONE SODIUM SUCCINATE 125 MG/2ML
125 INJECTION, POWDER, LYOPHILIZED, FOR SOLUTION INTRAMUSCULAR; INTRAVENOUS
Status: CANCELLED
Start: 2023-03-29

## 2023-03-29 RX ORDER — DOXORUBICIN HYDROCHLORIDE 2 MG/ML
50 INJECTION, SOLUTION INTRAVENOUS ONCE
Status: CANCELLED | OUTPATIENT
Start: 2023-03-29

## 2023-03-29 RX ORDER — ALBUTEROL SULFATE 90 UG/1
1-2 AEROSOL, METERED RESPIRATORY (INHALATION)
Status: CANCELLED
Start: 2023-03-29

## 2023-03-29 RX ORDER — ALBUTEROL SULFATE 0.83 MG/ML
2.5 SOLUTION RESPIRATORY (INHALATION)
Status: CANCELLED | OUTPATIENT
Start: 2023-03-29

## 2023-03-29 RX ORDER — HEPARIN SODIUM (PORCINE) LOCK FLUSH IV SOLN 100 UNIT/ML 100 UNIT/ML
5 SOLUTION INTRAVENOUS
Status: DISCONTINUED | OUTPATIENT
Start: 2023-03-29 | End: 2023-03-29 | Stop reason: HOSPADM

## 2023-03-29 RX ORDER — DIPHENHYDRAMINE HYDROCHLORIDE 50 MG/ML
50 INJECTION INTRAMUSCULAR; INTRAVENOUS
Status: CANCELLED
Start: 2023-03-29

## 2023-03-29 RX ORDER — HEPARIN SODIUM,PORCINE 10 UNIT/ML
5 VIAL (ML) INTRAVENOUS
Status: CANCELLED | OUTPATIENT
Start: 2023-03-29

## 2023-03-29 RX ORDER — ACETAMINOPHEN 325 MG/1
650 TABLET ORAL ONCE
Status: COMPLETED | OUTPATIENT
Start: 2023-03-29 | End: 2023-03-29

## 2023-03-29 RX ORDER — HEPARIN SODIUM (PORCINE) LOCK FLUSH IV SOLN 100 UNIT/ML 100 UNIT/ML
5 SOLUTION INTRAVENOUS
Status: CANCELLED | OUTPATIENT
Start: 2023-03-29

## 2023-03-29 RX ORDER — PALONOSETRON 0.05 MG/ML
0.25 INJECTION, SOLUTION INTRAVENOUS ONCE
Status: CANCELLED
Start: 2023-03-29

## 2023-03-29 RX ORDER — DOXORUBICIN HYDROCHLORIDE 2 MG/ML
50 INJECTION, SOLUTION INTRAVENOUS ONCE
Status: COMPLETED | OUTPATIENT
Start: 2023-03-29 | End: 2023-03-29

## 2023-03-29 RX ORDER — ACETAMINOPHEN 325 MG/1
650 TABLET ORAL ONCE
Status: CANCELLED
Start: 2023-03-29

## 2023-03-29 RX ORDER — EPINEPHRINE 1 MG/ML
0.3 INJECTION, SOLUTION INTRAMUSCULAR; SUBCUTANEOUS EVERY 5 MIN PRN
Status: CANCELLED | OUTPATIENT
Start: 2023-03-29

## 2023-03-29 RX ORDER — MEPERIDINE HYDROCHLORIDE 25 MG/ML
25 INJECTION INTRAMUSCULAR; INTRAVENOUS; SUBCUTANEOUS EVERY 30 MIN PRN
Status: CANCELLED | OUTPATIENT
Start: 2023-03-29

## 2023-03-29 RX ORDER — DIPHENHYDRAMINE HCL 25 MG
50 CAPSULE ORAL ONCE
Status: CANCELLED
Start: 2023-03-29

## 2023-03-29 RX ADMIN — PALONOSETRON 0.25 MG: 0.05 INJECTION, SOLUTION INTRAVENOUS at 09:24

## 2023-03-29 RX ADMIN — Medication 250 ML: at 09:21

## 2023-03-29 RX ADMIN — HEPARIN SODIUM (PORCINE) LOCK FLUSH IV SOLN 100 UNIT/ML 5 ML: 100 SOLUTION at 07:28

## 2023-03-29 RX ADMIN — ACETAMINOPHEN 650 MG: 325 TABLET ORAL at 10:34

## 2023-03-29 RX ADMIN — HEPARIN SODIUM (PORCINE) LOCK FLUSH IV SOLN 100 UNIT/ML 5 ML: 100 SOLUTION at 15:24

## 2023-03-29 RX ADMIN — DOXORUBICIN HYDROCHLORIDE 100 MG: 2 INJECTION, SOLUTION INTRAVENOUS at 09:54

## 2023-03-29 ASSESSMENT — PAIN SCALES - GENERAL: PAINLEVEL: NO PAIN (0)

## 2023-03-29 NOTE — LETTER
3/29/2023         RE: Geovanni Jasso  [id number]  [facility] [address]        Dear Colleague,    Thank you for referring your patient, Geovanni Jasso, to the [facility]. Please see a copy of my visit note below.    Oncology follow-up visit:  Date on this visit: 3/29/23     Diagnoses.    Diffuse large B-cell lymphoma    Primary Physician: Paddy Holly     History Of Present Illness:  Mr. Jasso is a 62 year old  male who presents for follow-up of diffuse lymphadenopathy.  He initially saw me on 1/18/2022 for retroperitoneal lymphadenopathy.  Please see my previous note for details.  I have copied and updated from prior notes.        Over the last few months, since fall of 2022, off and on he has had upper abdominal pain which lasts few minutes. He thinks stress sometimes can bring it on. No relationship to food.  No nausea or vomiting. BMs have been fine. No bleeding. No fevers.   He had a CT Abd Pelvis on 1/6/2023 which showed enlarged retroperitoneal lymphadenopathy  He has noticed some fatigue. He has also noticed some lump in the throat just above the sternum at night. This is going on for a couple of months. He denies any dysphagia.     Currently he feels well. Currently denies any abdominal pain. No nausea or vomiting. BMs are fine. No recent weight loss. No SOB. No neuropathy. No skin flushing. No drenching night sweats.    In the summer, he noticed a rash on the face which has since resolved.  He was noted to have CLAIRE positive.     On 2/6/2023, he had FNA of the left supraclavicular lymph node.  Cytology showed atypical lymphoid cells.  Flow cytometry showed lambda monotypic B cells which are negative for CD5 and CD10.  This is consistent with a B-cell lymphoma.  We decided to go ahead with excisional lymph node biopsy as well as bone marrow biopsy for complete staging.    Excisional lymph node biopsy showed diffuse large B-cell lymphoma.    Bone marrow biopsy did  not reveal evidence of lymphoma.    He started R-CHOP on 3/8/2023.      Interval history.    He is accompanied by his wife Charleen     Overall he tolerated the chemotherapy well although he had some side effects.  He noticed some diarrhea for which he took very occasional Imodium.  There was 1 day when he had low-grade temperatures and he went to emergency room and by that time his fever had resolved.  He was neutropenic with ANC 0.1.  Infectious work-up was negative.  He was discharged without antibiotics.  No more fever since then.  He had a skin the rash from allopurinol so it was stopped.  Rash resolved.  Denies any difficulty breathing.  Neck pain/soreness has almost resolved.  Neck swelling has improved.  Denies neuropathy.      ECOG 0-1    ROS:  A comprehensive ROS was otherwise neg    I reviewed other history in Epic as before.      Past Medical/Surgical History:  Past Medical History:   Diagnosis Date     Cancer (H) 2-7-2023    just diagnosed with lymphona.     Carpal tunnel syndrome      Chronic rhinitis 10/23/2015     Esophageal reflux 12/26/2013     Hand joint stiff, right    Shingles     Past Surgical History:   Procedure Laterality Date     BIOPSY  2-6-23     BIOPSY LYMPH NODE CERVICAL Left 2/16/2023    Procedure: excisional biopsy of a left cervical node;  Surgeon: Aung Tamayo MD;  Location: [facility] OR     COLONOSCOPY  2019    clean, do again in 10 years     COLONOSCOPY WITH CO2 INSUFFLATION N/A 07/17/2019    Procedure: COLONOSCOPY, WITH CO2 INSUFFLATION;  Surgeon: Jaison Hammer MD;  Location: [facility] OR     ENT SURGERY  1979    Loss of hearing rt ear (tinnitis)     HERNIA REPAIR  2000     NO HISTORY OF SURGERY       ORTHOPEDIC SURGERY  1988,1992    rt knee, scope in 88; ACL repair Early 90s     Cancer History:   As above    Allergies:  Allergies as of 03/29/2023 - Reviewed 03/22/2023   Allergen Reaction Noted     Penicillins  12/23/2013     Current Medications:  Current Outpatient  Medications   Medication Sig Dispense Refill     acetaminophen (TYLENOL) 325 MG tablet Take 2 tablets (650 mg) by mouth every 6 hours as needed for mild pain 50 tablet 0     diclofenac (VOLTAREN) 1 % topical gel Apply small amount topically to right shoulder 3-4 times daily as needed for pain. 50 g 0     Efinaconazole (JUBLIA) 10 % SOLN Externally apply topically daily to nail. 8 mL 4     fexofenadine (ALLEGRA) 60 MG tablet Take 1 tablet (60 mg) by mouth 2 times daily 180 tablet 0     fluticasone (FLONASE) 50 MCG/ACT nasal spray Spray 1-2 sprays into both nostrils daily 16 g 11     hydrocortisone (CORTAID) 1 % external ointment Apply topically 2 times daily As needed 56 g 0     ibuprofen (ADVIL,MOTRIN) 200 MG tablet Take 200 mg by mouth every 6 hours as needed       ketoconazole (NIZORAL) 2 % external cream Apply twice daily for 8 weeks to feet 60 g 3     LANsoprazole (PREVACID) 30 MG DR capsule Take 1 capsule (30 mg) by mouth every morning (before breakfast) 90 capsule 2     lidocaine-prilocaine (EMLA) 2.5-2.5 % external cream Apply topically as needed for moderate pain (4-6) Apply over port at least 30 minutes before port access. 30 g 0     methocarbamol (ROBAXIN) 500 MG tablet Take 1 tablet (500 mg) by mouth nightly as needed for muscle spasms 30 tablet 0     ondansetron (ZOFRAN ODT) 4 MG ODT tab Take 1 tablet (4 mg) by mouth every 8 hours as needed for nausea 10 tablet 0     ondansetron (ZOFRAN) 8 MG tablet Take 1 tablet (8 mg) by mouth every 8 hours as needed for nausea (vomiting) 30 tablet 2     oxyCODONE (ROXICODONE) 5 MG tablet Take 1-2 tablets (5-10 mg) by mouth every 4 hours as needed for moderate to severe pain 12 tablet 0     prochlorperazine (COMPAZINE) 10 MG tablet Take 1 tablet (10 mg) by mouth every 6 hours as needed for nausea or vomiting 30 tablet 2     senna-docusate (SENOKOT-S/PERICOLACE) 8.6-50 MG tablet Take 1-2 tablets by mouth 2 times daily 30 tablet 0     traMADol (ULTRAM) 50 MG tablet Take  "50 mg by mouth every 6 hours as needed       zolpidem (AMBIEN) 10 MG tablet Take 1 tablet (10 mg) by mouth nightly as needed for sleep 30 tablet 1      Family History:  Family History   Problem Relation Age of Onset     Hyperlipidemia Mother         medication     Diabetes Father      Other Cancer Father         Lung cancer     Other Cancer Maternal Grandmother         Unsure what type of cancer     Autoimmune Disease No family hx of      Maternal grand mother had some sort of a cancer  Dad had lung cancer  Has 4 kids- all healthy    Social History:  Social History     Socioeconomic History     Marital status:      Spouse name: Not on file     Number of children: Not on file     Years of education: Not on file     Highest education level: Not on file   Occupational History     Not on file   Tobacco Use     Smoking status: Never     Passive exposure: Never     Smokeless tobacco: Never   Vaping Use     Vaping Use: Never used   Substance and Sexual Activity     Alcohol use: Yes     Comment: occassional, 1- 2 per week     Drug use: No     Sexual activity: Yes     Partners: Female     Birth control/protection: Male Surgical, Female Surgical     Comment: vasectomy   Other Topics Concern     Parent/sibling w/ CABG, MI or angioplasty before 65F 55M? No   Social History Narrative     Not on file     Social Determinants of Health     Financial Resource Strain: Not on file   Food Insecurity: Not on file   Transportation Needs: Not on file   Physical Activity: Not on file   Stress: Not on file   Social Connections: Not on file   Intimate Partner Violence: Not on file   Housing Stability: Not on file   no smoking. Drinks etoh wine 2-3 times / week.       Physical Exam:  /81 (BP Location: Left arm, Patient Position: Chair, Cuff Size: Adult Large)   Pulse 92   Temp 98.1  F (36.7  C) (Oral)   Ht 1.803 m (5' 11\")   Wt 85.3 kg (188 lb)   SpO2 98%   BMI 26.22 kg/m     Wt Readings from Last 4 Encounters:   03/29/23 " 85.3 kg (188 lb)   03/22/23 88 kg (194 lb)   03/08/23 87 kg (191 lb 12.8 oz)   03/01/23 88 kg (194 lb)     CONSTITUTIONAL: no acute distress  EYES: PERRLA, no palor or icterus.   ENT/MOUTH: no mouth lesions. Ears normal  CVS: s1s2 no m r g .   RESPIRATORY: clear to auscultation b/l  GI: soft non tender no hepatosplenomegaly  NEURO: AAOX3  Grossly non focal neuro exam  INTEGUMENT: no obvious rashes  LYMPHATIC: I was able to palpate a very small left lower cervical lymph node and this is much improved as compared to previously.  No other palpable lymphadenopathy.    MUSCULOSKELETAL: Unremarkable. No bony tenderness.   EXTREMITIES: no edema  PSYCH: Mentation, mood and affect are normal. Decision making capacity is intact            Laboratory/Imaging Studies      Reviewed    3/29/2023.  CBC shows WBC 6.4.  Hemoglobin 14.7.  Platelets 318.  ANC 4.2.    CMP unremarkable except albumin 3.2.  Normal creatinine and potassium and calcium.  .    Previously  SPEP did not show any monoclonal protein.  Serum immunoglobulin levels were normal.  Cutler free light chain 2.53.  Lambda 1.6.  Ratio normal 1.58.      Hepatitis B surface antibody was positive.  Hepatitis B surface antigen and core antibody were negative.  HIV negative  Hep C negative        1/9/2023  CBC was unremarkable.  Peripheral blood smear was unremarkable.    CLAIRE was positive-1: 80, speckled pattern  dsDNA was negative  ESR 8    2/16/2023 excisional biopsy of the left level 4 cervical lymph node  Large B-cell lymphoma, non-germinal center subtype.    The neoplastic cells express CD20, BCL2, BCL6, and MUM-1 (variable staining intensity), and are negative for CD3, CD5, CD10, CD23, Cyclin D1, and ALK. CD21 and CD23 demonstrate a lack of residual follicular dendritic cell meshworks. C-Myc demonstrates variable staining, and is overall estimated at 20-30% (interpreted as negative; cutoff 40%). Ki-67 proliferative index is also variable, and is overall estimated  around 60-70%.      EBV RNA by in situ hybridization (SOY-OTTO) is negative for EBV in the neoplastic cells.   A congo red stain is negative for amyloid deposition in the tissue.     FISH testing on the lymph node  RESULTS:     - Gains of BCL6 (94%); no rearrangement of BCL6  - Gains of BCL2 (94%); no rearrangement of BCL2  - No rearrangement of MYC or IGH::MYC fusion        INTERPRETATION:    Of the interphase cells examined, 94-96% had signal patterns indicative of gains of BCL6 (3q27) and BCL2 (18q21), consistent with gains of all or part of chromosomes 3 and 18. No evidence was found of rearrangements of BCL6, MYC (8q24), or BCL2, or of a t(8;14).     Cytogenetics.  He has complex cytogenetics.    48,X,-Y,t(1;12)(p13;q13),+3,+3,add(3)(p25),t(3;8)(q21;p21),del(6)(13q21),add(11)(q22),+18,?idic(18)(p11.2)x2[cp19]/46,XY[1]      2/15/2023 bone marrow biopsy was unremarkable without evidence of involvement of lymphoma.        PET/CT 1/27/2023  CHEST:  Marked uptake in the enlarged 1.1 x 2.5 cm right axillary lymph node  (series 4, image 133) with SUV max 8.1 and prominent 1.0 x 1.0 cm left  axillary lymph node (series 4, image 123) with SUV max 4.1.     The central tracheobronchial tree is clear. No pleural effusion or  pneumothorax. No acute consolidation. Minimal bilateral dependent  atelectasis.     Heart size is within normal limits. No pericardial effusion. The  thoracic aorta and main pulmonary artery are within normal limits for  diameter. The esophagus is unremarkable.      ABDOMEN AND PELVIS:  Marked uptake in multiple enlarged retroperitoneal lymph nodes. For  example:  - 2.4 x 1.8 cm left para-aortic node (series 4, image 206) with SUV  max 6.3  - 1.7 x 2.5 cm left para-aortic node (series 4, image 217) with SUV  max 14.3     The liver is unremarkable. Radiodense gallstone without findings of  cholecystitis. No intrahepatic or extrahepatic biliary ductal  dilatation. The pancreas is unremarkable. No  pancreatic ductal  dilatation. Splenule. The spleen is unremarkable. The adrenal glands  are unremarkable.     Symmetric enhancement of the kidneys. No hydronephrosis. The urinary  bladder is unremarkable. The reproductive organs are unremarkable.     Normal caliber of the small and large bowel. Colonic diverticulosis  without diverticulitis. No free air, free fluid, or fluid collection.  Normal caliber of the abdominal aorta.     LOWER EXTREMITIES:   There is no suspicious FDG uptake in the visualized lower extremities.  Intramuscular lipomas in left rectus femoris and right soleus. Right  knee ACL repair changes.     BONES:   There is no suspicious FDG uptake in the skeleton. There are no  suspicious lytic or blastic osseous lesions. Degenerative changes of  bilateral hips.                                                                      IMPRESSION: In this patient with retroperitoneal lymphadenopathy:  1. Intense hypermetabolic uptake (Deauville 5) in multiple enlarged  and prominent axillary, left supraclavicular and retroperitoneal lymph  nodes, concerning for lymphoproliferative disease.  1a. Consider biopsying right axillary lymph node (max SUV 8) or left  supraclavicular node, largest (max SUV 14).    2. Cholelithiasis without cholecystitis.  3. Please see neck PET/CT report for head and neck findings.    PET CT neck 1/27/2023  Marked focal uptake in the enlarged left supraclavicular lymph node  measuring 3.3 x 2.3 cm (series 2, image 98) with SUV max 13.7.     Regarding evaluation of the mucosal space, there is no definite  abnormality or abnormal metabolic uptake on PET CT in the nasopharynx,  oropharynx, hypopharynx, or of the glottis. Regarding the tongue base,  no abnormality is identified. Regarding the major salivary glands, no  abnormality is identified. Regarding the thyroid gland, no abnormality  is identified.     Limited evaluation of the cervical vertebra demonstrates that there is  no overt  spinal canal stenosis. Mild mucosal thickening in bilateral  maxillary sinuses. Regarding the mastoid air cells, there is no  evidence of significant debris or fluid. Regarding the major  vasculature in the neck, no abnormality is identified.     Regarding the visualized lung apices, there is no definite  abnormality, and the lung apices appear relatively clear.                                                                      Impression:   1. Intense hypermetabolic uptake (Deauville 5) in the enlarged left  supraclavicular lymph node, concerning for lymphoproliferative  disease.   2. Please refer to the whole body PET CT performed as a separate  report, for the findings of the remainder of the body            1/6/2023-CT abdomen and pelvis   Retroperitoneal lymph node demonstrated in the left periaortic region at the level of the kidneys measuring 1.9 x 2.6 cm, . There is also a lobulated node anterior to the  esophagus at the level of the left kidney which appears to be 2 adjacent nodes. The largest node towards the left measures 1.9 x 2.5 cm  Sigmoid diverticulosis without evidence diverticulitis.  Cholelithiasis without evidence for cholecystitis.      Echocardiogram 3/2/2023 was unremarkable.  Left ventricular size, wall motion and function are normal. The ejection  fraction is 60-65%.     Global peak LV longitudinal strain is averaged at -19.5%. This is within  reported normal limits (normal <-18%).     Right ventricular function, chamber size, wall motion, and thickness are  normal.     No significant valvular abnormalities present.     Mild dilatation of the aorta is present. Sinuses of Valsalva 4.0 cm      ASSESSMENT/PLAN:    Diffuse large B-cell lymphoma presenting with diffuse lymphadenopathy.    He has marked FDG avid left supraclavicular, axillary, retroperitoneal lymph nodes.  No hepatosplenomegaly.  No evidence of extra lymphatic involvement.  FISH testing shows gains of BCL2 and BCL6 but no  rearrangement.  No rearrangement of MYC or IGH::MYC fusion  No evidence of double hit or triple hit lymphoma.  No evidence of bone marrow involvement.  LDH is mildly elevated.      IPI is 3    We started R-CHOP on 3/8/2023 with curative intent.      Overall he tolerated the first chemotherapy cycle well.  He did have chemotherapy-induced neutropenia and had neutropenic fever but no infection was found.  Fever resolved on its own without antibiotics.      W we will proceed with cycle #2 today.     Chemotherapy induced neutropenia.  We will add G-CSF support.     Plan to repeat PET/CT after 3 cycles.    Prevention of tumor lysis syndrome.  He was given allopurinol which he took for about 7 to 8 days but developed a rash from it.  Allopurinol was stopped.  He did not go into tumor lysis syndrome.    Diarrhea.  From chemotherapy.  We discussed the proper way to use Imodium.  Keep well-hydrated.      Return to clinic in 3 weeks before next cycle.    I answered all of their questions to their satisfaction.  They understand the situation and are agreeable with the plan.    Kiel Maurer MD            Again, thank you for allowing me to participate in the care of your patient.        Sincerely,        Kiel Maurer MD

## 2023-03-29 NOTE — NURSING NOTE
"Oncology Rooming Note    March 29, 2023 7:47 AM   Geovanni Jasso is a 62 year old male who presents for:    Chief Complaint   Patient presents with     Oncology Clinic Visit     Prior to tx     Initial Vitals: /81 (BP Location: Left arm, Patient Position: Chair, Cuff Size: Adult Large)   Pulse 92   Temp 98.1  F (36.7  C) (Oral)   Ht 1.803 m (5' 11\")   Wt 85.3 kg (188 lb)   SpO2 98%   BMI 26.22 kg/m   Estimated body mass index is 26.22 kg/m  as calculated from the following:    Height as of this encounter: 1.803 m (5' 11\").    Weight as of this encounter: 85.3 kg (188 lb). Body surface area is 2.07 meters squared.  No Pain (0) Comment: Data Unavailable   No LMP for male patient.  Allergies reviewed: Yes  Medications reviewed: Yes    Medications: Medication refills not needed today.  Pharmacy name entered into [facility]: [id number] - [place] - [address]    Clinical concerns: Fever 1 week ago - went to ED - no signs of infection; loose stools (not watery) - 80% of bathroom breaks include BM - VERY strong odor - tries Imodium - started more frequently once his fever spiked; skin is smelling different; Discuss alternative to allopurinol - is this needed?; being sent home with Prednisone?; Weight loss     Dr. Maurer was notified.      Tammie Aranda CMA              "

## 2023-03-29 NOTE — PROGRESS NOTES
Infusion Nursing Note:  Geovanni Jasso presents today for C2D1 Adriamycin, Vincristine, Cytoxan, Rituxan and Neulasta Onpro.    Patient seen by provider today: Yes: Dr Maurer   present during visit today: Not Applicable.    Note:  Wife picked up Prednisone refill from our retail pharmacy and patient will start taking Prednisone today.    Patient states he has had fatigue from pushing himself too much.  Learning to balance activity with rest.    Due to history of sensation of lump in throat with first dose of Rituxan, administered Rituxan at 50mg/hr x 30 min and increased by 50mg/hr every 30 min to a max rate of 500mg/hr.  Patient tolerated infusion well.  Only concern was some nausea towards end of Rituxan.    Intravenous Access:  Implanted Port.    Treatment Conditions:  Lab Results   Component Value Date    HGB 14.7 03/29/2023    WBC 6.4 03/29/2023    ANEU 0.1 (LL) 03/22/2023    ANEUTAUTO 4.2 03/29/2023     03/29/2023      Lab Results   Component Value Date     03/29/2023    POTASSIUM 3.9 03/29/2023    CR 0.87 03/29/2023    SHIV 8.9 03/29/2023    BILITOTAL 0.3 03/29/2023    ALBUMIN 3.2 (L) 03/29/2023    ALT 31 03/29/2023    AST 21 03/29/2023     Results reviewed, labs MET treatment parameters, ok to proceed with treatment.    Post Infusion Assessment:  Patient tolerated infusion without incident.  Blood return noted pre and post infusion.  Site patent and intact, free from redness, edema or discomfort.  No evidence of extravasations.  Access discontinued per protocol.   ONPRO  Was placed on patient's: back of left arm.  Was placed at 1422 PM  Podpal used: Yes  Patient education included: what patient can expect after application, what colored lights mean on the device, when to remove device, when and where to call with questions or issues, all patients questions answered and that Neulasta administration will occur at 1730 tomorrow.  Patient tolerated administration well.      Discharge Plan:    AVS to patient via [facility].  Patient will return 4/19/23 for next appointment.   Patient discharged in stable condition accompanied by: wife.  Departure Mode: Wheelchair.      Jesika Espino RN

## 2023-03-29 NOTE — PROGRESS NOTES
Oncology follow-up visit:  Date on this visit: 3/29/23     Diagnoses.    Diffuse large B-cell lymphoma    Primary Physician: Paddy Holly     History Of Present Illness:  Mr. Jasso is a 62 year old  male who presents for follow-up of diffuse lymphadenopathy.  He initially saw me on 1/18/2022 for retroperitoneal lymphadenopathy.  Please see my previous note for details.  I have copied and updated from prior notes.        Over the last few months, since fall of 2022, off and on he has had upper abdominal pain which lasts few minutes. He thinks stress sometimes can bring it on. No relationship to food.  No nausea or vomiting. BMs have been fine. No bleeding. No fevers.   He had a CT Abd Pelvis on 1/6/2023 which showed enlarged retroperitoneal lymphadenopathy  He has noticed some fatigue. He has also noticed some lump in the throat just above the sternum at night. This is going on for a couple of months. He denies any dysphagia.     Currently he feels well. Currently denies any abdominal pain. No nausea or vomiting. BMs are fine. No recent weight loss. No SOB. No neuropathy. No skin flushing. No drenching night sweats.    In the summer, he noticed a rash on the face which has since resolved.  He was noted to have CLAIRE positive.     On 2/6/2023, he had FNA of the left supraclavicular lymph node.  Cytology showed atypical lymphoid cells.  Flow cytometry showed lambda monotypic B cells which are negative for CD5 and CD10.  This is consistent with a B-cell lymphoma.  We decided to go ahead with excisional lymph node biopsy as well as bone marrow biopsy for complete staging.    Excisional lymph node biopsy showed diffuse large B-cell lymphoma.    Bone marrow biopsy did not reveal evidence of lymphoma.    He started R-CHOP on 3/8/2023.      Interval history.    He is accompanied by his wife Charleen     Overall he tolerated the chemotherapy well although he had some side effects.  He noticed some diarrhea for which he  took very occasional Imodium.  There was 1 day when he had low-grade temperatures and he went to emergency room and by that time his fever had resolved.  He was neutropenic with ANC 0.1.  Infectious work-up was negative.  He was discharged without antibiotics.  No more fever since then.  He had a skin the rash from allopurinol so it was stopped.  Rash resolved.  Denies any difficulty breathing.  Neck pain/soreness has almost resolved.  Neck swelling has improved.  Denies neuropathy.      ECOG 0-1    ROS:  A comprehensive ROS was otherwise neg    I reviewed other history in Epic as before.      Past Medical/Surgical History:  Past Medical History:   Diagnosis Date     Cancer (H) 2-7-2023    just diagnosed with lymphona.     Carpal tunnel syndrome      Chronic rhinitis 10/23/2015     Esophageal reflux 12/26/2013     Hand joint stiff, right    Shingles     Past Surgical History:   Procedure Laterality Date     BIOPSY  2-6-23     BIOPSY LYMPH NODE CERVICAL Left 2/16/2023    Procedure: excisional biopsy of a left cervical node;  Surgeon: Aung Tamayo MD;  Location: [facility] OR     COLONOSCOPY  2019    clean, do again in 10 years     COLONOSCOPY WITH CO2 INSUFFLATION N/A 07/17/2019    Procedure: COLONOSCOPY, WITH CO2 INSUFFLATION;  Surgeon: Jaison Hammer MD;  Location: [facility] OR     ENT SURGERY  1979    Loss of hearing rt ear (tinnitis)     HERNIA REPAIR  2000     NO HISTORY OF SURGERY       ORTHOPEDIC SURGERY  1988,1992    rt knee, scope in 88; ACL repair Early 90s     Cancer History:   As above    Allergies:  Allergies as of 03/29/2023 - Reviewed 03/22/2023   Allergen Reaction Noted     Penicillins  12/23/2013     Current Medications:  Current Outpatient Medications   Medication Sig Dispense Refill     acetaminophen (TYLENOL) 325 MG tablet Take 2 tablets (650 mg) by mouth every 6 hours as needed for mild pain 50 tablet 0     diclofenac (VOLTAREN) 1 % topical gel Apply small amount topically to right  shoulder 3-4 times daily as needed for pain. 50 g 0     Efinaconazole (JUBLIA) 10 % SOLN Externally apply topically daily to nail. 8 mL 4     fexofenadine (ALLEGRA) 60 MG tablet Take 1 tablet (60 mg) by mouth 2 times daily 180 tablet 0     fluticasone (FLONASE) 50 MCG/ACT nasal spray Spray 1-2 sprays into both nostrils daily 16 g 11     hydrocortisone (CORTAID) 1 % external ointment Apply topically 2 times daily As needed 56 g 0     ibuprofen (ADVIL,MOTRIN) 200 MG tablet Take 200 mg by mouth every 6 hours as needed       ketoconazole (NIZORAL) 2 % external cream Apply twice daily for 8 weeks to feet 60 g 3     LANsoprazole (PREVACID) 30 MG DR capsule Take 1 capsule (30 mg) by mouth every morning (before breakfast) 90 capsule 2     lidocaine-prilocaine (EMLA) 2.5-2.5 % external cream Apply topically as needed for moderate pain (4-6) Apply over port at least 30 minutes before port access. 30 g 0     methocarbamol (ROBAXIN) 500 MG tablet Take 1 tablet (500 mg) by mouth nightly as needed for muscle spasms 30 tablet 0     ondansetron (ZOFRAN ODT) 4 MG ODT tab Take 1 tablet (4 mg) by mouth every 8 hours as needed for nausea 10 tablet 0     ondansetron (ZOFRAN) 8 MG tablet Take 1 tablet (8 mg) by mouth every 8 hours as needed for nausea (vomiting) 30 tablet 2     oxyCODONE (ROXICODONE) 5 MG tablet Take 1-2 tablets (5-10 mg) by mouth every 4 hours as needed for moderate to severe pain 12 tablet 0     prochlorperazine (COMPAZINE) 10 MG tablet Take 1 tablet (10 mg) by mouth every 6 hours as needed for nausea or vomiting 30 tablet 2     senna-docusate (SENOKOT-S/PERICOLACE) 8.6-50 MG tablet Take 1-2 tablets by mouth 2 times daily 30 tablet 0     traMADol (ULTRAM) 50 MG tablet Take 50 mg by mouth every 6 hours as needed       zolpidem (AMBIEN) 10 MG tablet Take 1 tablet (10 mg) by mouth nightly as needed for sleep 30 tablet 1      Family History:  Family History   Problem Relation Age of Onset     Hyperlipidemia Mother          "medication     Diabetes Father      Other Cancer Father         Lung cancer     Other Cancer Maternal Grandmother         Unsure what type of cancer     Autoimmune Disease No family hx of      Maternal grand mother had some sort of a cancer  Dad had lung cancer  Has 4 kids- all healthy    Social History:  Social History     Socioeconomic History     Marital status:      Spouse name: Not on file     Number of children: Not on file     Years of education: Not on file     Highest education level: Not on file   Occupational History     Not on file   Tobacco Use     Smoking status: Never     Passive exposure: Never     Smokeless tobacco: Never   Vaping Use     Vaping Use: Never used   Substance and Sexual Activity     Alcohol use: Yes     Comment: occassional, 1- 2 per week     Drug use: No     Sexual activity: Yes     Partners: Female     Birth control/protection: Male Surgical, Female Surgical     Comment: vasectomy   Other Topics Concern     Parent/sibling w/ CABG, MI or angioplasty before 65F 55M? No   Social History Narrative     Not on file     Social Determinants of Health     Financial Resource Strain: Not on file   Food Insecurity: Not on file   Transportation Needs: Not on file   Physical Activity: Not on file   Stress: Not on file   Social Connections: Not on file   Intimate Partner Violence: Not on file   Housing Stability: Not on file   no smoking. Drinks etoh wine 2-3 times / week.       Physical Exam:  /81 (BP Location: Left arm, Patient Position: Chair, Cuff Size: Adult Large)   Pulse 92   Temp 98.1  F (36.7  C) (Oral)   Ht 1.803 m (5' 11\")   Wt 85.3 kg (188 lb)   SpO2 98%   BMI 26.22 kg/m     Wt Readings from Last 4 Encounters:   03/29/23 85.3 kg (188 lb)   03/22/23 88 kg (194 lb)   03/08/23 87 kg (191 lb 12.8 oz)   03/01/23 88 kg (194 lb)     CONSTITUTIONAL: no acute distress  EYES: PERRLA, no palor or icterus.   ENT/MOUTH: no mouth lesions. Ears normal  CVS: s1s2 no m r g . "   RESPIRATORY: clear to auscultation b/l  GI: soft non tender no hepatosplenomegaly  NEURO: AAOX3  Grossly non focal neuro exam  INTEGUMENT: no obvious rashes  LYMPHATIC: I was able to palpate a very small left lower cervical lymph node and this is much improved as compared to previously.  No other palpable lymphadenopathy.    MUSCULOSKELETAL: Unremarkable. No bony tenderness.   EXTREMITIES: no edema  PSYCH: Mentation, mood and affect are normal. Decision making capacity is intact            Laboratory/Imaging Studies      Reviewed    3/29/2023.  CBC shows WBC 6.4.  Hemoglobin 14.7.  Platelets 318.  ANC 4.2.    CMP unremarkable except albumin 3.2.  Normal creatinine and potassium and calcium.  .    Previously  SPEP did not show any monoclonal protein.  Serum immunoglobulin levels were normal.  Wilmot free light chain 2.53.  Lambda 1.6.  Ratio normal 1.58.      Hepatitis B surface antibody was positive.  Hepatitis B surface antigen and core antibody were negative.  HIV negative  Hep C negative        1/9/2023  CBC was unremarkable.  Peripheral blood smear was unremarkable.    CLAIRE was positive-1: 80, speckled pattern  dsDNA was negative  ESR 8    2/16/2023 excisional biopsy of the left level 4 cervical lymph node  Large B-cell lymphoma, non-germinal center subtype.    The neoplastic cells express CD20, BCL2, BCL6, and MUM-1 (variable staining intensity), and are negative for CD3, CD5, CD10, CD23, Cyclin D1, and ALK. CD21 and CD23 demonstrate a lack of residual follicular dendritic cell meshworks. C-Myc demonstrates variable staining, and is overall estimated at 20-30% (interpreted as negative; cutoff 40%). Ki-67 proliferative index is also variable, and is overall estimated around 60-70%.      EBV RNA by in situ hybridization (SOY-OTTO) is negative for EBV in the neoplastic cells.   A congo red stain is negative for amyloid deposition in the tissue.     FISH testing on the lymph node  RESULTS:     - Gains of  BCL6 (94%); no rearrangement of BCL6  - Gains of BCL2 (94%); no rearrangement of BCL2  - No rearrangement of MYC or IGH::MYC fusion        INTERPRETATION:    Of the interphase cells examined, 94-96% had signal patterns indicative of gains of BCL6 (3q27) and BCL2 (18q21), consistent with gains of all or part of chromosomes 3 and 18. No evidence was found of rearrangements of BCL6, MYC (8q24), or BCL2, or of a t(8;14).     Cytogenetics.  He has complex cytogenetics.    48,X,-Y,t(1;12)(p13;q13),+3,+3,add(3)(p25),t(3;8)(q21;p21),del(6)(13q21),add(11)(q22),+18,?idic(18)(p11.2)x2[cp19]/46,XY[1]      2/15/2023 bone marrow biopsy was unremarkable without evidence of involvement of lymphoma.        PET/CT 1/27/2023  CHEST:  Marked uptake in the enlarged 1.1 x 2.5 cm right axillary lymph node  (series 4, image 133) with SUV max 8.1 and prominent 1.0 x 1.0 cm left  axillary lymph node (series 4, image 123) with SUV max 4.1.     The central tracheobronchial tree is clear. No pleural effusion or  pneumothorax. No acute consolidation. Minimal bilateral dependent  atelectasis.     Heart size is within normal limits. No pericardial effusion. The  thoracic aorta and main pulmonary artery are within normal limits for  diameter. The esophagus is unremarkable.      ABDOMEN AND PELVIS:  Marked uptake in multiple enlarged retroperitoneal lymph nodes. For  example:  - 2.4 x 1.8 cm left para-aortic node (series 4, image 206) with SUV  max 6.3  - 1.7 x 2.5 cm left para-aortic node (series 4, image 217) with SUV  max 14.3     The liver is unremarkable. Radiodense gallstone without findings of  cholecystitis. No intrahepatic or extrahepatic biliary ductal  dilatation. The pancreas is unremarkable. No pancreatic ductal  dilatation. Splenule. The spleen is unremarkable. The adrenal glands  are unremarkable.     Symmetric enhancement of the kidneys. No hydronephrosis. The urinary  bladder is unremarkable. The reproductive organs are  unremarkable.     Normal caliber of the small and large bowel. Colonic diverticulosis  without diverticulitis. No free air, free fluid, or fluid collection.  Normal caliber of the abdominal aorta.     LOWER EXTREMITIES:   There is no suspicious FDG uptake in the visualized lower extremities.  Intramuscular lipomas in left rectus femoris and right soleus. Right  knee ACL repair changes.     BONES:   There is no suspicious FDG uptake in the skeleton. There are no  suspicious lytic or blastic osseous lesions. Degenerative changes of  bilateral hips.                                                                      IMPRESSION: In this patient with retroperitoneal lymphadenopathy:  1. Intense hypermetabolic uptake (Deauville 5) in multiple enlarged  and prominent axillary, left supraclavicular and retroperitoneal lymph  nodes, concerning for lymphoproliferative disease.  1a. Consider biopsying right axillary lymph node (max SUV 8) or left  supraclavicular node, largest (max SUV 14).    2. Cholelithiasis without cholecystitis.  3. Please see neck PET/CT report for head and neck findings.    PET CT neck 1/27/2023  Marked focal uptake in the enlarged left supraclavicular lymph node  measuring 3.3 x 2.3 cm (series 2, image 98) with SUV max 13.7.     Regarding evaluation of the mucosal space, there is no definite  abnormality or abnormal metabolic uptake on PET CT in the nasopharynx,  oropharynx, hypopharynx, or of the glottis. Regarding the tongue base,  no abnormality is identified. Regarding the major salivary glands, no  abnormality is identified. Regarding the thyroid gland, no abnormality  is identified.     Limited evaluation of the cervical vertebra demonstrates that there is  no overt spinal canal stenosis. Mild mucosal thickening in bilateral  maxillary sinuses. Regarding the mastoid air cells, there is no  evidence of significant debris or fluid. Regarding the major  vasculature in the neck, no abnormality is  identified.     Regarding the visualized lung apices, there is no definite  abnormality, and the lung apices appear relatively clear.                                                                      Impression:   1. Intense hypermetabolic uptake (Deauville 5) in the enlarged left  supraclavicular lymph node, concerning for lymphoproliferative  disease.   2. Please refer to the whole body PET CT performed as a separate  report, for the findings of the remainder of the body            1/6/2023-CT abdomen and pelvis   Retroperitoneal lymph node demonstrated in the left periaortic region at the level of the kidneys measuring 1.9 x 2.6 cm, . There is also a lobulated node anterior to the  esophagus at the level of the left kidney which appears to be 2 adjacent nodes. The largest node towards the left measures 1.9 x 2.5 cm  Sigmoid diverticulosis without evidence diverticulitis.  Cholelithiasis without evidence for cholecystitis.      Echocardiogram 3/2/2023 was unremarkable.  Left ventricular size, wall motion and function are normal. The ejection  fraction is 60-65%.     Global peak LV longitudinal strain is averaged at -19.5%. This is within  reported normal limits (normal <-18%).     Right ventricular function, chamber size, wall motion, and thickness are  normal.     No significant valvular abnormalities present.     Mild dilatation of the aorta is present. Sinuses of Valsalva 4.0 cm      ASSESSMENT/PLAN:    Diffuse large B-cell lymphoma presenting with diffuse lymphadenopathy.    He has marked FDG avid left supraclavicular, axillary, retroperitoneal lymph nodes.  No hepatosplenomegaly.  No evidence of extra lymphatic involvement.  FISH testing shows gains of BCL2 and BCL6 but no rearrangement.  No rearrangement of MYC or IGH::MYC fusion  No evidence of double hit or triple hit lymphoma.  No evidence of bone marrow involvement.  LDH is mildly elevated.      IPI is 3    We started R-CHOP on 3/8/2023 with curative  intent.      Overall he tolerated the first chemotherapy cycle well.  He did have chemotherapy-induced neutropenia and had neutropenic fever but no infection was found.  Fever resolved on its own without antibiotics.      W we will proceed with cycle #2 today.     Chemotherapy induced neutropenia.  We will add G-CSF support.     Plan to repeat PET/CT after 3 cycles.    Prevention of tumor lysis syndrome.  He was given allopurinol which he took for about 7 to 8 days but developed a rash from it.  Allopurinol was stopped.  He did not go into tumor lysis syndrome.    Diarrhea.  From chemotherapy.  We discussed the proper way to use Imodium.  Keep well-hydrated.      Return to clinic in 3 weeks before next cycle.    I answered all of their questions to their satisfaction.  They understand the situation and are agreeable with the plan.    Kiel Maurer MD

## 2023-03-29 NOTE — PATIENT INSTRUCTIONS
Chemo today    See Corrine in 3 weeks and chemo    Schedule PET around 5/4    See me 5/9 and chemo on 5/10

## 2023-04-03 ENCOUNTER — TELEPHONE (OUTPATIENT)
Dept: ONCOLOGY | Facility: CLINIC | Age: 63
End: 2023-04-03
Payer: COMMERCIAL

## 2023-04-03 NOTE — TELEPHONE ENCOUNTER
Bill and his wife called to report foul smelling gas and are wondering if he can take simethicone. He is getting up every 1-2 hours at night to urinate and is also having frequent bowel movements and large amounts of gas. He is taking imodium, 2-3 tablets per day and 1 tablet at a time. Stools are formed and brown but they report they are a strange shape/consistency. Denies watery or loose stools. Unable to state when his last loose stool was. The gas is making him uncomfortable. Gas started increasing 2-3 days after chemo. He is unable to tell if there is blood in the stool because he is color blind. Denies fevers but has some facial flushing. He also feels weak.    Reviewed indications for imodium, including loose or watery stools. Advised him not to take it if his stools are formed.     Will route to Corrine for recommendations on simethicone.    Kecia Bui, RN  Triage Nurse Advisor  [facility]

## 2023-04-03 NOTE — TELEPHONE ENCOUNTER
Writer called Charleen with recommendations from Corrine Smith SAURABH.    Luis, PURNIMA Esquivel CNP, Kara May, RN 2 minutes ago (12:34 PM)     SG  Yes - simethicone is safe.   Askj him to let us know if the stools get more loose as C diff has a nasty smell and hope it is not moving toward this. Corrine Villaseñor verbalized understanding.

## 2023-04-04 ENCOUNTER — NURSE TRIAGE (OUTPATIENT)
Dept: NURSING | Facility: CLINIC | Age: 63
End: 2023-04-04
Payer: COMMERCIAL

## 2023-04-04 NOTE — TELEPHONE ENCOUNTER
Temp 100.2. Has  Lymphoma. 2nd round of chemo was 6 days ago, prior to that was 4 weeks ago.  Onset diarrhea and gas about 5 days ago.  Immodium started.Today  Listless. Sleeping all day. Last dose of prednisone today. Urinary frequency without pain, does feel that he is emptying bladder but then needs to go again in 1 hour.  , every hour. This afternoon was chilled but afebrile. No cough. Gas pains relieved by stool mostly. Note swelling under eyes( Bags under eyes) is increased today. Has port. No signs of infection at port site.     Protocol reviewed and care advice. To ED now. Call back with further questions/concerns.     MADHAVI GALICIA RN  [facility] nurse advisors  4/4/2023  7:26 PM      Reason for Disposition    [1] Neutropenia known or suspected (e.g., recent cancer chemotherapy) AND [2] fever > 100.4 F (38.0 C)    Additional Information    Negative: Shock suspected (e.g., cold/pale/clammy skin, too weak to stand, low BP, rapid pulse)    Negative: Difficult to awaken or acting confused (e.g., disoriented, slurred speech)    Negative: Bluish (or gray) lips or face now    Negative: New-onset rash with many purple (or blood-colored) spots or dots    Negative: Sounds like a life-threatening emergency to the triager    Negative: Fever > 103 F (39.4 C)    Protocols used: CANCER - FEVER-A-

## 2023-04-05 ENCOUNTER — TELEPHONE (OUTPATIENT)
Dept: ONCOLOGY | Facility: CLINIC | Age: 63
End: 2023-04-05
Payer: COMMERCIAL

## 2023-04-05 NOTE — TELEPHONE ENCOUNTER
Wife is calling to notify us patient went in to the ED yesterday for fever. Please see ED notes via care everywhere for more info. They are awaiting a bed for admission.    Azeb Diaz RN, BSN, PHN, OCN  [facility]

## 2023-04-07 LAB — CULTURE HARVEST COMPLETE DATE: NORMAL

## 2023-04-10 ENCOUNTER — PATIENT OUTREACH (OUTPATIENT)
Dept: ONCOLOGY | Facility: CLINIC | Age: 63
End: 2023-04-10
Payer: COMMERCIAL

## 2023-04-10 NOTE — TELEPHONE ENCOUNTER
Spoke to patient, documentation in patient outreach encounter. Will close this encounter.    Jocelyn Renae, RN, BSN  RN Care Coordinator - Oncology  [facility]

## 2023-04-10 NOTE — PROGRESS NOTES
[facility]: Post-Discharge Note  SITUATION                                                      Admission:    Admission Date: 04/04/23   Reason for Admission: stomach pains  Discharge:   Discharge Date: 04/09/23  Discharge Diagnosis: sepsis    BACKGROUND                                                      Per hospital discharge summary and inpatient provider notes.  Brief Summary     Geovanni Jasso is a 62 y.o. male with a past medical history significant for recently diagnosed diffuse large B-cell lymphoma, followed by oncology clinic at [facility]. He had received his second round of chemotherapy on 3/29/2023.   Patient presented to ED with complaints of generalized weakness and fatigue for the last for 5 days, has been having multiple bouts of nonbloody loose stools. Tonight he was noted to have fever of 101  F prompting his ER visit       Assessment / Plan:     Plans for 4/8/2023:  - seen by ID and oncology  - nitazoxanide 500 mg orally twice a day for cryptosporidium. Completed course   - cdif positive on oral vancomycin - treat for 14 days  - with neutropenia resolved stopped cefepime and metronidazole   - diet as tolerated  - PT/OT - likely discharge home health care tomorrow pending symptoms and mobility     Sepsis  Neutropenic fever  -Presenting with fever of 101  F, severe leukopenia.  -Neutropenic fever, finding of pancolitis, possible GI source  -Empirically started on IV cefepime, IV Flagyl in ED. Continue for now.  -stool PCR pathogen negative, Cryptosporidium +  -On enteric precaution.  -Consulted infectious disease     Typhlitis   -Noted on CT, presenting with diarrhea, fever, recent chemo.  -Chemo-induced pancolitis versus infectious colitis.  -Empirically started on IV cefepime/Flagyl for now due to neutropenic fever.  - awaiting neutropenia to improve - resolved with neupogen 4/7     Non-Hodgkin's lymphoma  -Recently diagnosed, managed by oncology at [facility].  -Consulted in-house oncology,  "follow-up recommendations.      ASSESSMENT    Ever since he got the fever, before he went into the hospital  His voice has changed - considers this to be weak and soft. The whole time in the hospital he didn't talk much, considers himself a talker. Usually strong  On oral vancomycin until next Tuesday  Achiness started in the last couple days, but he denies having any pain.    During the evenings last week he was up every hour having a bowel movement, he is now having a bowel movement every 3 hours.     Charleen reports that he received a white blood cell shots left bruising that is more than normal all over his abdomen. They are wondering if his blood counts are okay.     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    Should they proceed with chemotherapy even though he is still fighting cdiff?    Charleen states that he has been on prevacid for a quite a long time. Charleen has read that it can be difficult to fight off c-diff while on this medication. Continue/discontinue?    I let her know I would forward to provider and let them know what he would like going forward - they would like a virtual visit with Dr. Maurer to discuss. Writer will call back once I hear from Dr. Maurer what he would like to do.         Discharge Assessment  How are your symptoms? (Red Flag symptoms escalate to triage hotline per guidelines): Unchanged  Do you feel your condition is stable enough to be safe at home until your provider visit?: Yes  Does the patient have their discharge instructions? : Yes  Does the patient have questions regarding their discharge instructions? : No  Were you started on any new medications or were there changes to any of your previous medications? : Yes  Does the patient have all of their medications?: Yes  Do you have questions regarding any of your medications? : No  Do you have all of your needed medical supplies or equipment (DME)?  " (i.e. oxygen tank, CPAP, cane, etc.): Yes  Discharge follow-up appointment scheduled within 14 calendar days? : Yes  Discharge Follow Up Appointment Date: 04/19/23  Discharge Follow Up Appointment Scheduled with?: Specialty Care Provider         Post-op (Clinicians Only)  Eating & Drinking: eating and drinking without complaints/concerns  PO Intake: clear liquids  Additional Symptoms: decreased appetite  Bowel Function: loose stools  Date of last BM: 04/10/23  Urinary Status: voiding without complaint/concerns      PLAN                                                      Outpatient Plan:  Follow up as listed below.    Future Appointments   Date Time Provider Department Center   4/19/2023  7:30 AM NURSE ONLY [facility] [place]   4/19/2023  8:00 AM Corrine Smith APRN CNP [facility]   4/19/2023  8:30 AM BAY 6 INFUSION [facility] [place]   4/20/2023 10:30 AM Lui Ashton MD [facility]   5/5/2023  1:15 PM MGPET1 [facility] [place]   5/9/2023 10:00 AM Kiel Maurer MD [facility]   5/10/2023  7:15 AM NURSE ONLY [facility]   5/10/2023  7:30 AM BAY 3 INFUSION [facility]F [place]   5/19/2023  8:30 AM Marbella Castro MD [facility]       Jocelyn Renae, RN, BSN  RN Care Coordinator - Oncology  [facility]

## 2023-04-10 NOTE — PROGRESS NOTES
Attempted to reach Bill to discuss hospital follow-up but reached voicemail. VM left requesting call back, direct desk number given.    Jocelyn Renae, RN, BSN  RN Care Coordinator - Oncology  [facility]

## 2023-04-13 ENCOUNTER — VIRTUAL VISIT (OUTPATIENT)
Dept: ONCOLOGY | Facility: CLINIC | Age: 63
End: 2023-04-13
Attending: INTERNAL MEDICINE
Payer: COMMERCIAL

## 2023-04-13 DIAGNOSIS — K52.9 TYPHLITIS: ICD-10-CM

## 2023-04-13 DIAGNOSIS — C83.30 DIFFUSE LARGE B-CELL LYMPHOMA, UNSPECIFIED BODY REGION (H): Primary | ICD-10-CM

## 2023-04-13 PROCEDURE — G0463 HOSPITAL OUTPT CLINIC VISIT: HCPCS | Mod: PN,GT | Performed by: INTERNAL MEDICINE

## 2023-04-13 PROCEDURE — G0463 HOSPITAL OUTPT CLINIC VISIT: HCPCS | Mod: PN,GT,95 | Performed by: INTERNAL MEDICINE

## 2023-04-13 PROCEDURE — 99215 OFFICE O/P EST HI 40 MIN: CPT | Mod: VID | Performed by: INTERNAL MEDICINE

## 2023-04-13 NOTE — PROGRESS NOTES
Virtual Visit Details    Type of service:  Video Visit   Video Start Time: 8:07 AM  Video End Time:8:32 AM    Originating Location (pt. Location): Home  Distant Location (provider location):  On-site  Platform used for Video Visit: Lainey

## 2023-04-13 NOTE — NURSING NOTE
Is the patient currently in the state of [place]? YES    Visit mode:VIDEO    If the visit is dropped, the patient can be reconnected by: VIDEO VISIT: Send to e-mail at: [email or web address]    Will anyone else be joining the visit? Yes, spouse      How would you like to obtain your AVS? MyChart    Are changes needed to the allergy or medication list? NO  Patient verified medications and allergies are correct via eCheck-in. Patient confirms no changes at this time and/or since last reviewed by clinic staff    Reason for visit: Geovanni Jasso 62 year old male presents today for hospital f/u on 4/4/23 for lymphoma.  Tammie Cheng, Virtual Facilitator

## 2023-04-13 NOTE — LETTER
4/13/2023         RE: Geovanni Jasso  [id number]  [facility] [address]        Dear Colleague,    Thank you for referring your patient, Geovanni Jasso, to the [facility]. Please see a copy of my visit note below.    Virtual Visit Details    Type of service:  Video Visit   Video Start Time: 8:07 AM  Video End Time:8:32 AM    Originating Location (pt. Location): Home  Distant Location (provider location):  On-site  Platform used for Video Visit: [facility]    Oncology follow-up visit:  Date on this visit: 4/13/23     Diagnoses.    Diffuse large B-cell lymphoma    Primary Physician: Paddy Holly     History Of Present Illness:  Mr. Jasso is a 62 year old  male who presents for follow-up of diffuse lymphadenopathy.  He initially saw me on 1/18/2022 for retroperitoneal lymphadenopathy.  Please see my previous note for details.  I have copied and updated from prior notes.        Over the last few months, since fall of 2022, off and on he has had upper abdominal pain which lasts few minutes. He thinks stress sometimes can bring it on. No relationship to food.  No nausea or vomiting. BMs have been fine. No bleeding. No fevers.   He had a CT Abd Pelvis on 1/6/2023 which showed enlarged retroperitoneal lymphadenopathy  He has noticed some fatigue. He has also noticed some lump in the throat just above the sternum at night. This is going on for a couple of months. He denies any dysphagia.     Currently he feels well. Currently denies any abdominal pain. No nausea or vomiting. BMs are fine. No recent weight loss. No SOB. No neuropathy. No skin flushing. No drenching night sweats.    In the summer, he noticed a rash on the face which has since resolved.  He was noted to have CLAIRE positive.     On 2/6/2023, he had FNA of the left supraclavicular lymph node.  Cytology showed atypical lymphoid cells.  Flow cytometry showed lambda monotypic B cells which are negative for CD5 and CD10.  This is  consistent with a B-cell lymphoma.  We decided to go ahead with excisional lymph node biopsy as well as bone marrow biopsy for complete staging.    Excisional lymph node biopsy showed diffuse large B-cell lymphoma.    Bone marrow biopsy did not reveal evidence of lymphoma.    He started R-CHOP on 3/8/2023.    Cycle #2 R-CHOP on 3/29/2023.      Interval history.    This is a video visit.  He is accompanied by his wife Charleen   I reviewed the discharge summary from recent hospitalization at [facility] when he was admitted from 4/4/2023 - 4/9/2023.  He was admitted for neutropenic fever, sepsis and C. difficile colitis.  He presented with multiple bouts of nonbloody diarrhea and fever of 101 with generalized fatigue and weakness.  He was neutropenic and was noted to have pancolitis.  C. difficile was positive.  Cryptosporidium was also positive.  He was started on IV cefepime and IV Flagyl initially.  Infectious disease was consulted and he was switched to oral vancomycin to complete 14 days.  Neutropenia resolved by 4/7/2023.    Now he feels better.  Stools are soft semisolid and less often.  He denies any abdominal pain.  He will finish vancomycin on 4/19/2023.  He does not have any fever anymore.  He feels more energetic now although still feels pretty fatigued.  No nausea or vomiting.  No shortness of breath.  No tingling or numbness.  No bleeding.  No new swellings.  Neck is not bothering him.    ECOG 1    ROS:  A comprehensive ROS was otherwise neg    I reviewed other history in Epic as before.      Past Medical/Surgical History:  Past Medical History:   Diagnosis Date    Cancer (H) 2-7-2023    just diagnosed with lymphona.    Carpal tunnel syndrome     Chronic rhinitis 10/23/2015    Esophageal reflux 12/26/2013    Hand joint stiff, right    Shingles     Past Surgical History:   Procedure Laterality Date    BIOPSY  2-6-23    BIOPSY LYMPH NODE CERVICAL Left 2/16/2023    Procedure: excisional biopsy of a left  cervical node;  Surgeon: Aung Tamayo MD;  Location: [facility] OR    COLONOSCOPY  2019    clean, do again in 10 years    COLONOSCOPY WITH CO2 INSUFFLATION N/A 07/17/2019    Procedure: COLONOSCOPY, WITH CO2 INSUFFLATION;  Surgeon: Jaison Hammer MD;  Location: [facility] OR    ENT SURGERY  1979    Loss of hearing rt ear (tinnitis)    HERNIA REPAIR  2000    NO HISTORY OF SURGERY      ORTHOPEDIC SURGERY  1988,1992    rt knee, scope in 88; ACL repair Early 90s     Cancer History:   As above    Allergies:  Allergies as of 04/13/2023 - Reviewed 04/13/2023   Allergen Reaction Noted    Penicillins  12/23/2013     Current Medications:  Current Outpatient Medications   Medication Sig Dispense Refill    acetaminophen (TYLENOL) 325 MG tablet Take 2 tablets (650 mg) by mouth every 6 hours as needed for mild pain 50 tablet 0    diclofenac (VOLTAREN) 1 % topical gel Apply small amount topically to right shoulder 3-4 times daily as needed for pain. 50 g 0    Efinaconazole (JUBLIA) 10 % SOLN Externally apply topically daily to nail. 8 mL 4    fexofenadine (ALLEGRA) 60 MG tablet Take 1 tablet (60 mg) by mouth 2 times daily 180 tablet 0    fluticasone (FLONASE) 50 MCG/ACT nasal spray Spray 1-2 sprays into both nostrils daily 16 g 11    hydrocortisone (CORTAID) 1 % external ointment Apply topically 2 times daily As needed 56 g 0    ibuprofen (ADVIL,MOTRIN) 200 MG tablet Take 200 mg by mouth every 6 hours as needed      ketoconazole (NIZORAL) 2 % external cream Apply twice daily for 8 weeks to feet 60 g 3    LANsoprazole (PREVACID) 30 MG DR capsule Take 1 capsule (30 mg) by mouth every morning (before breakfast) 90 capsule 2    lidocaine-prilocaine (EMLA) 2.5-2.5 % external cream Apply topically as needed for moderate pain (4-6) Apply over port at least 30 minutes before port access. 30 g 0    methocarbamol (ROBAXIN) 500 MG tablet Take 1 tablet (500 mg) by mouth nightly as needed for muscle spasms 30 tablet 0    ondansetron  (ZOFRAN ODT) 4 MG ODT tab Take 1 tablet (4 mg) by mouth every 8 hours as needed for nausea 10 tablet 0    ondansetron (ZOFRAN) 8 MG tablet Take 1 tablet (8 mg) by mouth every 8 hours as needed for nausea (vomiting) 30 tablet 2    oxyCODONE (ROXICODONE) 5 MG tablet Take 1-2 tablets (5-10 mg) by mouth every 4 hours as needed for moderate to severe pain 12 tablet 0    prochlorperazine (COMPAZINE) 10 MG tablet Take 1 tablet (10 mg) by mouth every 6 hours as needed for nausea or vomiting 30 tablet 2    senna-docusate (SENOKOT-S/PERICOLACE) 8.6-50 MG tablet Take 1-2 tablets by mouth 2 times daily 30 tablet 0    traMADol (ULTRAM) 50 MG tablet Take 50 mg by mouth every 6 hours as needed      zolpidem (AMBIEN) 10 MG tablet Take 1 tablet (10 mg) by mouth nightly as needed for sleep 30 tablet 1      Family History:  Family History   Problem Relation Age of Onset    Hyperlipidemia Mother         medication    Diabetes Father     Other Cancer Father         Lung cancer    Other Cancer Maternal Grandmother         Unsure what type of cancer    Autoimmune Disease No family hx of      Maternal grand mother had some sort of a cancer  Dad had lung cancer  Has 4 kids- all healthy    Social History:  Social History     Socioeconomic History    Marital status:      Spouse name: Not on file    Number of children: Not on file    Years of education: Not on file    Highest education level: Not on file   Occupational History    Not on file   Tobacco Use    Smoking status: Never     Passive exposure: Never    Smokeless tobacco: Never   Vaping Use    Vaping status: Never Used     Passive vaping exposure: Yes   Substance and Sexual Activity    Alcohol use: Yes     Comment: occassional, 1- 2 per week    Drug use: No    Sexual activity: Yes     Partners: Female     Birth control/protection: Male Surgical, Female Surgical     Comment: vasectomy   Other Topics Concern    Parent/sibling w/ CABG, MI or angioplasty before 65F 55M? No   Social  History Narrative    Not on file     Social Determinants of Health     Financial Resource Strain: Not on file   Food Insecurity: Not on file   Transportation Needs: Not on file   Physical Activity: Not on file   Stress: Not on file   Social Connections: Not on file   Intimate Partner Violence: Not on file   Housing Stability: Not on file   no smoking. Drinks etoh wine 2-3 times / week.       Physical Exam:  There were no vitals taken for this visit.   Wt Readings from Last 4 Encounters:   03/29/23 85.3 kg (188 lb)   03/22/23 88 kg (194 lb)   03/08/23 87 kg (191 lb 12.8 oz)   03/01/23 88 kg (194 lb)       Constitutional.  Looks well and in no apparent distress.   Eyes.  Without eye redness or apparent jaundice.   Respiratory.  Non labored breathing. Speaking in full sentences.    Skin.  No concerning skin rashes on the skin visualized.   Neurological.  Is alert and oriented.  Psychiatric.  Mood and affect seem appropriate.      The rest of a comprehensive physical examination is deferred due to Public Health Emergency video visit restrictions.            Laboratory/Imaging Studies      Reviewed    4/4/2023.  CBC shows WBC 0.3.  Hemoglobin 13.8.  Platelets 117.    4/9/2023  CBC shows WBC 19.4.  It was 22.5 on 4/8/2023.  ANC 13.6.  There was a left shift with metamyelocytes and myelocytes.  Hemoglobin 11.3.  Platelets 113.  Chemistry showed normal kidney functions.        Previously  SPEP did not show any monoclonal protein.  Serum immunoglobulin levels were normal.  [place] free light chain 2.53.  Lambda 1.6.  Ratio normal 1.58.      Hepatitis B surface antibody was positive.  Hepatitis B surface antigen and core antibody were negative.  HIV negative  Hep C negative        1/9/2023  CBC was unremarkable.  Peripheral blood smear was unremarkable.    CLAIRE was positive-1: 80, speckled pattern  dsDNA was negative  ESR 8    CT chest PE protocol/abdomen/pelvis on 4/5/2023 showed pancolitis.  No pulmonary embolism.   Normal-appearing lymph nodes.    2/16/2023 excisional biopsy of the left level 4 cervical lymph node  Large B-cell lymphoma, non-germinal center subtype.    The neoplastic cells express CD20, BCL2, BCL6, and MUM-1 (variable staining intensity), and are negative for CD3, CD5, CD10, CD23, Cyclin D1, and ALK. CD21 and CD23 demonstrate a lack of residual follicular dendritic cell meshworks. C-Myc demonstrates variable staining, and is overall estimated at 20-30% (interpreted as negative; cutoff 40%). Ki-67 proliferative index is also variable, and is overall estimated around 60-70%.      EBV RNA by in situ hybridization (SOY-OTTO) is negative for EBV in the neoplastic cells.   A congo red stain is negative for amyloid deposition in the tissue.     FISH testing on the lymph node  RESULTS:     - Gains of BCL6 (94%); no rearrangement of BCL6  - Gains of BCL2 (94%); no rearrangement of BCL2  - No rearrangement of MYC or IGH::MYC fusion        INTERPRETATION:    Of the interphase cells examined, 94-96% had signal patterns indicative of gains of BCL6 (3q27) and BCL2 (18q21), consistent with gains of all or part of chromosomes 3 and 18. No evidence was found of rearrangements of BCL6, MYC (8q24), or BCL2, or of a t(8;14).     Cytogenetics.  He has complex cytogenetics.    48,X,-Y,t(1;12)(p13;q13),+3,+3,add(3)(p25),t(3;8)(q21;p21),del(6)(13q21),add(11)(q22),+18,?idic(18)(p11.2)x2[cp19]/46,XY[1]      2/15/2023 bone marrow biopsy was unremarkable without evidence of involvement of lymphoma.        PET/CT 1/27/2023  CHEST:  Marked uptake in the enlarged 1.1 x 2.5 cm right axillary lymph node  (series 4, image 133) with SUV max 8.1 and prominent 1.0 x 1.0 cm left  axillary lymph node (series 4, image 123) with SUV max 4.1.     The central tracheobronchial tree is clear. No pleural effusion or  pneumothorax. No acute consolidation. Minimal bilateral dependent  atelectasis.     Heart size is within normal limits. No pericardial  effusion. The  thoracic aorta and main pulmonary artery are within normal limits for  diameter. The esophagus is unremarkable.      ABDOMEN AND PELVIS:  Marked uptake in multiple enlarged retroperitoneal lymph nodes. For  example:  - 2.4 x 1.8 cm left para-aortic node (series 4, image 206) with SUV  max 6.3  - 1.7 x 2.5 cm left para-aortic node (series 4, image 217) with SUV  max 14.3     The liver is unremarkable. Radiodense gallstone without findings of  cholecystitis. No intrahepatic or extrahepatic biliary ductal  dilatation. The pancreas is unremarkable. No pancreatic ductal  dilatation. Splenule. The spleen is unremarkable. The adrenal glands  are unremarkable.     Symmetric enhancement of the kidneys. No hydronephrosis. The urinary  bladder is unremarkable. The reproductive organs are unremarkable.     Normal caliber of the small and large bowel. Colonic diverticulosis  without diverticulitis. No free air, free fluid, or fluid collection.  Normal caliber of the abdominal aorta.     LOWER EXTREMITIES:   There is no suspicious FDG uptake in the visualized lower extremities.  Intramuscular lipomas in left rectus femoris and right soleus. Right  knee ACL repair changes.     BONES:   There is no suspicious FDG uptake in the skeleton. There are no  suspicious lytic or blastic osseous lesions. Degenerative changes of  bilateral hips.                                                                      IMPRESSION: In this patient with retroperitoneal lymphadenopathy:  1. Intense hypermetabolic uptake (Deauville 5) in multiple enlarged  and prominent axillary, left supraclavicular and retroperitoneal lymph  nodes, concerning for lymphoproliferative disease.  1a. Consider biopsying right axillary lymph node (max SUV 8) or left  supraclavicular node, largest (max SUV 14).    2. Cholelithiasis without cholecystitis.  3. Please see neck PET/CT report for head and neck findings.    PET CT neck 1/27/2023  Marked focal  uptake in the enlarged left supraclavicular lymph node  measuring 3.3 x 2.3 cm (series 2, image 98) with SUV max 13.7.     Regarding evaluation of the mucosal space, there is no definite  abnormality or abnormal metabolic uptake on PET CT in the nasopharynx,  oropharynx, hypopharynx, or of the glottis. Regarding the tongue base,  no abnormality is identified. Regarding the major salivary glands, no  abnormality is identified. Regarding the thyroid gland, no abnormality  is identified.     Limited evaluation of the cervical vertebra demonstrates that there is  no overt spinal canal stenosis. Mild mucosal thickening in bilateral  maxillary sinuses. Regarding the mastoid air cells, there is no  evidence of significant debris or fluid. Regarding the major  vasculature in the neck, no abnormality is identified.     Regarding the visualized lung apices, there is no definite  abnormality, and the lung apices appear relatively clear.                                                                      Impression:   1. Intense hypermetabolic uptake (Deauville 5) in the enlarged left  supraclavicular lymph node, concerning for lymphoproliferative  disease.   2. Please refer to the whole body PET CT performed as a separate  report, for the findings of the remainder of the body            1/6/2023-CT abdomen and pelvis   Retroperitoneal lymph node demonstrated in the left periaortic region at the level of the kidneys measuring 1.9 x 2.6 cm, . There is also a lobulated node anterior to the  esophagus at the level of the left kidney which appears to be 2 adjacent nodes. The largest node towards the left measures 1.9 x 2.5 cm  Sigmoid diverticulosis without evidence diverticulitis.  Cholelithiasis without evidence for cholecystitis.      Echocardiogram 3/2/2023 was unremarkable.  Left ventricular size, wall motion and function are normal. The ejection  fraction is 60-65%.     Global peak LV longitudinal strain is averaged at  -19.5%. This is within  reported normal limits (normal <-18%).     Right ventricular function, chamber size, wall motion, and thickness are  normal.     No significant valvular abnormalities present.     Mild dilatation of the aorta is present. Sinuses of Valsalva 4.0 cm      ASSESSMENT/PLAN:    Diffuse large B-cell lymphoma presenting with diffuse lymphadenopathy.    He has marked FDG avid left supraclavicular, axillary, retroperitoneal lymph nodes.  No hepatosplenomegaly.  No evidence of extra lymphatic involvement.  FISH testing shows gains of BCL2 and BCL6 but no rearrangement.  No rearrangement of MYC or IGH::MYC fusion  No evidence of double hit or triple hit lymphoma.  No evidence of bone marrow involvement.  LDH is mildly elevated.      IPI is 3    We started R-CHOP on 3/8/2023 with curative intent.      Overall he tolerated the first chemotherapy cycle well.  He did have chemotherapy-induced neutropenia and had neutropenic fever but no infection was found.  Fever resolved on its own without antibiotics.      He received cycle #2 on 3/29/2023 with G-CSF support because of neutropenic fever.    He developed neutropenic fever with pancolitis from C. difficile.  Cryptosporidium was also positive.    We discussed the situation in detail.    Now he is doing better.  He will be due for cycle #3 on 4/19/2023.  At that time we will need to see how he has bounced back in terms of physical symptoms.  If he is feeling well, then my recommendation would be to continue with the same chemotherapy and keep on track because we are treating a curable cancer so we need to be as aggressive as possible.    The other option would be to try to decrease the dose of chemotherapy by 10% but any deviation from standard would compromise on effectiveness of chemotherapy to some degree so I would like to keep him on track as much as possible.  Other option would be to delay the chemotherapy by 1 week.    We will need to make these  determinations next week.    We will repeat PET scan after cycle #3.    Chemotherapy induced neutropenic fever/typhlitis.  His white blood cell count went down to 0.3.  These bounced back after 3 days.  I am not certain whether he got any extra Neupogen in the hospital or not.  Continue with G-CSF support for his ongoing chemotherapy.  C. difficile was positive.  Now on vancomycin.  Symptoms have been improving and now he has semisolid stools and the frequency is also less.  If his symptoms are not completely resolved by next week, then I would recommend extending vancomycin for 10 to 14 days.  If symptoms are completely resolved, then we can stop the vancomycin at 2 weeks.    Frequency urination.  He does not have any dysuria.  Urine did not have infection when it was checked on 4/4/2023.  Urinary frequency is slowly improving.  Keep well-hydrated.      We did not address the following today.  Prevention of tumor lysis syndrome.  He was given allopurinol which he took for about 7 to 8 days but developed a rash from it.  Allopurinol was stopped.  He did not go into tumor lysis syndrome.      Return to clinic next week.    I answered all of their questions to their satisfaction.  They understand the situation and are agreeable with the plan.    Kiel Maurer MD    I spent >40 minutes on this visit on the date of service, including the video time, reviewing records and labs and imaging and placing new orders as well as coordination of care and documentation.

## 2023-04-13 NOTE — PROGRESS NOTES
Oncology follow-up visit:  Date on this visit: 4/13/23     Diagnoses.    Diffuse large B-cell lymphoma    Primary Physician: Paddy Holly     History Of Present Illness:  Mr. Jasso is a 62 year old  male who presents for follow-up of diffuse lymphadenopathy.  He initially saw me on 1/18/2022 for retroperitoneal lymphadenopathy.  Please see my previous note for details.  I have copied and updated from prior notes.        Over the last few months, since fall of 2022, off and on he has had upper abdominal pain which lasts few minutes. He thinks stress sometimes can bring it on. No relationship to food.  No nausea or vomiting. BMs have been fine. No bleeding. No fevers.   He had a CT Abd Pelvis on 1/6/2023 which showed enlarged retroperitoneal lymphadenopathy  He has noticed some fatigue. He has also noticed some lump in the throat just above the sternum at night. This is going on for a couple of months. He denies any dysphagia.     Currently he feels well. Currently denies any abdominal pain. No nausea or vomiting. BMs are fine. No recent weight loss. No SOB. No neuropathy. No skin flushing. No drenching night sweats.    In the summer, he noticed a rash on the face which has since resolved.  He was noted to have CLAIRE positive.     On 2/6/2023, he had FNA of the left supraclavicular lymph node.  Cytology showed atypical lymphoid cells.  Flow cytometry showed lambda monotypic B cells which are negative for CD5 and CD10.  This is consistent with a B-cell lymphoma.  We decided to go ahead with excisional lymph node biopsy as well as bone marrow biopsy for complete staging.    Excisional lymph node biopsy showed diffuse large B-cell lymphoma.    Bone marrow biopsy did not reveal evidence of lymphoma.    He started R-CHOP on 3/8/2023.    Cycle #2 R-CHOP on 3/29/2023.      Interval history.    This is a video visit.  He is accompanied by his wife Charleen   I reviewed the discharge summary from recent hospitalization  at [facility] when he was admitted from 4/4/2023 - 4/9/2023.  He was admitted for neutropenic fever, sepsis and C. difficile colitis.  He presented with multiple bouts of nonbloody diarrhea and fever of 101 with generalized fatigue and weakness.  He was neutropenic and was noted to have pancolitis.  C. difficile was positive.  Cryptosporidium was also positive.  He was started on IV cefepime and IV Flagyl initially.  Infectious disease was consulted and he was switched to oral vancomycin to complete 14 days.  Neutropenia resolved by 4/7/2023.    Now he feels better.  Stools are soft semisolid and less often.  He denies any abdominal pain.  He will finish vancomycin on 4/19/2023.  He does not have any fever anymore.  He feels more energetic now although still feels pretty fatigued.  No nausea or vomiting.  No shortness of breath.  No tingling or numbness.  No bleeding.  No new swellings.  Neck is not bothering him.    ECOG 1    ROS:  A comprehensive ROS was otherwise neg    I reviewed other history in Epic as before.      Past Medical/Surgical History:  Past Medical History:   Diagnosis Date     Cancer (H) 2-7-2023    just diagnosed with lymphona.     Carpal tunnel syndrome      Chronic rhinitis 10/23/2015     Esophageal reflux 12/26/2013     Hand joint stiff, right    Shingles     Past Surgical History:   Procedure Laterality Date     BIOPSY  2-6-23     BIOPSY LYMPH NODE CERVICAL Left 2/16/2023    Procedure: excisional biopsy of a left cervical node;  Surgeon: Aung Tamayo MD;  Location: [facility] OR     COLONOSCOPY  2019    clean, do again in 10 years     COLONOSCOPY WITH CO2 INSUFFLATION N/A 07/17/2019    Procedure: COLONOSCOPY, WITH CO2 INSUFFLATION;  Surgeon: Jaison Hammer MD;  Location: [facility] OR     ENT SURGERY  1979    Loss of hearing rt ear (tinnitis)     HERNIA REPAIR  2000     NO HISTORY OF SURGERY       ORTHOPEDIC SURGERY  1988,1992    rt knee, scope in 88; ACL repair Early 90s     Cancer  History:   As above    Allergies:  Allergies as of 04/13/2023 - Reviewed 04/13/2023   Allergen Reaction Noted     Penicillins  12/23/2013     Current Medications:  Current Outpatient Medications   Medication Sig Dispense Refill     acetaminophen (TYLENOL) 325 MG tablet Take 2 tablets (650 mg) by mouth every 6 hours as needed for mild pain 50 tablet 0     diclofenac (VOLTAREN) 1 % topical gel Apply small amount topically to right shoulder 3-4 times daily as needed for pain. 50 g 0     Efinaconazole (JUBLIA) 10 % SOLN Externally apply topically daily to nail. 8 mL 4     fexofenadine (ALLEGRA) 60 MG tablet Take 1 tablet (60 mg) by mouth 2 times daily 180 tablet 0     fluticasone (FLONASE) 50 MCG/ACT nasal spray Spray 1-2 sprays into both nostrils daily 16 g 11     hydrocortisone (CORTAID) 1 % external ointment Apply topically 2 times daily As needed 56 g 0     ibuprofen (ADVIL,MOTRIN) 200 MG tablet Take 200 mg by mouth every 6 hours as needed       ketoconazole (NIZORAL) 2 % external cream Apply twice daily for 8 weeks to feet 60 g 3     LANsoprazole (PREVACID) 30 MG DR capsule Take 1 capsule (30 mg) by mouth every morning (before breakfast) 90 capsule 2     lidocaine-prilocaine (EMLA) 2.5-2.5 % external cream Apply topically as needed for moderate pain (4-6) Apply over port at least 30 minutes before port access. 30 g 0     methocarbamol (ROBAXIN) 500 MG tablet Take 1 tablet (500 mg) by mouth nightly as needed for muscle spasms 30 tablet 0     ondansetron (ZOFRAN ODT) 4 MG ODT tab Take 1 tablet (4 mg) by mouth every 8 hours as needed for nausea 10 tablet 0     ondansetron (ZOFRAN) 8 MG tablet Take 1 tablet (8 mg) by mouth every 8 hours as needed for nausea (vomiting) 30 tablet 2     oxyCODONE (ROXICODONE) 5 MG tablet Take 1-2 tablets (5-10 mg) by mouth every 4 hours as needed for moderate to severe pain 12 tablet 0     prochlorperazine (COMPAZINE) 10 MG tablet Take 1 tablet (10 mg) by mouth every 6 hours as needed for  nausea or vomiting 30 tablet 2     senna-docusate (SENOKOT-S/PERICOLACE) 8.6-50 MG tablet Take 1-2 tablets by mouth 2 times daily 30 tablet 0     traMADol (ULTRAM) 50 MG tablet Take 50 mg by mouth every 6 hours as needed       zolpidem (AMBIEN) 10 MG tablet Take 1 tablet (10 mg) by mouth nightly as needed for sleep 30 tablet 1      Family History:  Family History   Problem Relation Age of Onset     Hyperlipidemia Mother         medication     Diabetes Father      Other Cancer Father         Lung cancer     Other Cancer Maternal Grandmother         Unsure what type of cancer     Autoimmune Disease No family hx of      Maternal grand mother had some sort of a cancer  Dad had lung cancer  Has 4 kids- all healthy    Social History:  Social History     Socioeconomic History     Marital status:      Spouse name: Not on file     Number of children: Not on file     Years of education: Not on file     Highest education level: Not on file   Occupational History     Not on file   Tobacco Use     Smoking status: Never     Passive exposure: Never     Smokeless tobacco: Never   Vaping Use     Vaping status: Never Used     Passive vaping exposure: Yes   Substance and Sexual Activity     Alcohol use: Yes     Comment: occassional, 1- 2 per week     Drug use: No     Sexual activity: Yes     Partners: Female     Birth control/protection: Male Surgical, Female Surgical     Comment: vasectomy   Other Topics Concern     Parent/sibling w/ CABG, MI or angioplasty before 65F 55M? No   Social History Narrative     Not on file     Social Determinants of Health     Financial Resource Strain: Not on file   Food Insecurity: Not on file   Transportation Needs: Not on file   Physical Activity: Not on file   Stress: Not on file   Social Connections: Not on file   Intimate Partner Violence: Not on file   Housing Stability: Not on file   no smoking. Drinks etoh wine 2-3 times / week.       Physical Exam:  There were no vitals taken for this  visit.   Wt Readings from Last 4 Encounters:   03/29/23 85.3 kg (188 lb)   03/22/23 88 kg (194 lb)   03/08/23 87 kg (191 lb 12.8 oz)   03/01/23 88 kg (194 lb)       Constitutional.  Looks well and in no apparent distress.   Eyes.  Without eye redness or apparent jaundice.   Respiratory.  Non labored breathing. Speaking in full sentences.    Skin.  No concerning skin rashes on the skin visualized.   Neurological.  Is alert and oriented.  Psychiatric.  Mood and affect seem appropriate.      The rest of a comprehensive physical examination is deferred due to Public Health Emergency video visit restrictions.            Laboratory/Imaging Studies      Reviewed    4/4/2023.  CBC shows WBC 0.3.  Hemoglobin 13.8.  Platelets 117.    4/9/2023  CBC shows WBC 19.4.  It was 22.5 on 4/8/2023.  ANC 13.6.  There was a left shift with metamyelocytes and myelocytes.  Hemoglobin 11.3.  Platelets 113.  Chemistry showed normal kidney functions.        Previously  SPEP did not show any monoclonal protein.  Serum immunoglobulin levels were normal.  Horton free light chain 2.53.  Lambda 1.6.  Ratio normal 1.58.      Hepatitis B surface antibody was positive.  Hepatitis B surface antigen and core antibody were negative.  HIV negative  Hep C negative        1/9/2023  CBC was unremarkable.  Peripheral blood smear was unremarkable.    CLAIRE was positive-1: 80, speckled pattern  dsDNA was negative  ESR 8    CT chest PE protocol/abdomen/pelvis on 4/5/2023 showed pancolitis.  No pulmonary embolism.  Normal-appearing lymph nodes.    2/16/2023 excisional biopsy of the left level 4 cervical lymph node  Large B-cell lymphoma, non-germinal center subtype.    The neoplastic cells express CD20, BCL2, BCL6, and MUM-1 (variable staining intensity), and are negative for CD3, CD5, CD10, CD23, Cyclin D1, and ALK. CD21 and CD23 demonstrate a lack of residual follicular dendritic cell meshworks. C-Myc demonstrates variable staining, and is overall estimated at  20-30% (interpreted as negative; cutoff 40%). Ki-67 proliferative index is also variable, and is overall estimated around 60-70%.      EBV RNA by in situ hybridization (SOY-OTTO) is negative for EBV in the neoplastic cells.   A congo red stain is negative for amyloid deposition in the tissue.     FISH testing on the lymph node  RESULTS:     - Gains of BCL6 (94%); no rearrangement of BCL6  - Gains of BCL2 (94%); no rearrangement of BCL2  - No rearrangement of MYC or IGH::MYC fusion        INTERPRETATION:    Of the interphase cells examined, 94-96% had signal patterns indicative of gains of BCL6 (3q27) and BCL2 (18q21), consistent with gains of all or part of chromosomes 3 and 18. No evidence was found of rearrangements of BCL6, MYC (8q24), or BCL2, or of a t(8;14).     Cytogenetics.  He has complex cytogenetics.    48,X,-Y,t(1;12)(p13;q13),+3,+3,add(3)(p25),t(3;8)(q21;p21),del(6)(13q21),add(11)(q22),+18,?idic(18)(p11.2)x2[cp19]/46,XY[1]      2/15/2023 bone marrow biopsy was unremarkable without evidence of involvement of lymphoma.        PET/CT 1/27/2023  CHEST:  Marked uptake in the enlarged 1.1 x 2.5 cm right axillary lymph node  (series 4, image 133) with SUV max 8.1 and prominent 1.0 x 1.0 cm left  axillary lymph node (series 4, image 123) with SUV max 4.1.     The central tracheobronchial tree is clear. No pleural effusion or  pneumothorax. No acute consolidation. Minimal bilateral dependent  atelectasis.     Heart size is within normal limits. No pericardial effusion. The  thoracic aorta and main pulmonary artery are within normal limits for  diameter. The esophagus is unremarkable.      ABDOMEN AND PELVIS:  Marked uptake in multiple enlarged retroperitoneal lymph nodes. For  example:  - 2.4 x 1.8 cm left para-aortic node (series 4, image 206) with SUV  max 6.3  - 1.7 x 2.5 cm left para-aortic node (series 4, image 217) with SUV  max 14.3     The liver is unremarkable. Radiodense gallstone without findings  of  cholecystitis. No intrahepatic or extrahepatic biliary ductal  dilatation. The pancreas is unremarkable. No pancreatic ductal  dilatation. Splenule. The spleen is unremarkable. The adrenal glands  are unremarkable.     Symmetric enhancement of the kidneys. No hydronephrosis. The urinary  bladder is unremarkable. The reproductive organs are unremarkable.     Normal caliber of the small and large bowel. Colonic diverticulosis  without diverticulitis. No free air, free fluid, or fluid collection.  Normal caliber of the abdominal aorta.     LOWER EXTREMITIES:   There is no suspicious FDG uptake in the visualized lower extremities.  Intramuscular lipomas in left rectus femoris and right soleus. Right  knee ACL repair changes.     BONES:   There is no suspicious FDG uptake in the skeleton. There are no  suspicious lytic or blastic osseous lesions. Degenerative changes of  bilateral hips.                                                                      IMPRESSION: In this patient with retroperitoneal lymphadenopathy:  1. Intense hypermetabolic uptake (Deauville 5) in multiple enlarged  and prominent axillary, left supraclavicular and retroperitoneal lymph  nodes, concerning for lymphoproliferative disease.  1a. Consider biopsying right axillary lymph node (max SUV 8) or left  supraclavicular node, largest (max SUV 14).    2. Cholelithiasis without cholecystitis.  3. Please see neck PET/CT report for head and neck findings.    PET CT neck 1/27/2023  Marked focal uptake in the enlarged left supraclavicular lymph node  measuring 3.3 x 2.3 cm (series 2, image 98) with SUV max 13.7.     Regarding evaluation of the mucosal space, there is no definite  abnormality or abnormal metabolic uptake on PET CT in the nasopharynx,  oropharynx, hypopharynx, or of the glottis. Regarding the tongue base,  no abnormality is identified. Regarding the major salivary glands, no  abnormality is identified. Regarding the thyroid gland, no  abnormality  is identified.     Limited evaluation of the cervical vertebra demonstrates that there is  no overt spinal canal stenosis. Mild mucosal thickening in bilateral  maxillary sinuses. Regarding the mastoid air cells, there is no  evidence of significant debris or fluid. Regarding the major  vasculature in the neck, no abnormality is identified.     Regarding the visualized lung apices, there is no definite  abnormality, and the lung apices appear relatively clear.                                                                      Impression:   1. Intense hypermetabolic uptake (Deauville 5) in the enlarged left  supraclavicular lymph node, concerning for lymphoproliferative  disease.   2. Please refer to the whole body PET CT performed as a separate  report, for the findings of the remainder of the body            1/6/2023-CT abdomen and pelvis   Retroperitoneal lymph node demonstrated in the left periaortic region at the level of the kidneys measuring 1.9 x 2.6 cm, . There is also a lobulated node anterior to the  esophagus at the level of the left kidney which appears to be 2 adjacent nodes. The largest node towards the left measures 1.9 x 2.5 cm  Sigmoid diverticulosis without evidence diverticulitis.  Cholelithiasis without evidence for cholecystitis.      Echocardiogram 3/2/2023 was unremarkable.  Left ventricular size, wall motion and function are normal. The ejection  fraction is 60-65%.     Global peak LV longitudinal strain is averaged at -19.5%. This is within  reported normal limits (normal <-18%).     Right ventricular function, chamber size, wall motion, and thickness are  normal.     No significant valvular abnormalities present.     Mild dilatation of the aorta is present. Sinuses of Valsalva 4.0 cm      ASSESSMENT/PLAN:    Diffuse large B-cell lymphoma presenting with diffuse lymphadenopathy.    He has marked FDG avid left supraclavicular, axillary, retroperitoneal lymph nodes.  No  hepatosplenomegaly.  No evidence of extra lymphatic involvement.  FISH testing shows gains of BCL2 and BCL6 but no rearrangement.  No rearrangement of MYC or IGH::MYC fusion  No evidence of double hit or triple hit lymphoma.  No evidence of bone marrow involvement.  LDH is mildly elevated.      IPI is 3    We started R-CHOP on 3/8/2023 with curative intent.      Overall he tolerated the first chemotherapy cycle well.  He did have chemotherapy-induced neutropenia and had neutropenic fever but no infection was found.  Fever resolved on its own without antibiotics.      He received cycle #2 on 3/29/2023 with G-CSF support because of neutropenic fever.    He developed neutropenic fever with pancolitis from C. difficile.  Cryptosporidium was also positive.    We discussed the situation in detail.    Now he is doing better.  He will be due for cycle #3 on 4/19/2023.  At that time we will need to see how he has bounced back in terms of physical symptoms.  If he is feeling well, then my recommendation would be to continue with the same chemotherapy and keep on track because we are treating a curable cancer so we need to be as aggressive as possible.    The other option would be to try to decrease the dose of chemotherapy by 10% but any deviation from standard would compromise on effectiveness of chemotherapy to some degree so I would like to keep him on track as much as possible.  Other option would be to delay the chemotherapy by 1 week.    We will need to make these determinations next week.    We will repeat PET scan after cycle #3.    Chemotherapy induced neutropenic fever/typhlitis.  His white blood cell count went down to 0.3.  These bounced back after 3 days.  I am not certain whether he got any extra Neupogen in the hospital or not.  Continue with G-CSF support for his ongoing chemotherapy.  C. difficile was positive.  Now on vancomycin.  Symptoms have been improving and now he has semisolid stools and the frequency  is also less.  If his symptoms are not completely resolved by next week, then I would recommend extending vancomycin for 10 to 14 days.  If symptoms are completely resolved, then we can stop the vancomycin at 2 weeks.    Frequency urination.  He does not have any dysuria.  Urine did not have infection when it was checked on 4/4/2023.  Urinary frequency is slowly improving.  Keep well-hydrated.      We did not address the following today.  Prevention of tumor lysis syndrome.  He was given allopurinol which he took for about 7 to 8 days but developed a rash from it.  Allopurinol was stopped.  He did not go into tumor lysis syndrome.      Return to clinic next week.    I answered all of their questions to their satisfaction.  They understand the situation and are agreeable with the plan.    Kiel Maurer MD    I spent >40 minutes on this visit on the date of service, including the video time, reviewing records and labs and imaging and placing new orders as well as coordination of care and documentation.

## 2023-04-18 NOTE — PROGRESS NOTES
Oncology Follow Up Visit: April 19, 2023    Oncologist: Dr Kiel Maurer  PCP: Paddy Holly    Diagnosis: Diffuse Large B cell Lymphoma  Geovanni Jasso is a 61 yo male presented in 1/18/2022 for retroperitoneal lymphadenopathy with upper abdominal pain x 3-4 months. .   1/6/2023 CT Abd Pelvis which showed enlarged retroperitoneal lymphadenopathy- with no B symptoms.   2/6/2023, he had FNA of the left supraclavicular lymph node.  Cytology showed atypical lymphoid cells.  Flow cytometry showed lambda monotypic B cells which are negative for CD5 and CD10.  This is consistent with a B-cell lymphoma.  We decided to go ahead with excisional lymph node biopsy as well as bone marrow biopsy for complete staging.  -Excisional lymph node biopsy showed diffuse large B-cell lymphoma.  -Bone marrow biopsy did not reveal evidence of lymphoma.  Treatment:   3/8/2023 R CHOP C1  3/29/2023 R CHOP C2   4/4/2023 hospitalized for Sepsis, Neutropenia, Cryptosporidium, and C. Diff.   4/19/2023 R CHOP C3    Interval History: Mr. Jasso and wife come to clinic for review. He was hospitalized at [facility] 4/4- 4/9/2023 for Sepsis, Neutropenia, Cryptosporidium, and C. Diff. Today he shares he continues but is finishing vancomycin today- still experiencing soft but not loose stools- 1-2 x daily-denies anal irritation.  He has had no recent fevers.He is still weak but has had no falls- not exercising. He is back to eating regular meals.  Bladder without issues today.  Denies any fevers chest pain shortness of breath, open wounds.  States he really does want to continue with treatment today.  Rest of comprehensive and complete ROS is reviewed and is negative.   Past Medical History:   Diagnosis Date     Cancer (H) 2-7-2023    just diagnosed with lymphona.     Carpal tunnel syndrome      Chronic rhinitis 10/23/2015     Esophageal reflux 12/26/2013     Hand joint stiff, right      Current Outpatient Medications   Medication     acetaminophen  (TYLENOL) 325 MG tablet     diclofenac (VOLTAREN) 1 % topical gel     Efinaconazole (JUBLIA) 10 % SOLN     fexofenadine (ALLEGRA) 60 MG tablet     fluticasone (FLONASE) 50 MCG/ACT nasal spray     hydrocortisone (CORTAID) 1 % external ointment     ibuprofen (ADVIL,MOTRIN) 200 MG tablet     ketoconazole (NIZORAL) 2 % external cream     LANsoprazole (PREVACID) 30 MG DR capsule     lidocaine-prilocaine (EMLA) 2.5-2.5 % external cream     methocarbamol (ROBAXIN) 500 MG tablet     ondansetron (ZOFRAN ODT) 4 MG ODT tab     ondansetron (ZOFRAN) 8 MG tablet     oxyCODONE (ROXICODONE) 5 MG tablet     prochlorperazine (COMPAZINE) 10 MG tablet     senna-docusate (SENOKOT-S/PERICOLACE) 8.6-50 MG tablet     traMADol (ULTRAM) 50 MG tablet     zolpidem (AMBIEN) 10 MG tablet     No current facility-administered medications for this visit.     Allergies   Allergen Reactions     Penicillins        Physical Exam:/83 (BP Location: Right arm)   Pulse 103   Temp 98.2  F (36.8  C) (Oral)   Resp 16   SpO2 98%    ECOG PS-0-1  Constitutional: Alert, cooperative and in no distress as seated-noted to have slow movement which may be related to the weakness.   ENT: Eyes bright, No mouth sores  Neck: Supple, No adenopathy.  Cardiac: Heart rate and rhythm is regular and strong without murmur  Respiratory: Breathing easy. Lung sounds clear to auscultation  GI: Abdomen is soft, non-tender, BS normal at this time. No masses or organomegaly  MS: Muscle tone fair, extremities normal with no edema.   Skin: No suspicious lesions or rashes  Neuro: Sensory grossly WNL, gait normal but a little slower than usual.   Lymph: Normal ant/post cervical, axillary, supraclavicular nodes  Psych: Mentation appears normal and affect normal/bright with good conversation.  -Patient in enteric precautions due to recent history of C. difficile    Laboratory/Imaging Results:   Results for orders placed or performed in visit on 04/19/23   Comprehensive metabolic  panel     Status: Abnormal   Result Value Ref Range    Sodium 137 133 - 144 mmol/L    Potassium 3.8 3.4 - 5.3 mmol/L    Chloride 108 94 - 109 mmol/L    Carbon Dioxide (CO2) 26 20 - 32 mmol/L    Anion Gap 3 3 - 14 mmol/L    Urea Nitrogen 12 7 - 30 mg/dL    Creatinine 0.76 0.66 - 1.25 mg/dL    Calcium 8.9 8.5 - 10.1 mg/dL    Glucose 142 (H) 70 - 99 mg/dL    Alkaline Phosphatase 150 40 - 150 U/L    AST 26 0 - 45 U/L    ALT 31 0 - 70 U/L    Protein Total 6.5 (L) 6.8 - 8.8 g/dL    Albumin 2.9 (L) 3.4 - 5.0 g/dL    Bilirubin Total 0.4 0.2 - 1.3 mg/dL    GFR Estimate >90 >60 mL/min/1.73m2   Lactate Dehydrogenase     Status: Abnormal   Result Value Ref Range    Lactate Dehydrogenase 259 (H) 85 - 227 U/L   CBC with platelets and differential     Status: Abnormal   Result Value Ref Range    WBC Count 6.8 4.0 - 11.0 10e3/uL    RBC Count 3.82 (L) 4.40 - 5.90 10e6/uL    Hemoglobin 12.5 (L) 13.3 - 17.7 g/dL    Hematocrit 37.0 (L) 40.0 - 53.0 %    MCV 97 78 - 100 fL    MCH 32.7 26.5 - 33.0 pg    MCHC 33.8 31.5 - 36.5 g/dL    RDW 15.3 (H) 10.0 - 15.0 %    Platelet Count 410 150 - 450 10e3/uL    % Neutrophils 75 %    % Lymphocytes 6 %    % Monocytes 17 %    % Eosinophils 0 %    % Basophils 2 %    % Immature Granulocytes 0 %    NRBCs per 100 WBC 0 <1 /100    Absolute Neutrophils 5.1 1.6 - 8.3 10e3/uL    Absolute Lymphocytes 0.4 (L) 0.8 - 5.3 10e3/uL    Absolute Monocytes 1.1 0.0 - 1.3 10e3/uL    Absolute Eosinophils 0.0 0.0 - 0.7 10e3/uL    Absolute Basophils 0.1 0.0 - 0.2 10e3/uL    Absolute Immature Granulocytes 0.0 <=0.4 10e3/uL    Absolute NRBCs 0.0 10e3/uL   CBC with platelets differential     Status: Abnormal    Narrative    The following orders were created for panel order CBC with platelets differential.  Procedure                               Abnormality         Status                     ---------                               -----------         ------                     CBC with platelets and d...[[id number]]  Abnormal             Final result                 Please view results for these tests on the individual orders.     Assessment and Plan:   Diffuse Large B cell Lymphoma-patient began treatment with R-CHOP and has completed first 2 cycles prior to being hospitalized for sepsis neutropenia Cryptosporidium and C. difficile on 4/4 through 4/9/2023.  He is just completing his vancomycin and states his stools are now back within soft zone 1-2 times daily.  He appears weak however he is up and around on his own short distances.  Really encouraged him to to increasing his exercise and activities to improve his strength.  In review of his labs and exam see that he is acceptable for treatment today and he does want to proceed with cycle 3 of R-CHOP today.  -Very mild anemia continues but this does not hold him back from plan.  Made him aware that he is at higher risk for disease during his javier and we did talk about precautions for him for prevention of further infections.    -He was in enteric precautions today  -He will receive Neulasta on pro with plan  C. Difficile and Cryptosporidium-he has completed his vancomycin prescription as of today.  He has follow-up with PCP next week and this may be good time to review labs.  The total time of this encounter amounted to 40 minutes. This time included time spent with the patient, prep work for review from previous hospitalization and history for this man I have not seen previously, ordering tests, and performing post visit documentation.  Corrine Smith,Cnp

## 2023-04-19 ENCOUNTER — LAB (OUTPATIENT)
Dept: ONCOLOGY | Facility: CLINIC | Age: 63
End: 2023-04-19
Payer: COMMERCIAL

## 2023-04-19 ENCOUNTER — INFUSION THERAPY VISIT (OUTPATIENT)
Dept: INFUSION THERAPY | Facility: CLINIC | Age: 63
End: 2023-04-19
Payer: COMMERCIAL

## 2023-04-19 ENCOUNTER — ONCOLOGY VISIT (OUTPATIENT)
Dept: ONCOLOGY | Facility: CLINIC | Age: 63
End: 2023-04-19
Payer: COMMERCIAL

## 2023-04-19 VITALS
SYSTOLIC BLOOD PRESSURE: 114 MMHG | OXYGEN SATURATION: 98 % | TEMPERATURE: 98.2 F | DIASTOLIC BLOOD PRESSURE: 75 MMHG | HEART RATE: 88 BPM

## 2023-04-19 VITALS
SYSTOLIC BLOOD PRESSURE: 134 MMHG | RESPIRATION RATE: 16 BRPM | OXYGEN SATURATION: 98 % | DIASTOLIC BLOOD PRESSURE: 83 MMHG | HEART RATE: 103 BPM | TEMPERATURE: 98.2 F

## 2023-04-19 DIAGNOSIS — T45.1X5A CHEMOTHERAPY-INDUCED NEUTROPENIA (H): ICD-10-CM

## 2023-04-19 DIAGNOSIS — D70.1 CHEMOTHERAPY-INDUCED NEUTROPENIA (H): Primary | ICD-10-CM

## 2023-04-19 DIAGNOSIS — D70.1 CHEMOTHERAPY-INDUCED NEUTROPENIA (H): ICD-10-CM

## 2023-04-19 DIAGNOSIS — R22.1 NECK MASS: Primary | ICD-10-CM

## 2023-04-19 DIAGNOSIS — A04.72 C. DIFFICILE COLITIS: ICD-10-CM

## 2023-04-19 DIAGNOSIS — C83.30 DIFFUSE LARGE B-CELL LYMPHOMA, UNSPECIFIED BODY REGION (H): ICD-10-CM

## 2023-04-19 DIAGNOSIS — M62.81 GENERALIZED MUSCLE WEAKNESS: ICD-10-CM

## 2023-04-19 DIAGNOSIS — T45.1X5A CHEMOTHERAPY-INDUCED NEUTROPENIA (H): Primary | ICD-10-CM

## 2023-04-19 LAB
ALBUMIN SERPL-MCNC: 2.9 G/DL (ref 3.4–5)
ALP SERPL-CCNC: 150 U/L (ref 40–150)
ALT SERPL W P-5'-P-CCNC: 31 U/L (ref 0–70)
ANION GAP SERPL CALCULATED.3IONS-SCNC: 3 MMOL/L (ref 3–14)
AST SERPL W P-5'-P-CCNC: 26 U/L (ref 0–45)
BASOPHILS # BLD AUTO: 0.1 10E3/UL (ref 0–0.2)
BASOPHILS NFR BLD AUTO: 2 %
BILIRUB SERPL-MCNC: 0.4 MG/DL (ref 0.2–1.3)
BUN SERPL-MCNC: 12 MG/DL (ref 7–30)
CALCIUM SERPL-MCNC: 8.9 MG/DL (ref 8.5–10.1)
CHLORIDE BLD-SCNC: 108 MMOL/L (ref 94–109)
CO2 SERPL-SCNC: 26 MMOL/L (ref 20–32)
CREAT SERPL-MCNC: 0.76 MG/DL (ref 0.66–1.25)
EOSINOPHIL # BLD AUTO: 0 10E3/UL (ref 0–0.7)
EOSINOPHIL NFR BLD AUTO: 0 %
ERYTHROCYTE [DISTWIDTH] IN BLOOD BY AUTOMATED COUNT: 15.3 % (ref 10–15)
GFR SERPL CREATININE-BSD FRML MDRD: >90 ML/MIN/1.73M2
GLUCOSE BLD-MCNC: 142 MG/DL (ref 70–99)
HCT VFR BLD AUTO: 37 % (ref 40–53)
HGB BLD-MCNC: 12.5 G/DL (ref 13.3–17.7)
IMM GRANULOCYTES # BLD: 0 10E3/UL
IMM GRANULOCYTES NFR BLD: 0 %
LDH SERPL L TO P-CCNC: 259 U/L (ref 85–227)
LYMPHOCYTES # BLD AUTO: 0.4 10E3/UL (ref 0.8–5.3)
LYMPHOCYTES NFR BLD AUTO: 6 %
MCH RBC QN AUTO: 32.7 PG (ref 26.5–33)
MCHC RBC AUTO-ENTMCNC: 33.8 G/DL (ref 31.5–36.5)
MCV RBC AUTO: 97 FL (ref 78–100)
MONOCYTES # BLD AUTO: 1.1 10E3/UL (ref 0–1.3)
MONOCYTES NFR BLD AUTO: 17 %
NEUTROPHILS # BLD AUTO: 5.1 10E3/UL (ref 1.6–8.3)
NEUTROPHILS NFR BLD AUTO: 75 %
NRBC # BLD AUTO: 0 10E3/UL
NRBC BLD AUTO-RTO: 0 /100
PLATELET # BLD AUTO: 410 10E3/UL (ref 150–450)
POTASSIUM BLD-SCNC: 3.8 MMOL/L (ref 3.4–5.3)
PROT SERPL-MCNC: 6.5 G/DL (ref 6.8–8.8)
RBC # BLD AUTO: 3.82 10E6/UL (ref 4.4–5.9)
SODIUM SERPL-SCNC: 137 MMOL/L (ref 133–144)
WBC # BLD AUTO: 6.8 10E3/UL (ref 4–11)

## 2023-04-19 PROCEDURE — 96415 CHEMO IV INFUSION ADDL HR: CPT | Performed by: NURSE PRACTITIONER

## 2023-04-19 PROCEDURE — 83615 LACTATE (LD) (LDH) ENZYME: CPT | Performed by: INTERNAL MEDICINE

## 2023-04-19 PROCEDURE — 99207 PR NO CHARGE LOS: CPT

## 2023-04-19 PROCEDURE — 80053 COMPREHEN METABOLIC PANEL: CPT | Performed by: INTERNAL MEDICINE

## 2023-04-19 PROCEDURE — 96377 APPLICATON ON-BODY INJECTOR: CPT | Mod: 59 | Performed by: NURSE PRACTITIONER

## 2023-04-19 PROCEDURE — 85025 COMPLETE CBC W/AUTO DIFF WBC: CPT | Performed by: INTERNAL MEDICINE

## 2023-04-19 PROCEDURE — 96367 TX/PROPH/DG ADDL SEQ IV INF: CPT | Performed by: NURSE PRACTITIONER

## 2023-04-19 PROCEDURE — 96413 CHEMO IV INFUSION 1 HR: CPT | Performed by: NURSE PRACTITIONER

## 2023-04-19 PROCEDURE — 96375 TX/PRO/DX INJ NEW DRUG ADDON: CPT | Performed by: NURSE PRACTITIONER

## 2023-04-19 PROCEDURE — 96417 CHEMO IV INFUS EACH ADDL SEQ: CPT | Performed by: NURSE PRACTITIONER

## 2023-04-19 PROCEDURE — 96411 CHEMO IV PUSH ADDL DRUG: CPT | Performed by: NURSE PRACTITIONER

## 2023-04-19 PROCEDURE — 99215 OFFICE O/P EST HI 40 MIN: CPT | Mod: 25 | Performed by: NURSE PRACTITIONER

## 2023-04-19 RX ORDER — OXYCODONE HYDROCHLORIDE 5 MG/1
5-10 TABLET ORAL EVERY 4 HOURS PRN
Qty: 12 TABLET | Refills: 0 | Status: CANCELLED | OUTPATIENT
Start: 2023-04-19

## 2023-04-19 RX ORDER — HEPARIN SODIUM (PORCINE) LOCK FLUSH IV SOLN 100 UNIT/ML 100 UNIT/ML
5 SOLUTION INTRAVENOUS
Status: CANCELLED | OUTPATIENT
Start: 2023-04-19

## 2023-04-19 RX ORDER — HEPARIN SODIUM,PORCINE 10 UNIT/ML
5 VIAL (ML) INTRAVENOUS
Status: CANCELLED | OUTPATIENT
Start: 2023-04-19

## 2023-04-19 RX ORDER — PALONOSETRON 0.05 MG/ML
0.25 INJECTION, SOLUTION INTRAVENOUS ONCE
Status: COMPLETED | OUTPATIENT
Start: 2023-04-19 | End: 2023-04-19

## 2023-04-19 RX ORDER — OXYCODONE HYDROCHLORIDE 5 MG/1
5-10 TABLET ORAL EVERY 4 HOURS PRN
Qty: 12 TABLET | Refills: 0 | Status: SHIPPED | OUTPATIENT
Start: 2023-04-19 | End: 2023-05-23

## 2023-04-19 RX ORDER — METHYLPREDNISOLONE SODIUM SUCCINATE 125 MG/2ML
125 INJECTION, POWDER, LYOPHILIZED, FOR SOLUTION INTRAMUSCULAR; INTRAVENOUS
Status: CANCELLED
Start: 2023-04-19

## 2023-04-19 RX ORDER — DIPHENHYDRAMINE HCL 25 MG
25 CAPSULE ORAL ONCE
Status: COMPLETED | OUTPATIENT
Start: 2023-04-19 | End: 2023-04-19

## 2023-04-19 RX ORDER — ALBUTEROL SULFATE 90 UG/1
1-2 AEROSOL, METERED RESPIRATORY (INHALATION)
Status: CANCELLED
Start: 2023-04-19

## 2023-04-19 RX ORDER — DIPHENHYDRAMINE HCL 25 MG
50 CAPSULE ORAL ONCE
Status: CANCELLED
Start: 2023-04-19

## 2023-04-19 RX ORDER — DIPHENHYDRAMINE HYDROCHLORIDE 50 MG/ML
50 INJECTION INTRAMUSCULAR; INTRAVENOUS
Status: CANCELLED
Start: 2023-04-19

## 2023-04-19 RX ORDER — EPINEPHRINE 1 MG/ML
0.3 INJECTION, SOLUTION INTRAMUSCULAR; SUBCUTANEOUS EVERY 5 MIN PRN
Status: CANCELLED | OUTPATIENT
Start: 2023-04-19

## 2023-04-19 RX ORDER — ALBUTEROL SULFATE 0.83 MG/ML
2.5 SOLUTION RESPIRATORY (INHALATION)
Status: CANCELLED | OUTPATIENT
Start: 2023-04-19

## 2023-04-19 RX ORDER — HEPARIN SODIUM (PORCINE) LOCK FLUSH IV SOLN 100 UNIT/ML 100 UNIT/ML
5 SOLUTION INTRAVENOUS
Status: DISCONTINUED | OUTPATIENT
Start: 2023-04-19 | End: 2023-04-19 | Stop reason: HOSPADM

## 2023-04-19 RX ORDER — DOXORUBICIN HYDROCHLORIDE 2 MG/ML
50 INJECTION, SOLUTION INTRAVENOUS ONCE
Status: CANCELLED | OUTPATIENT
Start: 2023-04-19

## 2023-04-19 RX ORDER — MEPERIDINE HYDROCHLORIDE 25 MG/ML
25 INJECTION INTRAMUSCULAR; INTRAVENOUS; SUBCUTANEOUS EVERY 30 MIN PRN
Status: CANCELLED | OUTPATIENT
Start: 2023-04-19

## 2023-04-19 RX ORDER — MEPERIDINE HYDROCHLORIDE 25 MG/ML
25 INJECTION INTRAMUSCULAR; INTRAVENOUS; SUBCUTANEOUS
Status: CANCELLED
Start: 2023-04-19

## 2023-04-19 RX ORDER — ACETAMINOPHEN 325 MG/1
650 TABLET ORAL ONCE
Status: COMPLETED | OUTPATIENT
Start: 2023-04-19 | End: 2023-04-19

## 2023-04-19 RX ORDER — PALONOSETRON 0.05 MG/ML
0.25 INJECTION, SOLUTION INTRAVENOUS ONCE
Status: CANCELLED
Start: 2023-04-19

## 2023-04-19 RX ORDER — HEPARIN SODIUM (PORCINE) LOCK FLUSH IV SOLN 100 UNIT/ML 100 UNIT/ML
500 SOLUTION INTRAVENOUS EVERY 8 HOURS
Status: DISCONTINUED | OUTPATIENT
Start: 2023-04-19 | End: 2023-04-19 | Stop reason: HOSPADM

## 2023-04-19 RX ORDER — DOXORUBICIN HYDROCHLORIDE 2 MG/ML
50 INJECTION, SOLUTION INTRAVENOUS ONCE
Status: COMPLETED | OUTPATIENT
Start: 2023-04-19 | End: 2023-04-19

## 2023-04-19 RX ORDER — DIPHENHYDRAMINE HCL 25 MG
25 CAPSULE ORAL ONCE
Status: CANCELLED
Start: 2023-04-19

## 2023-04-19 RX ORDER — ACETAMINOPHEN 325 MG/1
650 TABLET ORAL ONCE
Status: CANCELLED
Start: 2023-04-19

## 2023-04-19 RX ADMIN — HEPARIN SODIUM (PORCINE) LOCK FLUSH IV SOLN 100 UNIT/ML 500 UNITS: 100 SOLUTION at 08:05

## 2023-04-19 RX ADMIN — DOXORUBICIN HYDROCHLORIDE 100 MG: 2 INJECTION, SOLUTION INTRAVENOUS at 10:06

## 2023-04-19 RX ADMIN — Medication 25 MG: at 10:37

## 2023-04-19 RX ADMIN — PALONOSETRON 0.25 MG: 0.05 INJECTION, SOLUTION INTRAVENOUS at 09:27

## 2023-04-19 RX ADMIN — ACETAMINOPHEN 650 MG: 325 TABLET ORAL at 10:36

## 2023-04-19 RX ADMIN — HEPARIN SODIUM (PORCINE) LOCK FLUSH IV SOLN 100 UNIT/ML 5 ML: 100 SOLUTION at 14:14

## 2023-04-19 RX ADMIN — Medication 250 ML: at 09:27

## 2023-04-19 ASSESSMENT — PAIN SCALES - GENERAL: PAINLEVEL: MILD PAIN (3)

## 2023-04-19 NOTE — NURSING NOTE
"Oncology Rooming Note    April 19, 2023 8:25 AM   Geovanni Jasso is a 62 year old male who presents for:    Chief Complaint   Patient presents with     Oncology Clinic Visit     Prior to treatment       Initial Vitals: /83 (BP Location: Right arm)   Pulse 103   Temp 98.2  F (36.8  C) (Oral)   Resp 16   SpO2 98%  Estimated body mass index is 26.22 kg/m  as calculated from the following:    Height as of 3/29/23: 1.803 m (5' 11\").    Weight as of 3/29/23: 85.3 kg (188 lb). There is no height or weight on file to calculate BSA.  Mild Pain (3) Comment: Data Unavailable   No LMP for male patient.  Allergies reviewed: Yes  Medications reviewed: Yes    Medications: MEDICATION REFILLS NEEDED TODAY. Provider was notified.  Pharmacy name entered into [facility]: [id number] - [place], [place] - [address]    Clinical concerns: aches and fatigue. Needs pain meds to help sleep. Pain worse at night. Still waking several times throughout the night to urinate. Stools in the morning are semi-formed at this time.       Em Staton LPN            "

## 2023-04-19 NOTE — LETTER
4/19/2023         RE: Geovanni Jasso  [id number]  [facility] [address]        Dear Colleague,    Thank you for referring your patient, Geovanni Jasso, to the [facility] [place]. Please see a copy of my visit note below.    Oncology Follow Up Visit: April 19, 2023    Oncologist: Dr Kiel Maurer  PCP: Paddy Holly    Diagnosis: Diffuse Large B cell Lymphoma  Geovanni Jasso is a 61 yo male presented in 1/18/2022 for retroperitoneal lymphadenopathy with upper abdominal pain x 3-4 months. .   1/6/2023 CT Abd Pelvis which showed enlarged retroperitoneal lymphadenopathy- with no B symptoms.   2/6/2023, he had FNA of the left supraclavicular lymph node.  Cytology showed atypical lymphoid cells.  Flow cytometry showed lambda monotypic B cells which are negative for CD5 and CD10.  This is consistent with a B-cell lymphoma.  We decided to go ahead with excisional lymph node biopsy as well as bone marrow biopsy for complete staging.  -Excisional lymph node biopsy showed diffuse large B-cell lymphoma.  -Bone marrow biopsy did not reveal evidence of lymphoma.  Treatment:   3/8/2023 R CHOP C1  3/29/2023 R CHOP C2   4/4/2023 hospitalized for Sepsis, Neutropenia, Cryptosporidium, and C. Diff.   4/19/2023 R CHOP C3    Interval History: Mr. Jasso and wife come to clinic for review. He was hospitalized at [facility] 4/4- 4/9/2023 for Sepsis, Neutropenia, Cryptosporidium, and C. Diff. Today he shares he continues but is finishing vancomycin today- still experiencing soft but not loose stools- 1-2 x daily-denies anal irritation.  He has had no recent fevers.He is still weak but has had no falls- not exercising. He is back to eating regular meals.  Bladder without issues today.  Denies any fevers chest pain shortness of breath, open wounds.  States he really does want to continue with treatment today.  Rest of comprehensive and complete ROS is reviewed and is negative.   Past Medical History:   Diagnosis Date      Cancer (H) 2-7-2023    just diagnosed with lymphona.     Carpal tunnel syndrome      Chronic rhinitis 10/23/2015     Esophageal reflux 12/26/2013     Hand joint stiff, right      Current Outpatient Medications   Medication     acetaminophen (TYLENOL) 325 MG tablet     diclofenac (VOLTAREN) 1 % topical gel     Efinaconazole (JUBLIA) 10 % SOLN     fexofenadine (ALLEGRA) 60 MG tablet     fluticasone (FLONASE) 50 MCG/ACT nasal spray     hydrocortisone (CORTAID) 1 % external ointment     ibuprofen (ADVIL,MOTRIN) 200 MG tablet     ketoconazole (NIZORAL) 2 % external cream     LANsoprazole (PREVACID) 30 MG DR capsule     lidocaine-prilocaine (EMLA) 2.5-2.5 % external cream     methocarbamol (ROBAXIN) 500 MG tablet     ondansetron (ZOFRAN ODT) 4 MG ODT tab     ondansetron (ZOFRAN) 8 MG tablet     oxyCODONE (ROXICODONE) 5 MG tablet     prochlorperazine (COMPAZINE) 10 MG tablet     senna-docusate (SENOKOT-S/PERICOLACE) 8.6-50 MG tablet     traMADol (ULTRAM) 50 MG tablet     zolpidem (AMBIEN) 10 MG tablet     No current facility-administered medications for this visit.     Allergies   Allergen Reactions     Penicillins        Physical Exam:/83 (BP Location: Right arm)   Pulse 103   Temp 98.2  F (36.8  C) (Oral)   Resp 16   SpO2 98%    ECOG PS-0-1  Constitutional: Alert, cooperative and in no distress as seated-noted to have slow movement which may be related to the weakness.   ENT: Eyes bright, No mouth sores  Neck: Supple, No adenopathy.  Cardiac: Heart rate and rhythm is regular and strong without murmur  Respiratory: Breathing easy. Lung sounds clear to auscultation  GI: Abdomen is soft, non-tender, BS normal at this time. No masses or organomegaly  MS: Muscle tone fair, extremities normal with no edema.   Skin: No suspicious lesions or rashes  Neuro: Sensory grossly WNL, gait normal but a little slower than usual.   Lymph: Normal ant/post cervical, axillary, supraclavicular nodes  Psych: Mentation appears  normal and affect normal/bright with good conversation.  -Patient in enteric precautions due to recent history of C. difficile    Laboratory/Imaging Results:   Results for orders placed or performed in visit on 04/19/23   Comprehensive metabolic panel     Status: Abnormal   Result Value Ref Range    Sodium 137 133 - 144 mmol/L    Potassium 3.8 3.4 - 5.3 mmol/L    Chloride 108 94 - 109 mmol/L    Carbon Dioxide (CO2) 26 20 - 32 mmol/L    Anion Gap 3 3 - 14 mmol/L    Urea Nitrogen 12 7 - 30 mg/dL    Creatinine 0.76 0.66 - 1.25 mg/dL    Calcium 8.9 8.5 - 10.1 mg/dL    Glucose 142 (H) 70 - 99 mg/dL    Alkaline Phosphatase 150 40 - 150 U/L    AST 26 0 - 45 U/L    ALT 31 0 - 70 U/L    Protein Total 6.5 (L) 6.8 - 8.8 g/dL    Albumin 2.9 (L) 3.4 - 5.0 g/dL    Bilirubin Total 0.4 0.2 - 1.3 mg/dL    GFR Estimate >90 >60 mL/min/1.73m2   Lactate Dehydrogenase     Status: Abnormal   Result Value Ref Range    Lactate Dehydrogenase 259 (H) 85 - 227 U/L   CBC with platelets and differential     Status: Abnormal   Result Value Ref Range    WBC Count 6.8 4.0 - 11.0 10e3/uL    RBC Count 3.82 (L) 4.40 - 5.90 10e6/uL    Hemoglobin 12.5 (L) 13.3 - 17.7 g/dL    Hematocrit 37.0 (L) 40.0 - 53.0 %    MCV 97 78 - 100 fL    MCH 32.7 26.5 - 33.0 pg    MCHC 33.8 31.5 - 36.5 g/dL    RDW 15.3 (H) 10.0 - 15.0 %    Platelet Count 410 150 - 450 10e3/uL    % Neutrophils 75 %    % Lymphocytes 6 %    % Monocytes 17 %    % Eosinophils 0 %    % Basophils 2 %    % Immature Granulocytes 0 %    NRBCs per 100 WBC 0 <1 /100    Absolute Neutrophils 5.1 1.6 - 8.3 10e3/uL    Absolute Lymphocytes 0.4 (L) 0.8 - 5.3 10e3/uL    Absolute Monocytes 1.1 0.0 - 1.3 10e3/uL    Absolute Eosinophils 0.0 0.0 - 0.7 10e3/uL    Absolute Basophils 0.1 0.0 - 0.2 10e3/uL    Absolute Immature Granulocytes 0.0 <=0.4 10e3/uL    Absolute NRBCs 0.0 10e3/uL   CBC with platelets differential     Status: Abnormal    Narrative    The following orders were created for panel order CBC with  platelets differential.  Procedure                               Abnormality         Status                     ---------                               -----------         ------                     CBC with platelets and d...[[id number]]  Abnormal            Final result                 Please view results for these tests on the individual orders.     Assessment and Plan:   Diffuse Large B cell Lymphoma-patient began treatment with R-CHOP and has completed first 2 cycles prior to being hospitalized for sepsis neutropenia Cryptosporidium and C. difficile on 4/4 through 4/9/2023.  He is just completing his vancomycin and states his stools are now back within soft zone 1-2 times daily.  He appears weak however he is up and around on his own short distances.  Really encouraged him to to increasing his exercise and activities to improve his strength.  In review of his labs and exam see that he is acceptable for treatment today and he does want to proceed with cycle 3 of R-CHOP today.  -Very mild anemia continues but this does not hold him back from plan.  Made him aware that he is at higher risk for disease during his javier and we did talk about precautions for him for prevention of further infections.    -He was in enteric precautions today  -He will receive Neulasta on pro with plan  C. Difficile and Cryptosporidium-he has completed his vancomycin prescription as of today.  He has follow-up with PCP next week and this may be good time to review labs.  The total time of this encounter amounted to 40 minutes. This time included time spent with the patient, prep work for review from previous hospitalization and history for this man I have not seen previously, ordering tests, and performing post visit documentation.  Corrine Smith Cnp        Again, thank you for allowing me to participate in the care of your patient.        Sincerely,        Corrine Smith, NP, APRN CNP

## 2023-04-19 NOTE — PROGRESS NOTES
Infusion Nursing Note:  Geovanni Jasso presents today for C3 RCHOP and Onpro.    Patient seen by provider today: Yes: Corrine WRAY   present during visit today: Not Applicable.    Note: Pt diagnosed with Cdiff 4/5/23-will finish his last dose of Vanco tomorrow. Enteric precautions in place. Pt denies new concerns, tolerated treatment well last time. Pt verified he took his prednisone this AM. Pt c/o wooziness today once benadryl kicked in but VSS-no other symptoms noted. Tolerated the remainder of the infusion well.    Intravenous Access:  Implanted Port.    Treatment Conditions:  Lab Results   Component Value Date    HGB 12.5 (L) 04/19/2023    WBC 6.8 04/19/2023    ANEU 0.1 (LL) 03/22/2023    ANEUTAUTO 5.1 04/19/2023     04/19/2023      Lab Results   Component Value Date     04/19/2023    POTASSIUM 3.8 04/19/2023    CR 0.76 04/19/2023    SHIV 8.9 04/19/2023    BILITOTAL 0.4 04/19/2023    ALBUMIN 2.9 (L) 04/19/2023    ALT 31 04/19/2023    AST 26 04/19/2023     Results reviewed, labs MET treatment parameters, ok to proceed with treatment.    Post Infusion Assessment:  Patient tolerated infusion without incident.  Blood return noted pre and post infusion.  Site patent and intact, free from redness, edema or discomfort.  No evidence of extravasations.  Access discontinued per protocol.     ONPRO  Was placed on patients: L arm    Time was placed at: 1400    Podpal used: yes    ONPRO device lot number: [id number]    Patient educated on device, what colored lights mean, when to remove the device, and when/how to contact provider with questions/concerns. Neulasta administration will occur at 1700 on 4/20/23  .   Discharge Plan:   AVS to patient via Prot-OnHART.  Patient will return 5/10/23 for next appointment. Future appts have been reviewed and crosschecked with appt note and plan.  Patient discharged in stable condition accompanied by: wife.  Departure Mode: Ambulatory.      Claudine Kay RN

## 2023-04-19 NOTE — PROGRESS NOTES
Port accessed with no incidient. Site cleaned with chloraprep for 30 seconds. Site dry and accessed with appropriate port needle. Sterile dressing applied. Blood return noted and lab tubes drawn. Port flushed with saline and heparin. Patient tolerated port draw well.   Pastora Zapien RN

## 2023-04-24 ENCOUNTER — VIRTUAL VISIT (OUTPATIENT)
Dept: FAMILY MEDICINE | Facility: CLINIC | Age: 63
End: 2023-04-24
Payer: COMMERCIAL

## 2023-04-24 DIAGNOSIS — A04.72 C. DIFFICILE DIARRHEA: Primary | ICD-10-CM

## 2023-04-24 DIAGNOSIS — C83.30 DIFFUSE LARGE B-CELL LYMPHOMA, UNSPECIFIED BODY REGION (H): ICD-10-CM

## 2023-04-24 PROCEDURE — 99214 OFFICE O/P EST MOD 30 MIN: CPT | Mod: VID | Performed by: FAMILY MEDICINE

## 2023-04-24 NOTE — PROGRESS NOTES
Bill is a 62 year old who is being evaluated via a billable video visit.      How would you like to obtain your AVS? [facility]  If the video visit is dropped, the invitation should be resent by: Text to cell phone: [phone]  Will anyone else be joining your video visit? No          Assessment & Plan     C. difficile diarrhea  -Recent discharge from hospital on 4/9/2023 secondary to sepsis, C. difficile, Cryptosporidium infection.    Hospital course as noted below.  Geovanni Jasso is a 62 y.o. male with a past medical history significant for recently diagnosed diffuse large B-cell lymphoma, followed by oncology clinic at [facility]. He had received his second round of chemotherapy on 3/29/2023.   Patient presented to ED with complaints of generalized weakness and fatigue for the last for 5 days, has been having multiple bouts of nonbloody loose stools. Tonight he was noted to have fever of 101  F prompting his ER visit     Sepsis  Neutropenic fever  Typhlitis   Cdif colitis   -Presenting with fever of 101  F, severe leukopenia.  -Neutropenic fever, finding of pancolitis, possible GI source  -Empirically started on IV cefepime, IV Flagyl in ED.    -stool PCR pathogen negative, Cryptosporidium + and Cdif positive   -Consulted infectious disease:  - Completed course for crypto   - Discharged on oral vancomycin to complete 14 days days   - neutropenia resolved by 4/7 having received Neupogen previously       -Today,  is feeling good.  Denied any further episodes of loose stools.  No belly pain.  -Completed 14-day course of vancomycin.  -Recommended hydration, healthy diet.  - will let me know through [facility] if diarrhea symptoms recur again to do stool testing.    Diffuse large B-cell lymphoma, unspecified body region (H)  -Following with oncology on chemo every 3 weeks.  Last chemo reported on 4/19/2023.  -Labs reviewed from oncology visit on 4/19/2023.         MED REC REQUIRED  Post Medication Reconciliation  "Status:       BMI:   Estimated body mass index is 26.22 kg/m  as calculated from the following:    Height as of 3/29/23: 1.803 m (5' 11\").    Weight as of 3/29/23: 85.3 kg (188 lb).           Yash Wharton MD  [facility] TATA Khan is a 62 year old, presenting for the following health issues:  Hospital F/U        4/24/2023    11:19 AM   Additional Questions   Roomed by Judi RUTH         4/10/2023    10:16 AM   Post Discharge Outreach   Admission Date 4/4/2023   Reason for Admission stomach pains   Discharge Date 4/9/2023   Discharge Diagnosis sepsis   How are you doing now that you are home? feeling okay, feeling run down and tired   How are your symptoms? (Red Flag symptoms escalate to triage hotline per guidelines) Unchanged   Do you feel your condition is stable enough to be safe at home until your provider visit? Yes   Does the patient have their discharge instructions?  Yes   Does the patient have questions regarding their discharge instructions?  No   Were you started on any new medications or were there changes to any of your previous medications?  Yes   Does the patient have all of their medications? Yes   Do you have questions regarding any of your medications?  No   Do you have all of your needed medical supplies or equipment (DME)?  (i.e. oxygen tank, CPAP, cane, etc.) Yes   Discharge follow-up appointment scheduled within 14 calendar days?  Yes   Discharge Follow Up Appointment Date 4/19/2023   Discharge Follow Up Appointment Scheduled with? Specialty Care Provider     Hospital Follow-up Visit:    Hospital/Nursing Home/IP Rehab Facility: [facility]  Date of Admission: 04/04/2023  Date of Discharge: 04/09/2023  Reason(s) for Admission: Fever, and abdominal pain.    Was your hospitalization related to COVID-19? No   Problems taking medications regularly:  None  Medication changes since discharge: None  Problems adhering to non-medication " therapy:  None    Summary of hospitalization:  CareEverywhere information obtained and reviewed  Diagnostic Tests/Treatments reviewed.  Follow up needed: none  Other Healthcare Providers Involved in Patient s Care:         oncologist  Update since discharge: improved.         Plan of care communicated with patient and family                   Review of Systems   Constitutional, HEENT, cardiovascular, pulmonary, gi and gu systems are negative, except as otherwise noted.      Objective           Vitals:  No vitals were obtained today due to virtual visit.    Physical Exam   GENERAL: Healthy, alert and no distress  EYES: Eyes grossly normal to inspection.  No discharge or erythema, or obvious scleral/conjunctival abnormalities.  RESP: No audible wheeze, cough, or visible cyanosis.  No visible retractions or increased work of breathing.    SKIN: Visible skin clear. No significant rash, abnormal pigmentation or lesions.  NEURO: Cranial nerves grossly intact.  Mentation and speech appropriate for age.  PSYCH: Mentation appears normal, affect normal/bright, judgement and insight intact, normal speech and appearance well-groomed.                Video-Visit Details    Type of service:  Video Visit     Originating Location (pt. Location): Home    Distant Location (provider location):  On-site  Platform used for Video Visit: [email or web address]

## 2023-05-02 ENCOUNTER — TELEPHONE (OUTPATIENT)
Dept: ONCOLOGY | Facility: CLINIC | Age: 63
End: 2023-05-02

## 2023-05-02 DIAGNOSIS — R19.7 DIARRHEA, UNSPECIFIED TYPE: Primary | ICD-10-CM

## 2023-05-02 DIAGNOSIS — Z86.19 HISTORY OF CLOSTRIDIOIDES DIFFICILE INFECTION: ICD-10-CM

## 2023-05-02 NOTE — TELEPHONE ENCOUNTER
Charleen was informed that the order was placed and she will stop by the office for supplies to check the C-diff  Palmira Matthew RN on 5/2/2023 at 10:33 AM

## 2023-05-02 NOTE — TELEPHONE ENCOUNTER
Patient's wife called to express concern that patient is getting C-Diff back. She is asking to  a test kit when she is in the building today for her own CT appointment.     Bill has had 4  loose stools in the past 3 hours along with cramping. Patient completed Vancomycin within the past month. Patient was hospitalized with sepsis and C-diff on 4/4/23.    Palmira Matthew RN on 5/2/2023 at 10:05 AM

## 2023-05-05 ENCOUNTER — ANCILLARY PROCEDURE (OUTPATIENT)
Dept: PET IMAGING | Facility: CLINIC | Age: 63
End: 2023-05-05
Attending: INTERNAL MEDICINE
Payer: COMMERCIAL

## 2023-05-05 DIAGNOSIS — C83.30 DIFFUSE LARGE B-CELL LYMPHOMA, UNSPECIFIED BODY REGION (H): ICD-10-CM

## 2023-05-05 DIAGNOSIS — R59.0 CERVICAL LYMPHADENOPATHY: ICD-10-CM

## 2023-05-05 PROCEDURE — 78816 PET IMAGE W/CT FULL BODY: CPT | Mod: GC | Performed by: RADIOLOGY

## 2023-05-05 PROCEDURE — 70491 CT SOFT TISSUE NECK W/DYE: CPT | Mod: GC | Performed by: RADIOLOGY

## 2023-05-05 PROCEDURE — 74177 CT ABD & PELVIS W/CONTRAST: CPT | Mod: GC | Performed by: RADIOLOGY

## 2023-05-05 PROCEDURE — A9552 F18 FDG: HCPCS | Performed by: RADIOLOGY

## 2023-05-05 PROCEDURE — 71260 CT THORAX DX C+: CPT | Mod: GC | Performed by: RADIOLOGY

## 2023-05-05 RX ORDER — IOPAMIDOL 755 MG/ML
0-135 INJECTION, SOLUTION INTRAVASCULAR ONCE
Status: COMPLETED | OUTPATIENT
Start: 2023-05-05 | End: 2023-05-05

## 2023-05-05 RX ADMIN — IOPAMIDOL 100 ML: 755 INJECTION, SOLUTION INTRAVASCULAR at 12:40

## 2023-05-08 ENCOUNTER — VIRTUAL VISIT (OUTPATIENT)
Dept: FAMILY MEDICINE | Facility: CLINIC | Age: 63
End: 2023-05-08
Payer: COMMERCIAL

## 2023-05-08 ENCOUNTER — MYC MEDICAL ADVICE (OUTPATIENT)
Dept: FAMILY MEDICINE | Facility: CLINIC | Age: 63
End: 2023-05-08

## 2023-05-08 DIAGNOSIS — R91.8 GROUND GLASS OPACITY PRESENT ON IMAGING OF LUNG: ICD-10-CM

## 2023-05-08 DIAGNOSIS — J98.11 BILATERAL ATELECTASIS: Primary | ICD-10-CM

## 2023-05-08 DIAGNOSIS — L98.9 LESION OF FACE: ICD-10-CM

## 2023-05-08 PROCEDURE — 99214 OFFICE O/P EST MOD 30 MIN: CPT | Mod: VID | Performed by: INTERNAL MEDICINE

## 2023-05-08 RX ORDER — ALBUTEROL SULFATE 90 UG/1
2 AEROSOL, METERED RESPIRATORY (INHALATION) EVERY 4 HOURS PRN
Qty: 18 G | Refills: 5 | Status: SHIPPED | OUTPATIENT
Start: 2023-05-08 | End: 2023-11-28

## 2023-05-08 RX ORDER — GUAIFENESIN 600 MG/1
1200 TABLET, EXTENDED RELEASE ORAL 2 TIMES DAILY
Qty: 30 TABLET | Refills: 1 | Status: SHIPPED | OUTPATIENT
Start: 2023-05-08 | End: 2023-11-28

## 2023-05-08 NOTE — PATIENT INSTRUCTIONS
At [facility], we strive to deliver an exceptional experience to you, every time we see you. If you receive a survey, please complete it as we do value your feedback.  If you have MyChart, you can expect to receive results automatically within 24 hours of their completion.  Your provider will send a note interpreting your results as well.   If you do not have MyChart, you should receive your results in about a week by mail.    Your care team:                            Family Medicine Internal Medicine   MD Paddy Merrill MD Shantel Branch-Fleming, MD Srinivasa Vaka, MD Katya Belousova, PAPURNIMA Ayoub CNP, MD ([facility]   Hussain Scott, MD Cynthia Kathleen MD Amelia Massimini APRN GLADYS Persaud APRN MD Yash Cedillo MD          Clinic hours: Monday - Thursday 7 am-6 pm; Fridays 7 am-5 pm.   Urgent care: Monday - Friday 10 am- 8 pm; Saturday and Sunday 9 am-5 pm.    Clinic: [phone]       [facility]: Monday - Thursday 8 am - 7 pm; Friday 8 am - 6 pm  [facility]: [phone]

## 2023-05-08 NOTE — PROGRESS NOTES
Bill is a 62 year old who is being evaluated via a billable video visit.      How would you like to obtain your AVS? MyChart  If the video visit is dropped, the invitation should be resent by: Send to e-mail at: [email or web address]  Will anyone else be joining your video visit? No      Hospital Follow-up Visit:    Hospital[facility]Nursing Home/[facility]  Date of Admission: 4/4/2023  Date of Discharge: 4/09/2023  Reason(s) for Admission: Sepsis in an immunocompromised patient.  Was your hospitalization related to COVID-19? No   Problems taking medications regularly:  None  Medication changes since discharge: None  Problems adhering to non-medication therapy:  None    Summary of hospitalization:  [facility] discharge summary reviewed  Diagnostic Tests/Treatments reviewed.  Follow up needed: Yes.  Other Healthcare Providers Involved in Patient s Care:         None  Update since discharge: improved. -Difficulty talking and force his speech, cannot take deep breaths during the daytime, like losing air.    Plan of care communicated with patient     DIAGNOSTICS    IMPRESSION:   In this patient with history of diffuse large cell lymphoma:  1. There is evidence of the complete response by Lugano criteria.  2. Subcarinal partially calcified lymph node, similar in size to prior  with minimal FDG uptake above background, likely reactive, attention  on follow-up.  3. Peripheral predominant ground glass opacities in the lungs. These  findings are likely secondary to infectious/inflammatory changes  including COVID-19.   4. Incidental and chronic findings including cholelithiasis without  cholecystitis and sub-6 mm solid pulmonary nodule similar to CT dated  1/27/2023. Attention on follow-up.  5. Please see same day dedicated head/neck PET/CT for further details.    ASSESSMENT/PLAN  Bilateral atelectasis  - albuterol (PROAIR HFA/PROVENTIL HFA/VENTOLIN HFA) 108 (90 Base) MCG/ACT inhaler; Inhale 2 puffs  into the lungs every 4 hours as needed for shortness of breath  - guaiFENesin (MUCINEX) 600 MG 12 hr tablet; Take 2 tablets (1,200 mg) by mouth 2 times daily    Ground glass opacity present on imaging of lung  - albuterol (PROAIR HFA/PROVENTIL HFA/VENTOLIN HFA) 108 (90 Base) MCG/ACT inhaler; Inhale 2 puffs into the lungs every 4 hours as needed for shortness of breath  - guaiFENesin (MUCINEX) 600 MG 12 hr tablet; Take 2 tablets (1,200 mg) by mouth 2 times daily    Lesion of face  - Adult Dermatology Referral; Future    Disposition:  Follow-up in 4 weeks.    Paddy Holly MD  Internal Medicine    Video-Visit Details    Type of service:  Video Visit   Joined the call at 5/8/2023, 9:35:45 am.  Left the call at 5/8/2023, 10:00:24 am.  You were on the call for 24 minutes 39 seconds .  Originating Location (pt. Location): Home    Distant Location (provider location):  On-site  Platform used for Video Visit: Lainey

## 2023-05-09 ENCOUNTER — VIRTUAL VISIT (OUTPATIENT)
Dept: ONCOLOGY | Facility: CLINIC | Age: 63
End: 2023-05-09
Attending: INTERNAL MEDICINE
Payer: COMMERCIAL

## 2023-05-09 DIAGNOSIS — J18.9 PNEUMONIA DUE TO INFECTIOUS ORGANISM, UNSPECIFIED LATERALITY, UNSPECIFIED PART OF LUNG: Primary | ICD-10-CM

## 2023-05-09 DIAGNOSIS — C83.30 DIFFUSE LARGE B-CELL LYMPHOMA, UNSPECIFIED BODY REGION (H): ICD-10-CM

## 2023-05-09 DIAGNOSIS — R22.1 NECK MASS: ICD-10-CM

## 2023-05-09 DIAGNOSIS — M62.81 GENERALIZED MUSCLE WEAKNESS: ICD-10-CM

## 2023-05-09 DIAGNOSIS — D70.1 CHEMOTHERAPY-INDUCED NEUTROPENIA (H): ICD-10-CM

## 2023-05-09 DIAGNOSIS — T45.1X5A CHEMOTHERAPY-INDUCED NEUTROPENIA (H): ICD-10-CM

## 2023-05-09 DIAGNOSIS — A04.72 C. DIFFICILE COLITIS: ICD-10-CM

## 2023-05-09 PROCEDURE — 99215 OFFICE O/P EST HI 40 MIN: CPT | Mod: VID | Performed by: INTERNAL MEDICINE

## 2023-05-09 RX ORDER — VANCOMYCIN HYDROCHLORIDE 125 MG/1
125 CAPSULE ORAL 4 TIMES DAILY
Qty: 28 CAPSULE | Refills: 0 | Status: SHIPPED | OUTPATIENT
Start: 2023-05-09 | End: 2023-05-16

## 2023-05-09 RX ORDER — DIPHENHYDRAMINE HYDROCHLORIDE 50 MG/ML
50 INJECTION INTRAMUSCULAR; INTRAVENOUS
Status: CANCELLED
Start: 2023-05-10

## 2023-05-09 RX ORDER — EPINEPHRINE 1 MG/ML
0.3 INJECTION, SOLUTION INTRAMUSCULAR; SUBCUTANEOUS EVERY 5 MIN PRN
Status: CANCELLED | OUTPATIENT
Start: 2023-05-10

## 2023-05-09 RX ORDER — METHYLPREDNISOLONE SODIUM SUCCINATE 125 MG/2ML
125 INJECTION, POWDER, LYOPHILIZED, FOR SOLUTION INTRAMUSCULAR; INTRAVENOUS
Status: CANCELLED
Start: 2023-05-10

## 2023-05-09 RX ORDER — DOXORUBICIN HYDROCHLORIDE 2 MG/ML
50 INJECTION, SOLUTION INTRAVENOUS ONCE
Status: CANCELLED | OUTPATIENT
Start: 2023-05-10

## 2023-05-09 RX ORDER — LEVOFLOXACIN 500 MG/1
500 TABLET, FILM COATED ORAL DAILY
Qty: 7 TABLET | Refills: 0 | Status: SHIPPED | OUTPATIENT
Start: 2023-05-09 | End: 2023-05-16

## 2023-05-09 RX ORDER — ALBUTEROL SULFATE 90 UG/1
1-2 AEROSOL, METERED RESPIRATORY (INHALATION)
Status: CANCELLED
Start: 2023-05-10

## 2023-05-09 RX ORDER — PALONOSETRON 0.05 MG/ML
0.25 INJECTION, SOLUTION INTRAVENOUS ONCE
Status: CANCELLED
Start: 2023-05-10

## 2023-05-09 RX ORDER — HEPARIN SODIUM (PORCINE) LOCK FLUSH IV SOLN 100 UNIT/ML 100 UNIT/ML
5 SOLUTION INTRAVENOUS
Status: CANCELLED | OUTPATIENT
Start: 2023-05-10

## 2023-05-09 RX ORDER — HEPARIN SODIUM,PORCINE 10 UNIT/ML
5 VIAL (ML) INTRAVENOUS
Status: CANCELLED | OUTPATIENT
Start: 2023-05-10

## 2023-05-09 RX ORDER — MEPERIDINE HYDROCHLORIDE 25 MG/ML
25 INJECTION INTRAMUSCULAR; INTRAVENOUS; SUBCUTANEOUS
Status: CANCELLED
Start: 2023-05-10

## 2023-05-09 RX ORDER — ALBUTEROL SULFATE 0.83 MG/ML
2.5 SOLUTION RESPIRATORY (INHALATION)
Status: CANCELLED | OUTPATIENT
Start: 2023-05-10

## 2023-05-09 RX ORDER — MEPERIDINE HYDROCHLORIDE 25 MG/ML
25 INJECTION INTRAMUSCULAR; INTRAVENOUS; SUBCUTANEOUS EVERY 30 MIN PRN
Status: CANCELLED | OUTPATIENT
Start: 2023-05-10

## 2023-05-09 RX ORDER — DIPHENHYDRAMINE HCL 25 MG
25 CAPSULE ORAL ONCE
Status: CANCELLED
Start: 2023-05-10

## 2023-05-09 RX ORDER — ACETAMINOPHEN 325 MG/1
650 TABLET ORAL ONCE
Status: CANCELLED
Start: 2023-05-10

## 2023-05-09 NOTE — PROGRESS NOTES
Virtual Visit Details    Type of service:  Video Visit   Video Start Time: 9:53 AM  Video End Time:10:18 AM    Originating Location (pt. Location): Home    Distant Location (provider location):  Off-site  Platform used for Video Visit: [facility]        Oncology follow-up visit:  Date on this visit: 5/09/23     Diagnoses.    Diffuse large B-cell lymphoma    Primary Physician: Paddy Holly     History Of Present Illness:  Mr. Jasso is a 62 year old  male who presents for follow-up of diffuse lymphadenopathy.  He initially saw me on 1/18/2022 for retroperitoneal lymphadenopathy.  Please see my previous note for details.  I have copied and updated from prior notes.        Over the last few months, since fall of 2022, off and on he has had upper abdominal pain which lasts few minutes. He thinks stress sometimes can bring it on. No relationship to food.  No nausea or vomiting. BMs have been fine. No bleeding. No fevers.   He had a CT Abd Pelvis on 1/6/2023 which showed enlarged retroperitoneal lymphadenopathy  He has noticed some fatigue. He has also noticed some lump in the throat just above the sternum at night. This is going on for a couple of months. He denies any dysphagia.     Currently he feels well. Currently denies any abdominal pain. No nausea or vomiting. BMs are fine. No recent weight loss. No SOB. No neuropathy. No skin flushing. No drenching night sweats.    In the summer, he noticed a rash on the face which has since resolved.  He was noted to have CLAIRE positive.     On 2/6/2023, he had FNA of the left supraclavicular lymph node.  Cytology showed atypical lymphoid cells.  Flow cytometry showed lambda monotypic B cells which are negative for CD5 and CD10.  This is consistent with a B-cell lymphoma.  We decided to go ahead with excisional lymph node biopsy as well as bone marrow biopsy for complete staging.    Excisional lymph node biopsy showed diffuse large B-cell lymphoma.    Bone marrow biopsy did not  reveal evidence of lymphoma.    He started R-CHOP on 3/8/2023.    Cycle #2 R-CHOP on 3/29/2023.        He was admitted to the [facility] from 4/4/2023 - 4/9/2023.  He was admitted for neutropenic fever, sepsis and C. difficile colitis.  He presented with multiple bouts of nonbloody diarrhea and fever of 101 with generalized fatigue and weakness.  He was neutropenic and was noted to have pancolitis.  C. difficile was positive.  Cryptosporidium was also positive.  He was started on IV cefepime and IV Flagyl initially.  Infectious disease was consulted and he was switched to oral vancomycin to complete 14 days.  Neutropenia resolved by 4/7/2023.    He completed oral vancomycin course on 4/19/2023 4/19/2023.  Cycle #3 of R-CHOP    5/5/2023.  PET CT neck chest abdomen and pelvis shows complete response to treatment per Lugano criteria        Interval history.    This is a video visit.  He is accompanied by his wife Charleen   He felt really tired for a few days after chemotherapy but then started to feel better.  His bowel movements have been fine.  He denies any bleeding.  Over the last several days he has noticed some cough and some shortness of breath.  No fevers per se.  He started albuterol inhaler which has helped a little bit.  He did a home COVID test which was negative.  He denies any neuropathy.  No new pain and no new swellings.  No drenching night sweats.  He has lost a few pounds.    ECOG 1    ROS:  A comprehensive ROS was otherwise neg    I reviewed other history in Epic as before.      Past Medical/Surgical History:  Past Medical History:   Diagnosis Date     Cancer (H) 2-7-2023    just diagnosed with lymphona.     Carpal tunnel syndrome      Chronic rhinitis 10/23/2015     Esophageal reflux 12/26/2013     Hand joint stiff, right    Shingles     Past Surgical History:   Procedure Laterality Date     BIOPSY  2-6-23     BIOPSY LYMPH NODE CERVICAL Left 2/16/2023    Procedure: excisional biopsy of a  left cervical node;  Surgeon: Aung Tamayo MD;  Location: [facility] OR     COLONOSCOPY  2019    clean, do again in 10 years     COLONOSCOPY WITH CO2 INSUFFLATION N/A 07/17/2019    Procedure: COLONOSCOPY, WITH CO2 INSUFFLATION;  Surgeon: Jaison Hammer MD;  Location: [facility] OR     ENT SURGERY  1979    Loss of hearing rt ear (tinnitis)     HERNIA REPAIR  2000     NO HISTORY OF SURGERY       ORTHOPEDIC SURGERY  1988,1992    rt knee, scope in 88; ACL repair Early 90s     Cancer History:   As above    Allergies:  Allergies as of 05/09/2023 - Reviewed 05/09/2023   Allergen Reaction Noted     Penicillins  12/23/2013     Current Medications:  Current Outpatient Medications   Medication Sig Dispense Refill     acetaminophen (TYLENOL) 325 MG tablet Take 2 tablets (650 mg) by mouth every 6 hours as needed for mild pain 50 tablet 0     albuterol (PROAIR HFA/PROVENTIL HFA/VENTOLIN HFA) 108 (90 Base) MCG/ACT inhaler Inhale 2 puffs into the lungs every 4 hours as needed for shortness of breath 18 g 5     diclofenac (VOLTAREN) 1 % topical gel Apply small amount topically to right shoulder 3-4 times daily as needed for pain. 50 g 0     Efinaconazole (JUBLIA) 10 % SOLN Externally apply topically daily to nail. 8 mL 4     fexofenadine (ALLEGRA) 60 MG tablet Take 1 tablet (60 mg) by mouth 2 times daily 180 tablet 0     fluticasone (FLONASE) 50 MCG/ACT nasal spray Spray 1-2 sprays into both nostrils daily 16 g 11     guaiFENesin (MUCINEX) 600 MG 12 hr tablet Take 2 tablets (1,200 mg) by mouth 2 times daily 30 tablet 1     hydrocortisone (CORTAID) 1 % external ointment Apply topically 2 times daily As needed 56 g 0     ibuprofen (ADVIL,MOTRIN) 200 MG tablet Take 200 mg by mouth every 6 hours as needed       ketoconazole (NIZORAL) 2 % external cream Apply twice daily for 8 weeks to feet 60 g 3     LANsoprazole (PREVACID) 30 MG DR capsule Take 1 capsule (30 mg) by mouth every morning (before breakfast) 90 capsule 2      lidocaine-prilocaine (EMLA) 2.5-2.5 % external cream Apply topically as needed for moderate pain (4-6) Apply over port at least 30 minutes before port access. 30 g 0     methocarbamol (ROBAXIN) 500 MG tablet Take 1 tablet (500 mg) by mouth nightly as needed for muscle spasms 30 tablet 0     ondansetron (ZOFRAN ODT) 4 MG ODT tab Take 1 tablet (4 mg) by mouth every 8 hours as needed for nausea 10 tablet 0     ondansetron (ZOFRAN) 8 MG tablet Take 1 tablet (8 mg) by mouth every 8 hours as needed for nausea (vomiting) 30 tablet 2     oxyCODONE (ROXICODONE) 5 MG tablet Take 1-2 tablets (5-10 mg) by mouth every 4 hours as needed for moderate to severe pain 12 tablet 0     prochlorperazine (COMPAZINE) 10 MG tablet Take 1 tablet (10 mg) by mouth every 6 hours as needed for nausea or vomiting 30 tablet 2     senna-docusate (SENOKOT-S/PERICOLACE) 8.6-50 MG tablet Take 1-2 tablets by mouth 2 times daily 30 tablet 0     traMADol (ULTRAM) 50 MG tablet Take 50 mg by mouth every 6 hours as needed       zolpidem (AMBIEN) 10 MG tablet Take 1 tablet (10 mg) by mouth nightly as needed for sleep 30 tablet 1      Family History:  Family History   Problem Relation Age of Onset     Hyperlipidemia Mother         medication     Diabetes Father      Other Cancer Father         Lung cancer     Other Cancer Maternal Grandmother         Unsure what type of cancer     Autoimmune Disease No family hx of      Maternal grand mother had some sort of a cancer  Dad had lung cancer  Has 4 kids- all healthy    Social History:  Social History     Socioeconomic History     Marital status:      Spouse name: Not on file     Number of children: Not on file     Years of education: Not on file     Highest education level: Not on file   Occupational History     Not on file   Tobacco Use     Smoking status: Never     Passive exposure: Never     Smokeless tobacco: Never   Vaping Use     Vaping status: Never Used     Passive vaping exposure: Yes   Substance  and Sexual Activity     Alcohol use: Yes     Comment: occassional, 1- 2 per week     Drug use: No     Sexual activity: Yes     Partners: Female     Birth control/protection: Male Surgical, Female Surgical     Comment: vasectomy   Other Topics Concern     Parent/sibling w/ CABG, MI or angioplasty before 65F 55M? No   Social History Narrative     Not on file     Social Determinants of Health     Financial Resource Strain: Not on file   Food Insecurity: Not on file   Transportation Needs: Not on file   Physical Activity: Not on file   Stress: Not on file   Social Connections: Not on file   Intimate Partner Violence: Not on file   Housing Stability: Not on file   no smoking. Drinks etoh wine 2-3 times / week.       Physical Exam:  There were no vitals taken for this visit.   Wt Readings from Last 4 Encounters:   03/29/23 85.3 kg (188 lb)   03/22/23 88 kg (194 lb)   03/08/23 87 kg (191 lb 12.8 oz)   03/01/23 88 kg (194 lb)         Constitutional.  Does not seem to be in any acute distress.  Eyes.  No redness or discharge noted.  Respiratory.  Speaking in full sentences.  Breathing seems comfortable without any accessory use of muscles.    Skin.  Visualized his skin does not show any obvious rashes.  Musculoskeletal.  Range of motion for visualized areas is intact.  Neurological.  Alert and oriented x3.  Psychiatric.  Mood, mentation and affect are normal.  Decision making capacity is intact.      The rest of a comprehensive physical examination is deferred due to Public Health Emergency video visit restrictions.      Laboratory/Imaging Studies      Reviewed  4/19/2023  CBC shows WBC 6.8.  Hemoglobin 12.5.  Platelets 410.  Neutrophils 5.1.  CMP unremarkable except albumin 2.9.  .      4/4/2023.  CBC shows WBC 0.3.  Hemoglobin 13.8.  Platelets 117.    4/9/2023  CBC shows WBC 19.4.  It was 22.5 on 4/8/2023.  ANC 13.6.  There was a left shift with metamyelocytes and myelocytes.  Hemoglobin 11.3.  Platelets  113.  Chemistry showed normal kidney functions.        Previously  SPEP did not show any monoclonal protein.  Serum immunoglobulin levels were normal.  [place] free light chain 2.53.  Lambda 1.6.  Ratio normal 1.58.      Hepatitis B surface antibody was positive.  Hepatitis B surface antigen and core antibody were negative.  HIV negative  Hep C negative        1/9/2023  CBC was unremarkable.  Peripheral blood smear was unremarkable.    CLAIRE was positive-1: 80, speckled pattern  dsDNA was negative  ESR 8    PET CT chest abdomen and pelvis 5/5/2023  IMPRESSION:   In this patient with history of diffuse large cell lymphoma:  1. There is evidence of the complete response by Lugano criteria.  2. Subcarinal partially calcified lymph node, similar in size to prior  with minimal FDG uptake above background, likely reactive, attention  on follow-up.  3. Peripheral predominant ground glass opacities in the lungs. These  findings are likely secondary to infectious/inflammatory changes  including COVID-19.   4. Incidental and chronic findings including cholelithiasis without  cholecystitis and sub-6 mm solid pulmonary nodule similar to CT dated  1/27/2023. Attention on follow-up.  5. Please see same day dedicated head/neck PET/CT for further details.    PET CT neck 5/5/2023  Impression:   In this patient with a history of lymphoma:  1. There is complete response pet Lugano criteria. Excisional biopsy  changes in previously seen hypermetabolic left supraclavicular lymph  node. No residual hypermetabolic activity.  2. No additional hypermetabolic lesions of the head/neck.       2/16/2023 excisional biopsy of the left level 4 cervical lymph node  Large B-cell lymphoma, non-germinal center subtype.    The neoplastic cells express CD20, BCL2, BCL6, and MUM-1 (variable staining intensity), and are negative for CD3, CD5, CD10, CD23, Cyclin D1, and ALK. CD21 and CD23 demonstrate a lack of residual follicular dendritic cell meshworks.  C-Myc demonstrates variable staining, and is overall estimated at 20-30% (interpreted as negative; cutoff 40%). Ki-67 proliferative index is also variable, and is overall estimated around 60-70%.      EBV RNA by in situ hybridization (SOY-OTTO) is negative for EBV in the neoplastic cells.   A congo red stain is negative for amyloid deposition in the tissue.     FISH testing on the lymph node  RESULTS:     - Gains of BCL6 (94%); no rearrangement of BCL6  - Gains of BCL2 (94%); no rearrangement of BCL2  - No rearrangement of MYC or IGH::MYC fusion        INTERPRETATION:    Of the interphase cells examined, 94-96% had signal patterns indicative of gains of BCL6 (3q27) and BCL2 (18q21), consistent with gains of all or part of chromosomes 3 and 18. No evidence was found of rearrangements of BCL6, MYC (8q24), or BCL2, or of a t(8;14).     Cytogenetics.  He has complex cytogenetics.    48,X,-Y,t(1;12)(p13;q13),+3,+3,add(3)(p25),t(3;8)(q21;p21),del(6)(13q21),add(11)(q22),+18,?idic(18)(p11.2)x2[cp19]/46,XY[1]      2/15/2023 bone marrow biopsy was unremarkable without evidence of involvement of lymphoma.      ASSESSMENT/PLAN:    Diffuse large B-cell lymphoma presenting with diffuse lymphadenopathy.    He has marked FDG avid left supraclavicular, axillary, retroperitoneal lymph nodes.  No hepatosplenomegaly.  No evidence of bone marrow or extra lymphatic involvement.  FISH testing shows gains of BCL2 and BCL6 but no rearrangement.  No rearrangement of MYC or IGH::MYC fusion  No evidence of double hit or triple hit lymphoma.  No evidence of bone marrow involvement.  LDH is mildly elevated.      IPI is 3    We started R-CHOP on 3/8/2023 with curative intent.      Overall he tolerated the first chemotherapy cycle well.  He did have chemotherapy-induced neutropenia and had neutropenic fever but no infection was found.  Fever resolved on its own without antibiotics.      He received cycle #2 on 3/29/2023 with G-CSF support because  of neutropenic fever.    He developed neutropenic fever with pancolitis from C. difficile.  Cryptosporidium was also positive.    Cycle #3 was on 4/19/2023.    He achieved complete response by PET/CT done on 5/5/2023 after 3 cycles    I would recommend continuing with 3 more cycles of R-CHOP.    Plan to proceed with cycle #4 on 5/10/2023    Chemotherapy induced neutropenic fever/typhlitis.  His white blood cell count went down to 0.3.  These bounced back after 3 days.  I am not certain whether he got any extra Neupogen in the hospital or not.  We will continue G-CSF support with chemotherapy.      Anemia.  He has mild anemia from chemotherapy which is expected.  Continue to observe.    Respiratory.  He does have cough and some shortness of breath. He has subtle groundglass opacities at the periphery.  Because his immune system is not working well and he is at high risk of infection, I would recommend treating him with levofloxacin for 7 days for pneumonia.  He did a home COVID test which was negative.    C. difficile colitis.  Symptoms have resolved.  Now that he will be on levofloxacin, there will be a high risk of reactivation of C. difficile so therefore I recommend that he starts taking vancomycin 125 mg 4 times a day while he is taking levofloxacin.        We did not address the following today.    Frequency urination.  He does not have any dysuria.  Urine did not have infection when it was checked on 4/4/2023.  Urinary frequency is slowly improving.  Keep well-hydrated.    Prevention of tumor lysis syndrome.  He was given allopurinol which he took for about 7 to 8 days but developed a rash from it.  Allopurinol was stopped.  He did not go into tumor lysis syndrome.      Return to clinic before C#5    I answered all of their questions to their satisfaction.  They understand the situation and are agreeable with the plan.    Kiel Maurer MD    I spent >42 minutes on this visit on the date of service, including  the video time, reviewing records and labs and imaging and placing new orders as well as coordination of care and documentation.

## 2023-05-09 NOTE — NURSING NOTE
Is the patient currently in the state of [place]? YES    Visit mode:VIDEO    If the visit is dropped, the patient can be reconnected by: VIDEO VISIT: Send to e-mail at: [email or web address]    Will anyone else be joining the visit? NO      How would you like to obtain your AVS? MyChart    Are changes needed to the allergy or medication list? NO  Patient verified medications and allergies are correct via eCheck-in. Patient confirms no changes at this time and/or since last reviewed by clinic staff.    Reason for visit: Oncology Video Visit Return (Diffuse large B-cell lymphoma, f/u)  Geovanni GATES Louisa 62 year old male presents today for f/u on lymphoma and review CT/PET results.  Tammie Cheng, Virtual Facilitator

## 2023-05-09 NOTE — PATIENT INSTRUCTIONS
Start levofloxacin 500 mg daily x 7 days  Start vancomycin 125 mg 4 times daily x 7 days    Chemo as scheduled.    See me 5/30/2023     Chemo on 5/31/2023

## 2023-05-09 NOTE — LETTER
5/9/2023         RE: Geovanni Jasso  [id number]  [facility] [address]        Dear Colleague,    Thank you for referring your patient, Geovanni Jasso, to the [facility]. Please see a copy of my visit note below.    Virtual Visit Details    Type of service:  Video Visit   Video Start Time: 9:53 AM  Video End Time:10:18 AM    Originating Location (pt. Location): Home    Distant Location (provider location):  Off-site  Platform used for Video Visit: [facility]        Oncology follow-up visit:  Date on this visit: 5/09/23     Diagnoses.    Diffuse large B-cell lymphoma    Primary Physician: Paddy Holly     History Of Present Illness:  Mr. Jasso is a 62 year old  male who presents for follow-up of diffuse lymphadenopathy.  He initially saw me on 1/18/2022 for retroperitoneal lymphadenopathy.  Please see my previous note for details.  I have copied and updated from prior notes.        Over the last few months, since fall of 2022, off and on he has had upper abdominal pain which lasts few minutes. He thinks stress sometimes can bring it on. No relationship to food.  No nausea or vomiting. BMs have been fine. No bleeding. No fevers.   He had a CT Abd Pelvis on 1/6/2023 which showed enlarged retroperitoneal lymphadenopathy  He has noticed some fatigue. He has also noticed some lump in the throat just above the sternum at night. This is going on for a couple of months. He denies any dysphagia.     Currently he feels well. Currently denies any abdominal pain. No nausea or vomiting. BMs are fine. No recent weight loss. No SOB. No neuropathy. No skin flushing. No drenching night sweats.    In the summer, he noticed a rash on the face which has since resolved.  He was noted to have CLAIRE positive.     On 2/6/2023, he had FNA of the left supraclavicular lymph node.  Cytology showed atypical lymphoid cells.  Flow cytometry showed lambda monotypic B cells which are negative for CD5 and CD10.   This is consistent with a B-cell lymphoma.  We decided to go ahead with excisional lymph node biopsy as well as bone marrow biopsy for complete staging.    Excisional lymph node biopsy showed diffuse large B-cell lymphoma.    Bone marrow biopsy did not reveal evidence of lymphoma.    He started R-CHOP on 3/8/2023.    Cycle #2 R-CHOP on 3/29/2023.        He was admitted to the [facility] from 4/4/2023 - 4/9/2023.  He was admitted for neutropenic fever, sepsis and C. difficile colitis.  He presented with multiple bouts of nonbloody diarrhea and fever of 101 with generalized fatigue and weakness.  He was neutropenic and was noted to have pancolitis.  C. difficile was positive.  Cryptosporidium was also positive.  He was started on IV cefepime and IV Flagyl initially.  Infectious disease was consulted and he was switched to oral vancomycin to complete 14 days.  Neutropenia resolved by 4/7/2023.    He completed oral vancomycin course on 4/19/2023 4/19/2023.  Cycle #3 of R-CHOP    5/5/2023.  PET CT neck chest abdomen and pelvis shows complete response to treatment per Lugano criteria        Interval history.    This is a video visit.  He is accompanied by his wife Charleen   He felt really tired for a few days after chemotherapy but then started to feel better.  His bowel movements have been fine.  He denies any bleeding.  Over the last several days he has noticed some cough and some shortness of breath.  No fevers per se.  He started albuterol inhaler which has helped a little bit.  He did a home COVID test which was negative.  He denies any neuropathy.  No new pain and no new swellings.  No drenching night sweats.  He has lost a few pounds.    ECOG 1    ROS:  A comprehensive ROS was otherwise neg    I reviewed other history in Epic as before.      Past Medical/Surgical History:  Past Medical History:   Diagnosis Date     Cancer (H) 2-7-2023    just diagnosed with lymphona.     Carpal tunnel syndrome      Chronic  rhinitis 10/23/2015     Esophageal reflux 12/26/2013     Hand joint stiff, right    Shingles     Past Surgical History:   Procedure Laterality Date     BIOPSY  2-6-23     BIOPSY LYMPH NODE CERVICAL Left 2/16/2023    Procedure: excisional biopsy of a left cervical node;  Surgeon: Aung Tamayo MD;  Location: [facility] OR     COLONOSCOPY  2019    clean, do again in 10 years     COLONOSCOPY WITH CO2 INSUFFLATION N/A 07/17/2019    Procedure: COLONOSCOPY, WITH CO2 INSUFFLATION;  Surgeon: Jaison Hammer MD;  Location: [facility] OR     ENT SURGERY  1979    Loss of hearing rt ear (tinnitis)     HERNIA REPAIR  2000     NO HISTORY OF SURGERY       ORTHOPEDIC SURGERY  1988,1992    rt knee, scope in 88; ACL repair Early 90s     Cancer History:   As above    Allergies:  Allergies as of 05/09/2023 - Reviewed 05/09/2023   Allergen Reaction Noted     Penicillins  12/23/2013     Current Medications:  Current Outpatient Medications   Medication Sig Dispense Refill     acetaminophen (TYLENOL) 325 MG tablet Take 2 tablets (650 mg) by mouth every 6 hours as needed for mild pain 50 tablet 0     albuterol (PROAIR HFA/PROVENTIL HFA/VENTOLIN HFA) 108 (90 Base) MCG/ACT inhaler Inhale 2 puffs into the lungs every 4 hours as needed for shortness of breath 18 g 5     diclofenac (VOLTAREN) 1 % topical gel Apply small amount topically to right shoulder 3-4 times daily as needed for pain. 50 g 0     Efinaconazole (JUBLIA) 10 % SOLN Externally apply topically daily to nail. 8 mL 4     fexofenadine (ALLEGRA) 60 MG tablet Take 1 tablet (60 mg) by mouth 2 times daily 180 tablet 0     fluticasone (FLONASE) 50 MCG/ACT nasal spray Spray 1-2 sprays into both nostrils daily 16 g 11     guaiFENesin (MUCINEX) 600 MG 12 hr tablet Take 2 tablets (1,200 mg) by mouth 2 times daily 30 tablet 1     hydrocortisone (CORTAID) 1 % external ointment Apply topically 2 times daily As needed 56 g 0     ibuprofen (ADVIL,MOTRIN) 200 MG tablet Take 200 mg by mouth  every 6 hours as needed       ketoconazole (NIZORAL) 2 % external cream Apply twice daily for 8 weeks to feet 60 g 3     LANsoprazole (PREVACID) 30 MG DR capsule Take 1 capsule (30 mg) by mouth every morning (before breakfast) 90 capsule 2     lidocaine-prilocaine (EMLA) 2.5-2.5 % external cream Apply topically as needed for moderate pain (4-6) Apply over port at least 30 minutes before port access. 30 g 0     methocarbamol (ROBAXIN) 500 MG tablet Take 1 tablet (500 mg) by mouth nightly as needed for muscle spasms 30 tablet 0     ondansetron (ZOFRAN ODT) 4 MG ODT tab Take 1 tablet (4 mg) by mouth every 8 hours as needed for nausea 10 tablet 0     ondansetron (ZOFRAN) 8 MG tablet Take 1 tablet (8 mg) by mouth every 8 hours as needed for nausea (vomiting) 30 tablet 2     oxyCODONE (ROXICODONE) 5 MG tablet Take 1-2 tablets (5-10 mg) by mouth every 4 hours as needed for moderate to severe pain 12 tablet 0     prochlorperazine (COMPAZINE) 10 MG tablet Take 1 tablet (10 mg) by mouth every 6 hours as needed for nausea or vomiting 30 tablet 2     senna-docusate (SENOKOT-S/PERICOLACE) 8.6-50 MG tablet Take 1-2 tablets by mouth 2 times daily 30 tablet 0     traMADol (ULTRAM) 50 MG tablet Take 50 mg by mouth every 6 hours as needed       zolpidem (AMBIEN) 10 MG tablet Take 1 tablet (10 mg) by mouth nightly as needed for sleep 30 tablet 1      Family History:  Family History   Problem Relation Age of Onset     Hyperlipidemia Mother         medication     Diabetes Father      Other Cancer Father         Lung cancer     Other Cancer Maternal Grandmother         Unsure what type of cancer     Autoimmune Disease No family hx of      Maternal grand mother had some sort of a cancer  Dad had lung cancer  Has 4 kids- all healthy    Social History:  Social History     Socioeconomic History     Marital status:      Spouse name: Not on file     Number of children: Not on file     Years of education: Not on file     Highest  education level: Not on file   Occupational History     Not on file   Tobacco Use     Smoking status: Never     Passive exposure: Never     Smokeless tobacco: Never   Vaping Use     Vaping status: Never Used     Passive vaping exposure: Yes   Substance and Sexual Activity     Alcohol use: Yes     Comment: occassional, 1- 2 per week     Drug use: No     Sexual activity: Yes     Partners: Female     Birth control/protection: Male Surgical, Female Surgical     Comment: vasectomy   Other Topics Concern     Parent/sibling w/ CABG, MI or angioplasty before 65F 55M? No   Social History Narrative     Not on file     Social Determinants of Health     Financial Resource Strain: Not on file   Food Insecurity: Not on file   Transportation Needs: Not on file   Physical Activity: Not on file   Stress: Not on file   Social Connections: Not on file   Intimate Partner Violence: Not on file   Housing Stability: Not on file   no smoking. Drinks etoh wine 2-3 times / week.       Physical Exam:  There were no vitals taken for this visit.   Wt Readings from Last 4 Encounters:   03/29/23 85.3 kg (188 lb)   03/22/23 88 kg (194 lb)   03/08/23 87 kg (191 lb 12.8 oz)   03/01/23 88 kg (194 lb)         Constitutional.  Does not seem to be in any acute distress.  Eyes.  No redness or discharge noted.  Respiratory.  Speaking in full sentences.  Breathing seems comfortable without any accessory use of muscles.    Skin.  Visualized his skin does not show any obvious rashes.  Musculoskeletal.  Range of motion for visualized areas is intact.  Neurological.  Alert and oriented x3.  Psychiatric.  Mood, mentation and affect are normal.  Decision making capacity is intact.      The rest of a comprehensive physical examination is deferred due to Public Health Emergency video visit restrictions.      Laboratory/Imaging Studies      Reviewed  4/19/2023  CBC shows WBC 6.8.  Hemoglobin 12.5.  Platelets 410.  Neutrophils 5.1.  CMP unremarkable except albumin  2.9.  .      4/4/2023.  CBC shows WBC 0.3.  Hemoglobin 13.8.  Platelets 117.    4/9/2023  CBC shows WBC 19.4.  It was 22.5 on 4/8/2023.  ANC 13.6.  There was a left shift with metamyelocytes and myelocytes.  Hemoglobin 11.3.  Platelets 113.  Chemistry showed normal kidney functions.        Previously  SPEP did not show any monoclonal protein.  Serum immunoglobulin levels were normal.  [place] free light chain 2.53.  Lambda 1.6.  Ratio normal 1.58.      Hepatitis B surface antibody was positive.  Hepatitis B surface antigen and core antibody were negative.  HIV negative  Hep C negative        1/9/2023  CBC was unremarkable.  Peripheral blood smear was unremarkable.    CLAIRE was positive-1: 80, speckled pattern  dsDNA was negative  ESR 8    PET CT chest abdomen and pelvis 5/5/2023  IMPRESSION:   In this patient with history of diffuse large cell lymphoma:  1. There is evidence of the complete response by Lugano criteria.  2. Subcarinal partially calcified lymph node, similar in size to prior  with minimal FDG uptake above background, likely reactive, attention  on follow-up.  3. Peripheral predominant ground glass opacities in the lungs. These  findings are likely secondary to infectious/inflammatory changes  including COVID-19.   4. Incidental and chronic findings including cholelithiasis without  cholecystitis and sub-6 mm solid pulmonary nodule similar to CT dated  1/27/2023. Attention on follow-up.  5. Please see same day dedicated head/neck PET/CT for further details.    PET CT neck 5/5/2023  Impression:   In this patient with a history of lymphoma:  1. There is complete response pet Lugano criteria. Excisional biopsy  changes in previously seen hypermetabolic left supraclavicular lymph  node. No residual hypermetabolic activity.  2. No additional hypermetabolic lesions of the head/neck.       2/16/2023 excisional biopsy of the left level 4 cervical lymph node  Large B-cell lymphoma, non-germinal center  subtype.    The neoplastic cells express CD20, BCL2, BCL6, and MUM-1 (variable staining intensity), and are negative for CD3, CD5, CD10, CD23, Cyclin D1, and ALK. CD21 and CD23 demonstrate a lack of residual follicular dendritic cell meshworks. C-Myc demonstrates variable staining, and is overall estimated at 20-30% (interpreted as negative; cutoff 40%). Ki-67 proliferative index is also variable, and is overall estimated around 60-70%.      EBV RNA by in situ hybridization (SOY-OTTO) is negative for EBV in the neoplastic cells.   A congo red stain is negative for amyloid deposition in the tissue.     FISH testing on the lymph node  RESULTS:     - Gains of BCL6 (94%); no rearrangement of BCL6  - Gains of BCL2 (94%); no rearrangement of BCL2  - No rearrangement of MYC or IGH::MYC fusion        INTERPRETATION:    Of the interphase cells examined, 94-96% had signal patterns indicative of gains of BCL6 (3q27) and BCL2 (18q21), consistent with gains of all or part of chromosomes 3 and 18. No evidence was found of rearrangements of BCL6, MYC (8q24), or BCL2, or of a t(8;14).     Cytogenetics.  He has complex cytogenetics.    48,X,-Y,t(1;12)(p13;q13),+3,+3,add(3)(p25),t(3;8)(q21;p21),del(6)(13q21),add(11)(q22),+18,?idic(18)(p11.2)x2[cp19]/46,XY[1]      2/15/2023 bone marrow biopsy was unremarkable without evidence of involvement of lymphoma.      ASSESSMENT/PLAN:    Diffuse large B-cell lymphoma presenting with diffuse lymphadenopathy.    He has marked FDG avid left supraclavicular, axillary, retroperitoneal lymph nodes.  No hepatosplenomegaly.  No evidence of bone marrow or extra lymphatic involvement.  FISH testing shows gains of BCL2 and BCL6 but no rearrangement.  No rearrangement of MYC or IGH::MYC fusion  No evidence of double hit or triple hit lymphoma.  No evidence of bone marrow involvement.  LDH is mildly elevated.      IPI is 3    We started R-CHOP on 3/8/2023 with curative intent.      Overall he tolerated the  first chemotherapy cycle well.  He did have chemotherapy-induced neutropenia and had neutropenic fever but no infection was found.  Fever resolved on its own without antibiotics.      He received cycle #2 on 3/29/2023 with G-CSF support because of neutropenic fever.    He developed neutropenic fever with pancolitis from C. difficile.  Cryptosporidium was also positive.    Cycle #3 was on 4/19/2023.    He achieved complete response by PET/CT done on 5/5/2023 after 3 cycles    I would recommend continuing with 3 more cycles of R-CHOP.    Plan to proceed with cycle #4 on 5/10/2023    Chemotherapy induced neutropenic fever/typhlitis.  His white blood cell count went down to 0.3.  These bounced back after 3 days.  I am not certain whether he got any extra Neupogen in the hospital or not.  We will continue G-CSF support with chemotherapy.      Anemia.  He has mild anemia from chemotherapy which is expected.  Continue to observe.    Respiratory.  He does have cough and some shortness of breath. He has subtle groundglass opacities at the periphery.  Because his immune system is not working well and he is at high risk of infection, I would recommend treating him with levofloxacin for 7 days for pneumonia.  He did a home COVID test which was negative.    C. difficile colitis.  Symptoms have resolved.  Now that he will be on levofloxacin, there will be a high risk of reactivation of C. difficile so therefore I recommend that he starts taking vancomycin 125 mg 4 times a day while he is taking levofloxacin.        We did not address the following today.    Frequency urination.  He does not have any dysuria.  Urine did not have infection when it was checked on 4/4/2023.  Urinary frequency is slowly improving.  Keep well-hydrated.    Prevention of tumor lysis syndrome.  He was given allopurinol which he took for about 7 to 8 days but developed a rash from it.  Allopurinol was stopped.  He did not go into tumor lysis  syndrome.      Return to clinic before C#5    I answered all of their questions to their satisfaction.  They understand the situation and are agreeable with the plan.    Kiel Maurer MD    I spent >42 minutes on this visit on the date of service, including the video time, reviewing records and labs and imaging and placing new orders as well as coordination of care and documentation.                Again, thank you for allowing me to participate in the care of your patient.        Sincerely,        Kiel Maurer MD

## 2023-05-10 ENCOUNTER — LAB (OUTPATIENT)
Dept: ONCOLOGY | Facility: CLINIC | Age: 63
End: 2023-05-10
Payer: COMMERCIAL

## 2023-05-10 ENCOUNTER — INFUSION THERAPY VISIT (OUTPATIENT)
Dept: INFUSION THERAPY | Facility: CLINIC | Age: 63
End: 2023-05-10
Payer: COMMERCIAL

## 2023-05-10 VITALS
SYSTOLIC BLOOD PRESSURE: 105 MMHG | DIASTOLIC BLOOD PRESSURE: 71 MMHG | HEART RATE: 99 BPM | OXYGEN SATURATION: 96 % | RESPIRATION RATE: 14 BRPM | WEIGHT: 180.8 LBS | BODY MASS INDEX: 25.22 KG/M2 | TEMPERATURE: 98 F

## 2023-05-10 DIAGNOSIS — D70.1 CHEMOTHERAPY-INDUCED NEUTROPENIA (H): Primary | ICD-10-CM

## 2023-05-10 DIAGNOSIS — C83.30 DIFFUSE LARGE B-CELL LYMPHOMA, UNSPECIFIED BODY REGION (H): ICD-10-CM

## 2023-05-10 DIAGNOSIS — T45.1X5A CHEMOTHERAPY-INDUCED NEUTROPENIA (H): Primary | ICD-10-CM

## 2023-05-10 LAB
ALBUMIN SERPL-MCNC: 2.9 G/DL (ref 3.4–5)
ALP SERPL-CCNC: 100 U/L (ref 40–150)
ALT SERPL W P-5'-P-CCNC: 22 U/L (ref 0–70)
ANION GAP SERPL CALCULATED.3IONS-SCNC: 6 MMOL/L (ref 3–14)
AST SERPL W P-5'-P-CCNC: 19 U/L (ref 0–45)
BASOPHILS # BLD AUTO: 0.2 10E3/UL (ref 0–0.2)
BASOPHILS NFR BLD AUTO: 3 %
BILIRUB SERPL-MCNC: 0.3 MG/DL (ref 0.2–1.3)
BUN SERPL-MCNC: 9 MG/DL (ref 7–30)
CALCIUM SERPL-MCNC: 9.2 MG/DL (ref 8.5–10.1)
CHLORIDE BLD-SCNC: 109 MMOL/L (ref 94–109)
CO2 SERPL-SCNC: 26 MMOL/L (ref 20–32)
CREAT SERPL-MCNC: 0.78 MG/DL (ref 0.66–1.25)
EOSINOPHIL # BLD AUTO: 0.1 10E3/UL (ref 0–0.7)
EOSINOPHIL NFR BLD AUTO: 1 %
ERYTHROCYTE [DISTWIDTH] IN BLOOD BY AUTOMATED COUNT: 16.5 % (ref 10–15)
GFR SERPL CREATININE-BSD FRML MDRD: >90 ML/MIN/1.73M2
GLUCOSE BLD-MCNC: 117 MG/DL (ref 70–99)
HCT VFR BLD AUTO: 33.1 % (ref 40–53)
HGB BLD-MCNC: 11 G/DL (ref 13.3–17.7)
IMM GRANULOCYTES # BLD: 0 10E3/UL
IMM GRANULOCYTES NFR BLD: 1 %
LDH SERPL L TO P-CCNC: 173 U/L (ref 85–227)
LYMPHOCYTES # BLD AUTO: 0.5 10E3/UL (ref 0.8–5.3)
LYMPHOCYTES NFR BLD AUTO: 8 %
MCH RBC QN AUTO: 32.4 PG (ref 26.5–33)
MCHC RBC AUTO-ENTMCNC: 33.2 G/DL (ref 31.5–36.5)
MCV RBC AUTO: 97 FL (ref 78–100)
MONOCYTES # BLD AUTO: 1.1 10E3/UL (ref 0–1.3)
MONOCYTES NFR BLD AUTO: 16 %
NEUTROPHILS # BLD AUTO: 4.7 10E3/UL (ref 1.6–8.3)
NEUTROPHILS NFR BLD AUTO: 71 %
NRBC # BLD AUTO: 0 10E3/UL
NRBC BLD AUTO-RTO: 0 /100
PLATELET # BLD AUTO: 317 10E3/UL (ref 150–450)
POTASSIUM BLD-SCNC: 3.5 MMOL/L (ref 3.4–5.3)
PROT SERPL-MCNC: 6.6 G/DL (ref 6.8–8.8)
RBC # BLD AUTO: 3.4 10E6/UL (ref 4.4–5.9)
SODIUM SERPL-SCNC: 141 MMOL/L (ref 133–144)
WBC # BLD AUTO: 6.5 10E3/UL (ref 4–11)

## 2023-05-10 PROCEDURE — 96415 CHEMO IV INFUSION ADDL HR: CPT | Performed by: NURSE PRACTITIONER

## 2023-05-10 PROCEDURE — 96413 CHEMO IV INFUSION 1 HR: CPT | Performed by: NURSE PRACTITIONER

## 2023-05-10 PROCEDURE — 99207 PR NO CHARGE LOS: CPT

## 2023-05-10 PROCEDURE — 96375 TX/PRO/DX INJ NEW DRUG ADDON: CPT | Performed by: NURSE PRACTITIONER

## 2023-05-10 PROCEDURE — 85025 COMPLETE CBC W/AUTO DIFF WBC: CPT | Performed by: INTERNAL MEDICINE

## 2023-05-10 PROCEDURE — 80053 COMPREHEN METABOLIC PANEL: CPT | Performed by: INTERNAL MEDICINE

## 2023-05-10 PROCEDURE — 96377 APPLICATON ON-BODY INJECTOR: CPT | Mod: 59 | Performed by: NURSE PRACTITIONER

## 2023-05-10 PROCEDURE — 96367 TX/PROPH/DG ADDL SEQ IV INF: CPT | Performed by: NURSE PRACTITIONER

## 2023-05-10 PROCEDURE — 96417 CHEMO IV INFUS EACH ADDL SEQ: CPT | Performed by: NURSE PRACTITIONER

## 2023-05-10 PROCEDURE — 83615 LACTATE (LD) (LDH) ENZYME: CPT | Performed by: INTERNAL MEDICINE

## 2023-05-10 PROCEDURE — 96411 CHEMO IV PUSH ADDL DRUG: CPT | Performed by: NURSE PRACTITIONER

## 2023-05-10 RX ORDER — PALONOSETRON 0.05 MG/ML
0.25 INJECTION, SOLUTION INTRAVENOUS ONCE
Status: COMPLETED | OUTPATIENT
Start: 2023-05-10 | End: 2023-05-10

## 2023-05-10 RX ORDER — DOXORUBICIN HYDROCHLORIDE 2 MG/ML
50 INJECTION, SOLUTION INTRAVENOUS ONCE
Status: COMPLETED | OUTPATIENT
Start: 2023-05-10 | End: 2023-05-10

## 2023-05-10 RX ORDER — ACETAMINOPHEN 325 MG/1
650 TABLET ORAL ONCE
Status: COMPLETED | OUTPATIENT
Start: 2023-05-10 | End: 2023-05-10

## 2023-05-10 RX ORDER — HEPARIN SODIUM (PORCINE) LOCK FLUSH IV SOLN 100 UNIT/ML 100 UNIT/ML
500 SOLUTION INTRAVENOUS
Status: DISCONTINUED | OUTPATIENT
Start: 2023-05-10 | End: 2023-05-10 | Stop reason: HOSPADM

## 2023-05-10 RX ORDER — HEPARIN SODIUM (PORCINE) LOCK FLUSH IV SOLN 100 UNIT/ML 100 UNIT/ML
5 SOLUTION INTRAVENOUS
Status: DISCONTINUED | OUTPATIENT
Start: 2023-05-10 | End: 2023-05-10 | Stop reason: HOSPADM

## 2023-05-10 RX ADMIN — DOXORUBICIN HYDROCHLORIDE 100 MG: 2 INJECTION, SOLUTION INTRAVENOUS at 08:49

## 2023-05-10 RX ADMIN — HEPARIN SODIUM (PORCINE) LOCK FLUSH IV SOLN 100 UNIT/ML 500 UNITS: 100 SOLUTION at 07:37

## 2023-05-10 RX ADMIN — HEPARIN SODIUM (PORCINE) LOCK FLUSH IV SOLN 100 UNIT/ML 5 ML: 100 SOLUTION at 13:02

## 2023-05-10 RX ADMIN — Medication 250 ML: at 08:11

## 2023-05-10 RX ADMIN — PALONOSETRON 0.25 MG: 0.05 INJECTION, SOLUTION INTRAVENOUS at 08:23

## 2023-05-10 RX ADMIN — ACETAMINOPHEN 650 MG: 325 TABLET ORAL at 09:11

## 2023-05-10 NOTE — PROGRESS NOTES
Port site scrubbed with Chloraprep for 30 seconds. Accessed port using sterile technique. Two green and a purple tube drawn. See documentation flowsheet. Port needle left accessed for treatment. Tolerated port access and draw without complaint. Jesika Espino RN on 5/10/2023 at 7:10 AM

## 2023-05-10 NOTE — PROGRESS NOTES
Infusion Nursing Note:  Geovanni Jasso presents today for C4D1 Adriamycin, Vincristine, Cytoxan, Truxima and Neulasta Onpro.    Patient seen by provider today: No   present during visit today: Not Applicable.    Note:   Patient states he will take Prednisone as prescribed.    Has not started Levaquin for pneumonia yet as pharmacy did not have Vancomycin in stock.  Will be taking Vancomycin with Levaquin to prevent recurrence of Cdiff.     Rituxan given at 100 mg/hr x 30 minutes, then 200 mg/hr x 30 minutes, then 300 mg/hr x 30 minutes then 400 mg/hr for the remainder.    Intravenous Access:  Implanted Port.    Treatment Conditions:  Lab Results   Component Value Date    HGB 11.0 (L) 05/10/2023    WBC 6.5 05/10/2023    ANEU 0.1 (LL) 03/22/2023    ANEUTAUTO 4.7 05/10/2023     05/10/2023      Lab Results   Component Value Date     05/10/2023    POTASSIUM 3.5 05/10/2023    CR 0.78 05/10/2023    SHIV 9.2 05/10/2023    BILITOTAL 0.3 05/10/2023    ALBUMIN 2.9 (L) 05/10/2023    ALT 22 05/10/2023    AST 19 05/10/2023     Results reviewed, labs MET treatment parameters, ok to proceed with treatment.      Post Infusion Assessment:  Patient tolerated infusion without incident.  Blood return noted pre and post infusion.  Site patent and intact, free from redness, edema or discomfort.  No evidence of extravasations.  Access discontinued per protocol.  ONPRO  Was placed on patient's: back of left arm.  Was placed at 1215 PM  Podpal used: Yes  ONPRO injector device Lot number: [id number]  Patient education included: what patient can expect after application, what colored lights mean on the device, when to remove device, when and where to call with questions or issues, all patients questions answered and that Neulasta administration will occur at 1515 PM tomorrow.  Patient tolerated administration well.      Discharge Plan:   [facility]  Patient will return 5/31/23 for next appointment.   Patient  discharged in stable condition accompanied by: self. Wife is .  Departure Mode: Ambulatory.      Jesika Espino RN

## 2023-05-18 NOTE — PROGRESS NOTES
[facility] Dermatology Note  Encounter Date: May 19, 2023  Office Visit     Dermatology Problem List:  1. Possible onychomycosis  - nail clippings 4/16/21 and 7/21/21 both negative.   - current tx: Jublia daily.  -previous tx: terbinafine with improvement  2.Acne Rosacea    - Apply metrocream BID to the face      Relevant medical hx: non Hodgkins lymphoma  ____________________________________________     Assessment & Plan:     # Solar lentigines on the sun exposed skin areas. Benign nature was discussed. No further intervention required at this time.      #Onychomycosis.   -Restart Jublia. Refilled.     Procedures Performed:   None.    Follow-up: 1 year(s) in-person, or earlier for new or changing lesions    Staff and Scribe:     Scribe Disclosure:   I, Jonathan Sahu, am serving as a scribe to document services personally performed by this physician, Dr. Marbella Castro, based on data collection and the provider's statements to me.       Provider Disclosure:   The documentation recorded by the scribe accurately reflects the services I personally performed and the decisions made by me. Dr. Ernst was present.     Marbella Castro MD    Department of Dermatology  [facility]: Phone: [phone], Fax:[phone]  [facility]: Phone: [phone], Fax: [phone]    ____________________________________________    CC: Skin Check (No areas of concern; pt started on chem and since has had a rash on forehead (no other symptoms/doesn't bother him))    HPI:  Mr. Geovanni Jasso is a(n) 62 year old male who presents today as a return patient for a skin check. Pt reports he is going through chemotherapy treatment for non Hodgkins lymphoma.     Last seen 5/13/2022 for a skin check. At that time no concerning lesions were noted.       Patient is otherwise feeling well, without additional skin  concerns.    Labs Reviewed:  N/A    Physical Exam:  Vitals: There were no vitals taken for this visit.  SKIN: Total skin excluding the undergarment areas was performed. The exam included the head/face, neck, both arms, chest, back, abdomen, both legs, digits and/or nails.   - There are scattered light brown macules on sun exposed areas.   - No other lesions of concern on areas examined.     Medications:  Current Outpatient Medications   Medication     acetaminophen (TYLENOL) 325 MG tablet     albuterol (PROAIR HFA/PROVENTIL HFA/VENTOLIN HFA) 108 (90 Base) MCG/ACT inhaler     diclofenac (VOLTAREN) 1 % topical gel     Efinaconazole (JUBLIA) 10 % SOLN     fexofenadine (ALLEGRA) 60 MG tablet     fluticasone (FLONASE) 50 MCG/ACT nasal spray     guaiFENesin (MUCINEX) 600 MG 12 hr tablet     hydrocortisone (CORTAID) 1 % external ointment     ibuprofen (ADVIL,MOTRIN) 200 MG tablet     ketoconazole (NIZORAL) 2 % external cream     LANsoprazole (PREVACID) 30 MG DR capsule     lidocaine-prilocaine (EMLA) 2.5-2.5 % external cream     methocarbamol (ROBAXIN) 500 MG tablet     ondansetron (ZOFRAN ODT) 4 MG ODT tab     ondansetron (ZOFRAN) 8 MG tablet     oxyCODONE (ROXICODONE) 5 MG tablet     prochlorperazine (COMPAZINE) 10 MG tablet     senna-docusate (SENOKOT-S/PERICOLACE) 8.6-50 MG tablet     traMADol (ULTRAM) 50 MG tablet     zolpidem (AMBIEN) 10 MG tablet     No current facility-administered medications for this visit.      Past Medical History:   Patient Active Problem List   Diagnosis     Esophageal reflux     Hyperlipidemia LDL goal <70     Chronic rhinitis     Bilateral low back pain without sciatica     Chondromalacia patellae, right     S/P ACL reconstruction     Atherosclerosis of native coronary artery of native heart without angina pectoris     SOB (shortness of breath)     Non-allergic rhinitis     Positive CLAIRE (antinuclear antibody)     Hand joint stiff, right     Carpal tunnel syndrome     Osteoarthritis of knee,  unspecified laterality, unspecified osteoarthritis type     Diverticulosis of large intestine     Internal hemorrhoids     Diffuse large B-cell lymphoma, unspecified body region (H)     Chemotherapy-induced neutropenia (H)     Past Medical History:   Diagnosis Date     Cancer (H) 2-7-2023    just diagnosed with lymphona.     Carpal tunnel syndrome      Chronic rhinitis 10/23/2015     Esophageal reflux 12/26/2013     Hand joint stiff, right         CC Paddy Holly MD  [address] [place] [address] on close of this encounter.

## 2023-05-19 ENCOUNTER — OFFICE VISIT (OUTPATIENT)
Dept: DERMATOLOGY | Facility: CLINIC | Age: 63
End: 2023-05-19
Payer: COMMERCIAL

## 2023-05-19 ENCOUNTER — TELEPHONE (OUTPATIENT)
Dept: ONCOLOGY | Facility: CLINIC | Age: 63
End: 2023-05-19

## 2023-05-19 DIAGNOSIS — B35.1 ONYCHOMYCOSIS: Primary | ICD-10-CM

## 2023-05-19 PROCEDURE — 99213 OFFICE O/P EST LOW 20 MIN: CPT | Performed by: DERMATOLOGY

## 2023-05-19 RX ORDER — EFINACONAZOLE 100 MG/ML
SOLUTION TOPICAL DAILY
Qty: 8 ML | Refills: 4 | Status: SHIPPED | OUTPATIENT
Start: 2023-05-19 | End: 2024-03-14

## 2023-05-19 NOTE — NURSING NOTE
Geovanni Jasso's chief complaint for this visit includes:  Chief Complaint   Patient presents with     Skin Check     No areas of concern; pt started on chem and since has had a rash on forehead (no other symptoms/doesn't bother him)     PCP: Paddy Holly    Referring Provider:  Paddy Holly MD  [id number]  [facility]  [place] [address]    There were no vitals taken for this visit.  Data Unavailable        Allergies   Allergen Reactions     Penicillins          Do you need any medication refills at today's visit? No

## 2023-05-19 NOTE — LETTER
5/19/2023         RE: Geovanni Jasso  [id number]  [facility] [address]        Dear Colleague,    Thank you for referring your patient, Geovanni Jasso, to the [facility]. Please see a copy of my visit note below.    [facility] Dermatology Note  Encounter Date: May 19, 2023  Office Visit     Dermatology Problem List:  1. Possible onychomycosis  - nail clippings 4/16/21 and 7/21/21 both negative.   - current tx: Jublia daily.  -previous tx: terbinafine with improvement  2.Acne Rosacea    - Apply metrocream BID to the face      Relevant medical hx: non Hodgkins lymphoma  ____________________________________________     Assessment & Plan:     # Solar lentigines on the sun exposed skin areas. Benign nature was discussed. No further intervention required at this time.      #Onychomycosis.   -Restart Jublia. Refilled.     Procedures Performed:   None.    Follow-up: 1 year(s) in-person, or earlier for new or changing lesions    Staff and Scribe:     Scribe Disclosure:   I, Jonathan Sahu, am serving as a scribe to document services personally performed by this physician, Dr. Marbella Castro, based on data collection and the provider's statements to me.       Provider Disclosure:   The documentation recorded by the scribe accurately reflects the services I personally performed and the decisions made by me. Dr. Ernst was present.     Marbella Castro MD    Department of Dermatology  [facility]: Phone: [phone], Fax:[phone]  [facility]: Phone: [phone], Fax: [phone]    ____________________________________________    CC: Skin Check (No areas of concern; pt started on chem and since has had a rash on forehead (no other symptoms/doesn't bother him))    HPI:  Mr. Geovanni Jasso is a(n) 62 year old male who presents today as a return patient for a  skin check. Pt reports he is going through chemotherapy treatment for non Hodgkins lymphoma.     Last seen 5/13/2022 for a skin check. At that time no concerning lesions were noted.       Patient is otherwise feeling well, without additional skin concerns.    Labs Reviewed:  N/A    Physical Exam:  Vitals: There were no vitals taken for this visit.  SKIN: Total skin excluding the undergarment areas was performed. The exam included the head/face, neck, both arms, chest, back, abdomen, both legs, digits and/or nails.   - There are scattered light brown macules on sun exposed areas.   - No other lesions of concern on areas examined.     Medications:  Current Outpatient Medications   Medication     acetaminophen (TYLENOL) 325 MG tablet     albuterol (PROAIR HFA/PROVENTIL HFA/VENTOLIN HFA) 108 (90 Base) MCG/ACT inhaler     diclofenac (VOLTAREN) 1 % topical gel     Efinaconazole (JUBLIA) 10 % SOLN     fexofenadine (ALLEGRA) 60 MG tablet     fluticasone (FLONASE) 50 MCG/ACT nasal spray     guaiFENesin (MUCINEX) 600 MG 12 hr tablet     hydrocortisone (CORTAID) 1 % external ointment     ibuprofen (ADVIL,MOTRIN) 200 MG tablet     ketoconazole (NIZORAL) 2 % external cream     LANsoprazole (PREVACID) 30 MG DR capsule     lidocaine-prilocaine (EMLA) 2.5-2.5 % external cream     methocarbamol (ROBAXIN) 500 MG tablet     ondansetron (ZOFRAN ODT) 4 MG ODT tab     ondansetron (ZOFRAN) 8 MG tablet     oxyCODONE (ROXICODONE) 5 MG tablet     prochlorperazine (COMPAZINE) 10 MG tablet     senna-docusate (SENOKOT-S/PERICOLACE) 8.6-50 MG tablet     traMADol (ULTRAM) 50 MG tablet     zolpidem (AMBIEN) 10 MG tablet     No current facility-administered medications for this visit.      Past Medical History:   Patient Active Problem List   Diagnosis     Esophageal reflux     Hyperlipidemia LDL goal <70     Chronic rhinitis     Bilateral low back pain without sciatica     Chondromalacia patellae, right     S/P ACL reconstruction      Atherosclerosis of native coronary artery of native heart without angina pectoris     SOB (shortness of breath)     Non-allergic rhinitis     Positive CLAIRE (antinuclear antibody)     Hand joint stiff, right     Carpal tunnel syndrome     Osteoarthritis of knee, unspecified laterality, unspecified osteoarthritis type     Diverticulosis of large intestine     Internal hemorrhoids     Diffuse large B-cell lymphoma, unspecified body region (H)     Chemotherapy-induced neutropenia (H)     Past Medical History:   Diagnosis Date     Cancer (H) 2-7-2023    just diagnosed with lymphona.     Carpal tunnel syndrome      Chronic rhinitis 10/23/2015     Esophageal reflux 12/26/2013     Hand joint stiff, right         [place]  [address] [place] [address] on close of this encounter.     Again, thank you for allowing me to participate in the care of your patient.        Sincerely,        Marbella Castro MD

## 2023-05-19 NOTE — TELEPHONE ENCOUNTER
Oncology Nurse Triage    Situation:   Geovanni is reporting the following symptoms: fatigue, dry mouth, dry cough, nose bleeds.    Needs refill on Oxycodone.    Background:   Treating Provider:   Dr. Maurer    Date of last office visit: 5/9/23    Recent Treatments:C4D1 Adriamycin, Vincristine, Cytoxan, Truxima and Neulasta Onpro on 5/10/23.    Assessment:     Onset: For the last couple of weeks patient has been having a dry cough, dry mouth, and fatigue. He was treated for pneumonia with levofloxacin and took his last dose this week on Wednesday. No fever, wheezing, CP, or SOB. He does feel winded with activity. Has albuterol inhaler but only uses it once in a while as needed. He is eating and drinking ok. Low appetite. He drinks about 40 oz of water daily. Denies n/v/c/d or rash. In the last week, he's noticed daily nose bleeds. This usually happens once per day, lasting 1-2 minutes. No other sx noted.     Recommendations:     Advised to push fluids, try pedialyte, biotene mouth rinses, limit caffeine and continue to monitor sx. If sx persist or he develops new sx, he will need to be further evaluated in person, understanding verbalized.     Routing to care team to further review and advise.      Azeb Diaz RN, BSN, PHN, OCN  [facility]

## 2023-05-19 NOTE — PATIENT INSTRUCTIONS
[facility] Dermatology Visit    Thank you for allowing us to participate in your care. Your findings, instructions and follow-up plan are as follows:         When should I call my doctor?  If you are worsening or not improving, please, contact us or seek urgent care as noted below.     Who should I call with questions (adults)?  [facility] (adult and pediatric): [phone]  [facility] (adult): [phone]  For urgent needs outside of business hours call the [facility] at [phone] and ask for the dermatology resident on call  If this is a medical emergency and you are unable to reach an ER, Call 911    Who should I call with questions (pediatric)?  [facility]- Pediatric Dermatology  Dr. Faby Culver, Dr. Karri Gibson, Dr. Nidhi Waddell, Isabel Brewer, PA  Dr. Georgette Garcia, Dr. Dilma Longoria & Dr. Laron Jacobson  Non Urgent  Nurse Triage Line; [phone]- Cecily and Jeri RN Care Coordinators   Karmen (/Complex ) [phone]    If you need a prescription refill, please contact your pharmacy. Refills are approved or denied by our physicians during normal business hours, Monday through Fridays  Per office policy, refills will not be granted if you have not been seen within the past year (or sooner depending on your child's condition).    Scheduling Information:  Pediatric Appointment Scheduling and Call Center [phone]  Radiology Scheduling- [phone]  Sedation Unit Scheduling- [phone]  [facility] Scheduling- General [phone]; Pediatric Dermatology [phone]  Main  Services: [phone]  Jordanian: [phone]  Tunisian: [phone]  Hmong/Ronni/Filipino: [phone]  Preadmission Nursing Department Fax Number: [phone] (fax all pre-operative paperwork to this number)    For urgent matters arising during evenings, weekends, or  holidays that cannot wait for normal business hours please call [phone] and ask for the dermatology resident on call to be paged.

## 2023-05-22 NOTE — TELEPHONE ENCOUNTER
"Patient called back. He is doing a little better but still having some fatigue, dry mouth, and dry cough. He feels like he has to force sentences out when speaking. No CP, SOB, wheezing, or fever. He does tire out easily. He started using Biotene mouth rinse and this helps some. He also reports his taste is \"shot.\" Food is not tasting well.    Advised patient to come in to clinic for further assessment. Patient agrees. Appointment scheduled with SAURABH for tomorrow afternoon. He will continue to monitor sx and call back if he develops any new or worsening sx.     Azeb Diaz, RN, BSN, PHN, OCN  [facility]    "

## 2023-05-22 NOTE — TELEPHONE ENCOUNTER
EXAMINATION TYPE: XR Hip Complete LT

 

DATE OF EXAM: 5/22/2023

 

COMPARISON: None

 

HISTORY: Fall, pain

 

TECHNIQUE: 2 view left hip

 

FINDINGS: Femoral head articulates with the acetabulum. Joint spaces preserved. Growth plates are pat
ent. Orientation appears normal. Follow-up exams can be performed as clinically indicated.

 

IMPRESSION:

1.  No acute osseous abnormality left hip. Updated Rx sent with correct quantity.

## 2023-05-22 NOTE — TELEPHONE ENCOUNTER
Called patient for follow up, left message to call back.     Azeb Diaz RN, BSN, PHN, OCN  [facility]      Kiel Maurer MD  You; Sydni Renae RN 15 hours ago (6:06 PM)     AO  Please follow up early next week and see how he is doing     Thanks

## 2023-05-23 ENCOUNTER — ONCOLOGY VISIT (OUTPATIENT)
Dept: ONCOLOGY | Facility: CLINIC | Age: 63
End: 2023-05-23
Payer: COMMERCIAL

## 2023-05-23 ENCOUNTER — ANCILLARY PROCEDURE (OUTPATIENT)
Dept: GENERAL RADIOLOGY | Facility: CLINIC | Age: 63
End: 2023-05-23
Attending: NURSE PRACTITIONER
Payer: COMMERCIAL

## 2023-05-23 VITALS
HEART RATE: 118 BPM | RESPIRATION RATE: 18 BRPM | OXYGEN SATURATION: 96 % | DIASTOLIC BLOOD PRESSURE: 70 MMHG | WEIGHT: 177.4 LBS | SYSTOLIC BLOOD PRESSURE: 102 MMHG | BODY MASS INDEX: 24.74 KG/M2 | TEMPERATURE: 99.1 F

## 2023-05-23 DIAGNOSIS — R35.0 URINARY FREQUENCY: ICD-10-CM

## 2023-05-23 DIAGNOSIS — J98.9 REACTIVE AIRWAY DISEASE WITHOUT ASTHMA: ICD-10-CM

## 2023-05-23 DIAGNOSIS — R05.9 COUGH, UNSPECIFIED TYPE: ICD-10-CM

## 2023-05-23 DIAGNOSIS — R05.9 COUGH, UNSPECIFIED TYPE: Primary | ICD-10-CM

## 2023-05-23 DIAGNOSIS — C83.30 DIFFUSE LARGE B-CELL LYMPHOMA, UNSPECIFIED BODY REGION (H): ICD-10-CM

## 2023-05-23 LAB
ALBUMIN UR-MCNC: 20 MG/DL
ANION GAP SERPL CALCULATED.3IONS-SCNC: 4 MMOL/L (ref 3–14)
APPEARANCE UR: CLEAR
BASOPHILS # BLD AUTO: 0.1 10E3/UL (ref 0–0.2)
BASOPHILS NFR BLD AUTO: 1 %
BILIRUB UR QL STRIP: NEGATIVE
BUN SERPL-MCNC: 6 MG/DL (ref 7–30)
CALCIUM SERPL-MCNC: 9.1 MG/DL (ref 8.5–10.1)
CHLORIDE BLD-SCNC: 104 MMOL/L (ref 94–109)
CO2 SERPL-SCNC: 29 MMOL/L (ref 20–32)
COLOR UR AUTO: YELLOW
CREAT SERPL-MCNC: 0.96 MG/DL (ref 0.66–1.25)
EOSINOPHIL # BLD AUTO: 0 10E3/UL (ref 0–0.7)
EOSINOPHIL NFR BLD AUTO: 0 %
ERYTHROCYTE [DISTWIDTH] IN BLOOD BY AUTOMATED COUNT: 16.9 % (ref 10–15)
GFR SERPL CREATININE-BSD FRML MDRD: 88 ML/MIN/1.73M2
GLUCOSE BLD-MCNC: 110 MG/DL (ref 70–99)
GLUCOSE UR STRIP-MCNC: NEGATIVE MG/DL
HCT VFR BLD AUTO: 31.1 % (ref 40–53)
HGB BLD-MCNC: 10.5 G/DL (ref 13.3–17.7)
HGB UR QL STRIP: NEGATIVE
HOLD SPECIMEN: NORMAL
IMM GRANULOCYTES # BLD: 0.2 10E3/UL
IMM GRANULOCYTES NFR BLD: 2 %
KETONES UR STRIP-MCNC: NEGATIVE MG/DL
LEUKOCYTE ESTERASE UR QL STRIP: NEGATIVE
LYMPHOCYTES # BLD AUTO: 0.4 10E3/UL (ref 0.8–5.3)
LYMPHOCYTES NFR BLD AUTO: 5 %
MCH RBC QN AUTO: 32.7 PG (ref 26.5–33)
MCHC RBC AUTO-ENTMCNC: 33.8 G/DL (ref 31.5–36.5)
MCV RBC AUTO: 97 FL (ref 78–100)
MONOCYTES # BLD AUTO: 1 10E3/UL (ref 0–1.3)
MONOCYTES NFR BLD AUTO: 12 %
MUCOUS THREADS #/AREA URNS LPF: PRESENT /LPF
NEUTROPHILS # BLD AUTO: 6.5 10E3/UL (ref 1.6–8.3)
NEUTROPHILS NFR BLD AUTO: 80 %
NITRATE UR QL: NEGATIVE
NRBC # BLD AUTO: 0 10E3/UL
NRBC BLD AUTO-RTO: 0 /100
PH UR STRIP: 5.5 [PH] (ref 5–7)
PLATELET # BLD AUTO: 157 10E3/UL (ref 150–450)
POTASSIUM BLD-SCNC: 3.9 MMOL/L (ref 3.4–5.3)
RBC # BLD AUTO: 3.21 10E6/UL (ref 4.4–5.9)
RBC #/AREA URNS AUTO: ABNORMAL /HPF
SKIP: ABNORMAL
SODIUM SERPL-SCNC: 137 MMOL/L (ref 133–144)
SP GR UR STRIP: 1.02 (ref 1–1.03)
UROBILINOGEN UR STRIP-MCNC: NORMAL MG/DL
WBC # BLD AUTO: 8.2 10E3/UL (ref 4–11)
WBC #/AREA URNS AUTO: ABNORMAL /HPF

## 2023-05-23 PROCEDURE — 71046 X-RAY EXAM CHEST 2 VIEWS: CPT | Mod: GC | Performed by: RADIOLOGY

## 2023-05-23 PROCEDURE — 85025 COMPLETE CBC W/AUTO DIFF WBC: CPT | Performed by: INTERNAL MEDICINE

## 2023-05-23 PROCEDURE — 80048 BASIC METABOLIC PNL TOTAL CA: CPT | Performed by: INTERNAL MEDICINE

## 2023-05-23 PROCEDURE — 87635 SARS-COV-2 COVID-19 AMP PRB: CPT | Performed by: INTERNAL MEDICINE

## 2023-05-23 PROCEDURE — 36415 COLL VENOUS BLD VENIPUNCTURE: CPT | Performed by: INTERNAL MEDICINE

## 2023-05-23 PROCEDURE — 99215 OFFICE O/P EST HI 40 MIN: CPT | Performed by: NURSE PRACTITIONER

## 2023-05-23 PROCEDURE — 81001 URINALYSIS AUTO W/SCOPE: CPT | Performed by: INTERNAL MEDICINE

## 2023-05-23 RX ORDER — OXYCODONE HYDROCHLORIDE 5 MG/1
5-10 TABLET ORAL EVERY 4 HOURS PRN
Qty: 12 TABLET | Refills: 0 | Status: SHIPPED | OUTPATIENT
Start: 2023-05-23 | End: 2024-03-14

## 2023-05-23 RX ORDER — ALBUTEROL SULFATE 0.83 MG/ML
2.5 SOLUTION RESPIRATORY (INHALATION) EVERY 6 HOURS PRN
Qty: 90 ML | Refills: 0 | Status: SHIPPED | OUTPATIENT
Start: 2023-05-23 | End: 2023-11-28

## 2023-05-23 ASSESSMENT — PAIN SCALES - GENERAL: PAINLEVEL: NO PAIN (0)

## 2023-05-23 NOTE — PROGRESS NOTES
Oncology Follow Up Visit:May 23, 2023     Oncologist: Dr Kiel Maurer  PCP: Paddy Holly    Diagnosis: Diffuse Large B cell Lymphoma- here for symptom review.  Geovanni Jasso is a 63 yo male presented in 1/18/2022 for retroperitoneal lymphadenopathy with upper abdominal pain x 3-4 months. .   1/6/2023 CT Abd Pelvis which showed enlarged retroperitoneal lymphadenopathy- with no B symptoms.   2/6/2023, he had FNA of the left supraclavicular lymph node.  Cytology showed atypical lymphoid cells.  Flow cytometry showed lambda monotypic B cells which are negative for CD5 and CD10.  This is consistent with a B-cell lymphoma.  We decided to go ahead with excisional lymph node biopsy as well as bone marrow biopsy for complete staging.  -Excisional lymph node biopsy showed diffuse large B-cell lymphoma.  -Bone marrow biopsy did not reveal evidence of lymphoma.  Treatment:   3/8/2023 R CHOP C1  3/29/2023 R CHOP C2   4/4/2023 hospitalized for Sepsis, Neutropenia, Cryptosporidium, and C. Diff.   4/19/2023 R CHOP C3  5/5/2023  PET CT neck chest abdomen and pelvis shows complete response to treatment per Lugano criteria  5/10/2023  C4D1 Adriamycin, Vincristine, Cytoxan, Truxima and Neulasta Onpro     Interval History: Mr. Jasso comes to clinic for review of symptoms dry cough, dry mouth, voice changes, and fatigue with nose bleeds and urinary frequency noted as well. He was treated for pneumonia from CT results with levofloxacin and vancomycin x 1 week ending on 5/16 but felt it did not change anything.States the cough is non productive- may see some white phlegm at times. Voice is rough and not strong. Mouth and throat feels dry all of the time. Does not feel SOB. Has no fevers.  He is having sleeping issues with urinary frequency being the reason for this and not cough or respiratory issues. Up every 1-2 hours- feels he is getting about 4 hours sleep at night and never over 2 hours at a time. No burning, blood noted or  cloudiness.   Had been given albuterol inhaler by PCP and has been using 1-2 puffs up to 3 x daily but not sure this has been helpful.   Rest of comprehensive and complete ROS is reviewed and is negative.   Past Medical History:   Diagnosis Date     Cancer (H) 2-7-2023    just diagnosed with lymphona.     Carpal tunnel syndrome      Chronic rhinitis 10/23/2015     Esophageal reflux 12/26/2013     Hand joint stiff, right      Current Outpatient Medications   Medication     acetaminophen (TYLENOL) 325 MG tablet     albuterol (PROAIR HFA/PROVENTIL HFA/VENTOLIN HFA) 108 (90 Base) MCG/ACT inhaler     albuterol (PROVENTIL) (2.5 MG/3ML) 0.083% neb solution     diclofenac (VOLTAREN) 1 % topical gel     Efinaconazole (JUBLIA) 10 % SOLN     fexofenadine (ALLEGRA) 60 MG tablet     fluticasone (FLONASE) 50 MCG/ACT nasal spray     guaiFENesin (MUCINEX) 600 MG 12 hr tablet     hydrocortisone (CORTAID) 1 % external ointment     ibuprofen (ADVIL,MOTRIN) 200 MG tablet     ketoconazole (NIZORAL) 2 % external cream     LANsoprazole (PREVACID) 30 MG DR capsule     lidocaine-prilocaine (EMLA) 2.5-2.5 % external cream     methocarbamol (ROBAXIN) 500 MG tablet     ondansetron (ZOFRAN ODT) 4 MG ODT tab     ondansetron (ZOFRAN) 8 MG tablet     oxyCODONE (ROXICODONE) 5 MG tablet     prochlorperazine (COMPAZINE) 10 MG tablet     senna-docusate (SENOKOT-S/PERICOLACE) 8.6-50 MG tablet     traMADol (ULTRAM) 50 MG tablet     zolpidem (AMBIEN) 10 MG tablet     No current facility-administered medications for this visit.     Allergies   Allergen Reactions     Penicillins        Physical Exam:/70 (BP Location: Right arm, Patient Position: Sitting, Cuff Size: Adult Regular)   Pulse 118   Temp 99.1  F (37.3  C) (Oral)   Resp 18   Wt 80.5 kg (177 lb 6.4 oz)   SpO2 96%   BMI 24.74 kg/m     ECOG PS-0-1  Constitutional: Alert, cooperative and in no distress as seated.   ENT: Eyes bright, No mouth sores  Neck: Supple, No adenopathy.  Cardiac:  Heart rate and rhythm is regular and strong without murmur  Respiratory: Breathing easy- no obvious wheezing. Lung sounds clear to auscultation. Cough is a clearing cough but frequent and non productive.   Voice is rough and quieter than wife ( on phone) feels is normal and is worse after each infusion then starts to improve just before the next infusion.   GI: Abdomen is soft, non-tender, BS normal at this time. No masses or organomegaly  MS: Muscle tone fair, extremities normal with no edema.   Skin: No suspicious lesions or rashes  Neuro: Sensory grossly WNL, gait normal   Lymph: Normal ant/post cervical, axillary, supraclavicular nodes  Psych: Mentation appears normal and affect normal/bright with good conversation.    Laboratory/Imaging Results:   Results for orders placed or performed in visit on 05/23/23   XR Chest 2 Views     Status: None    Narrative    EXAM: XR CHEST 2 VIEWS  5/23/2023 4:11 PM     HISTORY:  cough, low grade fever, tachy, fatigue; Cough, unspecified  type; Diffuse large B-cell lymphoma, unspecified body region (H)       COMPARISON:  CT 5/6/2023. Chest radiograph 3/22/2023    TECHNIQUE: PA and lateral views the chest    FINDINGS:   Right chest wall Port-A-Cath with tip in the cavoatrial junction.  Surgical clips in the left neck..    The trachea is midline. The cardiomediastinal silhouette is within  normal limits. The pulmonary vasculature is distinct. No appreciable  pneumothorax or pleural effusion. No focal airspace consolidation. No  acute osseous abnormality.      Impression    IMPRESSION: No acute airspace disease.    I have personally reviewed the examination and initial interpretation  and I agree with the findings.    JOSE SANTAMARIA MD         SYSTEM ID:  [id number]   Results for orders placed or performed in visit on 05/23/23   Basic metabolic panel     Status: Abnormal   Result Value Ref Range    Sodium 137 133 - 144 mmol/L    Potassium 3.9 3.4 - 5.3 mmol/L    Chloride 104 94 - 109  mmol/L    Carbon Dioxide (CO2) 29 20 - 32 mmol/L    Anion Gap 4 3 - 14 mmol/L    Urea Nitrogen 6 (L) 7 - 30 mg/dL    Creatinine 0.96 0.66 - 1.25 mg/dL    Calcium 9.1 8.5 - 10.1 mg/dL    Glucose 110 (H) 70 - 99 mg/dL    GFR Estimate 88 >60 mL/min/1.73m2   CBC with platelets and differential     Status: Abnormal   Result Value Ref Range    WBC Count 8.2 4.0 - 11.0 10e3/uL    RBC Count 3.21 (L) 4.40 - 5.90 10e6/uL    Hemoglobin 10.5 (L) 13.3 - 17.7 g/dL    Hematocrit 31.1 (L) 40.0 - 53.0 %    MCV 97 78 - 100 fL    MCH 32.7 26.5 - 33.0 pg    MCHC 33.8 31.5 - 36.5 g/dL    RDW 16.9 (H) 10.0 - 15.0 %    Platelet Count 157 150 - 450 10e3/uL    % Neutrophils 80 %    % Lymphocytes 5 %    % Monocytes 12 %    % Eosinophils 0 %    % Basophils 1 %    % Immature Granulocytes 2 %    NRBCs per 100 WBC 0 <1 /100    Absolute Neutrophils 6.5 1.6 - 8.3 10e3/uL    Absolute Lymphocytes 0.4 (L) 0.8 - 5.3 10e3/uL    Absolute Monocytes 1.0 0.0 - 1.3 10e3/uL    Absolute Eosinophils 0.0 0.0 - 0.7 10e3/uL    Absolute Basophils 0.1 0.0 - 0.2 10e3/uL    Absolute Immature Granulocytes 0.2 <=0.4 10e3/uL    Absolute NRBCs 0.0 10e3/uL   Extra Tube     Status: None    Narrative    The following orders were created for panel order Extra Tube.  Procedure                               Abnormality         Status                     ---------                               -----------         ------                     Extra Urine Collection[[id number]]                           Final result                 Please view results for these tests on the individual orders.   Extra Urine Collection     Status: None   Result Value Ref Range    Hold Specimen JIC    UA Macroscopic with reflex to Microscopic and Culture     Status: Abnormal    Specimen: Urine, Midstream   Result Value Ref Range    Color Urine Yellow Colorless, Straw, Light Yellow, Yellow    Appearance Urine Clear Clear    Glucose Urine Negative Negative mg/dL    Bilirubin Urine Negative Negative     Ketones Urine Negative Negative mg/dL    Specific Gravity Urine 1.019 0.999 - 1.035    Blood Urine Negative Negative    pH Urine 5.5 5.0 - 7.0    Protein Albumin Urine 20 (A) Negative mg/dL    Urobilinogen Urine Normal Normal, 2.0 mg/dL    Nitrite Urine Negative Negative    Leukocyte Esterase Urine Negative Negative    SKIP     UA Microscopic with Reflex to Culture     Status: Abnormal   Result Value Ref Range    RBC Urine 0-2 0-2 /HPF /HPF    WBC Urine 0-5 0-5 /HPF /HPF    Mucus Urine Present (A) None Seen /LPF    Narrative    Urine Culture not indicated   CBC with platelets differential     Status: Abnormal    Narrative    The following orders were created for panel order CBC with platelets differential.  Procedure                               Abnormality         Status                     ---------                               -----------         ------                     CBC with platelets and d...[[id number]]  Abnormal            Final result                 Please view results for these tests on the individual orders.     Assessment and Plan:   Diffuse Large B cell Lymphoma patient with new complaints of cough, dry mouth and voice changes along with fatigue-   Due to result of inflammation/infection noted on recent CT along with noted C. Diff -he completed his vancomycin and levaquin prescription x 1 week but states these breathing symptoms have progressed rather than improved.   He has been seen by PCP and was given albuterol inhaler. He does not feel he has ever had allergy issues.   We did trial 6 puffs of albuterol due to no nebulizer available and pt did see some decrease in resistance to deep breathes. Chest xray was negative for airspace disease.   Will consider reactive airway and no relationship to his cancer issues- no need seen for additional antibiotics. Will provide nebulizer and albuterol for help with breathing Education on inhaler previously given as well as nebulizer use- suggest spacer.  Must rinse mouth after steroid use to prevent thrush.  Follow up with PCP if not improving over 1 week period or of fevers   Diffuse Large B cell Lymphoma  He started treatment C3 with RCHOP with Neulasta on 5/10/2023. - next due 5/31/2023. He should have improvement and recuperation prior to continuation of plan.   The total time of this encounter amounted to 40 minutes. This time included time spent with the patient, prep work for review from previous hospitalization and history for this man I have not seen previously, ordering tests, and performing post visit documentation.  Corrine Smith,Cnp

## 2023-05-23 NOTE — NURSING NOTE
"Oncology Rooming Note    May 23, 2023 3:58 PM   Geovanni Jasso is a 62 year old male who presents for:    Chief Complaint   Patient presents with     Oncology Clinic Visit     Follow up-dry mouth, dry cough, fatigue      Initial Vitals: /70 (BP Location: Right arm, Patient Position: Sitting, Cuff Size: Adult Regular)   Pulse 118   Temp 99.1  F (37.3  C) (Oral)   Resp 18   Wt 80.5 kg (177 lb 6.4 oz)   SpO2 96%   BMI 24.74 kg/m   Estimated body mass index is 24.74 kg/m  as calculated from the following:    Height as of 3/29/23: 1.803 m (5' 11\").    Weight as of this encounter: 80.5 kg (177 lb 6.4 oz). Body surface area is 2.01 meters squared.  No Pain (0) Comment: Data Unavailable   No LMP for male patient.  Allergies reviewed: Yes  Medications reviewed: Yes    Medications: Medication refills not needed today.  Pharm[facility] into [facility]: [id number] [place] [phone]    Clinical concerns: Patient reports dry cough and mouth,, fatigue, white thick mucous production. States that he has concerns post last imaging. Provider to send patient for labs and imaging.        Indu Yeager LPN May 23, 2023 3:59 PM            "

## 2023-05-23 NOTE — LETTER
5/23/2023         RE: Geovanni Jasso  [id number]  [facility] [address]        Dear Colleague,    Thank you for referring your patient, Geovanni Jasso, to the [facility]. Please see a copy of my visit note below.    Oncology Follow Up Visit:May 23, 2023     Oncologist: Dr Kiel Maurer  PCP: Paddy Holly    Diagnosis: Diffuse Large B cell Lymphoma- here for symptom review.  Geovanni Jasso is a 61 yo male presented in 1/18/2022 for retroperitoneal lymphadenopathy with upper abdominal pain x 3-4 months. .   1/6/2023 CT Abd Pelvis which showed enlarged retroperitoneal lymphadenopathy- with no B symptoms.   2/6/2023, he had FNA of the left supraclavicular lymph node.  Cytology showed atypical lymphoid cells.  Flow cytometry showed lambda monotypic B cells which are negative for CD5 and CD10.  This is consistent with a B-cell lymphoma.  We decided to go ahead with excisional lymph node biopsy as well as bone marrow biopsy for complete staging.  -Excisional lymph node biopsy showed diffuse large B-cell lymphoma.  -Bone marrow biopsy did not reveal evidence of lymphoma.  Treatment:   3/8/2023 R [facility]OP C1  3/29/2023 R [facility]OP C2   4/4/2023 hospitalized for Sepsis, Neutropenia, Cryptosporidium, and C. Diff.   4/19/2023 R CHOP C3  5/5/2023  PET CT neck chest abdomen and pelvis shows complete response to treatment per Lug[place] criteria  5/10/2023  C4D1 Adriamycin, Vincristine, Cytoxan, Truxima and Neulasta Onpro     Interval History: Mr. Jasso comes to clinic for review of symptoms dry cough, dry mouth, voice changes, and fatigue with nose bleeds and urinary frequency noted as well. He was treated for pneumonia from CT results with levofloxacin and vancomycin x 1 week ending on 5/16 but felt it did not change anything.States the cough is non productive- may see some white phlegm at times. Voice is rough and not strong. Mouth and throat feels dry all of the time. Does not feel SOB. Has no  fevers.  He is having sleeping issues with urinary frequency being the reason for this and not cough or respiratory issues. Up every 1-2 hours- feels he is getting about 4 hours sleep at night and never over 2 hours at a time. No burning, blood noted or cloudiness.   Had been given albuterol inhaler by PCP and has been using 1-2 puffs up to 3 x daily but not sure this has been helpful.   Rest of comprehensive and complete ROS is reviewed and is negative.   Past Medical History:   Diagnosis Date     Cancer (H) 2-7-2023    just diagnosed with lymphona.     Carpal tunnel syndrome      Chronic rhinitis 10/23/2015     Esophageal reflux 12/26/2013     Hand joint stiff, right      Current Outpatient Medications   Medication     acetaminophen (TYLENOL) 325 MG tablet     albuterol (PROAIR HFA/PROVENTIL HFA/VENTOLIN HFA) 108 (90 Base) MCG/ACT inhaler     albuterol (PROVENTIL) (2.5 MG/3ML) 0.083% neb solution     diclofenac (VOLTAREN) 1 % topical gel     Efinaconazole (JUBLIA) 10 % SOLN     fexofenadine (ALLEGRA) 60 MG tablet     fluticasone (FLONASE) 50 MCG/ACT nasal spray     guaiFENesin (MUCINEX) 600 MG 12 hr tablet     hydrocortisone (CORTAID) 1 % external ointment     ibuprofen (ADVIL,MOTRIN) 200 MG tablet     ketoconazole (NIZORAL) 2 % external cream     LANsoprazole (PREVACID) 30 MG DR capsule     lidocaine-prilocaine (EMLA) 2.5-2.5 % external cream     methocarbamol (ROBAXIN) 500 MG tablet     ondansetron (ZOFRAN ODT) 4 MG ODT tab     ondansetron (ZOFRAN) 8 MG tablet     oxyCODONE (ROXICODONE) 5 MG tablet     prochlorperazine (COMPAZINE) 10 MG tablet     senna-docusate (SENOKOT-S/PERICOLACE) 8.6-50 MG tablet     traMADol (ULTRAM) 50 MG tablet     zolpidem (AMBIEN) 10 MG tablet     No current facility-administered medications for this visit.     Allergies   Allergen Reactions     Penicillins        Physical Exam:/70 (BP Location: Right arm, Patient Position: Sitting, Cuff Size: Adult Regular)   Pulse 118   Temp  99.1  F (37.3  C) (Oral)   Resp 18   Wt 80.5 kg (177 lb 6.4 oz)   SpO2 96%   BMI 24.74 kg/m     ECOG PS-0-1  Constitutional: Alert, cooperative and in no distress as seated.   ENT: Eyes bright, No mouth sores  Neck: Supple, No adenopathy.  Cardiac: Heart rate and rhythm is regular and strong without murmur  Respiratory: Breathing easy- no obvious wheezing. Lung sounds clear to auscultation. Cough is a clearing cough but frequent and non productive.   Voice is rough and quieter than wife ( on phone) feels is normal and is worse after each infusion then starts to improve just before the next infusion.   GI: Abdomen is soft, non-tender, BS normal at this time. No masses or organomegaly  MS: Muscle tone fair, extremities normal with no edema.   Skin: No suspicious lesions or rashes  Neuro: Sensory grossly WNL, gait normal   Lymph: Normal ant/post cervical, axillary, supraclavicular nodes  Psych: Mentation appears normal and affect normal/bright with good conversation.    Laboratory/Imaging Results:   Results for orders placed or performed in visit on 05/23/23   XR Chest 2 Views     Status: None    Narrative    EXAM: XR CHEST 2 VIEWS  5/23/2023 4:11 PM     HISTORY:  cough, low grade fever, tachy, fatigue; Cough, unspecified  type; Diffuse large B-cell lymphoma, unspecified body region (H)       COMPARISON:  CT 5/6/2023. Chest radiograph 3/22/2023    TECHNIQUE: PA and lateral views the chest    FINDINGS:   Right chest wall Port-A-Cath with tip in the cavoatrial junction.  Surgical clips in the left neck..    The trachea is midline. The cardiomediastinal silhouette is within  normal limits. The pulmonary vasculature is distinct. No appreciable  pneumothorax or pleural effusion. No focal airspace consolidation. No  acute osseous abnormality.      Impression    IMPRESSION: No acute airspace disease.    I have personally reviewed the examination and initial interpretation  and I agree with the findings.    JOSE SANTAMARIA,  MD         SYSTEM ID:  [id number]   Results for orders placed or performed in visit on 05/23/23   Basic metabolic panel     Status: Abnormal   Result Value Ref Range    Sodium 137 133 - 144 mmol/L    Potassium 3.9 3.4 - 5.3 mmol/L    Chloride 104 94 - 109 mmol/L    Carbon Dioxide (CO2) 29 20 - 32 mmol/L    Anion Gap 4 3 - 14 mmol/L    Urea Nitrogen 6 (L) 7 - 30 mg/dL    Creatinine 0.96 0.66 - 1.25 mg/dL    Calcium 9.1 8.5 - 10.1 mg/dL    Glucose 110 (H) 70 - 99 mg/dL    GFR Estimate 88 >60 mL/min/1.73m2   CBC with platelets and differential     Status: Abnormal   Result Value Ref Range    WBC Count 8.2 4.0 - 11.0 10e3/uL    RBC Count 3.21 (L) 4.40 - 5.90 10e6/uL    Hemoglobin 10.5 (L) 13.3 - 17.7 g/dL    Hematocrit 31.1 (L) 40.0 - 53.0 %    MCV 97 78 - 100 fL    MCH 32.7 26.5 - 33.0 pg    MCHC 33.8 31.5 - 36.5 g/dL    RDW 16.9 (H) 10.0 - 15.0 %    Platelet Count 157 150 - 450 10e3/uL    % Neutrophils 80 %    % Lymphocytes 5 %    % Monocytes 12 %    % Eosinophils 0 %    % Basophils 1 %    % Immature Granulocytes 2 %    NRBCs per 100 WBC 0 <1 /100    Absolute Neutrophils 6.5 1.6 - 8.3 10e3/uL    Absolute Lymphocytes 0.4 (L) 0.8 - 5.3 10e3/uL    Absolute Monocytes 1.0 0.0 - 1.3 10e3/uL    Absolute Eosinophils 0.0 0.0 - 0.7 10e3/uL    Absolute Basophils 0.1 0.0 - 0.2 10e3/uL    Absolute Immature Granulocytes 0.2 <=0.4 10e3/uL    Absolute NRBCs 0.0 10e3/uL   Extra Tube     Status: None    Narrative    The following orders were created for panel order Extra Tube.  Procedure                               Abnormality         Status                     ---------                               -----------         ------                     Extra Urine Collection[[id number]]                           Final result                 Please view results for these tests on the individual orders.   Extra Urine Collection     Status: None   Result Value Ref Range    Hold Specimen JIC    UA Macroscopic with reflex to Microscopic and  Culture     Status: Abnormal    Specimen: Urine, Midstream   Result Value Ref Range    Color Urine Yellow Colorless, Straw, Light Yellow, Yellow    Appearance Urine Clear Clear    Glucose Urine Negative Negative mg/dL    Bilirubin Urine Negative Negative    Ketones Urine Negative Negative mg/dL    Specific Gravity Urine 1.019 0.999 - 1.035    Blood Urine Negative Negative    pH Urine 5.5 5.0 - 7.0    Protein Albumin Urine 20 (A) Negative mg/dL    Urobilinogen Urine Normal Normal, 2.0 mg/dL    Nitrite Urine Negative Negative    Leukocyte Esterase Urine Negative Negative    SKIP     UA Microscopic with Reflex to Culture     Status: Abnormal   Result Value Ref Range    RBC Urine 0-2 0-2 /HPF /HPF    WBC Urine 0-5 0-5 /HPF /HPF    Mucus Urine Present (A) None Seen /LPF    Narrative    Urine Culture not indicated   CBC with platelets differential     Status: Abnormal    Narrative    The following orders were created for panel order CBC with platelets differential.  Procedure                               Abnormality         Status                     ---------                               -----------         ------                     CBC with platelets and d...[[id number]]  Abnormal            Final result                 Please view results for these tests on the individual orders.     Assessment and Plan:   Diffuse Large B cell Lymphoma patient with new complaints of cough, dry mouth and voice changes along with fatigue-   Due to result of inflammation/infection noted on recent CT along with noted C. Diff -he completed his vancomycin and levaquin prescription x 1 week but states these breathing symptoms have progressed rather than improved.   He has been seen by PCP and was given albuterol inhaler. He does not feel he has ever had allergy issues.   We did trial 6 puffs of albuterol due to no nebulizer available and pt did see some decrease in resistance to deep breathes. Chest xray was negative for airspace disease.    Will consider reactive airway and no relationship to his cancer issues- no need seen for additional antibiotics. Will provide nebulizer and albuterol for help with breathing Education on inhaler previously given as well as nebulizer use- suggest spacer. Must rinse mouth after steroid use to prevent thrush.  Follow up with PCP if not improving over 1 week period or of fevers   Diffuse Large B cell Lymphoma  He started treatment C3 with [facility]OP with Neulasta on 5/10/2023. - next due 5/31/2023. He should have improvement and recuperation prior to continuation of plan.   The total time of this encounter amounted to 40 minutes. This time included time spent with the patient, prep work for review from previous hospitalization and history for this man I have not seen previously, ordering tests, and performing post visit documentation.  Corrine Smith Cnp        Again, thank you for allowing me to participate in the care of your patient.        Sincerely,        Corrine Smith, KATHERIN, APRN CNP

## 2023-05-24 ENCOUNTER — NURSE TRIAGE (OUTPATIENT)
Dept: NURSING | Facility: CLINIC | Age: 63
End: 2023-05-24

## 2023-05-24 DIAGNOSIS — C83.30 DIFFUSE LARGE B-CELL LYMPHOMA, UNSPECIFIED BODY REGION (H): ICD-10-CM

## 2023-05-24 DIAGNOSIS — J45.21 MILD INTERMITTENT REACTIVE AIRWAY DISEASE WITH ACUTE EXACERBATION: Primary | ICD-10-CM

## 2023-05-24 DIAGNOSIS — R06.02 SOB (SHORTNESS OF BREATH): ICD-10-CM

## 2023-05-24 LAB — SARS-COV-2 RNA RESP QL NAA+PROBE: NEGATIVE

## 2023-05-24 NOTE — TELEPHONE ENCOUNTER
Seen yesterday in clinic, was given Rx for Albuterol neb solution, but was not prescribed the machine to use the solution in. Picked up the solution but needs a Rx for the actual machine sent in to his pharmacy.   Elena Guidry RN on 5/24/2023 at 5:00 PM      Reason for Disposition    Medication questions    [1] Prescription prescribed recently is not at pharmacy AND [2] triager has access to patient's EMR AND [3] prescription is recorded in the EMR    Additional Information    Negative: [1] Caller is not with the adult (patient) AND [2] reporting urgent symptoms    Negative: Lab result questions    Negative: [1] Intentional drug overdose AND [2] suicidal thoughts or ideas    Negative: Drug overdose and triager unable to answer question    Negative: Caller requesting a renewal or refill of a medicine patient is currently taking    Negative: Caller requesting information unrelated to medicine    Negative: Caller requesting information about COVID-19 Vaccine    Negative: Caller requesting information about Emergency Contraception    Negative: Caller requesting information about Combined Birth Control Pills    Negative: Caller requesting information about Progestin Birth Control Pills    Negative: Caller requesting information about Post-Op pain or medicines    Negative: Caller requesting a prescription antibiotic (such as Penicillin) for Strep throat and has a positive culture result    Negative: Caller requesting a prescription anti-viral med (such as Tamiflu) and has influenza (flu) symptoms    Negative: Immunization reaction suspected    Negative: Rash while taking a medicine or within 3 days of stopping it    Negative: [1] Asthma and [2] having symptoms of asthma (cough, wheezing, etc.)    Negative: [1] Symptom of illness (e.g., headache, abdominal pain, earache, vomiting) AND [2] more than mild    Negative: Breastfeeding questions about mother's medicines and diet    Negative: MORE THAN A DOUBLE DOSE of a  prescription or over-the-counter (OTC) drug    Negative: [1] DOUBLE DOSE (an extra dose or lesser amount) of prescription drug AND [2] any symptoms (e.g., dizziness, nausea, pain, sleepiness)    Negative: [1] DOUBLE DOSE (an extra dose or lesser amount) of over-the-counter (OTC) drug AND [2] any symptoms (e.g., dizziness, nausea, pain, sleepiness)    Negative: Took another person's prescription drug    Negative: [1] DOUBLE DOSE (an extra dose or lesser amount) of prescription drug AND [2] NO symptoms (Exception: a double dose of antibiotics)    Negative: Diabetes drug error or overdose (e.g., took wrong type of insulin or took extra dose)    Negative: [1] Prescription not at pharmacy AND [2] was prescribed by PCP recently (Exception: triager has access to EMR and prescription is recorded there. Go to Home Care and confirm for pharmacy.)    Negative: [1] Pharmacy calling with prescription question AND [2] triager unable to answer question    Negative: [1] Caller has URGENT medicine question about med that PCP or specialist prescribed AND [2] triager unable to answer question    Negative: Medicine patch causing local rash or itching    Negative: [1] Caller has medicine question about med NOT prescribed by PCP AND [2] triager unable to answer question (e.g., compatibility with other med, storage)    Negative: Prescription request for new medicine (not a refill)    Negative: [1] Caller has NON-URGENT medicine question about med that PCP prescribed AND [2] triager unable to answer question    Negative: Caller wants to use a complementary or alternative medicine    Protocols used: INFORMATION ONLY CALL - NO TRIAGE-A-, MEDICATION QUESTION CALL-A-AH

## 2023-05-25 NOTE — TELEPHONE ENCOUNTER
Original order from visit did not go out to pharmacy. TC today to relay order to [facility] for nebulizer and supplies. EmmaCNp

## 2023-05-30 ENCOUNTER — VIRTUAL VISIT (OUTPATIENT)
Dept: ONCOLOGY | Facility: CLINIC | Age: 63
End: 2023-05-30
Payer: COMMERCIAL

## 2023-05-30 DIAGNOSIS — T45.1X5A CHEMOTHERAPY-INDUCED NEUTROPENIA (H): Primary | ICD-10-CM

## 2023-05-30 DIAGNOSIS — C83.30 DIFFUSE LARGE B-CELL LYMPHOMA, UNSPECIFIED BODY REGION (H): ICD-10-CM

## 2023-05-30 DIAGNOSIS — D70.1 CHEMOTHERAPY-INDUCED NEUTROPENIA (H): Primary | ICD-10-CM

## 2023-05-30 PROCEDURE — 99214 OFFICE O/P EST MOD 30 MIN: CPT | Mod: VID | Performed by: INTERNAL MEDICINE

## 2023-05-30 RX ORDER — METHYLPREDNISOLONE SODIUM SUCCINATE 125 MG/2ML
125 INJECTION, POWDER, LYOPHILIZED, FOR SOLUTION INTRAMUSCULAR; INTRAVENOUS
Status: CANCELLED
Start: 2023-05-31

## 2023-05-30 RX ORDER — PALONOSETRON 0.05 MG/ML
0.25 INJECTION, SOLUTION INTRAVENOUS ONCE
Status: CANCELLED
Start: 2023-05-31

## 2023-05-30 RX ORDER — DIPHENHYDRAMINE HCL 25 MG
25 CAPSULE ORAL ONCE
Status: CANCELLED
Start: 2023-05-31

## 2023-05-30 RX ORDER — ALBUTEROL SULFATE 90 UG/1
1-2 AEROSOL, METERED RESPIRATORY (INHALATION)
Status: CANCELLED
Start: 2023-05-31

## 2023-05-30 RX ORDER — EPINEPHRINE 1 MG/ML
0.3 INJECTION, SOLUTION INTRAMUSCULAR; SUBCUTANEOUS EVERY 5 MIN PRN
Status: CANCELLED | OUTPATIENT
Start: 2023-05-31

## 2023-05-30 RX ORDER — MEPERIDINE HYDROCHLORIDE 25 MG/ML
25 INJECTION INTRAMUSCULAR; INTRAVENOUS; SUBCUTANEOUS
Status: CANCELLED
Start: 2023-05-31

## 2023-05-30 RX ORDER — ALBUTEROL SULFATE 0.83 MG/ML
2.5 SOLUTION RESPIRATORY (INHALATION)
Status: CANCELLED | OUTPATIENT
Start: 2023-05-31

## 2023-05-30 RX ORDER — DIPHENHYDRAMINE HYDROCHLORIDE 50 MG/ML
50 INJECTION INTRAMUSCULAR; INTRAVENOUS
Status: CANCELLED
Start: 2023-05-31

## 2023-05-30 RX ORDER — MEPERIDINE HYDROCHLORIDE 25 MG/ML
25 INJECTION INTRAMUSCULAR; INTRAVENOUS; SUBCUTANEOUS EVERY 30 MIN PRN
Status: CANCELLED | OUTPATIENT
Start: 2023-05-31

## 2023-05-30 RX ORDER — DOXORUBICIN HYDROCHLORIDE 2 MG/ML
50 INJECTION, SOLUTION INTRAVENOUS ONCE
Status: CANCELLED | OUTPATIENT
Start: 2023-05-31

## 2023-05-30 RX ORDER — ACETAMINOPHEN 325 MG/1
650 TABLET ORAL ONCE
Status: CANCELLED
Start: 2023-05-31

## 2023-05-30 RX ORDER — HEPARIN SODIUM,PORCINE 10 UNIT/ML
5 VIAL (ML) INTRAVENOUS
Status: CANCELLED | OUTPATIENT
Start: 2023-05-31

## 2023-05-30 RX ORDER — HEPARIN SODIUM (PORCINE) LOCK FLUSH IV SOLN 100 UNIT/ML 100 UNIT/ML
5 SOLUTION INTRAVENOUS
Status: CANCELLED | OUTPATIENT
Start: 2023-05-31

## 2023-05-30 NOTE — PROGRESS NOTES
Virtual Visit Details    Type of service:  Video Visit   Video start time. 4:30 PM  Video stop time. 4:53 PM   Originating Location (pt. Location): Home  Distant Location (provider location):  Off-site  Platform used for Video Visit: [facility]     Oncology follow-up visit:  Date on this visit: 5/30/23     Diagnoses.    Diffuse large B-cell lymphoma    Primary Physician: Paddy Holly     History Of Present Illness:  Mr. Jasso is a 62 year old  male who presents for follow-up of diffuse lymphadenopathy.  He initially saw me on 1/18/2022 for retroperitoneal lymphadenopathy.  Please see my previous note for details.  I have copied and updated from prior notes.        Over the last few months, since fall of 2022, off and on he has had upper abdominal pain which lasts few minutes. He thinks stress sometimes can bring it on. No relationship to food.  No nausea or vomiting. BMs have been fine. No bleeding. No fevers.   He had a CT Abd Pelvis on 1/6/2023 which showed enlarged retroperitoneal lymphadenopathy  He has noticed some fatigue. He has also noticed some lump in the throat just above the sternum at night. This is going on for a couple of months. He denies any dysphagia.     Currently he feels well. Currently denies any abdominal pain. No nausea or vomiting. BMs are fine. No recent weight loss. No SOB. No neuropathy. No skin flushing. No drenching night sweats.    In the summer, he noticed a rash on the face which has since resolved.  He was noted to have CLAIRE positive.     On 2/6/2023, he had FNA of the left supraclavicular lymph node.  Cytology showed atypical lymphoid cells.  Flow cytometry showed lambda monotypic B cells which are negative for CD5 and CD10.  This is consistent with a B-cell lymphoma.  We decided to go ahead with excisional lymph node biopsy as well as bone marrow biopsy for complete staging.    Excisional lymph node biopsy showed diffuse large B-cell lymphoma.    Bone marrow biopsy did not  reveal evidence of lymphoma.    He started R-CHOP on 3/8/2023.    Cycle #2 R-CHOP on 3/29/2023.        He was admitted to the [facility] from 4/4/2023 - 4/9/2023.  He was admitted for neutropenic fever, sepsis and C. difficile colitis.  He presented with multiple bouts of nonbloody diarrhea and fever of 101 with generalized fatigue and weakness.  He was neutropenic and was noted to have pancolitis.  C. difficile was positive.  Cryptosporidium was also positive.  He was started on IV cefepime and IV Flagyl initially.  Infectious disease was consulted and he was switched to oral vancomycin to complete 14 days.  Neutropenia resolved by 4/7/2023.    He completed oral vancomycin course on 4/19/2023 4/19/2023.  Cycle #3 of R-CHOP    5/5/2023.  PET CT neck chest abdomen and pelvis shows complete response to treatment per Lugano criteria    5/10/2023.  Cycle #4 of R-CHOP.    5/31/2023.  Cycle #5 of R-CHOP as planned.        Interval history.    This is a video visit.  He is accompanied by his wife Charleen.  Overall he has been having progressive fatigue.  He still is having dry cough and dyspnea with mild exertion.  No fevers per se.  Has noticed a weak voice.  Today it is better.  He has tried albuterol nebulization which has helped.  He is usually is taking it twice a day.  No drenching night sweats.  Has lost a little bit of weight.  No nausea or vomiting.  No significant issues with bowel movements.  Denies neuropathy.  No new swellings.     ECOG 2    ROS:  A comprehensive ROS was otherwise neg    I reviewed other history in Epic as before.      Past Medical/Surgical History:  Past Medical History:   Diagnosis Date     Cancer (H) 2-7-2023    just diagnosed with lymphona.     Carpal tunnel syndrome      Chronic rhinitis 10/23/2015     Esophageal reflux 12/26/2013     Hand joint stiff, right    Shingles     Past Surgical History:   Procedure Laterality Date     BIOPSY  2-6-23     BIOPSY LYMPH NODE CERVICAL Left  2/16/2023    Procedure: excisional biopsy of a left cervical node;  Surgeon: Aung Tamayo MD;  Location: [facility] OR     COLONOSCOPY  2019    clean, do again in 10 years     COLONOSCOPY WITH CO2 INSUFFLATION N/A 07/17/2019    Procedure: COLONOSCOPY, WITH CO2 INSUFFLATION;  Surgeon: Jaison Hammer MD;  Location: [facility] OR     ENT SURGERY  1979    Loss of hearing rt ear (tinnitis)     HERNIA REPAIR  2000     NO HISTORY OF SURGERY       ORTHOPEDIC SURGERY  1988,1992    rt knee, scope in 88; ACL repair Early 90s     Cancer History:   As above    Allergies:  Allergies as of 05/30/2023 - Reviewed 05/23/2023   Allergen Reaction Noted     Penicillins  12/23/2013     Current Medications:  Current Outpatient Medications   Medication Sig Dispense Refill     acetaminophen (TYLENOL) 325 MG tablet Take 2 tablets (650 mg) by mouth every 6 hours as needed for mild pain 50 tablet 0     albuterol (PROAIR HFA/PROVENTIL HFA/VENTOLIN HFA) 108 (90 Base) MCG/ACT inhaler Inhale 2 puffs into the lungs every 4 hours as needed for shortness of breath 18 g 5     albuterol (PROVENTIL) (2.5 MG/3ML) 0.083% neb solution Take 1 vial (2.5 mg) by nebulization every 6 hours as needed for shortness of breath, wheezing or cough 90 mL 0     diclofenac (VOLTAREN) 1 % topical gel Apply small amount topically to right shoulder 3-4 times daily as needed for pain. 50 g 0     Efinaconazole (JUBLIA) 10 % SOLN Externally apply topically daily to nail. 8 mL 4     fexofenadine (ALLEGRA) 60 MG tablet Take 1 tablet (60 mg) by mouth 2 times daily 180 tablet 0     fluticasone (FLONASE) 50 MCG/ACT nasal spray Spray 1-2 sprays into both nostrils daily 16 g 11     guaiFENesin (MUCINEX) 600 MG 12 hr tablet Take 2 tablets (1,200 mg) by mouth 2 times daily 30 tablet 1     hydrocortisone (CORTAID) 1 % external ointment Apply topically 2 times daily As needed 56 g 0     ibuprofen (ADVIL,MOTRIN) 200 MG tablet Take 200 mg by mouth every 6 hours as needed        ketoconazole (NIZORAL) 2 % external cream Apply twice daily for 8 weeks to feet 60 g 3     LANsoprazole (PREVACID) 30 MG DR capsule Take 1 capsule (30 mg) by mouth every morning (before breakfast) 90 capsule 2     lidocaine-prilocaine (EMLA) 2.5-2.5 % external cream Apply topically as needed for moderate pain (4-6) Apply over port at least 30 minutes before port access. 30 g 0     methocarbamol (ROBAXIN) 500 MG tablet Take 1 tablet (500 mg) by mouth nightly as needed for muscle spasms 30 tablet 0     ondansetron (ZOFRAN ODT) 4 MG ODT tab Take 1 tablet (4 mg) by mouth every 8 hours as needed for nausea 10 tablet 0     ondansetron (ZOFRAN) 8 MG tablet Take 1 tablet (8 mg) by mouth every 8 hours as needed for nausea (vomiting) 30 tablet 2     oxyCODONE (ROXICODONE) 5 MG tablet Take 1-2 tablets (5-10 mg) by mouth every 4 hours as needed for moderate to severe pain 12 tablet 0     prochlorperazine (COMPAZINE) 10 MG tablet Take 1 tablet (10 mg) by mouth every 6 hours as needed for nausea or vomiting 30 tablet 2     senna-docusate (SENOKOT-S/PERICOLACE) 8.6-50 MG tablet Take 1-2 tablets by mouth 2 times daily 30 tablet 0     traMADol (ULTRAM) 50 MG tablet Take 50 mg by mouth every 6 hours as needed       zolpidem (AMBIEN) 10 MG tablet Take 1 tablet (10 mg) by mouth nightly as needed for sleep 30 tablet 1      Family History:  Family History   Problem Relation Age of Onset     Hyperlipidemia Mother         medication     Diabetes Father      Other Cancer Father         Lung cancer     Other Cancer Maternal Grandmother         Unsure what type of cancer     Autoimmune Disease No family hx of      Maternal grand mother had some sort of a cancer  Dad had lung cancer  Has 4 kids- all healthy    Social History:  Social History     Socioeconomic History     Marital status:      Spouse name: Not on file     Number of children: Not on file     Years of education: Not on file     Highest education level: Not on file    Occupational History     Not on file   Tobacco Use     Smoking status: Never     Passive exposure: Never     Smokeless tobacco: Never   Vaping Use     Vaping status: Never Used     Passive vaping exposure: Yes   Substance and Sexual Activity     Alcohol use: Yes     Comment: occassional, 1- 2 per week     Drug use: No     Sexual activity: Yes     Partners: Female     Birth control/protection: Male Surgical, Female Surgical     Comment: vasectomy   Other Topics Concern     Parent/sibling w/ CABG, MI or angioplasty before 65F 55M? No   Social History Narrative     Not on file     Social Determinants of Health     Financial Resource Strain: Not on file   Food Insecurity: Not on file   Transportation Needs: Not on file   Physical Activity: Not on file   Stress: Not on file   Social Connections: Not on file   Intimate Partner Violence: Not on file   Housing Stability: Not on file   no smoking. Drinks etoh wine 2-3 times / week.       Physical Exam:  There were no vitals taken for this visit.   Wt Readings from Last 4 Encounters:   05/23/23 80.5 kg (177 lb 6.4 oz)   05/10/23 82 kg (180 lb 12.8 oz)   03/29/23 85.3 kg (188 lb)   03/22/23 88 kg (194 lb)         Constitutional.  Looks well and in no apparent distress.   Eyes.  Without eye redness or apparent jaundice.   Respiratory.  Non labored breathing. Speaking in full sentences.    Skin.  No concerning skin rashes on the skin visualized.   Neurological.  Is alert and oriented.  Psychiatric.  Mood and affect seem appropriate.      The rest of a comprehensive physical examination is deferred due to Public Health Emergency video visit restrictions.      Laboratory/Imaging Studies      Reviewed  5/23/2023  CBC shows WBC 8.2.  Hemoglobin 10.5.  Platelets 157.    BMP unremarkable.    4/4/2023.  CBC shows WBC 0.3.  Hemoglobin 13.8.  Platelets 117.    4/9/2023  CBC shows WBC 19.4.  It was 22.5 on 4/8/2023.  ANC 13.6.  There was a left shift with metamyelocytes and  myelocytes.  Hemoglobin 11.3.  Platelets 113.  Chemistry showed normal kidney functions.        Previously  SPEP did not show any monoclonal protein.  Serum immunoglobulin levels were normal.  [place] free light chain 2.53.  Lambda 1.6.  Ratio normal 1.58.      Hepatitis B surface antibody was positive.  Hepatitis B surface antigen and core antibody were negative.  HIV negative  Hep C negative        1/9/2023  CBC was unremarkable.  Peripheral blood smear was unremarkable.    CLAIRE was positive-1: 80, speckled pattern  dsDNA was negative  ESR 8    Chest x-ray on 5/23/2023 did not show any acute airspace disease.    PET CT chest abdomen and pelvis 5/5/2023  IMPRESSION:   In this patient with history of diffuse large cell lymphoma:  1. There is evidence of the complete response by Lugano criteria.  2. Subcarinal partially calcified lymph node, similar in size to prior  with minimal FDG uptake above background, likely reactive, attention  on follow-up.  3. Peripheral predominant ground glass opacities in the lungs. These  findings are likely secondary to infectious/inflammatory changes  including COVID-19.   4. Incidental and chronic findings including cholelithiasis without  cholecystitis and sub-6 mm solid pulmonary nodule similar to CT dated  1/27/2023. Attention on follow-up.  5. Please see same day dedicated head/neck PET/CT for further details.    PET CT neck 5/5/2023  Impression:   In this patient with a history of lymphoma:  1. There is complete response pet Lugano criteria. Excisional biopsy  changes in previously seen hypermetabolic left supraclavicular lymph  node. No residual hypermetabolic activity.  2. No additional hypermetabolic lesions of the head/neck.       2/16/2023 excisional biopsy of the left level 4 cervical lymph node  Large B-cell lymphoma, non-germinal center subtype.    The neoplastic cells express CD20, BCL2, BCL6, and MUM-1 (variable staining intensity), and are negative for CD3, CD5, CD10,  CD23, Cyclin D1, and ALK. CD21 and CD23 demonstrate a lack of residual follicular dendritic cell meshworks. C-Myc demonstrates variable staining, and is overall estimated at 20-30% (interpreted as negative; cutoff 40%). Ki-67 proliferative index is also variable, and is overall estimated around 60-70%.      EBV RNA by in situ hybridization (SOY-OTTO) is negative for EBV in the neoplastic cells.   A congo red stain is negative for amyloid deposition in the tissue.     FISH testing on the lymph node  RESULTS:     - Gains of BCL6 (94%); no rearrangement of BCL6  - Gains of BCL2 (94%); no rearrangement of BCL2  - No rearrangement of MYC or IGH::MYC fusion        INTERPRETATION:    Of the interphase cells examined, 94-96% had signal patterns indicative of gains of BCL6 (3q27) and BCL2 (18q21), consistent with gains of all or part of chromosomes 3 and 18. No evidence was found of rearrangements of BCL6, MYC (8q24), or BCL2, or of a t(8;14).     Cytogenetics.  He has complex cytogenetics.    48,X,-Y,t(1;12)(p13;q13),+3,+3,add(3)(p25),t(3;8)(q21;p21),del(6)(13q21),add(11)(q22),+18,?idic(18)(p11.2)x2[cp19]/46,XY[1]      2/15/2023 bone marrow biopsy was unremarkable without evidence of involvement of lymphoma.      ASSESSMENT/PLAN:    Diffuse large B-cell lymphoma presenting with diffuse lymphadenopathy.    He has marked FDG avid left supraclavicular, axillary, retroperitoneal lymph nodes.  No hepatosplenomegaly.  No evidence of bone marrow or extra lymphatic involvement.  FISH testing shows gains of BCL2 and BCL6 but no rearrangement.  No rearrangement of MYC or IGH::MYC fusion  No evidence of double hit or triple hit lymphoma.  No evidence of bone marrow involvement.  LDH is mildly elevated.      IPI is 3    We started R-CHOP on 3/8/2023 with curative intent.      Overall he tolerated the first chemotherapy cycle well.  He did have chemotherapy-induced neutropenia and had neutropenic fever but no infection was found.  Fever  resolved on its own without antibiotics.      He received cycle #2 on 3/29/2023 with G-CSF support because of neutropenic fever.    He developed neutropenic fever with pancolitis from C. difficile.  Cryptosporidium was also positive.    Cycle #3 was on 4/19/2023.    He achieved complete response by PET/CT done on 5/5/2023 after 3 cycles    I recommended continuing with 3 more cycles of R-CHOP.    Cycle #4 on 5/10/2023  Overall he is tolerating chemotherapy fairly with progressive fatigue.  Plan to proceed with cycle #5 on 5/31/2023.  I do not want to deviate from standard therapy as much as possible because we are dealing with a potentially curable malignancy.  He understands that.    Chemotherapy induced neutropenic fever/typhlitis.  His white blood cell count went down to 0.3.  These bounced back after 3 days.  I am not certain whether he got any extra Neupogen in the hospital or not.  We will continue G-CSF support because of chemotherapy-induced neutropenia/neutropenic fever.     Anemia.  Hemoglobin is mildly low and it is from chemotherapy.  Continue to monitor.      Respiratory.  He was treated for 1 week of levofloxacin for cough and some shortness of breath and subtle groundglass opacities found on the CT scan.  He still has persistent cough and some dyspnea on exertion.  He also notices some weak/hoarse voice although it is better today.  I recommend that he tries Robitussin-DM as needed for the cough.  Sometimes cough can persist after a respiratory infection for several weeks.      C. difficile colitis.  Symptoms have resolved.  We gave him 1 week of vancomycin when he was on levofloxacin to prevent reactivation of C. Difficile.    Frequency urination.  He does not have any dysuria.  Urine did not have infection when it was checked on 4/4/2023.  Repeat UA on 5/23/2023 also did not show any infection or any hematuria.  Keep well-hydrated.    We did not address the following today.    Prevention of tumor  lysis syndrome.  He was given allopurinol which he took for about 7 to 8 days but developed a rash from it.  Allopurinol was stopped.  He did not go into tumor lysis syndrome.      Return to clinic before C#6      I answered all of his and his wife's questions to their satisfaction.  They understand the situation and are agreeable with the plan.    Kiel Maurer MD

## 2023-05-30 NOTE — NURSING NOTE
Is the patient currently in the state of [place]? YES    Visit mode:VIDEO    If the visit is dropped, the patient can be reconnected by: VIDEO VISIT: Text to cell phone: [phone]    Will anyone else be joining the visit? NO      How would you like to obtain your AVS? MyChart    Are changes needed to the allergy or medication list? NO    Reason for visit: RECHECK    Patient declined individual allergy and medication review by support staff because patient denies any changes since echeck-in completion and states all information entered during echeck-in remains accurate.    Mariama Vasquez VF

## 2023-05-30 NOTE — LETTER
5/30/2023         RE: Geovanni Jasso  [id number]  [facility] [address]        Dear Colleague,    Thank you for referring your patient, Geovanni Jasso, to the [facility]. Please see a copy of my visit note below.    Virtual Visit Details    Type of service:  Video Visit   Video start time. 4:30 PM  Video stop time. 4:53 PM   Originating Location (pt. Location): Home  Distant Location (provider location):  Off-site  Platform used for Video Visit: [facility]     Oncology follow-up visit:  Date on this visit: 5/30/23     Diagnoses.    Diffuse large B-cell lymphoma    Primary Physician: Paddy Holly     History Of Present Illness:  Mr. Jasso is a 62 year old  male who presents for follow-up of diffuse lymphadenopathy.  He initially saw me on 1/18/2022 for retroperitoneal lymphadenopathy.  Please see my previous note for details.  I have copied and updated from prior notes.        Over the last few months, since fall of 2022, off and on he has had upper abdominal pain which lasts few minutes. He thinks stress sometimes can bring it on. No relationship to food.  No nausea or vomiting. BMs have been fine. No bleeding. No fevers.   He had a CT Abd Pelvis on 1/6/2023 which showed enlarged retroperitoneal lymphadenopathy  He has noticed some fatigue. He has also noticed some lump in the throat just above the sternum at night. This is going on for a couple of months. He denies any dysphagia.     Currently he feels well. Currently denies any abdominal pain. No nausea or vomiting. BMs are fine. No recent weight loss. No SOB. No neuropathy. No skin flushing. No drenching night sweats.    In the summer, he noticed a rash on the face which has since resolved.  He was noted to have CLAIRE positive.     On 2/6/2023, he had FNA of the left supraclavicular lymph node.  Cytology showed atypical lymphoid cells.  Flow cytometry showed lambda monotypic B cells which are negative for CD5 and CD10.  This  is consistent with a B-cell lymphoma.  We decided to go ahead with excisional lymph node biopsy as well as bone marrow biopsy for complete staging.    Excisional lymph node biopsy showed diffuse large B-cell lymphoma.    Bone marrow biopsy did not reveal evidence of lymphoma.    He started R-CHOP on 3/8/2023.    Cycle #2 R-CHOP on 3/29/2023.        He was admitted to the [facility] from 4/4/2023 - 4/9/2023.  He was admitted for neutropenic fever, sepsis and C. difficile colitis.  He presented with multiple bouts of nonbloody diarrhea and fever of 101 with generalized fatigue and weakness.  He was neutropenic and was noted to have pancolitis.  C. difficile was positive.  Cryptosporidium was also positive.  He was started on IV cefepime and IV Flagyl initially.  Infectious disease was consulted and he was switched to oral vancomycin to complete 14 days.  Neutropenia resolved by 4/7/2023.    He completed oral vancomycin course on 4/19/2023 4/19/2023.  Cycle #3 of R-CHOP    5/5/2023.  PET CT neck chest abdomen and pelvis shows complete response to treatment per Lugano criteria    5/10/2023.  Cycle #4 of R-CHOP.    5/31/2023.  Cycle #5 of R-CHOP as planned.        Interval history.    This is a video visit.  He is accompanied by his wife Charleen.  Overall he has been having progressive fatigue.  He still is having dry cough and dyspnea with mild exertion.  No fevers per se.  Has noticed a weak voice.  Today it is better.  He has tried albuterol nebulization which has helped.  He is usually is taking it twice a day.  No drenching night sweats.  Has lost a little bit of weight.  No nausea or vomiting.  No significant issues with bowel movements.  Denies neuropathy.  No new swellings.     ECOG 2    ROS:  A comprehensive ROS was otherwise neg    I reviewed other history in Epic as before.      Past Medical/Surgical History:  Past Medical History:   Diagnosis Date     Cancer (H) 2-7-2023    just diagnosed with  lymphona.     Carpal tunnel syndrome      Chronic rhinitis 10/23/2015     Esophageal reflux 12/26/2013     Hand joint stiff, right    Shingles     Past Surgical History:   Procedure Laterality Date     BIOPSY  2-6-23     BIOPSY LYMPH NODE CERVICAL Left 2/16/2023    Procedure: excisional biopsy of a left cervical node;  Surgeon: Aung Tamayo MD;  Location: [facility] OR     COLONOSCOPY  2019    clean, do again in 10 years     COLONOSCOPY WITH CO2 INSUFFLATION N/A 07/17/2019    Procedure: COLONOSCOPY, WITH CO2 INSUFFLATION;  Surgeon: Jaison Hammer MD;  Location: [facility] OR     ENT SURGERY  1979    Loss of hearing rt ear (tinnitis)     HERNIA REPAIR  2000     NO HISTORY OF SURGERY       ORTHOPEDIC SURGERY  1988,1992    rt knee, scope in 88; ACL repair Early 90s     Cancer History:   As above    Allergies:  Allergies as of 05/30/2023 - Reviewed 05/23/2023   Allergen Reaction Noted     Penicillins  12/23/2013     Current Medications:  Current Outpatient Medications   Medication Sig Dispense Refill     acetaminophen (TYLENOL) 325 MG tablet Take 2 tablets (650 mg) by mouth every 6 hours as needed for mild pain 50 tablet 0     albuterol (PROAIR HFA/PROVENTIL HFA/VENTOLIN HFA) 108 (90 Base) MCG/ACT inhaler Inhale 2 puffs into the lungs every 4 hours as needed for shortness of breath 18 g 5     albuterol (PROVENTIL) (2.5 MG/3ML) 0.083% neb solution Take 1 vial (2.5 mg) by nebulization every 6 hours as needed for shortness of breath, wheezing or cough 90 mL 0     diclofenac (VOLTAREN) 1 % topical gel Apply small amount topically to right shoulder 3-4 times daily as needed for pain. 50 g 0     Efinaconazole (JUBLIA) 10 % SOLN Externally apply topically daily to nail. 8 mL 4     fexofenadine (ALLEGRA) 60 MG tablet Take 1 tablet (60 mg) by mouth 2 times daily 180 tablet 0     fluticasone (FLONASE) 50 MCG/ACT nasal spray Spray 1-2 sprays into both nostrils daily 16 g 11     guaiFENesin (MUCINEX) 600 MG 12 hr tablet Take  2 tablets (1,200 mg) by mouth 2 times daily 30 tablet 1     hydrocortisone (CORTAID) 1 % external ointment Apply topically 2 times daily As needed 56 g 0     ibuprofen (ADVIL,MOTRIN) 200 MG tablet Take 200 mg by mouth every 6 hours as needed       ketoconazole (NIZORAL) 2 % external cream Apply twice daily for 8 weeks to feet 60 g 3     LANsoprazole (PREVACID) 30 MG DR capsule Take 1 capsule (30 mg) by mouth every morning (before breakfast) 90 capsule 2     lidocaine-prilocaine (EMLA) 2.5-2.5 % external cream Apply topically as needed for moderate pain (4-6) Apply over port at least 30 minutes before port access. 30 g 0     methocarbamol (ROBAXIN) 500 MG tablet Take 1 tablet (500 mg) by mouth nightly as needed for muscle spasms 30 tablet 0     ondansetron (ZOFRAN ODT) 4 MG ODT tab Take 1 tablet (4 mg) by mouth every 8 hours as needed for nausea 10 tablet 0     ondansetron (ZOFRAN) 8 MG tablet Take 1 tablet (8 mg) by mouth every 8 hours as needed for nausea (vomiting) 30 tablet 2     oxyCODONE (ROXICODONE) 5 MG tablet Take 1-2 tablets (5-10 mg) by mouth every 4 hours as needed for moderate to severe pain 12 tablet 0     prochlorperazine (COMPAZINE) 10 MG tablet Take 1 tablet (10 mg) by mouth every 6 hours as needed for nausea or vomiting 30 tablet 2     senna-docusate (SENOKOT-S/PERICOLACE) 8.6-50 MG tablet Take 1-2 tablets by mouth 2 times daily 30 tablet 0     traMADol (ULTRAM) 50 MG tablet Take 50 mg by mouth every 6 hours as needed       zolpidem (AMBIEN) 10 MG tablet Take 1 tablet (10 mg) by mouth nightly as needed for sleep 30 tablet 1      Family History:  Family History   Problem Relation Age of Onset     Hyperlipidemia Mother         medication     Diabetes Father      Other Cancer Father         Lung cancer     Other Cancer Maternal Grandmother         Unsure what type of cancer     Autoimmune Disease No family hx of      Maternal grand mother had some sort of a cancer  Dad had lung cancer  Has 4 kids- all  healthy    Social History:  Social History     Socioeconomic History     Marital status:      Spouse name: Not on file     Number of children: Not on file     Years of education: Not on file     Highest education level: Not on file   Occupational History     Not on file   Tobacco Use     Smoking status: Never     Passive exposure: Never     Smokeless tobacco: Never   Vaping Use     Vaping status: Never Used     Passive vaping exposure: Yes   Substance and Sexual Activity     Alcohol use: Yes     Comment: occassional, 1- 2 per week     Drug use: No     Sexual activity: Yes     Partners: Female     Birth control/protection: Male Surgical, Female Surgical     Comment: vasectomy   Other Topics Concern     Parent/sibling w/ CABG, MI or angioplasty before 65F 55M? No   Social History Narrative     Not on file     Social Determinants of Health     Financial Resource Strain: Not on file   Food Insecurity: Not on file   Transportation Needs: Not on file   Physical Activity: Not on file   Stress: Not on file   Social Connections: Not on file   Intimate Partner Violence: Not on file   Housing Stability: Not on file   no smoking. Drinks etoh wine 2-3 times / week.       Physical Exam:  There were no vitals taken for this visit.   Wt Readings from Last 4 Encounters:   05/23/23 80.5 kg (177 lb 6.4 oz)   05/10/23 82 kg (180 lb 12.8 oz)   03/29/23 85.3 kg (188 lb)   03/22/23 88 kg (194 lb)         Constitutional.  Looks well and in no apparent distress.   Eyes.  Without eye redness or apparent jaundice.   Respiratory.  Non labored breathing. Speaking in full sentences.    Skin.  No concerning skin rashes on the skin visualized.   Neurological.  Is alert and oriented.  Psychiatric.  Mood and affect seem appropriate.      The rest of a comprehensive physical examination is deferred due to Public Health Emergency video visit restrictions.      Laboratory/Imaging Studies      Reviewed  5/23/2023  CBC shows WBC 8.2.  Hemoglobin  10.5.  Platelets 157.    BMP unremarkable.    4/4/2023.  CBC shows WBC 0.3.  Hemoglobin 13.8.  Platelets 117.    4/9/2023  CBC shows WBC 19.4.  It was 22.5 on 4/8/2023.  ANC 13.6.  There was a left shift with metamyelocytes and myelocytes.  Hemoglobin 11.3.  Platelets 113.  Chemistry showed normal kidney functions.        Previously  SPEP did not show any monoclonal protein.  Serum immunoglobulin levels were normal.  Western free light chain 2.53.  Lambda 1.6.  Ratio normal 1.58.      Hepatitis B surface antibody was positive.  Hepatitis B surface antigen and core antibody were negative.  HIV negative  Hep C negative        1/9/2023  CBC was unremarkable.  Peripheral blood smear was unremarkable.    CLAIRE was positive-1: 80, speckled pattern  dsDNA was negative  ESR 8    Chest x-ray on 5/23/2023 did not show any acute airspace disease.    PET CT chest abdomen and pelvis 5/5/2023  IMPRESSION:   In this patient with history of diffuse large cell lymphoma:  1. There is evidence of the complete response by Lugano criteria.  2. Subcarinal partially calcified lymph node, similar in size to prior  with minimal FDG uptake above background, likely reactive, attention  on follow-up.  3. Peripheral predominant ground glass opacities in the lungs. These  findings are likely secondary to infectious/inflammatory changes  including COVID-19.   4. Incidental and chronic findings including cholelithiasis without  cholecystitis and sub-6 mm solid pulmonary nodule similar to CT dated  1/27/2023. Attention on follow-up.  5. Please see same day dedicated head/neck PET/CT for further details.    PET CT neck 5/5/2023  Impression:   In this patient with a history of lymphoma:  1. There is complete response pet Lugano criteria. Excisional biopsy  changes in previously seen hypermetabolic left supraclavicular lymph  node. No residual hypermetabolic activity.  2. No additional hypermetabolic lesions of the head/neck.       2/16/2023 excisional  biopsy of the left level 4 cervical lymph node  Large B-cell lymphoma, non-germinal center subtype.    The neoplastic cells express CD20, BCL2, BCL6, and MUM-1 (variable staining intensity), and are negative for CD3, CD5, CD10, CD23, Cyclin D1, and ALK. CD21 and CD23 demonstrate a lack of residual follicular dendritic cell meshworks. C-Myc demonstrates variable staining, and is overall estimated at 20-30% (interpreted as negative; cutoff 40%). Ki-67 proliferative index is also variable, and is overall estimated around 60-70%.      EBV RNA by in situ hybridization (SOY-OTTO) is negative for EBV in the neoplastic cells.   A congo red stain is negative for amyloid deposition in the tissue.     FISH testing on the lymph node  RESULTS:     - Gains of BCL6 (94%); no rearrangement of BCL6  - Gains of BCL2 (94%); no rearrangement of BCL2  - No rearrangement of MYC or IGH::MYC fusion        INTERPRETATION:    Of the interphase cells examined, 94-96% had signal patterns indicative of gains of BCL6 (3q27) and BCL2 (18q21), consistent with gains of all or part of chromosomes 3 and 18. No evidence was found of rearrangements of BCL6, MYC (8q24), or BCL2, or of a t(8;14).     Cytogenetics.  He has complex cytogenetics.    48,X,-Y,t(1;12)(p13;q13),+3,+3,add(3)(p25),t(3;8)(q21;p21),del(6)(13q21),add(11)(q22),+18,?idic(18)(p11.2)x2[cp19]/46,XY[1]      2/15/2023 bone marrow biopsy was unremarkable without evidence of involvement of lymphoma.      ASSESSMENT/PLAN:    Diffuse large B-cell lymphoma presenting with diffuse lymphadenopathy.    He has marked FDG avid left supraclavicular, axillary, retroperitoneal lymph nodes.  No hepatosplenomegaly.  No evidence of bone marrow or extra lymphatic involvement.  FISH testing shows gains of BCL2 and BCL6 but no rearrangement.  No rearrangement of MYC or IGH::MYC fusion  No evidence of double hit or triple hit lymphoma.  No evidence of bone marrow involvement.  LDH is mildly elevated.      IPI  is 3    We started R-CHOP on 3/8/2023 with curative intent.      Overall he tolerated the first chemotherapy cycle well.  He did have chemotherapy-induced neutropenia and had neutropenic fever but no infection was found.  Fever resolved on its own without antibiotics.      He received cycle #2 on 3/29/2023 with G-CSF support because of neutropenic fever.    He developed neutropenic fever with pancolitis from C. difficile.  Cryptosporidium was also positive.    Cycle #3 was on 4/19/2023.    He achieved complete response by PET/CT done on 5/5/2023 after 3 cycles    I recommended continuing with 3 more cycles of R-CHOP.    Cycle #4 on 5/10/2023  Overall he is tolerating chemotherapy fairly with progressive fatigue.  Plan to proceed with cycle #5 on 5/31/2023.  I do not want to deviate from standard therapy as much as possible because we are dealing with a potentially curable malignancy.  He understands that.    Chemotherapy induced neutropenic fever/typhlitis.  His white blood cell count went down to 0.3.  These bounced back after 3 days.  I am not certain whether he got any extra Neupogen in the hospital or not.  We will continue G-CSF support because of chemotherapy-induced neutropenia/neutropenic fever.     Anemia.  Hemoglobin is mildly low and it is from chemotherapy.  Continue to monitor.      Respiratory.  He was treated for 1 week of levofloxacin for cough and some shortness of breath and subtle groundglass opacities found on the CT scan.  He still has persistent cough and some dyspnea on exertion.  He also notices some weak/hoarse voice although it is better today.  I recommend that he tries Robitussin-DM as needed for the cough.  Sometimes cough can persist after a respiratory infection for several weeks.      C. difficile colitis.  Symptoms have resolved.  We gave him 1 week of vancomycin when he was on levofloxacin to prevent reactivation of C. Difficile.    Frequency urination.  He does not have any  dysuria.  Urine did not have infection when it was checked on 4/4/2023.  Repeat UA on 5/23/2023 also did not show any infection or any hematuria.  Keep well-hydrated.    We did not address the following today.    Prevention of tumor lysis syndrome.  He was given allopurinol which he took for about 7 to 8 days but developed a rash from it.  Allopurinol was stopped.  He did not go into tumor lysis syndrome.      Return to clinic before C#6      I answered all of his and his wife's questions to their satisfaction.  They understand the situation and are agreeable with the plan.    Kiel Maurer MD          Again, thank you for allowing me to participate in the care of your patient.        Sincerely,        Kiel Maurer MD

## 2023-05-31 ENCOUNTER — LAB (OUTPATIENT)
Dept: ONCOLOGY | Facility: CLINIC | Age: 63
End: 2023-05-31
Payer: COMMERCIAL

## 2023-05-31 ENCOUNTER — INFUSION THERAPY VISIT (OUTPATIENT)
Dept: INFUSION THERAPY | Facility: CLINIC | Age: 63
End: 2023-05-31
Payer: COMMERCIAL

## 2023-05-31 VITALS
WEIGHT: 176 LBS | BODY MASS INDEX: 24.55 KG/M2 | TEMPERATURE: 98 F | DIASTOLIC BLOOD PRESSURE: 72 MMHG | RESPIRATION RATE: 16 BRPM | HEART RATE: 94 BPM | OXYGEN SATURATION: 96 % | SYSTOLIC BLOOD PRESSURE: 107 MMHG

## 2023-05-31 DIAGNOSIS — C83.30 DIFFUSE LARGE B-CELL LYMPHOMA, UNSPECIFIED BODY REGION (H): ICD-10-CM

## 2023-05-31 DIAGNOSIS — T45.1X5A CHEMOTHERAPY-INDUCED NEUTROPENIA (H): Primary | ICD-10-CM

## 2023-05-31 DIAGNOSIS — D70.1 CHEMOTHERAPY-INDUCED NEUTROPENIA (H): Primary | ICD-10-CM

## 2023-05-31 LAB
ALBUMIN SERPL-MCNC: 2.9 G/DL (ref 3.4–5)
ALP SERPL-CCNC: 109 U/L (ref 40–150)
ALT SERPL W P-5'-P-CCNC: 26 U/L (ref 0–70)
ANION GAP SERPL CALCULATED.3IONS-SCNC: 5 MMOL/L (ref 3–14)
AST SERPL W P-5'-P-CCNC: 27 U/L (ref 0–45)
BASOPHILS # BLD AUTO: 0.1 10E3/UL (ref 0–0.2)
BASOPHILS NFR BLD AUTO: 2 %
BILIRUB SERPL-MCNC: 0.4 MG/DL (ref 0.2–1.3)
BUN SERPL-MCNC: 9 MG/DL (ref 7–30)
CALCIUM SERPL-MCNC: 9.3 MG/DL (ref 8.5–10.1)
CHLORIDE BLD-SCNC: 111 MMOL/L (ref 94–109)
CO2 SERPL-SCNC: 26 MMOL/L (ref 20–32)
CREAT SERPL-MCNC: 0.78 MG/DL (ref 0.66–1.25)
EOSINOPHIL # BLD AUTO: 0.1 10E3/UL (ref 0–0.7)
EOSINOPHIL NFR BLD AUTO: 1 %
ERYTHROCYTE [DISTWIDTH] IN BLOOD BY AUTOMATED COUNT: 18.3 % (ref 10–15)
GFR SERPL CREATININE-BSD FRML MDRD: >90 ML/MIN/1.73M2
GLUCOSE BLD-MCNC: 133 MG/DL (ref 70–99)
HCT VFR BLD AUTO: 30.4 % (ref 40–53)
HGB BLD-MCNC: 9.9 G/DL (ref 13.3–17.7)
IMM GRANULOCYTES # BLD: 0 10E3/UL
IMM GRANULOCYTES NFR BLD: 1 %
LDH SERPL L TO P-CCNC: 177 U/L (ref 85–227)
LYMPHOCYTES # BLD AUTO: 0.5 10E3/UL (ref 0.8–5.3)
LYMPHOCYTES NFR BLD AUTO: 10 %
MCH RBC QN AUTO: 33.6 PG (ref 26.5–33)
MCHC RBC AUTO-ENTMCNC: 32.6 G/DL (ref 31.5–36.5)
MCV RBC AUTO: 103 FL (ref 78–100)
MONOCYTES # BLD AUTO: 0.9 10E3/UL (ref 0–1.3)
MONOCYTES NFR BLD AUTO: 16 %
NEUTROPHILS # BLD AUTO: 3.8 10E3/UL (ref 1.6–8.3)
NEUTROPHILS NFR BLD AUTO: 70 %
NRBC # BLD AUTO: 0 10E3/UL
NRBC BLD AUTO-RTO: 0 /100
PLATELET # BLD AUTO: 294 10E3/UL (ref 150–450)
POTASSIUM BLD-SCNC: 3.4 MMOL/L (ref 3.4–5.3)
PROT SERPL-MCNC: 6.1 G/DL (ref 6.8–8.8)
RBC # BLD AUTO: 2.95 10E6/UL (ref 4.4–5.9)
SODIUM SERPL-SCNC: 142 MMOL/L (ref 133–144)
WBC # BLD AUTO: 5.3 10E3/UL (ref 4–11)

## 2023-05-31 PROCEDURE — 99207 PR NO CHARGE LOS: CPT

## 2023-05-31 PROCEDURE — 96377 APPLICATON ON-BODY INJECTOR: CPT | Mod: 59 | Performed by: INTERNAL MEDICINE

## 2023-05-31 PROCEDURE — 83615 LACTATE (LD) (LDH) ENZYME: CPT | Performed by: INTERNAL MEDICINE

## 2023-05-31 PROCEDURE — 96417 CHEMO IV INFUS EACH ADDL SEQ: CPT | Performed by: INTERNAL MEDICINE

## 2023-05-31 PROCEDURE — 96413 CHEMO IV INFUSION 1 HR: CPT | Performed by: INTERNAL MEDICINE

## 2023-05-31 PROCEDURE — 96367 TX/PROPH/DG ADDL SEQ IV INF: CPT | Performed by: INTERNAL MEDICINE

## 2023-05-31 PROCEDURE — 85025 COMPLETE CBC W/AUTO DIFF WBC: CPT | Performed by: INTERNAL MEDICINE

## 2023-05-31 PROCEDURE — 80053 COMPREHEN METABOLIC PANEL: CPT | Performed by: INTERNAL MEDICINE

## 2023-05-31 PROCEDURE — 96415 CHEMO IV INFUSION ADDL HR: CPT | Performed by: INTERNAL MEDICINE

## 2023-05-31 PROCEDURE — 96411 CHEMO IV PUSH ADDL DRUG: CPT | Performed by: INTERNAL MEDICINE

## 2023-05-31 PROCEDURE — 96375 TX/PRO/DX INJ NEW DRUG ADDON: CPT | Performed by: INTERNAL MEDICINE

## 2023-05-31 RX ORDER — HEPARIN SODIUM (PORCINE) LOCK FLUSH IV SOLN 100 UNIT/ML 100 UNIT/ML
5 SOLUTION INTRAVENOUS
Status: DISCONTINUED | OUTPATIENT
Start: 2023-05-31 | End: 2023-05-31 | Stop reason: HOSPADM

## 2023-05-31 RX ORDER — ACETAMINOPHEN 325 MG/1
650 TABLET ORAL ONCE
Status: COMPLETED | OUTPATIENT
Start: 2023-05-31 | End: 2023-05-31

## 2023-05-31 RX ORDER — HEPARIN SODIUM (PORCINE) LOCK FLUSH IV SOLN 100 UNIT/ML 100 UNIT/ML
5 SOLUTION INTRAVENOUS EVERY 8 HOURS
Status: DISCONTINUED | OUTPATIENT
Start: 2023-05-31 | End: 2023-05-31 | Stop reason: HOSPADM

## 2023-05-31 RX ORDER — DOXORUBICIN HYDROCHLORIDE 2 MG/ML
50 INJECTION, SOLUTION INTRAVENOUS ONCE
Status: COMPLETED | OUTPATIENT
Start: 2023-05-31 | End: 2023-05-31

## 2023-05-31 RX ORDER — PALONOSETRON 0.05 MG/ML
0.25 INJECTION, SOLUTION INTRAVENOUS ONCE
Status: COMPLETED | OUTPATIENT
Start: 2023-05-31 | End: 2023-05-31

## 2023-05-31 RX ADMIN — Medication 250 ML: at 09:25

## 2023-05-31 RX ADMIN — DOXORUBICIN HYDROCHLORIDE 100 MG: 2 INJECTION, SOLUTION INTRAVENOUS at 10:15

## 2023-05-31 RX ADMIN — ACETAMINOPHEN 650 MG: 325 TABLET ORAL at 10:37

## 2023-05-31 RX ADMIN — PALONOSETRON 0.25 MG: 0.05 INJECTION, SOLUTION INTRAVENOUS at 09:48

## 2023-05-31 RX ADMIN — HEPARIN SODIUM (PORCINE) LOCK FLUSH IV SOLN 100 UNIT/ML 5 ML: 100 SOLUTION at 14:39

## 2023-05-31 RX ADMIN — HEPARIN SODIUM (PORCINE) LOCK FLUSH IV SOLN 100 UNIT/ML 5 ML: 100 SOLUTION at 08:52

## 2023-05-31 ASSESSMENT — PAIN SCALES - GENERAL: PAINLEVEL: NO PAIN (0)

## 2023-05-31 NOTE — PROGRESS NOTES
Infusion Nursing Note:  Geovanni Jasso presents today for C5D1 CHOP-R.    Patient seen by provider today: No- VV with Dr. Maurer yesterday   present during visit today: Not Applicable.    Note: N/A.    Intravenous Access:  Implanted Port.    Treatment Conditions:  Lab Results   Component Value Date    HGB 9.9 (L) 05/31/2023    WBC 5.3 05/31/2023    ANEU 0.1 (LL) 03/22/2023    ANEUTAUTO 3.8 05/31/2023     05/31/2023      Lab Results   Component Value Date     05/31/2023    POTASSIUM 3.4 05/31/2023    CR 0.78 05/31/2023    SHIV 9.3 05/31/2023    BILITOTAL 0.4 05/31/2023    ALBUMIN 2.9 (L) 05/31/2023    ALT 26 05/31/2023    AST 27 05/31/2023     Results reviewed, labs MET treatment parameters, ok to proceed with treatment.    Post Infusion Assessment:  Patient tolerated infusion without incident.  Blood return noted pre and post infusion.  Site patent and intact, free from redness, edema or discomfort.  No evidence of extravasations.  Access discontinued per protocol.     ONPRO  Was placed on patient's: back of left arm.    Was placed at 1640 PM    Podpal used: No    ONPRO injector device Lot number: [id number]    Patient education included: what patient can expect after application, what colored lights mean on the device, when to remove device, when and where to call with questions or issues, all patients questions answered and that Neulasta administration will occur at 1940 on 6/1/23.    Patient tolerated administration well.    Discharge Plan:   Patient will return 6/21/2023 for next appointment.   Future appts have been reviewed and crosschecked with appt note and plan.  Patient discharged in stable condition accompanied by: self.  Departure Mode: Ambulatory.    Tara Tong RN-BSN, PHN, OCN  [facility]

## 2023-06-09 ENCOUNTER — NURSE TRIAGE (OUTPATIENT)
Dept: NURSING | Facility: CLINIC | Age: 63
End: 2023-06-09

## 2023-06-10 NOTE — TELEPHONE ENCOUNTER
Call from patient who says for the last two times when he urinated blood, he saw some blood in the toilet bowel. Patient says he saw a line of red in the bowel. Patient reports pain at the end of when he urine the last 4 times.  No flank or abdominal pain  No fever/chill  No nausea/vomiting.    Medical Hx  Non-Hodgkins lymphoma  On chemo    Assessment/Disposition: be seen w/i 24 hrs  Informed [facility] Olmedo  is closest to him.    Reviewed care advice with caller per RN triage protocol guideline. Advised to call back with worsening symptoms, concerns or questions. Caller verbalized understanding.      Magnolia Vega, RN, BSN  Triage Nurse Advisor          Reason for Disposition    Pain or burning with passing urine    Additional Information    Negative: Shock suspected (e.g., cold/pale/clammy skin, too weak to stand, low BP, rapid pulse)    Negative: Sounds like a life-threatening emergency to the triager    Negative: Recent back or abdominal injury    Negative: Recent genital injury    Negative: [1] Unable to urinate (or only a few drops) > 4 hours AND [2] bladder feels very full (e.g., palpable bladder or strong urge to urinate)    Negative: Passing pure blood or large blood clots (i.e., size > a dime) (Exception: oz or small strands)    Negative: Fever > 100.4 F (38.0 C)    Negative: Patient sounds very sick or weak to the triager    Negative: [1] Pain or burning with passing urine AND [2] side (flank) or back pain present    Negative: Known sickle cell disease    Negative: Taking Coumadin (warfarin) or other strong blood thinner, or known bleeding disorder (e.g., thrombocytopenia)    Protocols used: URINE - BLOOD IN-A-

## 2023-06-11 ENCOUNTER — OFFICE VISIT (OUTPATIENT)
Dept: URGENT CARE | Facility: URGENT CARE | Age: 63
End: 2023-06-11
Payer: COMMERCIAL

## 2023-06-11 VITALS
RESPIRATION RATE: 16 BRPM | WEIGHT: 173.9 LBS | OXYGEN SATURATION: 100 % | BODY MASS INDEX: 24.25 KG/M2 | DIASTOLIC BLOOD PRESSURE: 73 MMHG | SYSTOLIC BLOOD PRESSURE: 106 MMHG | TEMPERATURE: 98.4 F | HEART RATE: 112 BPM

## 2023-06-11 DIAGNOSIS — R30.0 DYSURIA: Primary | ICD-10-CM

## 2023-06-11 DIAGNOSIS — R31.9 HEMATURIA, UNSPECIFIED TYPE: ICD-10-CM

## 2023-06-11 LAB
ALBUMIN UR-MCNC: >=300 MG/DL
APPEARANCE UR: ABNORMAL
BACTERIA #/AREA URNS HPF: ABNORMAL /HPF
BILIRUB UR QL STRIP: NEGATIVE
COLOR UR AUTO: ABNORMAL
GLUCOSE UR STRIP-MCNC: NEGATIVE MG/DL
HGB UR QL STRIP: ABNORMAL
KETONES UR STRIP-MCNC: NEGATIVE MG/DL
LEUKOCYTE ESTERASE UR QL STRIP: NEGATIVE
NITRATE UR QL: NEGATIVE
PH UR STRIP: 6 [PH] (ref 5–7)
RBC #/AREA URNS AUTO: >100 /HPF
SP GR UR STRIP: >=1.03 (ref 1–1.03)
SQUAMOUS #/AREA URNS AUTO: ABNORMAL /LPF
UROBILINOGEN UR STRIP-ACNC: 1 E.U./DL
WBC #/AREA URNS AUTO: ABNORMAL /HPF

## 2023-06-11 PROCEDURE — 81001 URINALYSIS AUTO W/SCOPE: CPT | Performed by: PHYSICIAN ASSISTANT

## 2023-06-11 PROCEDURE — 87086 URINE CULTURE/COLONY COUNT: CPT | Performed by: PHYSICIAN ASSISTANT

## 2023-06-11 PROCEDURE — 99213 OFFICE O/P EST LOW 20 MIN: CPT | Performed by: PHYSICIAN ASSISTANT

## 2023-06-11 RX ORDER — CIPROFLOXACIN 500 MG/1
500 TABLET, FILM COATED ORAL 2 TIMES DAILY
Qty: 10 TABLET | Refills: 0 | Status: SHIPPED | OUTPATIENT
Start: 2023-06-11 | End: 2023-06-16

## 2023-06-11 ASSESSMENT — ENCOUNTER SYMPTOMS
CARDIOVASCULAR NEGATIVE: 1
DIARRHEA: 0
GASTROINTESTINAL NEGATIVE: 1
ABDOMINAL PAIN: 0
SORE THROAT: 0
FEVER: 0
RESPIRATORY NEGATIVE: 1
SHORTNESS OF BREATH: 0
CONSTITUTIONAL NEGATIVE: 1
ALLERGIC/IMMUNOLOGIC NEGATIVE: 1
HEMATURIA: 1
HEADACHES: 0
VOMITING: 0
NAUSEA: 0
DYSURIA: 1
MUSCULOSKELETAL NEGATIVE: 1
FREQUENCY: 0
WHEEZING: 0
COUGH: 0
NEUROLOGICAL NEGATIVE: 1
MYALGIAS: 0
CHEST TIGHTNESS: 0
CHILLS: 0
PALPITATIONS: 0

## 2023-06-11 ASSESSMENT — PAIN SCALES - GENERAL: PAINLEVEL: MILD PAIN (3)

## 2023-06-11 NOTE — PROGRESS NOTES
Chief Complaint:    Chief Complaint   Patient presents with     Dysuria     Dysuria beginning on Friday. Patient reports pain of 9/10 when urinating.     Hematuria     Hematuria beginning on Friday.        ASSESSMENT     1. Dysuria    2. Hematuria, unspecified type         PLAN    Urinalysis discussed with patient.  With symptoms, will treat with Cipro.  We will call with culture results if resistant.  Follow up with PCP in 1 week if symptoms are not improving.  Worrisome symptoms discussed with instructions to go to the ED.  Patient verbalized understanding and agreed with this plan.    Labs:     Results for orders placed or performed in visit on 06/11/23   UA Macroscopic with reflex to Microscopic and Culture     Status: Abnormal    Specimen: Urine, Clean Catch   Result Value Ref Range    Color Urine Red (A) Colorless, Straw, Light Yellow, Yellow    Appearance Urine Slightly Cloudy (A) Clear    Glucose Urine Negative Negative mg/dL    Bilirubin Urine Negative Negative    Ketones Urine Negative Negative mg/dL    Specific Gravity Urine >=1.030 1.003 - 1.035    Blood Urine Large (A) Negative    pH Urine 6.0 5.0 - 7.0    Protein Albumin Urine >=300 (A) Negative mg/dL    Urobilinogen Urine 1.0 0.2, 1.0 E.U./dL    Nitrite Urine Negative Negative    Leukocyte Esterase Urine Negative Negative   UA Microscopic with Reflex to Culture     Status: Abnormal   Result Value Ref Range    Bacteria Urine Few (A) None Seen /HPF    RBC Urine >100 (A) 0-2 /HPF /HPF    WBC Urine 0-5 0-5 /HPF /HPF    Squamous Epithelials Urine Few (A) None Seen /LPF    Narrative    Urine Culture not indicated       Problem history    Patient Active Problem List   Diagnosis     Esophageal reflux     Hyperlipidemia LDL goal <70     Chronic rhinitis     Bilateral low back pain without sciatica     Chondromalacia patellae, right     S/P ACL reconstruction     Atherosclerosis of native coronary artery of native heart without angina pectoris     SOB  (shortness of breath)     Non-allergic rhinitis     Positive CLAIRE (antinuclear antibody)     Hand joint stiff, right     Carpal tunnel syndrome     Osteoarthritis of knee, unspecified laterality, unspecified osteoarthritis type     Diverticulosis of large intestine     Internal hemorrhoids     Diffuse large B-cell lymphoma, unspecified body region (H)     Chemotherapy-induced neutropenia (H)       Current Meds    Current Outpatient Medications:      ciprofloxacin (CIPRO) 500 MG tablet, Take 1 tablet (500 mg) by mouth 2 times daily for 5 days, Disp: 10 tablet, Rfl: 0     acetaminophen (TYLENOL) 325 MG tablet, Take 2 tablets (650 mg) by mouth every 6 hours as needed for mild pain, Disp: 50 tablet, Rfl: 0     albuterol (PROAIR HFA/PROVENTIL HFA/VENTOLIN HFA) 108 (90 Base) MCG/ACT inhaler, Inhale 2 puffs into the lungs every 4 hours as needed for shortness of breath, Disp: 18 g, Rfl: 5     albuterol (PROVENTIL) (2.5 MG/3ML) 0.083% neb solution, Take 1 vial (2.5 mg) by nebulization every 6 hours as needed for shortness of breath, wheezing or cough, Disp: 90 mL, Rfl: 0     diclofenac (VOLTAREN) 1 % topical gel, Apply small amount topically to right shoulder 3-4 times daily as needed for pain., Disp: 50 g, Rfl: 0     Efinaconazole (JUBLIA) 10 % SOLN, Externally apply topically daily to nail., Disp: 8 mL, Rfl: 4     fexofenadine (ALLEGRA) 60 MG tablet, Take 1 tablet (60 mg) by mouth 2 times daily, Disp: 180 tablet, Rfl: 0     fluticasone (FLONASE) 50 MCG/ACT nasal spray, Spray 1-2 sprays into both nostrils daily, Disp: 16 g, Rfl: 11     guaiFENesin (MUCINEX) 600 MG 12 hr tablet, Take 2 tablets (1,200 mg) by mouth 2 times daily, Disp: 30 tablet, Rfl: 1     hydrocortisone (CORTAID) 1 % external ointment, Apply topically 2 times daily As needed, Disp: 56 g, Rfl: 0     ibuprofen (ADVIL,MOTRIN) 200 MG tablet, Take 200 mg by mouth every 6 hours as needed, Disp: , Rfl:      ketoconazole (NIZORAL) 2 % external cream, Apply twice  daily for 8 weeks to feet, Disp: 60 g, Rfl: 3     LANsoprazole (PREVACID) 30 MG DR capsule, Take 1 capsule (30 mg) by mouth every morning (before breakfast), Disp: 90 capsule, Rfl: 2     lidocaine-prilocaine (EMLA) 2.5-2.5 % external cream, Apply topically as needed for moderate pain (4-6) Apply over port at least 30 minutes before port access., Disp: 30 g, Rfl: 0     methocarbamol (ROBAXIN) 500 MG tablet, Take 1 tablet (500 mg) by mouth nightly as needed for muscle spasms, Disp: 30 tablet, Rfl: 0     ondansetron (ZOFRAN ODT) 4 MG ODT tab, Take 1 tablet (4 mg) by mouth every 8 hours as needed for nausea, Disp: 10 tablet, Rfl: 0     ondansetron (ZOFRAN) 8 MG tablet, Take 1 tablet (8 mg) by mouth every 8 hours as needed for nausea (vomiting), Disp: 30 tablet, Rfl: 2     oxyCODONE (ROXICODONE) 5 MG tablet, Take 1-2 tablets (5-10 mg) by mouth every 4 hours as needed for moderate to severe pain, Disp: 12 tablet, Rfl: 0     prochlorperazine (COMPAZINE) 10 MG tablet, Take 1 tablet (10 mg) by mouth every 6 hours as needed for nausea or vomiting, Disp: 30 tablet, Rfl: 2     senna-docusate (SENOKOT-S/PERICOLACE) 8.6-50 MG tablet, Take 1-2 tablets by mouth 2 times daily, Disp: 30 tablet, Rfl: 0     traMADol (ULTRAM) 50 MG tablet, Take 50 mg by mouth every 6 hours as needed, Disp: , Rfl:      zolpidem (AMBIEN) 10 MG tablet, Take 1 tablet (10 mg) by mouth nightly as needed for sleep, Disp: 30 tablet, Rfl: 1    Allergies  Allergies   Allergen Reactions     Penicillins      Allopurinol Rash       SUBJECTIVE    HPI:  Geovanni Jasso is a 62 year old male who has symptoms of dysuria, and hematuria for 2 day(s).  he denies back pain, nausea, vomiting, fever and chills, flank pain.    ROS:      Review of Systems   Constitutional: Negative.  Negative for chills and fever.   HENT: Negative.  Negative for sore throat.    Respiratory: Negative.  Negative for cough, chest tightness, shortness of breath and wheezing.    Cardiovascular:  Negative.  Negative for chest pain and palpitations.   Gastrointestinal: Negative.  Negative for abdominal pain, diarrhea, nausea and vomiting.   Genitourinary: Positive for dysuria and hematuria. Negative for frequency, penile pain, penile swelling, scrotal swelling, testicular pain and urgency.   Musculoskeletal: Negative.  Negative for myalgias.   Skin: Negative for rash.   Allergic/Immunologic: Negative.  Negative for immunocompromised state.   Neurological: Negative.  Negative for headaches.       Family History   Family History   Problem Relation Age of Onset     Hyperlipidemia Mother         medication     Diabetes Father      Other Cancer Father         Lung cancer     Other Cancer Maternal Grandmother         Unsure what type of cancer     Autoimmune Disease No family hx of         Social History  Social History     Socioeconomic History     Marital status:      Spouse name: Not on file     Number of children: Not on file     Years of education: Not on file     Highest education level: Not on file   Occupational History     Not on file   Tobacco Use     Smoking status: Never     Passive exposure: Never     Smokeless tobacco: Never   Vaping Use     Vaping status: Never Used     Passive vaping exposure: Yes   Substance and Sexual Activity     Alcohol use: Yes     Comment: occassional, 1- 2 per week     Drug use: No     Sexual activity: Yes     Partners: Female     Birth control/protection: Male Surgical, Female Surgical     Comment: vasectomy   Other Topics Concern     Parent/sibling w/ CABG, MI or angioplasty before 65F 55M? No   Social History Narrative     Not on file     Social Determinants of Health     Financial Resource Strain: Not on file   Food Insecurity: Not on file   Transportation Needs: Not on file   Physical Activity: Not on file   Stress: Not on file   Social Connections: Not on file   Intimate Partner Violence: Not on file   Housing Stability: Not on file           OBJECTIVE     Vital  signs noted and reviewed by Hank Macdonald PA-C  /73 (BP Location: Left arm, Patient Position: Sitting, Cuff Size: Adult Regular)   Pulse 112   Temp 98.4  F (36.9  C) (Tympanic)   Resp 16   Wt 78.9 kg (173 lb 14.4 oz)   SpO2 100%   BMI 24.25 kg/m       Physical Exam  Vitals and nursing note reviewed.   Constitutional:       General: He is not in acute distress.     Appearance: He is well-developed. He is not ill-appearing, toxic-appearing or diaphoretic.   HENT:      Head: Normocephalic and atraumatic.      Right Ear: Hearing, tympanic membrane, ear canal and external ear normal. Tympanic membrane is not perforated, erythematous, retracted or bulging.      Left Ear: Hearing, tympanic membrane, ear canal and external ear normal. Tympanic membrane is not perforated, erythematous, retracted or bulging.      Nose: Nose normal. No mucosal edema, congestion or rhinorrhea.      Mouth/Throat:      Pharynx: No oropharyngeal exudate or posterior oropharyngeal erythema.      Tonsils: No tonsillar exudate or tonsillar abscesses. 0 on the right. 0 on the left.   Eyes:      Pupils: Pupils are equal, round, and reactive to light.   Cardiovascular:      Rate and Rhythm: Normal rate and regular rhythm.      Heart sounds: Normal heart sounds, S1 normal and S2 normal. Heart sounds not distant. No murmur heard.     No friction rub. No gallop.   Pulmonary:      Effort: Pulmonary effort is normal. No respiratory distress.      Breath sounds: Normal breath sounds. No decreased breath sounds, wheezing, rhonchi or rales.   Abdominal:      General: Bowel sounds are normal. There is no distension.      Palpations: Abdomen is soft.      Tenderness: There is no abdominal tenderness.   Musculoskeletal:      Cervical back: Normal range of motion and neck supple.   Lymphadenopathy:      Cervical: No cervical adenopathy.   Skin:     General: Skin is warm and dry.      Findings: No rash.   Neurological:      Mental Status: He is alert.       Cranial Nerves: No cranial nerve deficit.   Psychiatric:         Attention and Perception: He is attentive.         Speech: Speech normal.         Behavior: Behavior normal. Behavior is cooperative.         Thought Content: Thought content normal.         Judgment: Judgment normal.             Hank Macdonald PA-C  6/11/2023, 12:10 PM

## 2023-06-12 ENCOUNTER — PATIENT OUTREACH (OUTPATIENT)
Dept: ONCOLOGY | Facility: CLINIC | Age: 63
End: 2023-06-12

## 2023-06-12 NOTE — PROGRESS NOTES
TC to pts phone. Had to leave message that cipro appears to be a good choice for UTI - does not cause loose stools and I would not want to premedicate for C diff at this time. Reminded to push fluids and may use pyridium or AZO from pharmacy if needed- should not need a prescription from us to get that medication but warned it can make urine yellow/orange.   They may call back with there concerns and asked to talk with us this week to see how this is going. SGgueritaschadrian,CNP

## 2023-06-12 NOTE — PROGRESS NOTES
"Pt called into triage line today because he was seen in Urgent Care yesterday (notes in Epic) for symptoms of dysuria and hematuria.  He reports that he has been having \"extreme pain\" in the tip of his penis when he urinates.  He reports the pain when he urinates is 10/10.  He said he has been taking Oxycodone for the pain with some relief.  He reports he has passed several blood clots in his urine.  He was prescribed Cipro for UTI from Urgent Care and he started that yesterday.  Pt wanted Dr. Maurer and Corrine Govea NP to be aware of his UTI and wanted to make sure they were in agreement with the treatment of Cipro for his UTI.  In addition, he wanted to know if he should be started on something to help prevent C.Diff?  He said he had C.Diff about a month ago and is concerned about getting it again while on Cipro.  His wife also asked if they could have a prescription for Pyridium to help his urinary pain if that would be a good idea?  They also wanted to know if this UTI will impact his treatment at all?      Writer will route message to Corrine oGvea NP for her advise and recommendations and call patient back with plan.    Alexandria Duong, RN, BSN    Triage Nurse Advisor  [facility]/[place]/Marcela Freed  [phone]        "

## 2023-06-13 ENCOUNTER — NURSE TRIAGE (OUTPATIENT)
Dept: ONCOLOGY | Facility: CLINIC | Age: 63
End: 2023-06-13

## 2023-06-13 ENCOUNTER — TELEPHONE (OUTPATIENT)
Dept: FAMILY MEDICINE | Facility: CLINIC | Age: 63
End: 2023-06-13

## 2023-06-13 LAB — BACTERIA UR CULT: NO GROWTH

## 2023-06-13 NOTE — TELEPHONE ENCOUNTER
Called patient and added him to be seen tomorrow as per below. He will continue to monitor sx and call back if he develops any new or worsening sx.     Azeb Diaz RN, BSN, PHN, OCN  [facility]    Kiel Maurer MD  You; Corrine Smith, APRN CNP; Sydni Renae, GRISELDA 46 minutes ago (11:58 AM)     AO  Can I see him tomorrow at 4 pm with labs prior- cbc/diff and CMP.

## 2023-06-13 NOTE — TELEPHONE ENCOUNTER
Patient's wife with patient on the line calling with dysuria.    It is a third day on antibiotics patient received from urgent care.  Patient has not seen a difference. Still has severe pain at tip of the penis rating 10/10.  Also at times has a  a red blood string with urination.    Oncology patient, reporting knee swelling.   Also  Requesting urology referral.  Advised to inform oncologist with new symptoms.      OV scheduled with the first available IM provider per patient this Wednesday/    Kelsi Tafoya RN  [facility]

## 2023-06-13 NOTE — TELEPHONE ENCOUNTER
Oncology Nurse Triage    Situation:   Geovanni reporting the following symptoms: continued hematuria and dysuria. Swelling of ankles, bilateral.    Background:   Treating Provider:   Dr. Maurer    Date of last office visit: 5/30/23    Recent Treatments: C5D1 CHOP-R on 5/31/23    Assessment:     Onset: Patient was seen in UC on 6/11/23 for dysuria and hematuria and was treated with cipro. He continues to have these sx. Please see office visit notes in Epic. Advised to follow up with primary and I will also send message to Dr. Maurer for further review. They would like to know if chemo could be contributing to his sx.    Last night he noticed mild to moderate swelling of his ankles bilateral. He has not been very active. Denies any pain, skin changes, hot to touch. No fever, CP, or SOB. He has not tried elevation or compression. No other sx noted. Advised patient to elevate feet above the level of his heart and use compression socks or ace wraps to help with the swelling. Increasing activity may be helpful.    Recommendations:     They will continue to monitor sx and call back with any new or worsening sx. Writer will connect with care team for further review and advise.     Azeb Diaz, RN, BSN, PHN, OCN  M[facility]

## 2023-06-14 ENCOUNTER — LAB (OUTPATIENT)
Dept: ONCOLOGY | Facility: CLINIC | Age: 63
End: 2023-06-14
Payer: COMMERCIAL

## 2023-06-14 ENCOUNTER — ONCOLOGY VISIT (OUTPATIENT)
Dept: ONCOLOGY | Facility: CLINIC | Age: 63
End: 2023-06-14
Payer: COMMERCIAL

## 2023-06-14 ENCOUNTER — TELEPHONE (OUTPATIENT)
Dept: UROLOGY | Facility: CLINIC | Age: 63
End: 2023-06-14

## 2023-06-14 VITALS
SYSTOLIC BLOOD PRESSURE: 121 MMHG | TEMPERATURE: 98.3 F | OXYGEN SATURATION: 99 % | HEIGHT: 71 IN | DIASTOLIC BLOOD PRESSURE: 79 MMHG | HEART RATE: 91 BPM | WEIGHT: 176 LBS | BODY MASS INDEX: 24.64 KG/M2

## 2023-06-14 DIAGNOSIS — C83.30 DIFFUSE LARGE B-CELL LYMPHOMA, UNSPECIFIED BODY REGION (H): Primary | ICD-10-CM

## 2023-06-14 DIAGNOSIS — R22.1 NECK MASS: ICD-10-CM

## 2023-06-14 DIAGNOSIS — D70.1 CHEMOTHERAPY-INDUCED NEUTROPENIA (H): ICD-10-CM

## 2023-06-14 DIAGNOSIS — C83.30 DIFFUSE LARGE B-CELL LYMPHOMA, UNSPECIFIED BODY REGION (H): ICD-10-CM

## 2023-06-14 DIAGNOSIS — N32.89 BLADDER SPASM: ICD-10-CM

## 2023-06-14 DIAGNOSIS — T45.1X5A CHEMOTHERAPY-INDUCED NEUTROPENIA (H): Primary | ICD-10-CM

## 2023-06-14 DIAGNOSIS — N30.91 HEMORRHAGIC CYSTITIS: ICD-10-CM

## 2023-06-14 DIAGNOSIS — D70.1 CHEMOTHERAPY-INDUCED NEUTROPENIA (H): Primary | ICD-10-CM

## 2023-06-14 DIAGNOSIS — T45.1X5A CHEMOTHERAPY-INDUCED NEUTROPENIA (H): ICD-10-CM

## 2023-06-14 LAB
ALBUMIN SERPL BCG-MCNC: 3.5 G/DL (ref 3.5–5.2)
ALP SERPL-CCNC: 103 U/L (ref 40–129)
ALT SERPL W P-5'-P-CCNC: 19 U/L (ref 0–70)
ANION GAP SERPL CALCULATED.3IONS-SCNC: 10 MMOL/L (ref 7–15)
AST SERPL W P-5'-P-CCNC: 24 U/L (ref 0–45)
BASOPHILS # BLD AUTO: 0.1 10E3/UL (ref 0–0.2)
BASOPHILS NFR BLD AUTO: 1 %
BILIRUB SERPL-MCNC: 0.3 MG/DL
BUN SERPL-MCNC: 6.4 MG/DL (ref 8–23)
CALCIUM SERPL-MCNC: 9.1 MG/DL (ref 8.8–10.2)
CHLORIDE SERPL-SCNC: 104 MMOL/L (ref 98–107)
CREAT SERPL-MCNC: 0.76 MG/DL (ref 0.67–1.17)
DEPRECATED HCO3 PLAS-SCNC: 25 MMOL/L (ref 22–29)
EOSINOPHIL # BLD AUTO: 0 10E3/UL (ref 0–0.7)
EOSINOPHIL NFR BLD AUTO: 0 %
ERYTHROCYTE [DISTWIDTH] IN BLOOD BY AUTOMATED COUNT: 17.6 % (ref 10–15)
GFR SERPL CREATININE-BSD FRML MDRD: >90 ML/MIN/1.73M2
GLUCOSE SERPL-MCNC: 117 MG/DL (ref 70–99)
HCT VFR BLD AUTO: 28 % (ref 40–53)
HGB BLD-MCNC: 9.2 G/DL (ref 13.3–17.7)
IMM GRANULOCYTES # BLD: 0.1 10E3/UL
IMM GRANULOCYTES NFR BLD: 1 %
LYMPHOCYTES # BLD AUTO: 0.4 10E3/UL (ref 0.8–5.3)
LYMPHOCYTES NFR BLD AUTO: 7 %
MCH RBC QN AUTO: 33.9 PG (ref 26.5–33)
MCHC RBC AUTO-ENTMCNC: 32.9 G/DL (ref 31.5–36.5)
MCV RBC AUTO: 103 FL (ref 78–100)
MONOCYTES # BLD AUTO: 0.7 10E3/UL (ref 0–1.3)
MONOCYTES NFR BLD AUTO: 13 %
NEUTROPHILS # BLD AUTO: 3.7 10E3/UL (ref 1.6–8.3)
NEUTROPHILS NFR BLD AUTO: 78 %
NRBC # BLD AUTO: 0 10E3/UL
NRBC BLD AUTO-RTO: 0 /100
PLATELET # BLD AUTO: 154 10E3/UL (ref 150–450)
POTASSIUM SERPL-SCNC: 3.7 MMOL/L (ref 3.4–5.3)
PROT SERPL-MCNC: 5.7 G/DL (ref 6.4–8.3)
RBC # BLD AUTO: 2.71 10E6/UL (ref 4.4–5.9)
SODIUM SERPL-SCNC: 139 MMOL/L (ref 136–145)
WBC # BLD AUTO: 4.8 10E3/UL (ref 4–11)

## 2023-06-14 PROCEDURE — 99215 OFFICE O/P EST HI 40 MIN: CPT | Performed by: INTERNAL MEDICINE

## 2023-06-14 PROCEDURE — 99207 PR NO CHARGE NURSE ONLY: CPT

## 2023-06-14 PROCEDURE — 85025 COMPLETE CBC W/AUTO DIFF WBC: CPT | Performed by: INTERNAL MEDICINE

## 2023-06-14 PROCEDURE — 80053 COMPREHEN METABOLIC PANEL: CPT | Performed by: INTERNAL MEDICINE

## 2023-06-14 RX ORDER — OXYBUTYNIN CHLORIDE 5 MG/1
5 TABLET ORAL 3 TIMES DAILY
Qty: 30 TABLET | Refills: 1 | Status: SHIPPED | OUTPATIENT
Start: 2023-06-14 | End: 2023-10-04

## 2023-06-14 RX ORDER — HEPARIN SODIUM (PORCINE) LOCK FLUSH IV SOLN 100 UNIT/ML 100 UNIT/ML
500 SOLUTION INTRAVENOUS
Status: DISCONTINUED | OUTPATIENT
Start: 2023-06-14 | End: 2023-06-14 | Stop reason: HOSPADM

## 2023-06-14 RX ADMIN — HEPARIN SODIUM (PORCINE) LOCK FLUSH IV SOLN 100 UNIT/ML 500 UNITS: 100 SOLUTION at 15:06

## 2023-06-14 ASSESSMENT — PAIN SCALES - GENERAL: PAINLEVEL: MODERATE PAIN (4)

## 2023-06-14 NOTE — TELEPHONE ENCOUNTER
[facility] Call Center    Phone Message    May a detailed message be left on voicemail: yes     Reason for Call: Appointment Intake    Referring Provider Name: Kiel Maurer MD  Diagnosis and/or Symptoms: gross hematuria on Cytoxan- needs for cure of lymphoma. no good evidence of infection and no improvement with ciprofloxacin. Please see the patient asap as I do not want to delay the treatment for a potentially curable malignancy    Pt referred for above. Urgent referral appt needed in 1-2 days. Pt's preferred location is [place]. Pt didn't want to schedule first available as it's months out. He states that he needs to be seen before his next chemo treatment which is next week. Pt open to other locations but they are also going out weeks to months. Please review and follow-up with patient.    Action Taken: Message routed to:  Other:  Urology    Travel Screening: Not Applicable

## 2023-06-14 NOTE — PROGRESS NOTES
Oncology follow-up visit:  Date on this visit: 6/14/23     Diagnoses.    Diffuse large B-cell lymphoma    Primary Physician: Paddy Holly     History Of Present Illness:  Mr. Jasso is a 62 year old  male who presents for follow-up of diffuse lymphadenopathy.  He initially saw me on 1/18/2022 for retroperitoneal lymphadenopathy.  Please see my previous note for details.  I have copied and updated from prior notes.        Over the last few months, since fall of 2022, off and on he has had upper abdominal pain which lasts few minutes. He thinks stress sometimes can bring it on. No relationship to food.  No nausea or vomiting. BMs have been fine. No bleeding. No fevers.   He had a CT Abd Pelvis on 1/6/2023 which showed enlarged retroperitoneal lymphadenopathy  He has noticed some fatigue. He has also noticed some lump in the throat just above the sternum at night. This is going on for a couple of months. He denies any dysphagia.     Currently he feels well. Currently denies any abdominal pain. No nausea or vomiting. BMs are fine. No recent weight loss. No SOB. No neuropathy. No skin flushing. No drenching night sweats.    In the summer, he noticed a rash on the face which has since resolved.  He was noted to have CLAIRE positive.     On 2/6/2023, he had FNA of the left supraclavicular lymph node.  Cytology showed atypical lymphoid cells.  Flow cytometry showed lambda monotypic B cells which are negative for CD5 and CD10.  This is consistent with a B-cell lymphoma.  We decided to go ahead with excisional lymph node biopsy as well as bone marrow biopsy for complete staging.    Excisional lymph node biopsy showed diffuse large B-cell lymphoma.    Bone marrow biopsy did not reveal evidence of lymphoma.    He started R-CHOP on 3/8/2023.    Cycle #2 R-CHOP on 3/29/2023.        He was admitted to the [facility] from 4/4/2023 - 4/9/2023.  He was admitted for neutropenic fever, sepsis and C. difficile colitis.  He  presented with multiple bouts of nonbloody diarrhea and fever of 101 with generalized fatigue and weakness.  He was neutropenic and was noted to have pancolitis.  C. difficile was positive.  Cryptosporidium was also positive.  He was started on IV cefepime and IV Flagyl initially.  Infectious disease was consulted and he was switched to oral vancomycin to complete 14 days.  Neutropenia resolved by 4/7/2023.    He completed oral vancomycin course on 4/19/2023 4/19/2023.  Cycle #3 of R-CHOP    5/5/2023.  PET CT neck chest abdomen and pelvis shows complete response to treatment per Lugano criteria    5/10/2023.  Cycle #4 of R-CHOP.    5/31/2023.  Cycle #5 of R-CHOP .        Interval history.    He comes into the clinic today.  His wife Charleen is on the phone.      On 6/11/2023, he was seen in urgent care for 2 days of dysuria and hematuria.   Urinalysis showed gross hematuria but no increase in WBCs or nitrites or leukocyte esterase.   He was prescribed ciprofloxacin.  Urine culture did not grow anything.  He was told to complete the dose of ciprofloxacin.    He mentioned that he continues to have hematuria.  He passes clots.  He showed me the pictures as well.  He notices urgency and pain/burning especially in the tip of the penis.  He thinks that he is able to pass good amounts of urine.  No fevers.  Has not noticed any improvement with ciprofloxacin.  He is going to finish the ciprofloxacin course on the morning of Friday.  Otherwise he thinks that the last chemotherapy went better.  He continues to have fatigue.  Has some nausea but no vomiting.  Bowel movements are fine.  No other bleeding apart from occasional nosebleeds.  Coughing and breathing are much better now.     ECOG 2    ROS:  A comprehensive ROS was otherwise neg    I reviewed other history in Epic as before.      Past Medical/Surgical History:  Past Medical History:   Diagnosis Date     Cancer (H) 2-7-2023    just diagnosed with lymphona.      Carpal tunnel syndrome      Chronic rhinitis 10/23/2015     Esophageal reflux 12/26/2013     Hand joint stiff, right    Shingles     Past Surgical History:   Procedure Laterality Date     BIOPSY  2-6-23     BIOPSY LYMPH NODE CERVICAL Left 2/16/2023    Procedure: excisional biopsy of a left cervical node;  Surgeon: Aung Tamayo MD;  Location: [facility] OR     COLONOSCOPY  2019    clean, do again in 10 years     COLONOSCOPY WITH CO2 INSUFFLATION N/A 07/17/2019    Procedure: COLONOSCOPY, WITH CO2 INSUFFLATION;  Surgeon: Jaison Hammer MD;  Location: [facility] OR     ENT SURGERY  1979    Loss of hearing rt ear (tinnitis)     HERNIA REPAIR  2000     NO HISTORY OF SURGERY       ORTHOPEDIC SURGERY  1988,1992    rt knee, scope in 88; ACL repair Early 90s     Cancer History:   As above    Allergies:  Allergies as of 06/14/2023 - Reviewed 06/14/2023   Allergen Reaction Noted     Penicillins  12/23/2013     Allopurinol Rash 04/04/2023     Current Medications:  Current Outpatient Medications   Medication Sig Dispense Refill     acetaminophen (TYLENOL) 325 MG tablet Take 2 tablets (650 mg) by mouth every 6 hours as needed for mild pain 50 tablet 0     albuterol (PROAIR HFA/PROVENTIL HFA/VENTOLIN HFA) 108 (90 Base) MCG/ACT inhaler Inhale 2 puffs into the lungs every 4 hours as needed for shortness of breath 18 g 5     albuterol (PROVENTIL) (2.5 MG/3ML) 0.083% neb solution Take 1 vial (2.5 mg) by nebulization every 6 hours as needed for shortness of breath, wheezing or cough 90 mL 0     ciprofloxacin (CIPRO) 500 MG tablet Take 1 tablet (500 mg) by mouth 2 times daily for 5 days 10 tablet 0     diclofenac (VOLTAREN) 1 % topical gel Apply small amount topically to right shoulder 3-4 times daily as needed for pain. 50 g 0     Efinaconazole (JUBLIA) 10 % SOLN Externally apply topically daily to nail. 8 mL 4     fexofenadine (ALLEGRA) 60 MG tablet Take 1 tablet (60 mg) by mouth 2 times daily 180 tablet 0     fluticasone  (FLONASE) 50 MCG/ACT nasal spray Spray 1-2 sprays into both nostrils daily 16 g 11     guaiFENesin (MUCINEX) 600 MG 12 hr tablet Take 2 tablets (1,200 mg) by mouth 2 times daily 30 tablet 1     hydrocortisone (CORTAID) 1 % external ointment Apply topically 2 times daily As needed 56 g 0     ibuprofen (ADVIL,MOTRIN) 200 MG tablet Take 200 mg by mouth every 6 hours as needed       ketoconazole (NIZORAL) 2 % external cream Apply twice daily for 8 weeks to feet 60 g 3     LANsoprazole (PREVACID) 30 MG DR capsule Take 1 capsule (30 mg) by mouth every morning (before breakfast) 90 capsule 2     lidocaine-prilocaine (EMLA) 2.5-2.5 % external cream Apply topically as needed for moderate pain (4-6) Apply over port at least 30 minutes before port access. 30 g 0     methocarbamol (ROBAXIN) 500 MG tablet Take 1 tablet (500 mg) by mouth nightly as needed for muscle spasms 30 tablet 0     ondansetron (ZOFRAN ODT) 4 MG ODT tab Take 1 tablet (4 mg) by mouth every 8 hours as needed for nausea 10 tablet 0     ondansetron (ZOFRAN) 8 MG tablet Take 1 tablet (8 mg) by mouth every 8 hours as needed for nausea (vomiting) 30 tablet 2     oxyCODONE (ROXICODONE) 5 MG tablet Take 1-2 tablets (5-10 mg) by mouth every 4 hours as needed for moderate to severe pain 12 tablet 0     prochlorperazine (COMPAZINE) 10 MG tablet Take 1 tablet (10 mg) by mouth every 6 hours as needed for nausea or vomiting 30 tablet 2     senna-docusate (SENOKOT-S/PERICOLACE) 8.6-50 MG tablet Take 1-2 tablets by mouth 2 times daily 30 tablet 0     traMADol (ULTRAM) 50 MG tablet Take 50 mg by mouth every 6 hours as needed       zolpidem (AMBIEN) 10 MG tablet Take 1 tablet (10 mg) by mouth nightly as needed for sleep 30 tablet 1      Family History:  Family History   Problem Relation Age of Onset     Hyperlipidemia Mother         medication     Diabetes Father      Other Cancer Father         Lung cancer     Other Cancer Maternal Grandmother         Unsure what type of  "cancer     Autoimmune Disease No family hx of      Maternal grand mother had some sort of a cancer  Dad had lung cancer  Has 4 kids- all healthy    Social History:  Social History     Socioeconomic History     Marital status:      Spouse name: Not on file     Number of children: Not on file     Years of education: Not on file     Highest education level: Not on file   Occupational History     Not on file   Tobacco Use     Smoking status: Never     Passive exposure: Never     Smokeless tobacco: Never   Vaping Use     Vaping status: Never Used     Passive vaping exposure: Yes   Substance and Sexual Activity     Alcohol use: Yes     Comment: occassional, 1- 2 per week     Drug use: No     Sexual activity: Yes     Partners: Female     Birth control/protection: Male Surgical, Female Surgical     Comment: vasectomy   Other Topics Concern     Parent/sibling w/ CABG, MI or angioplasty before 65F 55M? No   Social History Narrative     Not on file     Social Determinants of Health     Financial Resource Strain: Not on file   Food Insecurity: Not on file   Transportation Needs: Not on file   Physical Activity: Not on file   Stress: Not on file   Social Connections: Not on file   Intimate Partner Violence: Not on file   Housing Stability: Not on file   no smoking. Drinks etoh wine 2-3 times / week.       Physical Exam:  /79 (BP Location: Left arm, Patient Position: Chair, Cuff Size: Adult Regular)   Pulse 91   Temp 98.3  F (36.8  C)   Ht 1.803 m (5' 11\")   Wt 79.8 kg (176 lb)   SpO2 99%   BMI 24.55 kg/m     Wt Readings from Last 4 Encounters:   06/14/23 79.8 kg (176 lb)   06/11/23 78.9 kg (173 lb 14.4 oz)   05/31/23 79.8 kg (176 lb)   05/23/23 80.5 kg (177 lb 6.4 oz)       CONSTITUTIONAL: no acute distress  EYES: PERRLA, there is pallor but no icterus  ENT/MOUTH: no mouth lesions. Ears normal  CVS: s1s2 no m r g .   RESPIRATORY: clear to auscultation b/l  GI: soft non tender no hepatosplenomegaly  NEURO: " AAOX3  Grossly non focal neuro exam  INTEGUMENT: no obvious rashes  LYMPHATIC: no palpable LAD  MUSCULOSKELETAL: Unremarkable. No bony tenderness.   EXTREMITIES: Trace ankle edema bilaterally   PSYCH: Mentation, mood and affect are normal. Decision making capacity is intact  Scrotal/penis look normal.        Laboratory/Imaging Studies      Reviewed  6/14/2023  CBC shows WBC 4.8.  Hemoglobin 9.2.  Platelets 154.  Chemistry unremarkable.  Creatinine 0.76.    6/11/2023  Urinalysis showed gross hematuria.  Few bacteria.  0-5 WBCs.  Negative nitrites and leukocyte esterase.    Urine culture was negative    4/4/2023.  CBC shows WBC 0.3.  Hemoglobin 13.8.  Platelets 117.    4/9/2023  CBC shows WBC 19.4.  It was 22.5 on 4/8/2023.  ANC 13.6.  There was a left shift with metamyelocytes and myelocytes.  Hemoglobin 11.3.  Platelets 113.  Chemistry showed normal kidney functions.        Previously  SPEP did not show any monoclonal protein.  Serum immunoglobulin levels were normal.  [place] free light chain 2.53.  Lambda 1.6.  Ratio normal 1.58.      Hepatitis B surface antibody was positive.  Hepatitis B surface antigen and core antibody were negative.  HIV negative  Hep C negative        1/9/2023  CBC was unremarkable.  Peripheral blood smear was unremarkable.    CLAIRE was positive-1: 80, speckled pattern  dsDNA was negative  ESR 8    Chest x-ray on 5/23/2023 did not show any acute airspace disease.    PET CT chest abdomen and pelvis 5/5/2023  IMPRESSION:   In this patient with history of diffuse large cell lymphoma:  1. There is evidence of the complete response by Lugano criteria.  2. Subcarinal partially calcified lymph node, similar in size to prior  with minimal FDG uptake above background, likely reactive, attention  on follow-up.  3. Peripheral predominant ground glass opacities in the lungs. These  findings are likely secondary to infectious/inflammatory changes  including COVID-19.   4. Incidental and chronic findings  including cholelithiasis without  cholecystitis and sub-6 mm solid pulmonary nodule similar to CT dated  1/27/2023. Attention on follow-up.  5. Please see same day dedicated head/neck PET/CT for further details.    PET CT neck 5/5/2023  Impression:   In this patient with a history of lymphoma:  1. There is complete response pet Lugano criteria. Excisional biopsy  changes in previously seen hypermetabolic left supraclavicular lymph  node. No residual hypermetabolic activity.  2. No additional hypermetabolic lesions of the head/neck.       2/16/2023 excisional biopsy of the left level 4 cervical lymph node  Large B-cell lymphoma, non-germinal center subtype.    The neoplastic cells express CD20, BCL2, BCL6, and MUM-1 (variable staining intensity), and are negative for CD3, CD5, CD10, CD23, Cyclin D1, and ALK. CD21 and CD23 demonstrate a lack of residual follicular dendritic cell meshworks. C-Myc demonstrates variable staining, and is overall estimated at 20-30% (interpreted as negative; cutoff 40%). Ki-67 proliferative index is also variable, and is overall estimated around 60-70%.      EBV RNA by in situ hybridization (SOY-OTTO) is negative for EBV in the neoplastic cells.   A congo red stain is negative for amyloid deposition in the tissue.     FISH testing on the lymph node  RESULTS:     - Gains of BCL6 (94%); no rearrangement of BCL6  - Gains of BCL2 (94%); no rearrangement of BCL2  - No rearrangement of MYC or IGH::MYC fusion        INTERPRETATION:    Of the interphase cells examined, 94-96% had signal patterns indicative of gains of BCL6 (3q27) and BCL2 (18q21), consistent with gains of all or part of chromosomes 3 and 18. No evidence was found of rearrangements of BCL6, MYC (8q24), or BCL2, or of a t(8;14).     Cytogenetics.  He has complex cytogenetics.    48,X,-Y,t(1;12)(p13;q13),+3,+3,add(3)(p25),t(3;8)(q21;p21),del(6)(13q21),add(11)(q22),+18,?idic(18)(p11.2)x2[cp19]/46,XY[1]      2/15/2023 bone marrow  biopsy was unremarkable without evidence of involvement of lymphoma.      ASSESSMENT/PLAN:    Diffuse large B-cell lymphoma presenting with diffuse lymphadenopathy.    He has marked FDG avid left supraclavicular, axillary, retroperitoneal lymph nodes.  No hepatosplenomegaly.  No evidence of bone marrow or extra lymphatic involvement.  FISH testing shows gains of BCL2 and BCL6 but no rearrangement.  No rearrangement of MYC or IGH::MYC fusion  No evidence of double hit or triple hit lymphoma.  No evidence of bone marrow involvement.  LDH is mildly elevated.      IPI is 3    We started R-CHOP on 3/8/2023 with curative intent.      Overall he tolerated the first chemotherapy cycle well.  He did have chemotherapy-induced neutropenia and had neutropenic fever but no infection was found.  Fever resolved on its own without antibiotics.      He received cycle #2 on 3/29/2023 with G-CSF support because of neutropenic fever.    He developed neutropenic fever with pancolitis from C. difficile.  Cryptosporidium was also positive.    Cycle #3 was on 4/19/2023.    He achieved complete response by PET/CT done on 5/5/2023 after 3 cycles    I recommended continuing with 3 more cycles of R-CHOP.    Cycle #4 on 5/10/2023.  Cycle #5 on 5/31/2023    Overall he is tolerating chemotherapy fairly with progressive fatigue.    Hematuria.  No good evidence of infection and ciprofloxacin has not improved his symptoms.  I am very concerned about hemorrhagic cystitis from cyclophosphamide.  I would like him to be evaluated by urology as soon as possible and I gave him an emergent referral.  Keep well-hydrated.  Start oxybutynin.    If this is indeed hemorrhagic cystitis from cyclophosphamide, then our options about cyclophosphamide include either delaying the chemotherapy Cycle #6 by 1 week and once symptoms improve then give him full dose R-CHOP with IV mesna for bladder protection.    The other option would be to give him cycle #6 as scheduled  but hold cyclophosphamide since he achieved CR after cycle #3 and this is the last planned cycle.    Out of the above 2 options, my first preference would be to give him cycle #6 without cyclophosphamide and not delay the chemotherapy for his symptoms to completely resolve.    After cycle #6, he should have repeat PET/CT.      History of chemotherapy induced neutropenic fever/typhlitis.  Continue G-CSF support.      Anemia.  He has anemia from chemotherapy.  Hematuria does not seem significant enough to cause anemia at this time.  Continue to observe.        Respiratory.  He was treated for 1 week of levofloxacin for cough and some shortness of breath and subtle groundglass opacities found on the CT scan.  Coughing and dyspnea on exertion have improved.  Continue to observe.       C. difficile colitis.  Symptoms have resolved.  We gave him 1 week of vancomycin when he was on levofloxacin to prevent reactivation of C. Difficile.  He did not get vancomycin with ciprofloxacin and currently bowel movements are normal.      We did not address the following today.    Prevention of tumor lysis syndrome.  He was given allopurinol which he took for about 7 to 8 days but developed a rash from it.  Allopurinol was stopped.  He did not go into tumor lysis syndrome.      Currently he has an appointment with me tomorrow which we will cancel.  I would like him to be evaluated by NP/PA before giving chemotherapy next week.    I will see him back on 7/25/2023 with PET/CT prior.      I answered all of his and his wife's questions to their satisfaction.  They understand the situation and are agreeable with the plan.    Kiel Maurer MD    I spent >45 minutes on this visit on the date of service, including the face to face time, reviewing records and labs and imaging and placing new orders as well as coordination of care and documentation.

## 2023-06-14 NOTE — PATIENT INSTRUCTIONS
Try oxybutinin 3 times daily  Refer to Urology asap/urgently    No need to see me tomorrow    See NP before chemo next week- even if its a video visit- potentially can see someone at [facility] [place]    See me 7/25/2023 with PET CT prior

## 2023-06-14 NOTE — PROGRESS NOTES
Port site scrubbed with Chloraprep for 30 seconds. Accessed port using sterile technique. Green and purple tubes drawn. See documentation flowsheet. Port needle deaccessed. Tolerated port access and draw without complaint. Jesika Espino RN on 6/14/2023 at 3:21 PM

## 2023-06-14 NOTE — LETTER
6/14/2023         RE: Geovanni Jasso  [id number]  [facility] [address]        Dear Colleague,    Thank you for referring your patient, Geovanni Jasso, to the [facility]. Please see a copy of my visit note below.    Oncology follow-up visit:  Date on this visit: 6/14/23     Diagnoses.    Diffuse large B-cell lymphoma    Primary Physician: Paddy Holly     History Of Present Illness:  Mr. Jasso is a 62 year old  male who presents for follow-up of diffuse lymphadenopathy.  He initially saw me on 1/18/2022 for retroperitoneal lymphadenopathy.  Please see my previous note for details.  I have copied and updated from prior notes.        Over the last few months, since fall of 2022, off and on he has had upper abdominal pain which lasts few minutes. He thinks stress sometimes can bring it on. No relationship to food.  No nausea or vomiting. BMs have been fine. No bleeding. No fevers.   He had a CT Abd Pelvis on 1/6/2023 which showed enlarged retroperitoneal lymphadenopathy  He has noticed some fatigue. He has also noticed some lump in the throat just above the sternum at night. This is going on for a couple of months. He denies any dysphagia.     Currently he feels well. Currently denies any abdominal pain. No nausea or vomiting. BMs are fine. No recent weight loss. No SOB. No neuropathy. No skin flushing. No drenching night sweats.    In the summer, he noticed a rash on the face which has since resolved.  He was noted to have CLAIRE positive.     On 2/6/2023, he had FNA of the left supraclavicular lymph node.  Cytology showed atypical lymphoid cells.  Flow cytometry showed lambda monotypic B cells which are negative for CD5 and CD10.  This is consistent with a B-cell lymphoma.  We decided to go ahead with excisional lymph node biopsy as well as bone marrow biopsy for complete staging.    Excisional lymph node biopsy showed diffuse large B-cell lymphoma.    Bone marrow biopsy did  not reveal evidence of lymphoma.    He started R-CHOP on 3/8/2023.    Cycle #2 R-CHOP on 3/29/2023.        He was admitted to the [facility] from 4/4/2023 - 4/9/2023.  He was admitted for neutropenic fever, sepsis and C. difficile colitis.  He presented with multiple bouts of nonbloody diarrhea and fever of 101 with generalized fatigue and weakness.  He was neutropenic and was noted to have pancolitis.  C. difficile was positive.  Cryptosporidium was also positive.  He was started on IV cefepime and IV Flagyl initially.  Infectious disease was consulted and he was switched to oral vancomycin to complete 14 days.  Neutropenia resolved by 4/7/2023.    He completed oral vancomycin course on 4/19/2023 4/19/2023.  Cycle #3 of R-CHOP    5/5/2023.  PET CT neck chest abdomen and pelvis shows complete response to treatment per Lugano criteria    5/10/2023.  Cycle #4 of R-CHOP.    5/31/2023.  Cycle #5 of R-CHOP .        Interval history.    He comes into the clinic today.  His wife Charleen is on the phone.      On 6/11/2023, he was seen in urgent care for 2 days of dysuria and hematuria.   Urinalysis showed gross hematuria but no increase in WBCs or nitrites or leukocyte esterase.   He was prescribed ciprofloxacin.  Urine culture did not grow anything.  He was told to complete the dose of ciprofloxacin.    He mentioned that he continues to have hematuria.  He passes clots.  He showed me the pictures as well.  He notices urgency and pain/burning especially in the tip of the penis.  He thinks that he is able to pass good amounts of urine.  No fevers.  Has not noticed any improvement with ciprofloxacin.  He is going to finish the ciprofloxacin course on the morning of Friday.  Otherwise he thinks that the last chemotherapy went better.  He continues to have fatigue.  Has some nausea but no vomiting.  Bowel movements are fine.  No other bleeding apart from occasional nosebleeds.  Coughing and breathing are much better  now.     ECOG 2    ROS:  A comprehensive ROS was otherwise neg    I reviewed other history in Epic as before.      Past Medical/Surgical History:  Past Medical History:   Diagnosis Date     Cancer (H) 2-7-2023    just diagnosed with lymphona.     Carpal tunnel syndrome      Chronic rhinitis 10/23/2015     Esophageal reflux 12/26/2013     Hand joint stiff, right    Shingles     Past Surgical History:   Procedure Laterality Date     BIOPSY  2-6-23     BIOPSY LYMPH NODE CERVICAL Left 2/16/2023    Procedure: excisional biopsy of a left cervical node;  Surgeon: Aung Tamayo MD;  Location: [facility] OR     COLONOSCOPY  2019    clean, do again in 10 years     COLONOSCOPY WITH CO2 INSUFFLATION N/A 07/17/2019    Procedure: COLONOSCOPY, WITH CO2 INSUFFLATION;  Surgeon: Jaison Hammer MD;  Location: [facility] OR     ENT SURGERY  1979    Loss of hearing rt ear (tinnitis)     HERNIA REPAIR  2000     NO HISTORY OF SURGERY       ORTHOPEDIC SURGERY  1988,1992    rt knee, scope in 88; ACL repair Early 90s     Cancer History:   As above    Allergies:  Allergies as of 06/14/2023 - Reviewed 06/14/2023   Allergen Reaction Noted     Penicillins  12/23/2013     Allopurinol Rash 04/04/2023     Current Medications:  Current Outpatient Medications   Medication Sig Dispense Refill     acetaminophen (TYLENOL) 325 MG tablet Take 2 tablets (650 mg) by mouth every 6 hours as needed for mild pain 50 tablet 0     albuterol (PROAIR HFA/PROVENTIL HFA/VENTOLIN HFA) 108 (90 Base) MCG/ACT inhaler Inhale 2 puffs into the lungs every 4 hours as needed for shortness of breath 18 g 5     albuterol (PROVENTIL) (2.5 MG/3ML) 0.083% neb solution Take 1 vial (2.5 mg) by nebulization every 6 hours as needed for shortness of breath, wheezing or cough 90 mL 0     ciprofloxacin (CIPRO) 500 MG tablet Take 1 tablet (500 mg) by mouth 2 times daily for 5 days 10 tablet 0     diclofenac (VOLTAREN) 1 % topical gel Apply small amount topically to right shoulder  3-4 times daily as needed for pain. 50 g 0     Efinaconazole (JUBLIA) 10 % SOLN Externally apply topically daily to nail. 8 mL 4     fexofenadine (ALLEGRA) 60 MG tablet Take 1 tablet (60 mg) by mouth 2 times daily 180 tablet 0     fluticasone (FLONASE) 50 MCG/ACT nasal spray Spray 1-2 sprays into both nostrils daily 16 g 11     guaiFENesin (MUCINEX) 600 MG 12 hr tablet Take 2 tablets (1,200 mg) by mouth 2 times daily 30 tablet 1     hydrocortisone (CORTAID) 1 % external ointment Apply topically 2 times daily As needed 56 g 0     ibuprofen (ADVIL,MOTRIN) 200 MG tablet Take 200 mg by mouth every 6 hours as needed       ketoconazole (NIZORAL) 2 % external cream Apply twice daily for 8 weeks to feet 60 g 3     LANsoprazole (PREVACID) 30 MG DR capsule Take 1 capsule (30 mg) by mouth every morning (before breakfast) 90 capsule 2     lidocaine-prilocaine (EMLA) 2.5-2.5 % external cream Apply topically as needed for moderate pain (4-6) Apply over port at least 30 minutes before port access. 30 g 0     methocarbamol (ROBAXIN) 500 MG tablet Take 1 tablet (500 mg) by mouth nightly as needed for muscle spasms 30 tablet 0     ondansetron (ZOFRAN ODT) 4 MG ODT tab Take 1 tablet (4 mg) by mouth every 8 hours as needed for nausea 10 tablet 0     ondansetron (ZOFRAN) 8 MG tablet Take 1 tablet (8 mg) by mouth every 8 hours as needed for nausea (vomiting) 30 tablet 2     oxyCODONE (ROXICODONE) 5 MG tablet Take 1-2 tablets (5-10 mg) by mouth every 4 hours as needed for moderate to severe pain 12 tablet 0     prochlorperazine (COMPAZINE) 10 MG tablet Take 1 tablet (10 mg) by mouth every 6 hours as needed for nausea or vomiting 30 tablet 2     senna-docusate (SENOKOT-S/PERICOLACE) 8.6-50 MG tablet Take 1-2 tablets by mouth 2 times daily 30 tablet 0     traMADol (ULTRAM) 50 MG tablet Take 50 mg by mouth every 6 hours as needed       zolpidem (AMBIEN) 10 MG tablet Take 1 tablet (10 mg) by mouth nightly as needed for sleep 30 tablet 1     "  Family History:  Family History   Problem Relation Age of Onset     Hyperlipidemia Mother         medication     Diabetes Father      Other Cancer Father         Lung cancer     Other Cancer Maternal Grandmother         Unsure what type of cancer     Autoimmune Disease No family hx of      Maternal grand mother had some sort of a cancer  Dad had lung cancer  Has 4 kids- all healthy    Social History:  Social History     Socioeconomic History     Marital status:      Spouse name: Not on file     Number of children: Not on file     Years of education: Not on file     Highest education level: Not on file   Occupational History     Not on file   Tobacco Use     Smoking status: Never     Passive exposure: Never     Smokeless tobacco: Never   Vaping Use     Vaping status: Never Used     Passive vaping exposure: Yes   Substance and Sexual Activity     Alcohol use: Yes     Comment: occassional, 1- 2 per week     Drug use: No     Sexual activity: Yes     Partners: Female     Birth control/protection: Male Surgical, Female Surgical     Comment: vasectomy   Other Topics Concern     Parent/sibling w/ CABG, MI or angioplasty before 65F 55M? No   Social History Narrative     Not on file     Social Determinants of Health     Financial Resource Strain: Not on file   Food Insecurity: Not on file   Transportation Needs: Not on file   Physical Activity: Not on file   Stress: Not on file   Social Connections: Not on file   Intimate Partner Violence: Not on file   Housing Stability: Not on file   no smoking. Drinks etoh wine 2-3 times / week.       Physical Exam:  /79 (BP Location: Left arm, Patient Position: Chair, Cuff Size: Adult Regular)   Pulse 91   Temp 98.3  F (36.8  C)   Ht 1.803 m (5' 11\")   Wt 79.8 kg (176 lb)   SpO2 99%   BMI 24.55 kg/m     Wt Readings from Last 4 Encounters:   06/14/23 79.8 kg (176 lb)   06/11/23 78.9 kg (173 lb 14.4 oz)   05/31/23 79.8 kg (176 lb)   05/23/23 80.5 kg (177 lb 6.4 oz) "       CONSTITUTIONAL: no acute distress  EYES: PERRLA, there is pallor but no icterus  ENT/MOUTH: no mouth lesions. Ears normal  CVS: s1s2 no m r g .   RESPIRATORY: clear to auscultation b/l  GI: soft non tender no hepatosplenomegaly  NEURO: AAOX3  Grossly non focal neuro exam  INTEGUMENT: no obvious rashes  LYMPHATIC: no palpable LAD  MUSCULOSKELETAL: Unremarkable. No bony tenderness.   EXTREMITIES: Trace ankle edema bilaterally   PSYCH: Mentation, mood and affect are normal. Decision making capacity is intact  Scrotal/penis look normal.        Laboratory/Imaging Studies      Reviewed  6/14/2023  CBC shows WBC 4.8.  Hemoglobin 9.2.  Platelets 154.  Chemistry unremarkable.  Creatinine 0.76.    6/11/2023  Urinalysis showed gross hematuria.  Few bacteria.  0-5 WBCs.  Negative nitrites and leukocyte esterase.    Urine culture was negative    4/4/2023.  CBC shows WBC 0.3.  Hemoglobin 13.8.  Platelets 117.    4/9/2023  CBC shows WBC 19.4.  It was 22.5 on 4/8/2023.  ANC 13.6.  There was a left shift with metamyelocytes and myelocytes.  Hemoglobin 11.3.  Platelets 113.  Chemistry showed normal kidney functions.        Previously  SPEP did not show any monoclonal protein.  Serum immunoglobulin levels were normal.  [place] free light chain 2.53.  Lambda 1.6.  Ratio normal 1.58.      Hepatitis B surface antibody was positive.  Hepatitis B surface antigen and core antibody were negative.  HIV negative  Hep C negative        1/9/2023  CBC was unremarkable.  Peripheral blood smear was unremarkable.    CLAIRE was positive-1: 80, speckled pattern  dsDNA was negative  ESR 8    Chest x-ray on 5/23/2023 did not show any acute airspace disease.    PET CT chest abdomen and pelvis 5/5/2023  IMPRESSION:   In this patient with history of diffuse large cell lymphoma:  1. There is evidence of the complete response by Lugano criteria.  2. Subcarinal partially calcified lymph node, similar in size to prior  with minimal FDG uptake above  background, likely reactive, attention  on follow-up.  3. Peripheral predominant ground glass opacities in the lungs. These  findings are likely secondary to infectious/inflammatory changes  including COVID-19.   4. Incidental and chronic findings including cholelithiasis without  cholecystitis and sub-6 mm solid pulmonary nodule similar to CT dated  1/27/2023. Attention on follow-up.  5. Please see same day dedicated head/neck PET/CT for further details.    PET CT neck 5/5/2023  Impression:   In this patient with a history of lymphoma:  1. There is complete response pet Lugano criteria. Excisional biopsy  changes in previously seen hypermetabolic left supraclavicular lymph  node. No residual hypermetabolic activity.  2. No additional hypermetabolic lesions of the head/neck.       2/16/2023 excisional biopsy of the left level 4 cervical lymph node  Large B-cell lymphoma, non-germinal center subtype.    The neoplastic cells express CD20, BCL2, BCL6, and MUM-1 (variable staining intensity), and are negative for CD3, CD5, CD10, CD23, Cyclin D1, and ALK. CD21 and CD23 demonstrate a lack of residual follicular dendritic cell meshworks. C-Myc demonstrates variable staining, and is overall estimated at 20-30% (interpreted as negative; cutoff 40%). Ki-67 proliferative index is also variable, and is overall estimated around 60-70%.      EBV RNA by in situ hybridization (SOY-OTTO) is negative for EBV in the neoplastic cells.   A congo red stain is negative for amyloid deposition in the tissue.     FISH testing on the lymph node  RESULTS:     - Gains of BCL6 (94%); no rearrangement of BCL6  - Gains of BCL2 (94%); no rearrangement of BCL2  - No rearrangement of MYC or IGH::MYC fusion        INTERPRETATION:    Of the interphase cells examined, 94-96% had signal patterns indicative of gains of BCL6 (3q27) and BCL2 (18q21), consistent with gains of all or part of chromosomes 3 and 18. No evidence was found of rearrangements of  BCL6, MYC (8q24), or BCL2, or of a t(8;14).     Cytogenetics.  He has complex cytogenetics.    48,X,-Y,t(1;12)(p13;q13),+3,+3,add(3)(p25),t(3;8)(q21;p21),del(6)(13q21),add(11)(q22),+18,?idic(18)(p11.2)x2[cp19]/46,XY[1]      2/15/2023 bone marrow biopsy was unremarkable without evidence of involvement of lymphoma.      ASSESSMENT/PLAN:    Diffuse large B-cell lymphoma presenting with diffuse lymphadenopathy.    He has marked FDG avid left supraclavicular, axillary, retroperitoneal lymph nodes.  No hepatosplenomegaly.  No evidence of bone marrow or extra lymphatic involvement.  FISH testing shows gains of BCL2 and BCL6 but no rearrangement.  No rearrangement of MYC or IGH::MYC fusion  No evidence of double hit or triple hit lymphoma.  No evidence of bone marrow involvement.  LDH is mildly elevated.      IPI is 3    We started R-CHOP on 3/8/2023 with curative intent.      Overall he tolerated the first chemotherapy cycle well.  He did have chemotherapy-induced neutropenia and had neutropenic fever but no infection was found.  Fever resolved on its own without antibiotics.      He received cycle #2 on 3/29/2023 with G-CSF support because of neutropenic fever.    He developed neutropenic fever with pancolitis from C. difficile.  Cryptosporidium was also positive.    Cycle #3 was on 4/19/2023.    He achieved complete response by PET/CT done on 5/5/2023 after 3 cycles    I recommended continuing with 3 more cycles of R-CHOP.    Cycle #4 on 5/10/2023.  Cycle #5 on 5/31/2023    Overall he is tolerating chemotherapy fairly with progressive fatigue.    Hematuria.  No good evidence of infection and ciprofloxacin has not improved his symptoms.  I am very concerned about hemorrhagic cystitis from cyclophosphamide.  I would like him to be evaluated by urology as soon as possible and I gave him an emergent referral.  Keep well-hydrated.  Start oxybutynin.    If this is indeed hemorrhagic cystitis from cyclophosphamide, then our  options about cyclophosphamide include either delaying the chemotherapy Cycle #6 by 1 week and once symptoms improve then give him full dose R-CHOP with IV mesna for bladder protection.    The other option would be to give him cycle #6 as scheduled but hold cyclophosphamide since he achieved CR after cycle #3 and this is the last planned cycle.    Out of the above 2 options, my first preference would be to give him cycle #6 without cyclophosphamide and not delay the chemotherapy for his symptoms to completely resolve.    After cycle #6, he should have repeat PET/CT.      History of chemotherapy induced neutropenic fever/typhlitis.  Continue G-CSF support.      Anemia.  He has anemia from chemotherapy.  Hematuria does not seem significant enough to cause anemia at this time.  Continue to observe.        Respiratory.  He was treated for 1 week of levofloxacin for cough and some shortness of breath and subtle groundglass opacities found on the CT scan.  Coughing and dyspnea on exertion have improved.  Continue to observe.       C. difficile colitis.  Symptoms have resolved.  We gave him 1 week of vancomycin when he was on levofloxacin to prevent reactivation of C. Difficile.  He did not get vancomycin with ciprofloxacin and currently bowel movements are normal.      We did not address the following today.    Prevention of tumor lysis syndrome.  He was given allopurinol which he took for about 7 to 8 days but developed a rash from it.  Allopurinol was stopped.  He did not go into tumor lysis syndrome.      Currently he has an appointment with me tomorrow which we will cancel.  I would like him to be evaluated by NP/PA before giving chemotherapy next week.    I will see him back on 7/25/2023 with PET/CT prior.      I answered all of his and his wife's questions to their satisfaction.  They understand the situation and are agreeable with the plan.    Kiel Maurer MD    I spent >45 minutes on this visit on the date of  service, including the face to face time, reviewing records and labs and imaging and placing new orders as well as coordination of care and documentation.          Again, thank you for allowing me to participate in the care of your patient.        Sincerely,        Kiel Maurer MD

## 2023-06-14 NOTE — TELEPHONE ENCOUNTER
Patient is scheduled tomorrow 6/15/23 at 2:30 pm with Dr. Parra.     Phyllis Real LPN on 6/14/2023 at 4:43 PM

## 2023-06-14 NOTE — NURSING NOTE
"Oncology Rooming Note    June 14, 2023 3:37 PM   Geovanni Jasso is a 62 year old male who presents for:    Chief Complaint   Patient presents with     Oncology Clinic Visit     Follow up     Initial Vitals: /79 (BP Location: Left arm, Patient Position: Chair, Cuff Size: Adult Regular)   Pulse 91   Temp 98.3  F (36.8  C)   Ht 1.803 m (5' 11\")   Wt 79.8 kg (176 lb)   SpO2 99%   BMI 24.55 kg/m   Estimated body mass index is 24.55 kg/m  as calculated from the following:    Height as of this encounter: 1.803 m (5' 11\").    Weight as of this encounter: 79.8 kg (176 lb). Body surface area is 2 meters squared.  Moderate Pain (4) Comment: Data Unavailable   No LMP for male patient.  Allergies reviewed: Yes  Medications reviewed: Yes    Medications: Medication refills not needed today.  Pharmacy name entered into [facility]: [id number] [place] [phone] [place] [id number]    Clinical concerns: UTI - chunky urine, blood in urine, pain, urgency     Dr. Maurer was notified.      Tammie Aranda CMA              "

## 2023-06-15 ENCOUNTER — OFFICE VISIT (OUTPATIENT)
Dept: UROLOGY | Facility: CLINIC | Age: 63
End: 2023-06-15
Payer: COMMERCIAL

## 2023-06-15 DIAGNOSIS — N30.91 HEMORRHAGIC CYSTITIS: Primary | ICD-10-CM

## 2023-06-15 DIAGNOSIS — N30.91 HEMORRHAGIC CYSTITIS: ICD-10-CM

## 2023-06-15 DIAGNOSIS — C83.30 DIFFUSE LARGE B-CELL LYMPHOMA, UNSPECIFIED BODY REGION (H): ICD-10-CM

## 2023-06-15 DIAGNOSIS — D70.1 CHEMOTHERAPY-INDUCED NEUTROPENIA (H): Primary | ICD-10-CM

## 2023-06-15 DIAGNOSIS — T45.1X5A CHEMOTHERAPY-INDUCED NEUTROPENIA (H): Primary | ICD-10-CM

## 2023-06-15 PROCEDURE — 99204 OFFICE O/P NEW MOD 45 MIN: CPT | Mod: 25 | Performed by: UROLOGY

## 2023-06-15 PROCEDURE — 52000 CYSTOURETHROSCOPY: CPT | Performed by: UROLOGY

## 2023-06-15 RX ORDER — DIPHENHYDRAMINE HCL 25 MG
25 CAPSULE ORAL ONCE
Status: CANCELLED
Start: 2023-06-21

## 2023-06-15 RX ORDER — METHYLPREDNISOLONE SODIUM SUCCINATE 125 MG/2ML
125 INJECTION, POWDER, LYOPHILIZED, FOR SOLUTION INTRAMUSCULAR; INTRAVENOUS
Status: CANCELLED
Start: 2023-06-21

## 2023-06-15 RX ORDER — HEPARIN SODIUM,PORCINE 10 UNIT/ML
5 VIAL (ML) INTRAVENOUS
Status: CANCELLED | OUTPATIENT
Start: 2023-06-21

## 2023-06-15 RX ORDER — EPINEPHRINE 1 MG/ML
0.3 INJECTION, SOLUTION INTRAMUSCULAR; SUBCUTANEOUS EVERY 5 MIN PRN
Status: CANCELLED | OUTPATIENT
Start: 2023-06-21

## 2023-06-15 RX ORDER — DOXORUBICIN HYDROCHLORIDE 2 MG/ML
50 INJECTION, SOLUTION INTRAVENOUS ONCE
Status: CANCELLED | OUTPATIENT
Start: 2023-06-21

## 2023-06-15 RX ORDER — MEPERIDINE HYDROCHLORIDE 25 MG/ML
25 INJECTION INTRAMUSCULAR; INTRAVENOUS; SUBCUTANEOUS EVERY 30 MIN PRN
Status: CANCELLED | OUTPATIENT
Start: 2023-06-21

## 2023-06-15 RX ORDER — TAMSULOSIN HYDROCHLORIDE 0.4 MG/1
0.4 CAPSULE ORAL DAILY
Qty: 30 CAPSULE | Refills: 3 | Status: SHIPPED | OUTPATIENT
Start: 2023-06-15 | End: 2023-10-04

## 2023-06-15 RX ORDER — ACETAMINOPHEN 325 MG/1
650 TABLET ORAL ONCE
Status: CANCELLED
Start: 2023-06-21

## 2023-06-15 RX ORDER — MEPERIDINE HYDROCHLORIDE 25 MG/ML
25 INJECTION INTRAMUSCULAR; INTRAVENOUS; SUBCUTANEOUS
Status: CANCELLED
Start: 2023-06-21

## 2023-06-15 RX ORDER — HEPARIN SODIUM (PORCINE) LOCK FLUSH IV SOLN 100 UNIT/ML 100 UNIT/ML
5 SOLUTION INTRAVENOUS
Status: CANCELLED | OUTPATIENT
Start: 2023-06-21

## 2023-06-15 RX ORDER — DIPHENHYDRAMINE HYDROCHLORIDE 50 MG/ML
50 INJECTION INTRAMUSCULAR; INTRAVENOUS
Status: CANCELLED
Start: 2023-06-21

## 2023-06-15 RX ORDER — ALBUTEROL SULFATE 0.83 MG/ML
2.5 SOLUTION RESPIRATORY (INHALATION)
Status: CANCELLED | OUTPATIENT
Start: 2023-06-21

## 2023-06-15 RX ORDER — ALBUTEROL SULFATE 90 UG/1
1-2 AEROSOL, METERED RESPIRATORY (INHALATION)
Status: CANCELLED
Start: 2023-06-21

## 2023-06-15 RX ORDER — PALONOSETRON 0.05 MG/ML
0.25 INJECTION, SOLUTION INTRAVENOUS ONCE
Status: CANCELLED
Start: 2023-06-21

## 2023-06-15 NOTE — PROGRESS NOTES
Urology Consult History and Physical  PAT CERDA   Name: Geovanni Jasso    MRN: [id number]   YOB: 1960       We were asked to see Geovanni Jasso at the request of Dr. Maurer for evaluation and treatment of gross hematuria.        Chief Complaint:   Gross hematuria     History is obtained from the patient            History of Present Illness:   Geovanni Jasso is a 62 year old male who is being seen for evaluation of gross hematuria     He started having gross hematuria last week   He has also developed some pain at the urethra and tip of the penis  He was started on oxybutynin 5mg XL (Ditropan XL)   Concern for possible hemorrhagic cystitis secondary to cyclophosphamide  He has also had nocturia every hour since starting chemo    UCx negative            Past Medical History:     Past Medical History:   Diagnosis Date     Cancer (H) 2-7-2023    just diagnosed with lymphona.     Carpal tunnel syndrome      Chronic rhinitis 10/23/2015     Esophageal reflux 12/26/2013     Hand joint stiff, right             Past Surgical History:     Past Surgical History:   Procedure Laterality Date     BIOPSY  2-6-23     BIOPSY LYMPH NODE CERVICAL Left 2/16/2023    Procedure: excisional biopsy of a left cervical node;  Surgeon: Aung Tamayo MD;  Location: [facility] OR     COLONOSCOPY  2019    clean, do again in 10 years     COLONOSCOPY WITH CO2 INSUFFLATION N/A 07/17/2019    Procedure: COLONOSCOPY, WITH CO2 INSUFFLATION;  Surgeon: Jaison Hammer MD;  Location: [facility] OR     ENT SURGERY  1979    Loss of hearing rt ear (tinnitis)     HERNIA REPAIR  2000     NO HISTORY OF SURGERY       ORTHOPEDIC SURGERY  1988,1992    rt knee, scope in 88; ACL repair Early 90s            Social History:     Social History     Tobacco Use     Smoking status: Never     Passive exposure: Never     Smokeless tobacco: Never   Vaping Use     Vaping status: Never Used     Passive vaping exposure: Yes   Substance Use Topics     Alcohol use:  Yes     Comment: occassional, 1- 2 per week       History   Smoking Status     Never   Smokeless Tobacco     Never            Family History:     Family History   Problem Relation Age of Onset     Hyperlipidemia Mother         medication     Diabetes Father      Other Cancer Father         Lung cancer     Other Cancer Maternal Grandmother         Unsure what type of cancer     Autoimmune Disease No family hx of               Allergies:     Allergies   Allergen Reactions     Penicillins      Allopurinol Rash            Medications:     Current Outpatient Medications   Medication Sig     acetaminophen (TYLENOL) 325 MG tablet Take 2 tablets (650 mg) by mouth every 6 hours as needed for mild pain     albuterol (PROAIR HFA/PROVENTIL HFA/VENTOLIN HFA) 108 (90 Base) MCG/ACT inhaler Inhale 2 puffs into the lungs every 4 hours as needed for shortness of breath     albuterol (PROVENTIL) (2.5 MG/3ML) 0.083% neb solution Take 1 vial (2.5 mg) by nebulization every 6 hours as needed for shortness of breath, wheezing or cough     ciprofloxacin (CIPRO) 500 MG tablet Take 1 tablet (500 mg) by mouth 2 times daily for 5 days     diclofenac (VOLTAREN) 1 % topical gel Apply small amount topically to right shoulder 3-4 times daily as needed for pain.     Efinaconazole (JUBLIA) 10 % SOLN Externally apply topically daily to nail.     fexofenadine (ALLEGRA) 60 MG tablet Take 1 tablet (60 mg) by mouth 2 times daily     fluticasone (FLONASE) 50 MCG/ACT nasal spray Spray 1-2 sprays into both nostrils daily     guaiFENesin (MUCINEX) 600 MG 12 hr tablet Take 2 tablets (1,200 mg) by mouth 2 times daily     hydrocortisone (CORTAID) 1 % external ointment Apply topically 2 times daily As needed     ibuprofen (ADVIL,MOTRIN) 200 MG tablet Take 200 mg by mouth every 6 hours as needed     ketoconazole (NIZORAL) 2 % external cream Apply twice daily for 8 weeks to feet     LANsoprazole (PREVACID) 30 MG DR capsule Take 1 capsule (30 mg) by mouth every  morning (before breakfast)     lidocaine-prilocaine (EMLA) 2.5-2.5 % external cream Apply topically as needed for moderate pain (4-6) Apply over port at least 30 minutes before port access.     methocarbamol (ROBAXIN) 500 MG tablet Take 1 tablet (500 mg) by mouth nightly as needed for muscle spasms     ondansetron (ZOFRAN ODT) 4 MG ODT tab Take 1 tablet (4 mg) by mouth every 8 hours as needed for nausea     ondansetron (ZOFRAN) 8 MG tablet Take 1 tablet (8 mg) by mouth every 8 hours as needed for nausea (vomiting)     oxybutynin (DITROPAN) 5 MG tablet Take 1 tablet (5 mg) by mouth 3 times daily     oxyCODONE (ROXICODONE) 5 MG tablet Take 1-2 tablets (5-10 mg) by mouth every 4 hours as needed for moderate to severe pain     prochlorperazine (COMPAZINE) 10 MG tablet Take 1 tablet (10 mg) by mouth every 6 hours as needed for nausea or vomiting     senna-docusate (SENOKOT-S/PERICOLACE) 8.6-50 MG tablet Take 1-2 tablets by mouth 2 times daily     traMADol (ULTRAM) 50 MG tablet Take 50 mg by mouth every 6 hours as needed     zolpidem (AMBIEN) 10 MG tablet Take 1 tablet (10 mg) by mouth nightly as needed for sleep     No current facility-administered medications for this visit.             Review of Systems:     Skin: negative  Eyes: negative  Ears/Nose/Throat: negative  Respiratory: No shortness of breath, dyspnea on exertion, cough, or hemoptysis  Cardiovascular: negative  Gastrointestinal: negative  Genitourinary: as above  Musculoskeletal: negative  Neurologic: negative  Psychiatric: negative  Hematologic/Lymphatic/Immunologic: as above  Endocrine: negative          Physical Exam:   No data found.  There is no height or weight on file to calculate BMI.     General: age-appropriate appearing male in NAD  HEENT: Head AT/NC, EOMI, CN Grossly intact  Lungs: no respiratory distress, or pursed lip breathing  Heart: No obvious jugular venous distension present  Back: no bony midline tenderness, no CVAT bilaterally.  Abdomen:  soft, non-distended, non-tender. No organomegaly  Lymph: no palpable inguinal lymphadenopathy.  LE: no edema.   Musculoskeltal: extremities normal, no peripheral edema  Skin: no suspicious lesions or rashes  Neuro: Alert, oriented, speech and mentation normal;  moving all 4 extremities equally.  Psych: affect and mood normal          Data:   All laboratory data reviewed:    UA RESULTS:  Recent Labs   Lab Test 06/11/23  1218   COLOR Red*   APPEARANCE Slightly Cloudy*   URINEGLC Negative   URINEBILI Negative   URINEKETONE Negative   SG >=1.030   UBLD Large*   URINEPH 6.0   PROTEIN >=300*   UROBILINOGEN 1.0   NITRITE Negative   LEUKEST Negative   RBCU >100*   WBCU 0-5      PSA   Date Value Ref Range Status   12/12/2018 1.20 0 - 4 ug/L Final     Comment:     Assay Method:  Chemiluminescence using Siemens Vista analyzer     Prostate Specific Antigen Screen   Date Value Ref Range Status   12/02/2022 1.18 0.00 - 4.00 ug/L Final      Lab Results   Component Value Date    CR 0.76 06/14/2023      IMAGING:  All pertinent imaging reviewed:    All imaging studies reviewed by me.  I personally reviewed these imaging films.  A formal report from radiology will follow.    CT CHEST/ABD/PEL 5/5/2023:  IMPRESSION:   In this patient with history of diffuse large cell lymphoma:  1. There is evidence of the complete response by Lugano criteria.  2. Subcarinal partially calcified lymph node, similar in size to prior  with minimal FDG uptake above background, likely reactive, attention  on follow-up.  3. Peripheral predominant ground glass opacities in the lungs. These  findings are likely secondary to infectious/inflammatory changes  including COVID-19.   4. Incidental and chronic findings including cholelithiasis without  cholecystitis and sub-6 mm solid pulmonary nodule similar to CT dated  1/27/2023. Attention on follow-up.  5. Please see same day dedicated head/neck PET/CT for further details.         Impression and Plan:    Impression:   62-year-old man with history of diffuse large B-cell lymphoma with recent development of gross hematuria secondary to hemorrhagic cystitis      Plan:   Hemorrhagic cystitis  - Cystoscopy today with clear evidence of hemorrhagic cystitis throughout the bladder.  No evidence of secondary malignancy and no current ongoing bruising or bleeding noted  - I recommend he continue on the previously prescribed oxybutynin  - There is no clear evidence of bladder outlet obstruction, however we discussed that the short course of tamsulosin may also decrease the stress on the detrusor muscle  - I would recommend no further cyclophosphamide unless he was pretreated with Mensa  - He may follow-up with me on a as needed basis  - I reviewed his prior PET scan with no  abnormalities  - I reviewed his serum creatinine which is stable and normal  - I reviewed his PSA history which is nonconcerning  - I reviewed his urine culture which was negative     Thank you for the kind consultation.    Time spent: 30 minutes spent on the date of the encounter doing chart review, history and exam, documentation and further activities as noted above.  This was in addition to cystoscopy time    Jamari Parra MD   Urology  [facility]  [facility] Phone: [phone]  [facility] Phone: [phone]

## 2023-06-15 NOTE — Clinical Note
Gee Maurer,  I was able to see Bill today and perform a cystoscopy.  This was consistent with diffuse hemorrhagic cystitis as suspected.  I would recommend no further cyclophosphamide unless he could have pretreatment with mesna.  Thanks,  Jamari Parra M.D. Cell: [phone]

## 2023-06-15 NOTE — PATIENT INSTRUCTIONS
"After Your Cystoscopy    What happens after the exam?    You may go back to your normal diet and activity as you feel ready, unless your doctor tells you not to.    For the next two days, you may notice:    Some blood in your urine  Some burning when you urinate (use the toilet)  An urge to urinate more often  Bladder spasms    These are normal after the procedure and should go away after a day or two.  To relieve these problems drink 6 to 8 large glasses of water each day (includes drinks at meals) as this will help clear the urine.  Take warm baths to relieve pain and bladder spasms.  Do not add anything to the bath water.  You may also take Tylenol (acetaminophen) for pain if needed.    When should I call my doctor?    A fever over 100F (38C) for more than a day. (Before you call the doctor, check your temperature under your tongue)  Chills  Failure to urinate: No urine comes out when you try to use the toilet. (Try soaking in a bathtub full of warm water. If still no urine, call your doctor)  A lot of blood in the urine, or blood clots larger than a nickel  Pain in the back or belly area (abdomen)  Pain or spasms that are not relieved by warm tub baths and pain medicine  Severe pain, burning or other problems while passing urine  Pain that gets worse after two days           AFTER YOUR CYSTOSCOPY        You have just completed a cystoscopy, or \"cysto\", which allowed your physician to learn more about your bladder (or to remove a stent placed after surgery). We suggest that you continue to avoid caffeine, fruit juice, and alcohol for the next 24 hours, however, you are encouraged to return to your normal activities.         A few things that are considered normal after your cystoscopy:     * Small amount of bleeding (or spotting) that clears within the next 24 hours     * Slight burning sensation with urination     * Sensation to of needing to avoid more frequently     * The feeling of \"air\" in your urine     * " Mild discomfort that is relieved with Tylenol        Please contact our office promptly if you:     * Develop a fever above 101 degrees     * Are unable to urinate     * Develop bright red blood that does not stop     * Severe pain or swelling         Please contact our office with any concerns or questions @[facility].

## 2023-06-15 NOTE — PROCEDURES
CYSTOSCOPY PROCEDURE NOTE:    Geovanni aJsso is a 62 year old male  who presents with gross hematuria  for cystoscopy.    Pt ID verified with patient: Yes     Procedure verified with patient: Yes     Procedure confirmed with physician and support staff: Yes     Consent form confirmed with physician and support staff.    Sign In  History and Physical Exam reviewed.  Informed Consent Discussed: Yes   Sign in Communication: Yes   Time Out:  Team Confirms the Correct Patient, Correct Procedure; Yes , Correct Site and Site Marking, Correct Position (if applicable).    Affirmation of Time Out: Yes   Sign Out:  Sign Out Discussion: Yes   Physician: Jamari Parra MD    A urinalysis was performed revealing no evidence of infection.    The benefits, risks, alternatives of the cystoscopy procedure and personnel were discussed with the patient. The verbal consent was obtained and the patient agrees to proceed.      Description of procedure:   After fully informed, voluntary consent was obtained, the patient was brought into the procedure room, identified and placed in a supine position on the cystoscopy table.  The groin/scrotum were prepped with betadine and draped in a sterile fashion.  Urojet lidocaine gel was introduced.  A 15F flexible cystoscope was inserted into the urethra, and the bladder and urethra wereexamined in a systematic manner.  The patient tolerated the procedure well and there were no complications.      Cystoscopic findings:  The urethra was normal without strictures.  The prostate was 3cm long and demonstrated very mild bilobar hypertrophy.  There was no median lobe.  The external sphincter coapted normally and the bladder neck was normal. The bladder was  entered and careful pan endoscopy was carried out. The posterior, superior and lateral walls and dome of the bladder were all well visualized and the scope was retroflexed upon itself..  There was mild trabeculation.  There were no neoplasms, stones, or  diverticula identifed.  The ureteric orifices were normal in position and number and effluxing clear urine.  There was evidence of diffuse hemorrhagic cystitis with no areas of active bleeding noted.    Assessment/Plan:   Geovanni Jasso is a 62 year old male with a history of diffuse large B-cell lymphoma with recent gross hematuria secondary to hemorrhagic cystitis     -See clinic note      Jamari Parra MD

## 2023-06-15 NOTE — NURSING NOTE
Geovanni Jasso's goals for this visit include:   Chief Complaint   Patient presents with     New Patient     gross hematuria on Cytoxan- needs for cure of lymphoma. no good evidence of infection and no improvement with ciprofloxacin, blood in the urine since Friday, 10/10 pain, clots in urine oxybutynin started yesterday, two more doses of cipro left.        He requests these members of his care team be copied on today's visit information:       PCP: Paddy Holly    Referring Provider:  Kiel Maurer MD  [address] [place] [id number]    There were no vitals taken for this visit.    Do you need any medication refills at today's visit?     Phyllis Real LPN on 6/15/2023 at 2:17 PM

## 2023-06-17 ENCOUNTER — HEALTH MAINTENANCE LETTER (OUTPATIENT)
Age: 63
End: 2023-06-17

## 2023-06-19 ENCOUNTER — TELEPHONE (OUTPATIENT)
Dept: ONCOLOGY | Facility: CLINIC | Age: 63
End: 2023-06-19

## 2023-06-19 RX ORDER — TAMSULOSIN HYDROCHLORIDE 0.4 MG/1
CAPSULE ORAL
Qty: 90 CAPSULE | OUTPATIENT
Start: 2023-06-19

## 2023-06-19 NOTE — CONFIDENTIAL NOTE
Bill calling in to report that his ankles and feet have had increased swelling in the past 24 hours. He has baseline edema in his ankles and feet per Bill, but this swelling is increased. The swelling is bilateral and noted from his ankles down. There is no associated skin color or temperature changes, and pt denies numbness, tingling, or pain.    Pt has apt with Ryann Wynn SAURABH tomorrow and wanted to know if she would recommend doing anything prior to his virtual apt.    Writer recommended elevating legs for a good portion of the day today if possible.    Will route to provider for additional recommendations.

## 2023-06-19 NOTE — CONFIDENTIAL NOTE
"Writer called pt with recommendations from Ryann Wynn SAURABH.    Ryann Selby PA-C  You; Sydni Renae, GRISELDA 1 minute ago (12:20 PM)     AK  Just the other suggestions are fine for now. I will chat with him about it tomorrow.        You  Ryann Selby PA-C; Sydni Renae RN 4 minutes ago (12:17 PM)     CB  I had him press into the swelling and it sounds like it took about 4-6 seconds for the swelling to rebound from the indentation. Hopefully that helps provide more info on the amount of swelling. If this changes anything on your end let me know and I can help to arrange.     Thanks!       Ryann Selby PA-C You; Sydni Renae RN 11 minutes ago (12:10 PM)     AK  How much more swelling in last 24 hours? Is this relatively new for him? I haven't met him before and last note from [facility] last week stated \"trace bilaterally\".     We can probably just wait on imaging until I chat with him over video tomorrow. I agree with keeping legs elevated. If he has compression stockings at home, then he can try those as well.     Pt verbalized understanding.  "

## 2023-06-20 ENCOUNTER — VIRTUAL VISIT (OUTPATIENT)
Dept: ONCOLOGY | Facility: CLINIC | Age: 63
End: 2023-06-20
Attending: PHYSICIAN ASSISTANT
Payer: COMMERCIAL

## 2023-06-20 DIAGNOSIS — C83.30 DIFFUSE LARGE B-CELL LYMPHOMA, UNSPECIFIED BODY REGION (H): Primary | ICD-10-CM

## 2023-06-20 DIAGNOSIS — D70.1 CHEMOTHERAPY-INDUCED NEUTROPENIA (H): ICD-10-CM

## 2023-06-20 DIAGNOSIS — T45.1X5A CHEMOTHERAPY-INDUCED NEUTROPENIA (H): ICD-10-CM

## 2023-06-20 DIAGNOSIS — G62.0 NEUROPATHY DUE TO CHEMOTHERAPEUTIC DRUG (H): ICD-10-CM

## 2023-06-20 DIAGNOSIS — N30.91 HEMORRHAGIC CYSTITIS: ICD-10-CM

## 2023-06-20 DIAGNOSIS — T45.1X5A NEUROPATHY DUE TO CHEMOTHERAPEUTIC DRUG (H): ICD-10-CM

## 2023-06-20 PROCEDURE — 99215 OFFICE O/P EST HI 40 MIN: CPT | Mod: VID | Performed by: PHYSICIAN ASSISTANT

## 2023-06-20 NOTE — LETTER
6/20/2023         RE: Geovanni Jasso  [id number]  [place] [address]        Dear Colleague,    Thank you for referring your patient, Geovanni Jasso, to the [facility]. Please see a copy of my visit note below.    Virtual Visit Details    Type of service:  Video Visit     Originating Location (pt. Location): Home  Distant Location (provider location):  On-site  Platform used for Video Visit: [facility]      Oncology/Hematology Visit Note    Jun 20, 2023    Reason for visit: Follow up DLBCL    Oncology HPI: Geovanni Jasso is a 62 year old male and below is his treatment summary:    Diffuse Large B cell Lymphoma  1/18/2022 for retroperitoneal lymphadenopathy with upper abdominal pain x 3-4 months. .   1/6/2023 CT Abd Pelvis which showed enlarged retroperitoneal lymphadenopathy- with no B symptoms.   2/6/2023, he had FNA of the left supraclavicular lymph node.  Cytology showed atypical lymphoid cells.  Flow cytometry showed lambda monotypic B cells which are negative for CD5 and CD10.  This is consistent with a B-cell lymphoma.  We decided to go ahead with excisional lymph node biopsy as well as bone marrow biopsy for complete staging.  -Excisional lymph node biopsy showed diffuse large B-cell lymphoma.  -Bone marrow biopsy did not reveal evidence of lymphoma.  Treatment:   3/8/2023 R CHOP C1  3/29/2023 R CHOP C2   4/4/2023 hospitalized for Sepsis, Neutropenia, Cryptosporidium, and C. Diff.   4/19/2023 R CHOP C3  5/5/2023  PET CT neck chest abdomen and pelvis shows complete response to treatment per Lugano criteria  5/10/2023  C4D1 Adriamycin, Vincristine, Cytoxan, Truxima and Neulasta Onpro   6/14/23 Cytoxan omitted for hemorrhagic cystitis    Video visit today for RCHOP (cytoxan omitted) cycle 6.     Interval History: Bill is over video today with his wife, Charleen.  He is excited to be almost complete with chemotherapy with final cycle tomorrow.  He has had a pretty significant dry  mouth throughout this course and using Biotene with some improvement.  He has noticed bilateral lower extremity swelling, left slightly greater than right and yesterday, but he was able to elevate his legs yesterday evening and last night and improvement.  Vision has decreased a little bit throughout this process, but no double vision.  He had chills a few nights ago, this has been ongoing since chemotherapy, no significant change.  He has noticed some tingling in his fingers and his dexterity is different.  He is still able to type and he does not drop things, but has been challenging.  Hematuria has resolved.  No fever, vomiting, bleeding, headache, chest pain, shortness of breath.     Review of Systems: See interval hx. Denies fevers, HA, dizziness, cough, sore throat, CP, SOB, abdominal pain, N/V, diarrhea, changes in urination, bleeding, bruising, rash.     PMHx and Social Hx reviewed per EPIC.      Medications:  Current Outpatient Medications   Medication Sig Dispense Refill     acetaminophen (TYLENOL) 325 MG tablet Take 2 tablets (650 mg) by mouth every 6 hours as needed for mild pain 50 tablet 0     albuterol (PROAIR HFA/PROVENTIL HFA/VENTOLIN HFA) 108 (90 Base) MCG/ACT inhaler Inhale 2 puffs into the lungs every 4 hours as needed for shortness of breath 18 g 5     albuterol (PROVENTIL) (2.5 MG/3ML) 0.083% neb solution Take 1 vial (2.5 mg) by nebulization every 6 hours as needed for shortness of breath, wheezing or cough 90 mL 0     diclofenac (VOLTAREN) 1 % topical gel Apply small amount topically to right shoulder 3-4 times daily as needed for pain. 50 g 0     Efinaconazole (JUBLIA) 10 % SOLN Externally apply topically daily to nail. 8 mL 4     fexofenadine (ALLEGRA) 60 MG tablet Take 1 tablet (60 mg) by mouth 2 times daily 180 tablet 0     fluticasone (FLONASE) 50 MCG/ACT nasal spray Spray 1-2 sprays into both nostrils daily 16 g 11     guaiFENesin (MUCINEX) 600 MG 12 hr tablet Take 2 tablets (1,200 mg)  by mouth 2 times daily 30 tablet 1     hydrocortisone (CORTAID) 1 % external ointment Apply topically 2 times daily As needed 56 g 0     ibuprofen (ADVIL,MOTRIN) 200 MG tablet Take 200 mg by mouth every 6 hours as needed       ketoconazole (NIZORAL) 2 % external cream Apply twice daily for 8 weeks to feet 60 g 3     LANsoprazole (PREVACID) 30 MG DR capsule Take 1 capsule (30 mg) by mouth every morning (before breakfast) 90 capsule 2     lidocaine-prilocaine (EMLA) 2.5-2.5 % external cream Apply topically as needed for moderate pain (4-6) Apply over port at least 30 minutes before port access. 30 g 0     methocarbamol (ROBAXIN) 500 MG tablet Take 1 tablet (500 mg) by mouth nightly as needed for muscle spasms 30 tablet 0     ondansetron (ZOFRAN ODT) 4 MG ODT tab Take 1 tablet (4 mg) by mouth every 8 hours as needed for nausea 10 tablet 0     ondansetron (ZOFRAN) 8 MG tablet Take 1 tablet (8 mg) by mouth every 8 hours as needed for nausea (vomiting) 30 tablet 2     oxybutynin (DITROPAN) 5 MG tablet Take 1 tablet (5 mg) by mouth 3 times daily 30 tablet 1     oxyCODONE (ROXICODONE) 5 MG tablet Take 1-2 tablets (5-10 mg) by mouth every 4 hours as needed for moderate to severe pain 12 tablet 0     prochlorperazine (COMPAZINE) 10 MG tablet Take 1 tablet (10 mg) by mouth every 6 hours as needed for nausea or vomiting 30 tablet 2     senna-docusate (SENOKOT-S/PERICOLACE) 8.6-50 MG tablet Take 1-2 tablets by mouth 2 times daily 30 tablet 0     tamsulosin (FLOMAX) 0.4 MG capsule Take 1 capsule (0.4 mg) by mouth daily 30 capsule 3     traMADol (ULTRAM) 50 MG tablet Take 50 mg by mouth every 6 hours as needed       zolpidem (AMBIEN) 10 MG tablet Take 1 tablet (10 mg) by mouth nightly as needed for sleep 30 tablet 1       Allergies   Allergen Reactions     Penicillins      Allopurinol Rash         EXAM:    There were no vitals taken for this visit. Video, no VS.     GENERAL:  Male, in no acute distress.  Alert and oriented x3.  Well groomed.   HEENT:  Normocephalic, atraumatic. No conjunctival injection or eye swelling. Alopecia.   LUNGS:  Nonlabored breathing, no cough or audible wheezing, able to speak full sentences.  MSK: Full ROM UE.    SKIN: No rash on exposed skin. No jaundice.   NEURO: CN grossly intact, speech normal  PSYCH: Mentation appears normal, insight and judgement intact      Labs: PENDING    Imaging: n/a    Impression/Plan: Geovanni Jasso is a 62 year old male with DLBCL currently on RCHOP.     DLBCL: Presented with diffuse lymphadenopathy and marked FDG avid left supraclavicular, axillary, retroperitoneal lymph nodes.  IPI is 3.  Started R-CHOP with C1 D1 on 3/8/2023 with curative intent.  He has been tolerating this pretty well, but has noticed neuropathy, alopecia, weakness, weight loss.  - cycle #2 on 3/29/2023 with G-CSF support because of neutropenic fever  - neutropenic fever with pancolitis from C. difficile.  Cryptosporidium was also positive.  - cycle #3 was on 4/19/2023.  - PET with CR on 5/5/2023 after 3 cycles  - cycle #4 on 5/10/2023.  - cycle #5 on 5/31/2023  - Cytoxan has been removed for hemorrhagic cystitis  - Labs pending, if within parameters, plan for cycle #6 (cytoxan omitted) tomorrow in [place]  - 7/21 PET  - 7/25 Dr. Maurer    Edema: Lower extremity swelling developed yesterday, improved with elevation.  Unable to examine over video today, but patient states much improved.  - Discussed elevation, compression stockings, limiting salt intake  - If edema returns, low threshold for bilateral lower extremity ultrasound    PN: Likely secondary to vincristine, tolerable.  Did not discuss neuropathic medication, but if symptoms persist, this is certainly an option in the future.    Hematuria: No evidence of infection and Cipro with no improvement.  Saw urology urgently and consistent with hemorrhagic cystitis, likely from Cytoxan.  This has now been omitted from R-CHOP.  Hematuria has resolved and continues on  oxybutynin.     Anemia: Likely secondary from chemotherapy, 9.2 on 6/14, labs are pending tomorrow.    C. difficile colitis: Admitted to [facility] in April 2023, completed vancomycin.  He was given a week of vancomycin with levofloxacin for prevention.      Chart documentation with Dragon Voice recognition Software. Although reviewed after completion, some words and grammatical errors may remain.    40 minutes spent on the date of the encounter doing chart review, review of outside records, patient visit, documentation and discussion with family     Ryann Wynn PA-C  Hematology/Oncology  [facility]                    Again, thank you for allowing me to participate in the care of your patient.        Sincerely,        Ryann Wynn PA-C

## 2023-06-20 NOTE — NURSING NOTE
Is the patient currently in the state of [place]? YES    Visit mode:VIDEO    If the visit is dropped, the patient can be reconnected by: VIDEO VISIT: Text to cell phone: [phone]    Will anyone else be joining the visit? NO      How would you like to obtain your AVS? MyChart    Are changes needed to the allergy or medication list? YES: See changes below.  Patient takes Prevacid as needed.  Patient takes Senna-docusate as needed.       Reason for visit: ARACELY Marques, Virtual Facilitator

## 2023-06-20 NOTE — LETTER
6/20/2023         RE: Geovanni Jasso  [address]  [place] [address]        Dear Colleague,    Thank you for referring your patient, Geovanni Jasso, to the [facility]. Please see a copy of my visit note below.    Virtual Visit Details    Type of service:  Video Visit     Originating Location (pt. Location): Home  Distant Location (provider location):  On-site  Platform used for Video Visit: [facility]      Oncology/Hematology Visit Note    Jun 20, 2023    Reason for visit: Follow up DLBCL    Oncology HPI: Geovanni Jasso is a 62 year old male and below is his treatment summary:    Diffuse Large B cell Lymphoma  1/18/2022 for retroperitoneal lymphadenopathy with upper abdominal pain x 3-4 months. .   1/6/2023 CT Abd Pelvis which showed enlarged retroperitoneal lymphadenopathy- with no B symptoms.   2/6/2023, he had FNA of the left supraclavicular lymph node.  Cytology showed atypical lymphoid cells.  Flow cytometry showed lambda monotypic B cells which are negative for CD5 and CD10.  This is consistent with a B-cell lymphoma.  We decided to go ahead with excisional lymph node biopsy as well as bone marrow biopsy for complete staging.  -Excisional lymph node biopsy showed diffuse large B-cell lymphoma.  -Bone marrow biopsy did not reveal evidence of lymphoma.  Treatment:   3/8/2023 R CHOP C1  3/29/2023 R CHOP C2   4/4/2023 hospitalized for Sepsis, Neutropenia, Cryptosporidium, and C. Diff.   4/19/2023 R CHOP C3  5/5/2023  PET CT neck chest abdomen and pelvis shows complete response to treatment per Lugano criteria  5/10/2023  C4D1 Adriamycin, Vincristine, Cytoxan, Truxima and Neulasta Onpro   6/14/23 Cytoxan omitted for hemorrhagic cystitis    Video visit today for RCHOP (cytoxan omitted) cycle 6.     Interval History: Bill is over video today with his wife, Charleen.  He is excited to be almost complete with chemotherapy with final cycle tomorrow.  He has had a pretty significant dry  mouth throughout this course and using Biotene with some improvement.  He has noticed bilateral lower extremity swelling, left slightly greater than right and yesterday, but he was able to elevate his legs yesterday evening and last night and improvement.  Vision has decreased a little bit throughout this process, but no double vision.  He had chills a few nights ago, this has been ongoing since chemotherapy, no significant change.  He has noticed some tingling in his fingers and his dexterity is different.  He is still able to type and he does not drop things, but has been challenging.  Hematuria has resolved.  No fever, vomiting, bleeding, headache, chest pain, shortness of breath.     Review of Systems: See interval hx. Denies fevers, HA, dizziness, cough, sore throat, CP, SOB, abdominal pain, N/V, diarrhea, changes in urination, bleeding, bruising, rash.     PMHx and Social Hx reviewed per EPIC.      Medications:  Current Outpatient Medications   Medication Sig Dispense Refill     acetaminophen (TYLENOL) 325 MG tablet Take 2 tablets (650 mg) by mouth every 6 hours as needed for mild pain 50 tablet 0     albuterol (PROAIR HFA/PROVENTIL HFA/VENTOLIN HFA) 108 (90 Base) MCG/ACT inhaler Inhale 2 puffs into the lungs every 4 hours as needed for shortness of breath 18 g 5     albuterol (PROVENTIL) (2.5 MG/3ML) 0.083% neb solution Take 1 vial (2.5 mg) by nebulization every 6 hours as needed for shortness of breath, wheezing or cough 90 mL 0     diclofenac (VOLTAREN) 1 % topical gel Apply small amount topically to right shoulder 3-4 times daily as needed for pain. 50 g 0     Efinaconazole (JUBLIA) 10 % SOLN Externally apply topically daily to nail. 8 mL 4     fexofenadine (ALLEGRA) 60 MG tablet Take 1 tablet (60 mg) by mouth 2 times daily 180 tablet 0     fluticasone (FLONASE) 50 MCG/ACT nasal spray Spray 1-2 sprays into both nostrils daily 16 g 11     guaiFENesin (MUCINEX) 600 MG 12 hr tablet Take 2 tablets (1,200 mg)  by mouth 2 times daily 30 tablet 1     hydrocortisone (CORTAID) 1 % external ointment Apply topically 2 times daily As needed 56 g 0     ibuprofen (ADVIL,MOTRIN) 200 MG tablet Take 200 mg by mouth every 6 hours as needed       ketoconazole (NIZORAL) 2 % external cream Apply twice daily for 8 weeks to feet 60 g 3     LANsoprazole (PREVACID) 30 MG DR capsule Take 1 capsule (30 mg) by mouth every morning (before breakfast) 90 capsule 2     lidocaine-prilocaine (EMLA) 2.5-2.5 % external cream Apply topically as needed for moderate pain (4-6) Apply over port at least 30 minutes before port access. 30 g 0     methocarbamol (ROBAXIN) 500 MG tablet Take 1 tablet (500 mg) by mouth nightly as needed for muscle spasms 30 tablet 0     ondansetron (ZOFRAN ODT) 4 MG ODT tab Take 1 tablet (4 mg) by mouth every 8 hours as needed for nausea 10 tablet 0     ondansetron (ZOFRAN) 8 MG tablet Take 1 tablet (8 mg) by mouth every 8 hours as needed for nausea (vomiting) 30 tablet 2     oxybutynin (DITROPAN) 5 MG tablet Take 1 tablet (5 mg) by mouth 3 times daily 30 tablet 1     oxyCODONE (ROXICODONE) 5 MG tablet Take 1-2 tablets (5-10 mg) by mouth every 4 hours as needed for moderate to severe pain 12 tablet 0     prochlorperazine (COMPAZINE) 10 MG tablet Take 1 tablet (10 mg) by mouth every 6 hours as needed for nausea or vomiting 30 tablet 2     senna-docusate (SENOKOT-S/PERICOLACE) 8.6-50 MG tablet Take 1-2 tablets by mouth 2 times daily 30 tablet 0     tamsulosin (FLOMAX) 0.4 MG capsule Take 1 capsule (0.4 mg) by mouth daily 30 capsule 3     traMADol (ULTRAM) 50 MG tablet Take 50 mg by mouth every 6 hours as needed       zolpidem (AMBIEN) 10 MG tablet Take 1 tablet (10 mg) by mouth nightly as needed for sleep 30 tablet 1       Allergies   Allergen Reactions     Penicillins      Allopurinol Rash         EXAM:    There were no vitals taken for this visit. Video, no VS.     GENERAL:  Male, in no acute distress.  Alert and oriented x3.  Well groomed.   HEENT:  Normocephalic, atraumatic. No conjunctival injection or eye swelling. Alopecia.   LUNGS:  Nonlabored breathing, no cough or audible wheezing, able to speak full sentences.  MSK: Full ROM UE.    SKIN: No rash on exposed skin. No jaundice.   NEURO: CN grossly intact, speech normal  PSYCH: Mentation appears normal, insight and judgement intact      Labs: PENDING    Imaging: n/a    Impression/Plan: Geovanni Jasso is a 62 year old male with DLBCL currently on RCHOP.     DLBCL: Presented with diffuse lymphadenopathy and marked FDG avid left supraclavicular, axillary, retroperitoneal lymph nodes.  IPI is 3.  Started R-CHOP with C1 D1 on 3/8/2023 with curative intent.  He has been tolerating this pretty well, but has noticed neuropathy, alopecia, weakness, weight loss.  - cycle #2 on 3/29/2023 with G-CSF support because of neutropenic fever  - neutropenic fever with pancolitis from C. difficile.  Cryptosporidium was also positive.  - cycle #3 was on 4/19/2023.  - PET with CR on 5/5/2023 after 3 cycles  - cycle #4 on 5/10/2023.  - cycle #5 on 5/31/2023  - Cytoxan has been removed for hemorrhagic cystitis  - Labs pending, if within parameters, plan for cycle #6 (cytoxan omitted) tomorrow in [place]  - 7/21 PET  - 7/25 Dr. Maurer    Edema: Lower extremity swelling developed yesterday, improved with elevation.  Unable to examine over video today, but patient states much improved.  - Discussed elevation, compression stockings, limiting salt intake  - If edema returns, low threshold for bilateral lower extremity ultrasound    PN: Likely secondary to vincristine, tolerable.  Did not discuss neuropathic medication, but if symptoms persist, this is certainly an option in the future.    Hematuria: No evidence of infection and Cipro with no improvement.  Saw urology urgently and consistent with hemorrhagic cystitis, likely from Cytoxan.  This has now been omitted from R-CHOP.  Hematuria has resolved and continues on  oxybutynin.     Anemia: Likely secondary from chemotherapy, 9.2 on 6/14, labs are pending tomorrow.    C. difficile colitis: Admitted to [facility] in April 2023, completed vancomycin.  He was given a week of vancomycin with levofloxacin for prevention.      Chart documentation with Dragon Voice recognition Software. Although reviewed after completion, some words and grammatical errors may remain.    40 minutes spent on the date of the encounter doing chart review, review of outside records, patient visit, documentation and discussion with family     Ryann Wynn PA-C  Hematology/Oncology  [facility]                    Again, thank you for allowing me to participate in the care of your patient.        Sincerely,        Ryann Wynn PA-C

## 2023-06-20 NOTE — PROGRESS NOTES
Virtual Visit Details    Type of service:  Video Visit     Originating Location (pt. Location): Home  Distant Location (provider location):  On-site  Platform used for Video Visit: [facility]      Oncology/Hematology Visit Note    Jun 20, 2023    Reason for visit: Follow up DLBCL    Oncology HPI: Geovanni Jasso is a 62 year old male and below is his treatment summary:    Diffuse Large B cell Lymphoma  1/18/2022 for retroperitoneal lymphadenopathy with upper abdominal pain x 3-4 months. .   1/6/2023 CT Abd Pelvis which showed enlarged retroperitoneal lymphadenopathy- with no B symptoms.   2/6/2023, he had FNA of the left supraclavicular lymph node.  Cytology showed atypical lymphoid cells.  Flow cytometry showed lambda monotypic B cells which are negative for CD5 and CD10.  This is consistent with a B-cell lymphoma.  We decided to go ahead with excisional lymph node biopsy as well as bone marrow biopsy for complete staging.  -Excisional lymph node biopsy showed diffuse large B-cell lymphoma.  -Bone marrow biopsy did not reveal evidence of lymphoma.  Treatment:   3/8/2023 R CHOP C1  3/29/2023 R CHOP C2   4/4/2023 hospitalized for Sepsis, Neutropenia, Cryptosporidium, and C. Diff.   4/19/2023 R CHOP C3  5/5/2023  PET CT neck chest abdomen and pelvis shows complete response to treatment per Lugano criteria  5/10/2023  C4D1 Adriamycin, Vincristine, Cytoxan, Truxima and Neulasta Onpro   6/14/23 Cytoxan omitted for hemorrhagic cystitis    Video visit today for RCHOP (cytoxan omitted) cycle 6.     Interval History: Bill is over video today with his wife, Charleen.  He is excited to be almost complete with chemotherapy with final cycle tomorrow.  He has had a pretty significant dry mouth throughout this course and using Biotene with some improvement.  He has noticed bilateral lower extremity swelling, left slightly greater than right and yesterday, but he was able to elevate his legs yesterday evening and last night and  improvement.  Vision has decreased a little bit throughout this process, but no double vision.  He had chills a few nights ago, this has been ongoing since chemotherapy, no significant change.  He has noticed some tingling in his fingers and his dexterity is different.  He is still able to type and he does not drop things, but has been challenging.  Hematuria has resolved.  No fever, vomiting, bleeding, headache, chest pain, shortness of breath.     Review of Systems: See interval hx. Denies fevers, HA, dizziness, cough, sore throat, CP, SOB, abdominal pain, N/V, diarrhea, changes in urination, bleeding, bruising, rash.     PMHx and Social Hx reviewed per EPIC.      Medications:  Current Outpatient Medications   Medication Sig Dispense Refill     acetaminophen (TYLENOL) 325 MG tablet Take 2 tablets (650 mg) by mouth every 6 hours as needed for mild pain 50 tablet 0     albuterol (PROAIR HFA/PROVENTIL HFA/VENTOLIN HFA) 108 (90 Base) MCG/ACT inhaler Inhale 2 puffs into the lungs every 4 hours as needed for shortness of breath 18 g 5     albuterol (PROVENTIL) (2.5 MG/3ML) 0.083% neb solution Take 1 vial (2.5 mg) by nebulization every 6 hours as needed for shortness of breath, wheezing or cough 90 mL 0     diclofenac (VOLTAREN) 1 % topical gel Apply small amount topically to right shoulder 3-4 times daily as needed for pain. 50 g 0     Efinaconazole (JUBLIA) 10 % SOLN Externally apply topically daily to nail. 8 mL 4     fexofenadine (ALLEGRA) 60 MG tablet Take 1 tablet (60 mg) by mouth 2 times daily 180 tablet 0     fluticasone (FLONASE) 50 MCG/ACT nasal spray Spray 1-2 sprays into both nostrils daily 16 g 11     guaiFENesin (MUCINEX) 600 MG 12 hr tablet Take 2 tablets (1,200 mg) by mouth 2 times daily 30 tablet 1     hydrocortisone (CORTAID) 1 % external ointment Apply topically 2 times daily As needed 56 g 0     ibuprofen (ADVIL,MOTRIN) 200 MG tablet Take 200 mg by mouth every 6 hours as needed       ketoconazole  (NIZORAL) 2 % external cream Apply twice daily for 8 weeks to feet 60 g 3     LANsoprazole (PREVACID) 30 MG DR capsule Take 1 capsule (30 mg) by mouth every morning (before breakfast) 90 capsule 2     lidocaine-prilocaine (EMLA) 2.5-2.5 % external cream Apply topically as needed for moderate pain (4-6) Apply over port at least 30 minutes before port access. 30 g 0     methocarbamol (ROBAXIN) 500 MG tablet Take 1 tablet (500 mg) by mouth nightly as needed for muscle spasms 30 tablet 0     ondansetron (ZOFRAN ODT) 4 MG ODT tab Take 1 tablet (4 mg) by mouth every 8 hours as needed for nausea 10 tablet 0     ondansetron (ZOFRAN) 8 MG tablet Take 1 tablet (8 mg) by mouth every 8 hours as needed for nausea (vomiting) 30 tablet 2     oxybutynin (DITROPAN) 5 MG tablet Take 1 tablet (5 mg) by mouth 3 times daily 30 tablet 1     oxyCODONE (ROXICODONE) 5 MG tablet Take 1-2 tablets (5-10 mg) by mouth every 4 hours as needed for moderate to severe pain 12 tablet 0     prochlorperazine (COMPAZINE) 10 MG tablet Take 1 tablet (10 mg) by mouth every 6 hours as needed for nausea or vomiting 30 tablet 2     senna-docusate (SENOKOT-S/PERICOLACE) 8.6-50 MG tablet Take 1-2 tablets by mouth 2 times daily 30 tablet 0     tamsulosin (FLOMAX) 0.4 MG capsule Take 1 capsule (0.4 mg) by mouth daily 30 capsule 3     traMADol (ULTRAM) 50 MG tablet Take 50 mg by mouth every 6 hours as needed       zolpidem (AMBIEN) 10 MG tablet Take 1 tablet (10 mg) by mouth nightly as needed for sleep 30 tablet 1       Allergies   Allergen Reactions     Penicillins      Allopurinol Rash         EXAM:    There were no vitals taken for this visit. Video, no VS.     GENERAL:  Male, in no acute distress.  Alert and oriented x3. Well groomed.   HEENT:  Normocephalic, atraumatic. No conjunctival injection or eye swelling. Alopecia.   LUNGS:  Nonlabored breathing, no cough or audible wheezing, able to speak full sentences.  MSK: Full ROM UE.    SKIN: No rash on exposed  skin. No jaundice.   NEURO: CN grossly intact, speech normal  PSYCH: Mentation appears normal, insight and judgement intact      Labs: PENDING    Imaging: n/a    Impression/Plan: Geovanni Jasso is a 62 year old male with DLBCL currently on RCHOP.     DLBCL: Presented with diffuse lymphadenopathy and marked FDG avid left supraclavicular, axillary, retroperitoneal lymph nodes.  IPI is 3.  Started R-CHOP with C1 D1 on 3/8/2023 with curative intent.  He has been tolerating this pretty well, but has noticed neuropathy, alopecia, weakness, weight loss.  - cycle #2 on 3/29/2023 with G-CSF support because of neutropenic fever  - neutropenic fever with pancolitis from C. difficile.  Cryptosporidium was also positive.  - cycle #3 was on 4/19/2023.  - PET with CR on 5/5/2023 after 3 cycles  - cycle #4 on 5/10/2023.  - cycle #5 on 5/31/2023  - Cytoxan has been removed for hemorrhagic cystitis  - Labs pending, if within parameters, plan for cycle #6 (cytoxan omitted) tomorrow in [place]  - 7/21 PET  - 7/25 Dr. Maurer    Edema: Lower extremity swelling developed yesterday, improved with elevation.  Unable to examine over video today, but patient states much improved.  - Discussed elevation, compression stockings, limiting salt intake  - If edema returns, low threshold for bilateral lower extremity ultrasound    PN: Likely secondary to vincristine, tolerable.  Did not discuss neuropathic medication, but if symptoms persist, this is certainly an option in the future.    Hematuria: No evidence of infection and Cipro with no improvement.  Saw urology urgently and consistent with hemorrhagic cystitis, likely from Cytoxan.  This has now been omitted from R-CHOP.  Hematuria has resolved and continues on oxybutynin.     Anemia: Likely secondary from chemotherapy, 9.2 on 6/14, labs are pending tomorrow.    C. difficile colitis: Admitted to [facility] in April 2023, completed vancomycin.  He was given a week of vancomycin with levofloxacin for  prevention.      Chart documentation with Dragon Voice recognition Software. Although reviewed after completion, some words and grammatical errors may remain.    40 minutes spent on the date of the encounter doing chart review, review of outside records, patient visit, documentation and discussion with family     Ryann Wynn PA-C  Hematology/Oncology  [facility]

## 2023-06-21 ENCOUNTER — INFUSION THERAPY VISIT (OUTPATIENT)
Dept: INFUSION THERAPY | Facility: CLINIC | Age: 63
End: 2023-06-21
Payer: COMMERCIAL

## 2023-06-21 ENCOUNTER — LAB (OUTPATIENT)
Dept: ONCOLOGY | Facility: CLINIC | Age: 63
End: 2023-06-21
Payer: COMMERCIAL

## 2023-06-21 VITALS
HEART RATE: 78 BPM | OXYGEN SATURATION: 98 % | DIASTOLIC BLOOD PRESSURE: 65 MMHG | TEMPERATURE: 98 F | BODY MASS INDEX: 24.42 KG/M2 | RESPIRATION RATE: 18 BRPM | WEIGHT: 175.1 LBS | SYSTOLIC BLOOD PRESSURE: 101 MMHG

## 2023-06-21 DIAGNOSIS — C83.30 DIFFUSE LARGE B-CELL LYMPHOMA, UNSPECIFIED BODY REGION (H): ICD-10-CM

## 2023-06-21 DIAGNOSIS — T45.1X5A CHEMOTHERAPY-INDUCED NEUTROPENIA (H): Primary | ICD-10-CM

## 2023-06-21 DIAGNOSIS — D70.1 CHEMOTHERAPY-INDUCED NEUTROPENIA (H): Primary | ICD-10-CM

## 2023-06-21 LAB
ALBUMIN SERPL BCG-MCNC: 3.7 G/DL (ref 3.5–5.2)
ALP SERPL-CCNC: 95 U/L (ref 40–129)
ALT SERPL W P-5'-P-CCNC: 20 U/L (ref 0–70)
ANION GAP SERPL CALCULATED.3IONS-SCNC: 9 MMOL/L (ref 7–15)
AST SERPL W P-5'-P-CCNC: 25 U/L (ref 0–45)
BASOPHILS # BLD AUTO: 0.1 10E3/UL (ref 0–0.2)
BASOPHILS NFR BLD AUTO: 2 %
BILIRUB SERPL-MCNC: 0.3 MG/DL
BUN SERPL-MCNC: 6.4 MG/DL (ref 8–23)
CALCIUM SERPL-MCNC: 9.4 MG/DL (ref 8.8–10.2)
CHLORIDE SERPL-SCNC: 103 MMOL/L (ref 98–107)
CREAT SERPL-MCNC: 0.7 MG/DL (ref 0.67–1.17)
DEPRECATED HCO3 PLAS-SCNC: 24 MMOL/L (ref 22–29)
EOSINOPHIL # BLD AUTO: 0 10E3/UL (ref 0–0.7)
EOSINOPHIL NFR BLD AUTO: 1 %
ERYTHROCYTE [DISTWIDTH] IN BLOOD BY AUTOMATED COUNT: 17.6 % (ref 10–15)
GFR SERPL CREATININE-BSD FRML MDRD: >90 ML/MIN/1.73M2
GLUCOSE SERPL-MCNC: 145 MG/DL (ref 70–99)
HCT VFR BLD AUTO: 31.8 % (ref 40–53)
HGB BLD-MCNC: 10.4 G/DL (ref 13.3–17.7)
IMM GRANULOCYTES # BLD: 0 10E3/UL
IMM GRANULOCYTES NFR BLD: 1 %
LDH SERPL L TO P-CCNC: 166 U/L (ref 0–250)
LYMPHOCYTES # BLD AUTO: 0.4 10E3/UL (ref 0.8–5.3)
LYMPHOCYTES NFR BLD AUTO: 11 %
MCH RBC QN AUTO: 34.8 PG (ref 26.5–33)
MCHC RBC AUTO-ENTMCNC: 32.7 G/DL (ref 31.5–36.5)
MCV RBC AUTO: 106 FL (ref 78–100)
MONOCYTES # BLD AUTO: 0.6 10E3/UL (ref 0–1.3)
MONOCYTES NFR BLD AUTO: 17 %
NEUTROPHILS # BLD AUTO: 2.5 10E3/UL (ref 1.6–8.3)
NEUTROPHILS NFR BLD AUTO: 68 %
NRBC # BLD AUTO: 0 10E3/UL
NRBC BLD AUTO-RTO: 0 /100
PLATELET # BLD AUTO: 246 10E3/UL (ref 150–450)
POTASSIUM SERPL-SCNC: 3.6 MMOL/L (ref 3.4–5.3)
PROT SERPL-MCNC: 5.9 G/DL (ref 6.4–8.3)
RBC # BLD AUTO: 2.99 10E6/UL (ref 4.4–5.9)
SODIUM SERPL-SCNC: 136 MMOL/L (ref 136–145)
WBC # BLD AUTO: 3.7 10E3/UL (ref 4–11)

## 2023-06-21 PROCEDURE — 96375 TX/PRO/DX INJ NEW DRUG ADDON: CPT | Performed by: INTERNAL MEDICINE

## 2023-06-21 PROCEDURE — 99207 PR NO CHARGE LOS: CPT

## 2023-06-21 PROCEDURE — 83615 LACTATE (LD) (LDH) ENZYME: CPT | Performed by: INTERNAL MEDICINE

## 2023-06-21 PROCEDURE — 80053 COMPREHEN METABOLIC PANEL: CPT | Performed by: INTERNAL MEDICINE

## 2023-06-21 PROCEDURE — 96377 APPLICATON ON-BODY INJECTOR: CPT | Mod: 59 | Performed by: INTERNAL MEDICINE

## 2023-06-21 PROCEDURE — 96413 CHEMO IV INFUSION 1 HR: CPT | Performed by: INTERNAL MEDICINE

## 2023-06-21 PROCEDURE — 85025 COMPLETE CBC W/AUTO DIFF WBC: CPT | Performed by: INTERNAL MEDICINE

## 2023-06-21 PROCEDURE — 96367 TX/PROPH/DG ADDL SEQ IV INF: CPT | Performed by: INTERNAL MEDICINE

## 2023-06-21 PROCEDURE — 96415 CHEMO IV INFUSION ADDL HR: CPT | Performed by: INTERNAL MEDICINE

## 2023-06-21 PROCEDURE — 96411 CHEMO IV PUSH ADDL DRUG: CPT | Performed by: INTERNAL MEDICINE

## 2023-06-21 RX ORDER — HEPARIN SODIUM (PORCINE) LOCK FLUSH IV SOLN 100 UNIT/ML 100 UNIT/ML
500 SOLUTION INTRAVENOUS DAILY PRN
Status: DISCONTINUED | OUTPATIENT
Start: 2023-06-21 | End: 2023-06-21 | Stop reason: HOSPADM

## 2023-06-21 RX ORDER — DOXORUBICIN HYDROCHLORIDE 2 MG/ML
50 INJECTION, SOLUTION INTRAVENOUS ONCE
Status: COMPLETED | OUTPATIENT
Start: 2023-06-21 | End: 2023-06-21

## 2023-06-21 RX ORDER — PALONOSETRON 0.05 MG/ML
0.25 INJECTION, SOLUTION INTRAVENOUS ONCE
Status: COMPLETED | OUTPATIENT
Start: 2023-06-21 | End: 2023-06-21

## 2023-06-21 RX ORDER — ACETAMINOPHEN 325 MG/1
650 TABLET ORAL ONCE
Status: COMPLETED | OUTPATIENT
Start: 2023-06-21 | End: 2023-06-21

## 2023-06-21 RX ORDER — HEPARIN SODIUM (PORCINE) LOCK FLUSH IV SOLN 100 UNIT/ML 100 UNIT/ML
5 SOLUTION INTRAVENOUS
Status: DISCONTINUED | OUTPATIENT
Start: 2023-06-21 | End: 2023-06-29 | Stop reason: HOSPADM

## 2023-06-21 RX ADMIN — Medication 250 ML: at 10:57

## 2023-06-21 RX ADMIN — HEPARIN SODIUM (PORCINE) LOCK FLUSH IV SOLN 100 UNIT/ML 500 UNITS: 100 SOLUTION at 09:52

## 2023-06-21 RX ADMIN — PALONOSETRON 0.25 MG: 0.05 INJECTION, SOLUTION INTRAVENOUS at 10:57

## 2023-06-21 RX ADMIN — DOXORUBICIN HYDROCHLORIDE 100 MG: 2 INJECTION, SOLUTION INTRAVENOUS at 11:49

## 2023-06-21 RX ADMIN — ACETAMINOPHEN 650 MG: 325 TABLET ORAL at 11:02

## 2023-06-21 RX ADMIN — HEPARIN SODIUM (PORCINE) LOCK FLUSH IV SOLN 100 UNIT/ML 5 ML: 100 SOLUTION at 15:33

## 2023-06-21 NOTE — PROGRESS NOTES
Port accessed using 20 G 3/4 inch needle.  Labs collected from port.  Line flushed with NS and Heparin.  Pt tolerated procedure.  Port left accessed for infusion.    Tara Tong RN-BSN, PHN, OCN  [facility]

## 2023-06-21 NOTE — PROGRESS NOTES
Infusion Nursing Note:  Geovanni Jasso presents today for C6 RCHOP + OnPro.    Patient seen by provider today: No   present during visit today: Not Applicable.    Note: Patient expressed continued fatigue. No nausea. Urinary symptoms resolved-- cytoxan taken out of plan.     Patient took Prednisone upon arrival to infusion.     Patient's Rituxan rates today: 100 ml/hr x 30 min, 200 ml/hour x 30 min, 300 ml/hour x 30 minutes and 400 ml/hour for the remainder.       Intravenous Access:  Implanted Port.    Treatment Conditions:  Lab Results   Component Value Date    HGB 10.4 (L) 06/21/2023    WBC 3.7 (L) 06/21/2023    ANEU 0.1 (LL) 03/22/2023    ANEUTAUTO 2.5 06/21/2023     06/21/2023      Lab Results   Component Value Date     06/21/2023    POTASSIUM 3.6 06/21/2023    CR 0.70 06/21/2023    SHIV 9.4 06/21/2023    BILITOTAL 0.3 06/21/2023    ALBUMIN 3.7 06/21/2023    ALT 20 06/21/2023    AST 25 06/21/2023     Results reviewed, labs MET treatment parameters, ok to proceed with treatment.    Handoff given to RN to resume care.      Eloisa Dominguez RN

## 2023-06-26 ASSESSMENT — ASTHMA QUESTIONNAIRES: ACT_TOTALSCORE: 23

## 2023-06-27 ENCOUNTER — VIRTUAL VISIT (OUTPATIENT)
Dept: FAMILY MEDICINE | Facility: CLINIC | Age: 63
End: 2023-06-27
Payer: COMMERCIAL

## 2023-06-27 ENCOUNTER — MYC MEDICAL ADVICE (OUTPATIENT)
Dept: FAMILY MEDICINE | Facility: CLINIC | Age: 63
End: 2023-06-27

## 2023-06-27 DIAGNOSIS — T45.1X5A CHEMOTHERAPY-INDUCED PERIPHERAL NEUROPATHY (H): Primary | ICD-10-CM

## 2023-06-27 DIAGNOSIS — G62.0 CHEMOTHERAPY-INDUCED PERIPHERAL NEUROPATHY (H): Primary | ICD-10-CM

## 2023-06-27 PROBLEM — K22.4 ESOPHAGEAL SPASM: Status: ACTIVE | Noted: 2023-06-27

## 2023-06-27 PROBLEM — Z86.19 HISTORY OF CLOSTRIDIOIDES DIFFICILE COLITIS: Status: ACTIVE | Noted: 2023-06-27

## 2023-06-27 PROCEDURE — 99213 OFFICE O/P EST LOW 20 MIN: CPT | Mod: VID | Performed by: INTERNAL MEDICINE

## 2023-06-27 RX ORDER — DULOXETIN HYDROCHLORIDE 20 MG/1
20 CAPSULE, DELAYED RELEASE ORAL DAILY
Qty: 30 CAPSULE | Refills: 2 | Status: SHIPPED | OUTPATIENT
Start: 2023-06-27 | End: 2023-11-28

## 2023-06-27 NOTE — PROGRESS NOTES
Bill is a 62 year old who is being evaluated via a billable video visit.      How would you like to obtain your AVS? MyChart  If the video visit is dropped, the invitation should be resent by: Send to e-mail at: [email or web address]  Will anyone else be joining your video visit? No    [facility] Internal Medicine Progress Note           Assessment and Plan:     Chemotherapy-induced peripheral neuropathy (H)  - glutamine 500 MG TABS; Take 1 tablet by mouth daily  - EMG; Future  - Adult Neurology  Referral; Future  - DULoxetine (CYMBALTA) 20 MG capsule; Take 1 capsule (20 mg) by mouth daily  - Physical Therapy Referral; Future         Interval History:     Reason for visit:  Loss of fine motor skills due to cancer treatments.   Symptoms include:  Loss of motor skills, weakness, trouble typing, loss of ,  Symptom intensity:  Severe  Symptom progression:  Staying the same  Had these symptoms before:  No              Significant Problems:   Patient Active Problem List   Diagnosis     Esophageal reflux     Hyperlipidemia LDL goal <70     Chronic rhinitis     Bilateral low back pain without sciatica     Chondromalacia patellae, right     S/P ACL reconstruction     Atherosclerosis of native coronary artery of native heart without angina pectoris     SOB (shortness of breath)     Non-allergic rhinitis     Positive CLAIRE (antinuclear antibody)     Hand joint stiff, right     Carpal tunnel syndrome     Osteoarthritis of knee, unspecified laterality, unspecified osteoarthritis type     Diverticulosis of large intestine     Internal hemorrhoids     Diffuse large B-cell lymphoma, unspecified body region (H)     Chemotherapy-induced neutropenia (H)     Esophageal spasm     Chemotherapy-induced peripheral neuropathy (H)     History of Clostridioides difficile colitis              Review of Systems:   CONSTITUTIONAL: NEGATIVE for fever, chills, change in weight  INTEGUMENTARY/SKIN: NEGATIVE for worrisome rashes,  moles or lesions  EYES: NEGATIVE for vision changes or irritation  ENT/MOUTH: NEGATIVE for ear, mouth and throat problems  RESP: NEGATIVE for significant cough or SOB  CV: NEGATIVE for chest pain, palpitations or peripheral edema  GI: NEGATIVE for nausea, abdominal pain, heartburn, or change in bowel habits  : NEGATIVE for frequency, dysuria, or hematuria  MUSCULOSKELETAL: NEGATIVE for significant arthralgias or myalgia  NEURO: NEGATIVE for HX CVA and HX TIA  ENDOCRINE: NEGATIVE for temperature intolerance, skin/hair changes  HEME: NEGATIVE for bleeding problems  PSYCHIATRIC: NEGATIVE for changes in mood or affect             Physical Exam:   There were no vitals taken for this visit.  Constitutional: fatigued, alert, cooperative, no apparent distress and appears stated age  Eyes: extra-ocular muscles intact  ENT: normocepalic, without obvious abnormality  Lungs: no increased work of breathing and no retractions  Neurologic: Mental Status Exam:  Level of Alertness:   awake  Orientation:   person, place, time  Memory:   normal  Fund of Knowledge:  normal  Attention/Concentration:  normal  Language:  normal  Neuropsychiatric: Normal affect, mood, orientation, memory and insight.          Data:   Epic reviewed.     Disposition:  Follow-up in 4 weeks. Or as needed.    Paddy Holly MD  Internal Medicine  [facility] Team      Video-Visit Details    Type of service:  Video Visit   Joined the call at 6/27/2023, 9:29:48 am.  Left the call at 6/27/2023, 9:55:44 am.  You were on the call for 25 minutes 56 seconds .  Originating Location (pt. Location): Home  Distant Location (provider location):  On-site  Platform used for Video Visit: [employer]

## 2023-06-29 ENCOUNTER — MYC MEDICAL ADVICE (OUTPATIENT)
Dept: FAMILY MEDICINE | Facility: CLINIC | Age: 63
End: 2023-06-29

## 2023-07-05 ENCOUNTER — THERAPY VISIT (OUTPATIENT)
Dept: PHYSICAL THERAPY | Facility: CLINIC | Age: 63
End: 2023-07-05
Attending: INTERNAL MEDICINE
Payer: COMMERCIAL

## 2023-07-05 DIAGNOSIS — T45.1X5A CHEMOTHERAPY-INDUCED PERIPHERAL NEUROPATHY (H): Primary | ICD-10-CM

## 2023-07-05 DIAGNOSIS — G62.0 CHEMOTHERAPY-INDUCED PERIPHERAL NEUROPATHY (H): Primary | ICD-10-CM

## 2023-07-05 PROCEDURE — 97110 THERAPEUTIC EXERCISES: CPT | Mod: GP | Performed by: PHYSICAL THERAPIST

## 2023-07-05 PROCEDURE — 97161 PT EVAL LOW COMPLEX 20 MIN: CPT | Mod: GP | Performed by: PHYSICAL THERAPIST

## 2023-07-05 PROCEDURE — 97112 NEUROMUSCULAR REEDUCATION: CPT | Mod: GP | Performed by: PHYSICAL THERAPIST

## 2023-07-05 NOTE — PROGRESS NOTES
PHYSICAL THERAPY EVALUATION  Type of Visit: Evaluation    See electronic medical record for Abuse and Falls Screening details.    Subjective      Presenting condition or subjective complaint: Weakness due to chemotherapy treatments. Primarily fine motor skills: weakness in hands cannot spread my fingers for some tasks, picking up pen/pencil- is poor, keyboarding is tough, feels it in hands and forearms, lifting a glass of water. Hard to know how much pressure on the cup.  Efficiency at work affected. Balance is I get up too fast I can get dizzi. Lay down in bed and my feet are tingling.   Date of onset:    1/2023 diagnosed with retroperitoneal lymphadenopathy/lymphoma, symptoms in Fall of 2022  Relevant medical history:  see epic. he has finished 6 rounds of chemo. He has next appt with Dr Maurer on 7/25 to determine medical POC. He has anemia and neuropathy from chemo. Hospitalized 4/4-4/7 with cdiff, sepsis, fever. 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 He wears glasses.  Dates & types of surgery:  see epic  Prior diagnostic imaging/testing results:     see epic  Prior therapy history for the same diagnosis, illness or injury: No      Prior Level of Function   Transfers: Independent  Ambulation: Independent  ADL: Independent  IADL: Driving, Finances, Housekeeping, Work, Yard work. Currently: working from home, full time can take a break if needed an make up hours. Has not been driving (wife has been).    Living Environment  Social support: With a significant other or spouse   Type of home: House   Stairs to enter the home: Yes 18 Is there a railing: Yes   Ramp: No   Stairs inside the home: Yes 18 Is there a railing: Yes   Help at home: Home and Yard maintenance tasks,   Equipment owned:       Employment: Yes   Hobbies/Interests: Golf, hasnt golf  this summer. Not sure he has the stamina and worried about germs. No regular exercise prior to cancer diagnosis, would like to kayak and golf this summer    Patient goals for therapy: Normal activities work and personal    Pain assessment: Pain present, back of neck and shoulder on right side. Concerned about it because of neck soreness when lymphoma started. Last night had stomach pain/knot and kept me up at night.      Objective   Cognitive Status Examination  Orientation: Oriented to person, place and time   Level of Consciousness: Alert  Follows Commands and Answers Questions: 100% of the time  Personal Safety and Judgement: Intact  Memory: Intact    OBSERVATION:   INTEGUMENTARY: Intact  POSTURE: WNL  PALPATION: trigger point right mid scapula, medial border  RANGE OF MOTION: CROM WNLs, slightly less rotation to the left because of right side discomfort. SIdebend looks symmetrical, extension is good. Retraction/protraction looks good. Both unit(s)/es AROM at shoulders WNLS. No discomfort noted on right.   STRENGTH:     BED MOBILITY: WNL  TRANSFERS: Independent  GAIT:   Level of [place]: Independent  Assistive Device(s): None  Gait Deviations: WNL  Gait Distance: not tested  Stairs: Ind, can do without railing 4 steps    SPECIAL TESTS  Functional Gait Assessment (FGA) TOTAL SCORE: (MAXIMUM SCORE 30): 26 26   10 Meter Walk Test (Comfortable)  5.5 seconds and 9 steps   10 Meter Walk Test (Fast)  3.85 seconds and 8 steps     0 Second Sit to Stand (reps/height) 13 reps in 30 seconds, not fully upright every time           SENSATION: feet feel numb and tingling, especially at toes. My hands no tingling but they dont feel right na no strength.      Assessment & Plan   CLINICAL IMPRESSIONS   Medical Diagnosis: chemo induced peripheral neuropathy    Treatment Diagnosis: impaired fine motote skills hand and high level balance   Impression/Assessment: Patient is a 62 year old male with chemo induced peripheral  neuropathy affecting fine motor skills of hands more that feet.  Also havign right side neck discomfort. The following significant findings have been identified: Pain, Decreased ROM/flexibility, Impaired balance and Impaired sensation. These impairments interfere with their ability to perform self care tasks, work tasks, recreational activities, household chores, driving , household mobility and community mobility as compared to previous level of function.     Clinical Decision Making (Complexity):   Clinical Presentation: Stable/Uncomplicated  Clinical Presentation Rationale: based on medical and personal factors listed in PT evaluation  Clinical Decision Making (Complexity): Low complexity    PLAN OF CARE  Treatment Interventions:  Interventions: Gait Training, Manual Therapy, Neuromuscular Re-education, Therapeutic Exercise, Self-Care/Home Management    Long Term Goals     PT Goal 1  Goal Identifier: HEP  Goal Description: HE will be IND with a HEP of balance, toe/feet exs for mobility and neck exs to decrease discomfort.  Rationale: to maximize safety and independence within the home;to maximize safety and independence within the community;to maximize safety and independence with self cares  Target Date: 10/02/23      Frequency of Treatment: see every 1-4 weeks  Duration of Treatment: up to 3 months (depends on medical POC (more chemo???))    Recommended Referrals to Other Professionals: Occupational Therapy  Education Assessment:   Learner/Method: Patient;Listening;Reading;Demonstration;Pictures/Video  Education Comments: I read to him Dr Meeks note about his bladder and to take the ditropan medication to help symptoms at night when he first goes to bed. He was not takign it as he was worried about side effects.    Risks and benefits of evaluation/treatment have been explained.   Patient/Family/caregiver agrees with Plan of Care.     Evaluation Time:     PT Eval, Low Complexity Minutes (77981): 40    Signing  Clinician: Trixie Vanesas, PT, NCS

## 2023-07-06 ENCOUNTER — MEDICAL CORRESPONDENCE (OUTPATIENT)
Dept: HEALTH INFORMATION MANAGEMENT | Facility: CLINIC | Age: 63
End: 2023-07-06

## 2023-07-06 ENCOUNTER — MYC MEDICAL ADVICE (OUTPATIENT)
Dept: FAMILY MEDICINE | Facility: CLINIC | Age: 63
End: 2023-07-06

## 2023-07-06 ENCOUNTER — THERAPY VISIT (OUTPATIENT)
Dept: OCCUPATIONAL THERAPY | Facility: CLINIC | Age: 63
End: 2023-07-06
Attending: INTERNAL MEDICINE
Payer: COMMERCIAL

## 2023-07-06 ENCOUNTER — TELEPHONE (OUTPATIENT)
Dept: FAMILY MEDICINE | Facility: CLINIC | Age: 63
End: 2023-07-06

## 2023-07-06 DIAGNOSIS — T45.1X5A CHEMOTHERAPY-INDUCED PERIPHERAL NEUROPATHY (H): Primary | ICD-10-CM

## 2023-07-06 DIAGNOSIS — G62.0 CHEMOTHERAPY-INDUCED PERIPHERAL NEUROPATHY (H): Primary | ICD-10-CM

## 2023-07-06 PROCEDURE — 97165 OT EVAL LOW COMPLEX 30 MIN: CPT | Mod: GO

## 2023-07-06 PROCEDURE — 97110 THERAPEUTIC EXERCISES: CPT | Mod: GO

## 2023-07-06 NOTE — TELEPHONE ENCOUNTER
Charleen returned call and states scheduling could find the order but it states it was canceled. Called [facility] scheduling with patient's spouse conferenced in.      states she found order and will reinstate it and can assist pt with scheduling.   Sujatha Rust RN    [facility]- Primary Care

## 2023-07-06 NOTE — PROGRESS NOTES
"OCCUPATIONAL THERAPY EVALUATION  Type of Visit: Evaluation    See electronic medical record for Abuse and Falls Screening details.    Subjective      Presenting condition or subjective complaint: Weakness due to chemotherapy treatments   Occupational Profile: Patient is a 62 year old male who was referred to outpatient occupational therapy to address deficits in functional hand use during ADL/IADL's secondary to chemo-induced peripheral neuropathy. He has been seen by outpatient physical therapy to address parathesias in feet and instability with ambulation. Patient denies parathesias in hands but notes \"heaviness\" and \"stiffness\" in hands. He endorse functional fine motor coordination limitations including difficulties typing, managing mouse, picking up pen/pencil, picking and manipulating objects, modulating force when grasping objects, opening containers (milk, pop can), tying, engaging in board games, and shuffling cards. He lives at home with supportive wife and continues to work as a . He has a goal of increasing hand strength and functional use to improve ease and efficiency in ADL/IADL's and work tasks.     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, symptoms in Fall of 2022    Relevant medical history:   see epic   Dates & types of surgery:  see epic    Prior diagnostic imaging/testing results:     see epic   Prior therapy history for the same diagnosis, illness or injury: No      Prior Level of Function   Transfers: Independent  Ambulation: Independent  ADL: Independent  IADL: Finances, Housekeeping, Laundry, Meal preparation, Medication management, Work    Living Environment  Social support: With a significant other or spouse   Type of home: House   Stairs to enter the home: Yes 18 Is there a railing: Yes   Ramp: No   Stairs inside the home: Yes 18 Is there a railing: Yes   Help at home: Home and Yard maintenance tasks  Equipment owned:   "     Employment: Yes   Hobbies/Interests: Golf    Patient goals for therapy: Normal activities work and personal    Pain assessment: reports discomfort in bilateral feet (numbness/tingling)      Objective   Fatigue    General Fatigue ADL, Work   Patient notes he is required to limit, modify, or receive assistance for activities secondary to fatigue during ADL's and work. He notes prolonged recovery if he pushes through fatigue       The FACIT-F (Functional Assessment of Chronic Illness Therapy - Fatigue) is a 13-item questionnaire that assesses self-reported fatigue and its impact upon daily activities and function.  The range of possible scores is 0-52, with 0 being the worst possible score and 52 the best.  Average score in [place] general population is 40.  Any effort to raise or maintain a score above 30 would help keep the person WNLs as defined by + or - one SD.  If a person were to have a 3-4 point increase or decrease in score, one can reasonably classify that person as having changed. Pt scored 24/52 (BNLs, see flowsheet for details).    Cognitive Status Examination  Orientation: Oriented to person, place and time   Level of Consciousness: Alert  Follows Commands and Answers Questions: 100% of the time  Personal Safety and Judgement: Intact  Memory: Intact  Attention: No deficits identified  Organization/Problem Solving: No deficits identified  Executive Function: No deficits identified    Pt denies cognitive changes and engages in cognitively stimulating activities  VISUAL SKILLS  Visual Acuity: No deficits identified  Visual Field: Appears normal  Visual Attention: Appears normal  Oculomotor: WNL     SENSATION: peripheral neuropathy impacts fine motor control, describes hands as being heavy, hot/cold, texture discrimination, sharp/dull intact    POSTURE: WFL  RANGE OF MOTION: UE AROM WFL  STRENGTH:   ,    Strength (lbs) L: 49 lb  (BNL - average: 56-97 lb) R: 39 lb (BNL - average:  lb)   Lateral  "Pinch (lbs) L: 11 lb (BNL) R: 10 lb (BNL)   3 Point Pinch (lbs)          Impaired right and left  and pinch strength noted (right greater deficits compared to left)    Hand Range of motion: reports hand tightness but achieves full range of motion - effortful to achieve full digital extension   Hand Dominance: Right  MUSCLE TONE: WFL - reports hand stiffness and \"heaviness\" in hands  COORDINATION: Fine Motor Coordination: difficulties typing, managing mouse, grasping pen/pencil, picking and manipulating objects, modulating force on objects, opening containers (milk, pop can), tying, board games, shuffling cards  Nine Hole Peg Test: R hand: 27 seconds (BNL - average: 20 seconds); L hand:30 seconds (BNL - average: 21 seconds)    BALANCE: notes some instabillities due to peripheral neuropathy in feet; no falls; pt is seeing outpatient physical therapy     FUNCTIONAL MOBILITY  Assistive Device(s): None    BED MOBILITY: Independent     TRANSFERS: Independent    BATHING: Independent    UPPER BODY DRESSING: Independent - notes tying requires concious effort     LOWER BODY DRESSING: Independent    TOILETING: Independent     GROOMING: Independent    EATING/SELF FEEDING: Independent  With difficulty - challenges grasping and modulating force when grasping cup and assuming proper grasp on utensil     ACTIVITY TOLERANCE: Pt reports fatigue during ADL/IADL's requiring increased assistance and notes prolonged recovery if he pushes through faitigue    INSTRUMENTAL ACTIVITIES OF DAILY LIVING (IADL): Pt receiving increased assistance from for IADL's since chemotherapy  Meal Planning/Prep: Pt states he prepares simple meals for himself  Home/Financial Management: Pt receiving increased assistance from family but gradually increasing participation in home management tasks  Communication/Computer Use: Difficulty with grasping writing utensils, typing, managing mouse  Community Mobility: Pt has not resumed driving   Work: Patient " continues to work as a , which is computer-based. Functional limitations includes decreased efficiency with typing and mouse use    Assessment & Plan   CLINICAL IMPRESSIONS   Medical Diagnosis: Chemotherapy-induced peripheral neuropathy (H)    Treatment Diagnosis: impaired ease, efficiency, and IND in ADL/IADL's    Impression/Assessment: Patient is a 62 year old male who was referred to outpatient occupational therapy to address deficits in functional hand use during ADL/IADL's secondary to chemo-induced peripheral neuropathy.  The following significant findings have been identified: Impaired activity tolerance and hand strength and fine motor coordintion.  These identified deficits interfere with their ability to perform self care tasks, work tasks (typing, mouse use, handwriting) and grasping/manipuating objects as compared to previous level of function.     Clinical Decision Making (Complexity):   Assessment of Occupational Performance: 1-3 Performance Deficits  Occupational Performance Limitations: work and grasping/manipulating objects, typing, handwriting  Clinical Decision Making (Complexity): Low complexity    PLAN OF CARE  Treatment Interventions:   Interventions: Self-Care/Home Management, Therapeutic Activity, Therapeutic Exercise    Long Term Goals   OT Goal 1  Goal Identifier: Falls Prevention  Goal Description: The patient will verbalize and/or demonstate understanding of fall prevention strategies to reduce risk of falling in the home and in the community and maximize safety and IND in ADL/IADL's  OT Goal 2  Goal Identifier: Fatigue Management  Goal Description: Patient will verbalize 2-3 energy conservation strategies (e.g. pacing, planning, prioritizing, etc.) to facilitate fatigue management with I/ADL (e.g. laundry, cooking, meal prep, work, etc.) as measured by improved score on FACIT by 10+ as indication of improved fatigue management.  Goal Progress: 24 @ eval  Target Date: 10/03/23  OT Goal  3  Goal Identifier: Adaptive Techniques/Equipment  Goal Description: Patient to verbalize understanding of and demonstrate use of 3-5 new pieces of adaptive equipment and/or adapted techniques to increase his independence and safety with ADLs and IADLS (handwriting, typing, utensil use, dressing fasteners)  Target Date: 10/03/23  OT Goal 4  Goal Identifier: Hand Strength  Goal Description: Pt will demonstrate improved right and left  strength by 10#  and improve lateral pinch by 3# or greater for increased independence while performing I/ADL s such as opening containers, maintaining pinch to hold items (e.g. LB clothing) grasping utensils, opening containers, holding/carrying objects).  Target Date: 10/03/23  OT Goal 5  Goal Identifier: HEP  Goal Description: Pt will demonstrate independent completion of 3-5 activity HEP for UE strength/coordination to promote independence with ADLs/IADLs (e.g. handwriting, typing, engaging fastenres, grasping/manipulating objects, modulating force)  Target Date: 10/03/23      Frequency of Treatment: 1x/week  Duration of Treatment: up to 90 days     Recommended Referrals to Other Professionals: none  Education Assessment: Learner/Method: Patient;Demonstration;Listening     Risks and benefits of evaluation/treatment have been explained.   Patient/Family/caregiver agrees with Plan of Care.     Evaluation Time:    OT Eval, Low Complexity Minutes (15597): 35      Signing Clinician: LAURE Mendoza/MICHAEL

## 2023-07-06 NOTE — TELEPHONE ENCOUNTER
Pt's wife calling back with pt on the line. State that they were abl to get the EMG scheduled for November.    State that the neurology referral needs to state a certain provider to be able to make an appointment with them.    State that she was told she can schedule with Dr. Cavazos.    Routing to provider to update order.    Fax: [phone]      Erma Back RN  [facility]

## 2023-07-06 NOTE — TELEPHONE ENCOUNTER
Patient and spouse Charleen on the line.     Charleen states she attempted to call and schedule EMG and was told by staff there isn't an order for it in the chart. Plan per Dr. Holly was for patient to see neurology and have EMG done.     Writer reviewed chart and was able to locate order for EMG in patient's chart.    Informed patient and wife of this. Confirmed with them number to schedule. They state they will try calling again.    :  Patient is also wondering if he can have order for EMG faxed to [facility] neurology at [phone] and [facility] of neurology at [phone].   Patient would like a [employer] message sent to him or call back once this is completed.    Sujatha Rust RN    [facility]- Primary [facility]

## 2023-07-07 ENCOUNTER — TELEPHONE (OUTPATIENT)
Dept: FAMILY MEDICINE | Facility: CLINIC | Age: 63
End: 2023-07-07

## 2023-07-07 NOTE — TELEPHONE ENCOUNTER
Faxed EMG order to [facility]. [phone], right fax confirmed at 6:43 am today, 7/7/2023. Copy to TC and abstracting.  Called and spoke to the patient and let him know that the order was faxed to [facility] this am. Patient understands.  Fiona Nogueira MA  [facility]   Primary Care

## 2023-07-07 NOTE — TELEPHONE ENCOUNTER
[facility] calling because they received an order for EMG.  State that the orders does not specify what extremity needs to be tested.      Routing to provider to update order.    Fax updated order to [phone].      Erma Back RN  [facility]

## 2023-07-07 NOTE — TELEPHONE ENCOUNTER
EMG referral also ordered for [facility], in case the patient is not able to schedule an earlier appointment at [facility]     Order placed in TC basket.

## 2023-07-13 ENCOUNTER — THERAPY VISIT (OUTPATIENT)
Dept: OCCUPATIONAL THERAPY | Facility: CLINIC | Age: 63
End: 2023-07-13
Attending: INTERNAL MEDICINE
Payer: COMMERCIAL

## 2023-07-13 ENCOUNTER — THERAPY VISIT (OUTPATIENT)
Dept: PHYSICAL THERAPY | Facility: CLINIC | Age: 63
End: 2023-07-13
Attending: INTERNAL MEDICINE
Payer: COMMERCIAL

## 2023-07-13 DIAGNOSIS — T45.1X5A CHEMOTHERAPY-INDUCED PERIPHERAL NEUROPATHY (H): Primary | ICD-10-CM

## 2023-07-13 DIAGNOSIS — G62.0 CHEMOTHERAPY-INDUCED PERIPHERAL NEUROPATHY (H): Primary | ICD-10-CM

## 2023-07-13 PROCEDURE — 97116 GAIT TRAINING THERAPY: CPT | Mod: GP | Performed by: PHYSICAL THERAPIST

## 2023-07-13 PROCEDURE — 97535 SELF CARE MNGMENT TRAINING: CPT | Mod: GO

## 2023-07-13 PROCEDURE — 97110 THERAPEUTIC EXERCISES: CPT | Mod: GO

## 2023-07-13 PROCEDURE — 97110 THERAPEUTIC EXERCISES: CPT | Mod: GP | Performed by: PHYSICAL THERAPIST

## 2023-07-20 ENCOUNTER — THERAPY VISIT (OUTPATIENT)
Dept: OCCUPATIONAL THERAPY | Facility: CLINIC | Age: 63
End: 2023-07-20
Attending: INTERNAL MEDICINE
Payer: COMMERCIAL

## 2023-07-20 DIAGNOSIS — T45.1X5A CHEMOTHERAPY-INDUCED PERIPHERAL NEUROPATHY (H): Primary | ICD-10-CM

## 2023-07-20 DIAGNOSIS — G62.0 CHEMOTHERAPY-INDUCED PERIPHERAL NEUROPATHY (H): Primary | ICD-10-CM

## 2023-07-20 PROCEDURE — 97110 THERAPEUTIC EXERCISES: CPT | Mod: GO

## 2023-07-20 PROCEDURE — 97535 SELF CARE MNGMENT TRAINING: CPT | Mod: GO

## 2023-07-21 ENCOUNTER — ANCILLARY PROCEDURE (OUTPATIENT)
Dept: PET IMAGING | Facility: CLINIC | Age: 63
End: 2023-07-21
Attending: INTERNAL MEDICINE
Payer: COMMERCIAL

## 2023-07-21 ENCOUNTER — LAB (OUTPATIENT)
Dept: ONCOLOGY | Facility: CLINIC | Age: 63
End: 2023-07-21
Payer: COMMERCIAL

## 2023-07-21 DIAGNOSIS — R22.1 NECK MASS: ICD-10-CM

## 2023-07-21 DIAGNOSIS — C83.30 DIFFUSE LARGE B-CELL LYMPHOMA, UNSPECIFIED BODY REGION (H): ICD-10-CM

## 2023-07-21 DIAGNOSIS — C83.30 DIFFUSE LARGE B-CELL LYMPHOMA, UNSPECIFIED BODY REGION (H): Primary | ICD-10-CM

## 2023-07-21 LAB
ALBUMIN SERPL BCG-MCNC: 3.8 G/DL (ref 3.5–5.2)
ALP SERPL-CCNC: 95 U/L (ref 40–129)
ALT SERPL W P-5'-P-CCNC: 18 U/L (ref 0–70)
ANION GAP SERPL CALCULATED.3IONS-SCNC: 8 MMOL/L (ref 7–15)
AST SERPL W P-5'-P-CCNC: 30 U/L (ref 0–45)
BASOPHILS # BLD AUTO: 0.1 10E3/UL (ref 0–0.2)
BASOPHILS NFR BLD AUTO: 2 %
BILIRUB SERPL-MCNC: 0.3 MG/DL
BUN SERPL-MCNC: 7.2 MG/DL (ref 8–23)
CALCIUM SERPL-MCNC: 9.5 MG/DL (ref 8.8–10.2)
CHLORIDE SERPL-SCNC: 104 MMOL/L (ref 98–107)
CREAT SERPL-MCNC: 0.7 MG/DL (ref 0.67–1.17)
DEPRECATED HCO3 PLAS-SCNC: 25 MMOL/L (ref 22–29)
EOSINOPHIL # BLD AUTO: 0.1 10E3/UL (ref 0–0.7)
EOSINOPHIL NFR BLD AUTO: 3 %
ERYTHROCYTE [DISTWIDTH] IN BLOOD BY AUTOMATED COUNT: 14.1 % (ref 10–15)
GFR SERPL CREATININE-BSD FRML MDRD: >90 ML/MIN/1.73M2
GLUCOSE SERPL-MCNC: 90 MG/DL (ref 70–99)
HCT VFR BLD AUTO: 35.3 % (ref 40–53)
HGB BLD-MCNC: 11.8 G/DL (ref 13.3–17.7)
IMM GRANULOCYTES # BLD: 0 10E3/UL
IMM GRANULOCYTES NFR BLD: 0 %
LDH SERPL L TO P-CCNC: 172 U/L (ref 0–250)
LYMPHOCYTES # BLD AUTO: 0.5 10E3/UL (ref 0.8–5.3)
LYMPHOCYTES NFR BLD AUTO: 11 %
MCH RBC QN AUTO: 35 PG (ref 26.5–33)
MCHC RBC AUTO-ENTMCNC: 33.4 G/DL (ref 31.5–36.5)
MCV RBC AUTO: 105 FL (ref 78–100)
MONOCYTES # BLD AUTO: 0.7 10E3/UL (ref 0–1.3)
MONOCYTES NFR BLD AUTO: 16 %
NEUTROPHILS # BLD AUTO: 2.7 10E3/UL (ref 1.6–8.3)
NEUTROPHILS NFR BLD AUTO: 68 %
NRBC # BLD AUTO: 0 10E3/UL
NRBC BLD AUTO-RTO: 0 /100
PLATELET # BLD AUTO: 217 10E3/UL (ref 150–450)
POTASSIUM SERPL-SCNC: 4.1 MMOL/L (ref 3.4–5.3)
PROT SERPL-MCNC: 6 G/DL (ref 6.4–8.3)
RBC # BLD AUTO: 3.37 10E6/UL (ref 4.4–5.9)
SODIUM SERPL-SCNC: 137 MMOL/L (ref 136–145)
WBC # BLD AUTO: 4 10E3/UL (ref 4–11)

## 2023-07-21 PROCEDURE — 36591 DRAW BLOOD OFF VENOUS DEVICE: CPT

## 2023-07-21 PROCEDURE — 83615 LACTATE (LD) (LDH) ENZYME: CPT | Performed by: INTERNAL MEDICINE

## 2023-07-21 PROCEDURE — 74177 CT ABD & PELVIS W/CONTRAST: CPT | Mod: GC | Performed by: RADIOLOGY

## 2023-07-21 PROCEDURE — 71260 CT THORAX DX C+: CPT | Mod: GC | Performed by: RADIOLOGY

## 2023-07-21 PROCEDURE — 78816 PET IMAGE W/CT FULL BODY: CPT | Mod: GC | Performed by: RADIOLOGY

## 2023-07-21 PROCEDURE — 80053 COMPREHEN METABOLIC PANEL: CPT | Performed by: INTERNAL MEDICINE

## 2023-07-21 PROCEDURE — A9552 F18 FDG: HCPCS | Performed by: RADIOLOGY

## 2023-07-21 PROCEDURE — 85025 COMPLETE CBC W/AUTO DIFF WBC: CPT | Performed by: INTERNAL MEDICINE

## 2023-07-21 PROCEDURE — 70491 CT SOFT TISSUE NECK W/DYE: CPT | Mod: GC | Performed by: RADIOLOGY

## 2023-07-21 RX ORDER — IOPAMIDOL 755 MG/ML
10-135 INJECTION, SOLUTION INTRAVASCULAR ONCE
Status: COMPLETED | OUTPATIENT
Start: 2023-07-21 | End: 2023-07-21

## 2023-07-21 RX ORDER — HEPARIN SODIUM (PORCINE) LOCK FLUSH IV SOLN 100 UNIT/ML 100 UNIT/ML
500 SOLUTION INTRAVENOUS DAILY PRN
Status: DISCONTINUED | OUTPATIENT
Start: 2023-07-21 | End: 2023-07-21 | Stop reason: HOSPADM

## 2023-07-21 RX ADMIN — IOPAMIDOL 97 ML: 755 INJECTION, SOLUTION INTRAVASCULAR at 08:29

## 2023-07-21 RX ADMIN — HEPARIN SODIUM (PORCINE) LOCK FLUSH IV SOLN 100 UNIT/ML 500 UNITS: 100 SOLUTION at 10:05

## 2023-07-21 NOTE — PROGRESS NOTES
Port accessed using 20 G 3/4 inch needle.  Labs collected from port.  Line flushed with NS and Heparin.  Pt tolerated procedure.  Port de-accessed per protocol.    Tara Tong RN-BSN, PHN, OCN  [facility]

## 2023-07-25 ENCOUNTER — VIRTUAL VISIT (OUTPATIENT)
Dept: ONCOLOGY | Facility: CLINIC | Age: 63
End: 2023-07-25
Payer: COMMERCIAL

## 2023-07-25 ENCOUNTER — MYC MEDICAL ADVICE (OUTPATIENT)
Dept: FAMILY MEDICINE | Facility: CLINIC | Age: 63
End: 2023-07-25

## 2023-07-25 VITALS — BODY MASS INDEX: 24.36 KG/M2 | HEIGHT: 71 IN | WEIGHT: 174 LBS

## 2023-07-25 DIAGNOSIS — G62.9 NEUROPATHY: Primary | ICD-10-CM

## 2023-07-25 DIAGNOSIS — E04.1 THYROID NODULE: Primary | ICD-10-CM

## 2023-07-25 DIAGNOSIS — M79.89 SWELLING OF LIMB: ICD-10-CM

## 2023-07-25 DIAGNOSIS — C83.30 DIFFUSE LARGE B-CELL LYMPHOMA, UNSPECIFIED BODY REGION (H): ICD-10-CM

## 2023-07-25 PROCEDURE — 99215 OFFICE O/P EST HI 40 MIN: CPT | Mod: VID | Performed by: INTERNAL MEDICINE

## 2023-07-25 RX ORDER — GABAPENTIN 300 MG/1
300 CAPSULE ORAL 3 TIMES DAILY
Qty: 30 CAPSULE | Refills: 3 | Status: SHIPPED | OUTPATIENT
Start: 2023-07-25 | End: 2023-07-25

## 2023-07-25 RX ORDER — GABAPENTIN 300 MG/1
300 CAPSULE ORAL AT BEDTIME
Qty: 30 CAPSULE | Refills: 3 | Status: SHIPPED | OUTPATIENT
Start: 2023-07-25 | End: 2023-10-04

## 2023-07-25 RX ORDER — FUROSEMIDE 20 MG
20 TABLET ORAL DAILY
Qty: 30 TABLET | Refills: 1 | Status: SHIPPED | OUTPATIENT
Start: 2023-07-25 | End: 2023-10-03

## 2023-07-25 ASSESSMENT — PAIN SCALES - GENERAL: PAINLEVEL: EXTREME PAIN (9)

## 2023-07-25 NOTE — PATIENT INSTRUCTIONS
Port flushes every 8 weeks also.    Start lasix 20 mg daily    Start gabapentin 300 mg at bedtime    Follow with PCP regarding thyroid nodule.    Labs and CT in 6 months and see me a few days after that

## 2023-07-25 NOTE — LETTER
7/25/2023         RE: Geovanni Jasso  [address]  [place] [address]        Dear Colleague,    Thank you for referring your patient, Geovanni Jasso, to the [facility]. Please see a copy of my visit note below.    Virtual Visit Details    Type of service:  Video Visit   Video start time. 4:06 PM   Video stop time. 4:43 PM   Originating Location (pt. Location): Home    Distant Location (provider location):  Off-site  Platform used for Video Visit: [facility]    Oncology follow-up visit:  Date on this visit: 7/25/23     Diagnoses.    Diffuse large B-cell lymphoma    Primary Physician: Paddy Holly     History Of Present Illness:  Mr. Jasso is a 62 year old  male who presents for follow-up of diffuse lymphadenopathy.  He initially saw me on 1/18/2022 for retroperitoneal lymphadenopathy.  Please see my previous note for details.  I have copied and updated from prior notes.        Over the last few months, since fall of 2022, off and on he has had upper abdominal pain which lasts few minutes. He thinks stress sometimes can bring it on. No relationship to food.  No nausea or vomiting. BMs have been fine. No bleeding. No fevers.   He had a CT Abd Pelvis on 1/6/2023 which showed enlarged retroperitoneal lymphadenopathy  He has noticed some fatigue. He has also noticed some lump in the throat just above the sternum at night. This is going on for a couple of months. He denies any dysphagia.     Currently he feels well. Currently denies any abdominal pain. No nausea or vomiting. BMs are fine. No recent weight loss. No SOB. No neuropathy. No skin flushing. No drenching night sweats.    In the summer, he noticed a rash on the face which has since resolved.  He was noted to have CLAIRE positive.     On 2/6/2023, he had FNA of the left supraclavicular lymph node.  Cytology showed atypical lymphoid cells.  Flow cytometry showed lambda monotypic B cells which are negative for CD5 and CD10.   This is consistent with a B-cell lymphoma.  We decided to go ahead with excisional lymph node biopsy as well as bone marrow biopsy for complete staging.    Excisional lymph node biopsy showed diffuse large B-cell lymphoma.    Bone marrow biopsy did not reveal evidence of lymphoma.    He started R-CHOP on 3/8/2023.    Cycle #2 R-CHOP on 3/29/2023.        He was admitted to the [facility] from 4/4/2023 - 4/9/2023.  He was admitted for neutropenic fever, sepsis and C. difficile colitis.  He presented with multiple bouts of nonbloody diarrhea and fever of 101 with generalized fatigue and weakness.  He was neutropenic and was noted to have pancolitis.  C. difficile was positive.  Cryptosporidium was also positive.  He was started on IV cefepime and IV Flagyl initially.  Infectious disease was consulted and he was switched to oral vancomycin to complete 14 days.  Neutropenia resolved by 4/7/2023.    He completed oral vancomycin course on 4/19/2023 4/19/2023.  Cycle #3 of R-CHOP    5/5/2023.  PET CT neck chest abdomen and pelvis shows complete response to treatment per Lugano criteria    5/10/2023.  Cycle #4 of R-CHOP.    5/31/2023.  Cycle #5 of R-CHOP .        Interval history.  This is a video visit.  His wife Charleen is also available.      He developed hemorrhagic cystitis from cyclophosphamide . I reviewed note from urologist Dr Parra. He received Cycle #6 R-CHOP without Cytoxan on 6/21/2023.    Overall he has been doing well and slowly getting better with better energy although still feels pretty fatigued.  His main complaint is neuropathy which involves his hands and tingling in the feet which also affects his walking.  He has started OT/PT which has helped a little bit but is still it is quite bothersome.  He has not tried any medications for it.  He has noticed improvement in his voice.  There is bilateral lower extremity swelling for which he has not tried Lasix.  He denies any GI problems currently.  No  new swellings.  No more hematuria or pain upon urination.  He has not been using oxybutynin recently because he has not required that.        ECOG 2    ROS:  A comprehensive ROS was otherwise neg    I reviewed other history in Epic as before.      Past Medical/Surgical History:  Past Medical History:   Diagnosis Date     Cancer (H) 2-7-2023    just diagnosed with lymphona.     Carpal tunnel syndrome      Chronic rhinitis 10/23/2015     Esophageal reflux 12/26/2013     Hand joint stiff, right    Shingles     Past Surgical History:   Procedure Laterality Date     BIOPSY  2-6-23     BIOPSY LYMPH NODE CERVICAL Left 2/16/2023    Procedure: excisional biopsy of a left cervical node;  Surgeon: Aung Tamayo MD;  Location: [facility] OR     COLONOSCOPY  2019    clean, do again in 10 years     COLONOSCOPY WITH CO2 INSUFFLATION N/A 07/17/2019    Procedure: COLONOSCOPY, WITH CO2 INSUFFLATION;  Surgeon: Jaison Hammer MD;  Location: [facility] OR     ENT SURGERY  1979    Loss of hearing rt ear (tinnitis)     HERNIA REPAIR  2000     NO HISTORY OF SURGERY       ORTHOPEDIC SURGERY  1988,1992    rt knee, scope in 88; ACL repair Early 90s     Cancer History:   As above    Allergies:  Allergies as of 07/25/2023 - Reviewed 07/21/2023   Allergen Reaction Noted     Penicillins  12/23/2013     Allopurinol Rash 04/04/2023     Current Medications:  Current Outpatient Medications   Medication Sig Dispense Refill     acetaminophen (TYLENOL) 325 MG tablet Take 2 tablets (650 mg) by mouth every 6 hours as needed for mild pain 50 tablet 0     albuterol (PROAIR HFA/PROVENTIL HFA/VENTOLIN HFA) 108 (90 Base) MCG/ACT inhaler Inhale 2 puffs into the lungs every 4 hours as needed for shortness of breath 18 g 5     albuterol (PROVENTIL) (2.5 MG/3ML) 0.083% neb solution Take 1 vial (2.5 mg) by nebulization every 6 hours as needed for shortness of breath, wheezing or cough 90 mL 0     diclofenac (VOLTAREN) 1 % topical gel Apply small amount  topically to right shoulder 3-4 times daily as needed for pain. 50 g 0     DULoxetine (CYMBALTA) 20 MG capsule Take 1 capsule (20 mg) by mouth daily 30 capsule 2     Efinaconazole (JUBLIA) 10 % SOLN Externally apply topically daily to nail. 8 mL 4     fexofenadine (ALLEGRA) 60 MG tablet Take 1 tablet (60 mg) by mouth 2 times daily 180 tablet 0     fluticasone (FLONASE) 50 MCG/ACT nasal spray Spray 1-2 sprays into both nostrils daily 16 g 11     glutamine 500 MG TABS Take 1 tablet by mouth daily       guaiFENesin (MUCINEX) 600 MG 12 hr tablet Take 2 tablets (1,200 mg) by mouth 2 times daily 30 tablet 1     hydrocortisone (CORTAID) 1 % external ointment Apply topically 2 times daily As needed 56 g 0     ibuprofen (ADVIL,MOTRIN) 200 MG tablet Take 200 mg by mouth every 6 hours as needed       ketoconazole (NIZORAL) 2 % external cream Apply twice daily for 8 weeks to feet 60 g 3     LANsoprazole (PREVACID) 30 MG DR capsule Take 1 capsule (30 mg) by mouth every morning (before breakfast) 90 capsule 2     lidocaine-prilocaine (EMLA) 2.5-2.5 % external cream Apply topically as needed for moderate pain (4-6) Apply over port at least 30 minutes before port access. 30 g 0     methocarbamol (ROBAXIN) 500 MG tablet Take 1 tablet (500 mg) by mouth nightly as needed for muscle spasms 30 tablet 0     ondansetron (ZOFRAN ODT) 4 MG ODT tab Take 1 tablet (4 mg) by mouth every 8 hours as needed for nausea 10 tablet 0     ondansetron (ZOFRAN) 8 MG tablet Take 1 tablet (8 mg) by mouth every 8 hours as needed for nausea (vomiting) 30 tablet 2     oxybutynin (DITROPAN) 5 MG tablet Take 1 tablet (5 mg) by mouth 3 times daily (Patient not taking: Reported on 6/20/2023) 30 tablet 1     oxyCODONE (ROXICODONE) 5 MG tablet Take 1-2 tablets (5-10 mg) by mouth every 4 hours as needed for moderate to severe pain 12 tablet 0     prochlorperazine (COMPAZINE) 10 MG tablet Take 1 tablet (10 mg) by mouth every 6 hours as needed for nausea or vomiting  30 tablet 2     senna-docusate (SENOKOT-S/PERICOLACE) 8.6-50 MG tablet Take 1-2 tablets by mouth 2 times daily 30 tablet 0     tamsulosin (FLOMAX) 0.4 MG capsule Take 1 capsule (0.4 mg) by mouth daily (Patient not taking: Reported on 6/20/2023) 30 capsule 3     traMADol (ULTRAM) 50 MG tablet Take 50 mg by mouth every 6 hours as needed       zolpidem (AMBIEN) 10 MG tablet Take 1 tablet (10 mg) by mouth nightly as needed for sleep (Patient not taking: Reported on 6/20/2023) 30 tablet 1      Family History:  Family History   Problem Relation Age of Onset     Hyperlipidemia Mother         medication     Diabetes Father      Other Cancer Father         Lung cancer     Other Cancer Maternal Grandmother         Unsure what type of cancer     Autoimmune Disease No family hx of      Maternal grand mother had some sort of a cancer  Dad had lung cancer  Has 4 kids- all healthy    Social History:  Social History     Socioeconomic History     Marital status:      Spouse name: Not on file     Number of children: Not on file     Years of education: Not on file     Highest education level: Not on file   Occupational History     Not on file   Tobacco Use     Smoking status: Never     Passive exposure: Never     Smokeless tobacco: Never   Vaping Use     Vaping Use: Never used   Substance and Sexual Activity     Alcohol use: Yes     Comment: occassional, 1- 2 per week     Drug use: No     Sexual activity: Yes     Partners: Female     Birth control/protection: Male Surgical, Female Surgical     Comment: vasectomy   Other Topics Concern     Parent/sibling w/ CABG, MI or angioplasty before 65F 55M? No   Social History Narrative     Not on file     Social Determinants of Health     Financial Resource Strain: Not on file   Food Insecurity: Not on file   Transportation Needs: Not on file   Physical Activity: Not on file   Stress: Not on file   Social Connections: Not on file   Intimate Partner Violence: Not on file   Housing  Stability: Not on file   no smoking. Drinks etoh wine 2-3 times / week.       Physical Exam:  There were no vitals taken for this visit.   Wt Readings from Last 4 Encounters:   06/21/23 79.4 kg (175 lb 1.6 oz)   06/14/23 79.8 kg (176 lb)   06/11/23 78.9 kg (173 lb 14.4 oz)   05/31/23 79.8 kg (176 lb)       Constitutional.  Looks well and in no apparent distress.   Eyes.  Without eye redness or apparent jaundice.   Respiratory.  Non labored breathing. Speaking in full sentences.    Skin.  No concerning skin rashes on the skin visualized.   Neurological.  Is alert and oriented.  Psychiatric.  Mood and affect seem appropriate.      The rest of a comprehensive physical examination is deferred due to Public Health Emergency video visit restrictions.          Laboratory/Imaging Studies      Reviewed  7/21/2023  CBC shows WBC 4.  Hemoglobin 11.8.  Platelets 217.    Chemistry unremarkable.        6/11/2023  Urinalysis showed gross hematuria.  Few bacteria.  0-5 WBCs.  Negative nitrites and leukocyte esterase.    Urine culture was negative      Previously  SPEP did not show any monoclonal protein.  Serum immunoglobulin levels were normal.  [place] free light chain 2.53.  Lambda 1.6.  Ratio normal 1.58.      Hepatitis B surface antibody was positive.  Hepatitis B surface antigen and core antibody were negative.  HIV negative  Hep C negative        1/9/2023  CBC was unremarkable.  Peripheral blood smear was unremarkable.    CLAIRE was positive-1: 80, speckled pattern  dsDNA was negative  ESR 8    Chest x-ray on 5/23/2023 did not show any acute airspace disease.    PET CT chest abdomen and pelvis 7/21/2023  FINDINGS:      HEAD/NECK:  Refer to dedicated report of high-resolution neuroradiology PET-CT for  findings in the head and neck.      CHEST:  No evidence of hypermetabolic mediastinal or axillary adenopathy.  Stable calcified subcarinal nodes with mild reactive uptake.      The central tracheobronchial tree is clear. No  pleural effusion or  pneumothorax. No acute consolidation.Few scattered sub-4 mm pulmonary  nodules are unchanged. Scattered calcified granulomas. Biapical  scarring.     Heart size is within normal limits. Mild scattered coronary  calcifications. No pericardial effusion. The thoracic aorta and main  pulmonary artery are within normal limits for diameter. Long segment  uptake in the esophagus likely represents reflux.      Right chest port catheter tip terminates at the cavoatrial junction.     ABDOMEN AND PELVIS:  There is no suspicious FDG uptake in the abdomen or pelvis.     The liver is unremarkable. Cholelithiasis. No intrahepatic or  extrahepatic biliary ductal dilatation. The pancreas is unremarkable.  No pancreatic ductal dilatation. The spleen is unremarkable. Small  splenule. The adrenal glands are unremarkable.     Symmetric enhancement of the kidneys. No hydronephrosis. The urinary  bladder is decompressed. Mild prostatomegaly. There is mild  heterogenous uptake in the prostate without discrete focal  hypermetabolism.       No hypermetabolic lymph nodes are demonstrated in the abdomen or  pelvis.     Normal caliber of the small and large bowel. Colonic diverticulosis.  No free air, free fluid, or fluid collection. Normal caliber of the  abdominal aorta. Small fat-containing umbilical hernia. Mild  inflammatory uptake associated with right ventral abdominal wall mesh  repair.      LOWER EXTREMITIES:   There is no suspicious FDG uptake in the visualized lower extremities.  Postsurgical changes of ACL repair in the right knee.      BONES:   There is no suspicious FDG uptake in the skeleton. There are no  suspicious lytic or blastic osseous lesions. Multilevel degenerative  changes in the spine. L1 vertebral body hemangioma.                                                                       IMPRESSION:      1. Continued complete metabolic response by Lugano criteria.     2. Stable ancillary findings as  detailed in the body of the report.    PET CT neck 7/21/2023  Impression:      1. No evidence of hypermetabolic cervical adenopathy.      2. Stable mildly avid, 8 mm left thyroid nodule; may consider further  evaluation with thyroid ultrasound.          2/16/2023 excisional biopsy of the left level 4 cervical lymph node  Large B-cell lymphoma, non-germinal center subtype.    The neoplastic cells express CD20, BCL2, BCL6, and MUM-1 (variable staining intensity), and are negative for CD3, CD5, CD10, CD23, Cyclin D1, and ALK. CD21 and CD23 demonstrate a lack of residual follicular dendritic cell meshworks. C-Myc demonstrates variable staining, and is overall estimated at 20-30% (interpreted as negative; cutoff 40%). Ki-67 proliferative index is also variable, and is overall estimated around 60-70%.      EBV RNA by in situ hybridization (SOY-OTTO) is negative for EBV in the neoplastic cells.   A congo red stain is negative for amyloid deposition in the tissue.     FISH testing on the lymph node  RESULTS:     - Gains of BCL6 (94%); no rearrangement of BCL6  - Gains of BCL2 (94%); no rearrangement of BCL2  - No rearrangement of MYC or IGH::MYC fusion        INTERPRETATION:    Of the interphase cells examined, 94-96% had signal patterns indicative of gains of BCL6 (3q27) and BCL2 (18q21), consistent with gains of all or part of chromosomes 3 and 18. No evidence was found of rearrangements of BCL6, MYC (8q24), or BCL2, or of a t(8;14).     Cytogenetics.  He has complex cytogenetics.    48,X,-Y,t(1;12)(p13;q13),+3,+3,add(3)(p25),t(3;8)(q21;p21),del(6)(13q21),add(11)(q22),+18,?idic(18)(p11.2)x2[cp19]/46,XY[1]      2/15/2023 bone marrow biopsy was unremarkable without evidence of involvement of lymphoma.      ASSESSMENT/PLAN:    Diffuse large B-cell lymphoma presenting with diffuse lymphadenopathy.    He has marked FDG avid left supraclavicular, axillary, retroperitoneal lymph nodes.  No hepatosplenomegaly.  No evidence of  bone marrow or extra lymphatic involvement.  FISH testing shows gains of BCL2 and BCL6 but no rearrangement.  No rearrangement of MYC or IGH::MYC fusion  No evidence of double hit or triple hit lymphoma.  No evidence of bone marrow involvement.  LDH is mildly elevated.      IPI is 3    We started R-CHOP on 3/8/2023 with curative intent.      Overall he tolerated the first chemotherapy cycle well.  He did have chemotherapy-induced neutropenia and had neutropenic fever but no infection was found.  Fever resolved on its own without antibiotics.      He received cycle #2 on 3/29/2023 with G-CSF support because of neutropenic fever.    He developed neutropenic fever with pancolitis from C. difficile.  Cryptosporidium was also positive.    Cycle #3 was on 4/19/2023.    He achieved complete response by PET/CT done on 5/5/2023 after 3 cycles    I recommended continuing with 3 more cycles of R-CHOP.    Cycle #4 on 5/10/2023.  Cycle #5 on 5/31/2023    He developed hemorrhagic cystitis from cyclophosphamide so he received cycle #6 R-CHOP without Cytoxan on 6/21/2023     After completing 6 cycles, repeat PET/CT on 7/21/2023 continues to show complete response.    I would recommend repeating labs and CT chest abdomen and pelvis in 6 months    Anemia.  Chemotherapy related.  Slowly improving.  Continue to observe.    Neuropathy.  Continue with OT/PT.  I recommend starting gabapentin 300 mg at bedtime and if he is able to tolerate this then slowly increase the dose as tolerated.  I also advised him that sometimes acupuncture or laser therapy can help.  He will also be evaluated by neurologist.  He will give us an update in about a month how the gabapentin is going and then we can adjust the dose as needed.    Fatigue.  Cumulative fatigue from the chemotherapy.  This is slowly improving.  Advised him to exercise regularly and be active as this will help him overcome fatigue quickly.    Leg swelling.  Keep legs elevated when lying  down or sitting.  Try Lasix 20 mg daily.    Hemorrhagic cystitis.  From cyclophosphamide.  He was seen by urology  on 6/15/2023.  As his symptoms have resolved, he has stopped oxybutynin.      Port-A-Cath.  We discussed that he should keep the Port-A-Cath in place right now and get it flushed every 8 weeks or so.  Usually the risk of cancer coming back is highest in the first couple of years and if he remains without evidence of cancer at that time, then we can remove the Port-A-Cath although at no point we can give him 100% guarantee that cancer will not come back.  He understands that and is willing to keep the Port-A-Cath in right now.    Thyroid nodule.  Follow with PCP to consider ultrasound/biopsy      We did not address the following today  History of chemotherapy induced neutropenic fever/typhlitis.  Was receiving G-CSF support.      Respiratory.  He was treated for 1 week of levofloxacin for cough and some shortness of breath and subtle groundglass opacities found on the CT scan.  Coughing and dyspnea on exertion have improved.  Continue to observe.       C. difficile colitis.  Symptoms have resolved.  We gave him 1 week of vancomycin when he was on levofloxacin to prevent reactivation of C. Difficile.  He did not get vancomycin with ciprofloxacin and currently bowel movements are normal.      Prevention of tumor lysis syndrome.  He was given allopurinol which he took for about 7 to 8 days but developed a rash from it.  Allopurinol was stopped.  He did not go into tumor lysis syndrome.      Currently he has an appointment with me tomorrow which we will cancel.  I would like him to be evaluated by NP/PA before giving chemotherapy next week.  See me back in 6 months with labs/CT scan prior    I answered all of his and his wife's questions to their satisfaction.    Kiel Maurer MD      I spent >56 minutes on this visit on the date of service, including the video time, reviewing records and labs and  imaging and placing new orders as well as coordination of care and documentation.      Again, thank you for allowing me to participate in the care of your patient.        Sincerely,        Kiel Maurer MD

## 2023-07-25 NOTE — NURSING NOTE
Is the patient currently in the state of [place]? YES    Visit mode:VIDEO    If the visit is dropped, the patient can be reconnected by: VIDEO VISIT: Send to e-mail at: [email or web address]    Will anyone else be joining the visit? YES: How would they like to receive their invitation?       How would you like to obtain your AVS? MyChart    Are changes needed to the allergy or medication list? NO    Patient denies any changes since echeck-in regarding medication and allergies and states all information entered during echeck-in remains accurate.     Reason for visit: RECHECK     Sun VELASQUEZ

## 2023-07-25 NOTE — PROGRESS NOTES
Virtual Visit Details    Type of service:  Video Visit   Video start time. 4:06 PM   Video stop time. 4:43 PM   Originating Location (pt. Location): Home    Distant Location (provider location):  Off-site  Platform used for Video Visit: [facility]    Oncology follow-up visit:  Date on this visit: 7/25/23     Diagnoses.    Diffuse large B-cell lymphoma    Primary Physician: Paddy Holly     History Of Present Illness:  Mr. Jasso is a 62 year old  male who presents for follow-up of diffuse lymphadenopathy.  He initially saw me on 1/18/2022 for retroperitoneal lymphadenopathy.  Please see my previous note for details.  I have copied and updated from prior notes.        Over the last few months, since fall of 2022, off and on he has had upper abdominal pain which lasts few minutes. He thinks stress sometimes can bring it on. No relationship to food.  No nausea or vomiting. BMs have been fine. No bleeding. No fevers.   He had a CT Abd Pelvis on 1/6/2023 which showed enlarged retroperitoneal lymphadenopathy  He has noticed some fatigue. He has also noticed some lump in the throat just above the sternum at night. This is going on for a couple of months. He denies any dysphagia.     Currently he feels well. Currently denies any abdominal pain. No nausea or vomiting. BMs are fine. No recent weight loss. No SOB. No neuropathy. No skin flushing. No drenching night sweats.    In the summer, he noticed a rash on the face which has since resolved.  He was noted to have CLAIRE positive.     On 2/6/2023, he had FNA of the left supraclavicular lymph node.  Cytology showed atypical lymphoid cells.  Flow cytometry showed lambda monotypic B cells which are negative for CD5 and CD10.  This is consistent with a B-cell lymphoma.  We decided to go ahead with excisional lymph node biopsy as well as bone marrow biopsy for complete staging.    Excisional lymph node biopsy showed diffuse large B-cell lymphoma.    Bone marrow biopsy did not  reveal evidence of lymphoma.    He started R-CHOP on 3/8/2023.    Cycle #2 R-CHOP on 3/29/2023.        He was admitted to the [facility] from 4/4/2023 - 4/9/2023.  He was admitted for neutropenic fever, sepsis and C. difficile colitis.  He presented with multiple bouts of nonbloody diarrhea and fever of 101 with generalized fatigue and weakness.  He was neutropenic and was noted to have pancolitis.  C. difficile was positive.  Cryptosporidium was also positive.  He was started on IV cefepime and IV Flagyl initially.  Infectious disease was consulted and he was switched to oral vancomycin to complete 14 days.  Neutropenia resolved by 4/7/2023.    He completed oral vancomycin course on 4/19/2023 4/19/2023.  Cycle #3 of R-CHOP    5/5/2023.  PET CT neck chest abdomen and pelvis shows complete response to treatment per Lugano criteria    5/10/2023.  Cycle #4 of R-CHOP.    5/31/2023.  Cycle #5 of R-CHOP .        Interval history.  This is a video visit.  His wife Charleen is also available.      He developed hemorrhagic cystitis from cyclophosphamide . I reviewed note from urologist Dr Parra. He received Cycle #6 R-CHOP without Cytoxan on 6/21/2023.    Overall he has been doing well and slowly getting better with better energy although still feels pretty fatigued.  His main complaint is neuropathy which involves his hands and tingling in the feet which also affects his walking.  He has started OT/PT which has helped a little bit but is still it is quite bothersome.  He has not tried any medications for it.  He has noticed improvement in his voice.  There is bilateral lower extremity swelling for which he has not tried Lasix.  He denies any GI problems currently.  No new swellings.  No more hematuria or pain upon urination.  He has not been using oxybutynin recently because he has not required that.        ECOG 2    ROS:  A comprehensive ROS was otherwise neg    I reviewed other history in Epic as before.      Past  Medical/Surgical History:  Past Medical History:   Diagnosis Date    Cancer (H) 2-7-2023    just diagnosed with lymphona.    Carpal tunnel syndrome     Chronic rhinitis 10/23/2015    Esophageal reflux 12/26/2013    Hand joint stiff, right    Shingles     Past Surgical History:   Procedure Laterality Date    BIOPSY  2-6-23    BIOPSY LYMPH NODE CERVICAL Left 2/16/2023    Procedure: excisional biopsy of a left cervical node;  Surgeon: Aung Tamayo MD;  Location: [facility] OR    COLONOSCOPY  2019    clean, do again in 10 years    COLONOSCOPY WITH CO2 INSUFFLATION N/A 07/17/2019    Procedure: COLONOSCOPY, WITH CO2 INSUFFLATION;  Surgeon: Jaison Hmamer MD;  Location: [facility] OR    ENT SURGERY  1979    Loss of hearing rt ear (tinnitis)    HERNIA REPAIR  2000    NO HISTORY OF SURGERY      ORTHOPEDIC SURGERY  1988,1992    rt knee, scope in 88; ACL repair Early 90s     Cancer History:   As above    Allergies:  Allergies as of 07/25/2023 - Reviewed 07/21/2023   Allergen Reaction Noted    Penicillins  12/23/2013    Allopurinol Rash 04/04/2023     Current Medications:  Current Outpatient Medications   Medication Sig Dispense Refill    acetaminophen (TYLENOL) 325 MG tablet Take 2 tablets (650 mg) by mouth every 6 hours as needed for mild pain 50 tablet 0    albuterol (PROAIR HFA/PROVENTIL HFA/VENTOLIN HFA) 108 (90 Base) MCG/ACT inhaler Inhale 2 puffs into the lungs every 4 hours as needed for shortness of breath 18 g 5    albuterol (PROVENTIL) (2.5 MG/3ML) 0.083% neb solution Take 1 vial (2.5 mg) by nebulization every 6 hours as needed for shortness of breath, wheezing or cough 90 mL 0    diclofenac (VOLTAREN) 1 % topical gel Apply small amount topically to right shoulder 3-4 times daily as needed for pain. 50 g 0    DULoxetine (CYMBALTA) 20 MG capsule Take 1 capsule (20 mg) by mouth daily 30 capsule 2    Efinaconazole (JUBLIA) 10 % SOLN Externally apply topically daily to nail. 8 mL 4    fexofenadine (ALLEGRA) 60 MG  tablet Take 1 tablet (60 mg) by mouth 2 times daily 180 tablet 0    fluticasone (FLONASE) 50 MCG/ACT nasal spray Spray 1-2 sprays into both nostrils daily 16 g 11    glutamine 500 MG TABS Take 1 tablet by mouth daily      guaiFENesin (MUCINEX) 600 MG 12 hr tablet Take 2 tablets (1,200 mg) by mouth 2 times daily 30 tablet 1    hydrocortisone (CORTAID) 1 % external ointment Apply topically 2 times daily As needed 56 g 0    ibuprofen (ADVIL,MOTRIN) 200 MG tablet Take 200 mg by mouth every 6 hours as needed      ketoconazole (NIZORAL) 2 % external cream Apply twice daily for 8 weeks to feet 60 g 3    LANsoprazole (PREVACID) 30 MG DR capsule Take 1 capsule (30 mg) by mouth every morning (before breakfast) 90 capsule 2    lidocaine-prilocaine (EMLA) 2.5-2.5 % external cream Apply topically as needed for moderate pain (4-6) Apply over port at least 30 minutes before port access. 30 g 0    methocarbamol (ROBAXIN) 500 MG tablet Take 1 tablet (500 mg) by mouth nightly as needed for muscle spasms 30 tablet 0    ondansetron (ZOFRAN ODT) 4 MG ODT tab Take 1 tablet (4 mg) by mouth every 8 hours as needed for nausea 10 tablet 0    ondansetron (ZOFRAN) 8 MG tablet Take 1 tablet (8 mg) by mouth every 8 hours as needed for nausea (vomiting) 30 tablet 2    oxybutynin (DITROPAN) 5 MG tablet Take 1 tablet (5 mg) by mouth 3 times daily (Patient not taking: Reported on 6/20/2023) 30 tablet 1    oxyCODONE (ROXICODONE) 5 MG tablet Take 1-2 tablets (5-10 mg) by mouth every 4 hours as needed for moderate to severe pain 12 tablet 0    prochlorperazine (COMPAZINE) 10 MG tablet Take 1 tablet (10 mg) by mouth every 6 hours as needed for nausea or vomiting 30 tablet 2    senna-docusate (SENOKOT-S/PERICOLACE) 8.6-50 MG tablet Take 1-2 tablets by mouth 2 times daily 30 tablet 0    tamsulosin (FLOMAX) 0.4 MG capsule Take 1 capsule (0.4 mg) by mouth daily (Patient not taking: Reported on 6/20/2023) 30 capsule 3    traMADol (ULTRAM) 50 MG tablet Take  50 mg by mouth every 6 hours as needed      zolpidem (AMBIEN) 10 MG tablet Take 1 tablet (10 mg) by mouth nightly as needed for sleep (Patient not taking: Reported on 6/20/2023) 30 tablet 1      Family History:  Family History   Problem Relation Age of Onset    Hyperlipidemia Mother         medication    Diabetes Father     Other Cancer Father         Lung cancer    Other Cancer Maternal Grandmother         Unsure what type of cancer    Autoimmune Disease No family hx of      Maternal grand mother had some sort of a cancer  Dad had lung cancer  Has 4 kids- all healthy    Social History:  Social History     Socioeconomic History    Marital status:      Spouse name: Not on file    Number of children: Not on file    Years of education: Not on file    Highest education level: Not on file   Occupational History    Not on file   Tobacco Use    Smoking status: Never     Passive exposure: Never    Smokeless tobacco: Never   Vaping Use    Vaping Use: Never used   Substance and Sexual Activity    Alcohol use: Yes     Comment: occassional, 1- 2 per week    Drug use: No    Sexual activity: Yes     Partners: Female     Birth control/protection: Male Surgical, Female Surgical     Comment: vasectomy   Other Topics Concern    Parent/sibling w/ CABG, MI or angioplasty before 65F 55M? No   Social History Narrative    Not on file     Social Determinants of Health     Financial Resource Strain: Not on file   Food Insecurity: Not on file   Transportation Needs: Not on file   Physical Activity: Not on file   Stress: Not on file   Social Connections: Not on file   Intimate Partner Violence: Not on file   Housing Stability: Not on file   no smoking. Drinks etoh wine 2-3 times / week.       Physical Exam:  There were no vitals taken for this visit.   Wt Readings from Last 4 Encounters:   06/21/23 79.4 kg (175 lb 1.6 oz)   06/14/23 79.8 kg (176 lb)   06/11/23 78.9 kg (173 lb 14.4 oz)   05/31/23 79.8 kg (176 lb)       Constitutional.   Looks well and in no apparent distress.   Eyes.  Without eye redness or apparent jaundice.   Respiratory.  Non labored breathing. Speaking in full sentences.    Skin.  No concerning skin rashes on the skin visualized.   Neurological.  Is alert and oriented.  Psychiatric.  Mood and affect seem appropriate.      The rest of a comprehensive physical examination is deferred due to Public Health Emergency video visit restrictions.          Laboratory/Imaging Studies      Reviewed  7/21/2023  CBC shows WBC 4.  Hemoglobin 11.8.  Platelets 217.    Chemistry unremarkable.        6/11/2023  Urinalysis showed gross hematuria.  Few bacteria.  0-5 WBCs.  Negative nitrites and leukocyte esterase.    Urine culture was negative      Previously  SPEP did not show any monoclonal protein.  Serum immunoglobulin levels were normal.  Buhl free light chain 2.53.  Lambda 1.6.  Ratio normal 1.58.      Hepatitis B surface antibody was positive.  Hepatitis B surface antigen and core antibody were negative.  HIV negative  Hep C negative        1/9/2023  CBC was unremarkable.  Peripheral blood smear was unremarkable.    CLAIRE was positive-1: 80, speckled pattern  dsDNA was negative  ESR 8    Chest x-ray on 5/23/2023 did not show any acute airspace disease.    PET CT chest abdomen and pelvis 7/21/2023  FINDINGS:      HEAD/NECK:  Refer to dedicated report of high-resolution neuroradiology PET-CT for  findings in the head and neck.      CHEST:  No evidence of hypermetabolic mediastinal or axillary adenopathy.  Stable calcified subcarinal nodes with mild reactive uptake.      The central tracheobronchial tree is clear. No pleural effusion or  pneumothorax. No acute consolidation.Few scattered sub-4 mm pulmonary  nodules are unchanged. Scattered calcified granulomas. Biapical  scarring.     Heart size is within normal limits. Mild scattered coronary  calcifications. No pericardial effusion. The thoracic aorta and main  pulmonary artery are  within normal limits for diameter. Long segment  uptake in the esophagus likely represents reflux.      Right chest port catheter tip terminates at the cavoatrial junction.     ABDOMEN AND PELVIS:  There is no suspicious FDG uptake in the abdomen or pelvis.     The liver is unremarkable. Cholelithiasis. No intrahepatic or  extrahepatic biliary ductal dilatation. The pancreas is unremarkable.  No pancreatic ductal dilatation. The spleen is unremarkable. Small  splenule. The adrenal glands are unremarkable.     Symmetric enhancement of the kidneys. No hydronephrosis. The urinary  bladder is decompressed. Mild prostatomegaly. There is mild  heterogenous uptake in the prostate without discrete focal  hypermetabolism.       No hypermetabolic lymph nodes are demonstrated in the abdomen or  pelvis.     Normal caliber of the small and large bowel. Colonic diverticulosis.  No free air, free fluid, or fluid collection. Normal caliber of the  abdominal aorta. Small fat-containing umbilical hernia. Mild  inflammatory uptake associated with right ventral abdominal wall mesh  repair.      LOWER EXTREMITIES:   There is no suspicious FDG uptake in the visualized lower extremities.  Postsurgical changes of ACL repair in the right knee.      BONES:   There is no suspicious FDG uptake in the skeleton. There are no  suspicious lytic or blastic osseous lesions. Multilevel degenerative  changes in the spine. L1 vertebral body hemangioma.                                                                       IMPRESSION:      1. Continued complete metabolic response by Lugano criteria.     2. Stable ancillary findings as detailed in the body of the report.    PET CT neck 7/21/2023  Impression:      1. No evidence of hypermetabolic cervical adenopathy.      2. Stable mildly avid, 8 mm left thyroid nodule; may consider further  evaluation with thyroid ultrasound.          2/16/2023 excisional biopsy of the left level 4 cervical lymph  node  Large B-cell lymphoma, non-germinal center subtype.    The neoplastic cells express CD20, BCL2, BCL6, and MUM-1 (variable staining intensity), and are negative for CD3, CD5, CD10, CD23, Cyclin D1, and ALK. CD21 and CD23 demonstrate a lack of residual follicular dendritic cell meshworks. C-Myc demonstrates variable staining, and is overall estimated at 20-30% (interpreted as negative; cutoff 40%). Ki-67 proliferative index is also variable, and is overall estimated around 60-70%.      EBV RNA by in situ hybridization (SOY-OTTO) is negative for EBV in the neoplastic cells.   A congo red stain is negative for amyloid deposition in the tissue.     FISH testing on the lymph node  RESULTS:     - Gains of BCL6 (94%); no rearrangement of BCL6  - Gains of BCL2 (94%); no rearrangement of BCL2  - No rearrangement of MYC or IGH::MYC fusion        INTERPRETATION:    Of the interphase cells examined, 94-96% had signal patterns indicative of gains of BCL6 (3q27) and BCL2 (18q21), consistent with gains of all or part of chromosomes 3 and 18. No evidence was found of rearrangements of BCL6, MYC (8q24), or BCL2, or of a t(8;14).     Cytogenetics.  He has complex cytogenetics.    48,X,-Y,t(1;12)(p13;q13),+3,+3,add(3)(p25),t(3;8)(q21;p21),del(6)(13q21),add(11)(q22),+18,?idic(18)(p11.2)x2[cp19]/46,XY[1]      2/15/2023 bone marrow biopsy was unremarkable without evidence of involvement of lymphoma.      ASSESSMENT/PLAN:    Diffuse large B-cell lymphoma presenting with diffuse lymphadenopathy.    He has marked FDG avid left supraclavicular, axillary, retroperitoneal lymph nodes.  No hepatosplenomegaly.  No evidence of bone marrow or extra lymphatic involvement.  FISH testing shows gains of BCL2 and BCL6 but no rearrangement.  No rearrangement of MYC or IGH::MYC fusion  No evidence of double hit or triple hit lymphoma.  No evidence of bone marrow involvement.  LDH is mildly elevated.      IPI is 3    We started R-CHOP on 3/8/2023  with curative intent.      Overall he tolerated the first chemotherapy cycle well.  He did have chemotherapy-induced neutropenia and had neutropenic fever but no infection was found.  Fever resolved on its own without antibiotics.      He received cycle #2 on 3/29/2023 with G-CSF support because of neutropenic fever.    He developed neutropenic fever with pancolitis from C. difficile.  Cryptosporidium was also positive.    Cycle #3 was on 4/19/2023.    He achieved complete response by PET/CT done on 5/5/2023 after 3 cycles    I recommended continuing with 3 more cycles of R-CHOP.    Cycle #4 on 5/10/2023.  Cycle #5 on 5/31/2023    He developed hemorrhagic cystitis from cyclophosphamide so he received cycle #6 R-CHOP without Cytoxan on 6/21/2023     After completing 6 cycles, repeat PET/CT on 7/21/2023 continues to show complete response.    I would recommend repeating labs and CT chest abdomen and pelvis in 6 months    Anemia.  Chemotherapy related.  Slowly improving.  Continue to observe.    Neuropathy.  Continue with OT/PT.  I recommend starting gabapentin 300 mg at bedtime and if he is able to tolerate this then slowly increase the dose as tolerated.  I also advised him that sometimes acupuncture or laser therapy can help.  He will also be evaluated by neurologist.  He will give us an update in about a month how the gabapentin is going and then we can adjust the dose as needed.    Fatigue.  Cumulative fatigue from the chemotherapy.  This is slowly improving.  Advised him to exercise regularly and be active as this will help him overcome fatigue quickly.    Leg swelling.  Keep legs elevated when lying down or sitting.  Try Lasix 20 mg daily.    Hemorrhagic cystitis.  From cyclophosphamide.  He was seen by urology  on 6/15/2023.  As his symptoms have resolved, he has stopped oxybutynin.      Port-A-Cath.  We discussed that he should keep the Port-A-Cath in place right now and get it flushed every 8 weeks or  so.  Usually the risk of cancer coming back is highest in the first couple of years and if he remains without evidence of cancer at that time, then we can remove the Port-A-Cath although at no point we can give him 100% guarantee that cancer will not come back.  He understands that and is willing to keep the Port-A-Cath in right now.    Thyroid nodule.  Follow with PCP to consider ultrasound/biopsy      We did not address the following today  History of chemotherapy induced neutropenic fever/typhlitis.  Was receiving G-CSF support.      Respiratory.  He was treated for 1 week of levofloxacin for cough and some shortness of breath and subtle groundglass opacities found on the CT scan.  Coughing and dyspnea on exertion have improved.  Continue to observe.       C. difficile colitis.  Symptoms have resolved.  We gave him 1 week of vancomycin when he was on levofloxacin to prevent reactivation of C. Difficile.  He did not get vancomycin with ciprofloxacin and currently bowel movements are normal.      Prevention of tumor lysis syndrome.  He was given allopurinol which he took for about 7 to 8 days but developed a rash from it.  Allopurinol was stopped.  He did not go into tumor lysis syndrome.      Currently he has an appointment with me tomorrow which we will cancel.  I would like him to be evaluated by NP/PA before giving chemotherapy next week.  See me back in 6 months with labs/CT scan prior    I answered all of his and his wife's questions to their satisfaction.    Kiel Maurer MD      I spent >56 minutes on this visit on the date of service, including the video time, reviewing records and labs and imaging and placing new orders as well as coordination of care and documentation.

## 2023-08-03 ENCOUNTER — THERAPY VISIT (OUTPATIENT)
Dept: PHYSICAL THERAPY | Facility: CLINIC | Age: 63
End: 2023-08-03
Attending: INTERNAL MEDICINE
Payer: COMMERCIAL

## 2023-08-03 DIAGNOSIS — G62.0 CHEMOTHERAPY-INDUCED PERIPHERAL NEUROPATHY (H): Primary | ICD-10-CM

## 2023-08-03 DIAGNOSIS — T45.1X5A CHEMOTHERAPY-INDUCED PERIPHERAL NEUROPATHY (H): Primary | ICD-10-CM

## 2023-08-03 PROCEDURE — 97535 SELF CARE MNGMENT TRAINING: CPT | Mod: GP | Performed by: PHYSICAL THERAPIST

## 2023-08-03 PROCEDURE — 97110 THERAPEUTIC EXERCISES: CPT | Mod: GP | Performed by: PHYSICAL THERAPIST

## 2023-08-08 ENCOUNTER — TRANSFERRED RECORDS (OUTPATIENT)
Dept: HEALTH INFORMATION MANAGEMENT | Facility: CLINIC | Age: 63
End: 2023-08-08

## 2023-08-15 ENCOUNTER — TRANSFERRED RECORDS (OUTPATIENT)
Dept: HEALTH INFORMATION MANAGEMENT | Facility: CLINIC | Age: 63
End: 2023-08-15

## 2023-08-22 ENCOUNTER — THERAPY VISIT (OUTPATIENT)
Dept: OCCUPATIONAL THERAPY | Facility: CLINIC | Age: 63
End: 2023-08-22
Attending: INTERNAL MEDICINE
Payer: COMMERCIAL

## 2023-08-22 DIAGNOSIS — T45.1X5A CHEMOTHERAPY-INDUCED PERIPHERAL NEUROPATHY (H): Primary | ICD-10-CM

## 2023-08-22 DIAGNOSIS — G62.0 CHEMOTHERAPY-INDUCED PERIPHERAL NEUROPATHY (H): Primary | ICD-10-CM

## 2023-08-22 PROCEDURE — 97530 THERAPEUTIC ACTIVITIES: CPT | Mod: GO

## 2023-08-23 ENCOUNTER — THERAPY VISIT (OUTPATIENT)
Dept: PHYSICAL THERAPY | Facility: CLINIC | Age: 63
End: 2023-08-23
Attending: INTERNAL MEDICINE
Payer: COMMERCIAL

## 2023-08-23 DIAGNOSIS — G62.0 CHEMOTHERAPY-INDUCED PERIPHERAL NEUROPATHY (H): Primary | ICD-10-CM

## 2023-08-23 DIAGNOSIS — T45.1X5A CHEMOTHERAPY-INDUCED PERIPHERAL NEUROPATHY (H): Primary | ICD-10-CM

## 2023-08-23 PROCEDURE — 97110 THERAPEUTIC EXERCISES: CPT | Mod: GP | Performed by: PHYSICAL THERAPIST

## 2023-09-15 ENCOUNTER — ALLIED HEALTH/NURSE VISIT (OUTPATIENT)
Dept: ONCOLOGY | Facility: CLINIC | Age: 63
End: 2023-09-15
Payer: COMMERCIAL

## 2023-09-15 DIAGNOSIS — C83.30 DIFFUSE LARGE B-CELL LYMPHOMA, UNSPECIFIED BODY REGION (H): Primary | ICD-10-CM

## 2023-09-15 LAB
BASOPHILS # BLD AUTO: 0.1 10E3/UL (ref 0–0.2)
BASOPHILS NFR BLD AUTO: 1 %
EOSINOPHIL # BLD AUTO: 0.1 10E3/UL (ref 0–0.7)
EOSINOPHIL NFR BLD AUTO: 3 %
ERYTHROCYTE [DISTWIDTH] IN BLOOD BY AUTOMATED COUNT: 12 % (ref 10–15)
HCT VFR BLD AUTO: 42 % (ref 40–53)
HGB BLD-MCNC: 14.4 G/DL (ref 13.3–17.7)
HOLD SPECIMEN: NORMAL
IMM GRANULOCYTES # BLD: 0 10E3/UL
IMM GRANULOCYTES NFR BLD: 0 %
LYMPHOCYTES # BLD AUTO: 0.5 10E3/UL (ref 0.8–5.3)
LYMPHOCYTES NFR BLD AUTO: 10 %
MCH RBC QN AUTO: 34 PG (ref 26.5–33)
MCHC RBC AUTO-ENTMCNC: 34.3 G/DL (ref 31.5–36.5)
MCV RBC AUTO: 99 FL (ref 78–100)
MONOCYTES # BLD AUTO: 0.5 10E3/UL (ref 0–1.3)
MONOCYTES NFR BLD AUTO: 9 %
NEUTROPHILS # BLD AUTO: 4.4 10E3/UL (ref 1.6–8.3)
NEUTROPHILS NFR BLD AUTO: 77 %
NRBC # BLD AUTO: 0 10E3/UL
NRBC BLD AUTO-RTO: 0 /100
PLATELET # BLD AUTO: 222 10E3/UL (ref 150–450)
RBC # BLD AUTO: 4.23 10E6/UL (ref 4.4–5.9)
WBC # BLD AUTO: 5.6 10E3/UL (ref 4–11)

## 2023-09-15 PROCEDURE — 36591 DRAW BLOOD OFF VENOUS DEVICE: CPT | Performed by: INTERNAL MEDICINE

## 2023-09-15 PROCEDURE — 85025 COMPLETE CBC W/AUTO DIFF WBC: CPT | Performed by: INTERNAL MEDICINE

## 2023-09-15 RX ORDER — HEPARIN SODIUM (PORCINE) LOCK FLUSH IV SOLN 100 UNIT/ML 100 UNIT/ML
500 SOLUTION INTRAVENOUS
Status: DISCONTINUED | OUTPATIENT
Start: 2023-09-15 | End: 2023-09-15 | Stop reason: HOSPADM

## 2023-09-15 RX ADMIN — HEPARIN SODIUM (PORCINE) LOCK FLUSH IV SOLN 100 UNIT/ML 500 UNITS: 100 SOLUTION at 09:55

## 2023-09-15 NOTE — PROGRESS NOTES
Port site scrubbed with Chloraprep for 30 seconds. Accessed port using sterile technique. Tubes drawn in rainbow order. See documentation flowsheet. Tolerated port access and draw without complaint.     Elva Flaherty RN, BSN

## 2023-09-21 ENCOUNTER — THERAPY VISIT (OUTPATIENT)
Dept: PHYSICAL THERAPY | Facility: CLINIC | Age: 63
End: 2023-09-21
Attending: INTERNAL MEDICINE
Payer: COMMERCIAL

## 2023-09-21 DIAGNOSIS — T45.1X5A CHEMOTHERAPY-INDUCED PERIPHERAL NEUROPATHY (H): ICD-10-CM

## 2023-09-21 DIAGNOSIS — M54.2 NECK PAIN: Primary | ICD-10-CM

## 2023-09-21 DIAGNOSIS — G62.0 CHEMOTHERAPY-INDUCED PERIPHERAL NEUROPATHY (H): ICD-10-CM

## 2023-09-21 PROCEDURE — 97116 GAIT TRAINING THERAPY: CPT | Mod: GP | Performed by: PHYSICAL THERAPIST

## 2023-09-21 PROCEDURE — 97110 THERAPEUTIC EXERCISES: CPT | Mod: GP | Performed by: PHYSICAL THERAPIST

## 2023-10-03 DIAGNOSIS — M79.89 SWELLING OF LIMB: ICD-10-CM

## 2023-10-03 RX ORDER — FUROSEMIDE 20 MG
20 TABLET ORAL DAILY
Qty: 30 TABLET | Refills: 1 | Status: SHIPPED | OUTPATIENT
Start: 2023-10-03 | End: 2023-11-28

## 2023-10-04 ENCOUNTER — E-VISIT (OUTPATIENT)
Dept: FAMILY MEDICINE | Facility: CLINIC | Age: 63
End: 2023-10-04
Payer: COMMERCIAL

## 2023-10-04 DIAGNOSIS — T50.905A DRUG-INDUCED URTICARIA: Primary | ICD-10-CM

## 2023-10-04 DIAGNOSIS — L50.0 DRUG-INDUCED URTICARIA: Primary | ICD-10-CM

## 2023-10-04 PROCEDURE — 99422 OL DIG E/M SVC 11-20 MIN: CPT | Performed by: INTERNAL MEDICINE

## 2023-10-04 RX ORDER — DEXAMETHASONE 2 MG/1
TABLET ORAL
Qty: 20 TABLET | Refills: 0 | Status: SHIPPED | OUTPATIENT
Start: 2023-10-04 | End: 2023-11-28

## 2023-10-06 ENCOUNTER — ANCILLARY PROCEDURE (OUTPATIENT)
Dept: ULTRASOUND IMAGING | Facility: CLINIC | Age: 63
End: 2023-10-06
Attending: INTERNAL MEDICINE
Payer: COMMERCIAL

## 2023-10-06 DIAGNOSIS — E04.1 THYROID NODULE: ICD-10-CM

## 2023-10-06 PROCEDURE — 76536 US EXAM OF HEAD AND NECK: CPT

## 2023-10-09 ENCOUNTER — TELEPHONE (OUTPATIENT)
Dept: FAMILY MEDICINE | Facility: CLINIC | Age: 63
End: 2023-10-09

## 2023-10-09 NOTE — TELEPHONE ENCOUNTER
Patient Quality Outreach    Patient is due for the following:   Physical Annual Wellness Visit      Topic Date Due    Pneumococcal Vaccine (1 - PCV) Never done    Zoster (Shingles) Vaccine (1 of 2) Never done    Flu Vaccine (1) 09/01/2023    COVID-19 Vaccine (6 - 2023-24 season) 09/01/2023       Next Steps:   Patient has upcoming appointment, these items will be addressed at that time. Appointments scheduled for 10/16/23 & 11/28/23 with PCP.      Type of outreach:    Chart review performed, no outreach needed.      Questions for provider review:    None           Theo Hicks MA

## 2023-10-16 ENCOUNTER — VIRTUAL VISIT (OUTPATIENT)
Dept: FAMILY MEDICINE | Facility: CLINIC | Age: 63
End: 2023-10-16
Payer: COMMERCIAL

## 2023-10-16 DIAGNOSIS — T45.1X5A CHEMOTHERAPY-INDUCED PERIPHERAL NEUROPATHY (H): ICD-10-CM

## 2023-10-16 DIAGNOSIS — M50.10 CERVICAL DISC DISORDER WITH RADICULOPATHY: Primary | ICD-10-CM

## 2023-10-16 DIAGNOSIS — R20.2 PARESTHESIA OF BOTH FEET: ICD-10-CM

## 2023-10-16 DIAGNOSIS — G62.0 CHEMOTHERAPY-INDUCED PERIPHERAL NEUROPATHY (H): ICD-10-CM

## 2023-10-16 PROCEDURE — 99214 OFFICE O/P EST MOD 30 MIN: CPT | Mod: VID | Performed by: INTERNAL MEDICINE

## 2023-10-16 RX ORDER — CYCLOBENZAPRINE HCL 10 MG
10 TABLET ORAL 3 TIMES DAILY PRN
Qty: 30 TABLET | Refills: 5 | Status: SHIPPED | OUTPATIENT
Start: 2023-10-16 | End: 2023-11-28

## 2023-10-16 RX ORDER — KETOROLAC TROMETHAMINE 10 MG/1
10 TABLET, FILM COATED ORAL EVERY 6 HOURS PRN
Qty: 20 TABLET | Refills: 0 | Status: SHIPPED | OUTPATIENT
Start: 2023-10-16 | End: 2024-03-14

## 2023-10-16 ASSESSMENT — ANXIETY QUESTIONNAIRES
5. BEING SO RESTLESS THAT IT IS HARD TO SIT STILL: NOT AT ALL
4. TROUBLE RELAXING: NOT AT ALL
GAD7 TOTAL SCORE: 0
1. FEELING NERVOUS, ANXIOUS, OR ON EDGE: NOT AT ALL
7. FEELING AFRAID AS IF SOMETHING AWFUL MIGHT HAPPEN: NOT AT ALL
GAD7 TOTAL SCORE: 0
IF YOU CHECKED OFF ANY PROBLEMS ON THIS QUESTIONNAIRE, HOW DIFFICULT HAVE THESE PROBLEMS MADE IT FOR YOU TO DO YOUR WORK, TAKE CARE OF THINGS AT HOME, OR GET ALONG WITH OTHER PEOPLE: NOT DIFFICULT AT ALL
2. NOT BEING ABLE TO STOP OR CONTROL WORRYING: NOT AT ALL
3. WORRYING TOO MUCH ABOUT DIFFERENT THINGS: NOT AT ALL
6. BECOMING EASILY ANNOYED OR IRRITABLE: NOT AT ALL

## 2023-10-16 ASSESSMENT — PATIENT HEALTH QUESTIONNAIRE - PHQ9: SUM OF ALL RESPONSES TO PHQ QUESTIONS 1-9: 1

## 2023-10-16 NOTE — PROGRESS NOTES
Bill is a 63 year old who is being evaluated via a billable video visit.      How would you like to obtain your AVS? MyChart  If the video visit is dropped, the invitation should be resent by: Send to e-mail at: [email or web address]  Will anyone else be joining your video visit? No                                                          [facility] INTERNAL MEDICINE NOTE    Geovanni Jasso is a 63 year old male who presents to clinic today for the following health issues:    Pain History:  When did you first notice your pain? Post-chemotherapy    Have you seen this provider for your pain in the past? No   Where in your body do your have pain? Neck Pain   Are you seeing anyone else for your pain? Yes - PT   What makes your pain better? Stretching   What makes your pain worse? Sleeping   How has pain affected your ability to work? 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 Spouse and children    Updates:  -feet still swollen and tingly.  -keyboarding feels better.      10/16/2023     9:02 AM   PHQ-9 SCORE   PHQ-9 Total Score 1         10/16/2023     9:04 AM   LOUIE-7 SCORE   Total Score 0         10/16/2023     9:04 AM   PEG Score   PEG Total Score 2.67         Patient Active Problem List   Diagnosis    Esophageal reflux    Hyperlipidemia LDL goal <70    Neck pain    Chronic rhinitis    Bilateral low back pain without sciatica    Chondromalacia patellae, right    S/P ACL reconstruction    Atherosclerosis of native coronary artery of native heart without angina pectoris    SOB (shortness of breath)    Non-allergic rhinitis    Positive CLAIRE (antinuclear antibody)    Hand joint stiff, right    Carpal tunnel syndrome    Osteoarthritis of knee, unspecified laterality, unspecified osteoarthritis type    Diverticulosis of large intestine    Internal hemorrhoids    Diffuse large B-cell lymphoma, unspecified body region (H)    Chemotherapy-induced  neutropenia     Esophageal spasm    Chemotherapy-induced peripheral neuropathy     History of Clostridioides difficile colitis    Cervical disc disorder with radiculopathy     Past Surgical History:   Procedure Laterality Date    BIOPSY  2-6-23    BIOPSY LYMPH NODE CERVICAL Left 2/16/2023    Procedure: excisional biopsy of a left cervical node;  Surgeon: Aung Tamayo MD;  Location: [facility] OR    COLONOSCOPY  2019    clean, do again in 10 years    COLONOSCOPY WITH CO2 INSUFFLATION N/A 07/17/2019    Procedure: COLONOSCOPY, WITH CO2 INSUFFLATION;  Surgeon: Jaison Hammer MD;  Location:  OR    ENT SURGERY  1979    Loss of hearing rt ear (tinnitis)    HERNIA REPAIR  2000    NO HISTORY OF SURGERY      ORTHOPEDIC SURGERY  1988,1992    rt knee, scope in 88; ACL repair Early 90s       Social History     Tobacco Use    Smoking status: Never     Passive exposure: Never    Smokeless tobacco: Never   Substance Use Topics    Alcohol use: Yes     Comment: occassional, 1- 2 per week     Family History   Problem Relation Age of Onset    Hyperlipidemia Mother         medication    Diabetes Father     Other Cancer Father         Lung cancer    Other Cancer Maternal Grandmother         Unsure what type of cancer    Autoimmune Disease No family hx of          Allergies   Allergen Reactions    Penicillins     Allopurinol Rash     Recent Labs   Lab Test 07/21/23  1004 06/21/23  0952 06/14/23  1506 01/27/23  0929 12/12/18  0728 11/12/18  1215 10/30/17  0820 11/30/16  0850   LDL  --   --   --   --  140*  --  146* 158*   HDL  --   --   --   --  37*  --  37* 33*   TRIG  --   --   --   --  148  --  139 175*   ALT 18 20 19   < >  --   --   --   --    CR 0.70 0.70 0.76   < >  --   --   --   --    GFRESTIMATED >90 >90 >90   < >  --   --   --   --    POTASSIUM 4.1 3.6 3.7   < >  --   --   --   --    TSH  --   --   --   --   --  2.01  --   --     < > = values in this interval not displayed.      BP Readings from Last 3  Encounters:   06/21/23 101/65   06/14/23 121/79   06/11/23 106/73    Wt Readings from Last 3 Encounters:   07/25/23 78.9 kg (174 lb)   06/21/23 79.4 kg (175 lb 1.6 oz)   06/14/23 79.8 kg (176 lb)         ROS:  C: NEGATIVE for fever, chills, change in weight  I: NEGATIVE for worrisome rashes, moles or lesions  E: NEGATIVE for vision changes or irritation  E/M: NEGATIVE for ear, mouth and throat problems  R: NEGATIVE for significant cough or SOB  B: NEGATIVE for masses, tenderness or discharge  CV: NEGATIVE for chest pain, palpitations or peripheral edema  GI: NEGATIVE for nausea, abdominal pain, heartburn, or change in bowel habits  : NEGATIVE for frequency, dysuria, or hematuria  M: NEGATIVE for significant arthralgias or myalgia  N: NEGATIVE for weakness, dizziness.  E: NEGATIVE for temperature intolerance, skin/hair changes  H: NEGATIVE for bleeding problems  P: NEGATIVE for changes in mood or affect    OBJECTIVE:                                                    There were no vitals taken for this visit.  There is no height or weight on file to calculate BMI.  GENERAL: alert, fatigued, and appears older than stated age  EYES: Eyes grossly normal to inspection  HENT: normal cephalic/atraumatic  RESP: No audible wheezes.  NEURO: Normal strength and tone, mentation intact and speech normal  PSYCH: mentation appears normal, affect normal/bright    Diagnostic Test Results:    3 views cervical spine radiographs 7/1/2021 9:11 AM     History: DDD (degenerative disc disease), cervical     Comparison: MR cervical spine 11/12/2014     Findings:     AP, lateral, and odontoid views of the cervical spine were obtained.     Down to the level of C7 is well visualized on the lateral view(s). The  cervicothoracic junction is not  well assessed.     There is no acute osseous abnormality. No prevertebral soft tissue  swelling. Straightening of the cervical lordosis. Trace  anterolisthesis of C3 on C4. Trace retrolisthesis of C4  on C5.     Multilevel degenerative changes of the cervical spine, most pronounced  from C4 to C6.      There is a horizontal linear lucency at the base of the odontoid  process on the odontoid view. No correlating lucency on the lateral  view. This is favored to represent an artifact likely from  overpenetration and the superimposed atlantoaxial joint space. Os  odontoideum is also considered, though not present on prior MRI. Tip  of dens is obscured by overlying maxilla on odontoid view. The lateral  mass of C1 is well aligned on C2.     The visualized lung apices are clear.                                                                      Impression:  1. Multilevel degenerative changes cervical spine most pronounced from  C4 to C6.  2. Trace anterolisthesis of C3 on C4 and trace retrolisthesis of C4 on  C5.  3. Horizontal linear lucency at the base of the odontoid process on  the odontoid view is favored to represent artifact although an occult  fracture cannot be completely excluded. Recommend further imaging with  CT for confirmation.     [Consider Follow Up: as above]     This report will be copied to the [facility] to ensure a  provider acknowledges the finding.      I have personally reviewed the examination and initial interpretation  and I agree with the findings.     ZULLY MOSER MD         ASSESSMENT/PLAN:                                                      Cervical disc disorder with radiculopathy  - ketorolac (TORADOL) 10 MG tablet; Take 1 tablet (10 mg) by mouth every 6 hours as needed for moderate pain  - MR Cervical Spine w/o Contrast  - cyclobenzaprine (FLEXERIL) 10 MG tablet; Take 1 tablet (10 mg) by mouth 3 times daily as needed for muscle spasms    Paresthesia of both feet  - MR Lumbar Spine w/o Contrast     Disposition:  Follow-up in 4 weeks or as needed.    Paddy Holly MD  [facility]        Video-Visit Details    Type of service:  Video  Visit   Joined the call at 10/16/2023, 11:18:17 am.  Left the call at 10/16/2023, 11:35:07 am.  You were on the call for 16 minutes 49 seconds .  Originating Location (pt. Location): Home  Distant Location (provider location):  On-site  Platform used for Video Visit: Lainey

## 2023-10-18 ENCOUNTER — THERAPY VISIT (OUTPATIENT)
Dept: PHYSICAL THERAPY | Facility: CLINIC | Age: 63
End: 2023-10-18
Attending: INTERNAL MEDICINE
Payer: COMMERCIAL

## 2023-10-18 DIAGNOSIS — M54.2 NECK PAIN: ICD-10-CM

## 2023-10-18 PROCEDURE — 97161 PT EVAL LOW COMPLEX 20 MIN: CPT | Mod: GP

## 2023-10-18 PROCEDURE — 97110 THERAPEUTIC EXERCISES: CPT | Mod: GP

## 2023-10-18 NOTE — PROGRESS NOTES
PHYSICAL THERAPY EVALUATION  Type of Visit: Evaluation    See electronic medical record for Abuse and Falls Screening details.    Subjective       Presenting condition or subjective complaint: extreme soreness in neck on the left side  Date of onset:      Relevant medical history: Cancer; Foot drop; Hearing problems; Pain at night or rest   Dates & types of surgery: chemo treatments for non-Hodgkins lymphoma 3-8-23 to 23    Prior diagnostic imaging/testing results: CT scan     Prior therapy history for the same diagnosis, illness or injury: No      Prior Level of Function  Transfers:   Ambulation:   ADL:   IADL:     Living Environment  Social support: With a significant other or spouse   Type of home: House   Stairs to enter the home: Yes 12 Is there a railing: Yes   Ramp: No   Stairs inside the home: Yes 12 Is there a railing: Yes   Help at home: Self Cares (home health aide/personal care attendant, family, etc)  Equipment owned:       Employment: Yes   Hobbies/Interests: Golf, attending sporting events.    Subjective Summary: Reports he has some neck pain on the left side that spreads down to the shoulder. [facility]  He finds no success with heat, positioning or anything to manage the pain. He has imaging scheduled. He previously had neck pain but never to this extent.     Patient goals for therapy: sleep without pain, turn head without pain, etc    Pain assessment: Location: Neck Pain/Ratin/10     Objective   CERVICAL SPINE EVALUATION  PAIN: Pain Level at Rest: 0/10  Pain Level with Use: 9/10  Pain Location: cervical spine  Pain Quality: Annoyance, Tug, Pull, Debilitating Pain  Pain is Exacerbated By: Looking up, sleeping, neck rotatoin  Pain is Relieved By: none  Pain Progression: Unchanged  INTEGUMENTARY (edema, incisions):   POSTURE:   GAIT:   Weightbearing Status:   Assistive Device(s):   Gait Deviations:   BALANCE/PROPRIOCEPTION:   WEIGHTBEARING ALIGNMENT:    ROM:  Flexion: Full with slight pull in the neck, Extension: Moderate limitation and painful, Right Side Bended: Moderate Limitation and painful, Left Side Bend: full and slight discomfort, Right rotation: full and slight pull, Left rotation: moderate limitation and moderate discomfort     MYOTOMES: WNL  DTR S:   CORD SIGNS:   DERMATOMES: WNL  NEURAL TENSION: Cervical WNL  Median N test negative  FLEXIBILITY:  Tight left levator scapula    SPECIAL TESTS: WNL  Spurling's negative  PALPATION:  Tenderness palpating left posterior neck  SPINAL SEGMENTAL CONCLUSIONS:       Assessment & Plan   CLINICAL IMPRESSIONS  Medical Diagnosis:      Treatment Diagnosis:     Impression/Assessment: Patient is a 63 year old male with Left Sided Posterior Neck Pain without radicular symptoms complaints.  The following significant findings have been identified: Pain, Decreased ROM/flexibility, and Decreased strength. These impairments interfere with their ability to perform self care tasks, work tasks, recreational activities, household chores, driving , household mobility, and community mobility as compared to previous level of function.     Clinical Decision Making (Complexity):  Clinical Presentation: Stable/Uncomplicated  Clinical Presentation Rationale: based on medical and personal factors listed in PT evaluation  Clinical Decision Making (Complexity): Low complexity    PLAN OF CARE  Treatment Interventions:  Interventions: Gait Training, Manual Therapy, Neuromuscular Re-education, Therapeutic Activity, Therapeutic Exercise    Long Term Goals            Frequency of Treatment:    Duration of Treatment:      Recommended Referrals to Other Professionals: Physical Therapy  Education Assessment:        Risks and benefits of evaluation/treatment have been explained.   Patient/Family/caregiver agrees with Plan of Care.     Evaluation Time:             Signing Clinician: Avery Corey, PT

## 2023-10-25 ENCOUNTER — THERAPY VISIT (OUTPATIENT)
Dept: PHYSICAL THERAPY | Facility: CLINIC | Age: 63
End: 2023-10-25
Attending: INTERNAL MEDICINE
Payer: COMMERCIAL

## 2023-10-25 DIAGNOSIS — M54.2 NECK PAIN: Primary | ICD-10-CM

## 2023-10-25 PROCEDURE — 97140 MANUAL THERAPY 1/> REGIONS: CPT | Mod: GP

## 2023-10-25 PROCEDURE — 97110 THERAPEUTIC EXERCISES: CPT | Mod: GP

## 2023-10-27 ENCOUNTER — ALLIED HEALTH/NURSE VISIT (OUTPATIENT)
Dept: ONCOLOGY | Facility: CLINIC | Age: 63
End: 2023-10-27
Payer: COMMERCIAL

## 2023-10-27 DIAGNOSIS — C83.30 DIFFUSE LARGE B-CELL LYMPHOMA, UNSPECIFIED BODY REGION (H): Primary | ICD-10-CM

## 2023-10-27 PROCEDURE — 96523 IRRIG DRUG DELIVERY DEVICE: CPT

## 2023-10-27 RX ORDER — HEPARIN SODIUM (PORCINE) LOCK FLUSH IV SOLN 100 UNIT/ML 100 UNIT/ML
500 SOLUTION INTRAVENOUS
Status: DISCONTINUED | OUTPATIENT
Start: 2023-10-27 | End: 2023-10-27 | Stop reason: HOSPADM

## 2023-10-27 RX ADMIN — HEPARIN SODIUM (PORCINE) LOCK FLUSH IV SOLN 100 UNIT/ML 500 UNITS: 100 SOLUTION at 08:58

## 2023-10-27 NOTE — PROGRESS NOTES
Port site scrubbed with Chloraprep for 30 seconds. Accessed port using sterile technique. See documentation flowsheet. Tolerated port access and flush without complaint.     Elva Flaherty RN, BSN

## 2023-10-29 ENCOUNTER — E-VISIT (OUTPATIENT)
Dept: FAMILY MEDICINE | Facility: CLINIC | Age: 63
End: 2023-10-29
Payer: COMMERCIAL

## 2023-10-29 DIAGNOSIS — U07.1 INFECTION DUE TO 2019 NOVEL CORONAVIRUS: Primary | ICD-10-CM

## 2023-10-29 PROCEDURE — 99421 OL DIG E/M SVC 5-10 MIN: CPT | Performed by: INTERNAL MEDICINE

## 2023-10-30 NOTE — TELEPHONE ENCOUNTER
Provider E-Visit time total (minutes): 5  
Alert and oriented to person, place, time/situation. normal mood and affect. no apparent risk to self or others.

## 2023-11-04 ENCOUNTER — HEALTH MAINTENANCE LETTER (OUTPATIENT)
Age: 63
End: 2023-11-04

## 2023-11-15 ENCOUNTER — THERAPY VISIT (OUTPATIENT)
Dept: PHYSICAL THERAPY | Facility: CLINIC | Age: 63
End: 2023-11-15
Payer: COMMERCIAL

## 2023-11-15 DIAGNOSIS — M54.2 NECK PAIN: Primary | ICD-10-CM

## 2023-11-15 PROCEDURE — 97110 THERAPEUTIC EXERCISES: CPT | Mod: GP

## 2023-11-20 ENCOUNTER — ANCILLARY PROCEDURE (OUTPATIENT)
Dept: MRI IMAGING | Facility: CLINIC | Age: 63
End: 2023-11-20
Attending: INTERNAL MEDICINE
Payer: COMMERCIAL

## 2023-11-20 PROCEDURE — 72141 MRI NECK SPINE W/O DYE: CPT | Performed by: RADIOLOGY

## 2023-11-20 PROCEDURE — 72148 MRI LUMBAR SPINE W/O DYE: CPT | Performed by: RADIOLOGY

## 2023-11-28 ENCOUNTER — ANCILLARY PROCEDURE (OUTPATIENT)
Dept: GENERAL RADIOLOGY | Facility: CLINIC | Age: 63
End: 2023-11-28
Attending: INTERNAL MEDICINE
Payer: COMMERCIAL

## 2023-11-28 ENCOUNTER — OFFICE VISIT (OUTPATIENT)
Dept: FAMILY MEDICINE | Facility: CLINIC | Age: 63
End: 2023-11-28
Payer: COMMERCIAL

## 2023-11-28 VITALS
DIASTOLIC BLOOD PRESSURE: 73 MMHG | HEART RATE: 72 BPM | BODY MASS INDEX: 25.54 KG/M2 | SYSTOLIC BLOOD PRESSURE: 121 MMHG | HEIGHT: 71 IN | RESPIRATION RATE: 16 BRPM | TEMPERATURE: 97.8 F | WEIGHT: 182.4 LBS | OXYGEN SATURATION: 99 %

## 2023-11-28 DIAGNOSIS — M47.816 LUMBAR FACET ARTHROPATHY: ICD-10-CM

## 2023-11-28 DIAGNOSIS — M51.369 DEGENERATION OF LUMBAR INTERVERTEBRAL DISC: ICD-10-CM

## 2023-11-28 DIAGNOSIS — R49.0 HOARSENESS: ICD-10-CM

## 2023-11-28 DIAGNOSIS — M50.20 CERVICAL DISC HERNIATION: ICD-10-CM

## 2023-11-28 DIAGNOSIS — N52.8 OTHER MALE ERECTILE DYSFUNCTION: ICD-10-CM

## 2023-11-28 DIAGNOSIS — M47.812 FACET ARTHROPATHY, CERVICAL: Primary | ICD-10-CM

## 2023-11-28 DIAGNOSIS — M51.26 LUMBAR DISC HERNIATION: ICD-10-CM

## 2023-11-28 DIAGNOSIS — U09.9 POST-ACUTE COVID-19 SYNDROME: ICD-10-CM

## 2023-11-28 PROCEDURE — 99214 OFFICE O/P EST MOD 30 MIN: CPT | Performed by: INTERNAL MEDICINE

## 2023-11-28 PROCEDURE — 71046 X-RAY EXAM CHEST 2 VIEWS: CPT | Mod: TC | Performed by: RADIOLOGY

## 2023-11-28 ASSESSMENT — PAIN SCALES - GENERAL: PAINLEVEL: MODERATE PAIN (4)

## 2023-11-28 NOTE — PROGRESS NOTES
[facility] INTERNAL MEDICINE NOTE    Geovanni Jasso is a 63 year old male who presents to clinic today for the following health issues:    Pain History:  When did you first notice your pain? Started at the end of June.   Have you seen this provider for your pain in the past? Yes   Where in your body do you have pain? Neck pain  Are you seeing anyone else for your pain? No. Patient is going to PT and OT.   Updates:  -Undergoing physical therapy.  -Hoarseness persists, triggered by recent by Covid-19 infection. Voice has been deeper since having covid end of October. Wants to make sure that nothing is wrong with his lungs/vocal cords.  -Neuropathy in feet not getting better. Patient states his toes sometimes feel numb.     Patient Active Problem List   Diagnosis    Esophageal reflux    Hyperlipidemia LDL goal <70    Neck pain    Chronic rhinitis    Bilateral low back pain without sciatica    Chondromalacia patellae, right    S/P ACL reconstruction    Atherosclerosis of native coronary artery of native heart without angina pectoris    SOB (shortness of breath)    Non-allergic rhinitis    Positive CLAIRE (antinuclear antibody)    Hand joint stiff, right    Carpal tunnel syndrome    Osteoarthritis of knee, unspecified laterality, unspecified osteoarthritis type    Diverticulosis of large intestine    Internal hemorrhoids    Diffuse large B-cell lymphoma, unspecified body region (H)    Chemotherapy-induced neutropenia     Esophageal spasm    Chemotherapy-induced peripheral neuropathy     History of Clostridioides difficile colitis    Cervical disc disorder with radiculopathy    Facet arthropathy, cervical    Cervical disc herniation    Lumbar facet arthropathy    Lumbar disc herniation, L3-L4    Degeneration of lumbar intervertebral disc     Past Surgical History:   Procedure Laterality Date    BIOPSY  2-6-23    BIOPSY LYMPH NODE CERVICAL Left 2/16/2023     Procedure: excisional biopsy of a left cervical node;  Surgeon: Aung Tamayo MD;  Location: [facility] OR    COLONOSCOPY  2019    clean, do again in 10 years    COLONOSCOPY WITH CO2 INSUFFLATION N/A 07/17/2019    Procedure: COLONOSCOPY, WITH CO2 INSUFFLATION;  Surgeon: Jaison Hammer MD;  Location: [facility] OR    ENT SURGERY  1979    Loss of hearing rt ear (tinnitis)    HERNIA REPAIR  2000    NO HISTORY OF SURGERY      ORTHOPEDIC SURGERY  1988,1992    rt knee, scope in 88; ACL repair Early 90s       Social History     Tobacco Use    Smoking status: Never     Passive exposure: Never    Smokeless tobacco: Never   Substance Use Topics    Alcohol use: Yes     Comment: occassional, 1- 2 per week     Family History   Problem Relation Age of Onset    Hyperlipidemia Mother         medication    Diabetes Father     Other Cancer Father         Lung cancer    Other Cancer Maternal Grandmother         Unsure what type of cancer    Autoimmune Disease No family hx of          Allergies   Allergen Reactions    Penicillins     Allopurinol Rash     Recent Labs   Lab Test 12/08/23  0914 11/30/23  0754 07/21/23  1004 06/21/23  0952 06/14/23  1506 01/27/23  0929 12/12/18  0728 11/12/18  1215   * 150*  --   --   --   --  140*  --    HDL 45 44  --   --   --   --  37*  --    TRIG 93 125  --   --   --   --  148  --    ALT  --   --  18 20 19   < >  --   --    CR  --   --  0.70 0.70 0.76   < >  --   --    GFRESTIMATED  --   --  >90 >90 >90   < >  --   --    POTASSIUM  --   --  4.1 3.6 3.7   < >  --   --    TSH  --   --   --   --   --   --   --  2.01    < > = values in this interval not displayed.      BP Readings from Last 3 Encounters:   11/28/23 121/73   06/21/23 101/65   06/14/23 121/79    Wt Readings from Last 3 Encounters:   11/28/23 82.7 kg (182 lb 6.4 oz)   07/25/23 78.9 kg (174 lb)   06/21/23 79.4 kg (175 lb 1.6 oz)                    ROS:  C: NEGATIVE for fever, chills, change in weight  I: NEGATIVE for worrisome  "rashes, moles or lesions  E: NEGATIVE for vision changes or irritation  E/M: NEGATIVE for ear, mouth and throat problems  R: NEGATIVE for significant cough or SOB  B: NEGATIVE for masses, tenderness or discharge  CV: NEGATIVE for chest pain, palpitations or peripheral edema  GI: NEGATIVE for nausea, abdominal pain, heartburn, or change in bowel habits  : NEGATIVE for frequency, dysuria, or hematuria  M: NEGATIVE for significant arthralgias or myalgia  N: NEGATIVE for weakness, dizziness or paresthesias  E: NEGATIVE for temperature intolerance, skin/hair changes  H: NEGATIVE for bleeding problems  P: NEGATIVE for changes in mood or affect    OBJECTIVE:                                                    /73 (BP Location: Left arm, Patient Position: Sitting, Cuff Size: Adult Regular)   Pulse 72   Temp 97.8  F (36.6  C) (Tympanic)   Resp 16   Ht 1.803 m (5' 11\")   Wt 82.7 kg (182 lb 6.4 oz)   SpO2 99%   BMI 25.44 kg/m    Body mass index is 25.44 kg/m .  GENERAL: alert, no distress, and fatigued  EYES: Eyes grossly normal to inspection and conjunctivae and sclerae normal  HENT: normal cephalic/atraumatic and oral mucous membranes moist  RESP: No audible wheezes.  CV: Not examined.  MS: Reduced range of motion of the cervical spine.  NEURO: Normal strength and tone, mentation intact and speech normal  PSYCH: mentation appears normal, affect normal/bright    Diagnostic Test Results:  Results for orders placed or performed in visit on 11/28/23   XR Chest 2 Views     Status: None    Narrative    EXAM: XR CHEST 2 VIEWS  LOCATION: [facility]  DATE: 11/28/2023    INDICATION:  Post-acute COVID-19 syndrome  COMPARISON: 05/23/2023      Impression    IMPRESSION: Lungs are clear. No pleural effusion or pneumothorax. Normal heart size. Stable right port terminating at the cavoatrial junction. Surgical clips in the left supraclavicular region.        ASSESSMENT/PLAN:                           "                            Facet arthropathy, cervical  - Spine  Referral; Future    Cervical disc herniation  - Spine  Referral; Future    Post-acute COVID-19 syndrome  - Adult ENT  Referral; Future  - XR Chest 2 Views    Hoarseness  - Adult ENT  Referral; Future    Degeneration of lumbar intervertebral disc  - Spine  Referral; Future    Lumbar disc herniation, L3-L4  - Spine  Referral; Future    Lumbar facet arthropathy  - Spine  Referral; Future    Other male erectile dysfunction  - Testosterone Free and Total; Standing  - tadalafil (CIALIS) 10 MG tablet; Take 1-2 tablets (10-20 mg) by mouth daily as needed     Disposition:  Follow-up in 4 weeks or as needed.    Paddy Holly MD  [facility]

## 2023-11-29 ENCOUNTER — MYC MEDICAL ADVICE (OUTPATIENT)
Dept: FAMILY MEDICINE | Facility: CLINIC | Age: 63
End: 2023-11-29

## 2023-11-29 ENCOUNTER — OFFICE VISIT (OUTPATIENT)
Dept: AUDIOLOGY | Facility: CLINIC | Age: 63
End: 2023-11-29
Payer: COMMERCIAL

## 2023-11-29 DIAGNOSIS — H90.3 SNHL (SENSORY-NEURAL HEARING LOSS), ASYMMETRICAL: Primary | ICD-10-CM

## 2023-11-29 PROCEDURE — V5010 ASSESSMENT FOR HEARING AID: HCPCS | Performed by: AUDIOLOGIST

## 2023-11-29 RX ORDER — TADALAFIL 10 MG/1
10-20 TABLET ORAL DAILY PRN
Qty: 30 TABLET | Refills: 11 | Status: SHIPPED | OUTPATIENT
Start: 2023-11-29 | End: 2024-03-14

## 2023-11-29 NOTE — PROGRESS NOTES
AUDIOLOGY REPORT    BACKGROUND INFORMATION: Geovanni Jasso was seen in the Audiology Clinic at [facility] on 11/29/2023 for follow-up.  The patient has been seen previously in this clinic on 2/07/2023 and results revealed a severe to profound sensorineural loss in the right ear and normal to moderate sensorineural loss in the left.  The patient was fit with a Phonak BiCROS in 2015.  The patient reports that his hearing aids are not working.    TEST RESULTS AND PROCEDURES: An electroacoustic hearing aid check was performed.  Adjustments were made including cleaning and replacing the left wax trap and the patient reported good sound. The hearing aid conformity evaluation was completed. This includes initially evaluating the devices electroacoustically and later matching NAL-NL target with soft sounds audible, moderate sounds comfortable and clear, and loud sounds below discomfort.     SUMMARY AND RECOMMENDATIONS: A hearing aid check was performed today and the patient reports good audibility following cleaning.  Adjustments were made as noted above and the patient will return as needed or at least every 4-6 months for cleaning and assessment of hearing aid.  Call this clinic with questions regarding today s visit.     Michelle Interiano.  Doctor of Audiology  [place] License # [id number]

## 2023-11-30 ENCOUNTER — LAB (OUTPATIENT)
Dept: LAB | Facility: CLINIC | Age: 63
End: 2023-11-30
Payer: COMMERCIAL

## 2023-11-30 ENCOUNTER — THERAPY VISIT (OUTPATIENT)
Dept: PHYSICAL THERAPY | Facility: CLINIC | Age: 63
End: 2023-11-30
Payer: COMMERCIAL

## 2023-11-30 ENCOUNTER — MYC MEDICAL ADVICE (OUTPATIENT)
Dept: FAMILY MEDICINE | Facility: CLINIC | Age: 63
End: 2023-11-30

## 2023-11-30 DIAGNOSIS — I25.10 ATHEROSCLEROSIS OF NATIVE CORONARY ARTERY OF NATIVE HEART WITHOUT ANGINA PECTORIS: Primary | ICD-10-CM

## 2023-11-30 DIAGNOSIS — M54.2 NECK PAIN: Primary | ICD-10-CM

## 2023-11-30 DIAGNOSIS — Z13.6 CARDIOVASCULAR SCREENING; LDL GOAL LESS THAN 100: Primary | ICD-10-CM

## 2023-11-30 DIAGNOSIS — N52.8 OTHER MALE ERECTILE DYSFUNCTION: ICD-10-CM

## 2023-11-30 LAB
CHOLEST SERPL-MCNC: 219 MG/DL
HDLC SERPL-MCNC: 44 MG/DL
LDLC SERPL CALC-MCNC: 150 MG/DL
NONHDLC SERPL-MCNC: 175 MG/DL
SHBG SERPL-SCNC: 88 NMOL/L (ref 11–80)
TRIGL SERPL-MCNC: 125 MG/DL

## 2023-11-30 PROCEDURE — 36415 COLL VENOUS BLD VENIPUNCTURE: CPT

## 2023-11-30 PROCEDURE — 84403 ASSAY OF TOTAL TESTOSTERONE: CPT

## 2023-11-30 PROCEDURE — 97110 THERAPEUTIC EXERCISES: CPT | Mod: GP

## 2023-11-30 PROCEDURE — 84270 ASSAY OF SEX HORMONE GLOBUL: CPT

## 2023-11-30 PROCEDURE — 80061 LIPID PANEL: CPT

## 2023-12-01 LAB
TESTOST FREE SERPL-MCNC: 7.62 NG/DL
TESTOST SERPL-MCNC: 709 NG/DL (ref 240–950)

## 2023-12-06 ENCOUNTER — OFFICE VISIT (OUTPATIENT)
Dept: AUDIOLOGY | Facility: CLINIC | Age: 63
End: 2023-12-06
Payer: COMMERCIAL

## 2023-12-06 DIAGNOSIS — H90.3 SNHL (SENSORY-NEURAL HEARING LOSS), ASYMMETRICAL: Primary | ICD-10-CM

## 2023-12-06 PROCEDURE — 92567 TYMPANOMETRY: CPT | Performed by: AUDIOLOGIST

## 2023-12-06 PROCEDURE — 92557 COMPREHENSIVE HEARING TEST: CPT | Performed by: AUDIOLOGIST

## 2023-12-06 PROCEDURE — 92590 PR HEARING AID EXAM MONAURAL: CPT | Performed by: AUDIOLOGIST

## 2023-12-06 NOTE — PROGRESS NOTES
AUDIOLOGY REPORT    SUBJECTIVE:  Geovanni Jasso is a 63 year old male who was seen in the Audiology Clinic at the [facility] for a hearing aid evaluation. The patient has been seen previously in this clinic on 2/07/2023 for assessment and results indicated a sever to profound sensorineural hearing loss in the right ear and normal sloping to moderate sensorineural loss in the left. The patient reports sudden hearing loss in the right ear in 1978. The patient also reports longstanding tinnitus in the right.  The patient was fit with a BiCROS hearing aid in 2017. The patient underwent chemotherapy last Spring and is concerned if his hearing has changed at all  He reports interest in newer hearing aid technology.     OBJECTIVE:    Otoscopic exam indicates ears are clear of cerumen bilaterally     Pure Tone Thresholds assessed using conventional audiometry with good  reliability from 250-8000 Hz bilaterally using insert earphones and circumaural headphones     RIGHT:  moderate sloping to profound sensorineural hearing loss    LEFT:    normal sloping to moderate sensorineural hearing loss    Tympanogram:    RIGHT: normal eardrum mobility    LEFT:   normal eardrum mobility    Reflexes (reported by stimulus ear):  Could not seal      Speech Reception Threshold:    RIGHT: 70 dB HL (speech detection)    LEFT:   30 dB HL  Word Recognition Score:     RIGHT: 0% at 100 dB HL using NU-6 recorded word list.    LEFT:   100% at 70 dB HL using NU-6 recorded word list.      ASSESSMENT:     ICD-10-CM    1. SNHL (sensory-neural hearing loss), asymmetrical  H90.3 Cmprhn Audiometry Thrshld Eval & Speech Recog (80159)     Tympanometry (impedance - testing) (44947)     Hearing Aid Exam, Monaural ([id number])          Compared to patient's previous audiogram dated 2/07/2023, hearing has remained stable overall. Today s results were discussed with the patient in detail.     Patient is a hearing aid candidate. Patient would  like to move forward with a hearing aid evaluation today. Therefore, the patient was presented with different options for amplification to help aid in communication. Discussed styles, levels of technology and monaural vs. binaural fitting.     The hearing aid(s) mutually chosen were:  Binaural: Phonak L70-R BiCROS BTE  COLOR: P5  BATTERY SIZE: rechargeable  EARMOLD/TIPS: RITE Size 2  CANAL/ LENGTH: 1    ASSESSMENT:     ICD-10-CM    1. SNHL (sensory-neural hearing loss), asymmetrical  H90.3 Cmprhn Audiometry Thrshld Eval & Speech Recog (03805)     Tympanometry (impedance - testing) (01062)     Hearing Aid Exam, Monaural (06320)          Reviewed purchase information and warranty information with patient. The 45 day trial period was explained to patient. The patient was given a copy of the [place] [employer] consumer brochure on purchasing hearing instruments. Patient risk factors have been provided to the patient in writing prior to the sale of the hearing aid per FDA regulation. The risk factors are also available in the User Instructional Booklet to be presented on the day of the hearing aid fitting. Hearing aid(s) ordered. Hearing aid evaluation completed.    PLAN: Geovanni is scheduled to return in 2-3 weeks for a hearing aid fitting and programming. Purchase agreement will be completed on that date. Please contact this clinic with any questions or concerns.           Michelle Interiano.  Doctor of Audiology  [place] License # [id number]

## 2023-12-08 ENCOUNTER — ALLIED HEALTH/NURSE VISIT (OUTPATIENT)
Dept: ONCOLOGY | Facility: CLINIC | Age: 63
End: 2023-12-08
Payer: COMMERCIAL

## 2023-12-08 DIAGNOSIS — Z13.6 CARDIOVASCULAR SCREENING; LDL GOAL LESS THAN 100: Primary | ICD-10-CM

## 2023-12-08 DIAGNOSIS — C83.30 DIFFUSE LARGE B-CELL LYMPHOMA, UNSPECIFIED BODY REGION (H): ICD-10-CM

## 2023-12-08 LAB
CHOLEST SERPL-MCNC: 201 MG/DL
FASTING STATUS PATIENT QL REPORTED: YES
HDLC SERPL-MCNC: 45 MG/DL
LDLC SERPL CALC-MCNC: 137 MG/DL
NONHDLC SERPL-MCNC: 156 MG/DL
TRIGL SERPL-MCNC: 93 MG/DL

## 2023-12-08 PROCEDURE — 80061 LIPID PANEL: CPT | Performed by: INTERNAL MEDICINE

## 2023-12-08 PROCEDURE — 36591 DRAW BLOOD OFF VENOUS DEVICE: CPT

## 2023-12-08 RX ORDER — HEPARIN SODIUM (PORCINE) LOCK FLUSH IV SOLN 100 UNIT/ML 100 UNIT/ML
500 SOLUTION INTRAVENOUS EVERY 8 HOURS
Status: DISCONTINUED | OUTPATIENT
Start: 2023-12-08 | End: 2023-12-08 | Stop reason: HOSPADM

## 2023-12-08 RX ADMIN — HEPARIN SODIUM (PORCINE) LOCK FLUSH IV SOLN 100 UNIT/ML 500 UNITS: 100 SOLUTION at 09:30

## 2023-12-08 NOTE — PROGRESS NOTES
"Patient's name and  were verified.  See Doc Flowsheet - IV assess for details.  IVAD accessed with 20G 3/4\" mendez gripper plus needle  blood return positive: YES  Site without redness, tenderness or swelling: YES  flushed with 20cc NS and 5cc 100u/ml heparin  Needle: De-accessed  Comments: Labs drawn.  Patient tolerated procedure without incident.    Naty Youssef RN, BSN, OCN    "

## 2023-12-14 NOTE — PROGRESS NOTES
SUBJECTIVE:    Geovanni Jasso  Is a 63 year old male who presents today for new patient evaluation of neck pain and facet arthropathy upon referral from Dr. Paddy Holly.  He has been going to PT.  He has also been getting chemotherapy for his large B cell lymphoma.  He saw Dr. Zulma Duque on 7/8/2021 for bilateral shoulder pain with documented an injury November 2019, the right subacromial injection February 2021 that was nontherapeutic and made him worse.  He has been going through PT.  His therapist and Dr. Castillo both thought he had a cervical source of pain.    Cervical MRI 11/20/2023: Multilevel degenerative joint and disc disease with no high-grade stenosis.  Facet arthropathy noted bilaterally C2-C7,    History today:    The patient confirms the above history.  He states that after his chemotherapy that his neck pain really got much worse.  It is hard for him to get comfortable at night.  Turning his head either side causes left-sided neck pain.  At this point he does not notice any significant shoulder pain and he does recall Dr. Castillo doing some other ultrasound-guided injections which were helpful.  His lymphoma was treated with chemotherapy but no radiation.  Recent PET scan was negative for cancer.    Pain score, and diagram reviewed.  See questionnaire.      ROS:  .    Otherwise negative for bowel/bladder retention, dysphagia, imbalance/falls, difficulty with fine motor skills, and otherwise unremarkable.     See the patient's intake questionnaire for details.    Medications:  Reviewed.    Allergies Reviewed.  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  He and his second wife have a blended family with 4 children and 4 grandchildren.  Sports hobbies and  activities: Golf, watching sports.  Former .    OBJECTIVE:    IMAGING: Images and reports reviewed.    MRI OF THE CERVICAL SPINE WITHOUT CONTRAST 11/20/2023 8:47 AM     COMPARISON: Cervical spine x-ray series 7/1/2021.     HISTORY: Neck pain. No applicable indication. Cervical disc disorder  with radiculopathy.     TECHNIQUE: Multiplanar, multisequence MRI images of the cervical spine  were acquired without intravenous contrast.     FINDINGS: There is normal alignment of the cervical vertebrae.  Vertebral body heights of the cervical spine are normal. Marrow signal  throughout the cervical vertebrae is within normal limits. There is no  evidence for fracture or pathologic bony lesion of the cervical spine.        There is loss of disc height, disc desiccation and posterior disc  bulging to varying degrees at all levels of the cervical spine with  exception of the normal appearing C7-T1.      The cervical spinal cord is normal in contour. There is no abnormal  signal in the cervical spinal cord. There is no evidence for  intrathecal abnormality.     Level by level:     C2-C3: There is facet arthropathy bilaterally, uncinate hypertrophy  bilaterally and a posterior broad-based disc-osteophyte complex. No  spinal canal stenosis. Mild left foraminal narrowing. No right  foraminal stenosis.     C3-C4: There is facet arthropathy bilaterally, uncinate hypertrophy  bilaterally and a posterior broad-based disc-osteophyte complex. No  spinal canal stenosis. Mild left foraminal narrowing. Mild right  foraminal narrowing.     C4-C5: There is facet arthropathy bilaterally, uncinate hypertrophy  bilaterally and a posterior broad-based disc-osteophyte complex. No  spinal canal stenosis. No left foraminal stenosis. Moderate right  foraminal stenosis.     C5-C6: There is facet arthropathy bilaterally, uncinate hypertrophy  bilaterally and a posterior broad-based disc-osteophyte complex with a  superimposed small  posterior broad-based disc herniation (protrusion).  No spinal canal stenosis. No foraminal stenosis on either side.     C6-C7: There is facet arthropathy bilaterally, uncinate hypertrophy  bilaterally and a posterior broad-based disc-osteophyte complex. No  spinal canal stenosis. No foraminal stenosis on either side.     C7-T1: Normal disc height and contour. Mild facet arthropathy  bilaterally. No spinal canal stenosis. No foraminal stenosis on either  side.                                                                       IMPRESSION:  1. Diffuse degenerative change of the cervical spine as detailed  above.  2. Moderate degenerative neural foraminal stenosis on the right at  C4-C5. No other high-grade spinal canal or high-grade neural foraminal  narrowing of the cervical spine.        PHYSICAL EXAMINATION:    CONSTITUTIONAL:   No acute distress.  The patient is well nourished and well groomed.  Ectomorphic frame with a mildly elevated BMI.  Chemotherapy port in his right anterior chest.  Negative cervical or supraclavicular adenopathy.  PSYCHIATRIC:  The patient is awake, alert, oriented to person, place, time and answering questions appropriately with clear speech.    SKIN:  Skin over the face, bilateral upper extremities, and posterior torso is clean, dry, intact without rashes.  MUSCULOSKELETAL:   Negative scapular dyskinesis.  Shoulder tenderness: 0/0/0/0/0 .  Shoulder range of motion: Full and painless.  Rotator cuff strength 5/5.     Elbow wrist and hand range of motion full and painless .   Thoracic range of motion with overpressure painless   Ribs: Painless with respiratory excursion   Chest wall compression: Nontender  Cervical range of motion full with pain on the left side of his neck noted with extension, bilateral sidebending, and left greater than right rotation.    Spurling's maneuver:   negative  for radiating symptoms.    Palpation of upper quadrant:    Negative tenderness, spasm,  nor active  trigger points radiating pain.    NEURO:   Mental status:  See Psychiatric above.  CN III-XII are grossly intact.    Gait (flat feet/heels/toes), squat/rise, normal.  Tandem walk, Romberg Pronator drift normal.  Reflexes: symmetric triceps, biceps, brachioradialis, pronator quadratus, bilaterally.    Sensation to light touch normal in torso and upper extremities .   Strength:  5/5 strength in C5-T1 myotomes, axillary/medial/radial/ulnar nerves .   Fonseca's/Tronder's negative .    VASCULAR:   Capillary refill, temperature and color in the upper extremities is normal and symmetric.    ASSESSMENT:     Geovanni Jasso is a 63 year old male who presents today for new patient evaluation of   Chronic neck pain  Suspected facet mediated pain mainly at C3-4, based on his pain pattern      DISCUSSION/PLAN:    MBB left C2-3, C3-4, C4-5.  I explained the RFA workup and he is in agreement.    Please note: Voice recognition software was used in this dictation.  It may therefore contain typographical errors.  Wally Negrete MD

## 2023-12-15 ENCOUNTER — THERAPY VISIT (OUTPATIENT)
Dept: PHYSICAL THERAPY | Facility: CLINIC | Age: 63
End: 2023-12-15
Payer: COMMERCIAL

## 2023-12-15 DIAGNOSIS — M54.2 NECK PAIN: Primary | ICD-10-CM

## 2023-12-15 PROCEDURE — 97110 THERAPEUTIC EXERCISES: CPT | Mod: GP

## 2023-12-15 NOTE — PROGRESS NOTES
12/15/23 0500   Appointment Info   Signing clinician's name / credentials Avery Corey, PT, DPT, CSCS, CLT   Total/Authorized Visits [employer]   Visits Used 5   Medical Diagnosis Neck Pain   PT Tx Diagnosis Left Sided Posterior Neck Pain   Progress Note/Certification   Onset of illness/injury or Date of Surgery 03/01/23   Therapy Frequency 1x a week   Predicted Duration 8 weeks   Progress Note Due Date 12/13/23   GOALS   PT Goals 2   PT Goal 1   Goal Identifier Sleeping   Goal Description Will be able to sleep a full night without pain or disruption   Rationale to maximize safety and independence with performance of ADLs and functional tasks;to maximize safety and independence within the home;to maximize safety and independence within the community;to maximize safety and independence with transportation;to maximize safety and independence with self cares   Goal Progress He reports he is still having issues sleeping, a lot of times it's difficulty getting to sleep.  As he tosses and turns while sleeping or adjusting positions he will feel a sharp pain in his neck.  If he is in a position where the head is not purely in neutral he will feel pain as well.  However, once he does fall asleep, most of the time he is able to sleep without pain in the neck.  Then when he wakes, as he gets out of bed and pushing himself up into the air, he feels a lot of soreness in the neck.   Target Date 11/15/23   PT Goal 2   Goal Identifier Neck ROM   Goal Description Will have full cervical ROM without pain or limitations in ROM   Rationale to maximize safety and independence with performance of ADLs and functional tasks;to maximize safety and independence within the home;to maximize safety and independence within the community;to maximize safety and independence with transportation;to maximize safety and independence with self cares   Goal Progress Does not have full or painfree ROM as it still hurts at end range, but his ROM has  "substantially improved with PT.   Target Date 12/13/23   Subjective Report   Subjective Report He arrives today saying that his neck has been really sore and \"rough\" especially at work.  He says right now it pulls and hurts with slight rotation of the neck in both directions.  When he did some of the neck exercises, such as looking up in the air with support from the towel, he states it seems to improve with reps.   Objective Measures   Objective Measures Objective Measure 2;Objective Measure 1;Objective Measure 3;Objective Measure 4   Objective Measure 1   Objective Measure Cervical ROM   Details Flexion: Full and No Symptoms.  Extension: 52 degrees and pain at end range. Left Rotation: 72 degrees and pain at end range in the left upper trap.  Right Rotation: 80 degrees and just a stretch in the musculature.  Left Side Bend: 40 degrees and a stretch on the right side.  Right Side Bend: 36 degrees and a pain in the left upper trapezius   Objective Measure 2   Objective Measure MMT   Details All 5/5 and painfree with good strength   Objective Measure 3   Objective Measure Neural Tension   Details Negative Neural Tension bilaterally and no history of radicular symptoms   Objective Measure 4   Objective Measure Neck Disability Index   Details 7 (14%)   Treatment Interventions (PT)   Interventions Therapeutic Procedure/Exercise   Therapeutic Procedure/Exercise   Therapeutic Procedures: strength, endurance, ROM, flexibillity minutes (26653) 38   PTRx Ther Proc 1 Cervical Retraction With Patient Overpressure   PTRx Ther Proc 1 - Details 1x10   PTRx Ther Proc 2 Cervical Extension Supported   PTRx Ther Proc 2 - Details 1x10 with Belt supporting   PTRx Ther Proc 3 Cervical ROM Rotation   PTRx Ther Proc 3 - Details 1x5 for 5 second holds   PTRx Ther Proc 4 Upper Trapezius Stretch   PTRx Ther Proc 4 - Details 2x20 second holds each side   PTRx Ther Proc 5 Pec Stretch Doorway   PTRx Ther Proc 5 - Details 1x15 seconds   PTRx " Ther Proc 6 Bilateral Rotation With Theraband   PTRx Ther Proc 6 - Details 1x20 with red band   PTRx Ther Proc 7 Shoulder Theraband Rows   PTRx Ther Proc 7 - Details 1x20 with Red Band   PTRx Ther Proc 8 Standing Theraband T's   PTRx Ther Proc 8 - Details 1x10 (Bothered right shoulder so did not add to HEP)   Skilled Intervention General Strength and Mobility   Patient Response/Progress Has HEP that has improved ROM and NDI score. Will continue at home.   Plan   Home program See PTRx   Updates to plan of care Discharged   Plan for next session Discharged   Total Session Time   Timed Code Treatment Minutes 38   Total Treatment Time (sum of timed and untimed services) 38           DISCHARGE  Reason for Discharge: No further expectation of progress.  Bill has a strong home exercise program and after five therapy visits he has improved neck ROM as well as an statistically significant improved NDI score.  Unfortuantely, despite his ROM improvements and his HEP he still has quite a bit of Neck Pain that limits his ability to fall asleep and his day to day activities. This pain has not resolved and still consistently occurs at all end range neck motions.      Equipment Issued: None    Discharge Plan: Patient to continue home program.    Referring Provider:  Paddy Holly MD

## 2023-12-20 ENCOUNTER — OFFICE VISIT (OUTPATIENT)
Dept: AUDIOLOGY | Facility: CLINIC | Age: 63
End: 2023-12-20
Payer: COMMERCIAL

## 2023-12-20 DIAGNOSIS — H90.3 SNHL (SENSORY-NEURAL HEARING LOSS), ASYMMETRICAL: Primary | ICD-10-CM

## 2023-12-20 PROCEDURE — V5221 HEARING AID BINAURAL BTE/BTE: HCPCS | Mod: GA | Performed by: AUDIOLOGIST

## 2023-12-20 PROCEDURE — V5011 HEARING AID FITTING/CHECKING: HCPCS | Mod: GA | Performed by: AUDIOLOGIST

## 2023-12-20 PROCEDURE — 92592 PR HEARING AID CHECK, MONAURAL: CPT | Mod: GA | Performed by: AUDIOLOGIST

## 2023-12-20 PROCEDURE — V5240 DISP FEE CONTRALATERAL BINAU: HCPCS | Mod: GA | Performed by: AUDIOLOGIST

## 2023-12-20 PROCEDURE — V5020 CONFORMITY EVALUATION: HCPCS | Mod: GA | Performed by: AUDIOLOGIST

## 2023-12-20 NOTE — PROGRESS NOTES
AUDIOLOGY REPORT    SUBJECTIVE: Geovanni Jasso, a 63 year old male, was seen in the Audiology Clinic at [facility] today for a Binaural hearing aid fitting. Previous results have revealed a longstanding asymmetrical sensorineural hearing loss with worse hearing in the right ear     OBJECTIVE:  Prior to fitting, a hearing aid check was performed to ensure device functionality. The hearing aid conformity evaluation was completed.The hearing aids were placed and they provided a good fit. Real-ear-probe-microphone measurements were completed on the [employer] system and were a good match to NAL-NL2 target with soft sounds audible, moderate sounds comfortable, and loud sounds below discomfort. UCLs are verified through maximum power output measures and demonstrate appropriate limiting of loud inputs. Mr. Jasso was oriented to proper hearing aid use, care, cleaning (no water, dry brush), batteries rechargeable, toxicity, low-battery signal), aid insertion/removal, user booklet, warranty information, storage cases, and other hearing aid details. The patient confirmed understanding of hearing aid use and care, and showed proper insertion of hearing aid and batteries while in the office today. Mr. Jasso reported good volume and sound quality today.    EAR(S) FIT: Binaural  MA HEARING AID MAKE: Right: Phonak; Left: Phonak    HEARING AID STYLE: Right: CROS; Left: GERA  DOME SIZE: Right:  small; Left::  medium   LENGTH: Right:  1; Left:  1     SERIAL NUMBERS: Right: [id number]; Left: [id number]  WARRANTY END DATE: Right: 3/5/2027; Left:: 3/5/2027    Signed waiver on file.       ASSESSMENT: BiCROS hearing aid fitting completed today. Verification measures were performed. The 45 day trial period was explained to patient, and they expressed understanding. Mr. Jasso signed the Hearing Aid Purchase Agreement and was given a copy, as well as details on his hearing aids. Patient was counseled that exact out of  pocket amounts cannot be determined for hearing aid claims being sent to insurance. Any insurance coverage information presented to the patient is an estimate only, and is not a guarantee of payment. Patient has been advised to check with their own insurance.    PLAN: Mr. Jasso will return for follow-up in 2-3 weeks for a hearing aid review appointment. Please call this clinic with questions regarding today s appointment.    Michelle Interiano.  Doctor of Audiology  [place] License # [id number]

## 2023-12-20 NOTE — PATIENT INSTRUCTIONS

## 2023-12-27 ENCOUNTER — OFFICE VISIT (OUTPATIENT)
Dept: NEUROSURGERY | Facility: CLINIC | Age: 63
End: 2023-12-27
Attending: INTERNAL MEDICINE
Payer: COMMERCIAL

## 2023-12-27 ENCOUNTER — PRE VISIT (OUTPATIENT)
Dept: NEUROSURGERY | Facility: CLINIC | Age: 63
End: 2023-12-27

## 2023-12-27 VITALS
WEIGHT: 185.9 LBS | HEIGHT: 71 IN | BODY MASS INDEX: 26.02 KG/M2 | HEART RATE: 75 BPM | DIASTOLIC BLOOD PRESSURE: 79 MMHG | SYSTOLIC BLOOD PRESSURE: 123 MMHG

## 2023-12-27 DIAGNOSIS — M47.812 FACET ARTHROPATHY, CERVICAL: ICD-10-CM

## 2023-12-27 DIAGNOSIS — M47.892 OTHER SPONDYLOSIS, CERVICAL REGION: Primary | ICD-10-CM

## 2023-12-27 PROCEDURE — 99214 OFFICE O/P EST MOD 30 MIN: CPT | Performed by: PREVENTIVE MEDICINE

## 2023-12-27 ASSESSMENT — PAIN SCALES - GENERAL: PAINLEVEL: EXTREME PAIN (8)

## 2023-12-27 NOTE — NURSING NOTE
"Reason For Visit:   Chief Complaint   Patient presents with    Consult     Neck pain-left side         Occupation:   Currently working? Yes.  Work status?  Full time.    Sports: golf  Activities: n             /79   Pulse 75   Ht 1.803 m (5' 11\")   Wt 84.3 kg (185 lb 14.4 oz)   BMI 25.93 kg/m        Allergies   Allergen Reactions    Penicillins     Allopurinol Rash       Current Outpatient Medications   Medication    acetaminophen (TYLENOL) 325 MG tablet    Efinaconazole (JUBLIA) 10 % SOLN    ibuprofen (ADVIL,MOTRIN) 200 MG tablet    ketorolac (TORADOL) 10 MG tablet    lidocaine-prilocaine (EMLA) 2.5-2.5 % external cream    methocarbamol (ROBAXIN) 500 MG tablet    oxyCODONE (ROXICODONE) 5 MG tablet    tadalafil (CIALIS) 10 MG tablet    traMADol (ULTRAM) 50 MG tablet     No current facility-administered medications for this visit.         Darla Severin-Brown, LPN   "

## 2023-12-27 NOTE — LETTER
12/27/2023         RE: Geovanni Jasso  [address]  [facility] [address]        Dear Colleague,    Thank you for referring your patient, Geovanni Jasso, to the [facility] [place]. Please see a copy of my visit note below.      SUBJECTIVE:    Geovanni Jasso  Is a 63 year old male who presents today for new patient evaluation of neck pain and facet arthropathy upon referral from Dr. Paddy Holly.  He has been going to PT.  He has also been getting chemotherapy for his large B cell lymphoma.  He saw Dr. Zulma Duque on 7/8/2021 for bilateral shoulder pain with documented an injury November 2019, the right subacromial injection February 2021 that was nontherapeutic and made him worse.  He has been going through PT.  His therapist and Dr. Castillo both thought he had a cervical source of pain.    Cervical MRI 11/20/2023: Multilevel degenerative joint and disc disease with no high-grade stenosis.  Facet arthropathy noted bilaterally C2-C7,    History today:    The patient confirms the above history.  He states that after his chemotherapy that his neck pain really got much worse.  It is hard for him to get comfortable at night.  Turning his head either side causes left-sided neck pain.  At this point he does not notice any significant shoulder pain and he does recall Dr. Castillo doing some other ultrasound-guided injections which were helpful.  His lymphoma was treated with chemotherapy but no radiation.  Recent PET scan was negative for cancer.    Pain score, and diagram reviewed.  See questionnaire.      ROS:  .    Otherwise negative for bowel/bladder retention, dysphagia, imbalance/falls, difficulty with fine motor skills, and otherwise unremarkable.     See the patient's intake questionnaire for details.    Medications:  Reviewed.    Allergies Reviewed.  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  He and his second wife have a blended family with 4 children and 4 grandchildren.  Sports hobbies and activities: Golf, watching sports.  Former .    OBJECTIVE:    IMAGING: Images and reports reviewed.    MRI OF THE CERVICAL SPINE WITHOUT CONTRAST 11/20/2023 8:47 AM     COMPARISON: Cervical spine x-ray series 7/1/2021.     HISTORY: Neck pain. No applicable indication. Cervical disc disorder  with radiculopathy.     TECHNIQUE: Multiplanar, multisequence MRI images of the cervical spine  were acquired without intravenous contrast.     FINDINGS: There is normal alignment of the cervical vertebrae.  Vertebral body heights of the cervical spine are normal. Marrow signal  throughout the cervical vertebrae is within normal limits. There is no  evidence for fracture or pathologic bony lesion of the cervical spine.        There is loss of disc height, disc desiccation and posterior disc  bulging to varying degrees at all levels of the cervical spine with  exception of the normal appearing C7-T1.      The cervical spinal cord is normal in contour. There is no abnormal  signal in the cervical spinal cord. There is no evidence for  intrathecal abnormality.     Level by level:     C2-C3: There is facet arthropathy bilaterally, uncinate hypertrophy  bilaterally and a posterior broad-based disc-osteophyte complex. No  spinal canal stenosis. Mild left foraminal narrowing. No right  foraminal stenosis.     C3-C4: There is facet arthropathy bilaterally, uncinate hypertrophy  bilaterally and a posterior broad-based disc-osteophyte complex. No  spinal canal stenosis. Mild left foraminal narrowing. Mild right  foraminal narrowing.     C4-C5: There is facet arthropathy bilaterally, uncinate hypertrophy  bilaterally and a posterior broad-based disc-osteophyte complex.  No  spinal canal stenosis. No left foraminal stenosis. Moderate right  foraminal stenosis.     C5-C6: There is facet arthropathy bilaterally, uncinate hypertrophy  bilaterally and a posterior broad-based disc-osteophyte complex with a  superimposed small posterior broad-based disc herniation (protrusion).  No spinal canal stenosis. No foraminal stenosis on either side.     C6-C7: There is facet arthropathy bilaterally, uncinate hypertrophy  bilaterally and a posterior broad-based disc-osteophyte complex. No  spinal canal stenosis. No foraminal stenosis on either side.     C7-T1: Normal disc height and contour. Mild facet arthropathy  bilaterally. No spinal canal stenosis. No foraminal stenosis on either  side.                                                                       IMPRESSION:  1. Diffuse degenerative change of the cervical spine as detailed  above.  2. Moderate degenerative neural foraminal stenosis on the right at  C4-C5. No other high-grade spinal canal or high-grade neural foraminal  narrowing of the cervical spine.        PHYSICAL EXAMINATION:    CONSTITUTIONAL:   No acute distress.  The patient is well nourished and well groomed.  Ectomorphic frame with a mildly elevated BMI.  Chemotherapy port in his right anterior chest.  Negative cervical or supraclavicular adenopathy.  PSYCHIATRIC:  The patient is awake, alert, oriented to person, place, time and answering questions appropriately with clear speech.    SKIN:  Skin over the face, bilateral upper extremities, and posterior torso is clean, dry, intact without rashes.  MUSCULOSKELETAL:   Negative scapular dyskinesis.  Shoulder tenderness: 0/0/0/0/0 .  Shoulder range of motion: Full and painless.  Rotator cuff strength 5/5.     Elbow wrist and hand range of motion full and painless .   Thoracic range of motion with overpressure painless   Ribs: Painless with respiratory excursion   Chest wall compression: Nontender  Cervical range of motion full  with pain on the left side of his neck noted with extension, bilateral sidebending, and left greater than right rotation.    Spurling's maneuver:   negative  for radiating symptoms.    Palpation of upper quadrant:    Negative tenderness, spasm,  nor active trigger points radiating pain.    NEURO:   Mental status:  See Psychiatric above.  CN III-XII are grossly intact.    Gait (flat feet/heels/toes), squat/rise, normal.  Tandem walk, Romberg Pronator drift normal.  Reflexes: symmetric triceps, biceps, brachioradialis, pronator quadratus, bilaterally.    Sensation to light touch normal in torso and upper extremities .   Strength:  5/5 strength in C5-T1 myotomes, axillary/medial/radial/ulnar nerves .   Fonseca's/Tronder's negative .    VASCULAR:   Capillary refill, temperature and color in the upper extremities is normal and symmetric.    ASSESSMENT:     Geovanni Jasso is a 63 year old male who presents today for new patient evaluation of   Chronic neck pain  Suspected facet mediated pain mainly at C3-4, based on his pain pattern      DISCUSSION/PLAN:    MBB left C2-3, C3-4, C4-5.  I explained the RFA workup and he is in agreement.    Please note: Voice recognition software was used in this dictation.  It may therefore contain typographical errors.  Wally Negrete MD      Again, thank you for allowing me to participate in the care of your patient.        Sincerely,        Wally Negrete MD

## 2023-12-27 NOTE — PATIENT INSTRUCTIONS
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  I will have the RFA workup started on the left from C2-C5 to maximize our likelihood of blocking the right nerves and then I will let you and Dr. Ty proceed based on your outcome.  Let me know if I can help you again.  See the assessment and plan below for further details of our discussion today, and the paragraph below describing the RFA workup.  I wish you a happy new year.    ASSESSMENT:     Geovanni Jasso is a 63 year old male who presents today for new patient evaluation of   Chronic neck pain  Suspected facet mediated pain mainly at C3-4, based on his pain pattern      DISCUSSION/PLAN:    MBB left C2-3, C3-4, C4-5.  I explained the RFA workup and he is in agreement.    Description of the RFA procedure:  The pathway from diagnostic medial branch blocks (anesthetic injection) to radiofrequency denervation/ablation (burning the sensory nerve) was discussed in detail with the patient today.  Risks and benefits were discussed and all questions were answered.  The patient states understanding and wishes to proceed.  There are no contraindications.  The patient understands they will have 2 diagnostic medial branch blocks; and after each block they will be required to keep a pain diary recording their pain scores approximately every hour until the pain has returned to baseline.  During the time after the block, the patient was instructed to perform activities which typically aggravates their pain.  The patient will contact the medical spine staff after each diagnostic block to assess the amount of benefit the patient received.  If the patient has a significantly positive response to each of these diagnostic blocks, they may move forward with the radiofrequency ablation procedure.

## 2024-01-03 ENCOUNTER — TELEPHONE (OUTPATIENT)
Dept: PALLIATIVE MEDICINE | Facility: CLINIC | Age: 64
End: 2024-01-03
Payer: COMMERCIAL

## 2024-01-03 DIAGNOSIS — M47.812 CERVICAL SPONDYLOSIS WITHOUT MYELOPATHY: Primary | ICD-10-CM

## 2024-01-03 NOTE — TELEPHONE ENCOUNTER
Date Scheduled: 1-18-24  Facility: Surgery Locations: [facility]  Surgeon: Dr. Carter   Post-op appointment scheduled:    scheduled?: No  Surgery packet/instructions confirmed received?  No  Pre op physical/PAC appointment: N/A  Special Considerations:     Katelin Salas  Surgery Scheduling Coordinator  Ph: [phone]

## 2024-01-08 ENCOUNTER — OFFICE VISIT (OUTPATIENT)
Dept: AUDIOLOGY | Facility: CLINIC | Age: 64
End: 2024-01-08
Payer: COMMERCIAL

## 2024-01-08 DIAGNOSIS — H90.3 SNHL (SENSORY-NEURAL HEARING LOSS), ASYMMETRICAL: Primary | ICD-10-CM

## 2024-01-08 PROCEDURE — V5010 ASSESSMENT FOR HEARING AID: HCPCS | Performed by: AUDIOLOGIST

## 2024-01-17 ENCOUNTER — TRANSFERRED RECORDS (OUTPATIENT)
Dept: HEALTH INFORMATION MANAGEMENT | Facility: CLINIC | Age: 64
End: 2024-01-17
Payer: COMMERCIAL

## 2024-01-18 ENCOUNTER — ANCILLARY PROCEDURE (OUTPATIENT)
Dept: GENERAL RADIOLOGY | Facility: CLINIC | Age: 64
End: 2024-01-18
Payer: COMMERCIAL

## 2024-01-18 ENCOUNTER — HOSPITAL ENCOUNTER (OUTPATIENT)
Facility: AMBULATORY SURGERY CENTER | Age: 64
Discharge: HOME | End: 2024-01-18
Payer: COMMERCIAL

## 2024-01-18 VITALS
RESPIRATION RATE: 16 BRPM | HEART RATE: 68 BPM | DIASTOLIC BLOOD PRESSURE: 77 MMHG | SYSTOLIC BLOOD PRESSURE: 116 MMHG | OXYGEN SATURATION: 98 % | TEMPERATURE: 98.2 F

## 2024-01-18 DIAGNOSIS — R52 PAIN: ICD-10-CM

## 2024-01-18 PROCEDURE — 64491 INJ PARAVERT F JNT C/T 2 LEV: CPT | Mod: LT,KX

## 2024-01-18 PROCEDURE — G8918 PT W/O PREOP ORDER IV AB PRO: HCPCS

## 2024-01-18 PROCEDURE — 64490 INJ PARAVERT F JNT C/T 1 LEV: CPT | Mod: LT,KX

## 2024-01-18 PROCEDURE — G8907 PT DOC NO EVENTS ON DISCHARG: HCPCS

## 2024-01-18 RX ORDER — BUPIVACAINE HYDROCHLORIDE 2.5 MG/ML
INJECTION, SOLUTION EPIDURAL; INFILTRATION; INTRACAUDAL PRN
Status: DISCONTINUED | OUTPATIENT
Start: 2024-01-18 | End: 2024-01-18 | Stop reason: HOSPADM

## 2024-01-18 NOTE — DISCHARGE INSTRUCTIONS
PAIN INJECTION HOME CARE INSTRUCTIONS  Activity  -Rest today  -Do not work today  -Resume normal activity tomorrow  -DO NOT shower for 24 hours  -DO NOT remove bandaid for 24 hours    Pain  -You may experience soreness at the injection site for one or two days  -You may use an ice pack for 20 minutes every 2 hours for the first 24 hours  -You may use a heating pad after the first 24 hours  -You may use Tylenol (acetaminophen) every 4 hours or other pain medicines as directed by your physician    You may experience numbness radiating into your legs or arms (depending on the procedure location). This numbness may last several hours. Until sensation returns to normal; please use caution in walking, climbing stairs, and stepping out of your vehicle, etc.    DID YOU RECEIVE SEDATION TODAY?  No    Safety  Sedation medicine, if given, may remain active for many hours. It is important for the next 24 hours that you do not:  -Drive a car  -Operate machines or power tools  -Consume alcohol, including beer  -Sign any important papers or legal documents    DID YOU RECEIVE STEROIDS TODAY?  No    Common side effects of steroids:  Not everyone will experience corticosteroid side effects. If side effects are experienced, they will gradually subside in the 7-10 day period following an injection. Most common side effects include:  -Flushed face and/or chest  -Feeling of warmth, particularly in the face but could be an overall feeling of warmth  -Increased blood sugar in diabetic patients  -Menstrual irregularities my occur. If taking hormone-based birth control an alternate method of birth control is recommended  -Sleep disturbances and/or mood swings are possible  -Leg cramps    Please contact us if you have:  -Severe pain  -Fever more than 101.5 degrees Fahrenheit  -Signs of infection at the injection site (redness, swelling, or drainage)    FOR PAIN CENTER PATIENTS:  If you have questions, please contact the Pain Clinic at  [phone] Option #1 between the hours of 7:00 am and 3:00 pm Monday through Friday. After office hours you can contact the on call provider by dialing [phone]. If you need immediate attention, we recommend that you go to a hospital emergency room or dial [phone].      FOR PM&R PATIENTS:  For patients seen by the PM and R service, please call [phone]. (Monday through Friday 8a-5p.  After business hours and weekends call [phone] and ask for the PM and R doctor on call). If you need to fax a pain diary to PM&R the fax number is [phone]. If you are unable to fax, uploading to Crew is encouraged, then send to provider. If you have procedure scheduling questions please call [phone] Option #2.

## 2024-01-19 ENCOUNTER — OFFICE VISIT (OUTPATIENT)
Dept: FAMILY MEDICINE | Facility: CLINIC | Age: 64
End: 2024-01-19
Payer: COMMERCIAL

## 2024-01-19 VITALS
WEIGHT: 183.2 LBS | SYSTOLIC BLOOD PRESSURE: 107 MMHG | HEIGHT: 71 IN | HEART RATE: 70 BPM | DIASTOLIC BLOOD PRESSURE: 71 MMHG | OXYGEN SATURATION: 98 % | BODY MASS INDEX: 25.65 KG/M2 | RESPIRATION RATE: 20 BRPM | TEMPERATURE: 97.4 F

## 2024-01-19 DIAGNOSIS — K51.00 ULCERATIVE (CHRONIC) PANCOLITIS WITHOUT COMPLICATIONS (H): ICD-10-CM

## 2024-01-19 DIAGNOSIS — R20.2 PARESTHESIA OF BOTH FEET: ICD-10-CM

## 2024-01-19 DIAGNOSIS — T45.1X5A CHEMOTHERAPY-INDUCED PERIPHERAL NEUROPATHY (H): ICD-10-CM

## 2024-01-19 DIAGNOSIS — G62.0 CHEMOTHERAPY-INDUCED PERIPHERAL NEUROPATHY (H): ICD-10-CM

## 2024-01-19 DIAGNOSIS — D70.1 CHEMOTHERAPY-INDUCED NEUTROPENIA (H): ICD-10-CM

## 2024-01-19 DIAGNOSIS — T45.1X5A CHEMOTHERAPY-INDUCED NEUTROPENIA (H): ICD-10-CM

## 2024-01-19 DIAGNOSIS — Z00.00 MEDICARE ANNUAL WELLNESS VISIT, INITIAL: Primary | ICD-10-CM

## 2024-01-19 DIAGNOSIS — C83.30 DIFFUSE LARGE B-CELL LYMPHOMA, UNSPECIFIED BODY REGION (H): ICD-10-CM

## 2024-01-19 DIAGNOSIS — Z23 NEED FOR PROPHYLACTIC VACCINATION AGAINST STREPTOCOCCUS PNEUMONIAE (PNEUMOCOCCUS): ICD-10-CM

## 2024-01-19 DIAGNOSIS — Z12.5 SCREENING FOR PROSTATE CANCER: ICD-10-CM

## 2024-01-19 PROBLEM — M47.816 FACET ARTHRITIS OF LUMBAR REGION: Status: ACTIVE | Noted: 2023-11-28

## 2024-01-19 LAB
BASOPHILS # BLD AUTO: 0 10E3/UL (ref 0–0.2)
BASOPHILS NFR BLD AUTO: 1 %
EOSINOPHIL # BLD AUTO: 0.1 10E3/UL (ref 0–0.7)
EOSINOPHIL NFR BLD AUTO: 3 %
ERYTHROCYTE [DISTWIDTH] IN BLOOD BY AUTOMATED COUNT: 13.1 % (ref 10–15)
HCT VFR BLD AUTO: 45.5 % (ref 40–53)
HGB BLD-MCNC: 15 G/DL (ref 13.3–17.7)
IMM GRANULOCYTES # BLD: 0 10E3/UL
IMM GRANULOCYTES NFR BLD: 0 %
LYMPHOCYTES # BLD AUTO: 0.6 10E3/UL (ref 0.8–5.3)
LYMPHOCYTES NFR BLD AUTO: 14 %
MCH RBC QN AUTO: 32.8 PG (ref 26.5–33)
MCHC RBC AUTO-ENTMCNC: 33 G/DL (ref 31.5–36.5)
MCV RBC AUTO: 100 FL (ref 78–100)
MONOCYTES # BLD AUTO: 0.5 10E3/UL (ref 0–1.3)
MONOCYTES NFR BLD AUTO: 13 %
NEUTROPHILS # BLD AUTO: 3 10E3/UL (ref 1.6–8.3)
NEUTROPHILS NFR BLD AUTO: 70 %
PLATELET # BLD AUTO: 210 10E3/UL (ref 150–450)
RBC # BLD AUTO: 4.57 10E6/UL (ref 4.4–5.9)
WBC # BLD AUTO: 4.2 10E3/UL (ref 4–11)

## 2024-01-19 PROCEDURE — 84207 ASSAY OF VITAMIN B-6: CPT | Mod: 90 | Performed by: INTERNAL MEDICINE

## 2024-01-19 PROCEDURE — 36415 COLL VENOUS BLD VENIPUNCTURE: CPT | Performed by: INTERNAL MEDICINE

## 2024-01-19 PROCEDURE — 83921 ORGANIC ACID SINGLE QUANT: CPT | Performed by: INTERNAL MEDICINE

## 2024-01-19 PROCEDURE — 99397 PER PM REEVAL EST PAT 65+ YR: CPT | Mod: 25 | Performed by: INTERNAL MEDICINE

## 2024-01-19 PROCEDURE — 84425 ASSAY OF VITAMIN B-1: CPT | Mod: 90 | Performed by: INTERNAL MEDICINE

## 2024-01-19 PROCEDURE — 85025 COMPLETE CBC W/AUTO DIFF WBC: CPT | Performed by: INTERNAL MEDICINE

## 2024-01-19 PROCEDURE — 83615 LACTATE (LD) (LDH) ENZYME: CPT | Performed by: INTERNAL MEDICINE

## 2024-01-19 PROCEDURE — 86334 IMMUNOFIX E-PHORESIS SERUM: CPT | Performed by: PATHOLOGY

## 2024-01-19 PROCEDURE — 80053 COMPREHEN METABOLIC PANEL: CPT | Performed by: INTERNAL MEDICINE

## 2024-01-19 PROCEDURE — 82607 VITAMIN B-12: CPT | Performed by: INTERNAL MEDICINE

## 2024-01-19 PROCEDURE — 99000 SPECIMEN HANDLING OFFICE-LAB: CPT | Performed by: INTERNAL MEDICINE

## 2024-01-19 PROCEDURE — 90471 IMMUNIZATION ADMIN: CPT | Performed by: INTERNAL MEDICINE

## 2024-01-19 PROCEDURE — G0103 PSA SCREENING: HCPCS | Performed by: INTERNAL MEDICINE

## 2024-01-19 PROCEDURE — 84478 ASSAY OF TRIGLYCERIDES: CPT | Performed by: INTERNAL MEDICINE

## 2024-01-19 PROCEDURE — 90677 PCV20 VACCINE IM: CPT | Performed by: INTERNAL MEDICINE

## 2024-01-19 ASSESSMENT — ENCOUNTER SYMPTOMS
COUGH: 0
NAUSEA: 0
EYE PAIN: 0
HEADACHES: 0
ABDOMINAL PAIN: 1
FEVER: 0
NERVOUS/ANXIOUS: 0
DYSURIA: 0
FREQUENCY: 0
DIARRHEA: 0
JOINT SWELLING: 0
HEMATOCHEZIA: 0
MYALGIAS: 1
PARESTHESIAS: 0
ARTHRALGIAS: 1
HEMATURIA: 0
WEAKNESS: 0
HEARTBURN: 0
SORE THROAT: 0
SHORTNESS OF BREATH: 0
DIZZINESS: 0
CONSTIPATION: 0
PALPITATIONS: 0
CHILLS: 0

## 2024-01-19 ASSESSMENT — ASTHMA QUESTIONNAIRES
QUESTION_4 LAST FOUR WEEKS HOW OFTEN HAVE YOU USED YOUR RESCUE INHALER OR NEBULIZER MEDICATION (SUCH AS ALBUTEROL): NOT AT ALL
ACT_TOTALSCORE: 25
QUESTION_5 LAST FOUR WEEKS HOW WOULD YOU RATE YOUR ASTHMA CONTROL: COMPLETELY CONTROLLED
QUESTION_1 LAST FOUR WEEKS HOW MUCH OF THE TIME DID YOUR ASTHMA KEEP YOU FROM GETTING AS MUCH DONE AT WORK, SCHOOL OR AT HOME: NONE OF THE TIME
ACT_TOTALSCORE: 25
QUESTION_2 LAST FOUR WEEKS HOW OFTEN HAVE YOU HAD SHORTNESS OF BREATH: NOT AT ALL
QUESTION_3 LAST FOUR WEEKS HOW OFTEN DID YOUR ASTHMA SYMPTOMS (WHEEZING, COUGHING, SHORTNESS OF BREATH, CHEST TIGHTNESS OR PAIN) WAKE YOU UP AT NIGHT OR EARLIER THAN USUAL IN THE MORNING: NOT AT ALL

## 2024-01-19 ASSESSMENT — PAIN SCALES - GENERAL: PAINLEVEL: MILD PAIN (3)

## 2024-01-19 ASSESSMENT — ACTIVITIES OF DAILY LIVING (ADL): CURRENT_FUNCTION: NO ASSISTANCE NEEDED

## 2024-01-19 NOTE — PATIENT INSTRUCTIONS
"Patient Education   Personalized Prevention Plan  You are due for the preventive services outlined below.  Your care team is available to assist you in scheduling these services.  If you have already completed any of these items, please share that information with your care team to update in your medical record.  Health Maintenance Due   Topic Date Due     Asthma Action Plan - yearly  Never done     Pneumococcal Vaccine (1 of 2 - PCV) Never done     Zoster (Shingles) Vaccine (1 of 2) Never done     RSV VACCINE (Pregnancy & 60+) (1 - 1-dose 60+ series) Never done     Flu Vaccine (1) 09/01/2023     COVID-19 Vaccine (6 - 2023-24 season) 09/01/2023     Annual Wellness Visit  09/09/2023     Learning About Being Physically Active  What is physical activity?     Being physically active means doing any kind of activity that gets your body moving.  The types of physical activity that can help you get fit and stay healthy include:  Aerobic or \"cardio\" activities. These make your heart beat faster and make you breathe harder, such as brisk walking, riding a bike, or running. They strengthen your heart and lungs and build up your endurance.  Strength training activities. These make your muscles work against, or \"resist,\" something. Examples include lifting weights or doing push-ups. These activities help tone and strengthen your muscles and bones.  Stretches. These let you move your joints and muscles through their full range of motion. Stretching helps you be more flexible.  Reaching a balance between these three types of physical activity is important because each one contributes to your overall fitness.  What are the benefits of being active?  Being active is one of the best things you can do for your health. It helps you to:  Feel stronger and have more energy to do all the things you like to do.  Focus better at school or work.  Feel, think, and sleep better.  Reach and stay at a healthy weight.  Lose fat and build lean " "muscle.  Lower your risk for serious health problems, including diabetes, heart attack, high blood pressure, and some cancers.  Keep your heart, lungs, bones, muscles, and joints strong and healthy.  How can you make being active part of your life?  Start slowly. Make it your long-term goal to get at least 30 minutes of exercise on most days of the week. Walking is a good choice. You also may want to do other activities, such as running, swimming, cycling, or playing tennis or team sports.  Pick activities that you like--ones that make your heart beat faster, your muscles stronger, and your muscles and joints more flexible. If you find more than one thing you like doing, do them all. You don't have to do the same thing every day.  Get your heart pumping every day. Any activity that makes your heart beat faster and keeps it at that rate for a while counts.  Here are some great ways to get your heart beating faster:  Go for a brisk walk, run, or hike.  Go for a swim or bike ride.  Take an online exercise class or dance.  Play a game of touch football, basketball, or soccer.  Play tennis, pickleball, or racquetball.  Climb stairs.  Even some household chores can be aerobic. Just do them at a faster pace. Raking or mowing the lawn, sweeping the garage, and vacuuming and cleaning your home all can help get your heart rate up.  Strengthen your muscles during the week. You don't have to lift heavy weights or grow big, bulky muscles to get stronger. Doing a few simple activities that make your muscles work against, or \"resist,\" something can help you get stronger. Aim for at least twice a week.  For example, you can:  Do push-ups or sit-ups, which use your own body weight as resistance.  Lift weights or dumbbells or use stretch bands at home or in a gym or community center.  Stretch your muscles often. Stretching will help you as you become more active. It can help you stay flexible and loosen tight muscles. It can also " "help improve your balance and posture and can be a great way to relax.  Be sure to stretch the muscles you'll be using when you work out. It's best to warm your muscles slightly before you stretch them. Walk or do some other light aerobic activity for a few minutes. Then start stretching.  When you stretch your muscles:  Do it slowly. Stretching is not about going fast or making sudden movements.  Don't push or bounce during a stretch.  Hold each stretch for at least 15 to 30 seconds, if you can. You should feel a stretch in the muscle, but not pain.  Breathe out as you do the stretch. Then breathe in as you hold the stretch. Don't hold your breath.  If you're worried about how more activity might affect your health, have a checkup before you start. Follow any special advice your doctor gives you for getting a smart start.  Where can you learn more?  Go to https://www.LANDBAY.Hydrostor/patiented  Enter W332 in the search box to learn more about \"Learning About Being Physically Active.\"  Current as of: June 6, 2023               Content Version: 13.8    5743-7357 [employer].   Care instructions adapted under license by your healthcare professional. If you have questions about a medical condition or this instruction, always ask your healthcare professional. [employer] disclaims any warranty or liability for your use of this information.      Learning About Dietary Guidelines  What are the Dietary Guidelines for Americans?     Dietary Guidelines for Americans provide tips for eating well and staying healthy. This helps reduce the risk for long-term (chronic) diseases.  These guidelines recommend that you:  Eat and drink the right amount for you. The U.[place] government's food guide is called MyPlate. It can help you make your own well-balanced eating plan.  Try to balance your eating with your activity. This helps you stay at a healthy weight.  Drink alcohol in moderation, if at all.  Limit foods " "high in salt, saturated fat, trans fat, and added sugar.  These guidelines are from the U.S. Department of Agriculture and the U.S. Department of Health and Human Services. They are updated every 5 years.  What is MyPlate?  MyPlate is the [place] government's food guide. It can help you make your own well-balanced eating plan. A balanced eating plan means that you eat enough, but not too much, and that your food gives you the nutrients you need to stay healthy.  MyPlate focuses on eating plenty of whole grains, fruits, and vegetables, and on limiting fat and sugar. It is available online at www.ChooseMyPlate.gov.  How can you get started?  If you're trying to eat healthier, you can slowly change your eating habits over time. You don't have to make big changes all at once. Start by adding one or two healthy foods to your meals each day.  Grains  Choose whole-grain breads and cereals and whole-wheat pasta and whole-grain crackers.  Vegetables  Eat a variety of vegetables every day. They have lots of nutrients and are part of a heart-healthy diet.  Fruits  Eat a variety of fruits every day. Fruits contain lots of nutrients. Choose fresh fruit instead of fruit juice.  Protein foods  Choose fish and lean poultry more often. Eat red meat and fried meats less often. Dried beans, tofu, and nuts are also good sources of protein.  Dairy  Choose low-fat or fat-free products from this food group. If you have problems digesting milk, try eating cheese or yogurt instead.  Fats and oils  Limit fats and oils if you're trying to cut calories. Choose healthy fats when you cook. These include canola oil and olive oil.  Where can you learn more?  Go to https://www.healthNutmeg Education.net/patiented  Enter D676 in the search box to learn more about \"Learning About Dietary Guidelines.\"  Current as of: February 28, 2023               Content Version: 13.8    0957-1085 [employer].   Care instructions adapted under license by your " healthcare professional. If you have questions about a medical condition or this instruction, always ask your healthcare professional. Healthwise, [facility] disclaims any warranty or liability for your use of this information.      Hearing Loss: Care Instructions  Overview     Hearing loss is a sudden or slow decrease in how well you hear. It can range from slight to profound. Permanent hearing loss can occur with aging. It also can happen when you are exposed long-term to loud noise. Examples include listening to loud music, riding motorcycles, or being around other loud machines.  Hearing loss can affect your work and home life. It can make you feel lonely or depressed. You may feel that you have lost your independence. But hearing aids and other devices can help you hear better and feel connected to others.  Follow-up care is a key part of your treatment and safety. Be sure to make and go to all appointments, and call your doctor if you are having problems. It's also a good idea to know your test results and keep a list of the medicines you take.  How can you care for yourself at home?  Avoid loud noises whenever possible. This helps keep your hearing from getting worse.  Always wear hearing protection around loud noises.  Wear a hearing aid as directed.  A professional can help you pick a hearing aid that will work best for you.  You can also get hearing aids over the counter for mild to moderate hearing loss.  Have hearing tests as your doctor suggests. They can show whether your hearing has changed. Your hearing aid may need to be adjusted.  Use other devices as needed. These may include:  Telephone amplifiers and hearing aids that can connect to a television, stereo, radio, or microphone.  Devices that use lights or vibrations. These alert you to the doorbell, a ringing telephone, or a baby monitor.  Television closed-captioning. This shows the words at the bottom of the screen. Most new TVs can do  "this.  TTY (text telephone). This lets you type messages back and forth on the telephone instead of talking or listening. These devices are also called TDD. When messages are typed on the keyboard, they are sent over the phone line to a receiving TTY. The message is shown on a monitor.  Use text messaging, social media, and email if it is hard for you to communicate by telephone.  Try to learn a listening technique called speechreading. It is not lipreading. You pay attention to people's gestures, expressions, posture, and tone of voice. These clues can help you understand what a person is saying. Face the person you are talking to, and have them face you. Make sure the lighting is good. You need to see the other person's face clearly.  Think about counseling if you need help to adjust to your hearing loss.  When should you call for help?  Watch closely for changes in your health, and be sure to contact your doctor if:    You think your hearing is getting worse.     You have new symptoms, such as dizziness or nausea.   Where can you learn more?  Go to https://www.[employer]ivorte.net/patiented  Enter R798 in the search box to learn more about \"Hearing Loss: Care Instructions.\"  Current as of: February 28, 2023               Content Version: 13.8    5648-6694 [employer].   Care instructions adapted under license by your healthcare professional. If you have questions about a medical condition or this instruction, always ask your healthcare professional. Healthwise, [employer] disclaims any warranty or liability for your use of this information.         "

## 2024-01-19 NOTE — PROGRESS NOTES
Preventive Care Visit  [facility]  Paddy Holly MD, Internal Medicine  Jan 19, 2024      SUBJECTIVE:   Bill is a 63 year old male who presents today for an annual wellness visit.    Visual acuity not obtained by staff.     Today's PHQ-2 Score:       1/19/2024     7:59 AM   PHQ-2 ( 1999 [employer])   Q1: Little interest or pleasure in doing things 0   Q2: Feeling down, depressed or hopeless 0   PHQ-2 Score 0   Q1: Little interest or pleasure in doing things Not at all   Q2: Feeling down, depressed or hopeless Not at all   PHQ-2 Score 0       Have you ever done Advance Care Planning? (For example, a Health Directive, POLST, or a discussion with a medical provider or your loved ones about your wishes): No, advance care planning information given to patient to review.  Patient plans to discuss their wishes with loved ones or provider.      Social History     Tobacco Use    Smoking status: Never     Passive exposure: Never    Smokeless tobacco: Never   Substance Use Topics    Alcohol use: Yes     Comment: occassional, 1- 2 per week         1/19/2024     7:59 AM   Alcohol Use   Prescreen: >3 drinks/day or >7 drinks/week? No       Last PSA:   PSA   Date Value Ref Range Status   12/12/2018 1.20 0 - 4 ug/L Final     Comment:     Assay Method:  Chemiluminescence using Siemens Vista analyzer     Prostate Specific Antigen Screen   Date Value Ref Range Status   12/02/2022 1.18 0.00 - 4.00 ug/L Final       Reviewed orders with patient. Reviewed health maintenance and updated orders accordingly - Yes  Labs reviewed in EPIC  BP Readings from Last 3 Encounters:   01/19/24 107/71   01/18/24 116/77   12/27/23 123/79    Wt Readings from Last 3 Encounters:   01/24/24 83.9 kg (185 lb)   01/19/24 83.1 kg (183 lb 3.2 oz)   12/27/23 84.3 kg (185 lb 14.4 oz)                  Patient Active Problem List   Diagnosis    Esophageal reflux    Hyperlipidemia LDL goal <70    Neck pain    Chronic rhinitis    Bilateral  low back pain without sciatica    Chondromalacia patellae, right    S/P ACL reconstruction    Atherosclerosis of native coronary artery of native heart without angina pectoris    SOB (shortness of breath)    Non-allergic rhinitis    Positive CLAIRE (antinuclear antibody)    Hand joint stiff, right    Carpal tunnel syndrome    Osteoarthritis of knee, unspecified laterality, unspecified osteoarthritis type    Diverticulosis of large intestine    Internal hemorrhoids    Diffuse large B-cell lymphoma, unspecified body region (H)    Chemotherapy-induced neutropenia  (H24)    Esophageal spasm    Chemotherapy-induced peripheral neuropathy  (H24)    History of Clostridioides difficile colitis    Cervical disc disorder with radiculopathy    Facet arthropathy, cervical    Cervical disc herniation    Facet arthritis of lumbar region    Lumbar disc herniation, L3-L4    Degeneration of lumbar intervertebral disc    Cervical spondylosis without myelopathy    Ulcerative (chronic) pancolitis without complications (H)     Past Surgical History:   Procedure Laterality Date    BIOPSY  2-6-23    BIOPSY LYMPH NODE CERVICAL Left 2/16/2023    Procedure: excisional biopsy of a left cervical node;  Surgeon: Aung Tamayo MD;  Location: [facility] OR    COLONOSCOPY  2019    clean, do again in 10 years    COLONOSCOPY WITH CO2 INSUFFLATION N/A 07/17/2019    Procedure: COLONOSCOPY, WITH CO2 INSUFFLATION;  Surgeon: Jaison Hammer MD;  Location: [facility] OR    ENT SURGERY  1979    Loss of hearing rt ear (tinnitis)    HERNIA REPAIR  2000    INJECT BLOCK MEDIAL BRANCH CERVICAL/THORACIC/LUMBAR Left 1/18/2024    Procedure: BLOCK, NERVE, FACET JOINT, MEDIAL BRANCH, DIAGNOSTIC C3/4/5;  Surgeon: Lb Carter MD;  Location: [facility] OR    NO HISTORY OF SURGERY      ORTHOPEDIC SURGERY  1988,1992    rt knee, scope in 88; ACL repair Early 90s       Social History     Tobacco Use    Smoking status: Never     Passive exposure: Never    Smokeless tobacco:  Never   Substance Use Topics    Alcohol use: Yes     Comment: occassional, 1- 2 per week     Family History   Problem Relation Age of Onset    Hyperlipidemia Mother         medication    Diabetes Father     Other Cancer Father         Lung cancer    Other Cancer Maternal Grandmother         Unsure what type of cancer    Autoimmune Disease No family hx of          Allergies   Allergen Reactions    Penicillins     Allopurinol Rash     Recent Labs   Lab Test 01/19/24  0901 12/08/23  0914 11/30/23  0754 07/21/23  1004 06/21/23  0952 01/27/23  0929 12/12/18  0728 11/12/18  1215   LDL  --  137* 150*  --   --   --  140*  --    HDL  --  45 44  --   --   --  37*  --    TRIG 86 93 125  --   --   --  148  --    ALT 14  --   --  18 20   < >  --   --    CR 0.94  --   --  0.70 0.70   < >  --   --    GFRESTIMATED 90  --   --  >90 >90   < >  --   --    POTASSIUM 4.8  --   --  4.1 3.6   < >  --   --    TSH  --   --   --   --   --   --   --  2.01    < > = values in this interval not displayed.        Reviewed and updated as needed this visit by clinical staff    Allergies  Meds              Reviewed and updated as needed this visit by Provider                    Review of Systems   Constitutional:  Negative for chills and fever.   HENT:  Positive for hearing loss. Negative for congestion, ear pain and sore throat.    Eyes:  Negative for pain and visual disturbance.   Respiratory:  Negative for cough and shortness of breath.    Cardiovascular:  Negative for chest pain and palpitations.   Gastrointestinal:  Positive for abdominal pain. Negative for constipation, diarrhea and nausea.   Genitourinary:  Negative for dysuria, frequency, genital sores, hematuria, penile discharge and urgency.   Musculoskeletal:  Positive for arthralgias and myalgias. Negative for joint swelling.   Skin:  Negative for rash.   Neurological:  Negative for dizziness, weakness and headaches.   Psychiatric/Behavioral:  The patient is not nervous/anxious.   "      OBJECTIVE:   /71 (BP Location: Left arm, Patient Position: Sitting, Cuff Size: Adult Large)   Pulse 70   Temp 97.4  F (36.3  C) (Tympanic)   Resp 20   Ht 1.803 m (5' 11\")   Wt 83.1 kg (183 lb 3.2 oz)   SpO2 98%   BMI 25.55 kg/m     Estimated body mass index is 25.55 kg/m  as calculated from the following:    Height as of this encounter: 1.803 m (5' 11\").    Weight as of this encounter: 83.1 kg (183 lb 3.2 oz).  Physical Exam  GENERAL: alert and no distress  EYES: Eyes grossly normal to inspection and conjunctivae and sclerae normal  HENT: normal cephalic/atraumatic and oral mucous membranes moist  RESP: lungs clear to auscultation - no rales, rhonchi or wheezes  CV: regular rates and rhythm and no peripheral edema  ABDOMEN: soft, nontender and bowel sounds normal  MS: no gross musculoskeletal defects noted, no edema  NEURO: Normal strength and tone, mentation intact and speech normal  PSYCH: mentation appears normal, affect normal/bright    Diagnostic Test Results:  Results for orders placed or performed in visit on 01/19/24   Protein Immunofixation Serum     Status: None   Result Value Ref Range    Immunofixation ELP       No monoclonal protein seen on immunofixation. Pathologic significance requires clinical correlation.  GEOVANNI Gordon M.D., Ph.D., Pathologist ()    Signout Location if Remote [facility]    Methylmalonic Acid     Status: Normal   Result Value Ref Range    Methylmalonic Acid 0.17 0.00 - 0.40 umol/L    Narrative    This test was developed and its performance characteristics determined by the [facility],  [facility]. It has not been cleared or approved by the FDA. The laboratory is regulated under CLIA as qualified to perform high-complexity testing. This test is used for clinical purposes. It should not be regarded as investigational or for research.   Vitamin B1 whole blood     Status: None   Result Value Ref Range    Vitamin B1 " Whole Blood Level 111 70 - 180 nmol/L   Vitamin B6     Status: None   Result Value Ref Range    Vitamin B6 28.1 20.0 - 125.0 nmol/L   Vitamin B12     Status: Normal   Result Value Ref Range    Vitamin B12 379 232 - 1,245 pg/mL   PSA, screen     Status: Normal   Result Value Ref Range    Prostate Specific Antigen Screen 0.88 0.00 - 4.50 ng/mL    Narrative    This result is obtained using the Roche Elecsys total PSA method on the evangelist [id number] immunoassay analyzer. Results obtained with different assay methods or kits cannot be used interchangeably.   Triglycerides     Status: None   Result Value Ref Range    Triglycerides 86 <150 mg/dL    Patient Fasting > 8hrs? Yes    Lactate Dehydrogenase     Status: Normal   Result Value Ref Range    Lactate Dehydrogenase 157 0 - 250 U/L   Comprehensive metabolic panel     Status: Normal   Result Value Ref Range    Sodium 141 135 - 145 mmol/L    Potassium 4.8 3.4 - 5.3 mmol/L    Carbon Dioxide (CO2) 28 22 - 29 mmol/L    Anion Gap 8 7 - 15 mmol/L    Urea Nitrogen 14.3 8.0 - 23.0 mg/dL    Creatinine 0.94 0.67 - 1.17 mg/dL    GFR Estimate 90 >60 mL/min/1.73m2    Calcium 9.9 8.8 - 10.2 mg/dL    Chloride 105 98 - 107 mmol/L    Glucose 94 70 - 99 mg/dL    Alkaline Phosphatase 101 40 - 150 U/L    AST 23 0 - 45 U/L    ALT 14 0 - 70 U/L    Protein Total 6.8 6.4 - 8.3 g/dL    Albumin 4.2 3.5 - 5.2 g/dL    Bilirubin Total 0.4 <=1.2 mg/dL   CBC with platelets and differential     Status: Abnormal   Result Value Ref Range    WBC Count 4.2 4.0 - 11.0 10e3/uL    RBC Count 4.57 4.40 - 5.90 10e6/uL    Hemoglobin 15.0 13.3 - 17.7 g/dL    Hematocrit 45.5 40.0 - 53.0 %     78 - 100 fL    MCH 32.8 26.5 - 33.0 pg    MCHC 33.0 31.5 - 36.5 g/dL    RDW 13.1 10.0 - 15.0 %    Platelet Count 210 150 - 450 10e3/uL    % Neutrophils 70 %    % Lymphocytes 14 %    % Monocytes 13 %    % Eosinophils 3 %    % Basophils 1 %    % Immature Granulocytes 0 %    Absolute Neutrophils 3.0 1.6 - 8.3 10e3/uL    Absolute  Lymphocytes 0.6 (L) 0.8 - 5.3 10e3/uL    Absolute Monocytes 0.5 0.0 - 1.3 10e3/uL    Absolute Eosinophils 0.1 0.0 - 0.7 10e3/uL    Absolute Basophils 0.0 0.0 - 0.2 10e3/uL    Absolute Immature Granulocytes 0.0 <=0.4 10e3/uL   CBC with platelets differential     Status: Abnormal    Narrative    The following orders were created for panel order CBC with platelets differential.  Procedure                               Abnormality         Status                     ---------                               -----------         ------                     CBC with platelets and d...[[id number]]  Abnormal            Final result                 Please view results for these tests on the individual orders.       ASSESSMENT/PLAN:     Medicare annual wellness visit, initial  - REVIEW OF HEALTH MAINTENANCE PROTOCOL ORDERS  - Triglycerides    Paresthesia of both feet  - Protein Immunofixation Serum  - Methylmalonic Acid  - Vitamin B1 whole blood  - Vitamin B6  - Vitamin B12  - Triglycerides    Screening for prostate cancer  - PSA, screen    Need for prophylactic vaccination against Streptococcus pneumoniae (pneumococcus)  - Pneumococcal 20 Valent Conjugate (PCV20)    Diffuse large B-cell lymphoma, unspecified body region (H)  - REVIEW OF HEALTH MAINTENANCE PROTOCOL ORDERS  - CBC with platelets differential  - Lactate Dehydrogenase  - Comprehensive metabolic panel    Ulcerative (chronic) pancolitis without complications (H)  - REVIEW OF HEALTH MAINTENANCE PROTOCOL ORDERS    Chemotherapy-induced peripheral neuropathy  (H24)  - REVIEW OF HEALTH MAINTENANCE PROTOCOL ORDERS    Chemotherapy-induced neutropenia  (H24)  - REVIEW OF HEALTH MAINTENANCE PROTOCOL ORDERS      Patient has been advised of split billing requirements and indicates understanding: Yes      Counseling  Special attention given to:        Regular exercise       Healthy diet/nutrition       Immunizations  Vaccinated for: Pneumococcal           Prostate cancer screening    "    The ASCVD Risk score (Anthony OROZCO, et al., 2019) failed to calculate for the following reasons:    The systolic blood pressure is missing      BMI  Estimated body mass index is 25.55 kg/m  as calculated from the following:    Height as of this encounter: 1.803 m (5' 11\").    Weight as of this encounter: 83.1 kg (183 lb 3.2 oz).   Weight management plan: diet and exercise.      He reports that he has never smoked. He has never been exposed to tobacco smoke. He has never used smokeless tobacco.            Signed Electronically by: Paddy Holly MD    Identified Health Risks:  Immunocompromised status.  "

## 2024-01-19 NOTE — PROGRESS NOTES
Prior to immunization administration, verified patients identity using patient s name and date of birth. Please see Immunization Activity for additional information.     Screening Questionnaire for Adult Immunization    Are you sick today?   No   Do you have allergies to medications, food, a vaccine component or latex?   No   Have you ever had a serious reaction after receiving a vaccination?   No   Do you have a long-term health problem with heart, lung, kidney, or metabolic disease (e.g., diabetes), asthma, a blood disorder, no spleen, complement component deficiency, a cochlear implant, or a spinal fluid leak?  Are you on long-term aspirin therapy?   No   Do you have cancer, leukemia, HIV/AIDS, or any other immune system problem?   No   Do you have a parent, brother, or sister with an immune system problem?   No   In the past 3 months, have you taken medications that affect  your immune system, such as prednisone, other steroids, or anticancer drugs; drugs for the treatment of rheumatoid arthritis, Crohn s disease, or psoriasis; or have you had radiation treatments?   No   Have you had a seizure, or a brain or other nervous system problem?   No   During the past year, have you received a transfusion of blood or blood    products, or been given immune (gamma) globulin or antiviral drug?   No   For women: Are you pregnant or is there a chance you could become       pregnant during the next month?   No   Have you received any vaccinations in the past 4 weeks?   No     Immunization questionnaire answers were all negative.      Patient instructed to remain in clinic for 15 minutes afterwards, and to report any adverse reactions.     Screening performed by Lisa Rojo MA on 1/19/2024 at 8:49 AM.

## 2024-01-20 LAB
ALBUMIN SERPL BCG-MCNC: 4.2 G/DL (ref 3.5–5.2)
ALP SERPL-CCNC: 101 U/L (ref 40–150)
ALT SERPL W P-5'-P-CCNC: 14 U/L (ref 0–70)
ANION GAP SERPL CALCULATED.3IONS-SCNC: 8 MMOL/L (ref 7–15)
AST SERPL W P-5'-P-CCNC: 23 U/L (ref 0–45)
BILIRUB SERPL-MCNC: 0.4 MG/DL
BUN SERPL-MCNC: 14.3 MG/DL (ref 8–23)
CALCIUM SERPL-MCNC: 9.9 MG/DL (ref 8.8–10.2)
CHLORIDE SERPL-SCNC: 105 MMOL/L (ref 98–107)
CREAT SERPL-MCNC: 0.94 MG/DL (ref 0.67–1.17)
DEPRECATED HCO3 PLAS-SCNC: 28 MMOL/L (ref 22–29)
EGFRCR SERPLBLD CKD-EPI 2021: 90 ML/MIN/1.73M2
FASTING STATUS PATIENT QL REPORTED: YES
GLUCOSE SERPL-MCNC: 94 MG/DL (ref 70–99)
LDH SERPL L TO P-CCNC: 157 U/L (ref 0–250)
POTASSIUM SERPL-SCNC: 4.8 MMOL/L (ref 3.4–5.3)
PROT SERPL-MCNC: 6.8 G/DL (ref 6.4–8.3)
PSA SERPL DL<=0.01 NG/ML-MCNC: 0.88 NG/ML (ref 0–4.5)
SODIUM SERPL-SCNC: 141 MMOL/L (ref 135–145)
TRIGL SERPL-MCNC: 86 MG/DL
VIT B12 SERPL-MCNC: 379 PG/ML (ref 232–1245)

## 2024-01-22 ENCOUNTER — LAB (OUTPATIENT)
Dept: INFUSION THERAPY | Facility: CLINIC | Age: 64
End: 2024-01-22
Attending: INTERNAL MEDICINE
Payer: COMMERCIAL

## 2024-01-22 ENCOUNTER — ANCILLARY PROCEDURE (OUTPATIENT)
Dept: CT IMAGING | Facility: CLINIC | Age: 64
End: 2024-01-22
Attending: INTERNAL MEDICINE
Payer: COMMERCIAL

## 2024-01-22 DIAGNOSIS — C83.30 DIFFUSE LARGE B-CELL LYMPHOMA, UNSPECIFIED BODY REGION (H): ICD-10-CM

## 2024-01-22 DIAGNOSIS — C83.30 DIFFUSE LARGE B-CELL LYMPHOMA, UNSPECIFIED BODY REGION (H): Primary | ICD-10-CM

## 2024-01-22 LAB
HOLD SPECIMEN: NORMAL
LOCATION OF TASK: NORMAL
PROT PATTERN SERPL IFE-IMP: NORMAL
PYRIDOXAL PHOS SERPL-SCNC: 28.1 NMOL/L

## 2024-01-22 PROCEDURE — 250N000011 HC RX IP 250 OP 636: Performed by: INTERNAL MEDICINE

## 2024-01-22 PROCEDURE — 74177 CT ABD & PELVIS W/CONTRAST: CPT | Performed by: STUDENT IN AN ORGANIZED HEALTH CARE EDUCATION/TRAINING PROGRAM

## 2024-01-22 PROCEDURE — 71260 CT THORAX DX C+: CPT | Performed by: STUDENT IN AN ORGANIZED HEALTH CARE EDUCATION/TRAINING PROGRAM

## 2024-01-22 RX ORDER — HEPARIN SODIUM (PORCINE) LOCK FLUSH IV SOLN 100 UNIT/ML 100 UNIT/ML
500 SOLUTION INTRAVENOUS ONCE
Status: COMPLETED | OUTPATIENT
Start: 2024-01-22 | End: 2024-01-22

## 2024-01-22 RX ORDER — IOPAMIDOL 755 MG/ML
101 INJECTION, SOLUTION INTRAVASCULAR ONCE
Status: COMPLETED | OUTPATIENT
Start: 2024-01-22 | End: 2024-01-22

## 2024-01-22 RX ORDER — HEPARIN SODIUM (PORCINE) LOCK FLUSH IV SOLN 100 UNIT/ML 100 UNIT/ML
5 SOLUTION INTRAVENOUS ONCE
Status: COMPLETED | OUTPATIENT
Start: 2024-01-22 | End: 2024-01-22

## 2024-01-22 RX ADMIN — IOPAMIDOL 101 ML: 755 INJECTION, SOLUTION INTRAVASCULAR at 08:58

## 2024-01-22 RX ADMIN — Medication 500 UNITS: at 08:38

## 2024-01-22 RX ADMIN — HEPARIN SODIUM (PORCINE) LOCK FLUSH IV SOLN 100 UNIT/ML 5 ML: 100 SOLUTION at 09:58

## 2024-01-22 NOTE — ANESTHESIA POSTPROCEDURE EVALUATION
Patient: Geovanni Jasso    Procedure: Procedure(s):  excisional biopsy of a left cervical node       Anesthesia Type:  General    Note:  Disposition: Inpatient   Postop Pain Control: Uneventful            Sign Out: Well controlled pain   PONV: No   Neuro/Psych: Uneventful            Sign Out: Acceptable/Baseline neuro status   Airway/Respiratory: Uneventful            Sign Out: Acceptable/Baseline resp. status   CV/Hemodynamics: Uneventful            Sign Out: Acceptable CV status; No obvious hypovolemia; No obvious fluid overload   Other NRE: NONE   DID A NON-ROUTINE EVENT OCCUR? No           Last vitals:  Vitals Value Taken Time   /80 02/16/23 1231   Temp 36.4  C (97.5  F) 02/16/23 1200   Pulse 63 02/16/23 1231   Resp 10 02/16/23 1231   SpO2 99 % 02/16/23 1231       Electronically Signed By: Wale Sanchez MD  January 22, 2024  1:45 PM

## 2024-01-22 NOTE — PROGRESS NOTES
See IV flow sheet for details of port access. Just in case tubes x 3 drawn and sent to the lab. Dr Maurer notified in case additional labs are needed (Bill had some labs last week). Port needle flushed per protocol and needle left in place for CT scan to follow.    Marisol Negrete RN

## 2024-01-23 LAB
METHYLMALONATE SERPL-SCNC: 0.17 UMOL/L (ref 0–0.4)
VIT B1 PYROPHOSHATE BLD-SCNC: 111 NMOL/L

## 2024-01-24 ENCOUNTER — VIRTUAL VISIT (OUTPATIENT)
Dept: ONCOLOGY | Facility: CLINIC | Age: 64
End: 2024-01-24
Attending: INTERNAL MEDICINE
Payer: COMMERCIAL

## 2024-01-24 VITALS — BODY MASS INDEX: 25.9 KG/M2 | WEIGHT: 185 LBS | HEIGHT: 71 IN

## 2024-01-24 DIAGNOSIS — C83.30 DIFFUSE LARGE B-CELL LYMPHOMA, UNSPECIFIED BODY REGION (H): Primary | ICD-10-CM

## 2024-01-24 PROCEDURE — 99214 OFFICE O/P EST MOD 30 MIN: CPT | Mod: 95 | Performed by: INTERNAL MEDICINE

## 2024-01-24 ASSESSMENT — PAIN SCALES - GENERAL: PAINLEVEL: MODERATE PAIN (4)

## 2024-01-24 NOTE — NURSING NOTE
Is the patient currently in the state of [place]? YES    Visit mode:VIDEO    If the visit is dropped, the patient can be reconnected by: VIDEO VISIT: Send to e-mail at: [email or web address]    Will anyone else be joining the visit? NO  (If patient encounters technical issues they should call [phone]    How would you like to obtain your AVS? MyChart    Are changes needed to the allergy or medication list? No    Reason for visit: ARACELY BENAVIDES

## 2024-01-24 NOTE — PROGRESS NOTES
Oncology follow-up visit:  Date on this visit: 1/24/24     Diagnoses.    Diffuse large B-cell lymphoma    Primary Physician: Paddy Holly     History Of Present Illness:  Mr. Jasso is a 63 year old  male who presents for follow-up of diffuse lymphadenopathy.  He initially saw me on 1/18/2022 for retroperitoneal lymphadenopathy.  Please see my previous note for details.  I have copied and updated from prior notes.        Over the last few months, since fall of 2022, off and on he has had upper abdominal pain which lasts few minutes. He thinks stress sometimes can bring it on. No relationship to food.  No nausea or vomiting. BMs have been fine. No bleeding. No fevers.   He had a CT Abd Pelvis on 1/6/2023 which showed enlarged retroperitoneal lymphadenopathy  He has noticed some fatigue. He has also noticed some lump in the throat just above the sternum at night. This is going on for a couple of months. He denies any dysphagia.     Currently he feels well. Currently denies any abdominal pain. No nausea or vomiting. BMs are fine. No recent weight loss. No SOB. No neuropathy. No skin flushing. No drenching night sweats.    In the summer, he noticed a rash on the face which has since resolved.  He was noted to have CLAIRE positive.     On 2/6/2023, he had FNA of the left supraclavicular lymph node.  Cytology showed atypical lymphoid cells.  Flow cytometry showed lambda monotypic B cells which are negative for CD5 and CD10.  This is consistent with a B-cell lymphoma.  We decided to go ahead with excisional lymph node biopsy as well as bone marrow biopsy for complete staging.    Excisional lymph node biopsy showed diffuse large B-cell lymphoma.    Bone marrow biopsy did not reveal evidence of lymphoma.    He started R-CHOP on 3/8/2023.    Cycle #2 R-CHOP on 3/29/2023.        He was admitted to the [facility] from 4/4/2023 - 4/9/2023.  He was admitted for neutropenic fever, sepsis and C. difficile colitis.  He  presented with multiple bouts of nonbloody diarrhea and fever of 101 with generalized fatigue and weakness.  He was neutropenic and was noted to have pancolitis.  C. difficile was positive.  Cryptosporidium was also positive.  He was started on IV cefepime and IV Flagyl initially.  Infectious disease was consulted and he was switched to oral vancomycin to complete 14 days.  Neutropenia resolved by 4/7/2023.    He completed oral vancomycin course on 4/19/2023 4/19/2023.  Cycle #3 of R-CHOP    5/5/2023.  PET CT neck chest abdomen and pelvis shows complete response to treatment per Lugano criteria    5/10/2023.  Cycle #4 of R-CHOP.    5/31/2023.  Cycle #5 of R-CHOP .      He developed hemorrhagic cystitis from cyclophosphamide . I reviewed note from urologist Dr Parra. He received Cycle #6 R-CHOP without Cytoxan on 6/21/2023.    After completing 6 cycles, repeat PET/CT on 7/21/2023 continues to show complete response.    Interval history.  This is a video visit.    Overall he feels well but he still has fatigue and endurance is low.  He has noticed significant improvement in the neuropathy in his hands which now feels almost normal.  He is to have tingling and numbness in the feet which has improved only slightly.  He is following with neurologist at [facility].  I reviewed the notes from neurologist.  He denies any B symptoms.  No trouble with urination.  No infections or shortness of breath.  He has neck issues related to arthritis.  Leg swelling has completely resolved so he stopped taking Lasix.  No nausea vomiting diarrhea constipation.        ECOG 1    ROS:  A comprehensive ROS was otherwise neg    I reviewed other history in Epic as before.      Past Medical/Surgical History:  Past Medical History:   Diagnosis Date    Cancer (H) 2-7-2023    just diagnosed with lymphona.    Carpal tunnel syndrome     Chronic rhinitis 10/23/2015    Degeneration of lumbar intervertebral disc 11/28/2023    Esophageal reflux  12/26/2013    Hand joint stiff, right    Shingles     Past Surgical History:   Procedure Laterality Date    BIOPSY  2-6-23    BIOPSY LYMPH NODE CERVICAL Left 2/16/2023    Procedure: excisional biopsy of a left cervical node;  Surgeon: Aung Tamayo MD;  Location: [facility] OR    COLONOSCOPY  2019    clean, do again in 10 years    COLONOSCOPY WITH CO2 INSUFFLATION N/A 07/17/2019    Procedure: COLONOSCOPY, WITH CO2 INSUFFLATION;  Surgeon: Jaison Hammer MD;  Location: [facility] OR    ENT SURGERY  1979    Loss of hearing rt ear (tinnitis)    HERNIA REPAIR  2000    INJECT BLOCK MEDIAL BRANCH CERVICAL/THORACIC/LUMBAR Left 1/18/2024    Procedure: BLOCK, NERVE, FACET JOINT, MEDIAL BRANCH, DIAGNOSTIC C3/4/5;  Surgeon: Lb Carter MD;  Location: [facility] OR    NO HISTORY OF SURGERY      ORTHOPEDIC SURGERY  1988,1992    rt knee, scope in 88; ACL repair Early 90s     Cancer History:   As above    Allergies:  Allergies as of 01/24/2024 - Reviewed 01/24/2024   Allergen Reaction Noted    Penicillins  12/23/2013    Allopurinol Rash 04/04/2023     Current Medications:  Current Outpatient Medications   Medication Sig Dispense Refill    acetaminophen (TYLENOL) 325 MG tablet Take 2 tablets (650 mg) by mouth every 6 hours as needed for mild pain 50 tablet 0    Efinaconazole (JUBLIA) 10 % SOLN Externally apply topically daily to nail. 8 mL 4    ibuprofen (ADVIL,MOTRIN) 200 MG tablet Take 200 mg by mouth every 6 hours as needed      ketorolac (TORADOL) 10 MG tablet Take 1 tablet (10 mg) by mouth every 6 hours as needed for moderate pain 20 tablet 0    lidocaine-prilocaine (EMLA) 2.5-2.5 % external cream Apply topically as needed for moderate pain (4-6) Apply over port at least 30 minutes before port access. 30 g 0    methocarbamol (ROBAXIN) 500 MG tablet Take 1 tablet (500 mg) by mouth nightly as needed for muscle spasms 30 tablet 0    oxyCODONE (ROXICODONE) 5 MG tablet Take 1-2 tablets (5-10 mg) by mouth every 4 hours as needed  for moderate to severe pain 12 tablet 0    tadalafil (CIALIS) 10 MG tablet Take 1-2 tablets (10-20 mg) by mouth daily as needed 30 tablet 11    traMADol (ULTRAM) 50 MG tablet Take 50 mg by mouth every 6 hours as needed        Family History:  Family History   Problem Relation Age of Onset    Hyperlipidemia Mother         medication    Diabetes Father     Other Cancer Father         Lung cancer    Other Cancer Maternal Grandmother         Unsure what type of cancer    Autoimmune Disease No family hx of      Maternal grand mother had some sort of a cancer  Dad had lung cancer  Has 4 kids- all healthy    Social History:  Social History     Socioeconomic History    Marital status:      Spouse name: Not on file    Number of children: Not on file    Years of education: Not on file    Highest education level: Not on file   Occupational History    Not on file   Tobacco Use    Smoking status: Never     Passive exposure: Never    Smokeless tobacco: Never   Vaping Use    Vaping Use: Never used   Substance and Sexual Activity    Alcohol use: Yes     Comment: occassional, 1- 2 per week    Drug use: No    Sexual activity: Yes     Partners: Female     Birth control/protection: Male Surgical, Female Surgical     Comment: vasectomy   Other Topics Concern    Parent/sibling w/ CABG, MI or angioplasty before 65F 55M? No   Social History Narrative    Not on file     Social Determinants of Health     Financial Resource Strain: Low Risk  (1/19/2024)    Financial Resource Strain     Within the past 12 months, have you or your family members you live with been unable to get utilities (heat, electricity) when it was really needed?: No   Food Insecurity: Low Risk  (1/19/2024)    Food Insecurity     Within the past 12 months, did you worry that your food would run out before you got money to buy more?: No     Within the past 12 months, did the food you bought just not last and you didn t have money to get more?: No   Transportation  "Needs: Low Risk  (1/19/2024)    Transportation Needs     Within the past 12 months, has lack of transportation kept you from medical appointments, getting your medicines, non-medical meetings or appointments, work, or from getting things that you need?: No   Physical Activity: Not on file   Stress: Not on file   Social Connections: Not on file   Interpersonal Safety: Low Risk  (1/19/2024)    Interpersonal Safety     Do you feel physically and emotionally safe where you currently live?: Yes     Within the past 12 months, have you been hit, slapped, kicked or otherwise physically hurt by someone?: No     Within the past 12 months, have you been humiliated or emotionally abused in other ways by your partner or ex-partner?: No   Housing Stability: Low Risk  (1/19/2024)    Housing Stability     Do you have housing? : Yes     Are you worried about losing your housing?: No   no smoking. Drinks etoh wine 2-3 times / week.       Physical Exam:  Ht 1.803 m (5' 11\")   Wt 83.9 kg (185 lb)   BMI 25.80 kg/m     Wt Readings from Last 4 Encounters:   01/24/24 83.9 kg (185 lb)   01/19/24 83.1 kg (183 lb 3.2 oz)   12/27/23 84.3 kg (185 lb 14.4 oz)   11/28/23 82.7 kg (182 lb 6.4 oz)         Constitutional.  Does not seem to be in any acute distress.  Eyes.  No redness or discharge noted.  Respiratory.  Speaking in full sentences.  Breathing seems comfortable without any accessory use of muscles.    Skin.  Visualized his skin does not show any obvious rashes.  Musculoskeletal.  Range of motion for visualized areas is intact.  Neurological.  Alert and oriented x3.  Psychiatric.  Mood, mentation and affect are normal.  Decision making capacity is intact.      The rest of a comprehensive physical examination is deferred due to Public Health Emergency video visit restrictions.      Laboratory/Imaging Studies      Reviewed  1/19/2024  CBC shows WBC 4.2.  Hemoglobin 15.  Platelets 210.  Lymphocyte 0.6.  Neutrophils 3.0.    Chemistry " unremarkable.      Vitamin B1, vitamin B12 and vitamin B6 are normal.    PSA 0.88.    Serum immunofixation was unremarkable    6/11/2023  Urinalysis showed gross hematuria.  Few bacteria.  0-5 WBCs.  Negative nitrites and leukocyte esterase.    Urine culture was negative      Previously  SPEP did not show any monoclonal protein.  Serum immunoglobulin levels were normal.  Torrey free light chain 2.53.  Lambda 1.6.  Ratio normal 1.58.      Hepatitis B surface antibody was positive.  Hepatitis B surface antigen and core antibody were negative.  HIV negative  Hep C negative        1/9/2023  CBC was unremarkable.  Peripheral blood smear was unremarkable.    CLAIRE was positive-1: 80, speckled pattern  dsDNA was negative  ESR 8      EXAMINATION: CT CHEST/ABDOMEN/PELVIS W CONTRAST, 1/22/2024 9:11 AM     TECHNIQUE: Helical CT images from the thoracic inlet through the  symphysis pubis were obtained with intravenous contrast. Contrast  dose: 101ml isovue 370     COMPARISON: PET CT 7/21/2023     HISTORY: Diffuse large B-cell lymphoma, unspecified body region (H)     FINDINGS:     Chest:     Heart/ Mediastinum: Heart is within normal limits. No evidence of  central pulmonary embolism. No bulky lymphadenopathy.  Esophagus  appears normal. Right chest wall Port-A-Cath with tip at the  cavoatrial junction.     Lungs/pleura: The central tracheobronchial tree is patent.  No focal  mass or consolidation.  A few small pulmonary nodules that are 5 mm or  less are unchanged including right lower lobe nodules on series 4  image 198. No pneumothorax or pleural effusion.      Chest wall/axilla: No bulky lymphadenopathy..     Abdomen and Pelvis:     Liver: A few subcentimeter hypodensities are too small to fully  characterize. No suspicious masses. No hepatic steatosis.      Gallbladder/biliary tree: Cholelithiasis without evidence of acute  cholecystitis.      Spleen: Unremarkable.     Pancreas: Unremarkable. No mass or ductal  dilatation.     Adrenal glands: Unremarkable.     Kidneys: Unremarkable. No hydronephrosis.     Bowel: Colonic diverticulosis without evidence of acute  diverticulitis. No obstruction. Normal appendix.     Retroperitoneum: Vasculature is grossly unremarkable..  No bulky  lymphadenopathy.     Pelvis: Urinary bladder is unremarkable.  Prostate and seminal  vesicles are within normal limits.     Bones: Unremarkable. No suspicious lesions.     Soft Tissues: Hernia repair mesh in the right lower quadrant.                                                                      IMPRESSION:     1.  No lymphadenopathy or evidence of metastatic disease.    2/16/2023 excisional biopsy of the left level 4 cervical lymph node  Large B-cell lymphoma, non-germinal center subtype.    The neoplastic cells express CD20, BCL2, BCL6, and MUM-1 (variable staining intensity), and are negative for CD3, CD5, CD10, CD23, Cyclin D1, and ALK. CD21 and CD23 demonstrate a lack of residual follicular dendritic cell meshworks. C-Myc demonstrates variable staining, and is overall estimated at 20-30% (interpreted as negative; cutoff 40%). Ki-67 proliferative index is also variable, and is overall estimated around 60-70%.      EBV RNA by in situ hybridization (SOY-OTTO) is negative for EBV in the neoplastic cells.   A congo red stain is negative for amyloid deposition in the tissue.     FISH testing on the lymph node  RESULTS:     - Gains of BCL6 (94%); no rearrangement of BCL6  - Gains of BCL2 (94%); no rearrangement of BCL2  - No rearrangement of MYC or IGH::MYC fusion        INTERPRETATION:    Of the interphase cells examined, 94-96% had signal patterns indicative of gains of BCL6 (3q27) and BCL2 (18q21), consistent with gains of all or part of chromosomes 3 and 18. No evidence was found of rearrangements of BCL6, MYC (8q24), or BCL2, or of a t(8;14).     Cytogenetics.  He has complex cytogenetics.     48,X,-Y,t(1;12)(p13;q13),+3,+3,add(3)(p25),t(3;8)(q21;p21),del(6)(13q21),add(11)(q22),+18,?idic(18)(p11.2)x2[cp19]/46,XY[1]      2/15/2023 bone marrow biopsy was unremarkable without evidence of involvement of lymphoma.      ASSESSMENT/PLAN:    Diffuse large B-cell lymphoma presenting with diffuse lymphadenopathy.    He has marked FDG avid left supraclavicular, axillary, retroperitoneal lymph nodes.  No hepatosplenomegaly.  No evidence of bone marrow or extra lymphatic involvement.  FISH testing shows gains of BCL2 and BCL6 but no rearrangement.  No rearrangement of MYC or IGH::MYC fusion  No evidence of double hit or triple hit lymphoma.  No evidence of bone marrow involvement.  LDH is mildly elevated.      IPI is 3    We started R-CHOP on 3/8/2023 with curative intent.      Overall he tolerated the first chemotherapy cycle well.  He did have chemotherapy-induced neutropenia and had neutropenic fever but no infection was found.  Fever resolved on its own without antibiotics.      He received cycle #2 on 3/29/2023 with G-CSF support because of neutropenic fever.    He developed neutropenic fever with pancolitis from C. difficile.  Cryptosporidium was also positive.    Cycle #3 was on 4/19/2023.    He achieved complete response by PET/CT done on 5/5/2023 after 3 cycles    I recommended continuing with 3 more cycles of R-CHOP.    Cycle #4 on 5/10/2023.  Cycle #5 on 5/31/2023    He developed hemorrhagic cystitis from cyclophosphamide so he received cycle #6 R-CHOP without Cytoxan on 6/21/2023     After completing 6 cycles, repeat PET/CT on 7/21/2023 continues to show complete response.    He has been on observation since then and is doing fairly well.    Repeat CT chest abdomen and pelvis on 1/22/2024 does not show any evidence of recurrence.    I would recommend repeat testing with labs and CT chest abdomen and pelvis in 6 months.    Cytopenias.  Previously he had anemia and lymphocytopenia.  Anemia has resolved.   He still has mild lymphocytopenia and we will continue to monitor.    Neuropathy.    I also reviewed notes from neurologist Dr. Violette Cerda for chemotherapy-induced peripheral neuropathy.  He has tingling and dysesthesias in bilateral feet.  Numbness of the hands has improved.  He was found to have severe subacute axonal sensorimotor polyneuropathy on EMG/NCS in August 2023.  He was started on alpha lipoic acid.  I told him that I have seen some patients benefit from laser therapy or acupuncture and this is something which potentially could be considered and he can discuss this with his neurologist.    Fatigue.  I advised him to exercise regularly and try to slowly build up the strength and endurance.  I told him that he should take the special time out daily to exercise.      Leg swelling.  This has resolved. No more on lasix.     Port-A-Cath.  I recommend continuing with port flushes every 8.  But he wants the Port-A-Cath out.  We again discussed that the risk of cancer coming back is highest in the first couple of years and it is a possibility that the Port-A-Cath might need to be placed again.  He understands this risk but he wants the port out so we will arrange for port removal.        Thyroid nodule.  Ultrasound in October 2023 did not show any concerning findings and he is following with PCP regarding this.        We did not address the following today        Hemorrhagic cystitis.  From cyclophosphamide.  He was seen by urology  on 6/15/2023.  As his symptoms have resolved, he has stopped oxybutynin.        History of chemotherapy induced neutropenic fever/typhlitis.  Was receiving G-CSF support.      Respiratory.  He was treated for 1 week of levofloxacin for cough and some shortness of breath and subtle groundglass opacities found on the CT scan.  Coughing and dyspnea on exertion have improved.  Continue to observe.       C. difficile colitis.  Symptoms have resolved.  We gave him 1 week of  vancomycin when he was on levofloxacin to prevent reactivation of C. Difficile.  He did not get vancomycin with ciprofloxacin and currently bowel movements are normal.      Prevention of tumor lysis syndrome.  He was given allopurinol which he took for about 7 to 8 days but developed a rash from it.  Allopurinol was stopped.  He did not go into tumor lysis syndrome.      See me back in 6 months with labs/CT scan prior    All questions answered.  He is agreeable and comfortable with the plan.    Kiel Maurer MD

## 2024-01-24 NOTE — LETTER
1/24/2024         RE: Geovanni Jasso  [id number]  [facility] [address]        Dear Colleague,    Thank you for referring your patient, Geovanni Jasso, to the [facility]. Please see a copy of my visit note below.    Oncology follow-up visit:  Date on this visit: 1/24/24     Diagnoses.    Diffuse large B-cell lymphoma    Primary Physician: Paddy Holly     History Of Present Illness:  Mr. Jasso is a 63 year old  male who presents for follow-up of diffuse lymphadenopathy.  He initially saw me on 1/18/2022 for retroperitoneal lymphadenopathy.  Please see my previous note for details.  I have copied and updated from prior notes.        Over the last few months, since fall of 2022, off and on he has had upper abdominal pain which lasts few minutes. He thinks stress sometimes can bring it on. No relationship to food.  No nausea or vomiting. BMs have been fine. No bleeding. No fevers.   He had a CT Abd Pelvis on 1/6/2023 which showed enlarged retroperitoneal lymphadenopathy  He has noticed some fatigue. He has also noticed some lump in the throat just above the sternum at night. This is going on for a couple of months. He denies any dysphagia.     Currently he feels well. Currently denies any abdominal pain. No nausea or vomiting. BMs are fine. No recent weight loss. No SOB. No neuropathy. No skin flushing. No drenching night sweats.    In the summer, he noticed a rash on the face which has since resolved.  He was noted to have CLAIRE positive.     On 2/6/2023, he had FNA of the left supraclavicular lymph node.  Cytology showed atypical lymphoid cells.  Flow cytometry showed lambda monotypic B cells which are negative for CD5 and CD10.  This is consistent with a B-cell lymphoma.  We decided to go ahead with excisional lymph node biopsy as well as bone marrow biopsy for complete staging.    Excisional lymph node biopsy showed diffuse large B-cell lymphoma.    Bone marrow biopsy did  not reveal evidence of lymphoma.    He started R-CHOP on 3/8/2023.    Cycle #2 R-CHOP on 3/29/2023.        He was admitted to the [facility] from 4/4/2023 - 4/9/2023.  He was admitted for neutropenic fever, sepsis and C. difficile colitis.  He presented with multiple bouts of nonbloody diarrhea and fever of 101 with generalized fatigue and weakness.  He was neutropenic and was noted to have pancolitis.  C. difficile was positive.  Cryptosporidium was also positive.  He was started on IV cefepime and IV Flagyl initially.  Infectious disease was consulted and he was switched to oral vancomycin to complete 14 days.  Neutropenia resolved by 4/7/2023.    He completed oral vancomycin course on 4/19/2023 4/19/2023.  Cycle #3 of R-CHOP    5/5/2023.  PET CT neck chest abdomen and pelvis shows complete response to treatment per Lugano criteria    5/10/2023.  Cycle #4 of R-CHOP.    5/31/2023.  Cycle #5 of R-CHOP .      He developed hemorrhagic cystitis from cyclophosphamide . I reviewed note from urologist Dr Parra. He received Cycle #6 R-CHOP without Cytoxan on 6/21/2023.    After completing 6 cycles, repeat PET/CT on 7/21/2023 continues to show complete response.    Interval history.  This is a video visit.    Overall he feels well but he still has fatigue and endurance is low.  He has noticed significant improvement in the neuropathy in his hands which now feels almost normal.  He is to have tingling and numbness in the feet which has improved only slightly.  He is following with neurologist at [facility] neurology.  I reviewed the notes from neurologist.  He denies any B symptoms.  No trouble with urination.  No infections or shortness of breath.  He has neck issues related to arthritis.  Leg swelling has completely resolved so he stopped taking Lasix.  No nausea vomiting diarrhea constipation.        ECOG 1    ROS:  A comprehensive ROS was otherwise neg    I reviewed other history in Epic as before.      Past  Medical/Surgical History:  Past Medical History:   Diagnosis Date     Cancer (H) 2-7-2023    just diagnosed with lymphona.     Carpal tunnel syndrome      Chronic rhinitis 10/23/2015     Degeneration of lumbar intervertebral disc 11/28/2023     Esophageal reflux 12/26/2013     Hand joint stiff, right    Shingles     Past Surgical History:   Procedure Laterality Date     BIOPSY  2-6-23     BIOPSY LYMPH NODE CERVICAL Left 2/16/2023    Procedure: excisional biopsy of a left cervical node;  Surgeon: Aung Tamayo MD;  Location: [facility] OR     COLONOSCOPY  2019    clean, do again in 10 years     COLONOSCOPY WITH CO2 INSUFFLATION N/A 07/17/2019    Procedure: COLONOSCOPY, WITH CO2 INSUFFLATION;  Surgeon: Jaison Hammer MD;  Location: [facility] OR     ENT SURGERY  1979    Loss of hearing rt ear (tinnitis)     HERNIA REPAIR  2000     INJECT BLOCK MEDIAL BRANCH CERVICAL/THORACIC/LUMBAR Left 1/18/2024    Procedure: BLOCK, NERVE, FACET JOINT, MEDIAL BRANCH, DIAGNOSTIC C3/4/5;  Surgeon: Lb Carter MD;  Location: [facility] OR     NO HISTORY OF SURGERY       ORTHOPEDIC SURGERY  1988,1992    rt knee, scope in 88; ACL repair Early 90s     Cancer History:   As above    Allergies:  Allergies as of 01/24/2024 - Reviewed 01/24/2024   Allergen Reaction Noted     Penicillins  12/23/2013     Allopurinol Rash 04/04/2023     Current Medications:  Current Outpatient Medications   Medication Sig Dispense Refill     acetaminophen (TYLENOL) 325 MG tablet Take 2 tablets (650 mg) by mouth every 6 hours as needed for mild pain 50 tablet 0     Efinaconazole (JUBLIA) 10 % SOLN Externally apply topically daily to nail. 8 mL 4     ibuprofen (ADVIL,MOTRIN) 200 MG tablet Take 200 mg by mouth every 6 hours as needed       ketorolac (TORADOL) 10 MG tablet Take 1 tablet (10 mg) by mouth every 6 hours as needed for moderate pain 20 tablet 0     lidocaine-prilocaine (EMLA) 2.5-2.5 % external cream Apply topically as needed for moderate pain (4-6)  Apply over port at least 30 minutes before port access. 30 g 0     methocarbamol (ROBAXIN) 500 MG tablet Take 1 tablet (500 mg) by mouth nightly as needed for muscle spasms 30 tablet 0     oxyCODONE (ROXICODONE) 5 MG tablet Take 1-2 tablets (5-10 mg) by mouth every 4 hours as needed for moderate to severe pain 12 tablet 0     tadalafil (CIALIS) 10 MG tablet Take 1-2 tablets (10-20 mg) by mouth daily as needed 30 tablet 11     traMADol (ULTRAM) 50 MG tablet Take 50 mg by mouth every 6 hours as needed        Family History:  Family History   Problem Relation Age of Onset     Hyperlipidemia Mother         medication     Diabetes Father      Other Cancer Father         Lung cancer     Other Cancer Maternal Grandmother         Unsure what type of cancer     Autoimmune Disease No family hx of      Maternal grand mother had some sort of a cancer  Dad had lung cancer  Has 4 kids- all healthy    Social History:  Social History     Socioeconomic History     Marital status:      Spouse name: Not on file     Number of children: Not on file     Years of education: Not on file     Highest education level: Not on file   Occupational History     Not on file   Tobacco Use     Smoking status: Never     Passive exposure: Never     Smokeless tobacco: Never   Vaping Use     Vaping Use: Never used   Substance and Sexual Activity     Alcohol use: Yes     Comment: occassional, 1- 2 per week     Drug use: No     Sexual activity: Yes     Partners: Female     Birth control/protection: Male Surgical, Female Surgical     Comment: vasectomy   Other Topics Concern     Parent/sibling w/ CABG, MI or angioplasty before 65F 55M? No   Social History Narrative     Not on file     Social Determinants of Health     Financial Resource Strain: Low Risk  (1/19/2024)    Financial Resource Strain      Within the past 12 months, have you or your family members you live with been unable to get utilities (heat, electricity) when it was really needed?: No  "  Food Insecurity: Low Risk  (1/19/2024)    Food Insecurity      Within the past 12 months, did you worry that your food would run out before you got money to buy more?: No      Within the past 12 months, did the food you bought just not last and you didn t have money to get more?: No   Transportation Needs: Low Risk  (1/19/2024)    Transportation Needs      Within the past 12 months, has lack of transportation kept you from medical appointments, getting your medicines, non-medical meetings or appointments, work, or from getting things that you need?: No   Physical Activity: Not on file   Stress: Not on file   Social Connections: Not on file   Interpersonal Safety: Low Risk  (1/19/2024)    Interpersonal Safety      Do you feel physically and emotionally safe where you currently live?: Yes      Within the past 12 months, have you been hit, slapped, kicked or otherwise physically hurt by someone?: No      Within the past 12 months, have you been humiliated or emotionally abused in other ways by your partner or ex-partner?: No   Housing Stability: Low Risk  (1/19/2024)    Housing Stability      Do you have housing? : Yes      Are you worried about losing your housing?: No   no smoking. Drinks etoh wine 2-3 times / week.       Physical Exam:  Ht 1.803 m (5' 11\")   Wt 83.9 kg (185 lb)   BMI 25.80 kg/m     Wt Readings from Last 4 Encounters:   01/24/24 83.9 kg (185 lb)   01/19/24 83.1 kg (183 lb 3.2 oz)   12/27/23 84.3 kg (185 lb 14.4 oz)   11/28/23 82.7 kg (182 lb 6.4 oz)         Constitutional.  Does not seem to be in any acute distress.  Eyes.  No redness or discharge noted.  Respiratory.  Speaking in full sentences.  Breathing seems comfortable without any accessory use of muscles.    Skin.  Visualized his skin does not show any obvious rashes.  Musculoskeletal.  Range of motion for visualized areas is intact.  Neurological.  Alert and oriented x3.  Psychiatric.  Mood, mentation and affect are normal.  Decision " making capacity is intact.      The rest of a comprehensive physical examination is deferred due to [facility] video visit restrictions.      Laboratory/Imaging Studies      Reviewed  1/19/2024  CBC shows WBC 4.2.  Hemoglobin 15.  Platelets 210.  Lymphocyte 0.6.  Neutrophils 3.0.    Chemistry unremarkable.      Vitamin B1, vitamin B12 and vitamin B6 are normal.    PSA 0.88.    Serum immunofixation was unremarkable    6/11/2023  Urinalysis showed gross hematuria.  Few bacteria.  0-5 WBCs.  Negative nitrites and leukocyte esterase.    Urine culture was negative      Previously  SPEP did not show any monoclonal protein.  Serum immunoglobulin levels were normal.  [place] free light chain 2.53.  Lambda 1.6.  Ratio normal 1.58.      Hepatitis B surface antibody was positive.  Hepatitis B surface antigen and core antibody were negative.  HIV negative  Hep C negative        1/9/2023  CBC was unremarkable.  Peripheral blood smear was unremarkable.    CLAIRE was positive-1: 80, speckled pattern  dsDNA was negative  ESR 8      EXAMINATION: CT CHEST/ABDOMEN/PELVIS W CONTRAST, 1/22/2024 9:11 AM     TECHNIQUE: Helical CT images from the thoracic inlet through the  symphysis pubis were obtained with intravenous contrast. Contrast  dose: 101ml isovue 370     COMPARISON: PET CT 7/21/2023     HISTORY: Diffuse large B-cell lymphoma, unspecified body region (H)     FINDINGS:     Chest:     Heart/ Mediastinum: Heart is within normal limits. No evidence of  central pulmonary embolism. No bulky lymphadenopathy.  Esophagus  appears normal. Right chest wall Port-A-Cath with tip at the  cavoatrial junction.     Lungs/pleura: The central tracheobronchial tree is patent.  No focal  mass or consolidation.  A few small pulmonary nodules that are 5 mm or  less are unchanged including right lower lobe nodules on series 4  image 198. No pneumothorax or pleural effusion.      Chest wall/axilla: No bulky lymphadenopathy..     Abdomen  and Pelvis:     Liver: A few subcentimeter hypodensities are too small to fully  characterize. No suspicious masses. No hepatic steatosis.      Gallbladder/biliary tree: Cholelithiasis without evidence of acute  cholecystitis.      Spleen: Unremarkable.     Pancreas: Unremarkable. No mass or ductal dilatation.     Adrenal glands: Unremarkable.     Kidneys: Unremarkable. No hydronephrosis.     Bowel: Colonic diverticulosis without evidence of acute  diverticulitis. No obstruction. Normal appendix.     Retroperitoneum: Vasculature is grossly unremarkable..  No bulky  lymphadenopathy.     Pelvis: Urinary bladder is unremarkable.  Prostate and seminal  vesicles are within normal limits.     Bones: Unremarkable. No suspicious lesions.     Soft Tissues: Hernia repair mesh in the right lower quadrant.                                                                      IMPRESSION:     1.  No lymphadenopathy or evidence of metastatic disease.    2/16/2023 excisional biopsy of the left level 4 cervical lymph node  Large B-cell lymphoma, non-germinal center subtype.    The neoplastic cells express CD20, BCL2, BCL6, and MUM-1 (variable staining intensity), and are negative for CD3, CD5, CD10, CD23, Cyclin D1, and ALK. CD21 and CD23 demonstrate a lack of residual follicular dendritic cell meshworks. C-Myc demonstrates variable staining, and is overall estimated at 20-30% (interpreted as negative; cutoff 40%). Ki-67 proliferative index is also variable, and is overall estimated around 60-70%.      EBV RNA by in situ hybridization (SOY-OTTO) is negative for EBV in the neoplastic cells.   A congo red stain is negative for amyloid deposition in the tissue.     FISH testing on the lymph node  RESULTS:     - Gains of BCL6 (94%); no rearrangement of BCL6  - Gains of BCL2 (94%); no rearrangement of BCL2  - No rearrangement of MYC or IGH::MYC fusion        INTERPRETATION:    Of the interphase cells examined, 94-96% had signal patterns  indicative of gains of BCL6 (3q27) and BCL2 (18q21), consistent with gains of all or part of chromosomes 3 and 18. No evidence was found of rearrangements of BCL6, MYC (8q24), or BCL2, or of a t(8;14).     Cytogenetics.  He has complex cytogenetics.    48,X,-Y,t(1;12)(p13;q13),+3,+3,add(3)(p25),t(3;8)(q21;p21),del(6)(13q21),add(11)(q22),+18,?idic(18)(p11.2)x2[cp19]/46,XY[1]      2/15/2023 bone marrow biopsy was unremarkable without evidence of involvement of lymphoma.      ASSESSMENT/PLAN:    Diffuse large B-cell lymphoma presenting with diffuse lymphadenopathy.    He has marked FDG avid left supraclavicular, axillary, retroperitoneal lymph nodes.  No hepatosplenomegaly.  No evidence of bone marrow or extra lymphatic involvement.  FISH testing shows gains of BCL2 and BCL6 but no rearrangement.  No rearrangement of MYC or IGH::MYC fusion  No evidence of double hit or triple hit lymphoma.  No evidence of bone marrow involvement.  LDH is mildly elevated.      IPI is 3    We started R-CHOP on 3/8/2023 with curative intent.      Overall he tolerated the first chemotherapy cycle well.  He did have chemotherapy-induced neutropenia and had neutropenic fever but no infection was found.  Fever resolved on its own without antibiotics.      He received cycle #2 on 3/29/2023 with G-CSF support because of neutropenic fever.    He developed neutropenic fever with pancolitis from C. difficile.  Cryptosporidium was also positive.    Cycle #3 was on 4/19/2023.    He achieved complete response by PET/CT done on 5/5/2023 after 3 cycles    I recommended continuing with 3 more cycles of R-CHOP.    Cycle #4 on 5/10/2023.  Cycle #5 on 5/31/2023    He developed hemorrhagic cystitis from cyclophosphamide so he received cycle #6 R-CHOP without Cytoxan on 6/21/2023     After completing 6 cycles, repeat PET/CT on 7/21/2023 continues to show complete response.    He has been on observation since then and is doing fairly well.    Repeat CT chest  abdomen and pelvis on 1/22/2024 does not show any evidence of recurrence.    I would recommend repeat testing with labs and CT chest abdomen and pelvis in 6 months.    Cytopenias.  Previously he had anemia and lymphocytopenia.  Anemia has resolved.  He still has mild lymphocytopenia and we will continue to monitor.    Neuropathy.    I also reviewed notes from neurologist Dr. Violette Cerda for chemotherapy-induced peripheral neuropathy.  He has tingling and dysesthesias in bilateral feet.  Numbness of the hands has improved.  He was found to have severe subacute axonal sensorimotor polyneuropathy on EMG/NCS in August 2023.  He was started on alpha lipoic acid.  I told him that I have seen some patients benefit from laser therapy or acupuncture and this is something which potentially could be considered and he can discuss this with his neurologist.    Fatigue.  I advised him to exercise regularly and try to slowly build up the strength and endurance.  I told him that he should take the special time out daily to exercise.      Leg swelling.  This has resolved. No more on lasix.     Port-A-Cath.  I recommend continuing with port flushes every 8.  But he wants the Port-A-Cath out.  We again discussed that the risk of cancer coming back is highest in the first couple of years and it is a possibility that the Port-A-Cath might need to be placed again.  He understands this risk but he wants the port out so we will arrange for port removal.        Thyroid nodule.  Ultrasound in October 2023 did not show any concerning findings and he is following with PCP regarding this.        We did not address the following today        Hemorrhagic cystitis.  From cyclophosphamide.  He was seen by urology  on 6/15/2023.  As his symptoms have resolved, he has stopped oxybutynin.        History of chemotherapy induced neutropenic fever/typhlitis.  Was receiving G-CSF support.      Respiratory.  He was treated for 1 week of levofloxacin  for cough and some shortness of breath and subtle groundglass opacities found on the CT scan.  Coughing and dyspnea on exertion have improved.  Continue to observe.       C. difficile colitis.  Symptoms have resolved.  We gave him 1 week of vancomycin when he was on levofloxacin to prevent reactivation of C. Difficile.  He did not get vancomycin with ciprofloxacin and currently bowel movements are normal.      Prevention of tumor lysis syndrome.  He was given allopurinol which he took for about 7 to 8 days but developed a rash from it.  Allopurinol was stopped.  He did not go into tumor lysis syndrome.      See me back in 6 months with labs/CT scan prior    All questions answered.  He is agreeable and comfortable with the plan.    Kiel Maurer MD          Virtual Visit Details    Type of service:  Video Visit     Originating Location (pt. Location): Home    Distant Location (provider location):  On-site  Platform used for Video Visit: A.P [employer]vanash[employer] Silk  Video start time. 3:28 PM  Video stop time. 3:41 PM         Again, thank you for allowing me to participate in the care of your patient.        Sincerely,        Kiel Maurer MD

## 2024-01-24 NOTE — PROGRESS NOTES
Virtual Visit Details    Type of service:  Video Visit     Originating Location (pt. Location): Home    Distant Location (provider location):  On-site  Platform used for Video Visit: Variable  Video start time. 3:28 PM  Video stop time. 3:41 PM

## 2024-01-26 NOTE — OP NOTE
Diagnostic Medial Branch Block    The patient s identity, the procedure to be performed and the specific site of the procedure was verified in accordance with [facility] Protocol.  Diagnosis: 1.  Sondyloarthropathy                     2.  Facet Arthropathy  Pre-procedure Pain Score: 4/10           Procedure Note:  Informed consent was obtained.  The patient was positioned comfortably in the prone position.  There was no evidence of infection at the sites of needle insertion.  The patient was prepped and draped in a sterile fashion.  Skeletal landmarks were identified with the fluoroscope guidance.  27 gauge spinal needles were then placed at the junction of the superior articulating process and the transverse process for lumbar and thoracic levels and centrally on the lateral mass in the cervical region.  Medication was then injected after negative aspiration. The patient was then observed for 30 minutes.  No complications were noted.      Levels:Left   C3/4/5  Medication (per site): 0.5cc   0.25% Bupivacaine      Post Procedure Pain Score: 1/10    The patient was given discharge instructions and verbalizes understanding, including understanding of those signs and symptoms that would require emergency care.     Plan:   The patient will fill out a pain diary for the remainder of the day and will either fax, email or bring it to the pain clinic.      Counseling: Greater than 50% of this patient visit was spent in counseling the patient regarding the treatment of their pain, coordinating their overall treatment plan and assessing their progress.

## 2024-02-07 ENCOUNTER — DOCUMENTATION ONLY (OUTPATIENT)
Dept: ONCOLOGY | Facility: CLINIC | Age: 64
End: 2024-02-07
Payer: COMMERCIAL

## 2024-02-07 NOTE — NURSING NOTE
Orders for port removal faxed to [facility] along with demographics, insurance, and office notes.  Jocelyn Renae, RN, BSN  RN Care Coordinator - Oncology/Hematology  [facility]

## 2024-02-21 ENCOUNTER — OFFICE VISIT (OUTPATIENT)
Dept: OTOLARYNGOLOGY | Facility: CLINIC | Age: 64
End: 2024-02-21
Attending: INTERNAL MEDICINE
Payer: COMMERCIAL

## 2024-02-21 VITALS — DIASTOLIC BLOOD PRESSURE: 75 MMHG | SYSTOLIC BLOOD PRESSURE: 115 MMHG | HEART RATE: 71 BPM

## 2024-02-21 DIAGNOSIS — R49.0 HOARSENESS: ICD-10-CM

## 2024-02-21 DIAGNOSIS — U09.9 POST-ACUTE COVID-19 SYNDROME: ICD-10-CM

## 2024-02-21 DIAGNOSIS — K21.9 LPRD (LARYNGOPHARYNGEAL REFLUX DISEASE): Primary | ICD-10-CM

## 2024-02-21 PROCEDURE — 31575 DIAGNOSTIC LARYNGOSCOPY: CPT | Performed by: OTOLARYNGOLOGY

## 2024-02-21 PROCEDURE — 99214 OFFICE O/P EST MOD 30 MIN: CPT | Mod: 25 | Performed by: OTOLARYNGOLOGY

## 2024-02-21 RX ORDER — GUAIFENESIN 600 MG/1
600 TABLET, EXTENDED RELEASE ORAL 2 TIMES DAILY
Qty: 40 TABLET | Refills: 2 | Status: SHIPPED | OUTPATIENT
Start: 2024-02-21

## 2024-02-21 ASSESSMENT — ENCOUNTER SYMPTOMS
COUGH: 1
DOUBLE VISION: 0
EYES NEGATIVE: 1
CONSTITUTIONAL NEGATIVE: 1
BLURRED VISION: 0
HEARTBURN: 0
GASTROINTESTINAL NEGATIVE: 1
SPUTUM PRODUCTION: 1

## 2024-02-21 NOTE — PROGRESS NOTES
Geovanni Jasso's chief complaint for this visit includes:  Chief Complaint   Patient presents with    Consult     Voice hoarseness (post covid symptom?) referred by PCP      PCP: Paddy Holly    Referring Provider:  Paddy Holly MD  [id number] [place] [address]    /75 (BP Location: Right arm, Patient Position: Sitting, Cuff Size: Adult Large)   Pulse 71     Tammie Go MA on 2/21/2024 at 2:16 PM

## 2024-02-21 NOTE — LETTER
2/21/2024         RE: Geovanni Jasso  [id number]  [facility] [address]        Dear Colleague,    Thank you for referring your patient, Geovanni Jasso, to the [facility]. Please see a copy of my visit note below.    Chief Complaint   Patient presents with     Consult     Voice hoarseness (post covid symptom?) referred by PCP       PCP: Paddy Holly     Referring Provider: Paddy Holly    /75 (BP Location: Right arm, Patient Position: Sitting, Cuff Size: Adult Large)   Pulse 71     ENT Problem List:  Patient Active Problem List   Diagnosis Code     Esophageal reflux K21.9     Hyperlipidemia LDL goal <70 E78.5     Neck pain M54.2     Chronic rhinitis J31.0     Bilateral low back pain without sciatica M54.50     Chondromalacia patellae, right M22.41     S/P ACL reconstruction Z98.890     Atherosclerosis of native coronary artery of native heart without angina pectoris I25.10     SOB (shortness of breath) R06.02     Non-allergic rhinitis J31.0     Positive CLAIRE (antinuclear antibody) R76.8     Hand joint stiff, right M25.641     Carpal tunnel syndrome G56.00     Osteoarthritis of knee, unspecified laterality, unspecified osteoarthritis type M17.9     Diverticulosis of large intestine K57.30     Internal hemorrhoids K64.8     Diffuse large B-cell lymphoma, unspecified body region (H) C83.30     Chemotherapy-induced neutropenia  (H24) D70.1, T45.1X5A     Esophageal spasm K22.4     Chemotherapy-induced peripheral neuropathy  (H24) G62.0, T45.1X5A     History of Clostridioides difficile colitis Z86.19     Cervical disc disorder with radiculopathy M50.10     Facet arthropathy, cervical M47.812     Cervical disc herniation M50.20     Facet arthritis of lumbar region M47.816     Lumbar disc herniation, L3-L4 M51.26     Degeneration of lumbar intervertebral disc M51.36     Cervical spondylosis without myelopathy M47.812     Ulcerative (chronic) pancolitis without complications  (H) K51.00      Current Medications:  Current Outpatient Medications   Medication     acetaminophen (TYLENOL) 325 MG tablet     Efinaconazole (JUBLIA) 10 % SOLN     ibuprofen (ADVIL,MOTRIN) 200 MG tablet     ketorolac (TORADOL) 10 MG tablet     lidocaine-prilocaine (EMLA) 2.5-2.5 % external cream     methocarbamol (ROBAXIN) 500 MG tablet     oxyCODONE (ROXICODONE) 5 MG tablet     tadalafil (CIALIS) 10 MG tablet     traMADol (ULTRAM) 50 MG tablet     No current facility-administered medications for this visit.     PET CT fusion examination 7/21/2023    1. Neck CT with contrast  2. PET study of the neck  3. PET CT fusion study of the neck     History: 62-year-old man with diffuse large B-cell lymphoma, post cycle 6 of R-CHOP on 6/21/2023. Imaging is requested for restaging.     Comparison: PET/CT dated 5/5/2023.     Technique: Please refer to the accompanying whole body PET-CT for report of the dose and whole body PET-CT findings. Regarding the neck, axial images were obtained after nonionic intravenous contrast administration, with sagittal and coronal reconstructions performed. Neck CT images were reviewed in bone, soft tissue, and lung windows, with review of the fused PET-CT images as well in multiple planes.     Findings:  Evaluation of the mucosal space demonstrates no abnormality or abnormal metabolic uptake on PET CT in the nasopharynx, oropharynx, hypopharynx or the glottis. The tongue base appears normal. The major salivary glands appear normal. Stable mildly avid, 8 mm left thyroid nodule (3/106).      There is no evident cervical lymphadenopathy, and the cervical lymph nodes are within normal limits by size criteria. No abnormal metabolic uptake on PET CT. Surgical clips in the left neck base from prior excisional biopsy.       Limited evaluation of the cervical vertebral column demonstrates no evident spinal canal stenosis. Trace anterolisthesis of C2 on C3 and C3 on C4. Mild mucosal thickening in  bilateral maxillary sinuses. The major vascular structures in the neck are patent. Extensive streak artifacts related to dental amalgam obscures surrounding structures.                                                                    Impression:   1. No evidence of hypermetabolic cervical adenopathy.   2. Stable mildly avid, 8 mm left thyroid nodule; may consider further evaluation with thyroid ultrasound.    3. Please refer to the whole body PET CT performed as a separate report, for the findings of the remainder of the body.      US THYROID 10/6/2023     COMPARISON: PET CT 7/21/2023     HISTORY: Thyroid nodule mildly FDG avid.     FINDINGS:   Thyroid parenchyma: homogenous  The right lobe of the thyroid measures: 4.7 x 1.7 x 1.7 cm  The left lobe of the thyroid measures: 4.6 x 1.4 x 1.3 cm  The thyroid isthmus measures: 0.2 cm     Nodule 1:  Lobe: Left  Location: Mid posteriorly  Size: 0.9 x 0.5 x 0.8 cm  Composition: Solid or almost completely solid (2 points)  Echogenicity: Very hypoechoic (3 points)  Shape: Wider than tall (0 points)  Margin: Smooth (0 points)  Echogenic Foci: None or large comet tail artifact (0 points)  Stability: No significant change in size  TIRADS: TR4 (4-6 points)                                                                    Impression:  0.9 cm left TR4 nodule as detailed above, corresponding to the mildly FDG-avid lesion seen on PET CT, which may be in the thyroid or a parathyroid adenoma. Although per the ACR TI-RADS guidelines below, no further follow-up is necessary. However, given the focal FDG uptake, FNA could be considered.        ACR TI-RADS recommendations  TR2 (2 points) & TR1 (0 points) -No FNA or follow-up  TR3 (3 points) - FNA if ? 2.5cm, follow-up if 1.5 -2.4 cm in 1, 3 and 5 years  TR4 (4-6 points) - FNA if ? 1.5cm, follow-up if 1 -1.4 cm in 1, 2, 3 and 5 years  TR5 (?7 points) - FNA if ? 1cm, follow-up if 0.5 -0.9 cm every year for 5 years     CT  CHEST/ABDOMEN/PELVIS W CONTRAST, 1/22/2024     TECHNIQUE: Helical CT images from the thoracic inlet through the symphysis pubis were obtained with intravenous contrast. Contrast dose: 101ml isovue 370     COMPARISON: PET CT 7/21/2023     HISTORY: Diffuse large B-cell lymphoma, unspecified body region (H)     FINDINGS:     Chest: Heart/ Mediastinum: Heart is within normal limits. No evidence of central pulmonary embolism. No bulky lymphadenopathy.  Esophagus appears normal. Right chest wall Port-A-Cath with tip at the cavoatrial junction.     Lungs/pleura: The central tracheobronchial tree is patent.  No focal mass or consolidation.  A few small pulmonary nodules that are 5 mm or less are unchanged including right lower lobe nodules on series 4 image 198. No pneumothorax or pleural effusion.      Chest wall/axilla: No bulky lymphadenopathy..     Abdomen and Pelvis:     Liver: A few subcentimeter hypodensities are too small to fully  characterize. No suspicious masses. No hepatic steatosis.      Gallbladder/biliary tree: Cholelithiasis without evidence of acute cholecystitis.      Spleen: Unremarkable.     Pancreas: Unremarkable. No mass or ductal dilatation.     Adrenal glands: Unremarkable.     Kidneys: Unremarkable. No hydronephrosis.     Bowel: Colonic diverticulosis without evidence of acute diverticulitis. No obstruction. Normal appendix.     Retroperitoneum: Vasculature is grossly unremarkable..  No bulky lymphadenopathy.     Pelvis: Urinary bladder is unremarkable.  Prostate and seminal vesicles are within normal limits.     Bones: Unremarkable. No suspicious lesions.     Soft Tissues: Hernia repair mesh in the right lower quadrant.                                                                IMPRESSION:     1.  No lymphadenopathy or evidence of metastatic disease.    HPI  Pleasant 63 year old male presents today as a(n) new patient referred from his PCP for hoarseness post COVID that onset in January. In  the past, when he would get a cold his voice would be deep but it would eventually go away. He states that now his voice has gotten better, but he has constant tinnitus. He feels some light-headedness and dizziness when he gets up too fast and when he looks up. He states that he is constantly clearing his throat and blowing his nose. His breathes through his nose well, but he mouth breathes at night. He sneezes and has a history of heartburn, though he does not feel heartburn currently. He has had chemotherapy treatments. He is a non-smoker and has had a tonsillectomy. He coughs in the morning with phlegm production.  He denies aural pressure, dizziness when laying in the bed, blurry vision, double vision, heartburn, head and neck surgeries.    Review of Systems   Constitutional: Negative.    HENT:  Positive for congestion and tinnitus.    Eyes: Negative.  Negative for blurred vision and double vision.   Respiratory:  Positive for cough and sputum production.    Gastrointestinal: Negative.  Negative for heartburn.   Skin: Negative.    Endo/Heme/Allergies: Negative.        Physical Exam  Vitals and nursing note reviewed.   Constitutional:       Appearance: Normal appearance.   HENT:      Head: Normocephalic and atraumatic.      Jaw: There is normal jaw occlusion.      Right Ear: Hearing, tympanic membrane and ear canal normal. There is no impacted cerumen.      Left Ear: Hearing, tympanic membrane and ear canal normal. There is no impacted cerumen.      Nose: Septal deviation and congestion present. No mucosal edema or rhinorrhea.      Right Nostril: No occlusion.      Left Nostril: No occlusion.      Right Turbinates: Not enlarged or swollen.      Left Turbinates: Not enlarged or swollen.      Right Sinus: No maxillary sinus tenderness or frontal sinus tenderness.      Left Sinus: No maxillary sinus tenderness or frontal sinus tenderness.      Mouth/Throat:      Mouth: Mucous membranes are moist.      Pharynx:  Oropharynx is clear. Uvula midline.   Eyes:      Extraocular Movements: Extraocular movements intact.      Pupils: Pupils are equal, round, and reactive to light.   Neurological:      Mental Status: He is alert.       Diagnostic nasal endoscopy:    The patient was seen in the room and identified. Pros and cons of the procedure were explained to the patient. The procedure and its alternatives were explained to the patient in lay terms. Patient's questions were answered. Symptoms required a diagnostic endoscopic evaluation under local anesthesia. After obtaining an informed consent from the patient, 2% Lidocaine spray was applied to each nostril. Then a flexible scopic exam was performed. Septum is deviated to the right and then to the left. Ostiomeatal complexes are free of disease. No pus or polyp seen. Inferior turbinates are enlarged. Nasopharynx is normal. Rosenmüller fossa and torus tubarius are normal. Epiglottis, hypopharynx, false vocal folds are normal. No pooling in pyriform fossae. Vocal cords are mobile and normal other than slight edematous appearance of posterior commissure. Patient tolerated the procedure well and left the room with no complications.    Functional improper cord use and deconditioning may also contribute.  Over 80% of patients with LPRD do not have heartburn or clear symptoms of GERD. The trhoat symptoms of LPRD can be caused by irritation from direct contact with stomach secretions, or by reflexive cricopharyngeal spasm from reflux into lower portions of the esophagus. I counseled the patient today on the importance of diet and the timing and volume of meals for nonpharmaceutical control of reflux diseases. I also advised frequent sips of water instead of throat clearing to manage the globus sensation and reduce cricopharyngeal spasm. I would like her to follow up with voice  therapist. The patient needs videostroboscopic eval  I will see the patient for follow up in 2 months.      Adult lifestyle changes to prevent LPR reviewed      Avoid eating and drinking within two to three hours prior to bedtime    Do not drink alcohol    Eat small meals and slowly    Limit problem foods:    o Caffeine  o Carbonated drinks  o Chocolate  o Peppermint  o Tomato  o Citrus fruits  o Fatty and fried foods      Lose weight    Quit smoking    Wear loose clothing    A/P  This pleasant patient displayed phlegm/ mucosal secretions from the lungs and laryngo-pharyngeal reflux was identified. There are no sinus infections present, tumors, masses, or lesions. There also is no inflammations in the throat itself, polyps, or nodules. The patient was advised to get good hydration, take guaiFENesin (MUCINEX) 600 MG 12 hr tablet 1 tablet (600 mg) by mouth BID prescribed today for congestion, and use a humidifier. For acid reflux, an acid reflux diet was recommended which includes the following:  Small meals  No eating at least 2 hours before bed  Elevate upper part of the body when laying down  Avoid fried food, spicy food, acidic food, caffeine  Additionally, he can Google acid reflux diet.    Follow up in clinic as needed. If the problem continues in 2 months, the potential of an acid reflux medication is discussed.    Scribe/Staff:    Scribe Disclosure:   I, Pam Davies, am serving as a scribe; to document services personally performed by Cristiana Connell MD based on data collection and the provider's statements to me.     Provider Disclosure:  I agree with above History, Review of Systems, Physical exam and Plan.  I have reviewed the content of the documentation and have edited it as needed. I have personally performed the services documented here and the documentation accurately represents those services and the decisions I have made.      Electronically signed by:  Cristiana Jasso's chief complaint for this visit includes:  Chief Complaint   Patient presents with     Consult      Voice hoarseness (post covid symptom?) referred by PCP      PCP: Paddy Holly    Referring Provider:  Paddy Holly MD  [id number] [place] [address]    /75 (BP Location: Right arm, Patient Position: Sitting, Cuff Size: Adult Large)   Pulse 71     Tammie Go MA on 2/21/2024 at 2:16 PM          Again, thank you for allowing me to participate in the care of your patient.        Sincerely,        Cristiana Connell MD

## 2024-02-21 NOTE — PROGRESS NOTES
Chief Complaint   Patient presents with    Consult     Voice hoarseness (post covid symptom?) referred by PCP       PCP: Paddy Holly     Referring Provider: Paddy Holly    /75 (BP Location: Right arm, Patient Position: Sitting, Cuff Size: Adult Large)   Pulse 71     ENT Problem List:  Patient Active Problem List   Diagnosis Code    Esophageal reflux K21.9    Hyperlipidemia LDL goal <70 E78.5    Neck pain M54.2    Chronic rhinitis J31.0    Bilateral low back pain without sciatica M54.50    Chondromalacia patellae, right M22.41    S/P ACL reconstruction Z98.890    Atherosclerosis of native coronary artery of native heart without angina pectoris I25.10    SOB (shortness of breath) R06.02    Non-allergic rhinitis J31.0    Positive CLAIRE (antinuclear antibody) R76.8    Hand joint stiff, right M25.641    Carpal tunnel syndrome G56.00    Osteoarthritis of knee, unspecified laterality, unspecified osteoarthritis type M17.9    Diverticulosis of large intestine K57.30    Internal hemorrhoids K64.8    Diffuse large B-cell lymphoma, unspecified body region (H) C83.30    Chemotherapy-induced neutropenia  (H24) D70.1, T45.1X5A    Esophageal spasm K22.4    Chemotherapy-induced peripheral neuropathy  (H24) G62.0, T45.1X5A    History of Clostridioides difficile colitis Z86.19    Cervical disc disorder with radiculopathy M50.10    Facet arthropathy, cervical M47.812    Cervical disc herniation M50.20    Facet arthritis of lumbar region M47.816    Lumbar disc herniation, L3-L4 M51.26    Degeneration of lumbar intervertebral disc M51.36    Cervical spondylosis without myelopathy M47.812    Ulcerative (chronic) pancolitis without complications (H) K51.00      Current Medications:  Current Outpatient Medications   Medication    acetaminophen (TYLENOL) 325 MG tablet    Efinaconazole (JUBLIA) 10 % SOLN    ibuprofen (ADVIL,MOTRIN) 200 MG tablet    ketorolac (TORADOL) 10 MG tablet    lidocaine-prilocaine (EMLA) 2.5-2.5  % external cream    methocarbamol (ROBAXIN) 500 MG tablet    oxyCODONE (ROXICODONE) 5 MG tablet    tadalafil (CIALIS) 10 MG tablet    traMADol (ULTRAM) 50 MG tablet     No current facility-administered medications for this visit.     PET CT fusion examination 7/21/2023    1. Neck CT with contrast  2. PET study of the neck  3. PET CT fusion study of the neck     History: 62-year-old man with diffuse large B-cell lymphoma, post cycle 6 of R-CHOP on 6/21/2023. Imaging is requested for restaging.     Comparison: PET/CT dated 5/5/2023.     Technique: Please refer to the accompanying whole body PET-CT for report of the dose and whole body PET-CT findings. Regarding the neck, axial images were obtained after nonionic intravenous contrast administration, with sagittal and coronal reconstructions performed. Neck CT images were reviewed in bone, soft tissue, and lung windows, with review of the fused PET-CT images as well in multiple planes.     Findings:  Evaluation of the mucosal space demonstrates no abnormality or abnormal metabolic uptake on PET CT in the nasopharynx, oropharynx, hypopharynx or the glottis. The tongue base appears normal. The major salivary glands appear normal. Stable mildly avid, 8 mm left thyroid nodule (3/106).      There is no evident cervical lymphadenopathy, and the cervical lymph nodes are within normal limits by size criteria. No abnormal metabolic uptake on PET CT. Surgical clips in the left neck base from prior excisional biopsy.       Limited evaluation of the cervical vertebral column demonstrates no evident spinal canal stenosis. Trace anterolisthesis of C2 on C3 and C3 on C4. Mild mucosal thickening in bilateral maxillary sinuses. The major vascular structures in the neck are patent. Extensive streak artifacts related to dental amalgam obscures surrounding structures.                                                                    Impression:   1. No evidence of hypermetabolic cervical  adenopathy.   2. Stable mildly avid, 8 mm left thyroid nodule; may consider further evaluation with thyroid ultrasound.    3. Please refer to the whole body PET CT performed as a separate report, for the findings of the remainder of the body.      US THYROID 10/6/2023     COMPARISON: PET CT 7/21/2023     HISTORY: Thyroid nodule mildly FDG avid.     FINDINGS:   Thyroid parenchyma: homogenous  The right lobe of the thyroid measures: 4.7 x 1.7 x 1.7 cm  The left lobe of the thyroid measures: 4.6 x 1.4 x 1.3 cm  The thyroid isthmus measures: 0.2 cm     Nodule 1:  Lobe: Left  Location: Mid posteriorly  Size: 0.9 x 0.5 x 0.8 cm  Composition: Solid or almost completely solid (2 points)  Echogenicity: Very hypoechoic (3 points)  Shape: Wider than tall (0 points)  Margin: Smooth (0 points)  Echogenic Foci: None or large comet tail artifact (0 points)  Stability: No significant change in size  TIRADS: TR4 (4-6 points)                                                                    Impression:  0.9 cm left TR4 nodule as detailed above, corresponding to the mildly FDG-avid lesion seen on PET CT, which may be in the thyroid or a parathyroid adenoma. Although per the ACR TI-RADS guidelines below, no further follow-up is necessary. However, given the focal FDG uptake, FNA could be considered.        ACR TI-RADS recommendations  TR2 (2 points) & TR1 (0 points) -No FNA or follow-up  TR3 (3 points) - FNA if ? 2.5cm, follow-up if 1.5 -2.4 cm in 1, 3 and 5 years  TR4 (4-6 points) - FNA if ? 1.5cm, follow-up if 1 -1.4 cm in 1, 2, 3 and 5 years  TR5 (?7 points) - FNA if ? 1cm, follow-up if 0.5 -0.9 cm every year for 5 years     CT CHEST/ABDOMEN/PELVIS W CONTRAST, 1/22/2024     TECHNIQUE: Helical CT images from the thoracic inlet through the symphysis pubis were obtained with intravenous contrast. Contrast dose: 101ml isovue 370     COMPARISON: PET CT 7/21/2023     HISTORY: Diffuse large B-cell lymphoma, unspecified body region (H)      FINDINGS:     Chest: Heart/ Mediastinum: Heart is within normal limits. No evidence of central pulmonary embolism. No bulky lymphadenopathy.  Esophagus appears normal. Right chest wall Port-A-Cath with tip at the cavoatrial junction.     Lungs/pleura: The central tracheobronchial tree is patent.  No focal mass or consolidation.  A few small pulmonary nodules that are 5 mm or less are unchanged including right lower lobe nodules on series 4 image 198. No pneumothorax or pleural effusion.      Chest wall/axilla: No bulky lymphadenopathy..     Abdomen and Pelvis:     Liver: A few subcentimeter hypodensities are too small to fully  characterize. No suspicious masses. No hepatic steatosis.      Gallbladder/biliary tree: Cholelithiasis without evidence of acute cholecystitis.      Spleen: Unremarkable.     Pancreas: Unremarkable. No mass or ductal dilatation.     Adrenal glands: Unremarkable.     Kidneys: Unremarkable. No hydronephrosis.     Bowel: Colonic diverticulosis without evidence of acute diverticulitis. No obstruction. Normal appendix.     Retroperitoneum: Vasculature is grossly unremarkable..  No bulky lymphadenopathy.     Pelvis: Urinary bladder is unremarkable.  Prostate and seminal vesicles are within normal limits.     Bones: Unremarkable. No suspicious lesions.     Soft Tissues: Hernia repair mesh in the right lower quadrant.                                                                IMPRESSION:     1.  No lymphadenopathy or evidence of metastatic disease.    HPI  Pleasant 63 year old male presents today as a(n) new patient referred from his PCP for hoarseness post COVID that onset in January. In the past, when he would get a cold his voice would be deep but it would eventually go away. He states that now his voice has gotten better, but he has constant tinnitus. He feels some light-headedness and dizziness when he gets up too fast and when he looks up. He states that he is constantly clearing his  throat and blowing his nose. His breathes through his nose well, but he mouth breathes at night. He sneezes and has a history of heartburn, though he does not feel heartburn currently. He has had chemotherapy treatments. He is a non-smoker and has had a tonsillectomy. He coughs in the morning with phlegm production.  He denies aural pressure, dizziness when laying in the bed, blurry vision, double vision, heartburn, head and neck surgeries.    Review of Systems   Constitutional: Negative.    HENT:  Positive for congestion and tinnitus.    Eyes: Negative.  Negative for blurred vision and double vision.   Respiratory:  Positive for cough and sputum production.    Gastrointestinal: Negative.  Negative for heartburn.   Skin: Negative.    Endo/Heme/Allergies: Negative.        Physical Exam  Vitals and nursing note reviewed.   Constitutional:       Appearance: Normal appearance.   HENT:      Head: Normocephalic and atraumatic.      Jaw: There is normal jaw occlusion.      Right Ear: Hearing, tympanic membrane and ear canal normal. There is no impacted cerumen.      Left Ear: Hearing, tympanic membrane and ear canal normal. There is no impacted cerumen.      Nose: Septal deviation and congestion present. No mucosal edema or rhinorrhea.      Right Nostril: No occlusion.      Left Nostril: No occlusion.      Right Turbinates: Not enlarged or swollen.      Left Turbinates: Not enlarged or swollen.      Right Sinus: No maxillary sinus tenderness or frontal sinus tenderness.      Left Sinus: No maxillary sinus tenderness or frontal sinus tenderness.      Mouth/Throat:      Mouth: Mucous membranes are moist.      Pharynx: Oropharynx is clear. Uvula midline.   Eyes:      Extraocular Movements: Extraocular movements intact.      Pupils: Pupils are equal, round, and reactive to light.   Neurological:      Mental Status: He is alert.       Diagnostic nasal endoscopy:    The patient was seen in the room and identified. Pros and cons  of the procedure were explained to the patient. The procedure and its alternatives were explained to the patient in lay terms. Patient's questions were answered. Symptoms required a diagnostic endoscopic evaluation under local anesthesia. After obtaining an informed consent from the patient, 2% Lidocaine spray was applied to each nostril. Then a flexible scopic exam was performed. Septum is deviated to the right and then to the left. Ostiomeatal complexes are free of disease. No pus or polyp seen. Inferior turbinates are enlarged. Nasopharynx is normal. Rosenmüller fossa and torus tubarius are normal. Epiglottis, hypopharynx, false vocal folds are normal. No pooling in pyriform fossae. Vocal cords are mobile and normal other than slight edematous appearance of posterior commissure. Patient tolerated the procedure well and left the room with no complications.    Functional improper cord use and deconditioning may also contribute.  Over 80% of patients with LPRD do not have heartburn or clear symptoms of GERD. The trhoat symptoms of LPRD can be caused by irritation from direct contact with stomach secretions, or by reflexive cricopharyngeal spasm from reflux into lower portions of the esophagus. I counseled the patient today on the importance of diet and the timing and volume of meals for nonpharmaceutical control of reflux diseases. I also advised frequent sips of water instead of throat clearing to manage the globus sensation and reduce cricopharyngeal spasm. I would like her to follow up with voice  therapist. The patient needs videostroboscopic eval  I will see the patient for follow up in 2 months.     Adult lifestyle changes to prevent LPR reviewed     Avoid eating and drinking within two to three hours prior to bedtime   Do not drink alcohol   Eat small meals and slowly   Limit problem foods:    o Caffeine  o Carbonated drinks  o Chocolate  o Peppermint  o Tomato  o Citrus fruits  o Fatty and fried foods      Lose weight   Quit smoking   Wear loose clothing    A/P  This pleasant patient displayed phlegm/ mucosal secretions from the lungs and laryngo-pharyngeal reflux was identified. There are no sinus infections present, tumors, masses, or lesions. There also is no inflammations in the throat itself, polyps, or nodules. The patient was advised to get good hydration, take guaiFENesin (MUCINEX) 600 MG 12 hr tablet 1 tablet (600 mg) by mouth BID prescribed today for congestion, and use a humidifier. For acid reflux, an acid reflux diet was recommended which includes the following:  Small meals  No eating at least 2 hours before bed  Elevate upper part of the body when laying down  Avoid fried food, spicy food, acidic food, caffeine  Additionally, he can Google acid reflux diet.    Follow up in clinic as needed. If the problem continues in 2 months, the potential of an acid reflux medication is discussed.    Scribe/Staff:    Scribe Disclosure:   I, Pam Davies, am serving as a scribe; to document services personally performed by Cristiana Connell MD based on data collection and the provider's statements to me.     Provider Disclosure:  I agree with above History, Review of Systems, Physical exam and Plan.  I have reviewed the content of the documentation and have edited it as needed. I have personally performed the services documented here and the documentation accurately represents those services and the decisions I have made.      Electronically signed by:  Cristiana Connell MD

## 2024-02-21 NOTE — NURSING NOTE
Geovanni Jasso's chief complaint for this visit includes:  Chief Complaint   Patient presents with    Consult     Voice hoarseness (post covid symptom?) referred by PCP      PCP: Paddy Holly    Referring Provider:  Paddy Holly MD  [id number] [place] [address]    /75 (BP Location: Right arm, Patient Position: Sitting, Cuff Size: Adult Large)   Pulse 71     Tammie Go MA on 2/21/2024 at 2:16 PM

## 2024-02-29 ENCOUNTER — VIRTUAL VISIT (OUTPATIENT)
Dept: FAMILY MEDICINE | Facility: CLINIC | Age: 64
End: 2024-02-29
Payer: COMMERCIAL

## 2024-02-29 DIAGNOSIS — M50.20 HERNIATION OF INTERVERTEBRAL DISC OF CERVICAL REGION: Primary | ICD-10-CM

## 2024-02-29 DIAGNOSIS — M47.812 FACET ARTHRITIS, DEGENERATIVE, CERVICAL SPINE: ICD-10-CM

## 2024-02-29 PROCEDURE — 99213 OFFICE O/P EST LOW 20 MIN: CPT | Mod: 95 | Performed by: INTERNAL MEDICINE

## 2024-02-29 NOTE — PROGRESS NOTES
Bill is a 63 year old who is being evaluated via a billable video visit.      How would you like to obtain your AVS? MyChart  If the video visit is dropped, the invitation should be resent by: Send to e-mail at: [email or web address]  Will anyone else be joining your video visit? No    [facility] Internal Medicine Progress Note           Assessment and Plan:     Herniation of intervertebral disc of cervical region, C5-C6  Most probable etiology of recurring neck pain. Previous cervical epidural injection wasn't effective.    Facet arthritis, degenerative, cervical spine  Secondary cause of patient's neck pain.        Interval History:     Pain History:  When did you first notice your pain? 6 months ago    Have you seen this provider for your pain in the past? Yes   Where in your body do you have pain? Neck  Are you seeing anyone else for your pain? Cassidy.  Description: Left-sided neck pain (lower half of neck) that is worse when sitting up in the morning.  Tests: MRI of cervical spine last 11/20/23 shows lumbar facet bilaterally from C2-T1 and disc herniation at C5-C6.  Treatments: Nerve block at facet joints C3-C5 wasn't effective. Muscle relaxants (Flexeril), NSAIDs (Toradol) and neuroleptic agents (Gabapentin) aren't effective. He also took Duloxetine for a different indication (chemotherapy-induced neuropathy), yet it did not improve his condition.              Significant Problems:     Patient Active Problem List   Diagnosis    Esophageal reflux    Hyperlipidemia LDL goal <70    Neck pain    Chronic rhinitis    Bilateral low back pain without sciatica    Chondromalacia patellae, right    S/P ACL reconstruction    Atherosclerosis of native coronary artery of native heart without angina pectoris    SOB (shortness of breath)    Non-allergic rhinitis    Positive CLAIRE (antinuclear antibody)    Hand joint stiff, right    Carpal tunnel syndrome    Osteoarthritis of knee, unspecified laterality, unspecified  osteoarthritis type    Diverticulosis of large intestine    Internal hemorrhoids    Diffuse large B-cell lymphoma, unspecified body region (H)    Chemotherapy-induced neutropenia  (H24)    Esophageal spasm    Chemotherapy-induced peripheral neuropathy  (H24)    History of Clostridioides difficile colitis    Cervical disc disorder with radiculopathy    Facet arthropathy, cervical    Cervical disc herniation    Facet arthritis of lumbar region    Lumbar disc herniation, L3-L4    Degeneration of lumbar intervertebral disc    Facet arthritis, degenerative, cervical spine    Ulcerative (chronic) pancolitis without complications (H)              Review of Systems:     CONSTITUTIONAL: NEGATIVE for fever, chills, change in weight  INTEGUMENTARY/SKIN: NEGATIVE for worrisome rashes, moles or lesions  EYES: NEGATIVE for vision changes or irritation  ENT/MOUTH: NEGATIVE for ear, mouth and throat problems  RESP: NEGATIVE for significant cough or SOB  CV: NEGATIVE for chest pain, palpitations or peripheral edema  GI: NEGATIVE for nausea, abdominal pain, heartburn, or change in bowel habits  : NEGATIVE for frequency, dysuria, or hematuria  MUSCULOSKELETAL:As above.  NEURO: NEGATIVE for weakness, dizziness or paresthesias  ENDOCRINE: NEGATIVE for temperature intolerance, skin/hair changes  HEME: NEGATIVE for bleeding problems  PSYCHIATRIC: NEGATIVE for changes in mood or affect             Physical Exam:   Vital signs and physical exam not obtained due to nature of visit.          Data:   MRI OF THE LUMBAR SPINE WITHOUT CONTRAST 11/20/2023 9:14 AM      COMPARISON: Lumbar spine MRI 9/6/2017.     HISTORY: Low back pain; Neurologic deficit, non-traumatic; lower  extremity numbness/paresthesia; Increasing/persistent lower extremity  numbness/paresthesia; No known/automatically detected potential  contraindications to imaging; Paresthesia of both feet.     TECHNIQUE: Multiplanar, multisequence MRI images of the lumbar spine  were  acquired without IV contrast.     FINDINGS: There are five lumbar-type vertebrae for the purposes of  this dictation.      There is normal alignment of the lumbar vertebrae. Vertebral body  heights of the lumbar spine are normal. There is Modic 1 degenerative  marrow signal change in the opposing endplates at L5-S1. Marrow signal  throughout the lumbar vertebrae is otherwise within normal limits.  There is no evidence for fracture or pathologic bony lesion of the  lumbar spine.      There is loss of disc height, disc desiccation and posterior disc  bulging/herniation to varying degrees at all levels of the lumbar  spine.      The tip of the conus medullaris is at the mid L1 level which is within  normal limits. There is no evidence for intrathecal abnormality.      Level by level:      T12-L1: Normal disc height and signal. No herniation. Normal facet  joints. No spinal canal stenosis. No foraminal stenosis on either  side.      L1-L2: Loss of disc height, disc desiccation and mild circumferential  disc bulging. No herniation. Normal facets. No spinal canal stenosis.  No foraminal stenosis on either side.     L2-L3: Loss of disc height, disc desiccation and mild circumferential  disc bulging. No herniation. Mild facet arthropathy bilaterally. No  spinal canal stenosis. No foraminal stenosis on either side.     L3-L4: Loss of disc height, disc desiccation and mild circumferential  disc bulging. No herniation. Mild facet arthropathy bilaterally. No  spinal canal stenosis. No foraminal stenosis on either side.     L4-L5: Loss of disc height, disc desiccation and circumferential disc  bulging with a superimposed small posterior broad-based disc  herniation (protrusion). Moderate facet arthropathy bilaterally.  Minimal spinal canal narrowing. Mild bilateral neural foraminal  stenosis.     L5-S1: Loss of disc height, disc desiccation and mild circumferential  disc bulging. No herniation. Moderate facet arthropathy  bilaterally.  No spinal canal stenosis. Mild to moderate left foraminal stenosis.  Mild right foraminal narrowing.                                                                      IMPRESSION:  1. Diffuse degenerative change of the lumbar spine as detailed above.  2. No spinal canal stenosis of the lumbar spine.  3. Mild to moderate neural foraminal stenosis on the left at L5-S1. No  other significant neural foraminal narrowing of the lumbar spine.  4. Modic 1 degenerative marrow signal change in the opposing endplates  at L5-S1.      MENA COSTELLO MD      Disposition:  Follow-up in 4 weeks or as needed.    Paddy Holly MD  Internal Medicine  [facility] Team        Video-Visit Details     Type of service:  Video Visit  Video Start Time: 5:30 PM   Video End Time: 5:45 PM  Originating Location (pt. Location): Home   Distant Location (provider location):  [facility]   Platform used for Video Visit: Owned it

## 2024-03-14 ENCOUNTER — VIRTUAL VISIT (OUTPATIENT)
Dept: FAMILY MEDICINE | Facility: CLINIC | Age: 64
End: 2024-03-14
Payer: COMMERCIAL

## 2024-03-14 DIAGNOSIS — R06.09 EXERTIONAL DYSPNEA: ICD-10-CM

## 2024-03-14 DIAGNOSIS — H02.843 EDEMA OF RIGHT EYELID: Primary | ICD-10-CM

## 2024-03-14 DIAGNOSIS — R53.83 CHEMOTHERAPY-INDUCED FATIGUE: ICD-10-CM

## 2024-03-14 DIAGNOSIS — Z92.21 PERSONAL HISTORY OF CHEMOTHERAPY: ICD-10-CM

## 2024-03-14 DIAGNOSIS — T45.1X5A CHEMOTHERAPY-INDUCED FATIGUE: ICD-10-CM

## 2024-03-14 PROCEDURE — 99214 OFFICE O/P EST MOD 30 MIN: CPT | Mod: 95 | Performed by: INTERNAL MEDICINE

## 2024-03-14 RX ORDER — PREDNISONE 5 MG/1
5 TABLET ORAL DAILY
Qty: 15 TABLET | Refills: 2 | Status: SHIPPED | OUTPATIENT
Start: 2024-03-14

## 2024-03-14 RX ORDER — ATOMOXETINE 10 MG/1
10 CAPSULE ORAL
Qty: 30 CAPSULE | Refills: 5 | Status: SHIPPED | OUTPATIENT
Start: 2024-03-14

## 2024-03-14 NOTE — PROGRESS NOTES
Bill is a 63 year old who is being evaluated via a billable video visit.    How would you like to obtain your AVS? MyChart  If the video visit is dropped, the invitation should be resent by: Text to cell phone: [phone]  Will anyone else be joining your video visit? No    [facility] Internal Medicine Progress Note           Assessment and Plan:     Edema of right eyelid  Etiology unknown. NO evidence of anasarca nor renal insufficiency. Patient states that it started since he received his chemotherapy.  - predniSONE (DELTASONE) 5 MG tablet; Take 1 tablet (5 mg) by mouth daily  - Adult Eye  Referral; Future    Chemotherapy-induced fatigue  - atomoxetine (STRATTERA) 10 MG capsule; Take 1 capsule (10 mg) by mouth daily (with lunch)    Exertional dyspnea  - Echocardiogram Complete; Future    Personal history of chemotherapy  - Echocardiogram Complete; Future            Interval History:       Eye(s) Problem  Onset/Duration: swelling of right lower eyelid when stressed and working for a full day. Has been going on ever since his cancer treatments. Doesn't seem to be getting better and notices times where he is having a long day and or looking at the computer for too long they get more puffy  Description:   Location: Both but Right is more pronounce  Pain: Sore but not painful  Redness: No  Accompanying Signs & Symptoms:  Discharge/mattering: Not that much  Swelling: YES  Visual changes: No  Fever: No  Nasal Congestion: Blowing nose more frequent and sneezing  Bothered by bright lights: No  History:  Trauma: No  Foreign body exposure: As a youth he has saw dust in the eye every once in a while. Over 30+ years ago he got poked in the eye by his infant child  Wearing contacts: No  Precipitating or alleviating factors: notices times where he is having a long day, stressful situations and or looking at the computer for too long it flares up  Therapies tried and outcome: None    Updates:  -Fatigued by  3 PM.            Significant Problems:     Patient Active Problem List   Diagnosis    Esophageal reflux    Hyperlipidemia LDL goal <70    Neck pain    Chronic rhinitis    Bilateral low back pain without sciatica    Chondromalacia patellae, right    S/P ACL reconstruction    Atherosclerosis of native coronary artery of native heart without angina pectoris    SOB (shortness of breath)    Non-allergic rhinitis    Positive CLAIRE (antinuclear antibody)    Hand joint stiff, right    Carpal tunnel syndrome    Osteoarthritis of knee, unspecified laterality, unspecified osteoarthritis type    Diverticulosis of large intestine    Internal hemorrhoids    Diffuse large B-cell lymphoma, unspecified body region (H)    Chemotherapy-induced neutropenia  (H24)    Esophageal spasm    Chemotherapy-induced peripheral neuropathy  (H24)    History of Clostridioides difficile colitis    Cervical disc disorder with radiculopathy    Facet arthropathy, cervical    Cervical disc herniation    Facet arthritis of lumbar region    Lumbar disc herniation, L3-L4    Degeneration of lumbar intervertebral disc    Facet arthritis, degenerative, cervical spine    Ulcerative (chronic) pancolitis without complications (H)              Review of Systems:     CONSTITUTIONAL: NEGATIVE for fever, chills, change in weight  CONSTITUTIONAL:NEGATIVE  for malaise, myalgias, and sweats  INTEGUMENTARY/SKIN: NEGATIVE for worrisome rashes, moles or lesions  EYES: As above.  ENT/MOUTH: NEGATIVE for ear, mouth and throat problems  RESP:POSITIVE for dyspnea on exertion and NEGATIVE for pleurisy  CV: NEGATIVE for chest pain, palpitations or peripheral edema  GI: NEGATIVE for nausea, abdominal pain, heartburn, or change in bowel habits  : NEGATIVE for frequency, dysuria, or hematuria  MUSCULOSKELETAL: NEGATIVE for significant arthralgias or myalgia  NEURO: NEGATIVE for weakness, dizziness or paresthesias  ENDOCRINE: NEGATIVE for temperature intolerance, skin/hair  changes  HEME: NEGATIVE for bleeding problems  PSYCHIATRIC: NEGATIVE for changes in mood or affect             Physical Exam:   Vital signs and physical exam not obtained due to nature of visit.          Data:       Procedure: CT CALCIUM SCREENING      Examination Date: 11/10/2017 1:54 PM      Clinical Information: ; Hyperlipidemia LDL goal <100      Ordering Provider: Avni     PROCEDURE: High-resolution, ECG synchronized multi-slice computed  tomography was performed without incident. Coronary calcification was  analyzed using [employer] calcium scoring software. Scan protocol was  optimized to minimize radiation exposure. The total radiation exposure  was calculated to be 64 DLP and 0.896 mSv.     FINDINGS:     Overall quality of the study: Good.      CORONARY ARTERY CALCIUM SCORES:      Left main coronary artery: 0  Left anterior descending coronary artery: 50.7  Circumflex coronary artery: 0   Right coronary artery: 0     TOTAL CALCIUM SCORE: 50.7     The total Agatston calcium score is 50.7 placing the patient in the 57  percentile when compared to age and gender matched control group.     Please review Radiology report for incidental noncardiac findings that  will follow separately     CALCIUM SCORE OF 1-99: If the patient has more than one cardiac risk  factor (male age >45, female age >55, hypertension, smoking, high  cholesterol, low HDL, family history of heart disease) then the risk  of coronary heart disease, death or myocardial infarction is 0.4% per  year (Emmanuelle P. et al. JAC, 2007; 49:378-402).  The more calcium  is detected, the higher is the likelihood for an obstructive coronary  disease. However, there is no unique relationship between the amount  of detected calcium and the extent of or localization of coronary  artery disease. If there are any cardiac symptoms (for example chest  pain/pressure or shortness of breath) then immediate medical attention  is necessary.     GENERAL  RECOMMENDATIONS: Adoption and maintenance of a healthy  lifestyle is recommended for all people. This should include regular,  appropriate exercise and observance of a proper diet, to ensure  balanced nutrition and weight control. Tobacco use should be avoided.  Cholesterol has been linked to coronary atherosclerosis, and we  strongly encourage adhering to the recommendations of the National  Cholesterol Education Panel (NCEP). For primary prevention, these  include a target goal for total cholesterol of less than 200 mg/dl,  HDL cholesterol of greater than 40 mg/dl, triglycerides of less than  150 mg/dl, and LDL cholesterol of less than 100 mg/dl. However note  that these are general recommendations only, and as with all such  matters, the personal physician should be consulted regarding  recommendations and treatments appropriate for the individual.      STEVAN WESTON MD      [facility]  Echocardiography Laboratory  [address] [place] [id number]     Name: KOREY ZAZUETA  MRN: [id number]  : 1960  Study Date: 2023 11:44 AM  Age: 62 yrs  Gender: Male  Patient Location: [facility]  Reason For Study: Encounter for monitoring cardiotoxic drug therapy, Encounter  for  Ordering Physician: MARK MANCILLA  Referring Physician: MARK MANCILLA  Performed By: Mariel Meyers     BSA: 2.1 m2  Height: 71 in  Weight: 194 lb  HR: 65  BP: 129/69 mmHg  ______________________________________________________________________________  Procedure  Echocardiogram with two-dimensional, color and spectral Doppler performed.  ______________________________________________________________________________  Interpretation Summary  Left ventricular size, wall motion and function are normal. The ejection  fraction is 60-65%.     Global peak LV longitudinal strain is averaged at -19.5%. This is within  reported normal limits (normal <-18%).     Right ventricular function, chamber size, wall  motion, and thickness are  normal.     No significant valvular abnormalities present.     Mild dilatation of the aorta is present. Sinuses of Valsalva 4.0 cm.     Compared to prior baseline imaging on 12/12/2015 there are no hemodynamically  significant differences though there is now borderline to mild aortic  dilation.     ______________________________________________________________________________  Left Ventricle  Left ventricular size, wall motion and function are normal. The ejection  fraction is 60-65%. Left ventricular diastolic function is normal. Global peak  LV longitudinal strain is averaged at -19.5%. This is within reported normal  limits (normal <-18%).     Right Ventricle  Right ventricular function, chamber size, wall motion, and thickness are  normal.     Atria  The left atrium appears normal. The atrial septum is intact as assessed by  color Doppler .     Mitral Valve  The mitral valve is normal. Trace mitral insufficiency is present.     Aortic Valve  Aortic valve is normal in structure and function. The aortic valve is  tricuspid.     Tricuspid Valve  The tricuspid valve is normal. The peak velocity of the tricuspid regurgitant  jet is not obtainable. Pulmonary artery systolic pressure cannot be assessed.     Pulmonic Valve  The valve leaflets are not well visualized. On Doppler interrogation, there is  no significant stenosis or regurgitation.     Vessels  The inferior vena cava is normal. Sinuses of Valsalva 4.0 cm. Mild dilatation  of the aorta is present. Ascending aorta 3.5 cm. IVC diameter <2.1 cm  collapsing >50% with sniff suggests a normal RA pressure of 3 mmHg.     Pericardium  No pericardial effusion is present.     Miscellaneous  No significant valvular abnormalities present.     Compared to Previous Study  Compared to prior baseline imaging on 12/12/2015 there are no hemodynamically  significant differences though there is now borderline to mild  aortic  dilation.  ______________________________________________________________________________  MMode/2D Measurements & Calculations  IVSd: 0.99 cm  LVIDd: 3.7 cm  LVIDs: 2.5 cm  LVPWd: 1.0 cm  FS: 33.1 %  LV mass(C)d: 114.0 grams  LV mass(C)dI: 54.8 grams/m2  Ao root diam: 4.0 cm  LA dimension: 3.0 cm  asc Aorta Diam: 3.5 cm  LA/Ao: 0.76  LVOT diam: 2.0 cm  LVOT area: 3.2 cm2  LA Volume (BP): 32.7 ml     LA Volume Index (BP): 15.7 ml/m2  RWT: 0.55     Doppler Measurements & Calculations  MV E max kar: 61.2 cm/sec  MV A max kar: 77.6 cm/sec  MV E/A: 0.79  MV dec slope: 249.1 cm/sec2  MV dec time: 0.25 sec  Ao V2 max: 110.7 cm/sec  Ao max P.9 mmHg  Ao V2 mean: 81.9 cm/sec  Ao mean P.9 mmHg  Ao V2 VTI: 22.3 cm  WEST(I,D): 2.4 cm2  WEST(V,D): 2.5 cm2  LV V1 max PG: 3.1 mmHg  LV V1 max: 87.6 cm/sec  LV V1 VTI: 16.8 cm  SV(LVOT): 53.4 ml  SI(LVOT): 25.6 ml/m2  AV Kar Ratio (DI): 0.79  WEST Index (cm2/m2): 1.2  E/E' av.2  Lateral E/e': 5.7  Medial E/e': 8.7     ______________________________________________________________________________  Report approved by: Lloyd Bee 2023 12:21 PM     Disposition:  Follow-up in 4 weeks or as needed.      Paddy Holly MD  Internal Medicine  [facility] Team        Video-Visit Details     Type of service:  Video Visit  Video Start Time: 5:30 PM  Video End Time: 5:50 PM  Originating Location (pt. Location): Home   Distant Location (provider location):  [facility]   Platform used for Video Visit: [employer]

## 2024-03-18 ENCOUNTER — TELEPHONE (OUTPATIENT)
Dept: OPHTHALMOLOGY | Facility: CLINIC | Age: 64
End: 2024-03-18
Payer: COMMERCIAL

## 2024-03-18 NOTE — TELEPHONE ENCOUNTER
[facility]    Phone Message    May a detailed message be left on voicemail: yes     Reason for Call: Appointment Intake    Referring Provider Name: Paddy Holly MD in [facility]/IM/PEDS  Diagnosis and/or Symptoms: Blepharoedema, right. Hx of chemotherapy,Edema of right eyelid, Pt is requesting to be seen in [facility] possibly by Dr Garcia if possible please contact pt to discuss being seen Thank you!     Action Taken: Message routed to:  [facility] eye [facility]    Travel Screening: Not Applicable

## 2024-03-18 NOTE — TELEPHONE ENCOUNTER
[facility]    Phone Message    May a detailed message be left on voicemail: yes     Reason for Call: Appointment Intake    Referring Provider Name: Paddy Holly MD   Diagnosis and/or Symptoms: Blepharoedema, right. Hx of chemotherapy. Please review and contact pt to discuss scheduling. Not listed on protocol. Thank you   Action Taken: Message routed to:  [facility] eye    Travel Screening: Not Applicable

## 2024-03-18 NOTE — TELEPHONE ENCOUNTER
LVM for pt that we can accommodate him on the [facility] schedule with Dr. Garcia on Wed 3/20 at either 9 am or 2:15 pm to evaluate the lid swelling. Ok to schedule if he calls back.    Ana María Javed COA 4:07 PM March 18, 2024

## 2024-03-20 ENCOUNTER — OFFICE VISIT (OUTPATIENT)
Dept: OPHTHALMOLOGY | Facility: CLINIC | Age: 64
End: 2024-03-20
Payer: COMMERCIAL

## 2024-03-20 DIAGNOSIS — H02.843 EDEMA OF EYELID OF BOTH EYES: ICD-10-CM

## 2024-03-20 DIAGNOSIS — H02.846 EDEMA OF EYELID OF BOTH EYES: ICD-10-CM

## 2024-03-20 DIAGNOSIS — C83.30 DIFFUSE LARGE B-CELL LYMPHOMA, UNSPECIFIED BODY REGION (H): Primary | ICD-10-CM

## 2024-03-20 PROCEDURE — 99203 OFFICE O/P NEW LOW 30 MIN: CPT | Performed by: OPHTHALMOLOGY

## 2024-03-20 ASSESSMENT — VISUAL ACUITY
CORRECTION_TYPE: GLASSES
METHOD: SNELLEN - LINEAR
OD_CC: 20/20
OS_CC: 20/20

## 2024-03-20 ASSESSMENT — CONF VISUAL FIELD
OD_INFERIOR_TEMPORAL_RESTRICTION: 0
OD_NORMAL: 1
OD_INFERIOR_NASAL_RESTRICTION: 0
OS_INFERIOR_NASAL_RESTRICTION: 0
OD_SUPERIOR_TEMPORAL_RESTRICTION: 0
OD_SUPERIOR_NASAL_RESTRICTION: 0
OS_SUPERIOR_NASAL_RESTRICTION: 0
OS_SUPERIOR_TEMPORAL_RESTRICTION: 0
OS_NORMAL: 1
OS_INFERIOR_TEMPORAL_RESTRICTION: 0

## 2024-03-20 ASSESSMENT — TONOMETRY
IOP_METHOD: ICARE
OS_IOP_MMHG: 14
OD_IOP_MMHG: 13

## 2024-03-20 NOTE — PROGRESS NOTES
"     Chief Complaint(s) and History of Present Illness(es)     Eye Problem Both Eyes            Laterality: both eyes    Onset: gradual    Onset: 5 months ago    Course: gradually worsening    Associated symptoms: itching    Treatments tried: eye drops    Response to treatment: no improvement    Pain scale: 0/10          Comments    Geovanni Jasso is being seen for a consult today by the request of Pdady Holly MD for blepharoedema of right eye, hx chemotherapy.     Bill is concerned about bilateral lower lids being puffy since going   through cancer treatment. He states the puffiness can be worse during   certain times of the day - for example if he has been working on the   computer a lot or is under a lot of stress, he sees more puffiness and   also experiences itching of the eyes.  His wife comments on the way his   eyes look a lot.      He uses patanol or pataday PRN OU \"probably not enough\"    He does not use any artificial tears.    Margarita Ulrich, COT 9:07 AM 03/20/2024      History of DLBCL lymphoma presenting with diffuse lymphadenopathy. Treated with R-CHOP. Finished chemo 6/2023.       Noticed swelling worse on the right side under eyes over the past several months.     Unaware of any heart or lung problems.      Assessment & Plan     Geovanni Jasso is a 63 year old male with the following diagnoses:   Encounter Diagnoses   Name Primary?    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   Given his recent DLBCL, and rather recent onset, will obtain a CT orbit to rule out orbital lymphoma. In the absence of any mass, we can observe. If he is bothered by it, he could certainly consider both lower eyelid blepharoplasty (TCFx, SP, LCP).       Patient disposition:   No follow-ups on file.        Attending Physician Attestation: Complete documentation of historical and exam elements " from today's encounter can be found in the full encounter summary report (not reduplicated in this progress note). I personally obtained the chief complaint(s) and history of present illness. I confirmed and edited as necessary the review of systems, past medical/surgical history, family history, social history, and examination findings as documented by others; and I examined the patient myself. I personally reviewed the relevant tests, images, and reports as documented above. I formulated and edited as necessary the assessment and plan and discussed the findings and management plan with the patient.  -Paolo Garcia MD

## 2024-03-20 NOTE — LETTER
" 3/20/2024         RE:  :  MRN: Geovanni Jasso  1960  [id number]     Dear Dr. Holly,    Thank you for asking me to see your patient, Geovanni Jasso, for an oculoplastic   consultation.  My assessment and plan are below.  For further details, please see my attached clinic note.      Chief Complaint(s) and History of Present Illness(es)     Eye Problem Both Eyes            Laterality: both eyes    Onset: gradual    Onset: 5 months ago    Course: gradually worsening    Associated symptoms: itching    Treatments tried: eye drops    Response to treatment: no improvement    Pain scale: 0/10          Comments    Geovanni Jasso is being seen for a consult today by the request of Paddy Holly MD for blepharoedema of right eye, hx chemotherapy.     Bill is concerned about bilateral lower lids being puffy since going   through cancer treatment. He states the puffiness can be worse during   certain times of the day - for example if he has been working on the   computer a lot or is under a lot of stress, he sees more puffiness and   also experiences itching of the eyes.  His wife comments on the way his   eyes look a lot.      He uses patanol or pataday PRN OU \"probably not enough\"    He does not use any artificial tears.    Margarita Ulrich, COT 9:07 AM 2024      History of DLBCL lymphoma presenting with diffuse lymphadenopathy. Treated with R-CHOP. Finished chemo 2023.       Noticed swelling worse on the right side under eyes over the past several months.     Unaware of any heart or lung problems.      Assessment & Plan     Geovanni Jasso is a 63 year old male with the following diagnoses:   Encounter Diagnoses   Name Primary?     Diffuse large B-cell lymphoma, unspecified body region (H) Yes     Edema of eyelid of both eyes      Very likely this is standard both lower eyelid steatochalasis.   Given his recent DLBCL, and rather recent onset, will obtain a CT orbit to rule out orbital lymphoma. In the " absence of any mass, we can observe. If he is bothered by it, he could certainly consider both lower eyelid blepharoplasty (TCFx, SP, LCP).       Patient disposition:   No follow-ups on file.         Again, thank you for allowing me to participate in the care of your patient.      Sincerely,    Paolo Garcia MD  Department of Ophthalmology and Visual Neurosciences  [facility]    CC: Paddy Holly MD  [id number]  [facility] [address]  Via In Basket

## 2024-03-21 ENCOUNTER — TELEPHONE (OUTPATIENT)
Dept: ANESTHESIOLOGY | Facility: CLINIC | Age: 64
End: 2024-03-21
Payer: COMMERCIAL

## 2024-03-21 DIAGNOSIS — M47.812 CERVICAL SPONDYLOSIS WITHOUT MYELOPATHY: Primary | ICD-10-CM

## 2024-03-21 NOTE — TELEPHONE ENCOUNTER
Patient is scheduled for surgery with Dr. Carter    Spoke with: patient    Date of Surgery: Jorge Alberto 4/25/24    Location:     Informed patient they will need an adult  yes    Additional comments: Patient is aware of date and time of the procedure.        Cecily Boone MA on 3/21/2024 at 4:10 PM

## 2024-04-07 ENCOUNTER — MYC MEDICAL ADVICE (OUTPATIENT)
Dept: FAMILY MEDICINE | Facility: CLINIC | Age: 64
End: 2024-04-07

## 2024-04-07 ENCOUNTER — E-VISIT (OUTPATIENT)
Dept: FAMILY MEDICINE | Facility: CLINIC | Age: 64
End: 2024-04-07
Payer: COMMERCIAL
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
## 2024-04-07 PROCEDURE — 99421 OL DIG E/M SVC 5-10 MIN: CPT | Performed by: INTERNAL MEDICINE

## 2024-04-07 PROCEDURE — 87168 MACROSCOPIC EXAM ARTHROPOD: CPT

## 2024-04-08 ENCOUNTER — LAB (OUTPATIENT)
Dept: LAB | Facility: CLINIC | Age: 64
End: 2024-04-08
Payer: COMMERCIAL

## 2024-04-08 ENCOUNTER — OFFICE VISIT (OUTPATIENT)
Dept: FAMILY MEDICINE | Facility: CLINIC | Age: 64
End: 2024-04-08
Payer: COMMERCIAL
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
## 2024-04-08 DIAGNOSIS — S80.861A TICK BITE OF RIGHT LOWER LEG, INITIAL ENCOUNTER: Primary | ICD-10-CM
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
## 2024-04-08 LAB — PARASITE IDENTIFICATION: ABNORMAL

## 2024-04-08 PROCEDURE — 99207 PR NO CHARGE LOS: CPT | Performed by: INTERNAL MEDICINE

## 2024-04-08 RX ORDER — DOXYCYCLINE 100 MG/1
200 CAPSULE ORAL ONCE
Qty: 2 CAPSULE | Refills: 0 | Status: SHIPPED | OUTPATIENT
Start: 2024-04-08 | End: 2024-04-08

## 2024-04-08 RX ORDER — DOXYCYCLINE 100 MG/1
100 CAPSULE ORAL 2 TIMES DAILY
Qty: 20 CAPSULE | Refills: 0 | Status: SHIPPED | OUTPATIENT
Start: 2024-04-09

## 2024-04-09 ENCOUNTER — ANCILLARY PROCEDURE (OUTPATIENT)
Dept: CARDIOLOGY | Facility: CLINIC | Age: 64
End: 2024-04-09
Attending: INTERNAL MEDICINE
Payer: COMMERCIAL

## 2024-04-09 DIAGNOSIS — Z92.21 PERSONAL HISTORY OF CHEMOTHERAPY: ICD-10-CM

## 2024-04-09 DIAGNOSIS — R06.09 EXERTIONAL DYSPNEA: ICD-10-CM

## 2024-04-09 LAB — LVEF ECHO: NORMAL

## 2024-04-09 PROCEDURE — 93306 TTE W/DOPPLER COMPLETE: CPT | Performed by: INTERNAL MEDICINE

## 2024-04-09 NOTE — PROGRESS NOTES
The patient came in today requesting tick removal from his right leg.     Procedure was not performed as patient was able to remove a tick at home.     He was also treated with Doxycycline, per E-visit today.     No charge for this visit.

## 2024-04-23 ENCOUNTER — MYC MEDICAL ADVICE (OUTPATIENT)
Dept: FAMILY MEDICINE | Facility: CLINIC | Age: 64
End: 2024-04-23
Payer: COMMERCIAL

## 2024-04-23 DIAGNOSIS — Z88.0 PENICILLIN ALLERGY: Primary | ICD-10-CM

## 2024-04-23 NOTE — TELEPHONE ENCOUNTER
Routing to provider. Pt last seen on 4/8/24. Please advise if order can be placed or if pt needs visit to discuss further.     Aleshia Jain RN

## 2024-04-25 ENCOUNTER — HOSPITAL ENCOUNTER (OUTPATIENT)
Facility: AMBULATORY SURGERY CENTER | Age: 64
Discharge: HOME | End: 2024-04-25
Payer: COMMERCIAL

## 2024-04-25 ENCOUNTER — ANCILLARY PROCEDURE (OUTPATIENT)
Dept: GENERAL RADIOLOGY | Facility: CLINIC | Age: 64
End: 2024-04-25
Payer: COMMERCIAL

## 2024-04-25 VITALS
RESPIRATION RATE: 16 BRPM | OXYGEN SATURATION: 97 % | HEART RATE: 65 BPM | SYSTOLIC BLOOD PRESSURE: 128 MMHG | TEMPERATURE: 98.2 F | DIASTOLIC BLOOD PRESSURE: 82 MMHG

## 2024-04-25 DIAGNOSIS — R52 PAIN: ICD-10-CM

## 2024-04-25 PROCEDURE — G8918 PT W/O PREOP ORDER IV AB PRO: HCPCS

## 2024-04-25 PROCEDURE — G8907 PT DOC NO EVENTS ON DISCHARG: HCPCS

## 2024-04-25 PROCEDURE — 64490 INJ PARAVERT F JNT C/T 1 LEV: CPT | Mod: LT,KX

## 2024-04-25 PROCEDURE — 64491 INJ PARAVERT F JNT C/T 2 LEV: CPT | Mod: LT,KX

## 2024-04-25 RX ORDER — BUPIVACAINE HYDROCHLORIDE 2.5 MG/ML
INJECTION, SOLUTION EPIDURAL; INFILTRATION; INTRACAUDAL PRN
Status: DISCONTINUED | OUTPATIENT
Start: 2024-04-25 | End: 2024-04-25 | Stop reason: HOSPADM

## 2024-04-25 NOTE — OP NOTE
Diagnostic Medial Branch Block    The patient s identity, the procedure to be performed and the specific site of the procedure was verified in accordance with [facility] Protocol.  Diagnosis: 1.  Cervical Sondyloarthropathy                     2.  Facet Arthropathy  Pre-procedure Pain Score: 8/10           Procedure Note:  Informed consent was obtained.  The patient was positioned comfortably in the prone position.  There was no evidence of infection at the sites of needle insertion.  The patient was prepped and draped in a sterile fashion.  Skeletal landmarks were identified with the fluoroscope guidance.  27 gauge spinal needles were then placed at the junction of the superior articulating process and the transverse process for lumbar and thoracic levels and centrally on the lateral mass in the cervical region.  Medication was then injected after negative aspiration. The patient was then observed for 30 minutes.  No complications were noted.      Levels:Left   C3/4/5  Medication (per site): 0.5cc   0.25% Bupivacaine      Post Procedure Pain Score: 3/10    The patient was given discharge instructions and verbalizes understanding, including understanding of those signs and symptoms that would require emergency care.     Plan:   The patient will fill out a pain diary for the remainder of the day and will either fax, email or bring it to the pain clinic.      Counseling: Greater than 50% of this patient visit was spent in counseling the patient regarding the treatment of their pain, coordinating their overall treatment plan and assessing their progress.

## 2024-04-25 NOTE — DISCHARGE INSTRUCTIONS
PAIN INJECTION HOME CARE INSTRUCTIONS  Activity  -Rest today  -Do not work today  -Resume normal activity tomorrow  -DO NOT shower for 24 hours  -DO NOT remove bandaid for 24 hours    Pain  -You may experience soreness at the injection site for one or two days  -You may use an ice pack for 20 minutes every 2 hours for the first 24 hours  -You may use a heating pad after the first 24 hours  -You may use Tylenol (acetaminophen) every 4 hours or other pain medicines as directed by your physician    You may experience numbness radiating into your legs or arms (depending on the procedure location). This numbness may last several hours. Until sensation returns to normal; please use caution in walking, climbing stairs, and stepping out of your vehicle, etc.    DID YOU RECEIVE SEDATION TODAY?  No    Safety  Sedation medicine, if given, may remain active for many hours. It is important for the next 24 hours that you do not:  -Drive a car  -Operate machines or power tools  -Consume alcohol, including beer  -Sign any important papers or legal documents    DID YOU RECEIVE STEROIDS TODAY?  Yes    Common side effects of steroids:  Not everyone will experience corticosteroid side effects. If side effects are experienced, they will gradually subside in the 7-10 day period following an injection. Most common side effects include:  -Flushed face and/or chest  -Feeling of warmth, particularly in the face but could be an overall feeling of warmth  -Increased blood sugar in diabetic patients  -Menstrual irregularities my occur. If taking hormone-based birth control an alternate method of birth control is recommended  -Sleep disturbances and/or mood swings are possible  -Leg cramps    Please contact us if you have:  -Severe pain  -Fever more than 101.5 degrees Fahrenheit  -Signs of infection at the injection site (redness, swelling, or drainage)    FOR PAIN CENTER PATIENTS:  If you have questions, please contact the Pain Clinic at  [phone] Option #1 between the hours of 7:00 am and 3:00 pm Monday through Friday. After office hours you can contact the on call provider by dialing [phone]. If you need immediate attention, we recommend that you go to a hospital emergency room or dial [phone].      FOR PM&R PATIENTS:  For patients seen by the PM and R service, please call [phone]. (Monday through Friday 8a-5p.  After business hours and weekends call [phone] and ask for the PM and R doctor on call). If you need to fax a pain diary to PM&R the fax number is [phone]. If you are unable to fax, uploading to [employer] is encouraged, then send to provider. If you have procedure scheduling questions please call [phone] Option #2.

## 2024-04-26 ENCOUNTER — TELEPHONE (OUTPATIENT)
Dept: ANESTHESIOLOGY | Facility: CLINIC | Age: 64
End: 2024-04-26
Payer: COMMERCIAL

## 2024-04-26 ENCOUNTER — TELEPHONE (OUTPATIENT)
Dept: PALLIATIVE MEDICINE | Facility: CLINIC | Age: 64
End: 2024-04-26
Payer: COMMERCIAL

## 2024-04-26 DIAGNOSIS — M47.812 CERVICAL SPONDYLOSIS WITHOUT MYELOPATHY: Primary | ICD-10-CM

## 2024-04-26 RX ORDER — LIDOCAINE 40 MG/G
CREAM TOPICAL
Status: CANCELLED | OUTPATIENT
Start: 2024-04-26

## 2024-04-26 NOTE — TELEPHONE ENCOUNTER
Post-Procedure Pain Diary    To determine if the treated area is the source of your pain, today's injection attempted to interrupt this pain signal.  Depending on your response, a different procedure could be considered to treat your pain for a longer duration.  How long the medication last varies from person to person.  What we need to know is how well you did do while the medication was effective.  You should be active during the time the medication is in effect.  DO NOT take pain medication, if you do, please record it.    Please base your response only on the area and/or side injected.  Do not include the pain from other areas of the body or the expected soreness from the needles used during the procedure.    Which side was the procedure performed? Left             Side of body:  Left Left Right  Right   Time Pain Score   (1-10)   Percent Improvement   (%)  [0,20,50,58199%] Pain Score (1-10)   Percent Improvement   (%)  [0,20,50,29825%]   Before Injection        7/8        N/A    Immediately after injection 3      1 hours after injection  0     0      0     0      1     1 90-95% overall     2 hours after injection       3 hours after injection         4 hours after injection           5 hours after injection           6 hours after injection               Comments:    Was it easier to do activities that normally bother you?  (Examples:  walking, flexibility, dishes)  Yes. Patient reported the following activities: loading/unloading the , taking the garbage cans out to the street, and building a bookshelf. He said these activities were easier to complete than normal & that he had much less pain while doing them.       Please call [phone] to report your response the next day or fax your pain diary results ([phone] for [place] or [phone] for [place]). You may also scan the results to the My Chart patient portal and send a message to the provider.  Please inform the call center that  "you are reporting your \"Pain Diary.\"   "

## 2024-04-26 NOTE — TELEPHONE ENCOUNTER
Called patient to schedule procedure with Dr. Carter    Date of Procedure: 5/16/24    Arrival time given: Yes: Arrival Time 11:30am       Procedure Location: [facility]     Verified Location with Patient:  Yes  Address provided to the patient    Pre-op H&P Required:  No: Local Anesthesia       Post-Op/Follow Up Appt:  Not Indicated in Request      Informed patient they will need a  to drive them home:  Yes    Patients : Wife (nathaly)     Patient is aware that pre-op RN from the procedure center will call 2-3 days prior to scheduled procedure to confirm arrival time and review any instructions:  Yes       Additional Comments: N/A        Cecily Boone MA on 4/26/2024 at 2:25 PM      P: [phone]*

## 2024-05-03 ENCOUNTER — LAB (OUTPATIENT)
Dept: LAB | Facility: CLINIC | Age: 64
End: 2024-05-03
Payer: COMMERCIAL

## 2024-05-03 DIAGNOSIS — C85.90 LEUCOSARCOMA (H): Primary | ICD-10-CM

## 2024-05-03 LAB
BASOPHILS # BLD AUTO: 0.1 10E3/UL (ref 0–0.2)
BASOPHILS NFR BLD AUTO: 1 %
EOSINOPHIL # BLD AUTO: 0.2 10E3/UL (ref 0–0.7)
EOSINOPHIL NFR BLD AUTO: 4 %
ERYTHROCYTE [DISTWIDTH] IN BLOOD BY AUTOMATED COUNT: 13.2 % (ref 10–15)
HCT VFR BLD AUTO: 43.6 % (ref 40–53)
HGB BLD-MCNC: 14.9 G/DL (ref 13.3–17.7)
IMM GRANULOCYTES # BLD: 0 10E3/UL
IMM GRANULOCYTES NFR BLD: 0 %
LYMPHOCYTES # BLD AUTO: 1.1 10E3/UL (ref 0.8–5.3)
LYMPHOCYTES NFR BLD AUTO: 20 %
MCH RBC QN AUTO: 33 PG (ref 26.5–33)
MCHC RBC AUTO-ENTMCNC: 34.2 G/DL (ref 31.5–36.5)
MCV RBC AUTO: 97 FL (ref 78–100)
MONOCYTES # BLD AUTO: 0.5 10E3/UL (ref 0–1.3)
MONOCYTES NFR BLD AUTO: 10 %
NEUTROPHILS # BLD AUTO: 3.3 10E3/UL (ref 1.6–8.3)
NEUTROPHILS NFR BLD AUTO: 65 %
NRBC # BLD AUTO: 0 10E3/UL
NRBC BLD AUTO-RTO: 0 /100
PLATELET # BLD AUTO: 214 10E3/UL (ref 150–450)
RBC # BLD AUTO: 4.51 10E6/UL (ref 4.4–5.9)
WBC # BLD AUTO: 5.2 10E3/UL (ref 4–11)

## 2024-05-03 PROCEDURE — 36415 COLL VENOUS BLD VENIPUNCTURE: CPT

## 2024-05-03 PROCEDURE — 85025 COMPLETE CBC W/AUTO DIFF WBC: CPT

## 2024-05-13 ENCOUNTER — MYC MEDICAL ADVICE (OUTPATIENT)
Dept: NEUROSURGERY | Facility: CLINIC | Age: 64
End: 2024-05-13
Payer: COMMERCIAL

## 2024-05-15 RX ORDER — MOLNUPIRAVIR 200 MG/1
CAPSULE ORAL
COMMUNITY

## 2024-05-16 ENCOUNTER — HOSPITAL ENCOUNTER (OUTPATIENT)
Facility: AMBULATORY SURGERY CENTER | Age: 64
Discharge: HOME | End: 2024-05-16
Payer: COMMERCIAL

## 2024-05-16 ENCOUNTER — ANCILLARY PROCEDURE (OUTPATIENT)
Dept: GENERAL RADIOLOGY | Facility: CLINIC | Age: 64
End: 2024-05-16
Payer: COMMERCIAL

## 2024-05-16 VITALS
RESPIRATION RATE: 16 BRPM | OXYGEN SATURATION: 99 % | DIASTOLIC BLOOD PRESSURE: 87 MMHG | HEART RATE: 66 BPM | SYSTOLIC BLOOD PRESSURE: 131 MMHG | TEMPERATURE: 97 F

## 2024-05-16 DIAGNOSIS — R52 PAIN: ICD-10-CM

## 2024-05-16 DIAGNOSIS — M47.812 CERVICAL SPONDYLOSIS WITHOUT MYELOPATHY: ICD-10-CM

## 2024-05-16 PROCEDURE — G8907 PT DOC NO EVENTS ON DISCHARG: HCPCS

## 2024-05-16 PROCEDURE — G8918 PT W/O PREOP ORDER IV AB PRO: HCPCS

## 2024-05-16 PROCEDURE — 64633 DESTROY CERV/THOR FACET JNT: CPT | Mod: LT

## 2024-05-16 PROCEDURE — 64634 DESTROY C/TH FACET JNT ADDL: CPT | Mod: LT

## 2024-05-16 RX ORDER — LIDOCAINE HYDROCHLORIDE 10 MG/ML
INJECTION, SOLUTION EPIDURAL; INFILTRATION; INTRACAUDAL; PERINEURAL PRN
Status: DISCONTINUED | OUTPATIENT
Start: 2024-05-16 | End: 2024-05-16 | Stop reason: HOSPADM

## 2024-05-16 RX ORDER — SODIUM CHLORIDE, SODIUM LACTATE, POTASSIUM CHLORIDE, CALCIUM CHLORIDE 600; 310; 30; 20 MG/100ML; MG/100ML; MG/100ML; MG/100ML
INJECTION, SOLUTION INTRAVENOUS CONTINUOUS PRN
Status: COMPLETED | OUTPATIENT
Start: 2024-05-16 | End: 2024-05-16

## 2024-05-16 RX ORDER — LIDOCAINE 40 MG/G
CREAM TOPICAL
Status: DISCONTINUED | OUTPATIENT
Start: 2024-05-16 | End: 2024-05-17 | Stop reason: HOSPADM

## 2024-05-16 RX ORDER — FENTANYL CITRATE 50 UG/ML
INJECTION, SOLUTION INTRAMUSCULAR; INTRAVENOUS PRN
Status: DISCONTINUED | OUTPATIENT
Start: 2024-05-16 | End: 2024-05-16 | Stop reason: HOSPADM

## 2024-05-16 RX ORDER — METHYLPREDNISOLONE ACETATE 40 MG/ML
INJECTION, SUSPENSION INTRA-ARTICULAR; INTRALESIONAL; INTRAMUSCULAR; SOFT TISSUE PRN
Status: DISCONTINUED | OUTPATIENT
Start: 2024-05-16 | End: 2024-05-16 | Stop reason: HOSPADM

## 2024-05-16 RX ORDER — BUPIVACAINE HYDROCHLORIDE 2.5 MG/ML
INJECTION, SOLUTION INFILTRATION; PERINEURAL PRN
Status: DISCONTINUED | OUTPATIENT
Start: 2024-05-16 | End: 2024-05-16 | Stop reason: HOSPADM

## 2024-05-16 NOTE — OP NOTE
Posterior Primary Ramus Radiofrequency Denervation    The patient s identity, the procedure to be performed and the specific site of the procedure was verified in accordance with [facility] Protocol.   Diagnosis: Cervical Spondylosis without Myelopathy  Levels Blocked: left C3/4/5  Pre-Procedure Pain Score:7/10  Procedure Note:  Informed consent was obtained.  The patient was positioned comfortably in the prone position.  There was no evidence of infection at the site of needle insertion.  The patient was prepped and draped in a sterile fashion.  Skeletal landmarks were identified under fluoroscopic guidance.  At all insertion sites the skin were anesthetized with 1% lidocaine.  Standard insulated radiofrequency probe needles were inserted at the sites indicated.  Proper placement was determined both fluoroscopically and with test stimulation at each primary site with 50hz and 2hz stimulation.  No radicular stimulation was identified and no distal motor activity was noted. Radiofrequency denervation was performed at each site for 135 seconds at 90 degrees Centigrade. The patient was then observed for 30 minutes.  No complications were noted. The patient tolerated the procedure well and was released with post-procedure instructions. The patient was given discharge instructions and verbalizes understanding, including understanding of those signs and symptoms that would require emergency care.      Medications injected at each site post radio frequency denervation:  1ml from a 10ml mixture 0.25% Marcaine and with 40 mg Methylprednisolone    Intravenous line placed Yes.  Standard non-invasive monitors placed Yes.    Post-Procedure Pain Score:2/10    The patient was given discharge instructions and verbalizes understanding, including understanding of those signs and symptoms that would require emergency care.     Counseling: Greater than 50% of this patient visit was spent in counseling the  patient regarding the treatment of their pain, coordinating their overall treatment plan and assessing their progress.   Cervical Spondylosis without Myelopathy

## 2024-05-16 NOTE — DISCHARGE INSTRUCTIONS
PAIN INJECTION HOME CARE INSTRUCTIONS  Activity  -Rest today  -Do not work today  -Resume normal activity tomorrow  -DO NOT shower for 24 hours  -DO NOT remove bandaid for 24 hours    Pain  -You may experience soreness at the injection site for one or two days  -You may use an ice pack for 20 minutes every 2 hours for the first 24 hours  -You may use a heating pad after the first 24 hours  -You may use Tylenol (acetaminophen) every 4 hours or other pain medicines as directed by your physician    You may experience numbness radiating into your legs or arms (depending on the procedure location). This numbness may last several hours. Until sensation returns to normal; please use caution in walking, climbing stairs, and stepping out of your vehicle, etc.    DID YOU RECEIVE SEDATION TODAY?  Yes    Safety  Sedation medicine, if given, may remain active for many hours. It is important for the next 24 hours that you do not:  -Drive a car  -Operate machines or power tools  -Consume alcohol, including beer  -Sign any important papers or legal documents    DID YOU RECEIVE STEROIDS TODAY?  Yes    Common side effects of steroids:  Not everyone will experience corticosteroid side effects. If side effects are experienced, they will gradually subside in the 7-10 day period following an injection. Most common side effects include:  -Flushed face and/or chest  -Feeling of warmth, particularly in the face but could be an overall feeling of warmth  -Increased blood sugar in diabetic patients  -Menstrual irregularities my occur. If taking hormone-based birth control an alternate method of birth control is recommended  -Sleep disturbances and/or mood swings are possible  -Leg cramps    Please contact us if you have:  -Severe pain  -Fever more than 101.5 degrees Fahrenheit  -Signs of infection at the injection site (redness, swelling, or drainage)    FOR PAIN CENTER PATIENTS:  If you have questions, please contact the Pain Clinic at  [phone] Option #1 between the hours of 7:00 am and 3:00 pm Monday through Friday. After office hours you can contact the on call provider by dialing [phone]. If you need immediate attention, we recommend that you go to a hospital emergency room or dial [phone].      FOR PM&R PATIENTS:  For patients seen by the PM and R service, please call [phone]. (Monday through Friday 8a-5p.  After business hours and weekends call [phone] and ask for the PM and R doctor on call). If you need to fax a pain diary to PM&R the fax number is [phone]. If you are unable to fax, uploading to [email or web address] is encouraged, then send to provider. If you have procedure scheduling questions please call [phone] Option #2.

## 2024-05-29 NOTE — PROGRESS NOTES
[facility] Dermatology Note  Encounter Date: Marty 3, 2024  Office Visit     Dermatology Problem List:  1. Possible onychomycosis  - nail clippings 4/16/21 and 7/21/21 both negative.   - current tx: Jublia daily.  -previous tx: terbinafine with improvement  2.Acne Rosacea    - Apply metrocream BID to the face      Relevant medical hx: non Hodgkins lymphoma    ____________________________________________    Assessment & Plan:    # History of non-Hodgkins lymphoma  - ABCDEs: Counseled ABCDEs of melanoma: Asymmetry, Border (irregularity), Color (not uniform, changes in color), Diameter (greater than 6 mm which is about the size of a pencil eraser), and Evolving (any changes in preexisting moles).  - Sun protection: Counseled SPF30+ sunscreen, UPF clothing, sun avoidance, tanning bed avoidance.  - Reviewed recommendation for regular skin exams.     #Onychomycosis.   - Jublia can be continued     # Seborrheic keratosis, non irritated.   -No treatment necessary unless lesion(s) become symptomatic.          Procedures Performed:   none    Follow-up: 1 year(s) in-person, or earlier for new or changing lesions    Staff and Scribe:     Scribe Disclosure:   I, Ankita Peña, am serving as a scribe to document services personally performed by Marbella Castro MD based on data collection and the provider's statements to me.     Provider Disclosure:   The documentation recorded by the scribe accurately reflects the services I personally performed and the decisions made by me.    Marbella Castro MD    Department of Dermatology  [facility]: Phone: [phone], Fax:[phone]  [facility]: Phone: [phone], Fax: [phone]   ____________________________________________    CC: Skin Check (Areas of concern: bilateral sides and arms)    HPI:  Mr. Geovanni Jasso is a(n) 63 year old male who  presents today as a return patient for skin check.    No rash on the sides. Wants sides and arms checked today.     Patient is otherwise feeling well, without additional skin concerns.    Labs Reviewed:  N/A    Physical Exam:  Vitals: There were no vitals taken for this visit.  SKIN: Full skin exam including undergarment areas was performed. The exam included the head/face, neck, both arms, chest, back, abdomen, both legs, digits and/or nails.  .Carmen Finley present   - yellow thickened nails with splinter hemorrhages  - There are waxy stuck on tan to brown papules on the trunk and extremities..    - No other lesions of concern on areas examined.     Medications:  Current Outpatient Medications   Medication Sig Dispense Refill    atomoxetine (STRATTERA) 10 MG capsule Take 1 capsule (10 mg) by mouth daily (with lunch) 30 capsule 5    doxycycline monohydrate (MONODOX) 100 MG capsule Take 1 capsule (100 mg) by mouth 2 times daily 20 capsule 0    guaiFENesin (MUCINEX) 600 MG 12 hr tablet Take 1 tablet (600 mg) by mouth 2 times daily 40 tablet 2    predniSONE (DELTASONE) 5 MG tablet Take 1 tablet (5 mg) by mouth daily 15 tablet 2    molnupiravir (LAGEVRIO) 200 MG capsule LAGEVRIO 200 MG CAPS (Patient not taking: Reported on 6/3/2024)       No current facility-administered medications for this visit.      Past Medical History:   Patient Active Problem List   Diagnosis    Esophageal reflux    Hyperlipidemia LDL goal <70    Neck pain    Chronic rhinitis    Bilateral low back pain without sciatica    Chondromalacia patellae, right    S/P ACL reconstruction    Atherosclerosis of native coronary artery of native heart without angina pectoris    SOB (shortness of breath)    Non-allergic rhinitis    Positive CLAIRE (antinuclear antibody)    Hand joint stiff, right    Carpal tunnel syndrome    Osteoarthritis of knee, unspecified laterality, unspecified osteoarthritis type    Diverticulosis of large intestine    Internal  hemorrhoids    Diffuse large B-cell lymphoma, unspecified body region (H)    Chemotherapy-induced neutropenia (H24)    Esophageal spasm    Chemotherapy-induced peripheral neuropathy (H24)    History of Clostridioides difficile colitis    Cervical disc disorder with radiculopathy    Facet arthropathy, cervical    Cervical disc herniation    Facet arthritis of lumbar region    Lumbar disc herniation, L3-L4    Degeneration of lumbar intervertebral disc    Facet arthritis, degenerative, cervical spine    Ulcerative (chronic) pancolitis without complications (H)    Chemotherapy-induced fatigue    Cervical spondylosis without myelopathy     Past Medical History:   Diagnosis Date    Cancer (H) 2-7-2023    just diagnosed with lymphona.    Carpal tunnel syndrome     Chronic rhinitis 10/23/2015    Degeneration of lumbar intervertebral disc 11/28/2023    Esophageal reflux 12/26/2013    Hand joint stiff, right         CC No referring provider defined for this encounter. on close of this encounter.

## 2024-05-29 NOTE — PATIENT INSTRUCTIONS

## 2024-06-03 ENCOUNTER — OFFICE VISIT (OUTPATIENT)
Dept: DERMATOLOGY | Facility: CLINIC | Age: 64
End: 2024-06-03
Payer: COMMERCIAL

## 2024-06-03 DIAGNOSIS — L82.1 SEBORRHEIC KERATOSIS: Primary | ICD-10-CM

## 2024-06-03 PROCEDURE — 99213 OFFICE O/P EST LOW 20 MIN: CPT | Performed by: DERMATOLOGY

## 2024-06-03 ASSESSMENT — PAIN SCALES - GENERAL: PAINLEVEL: MILD PAIN (2)

## 2024-06-03 NOTE — LETTER
6/3/2024         RE: Geovanni Jasso  [address]  [place] [address]        Dear Colleague,    Thank you for referring your patient, Geovanni Jasso, to the [facility]. Please see a copy of my visit note below.      [facility] Dermatology Note  Encounter Date: Marty 3, 2024  Office Visit     Dermatology Problem List:  1. Possible onychomycosis  - nail clippings 4/16/21 and 7/21/21 both negative.   - current tx: Jublia daily.  -previous tx: terbinafine with improvement  2.Acne Rosacea    - Apply metrocream BID to the face      Relevant medical hx: non Hodgkins lymphoma    ____________________________________________    Assessment & Plan:    # History of non-Hodgkins lymphoma  - ABCDEs: Counseled ABCDEs of melanoma: Asymmetry, Border (irregularity), Color (not uniform, changes in color), Diameter (greater than 6 mm which is about the size of a pencil eraser), and Evolving (any changes in preexisting moles).  - Sun protection: Counseled SPF30+ sunscreen, UPF clothing, sun avoidance, tanning bed avoidance.  - Reviewed recommendation for regular skin exams.     #Onychomycosis.   - Jublia can be continued     # Seborrheic keratosis, non irritated.   -No treatment necessary unless lesion(s) become symptomatic.          Procedures Performed:   none    Follow-up: 1 year(s) in-person, or earlier for new or changing lesions    Staff and Scribe:     Scribe Disclosure:   I, Ankita Peña, am serving as a scribe to document services personally performed by Marbella Castro MD based on data collection and the provider's statements to me.     Provider Disclosure:   The documentation recorded by the scribe accurately reflects the services I personally performed and the decisions made by me.    Marbella Castro MD    Department of Dermatology  [facility]: Phone: [phone], Fax:[phone]  [facility]  [facility]: Phone: [phone], Fax: [phone]   ____________________________________________    CC: Skin Check (Areas of concern: bilateral sides and arms)    HPI:  Mr. Geovanni Jasso is a(n) 63 year old male who presents today as a return patient for skin check.    No rash on the sides. Wants sides and arms checked today.     Patient is otherwise feeling well, without additional skin concerns.    Labs Reviewed:  N/A    Physical Exam:  Vitals: There were no vitals taken for this visit.  SKIN: Full skin exam including undergarment areas was performed. The exam included the head/face, neck, both arms, chest, back, abdomen, both legs, digits and/or nails.  .Carmen Finley present   - yellow thickened nails with splinter hemorrhages  - There are waxy stuck on tan to brown papules on the trunk and extremities..    - No other lesions of concern on areas examined.     Medications:  Current Outpatient Medications   Medication Sig Dispense Refill     atomoxetine (STRATTERA) 10 MG capsule Take 1 capsule (10 mg) by mouth daily (with lunch) 30 capsule 5     doxycycline monohydrate (MONODOX) 100 MG capsule Take 1 capsule (100 mg) by mouth 2 times daily 20 capsule 0     guaiFENesin (MUCINEX) 600 MG 12 hr tablet Take 1 tablet (600 mg) by mouth 2 times daily 40 tablet 2     predniSONE (DELTASONE) 5 MG tablet Take 1 tablet (5 mg) by mouth daily 15 tablet 2     molnupiravir (LAGEVRIO) 200 MG capsule LAGEVRIO 200 MG CAPS (Patient not taking: Reported on 6/3/2024)       No current facility-administered medications for this visit.      Past Medical History:   Patient Active Problem List   Diagnosis     Esophageal reflux     Hyperlipidemia LDL goal <70     Neck pain     Chronic rhinitis     Bilateral low back pain without sciatica     Chondromalacia patellae, right     S/P ACL reconstruction     Atherosclerosis of native coronary artery of native heart without angina pectoris     SOB (shortness of  breath)     Non-allergic rhinitis     Positive CLAIRE (antinuclear antibody)     Hand joint stiff, right     Carpal tunnel syndrome     Osteoarthritis of knee, unspecified laterality, unspecified osteoarthritis type     Diverticulosis of large intestine     Internal hemorrhoids     Diffuse large B-cell lymphoma, unspecified body region (H)     Chemotherapy-induced neutropenia (H24)     Esophageal spasm     Chemotherapy-induced peripheral neuropathy (H24)     History of Clostridioides difficile colitis     Cervical disc disorder with radiculopathy     Facet arthropathy, cervical     Cervical disc herniation     Facet arthritis of lumbar region     Lumbar disc herniation, L3-L4     Degeneration of lumbar intervertebral disc     Facet arthritis, degenerative, cervical spine     Ulcerative (chronic) pancolitis without complications (H)     Chemotherapy-induced fatigue     Cervical spondylosis without myelopathy     Past Medical History:   Diagnosis Date     Cancer (H) 2-7-2023    just diagnosed with lymphona.     Carpal tunnel syndrome      Chronic rhinitis 10/23/2015     Degeneration of lumbar intervertebral disc 11/28/2023     Esophageal reflux 12/26/2013     Hand joint stiff, right         CC No referring provider defined for this encounter. on close of this encounter.      Again, thank you for allowing me to participate in the care of your patient.        Sincerely,        Marbella Castro MD

## 2024-06-03 NOTE — NURSING NOTE
Geovanni Jasso's chief complaint for this visit includes:  Chief Complaint   Patient presents with    Skin Check     Areas of concern: bilateral sides and arms     PCP: Paddy Holly    Referring Provider:  No referring provider defined for this encounter.    There were no vitals taken for this visit.  Mild Pain (2)        Allergies   Allergen Reactions    Penicillins     Allopurinol Rash         Do you need any medication refills at today's visit?    No

## 2024-07-19 ENCOUNTER — ANCILLARY PROCEDURE (OUTPATIENT)
Dept: CT IMAGING | Facility: CLINIC | Age: 64
End: 2024-07-19
Attending: INTERNAL MEDICINE
Payer: COMMERCIAL

## 2024-07-19 ENCOUNTER — LAB (OUTPATIENT)
Dept: LAB | Facility: CLINIC | Age: 64
End: 2024-07-19
Payer: COMMERCIAL

## 2024-07-19 DIAGNOSIS — C83.30 DIFFUSE LARGE B-CELL LYMPHOMA, UNSPECIFIED BODY REGION (H): ICD-10-CM

## 2024-07-19 LAB
ALBUMIN SERPL BCG-MCNC: 4.3 G/DL (ref 3.5–5.2)
ALP SERPL-CCNC: 102 U/L (ref 40–150)
ALT SERPL W P-5'-P-CCNC: 11 U/L (ref 0–70)
ANION GAP SERPL CALCULATED.3IONS-SCNC: 10 MMOL/L (ref 7–15)
AST SERPL W P-5'-P-CCNC: 22 U/L (ref 0–45)
BASOPHILS # BLD AUTO: 0.1 10E3/UL (ref 0–0.2)
BASOPHILS NFR BLD AUTO: 1 %
BILIRUB SERPL-MCNC: 0.7 MG/DL
BUN SERPL-MCNC: 14.7 MG/DL (ref 8–23)
CALCIUM SERPL-MCNC: 9.4 MG/DL (ref 8.8–10.4)
CHLORIDE SERPL-SCNC: 107 MMOL/L (ref 98–107)
CREAT BLD-MCNC: 0.9 MG/DL (ref 0.7–1.3)
CREAT SERPL-MCNC: 0.94 MG/DL (ref 0.67–1.17)
EGFRCR SERPLBLD CKD-EPI 2021: 90 ML/MIN/1.73M2
EGFRCR SERPLBLD CKD-EPI 2021: >60 ML/MIN/1.73M2
EOSINOPHIL # BLD AUTO: 0.2 10E3/UL (ref 0–0.7)
EOSINOPHIL NFR BLD AUTO: 5 %
ERYTHROCYTE [DISTWIDTH] IN BLOOD BY AUTOMATED COUNT: 13 % (ref 10–15)
GLUCOSE SERPL-MCNC: 98 MG/DL (ref 70–99)
HCO3 SERPL-SCNC: 24 MMOL/L (ref 22–29)
HCT VFR BLD AUTO: 44.1 % (ref 40–53)
HGB BLD-MCNC: 14.8 G/DL (ref 13.3–17.7)
IMM GRANULOCYTES # BLD: 0 10E3/UL
IMM GRANULOCYTES NFR BLD: 0 %
LDH SERPL L TO P-CCNC: 179 U/L (ref 0–250)
LYMPHOCYTES # BLD AUTO: 1.2 10E3/UL (ref 0.8–5.3)
LYMPHOCYTES NFR BLD AUTO: 26 %
MCH RBC QN AUTO: 33 PG (ref 26.5–33)
MCHC RBC AUTO-ENTMCNC: 33.6 G/DL (ref 31.5–36.5)
MCV RBC AUTO: 98 FL (ref 78–100)
MONOCYTES # BLD AUTO: 0.6 10E3/UL (ref 0–1.3)
MONOCYTES NFR BLD AUTO: 12 %
NEUTROPHILS # BLD AUTO: 2.6 10E3/UL (ref 1.6–8.3)
NEUTROPHILS NFR BLD AUTO: 56 %
NRBC # BLD AUTO: 0 10E3/UL
NRBC BLD AUTO-RTO: 0 /100
PLATELET # BLD AUTO: 203 10E3/UL (ref 150–450)
POTASSIUM SERPL-SCNC: 4 MMOL/L (ref 3.4–5.3)
PROT SERPL-MCNC: 6.8 G/DL (ref 6.4–8.3)
RBC # BLD AUTO: 4.49 10E6/UL (ref 4.4–5.9)
SODIUM SERPL-SCNC: 141 MMOL/L (ref 135–145)
WBC # BLD AUTO: 4.7 10E3/UL (ref 4–11)

## 2024-07-19 PROCEDURE — 83615 LACTATE (LD) (LDH) ENZYME: CPT

## 2024-07-19 PROCEDURE — 80053 COMPREHEN METABOLIC PANEL: CPT

## 2024-07-19 PROCEDURE — 85025 COMPLETE CBC W/AUTO DIFF WBC: CPT

## 2024-07-19 PROCEDURE — 74177 CT ABD & PELVIS W/CONTRAST: CPT | Performed by: RADIOLOGY

## 2024-07-19 PROCEDURE — 36415 COLL VENOUS BLD VENIPUNCTURE: CPT

## 2024-07-19 PROCEDURE — 71260 CT THORAX DX C+: CPT | Performed by: RADIOLOGY

## 2024-07-19 PROCEDURE — 82565 ASSAY OF CREATININE: CPT

## 2024-07-19 RX ORDER — IOPAMIDOL 755 MG/ML
102 INJECTION, SOLUTION INTRAVASCULAR ONCE
Status: COMPLETED | OUTPATIENT
Start: 2024-07-19 | End: 2024-07-19

## 2024-07-19 RX ADMIN — IOPAMIDOL 102 ML: 755 INJECTION, SOLUTION INTRAVASCULAR at 08:41

## 2024-07-24 ENCOUNTER — ONCOLOGY VISIT (OUTPATIENT)
Dept: ONCOLOGY | Facility: CLINIC | Age: 64
End: 2024-07-24
Attending: INTERNAL MEDICINE
Payer: COMMERCIAL

## 2024-07-24 VITALS
SYSTOLIC BLOOD PRESSURE: 119 MMHG | RESPIRATION RATE: 16 BRPM | OXYGEN SATURATION: 97 % | BODY MASS INDEX: 27.06 KG/M2 | HEIGHT: 71 IN | HEART RATE: 67 BPM | WEIGHT: 193.3 LBS | DIASTOLIC BLOOD PRESSURE: 74 MMHG

## 2024-07-24 DIAGNOSIS — C83.30 DIFFUSE LARGE B-CELL LYMPHOMA, UNSPECIFIED BODY REGION (H): Primary | ICD-10-CM

## 2024-07-24 PROCEDURE — 99214 OFFICE O/P EST MOD 30 MIN: CPT | Performed by: INTERNAL MEDICINE

## 2024-07-24 ASSESSMENT — PAIN SCALES - GENERAL: PAINLEVEL: NO PAIN (0)

## 2024-07-24 NOTE — PROGRESS NOTES
Oncology follow-up visit:  Date on this visit: 7/24/24     Diagnoses.    Diffuse large B-cell lymphoma    Primary Physician: Paddy Holly     History Of Present Illness:  Mr. Jasso is a 63 year old  male who presents for follow-up of diffuse lymphadenopathy.  He initially saw me on 1/18/2022 for retroperitoneal lymphadenopathy.  Please see my previous note for details.  I have copied and updated from prior notes.        Over the last few months, since fall of 2022, off and on he has had upper abdominal pain which lasts few minutes. He thinks stress sometimes can bring it on. No relationship to food.  No nausea or vomiting. BMs have been fine. No bleeding. No fevers.   He had a CT Abd Pelvis on 1/6/2023 which showed enlarged retroperitoneal lymphadenopathy  He has noticed some fatigue. He has also noticed some lump in the throat just above the sternum at night. This is going on for a couple of months. He denies any dysphagia.     Currently he feels well. Currently denies any abdominal pain. No nausea or vomiting. BMs are fine. No recent weight loss. No SOB. No neuropathy. No skin flushing. No drenching night sweats.    In the summer, he noticed a rash on the face which has since resolved.  He was noted to have CLAIRE positive.     On 2/6/2023, he had FNA of the left supraclavicular lymph node.  Cytology showed atypical lymphoid cells.  Flow cytometry showed lambda monotypic B cells which are negative for CD5 and CD10.  This is consistent with a B-cell lymphoma.  We decided to go ahead with excisional lymph node biopsy as well as bone marrow biopsy for complete staging.    Excisional lymph node biopsy showed diffuse large B-cell lymphoma.    Bone marrow biopsy did not reveal evidence of lymphoma.    He started R-CHOP on 3/8/2023.    Cycle #2 R-CHOP on 3/29/2023.        He was admitted to the  from 4/4/2023 - 4/9/2023.  He was admitted for neutropenic fever, sepsis and C. difficile colitis.  He  presented with multiple bouts of nonbloody diarrhea and fever of 101 with generalized fatigue and weakness.  He was neutropenic and was noted to have pancolitis.  C. difficile was positive.  Cryptosporidium was also positive.  He was started on IV cefepime and IV Flagyl initially.  Infectious disease was consulted and he was switched to oral vancomycin to complete 14 days.  Neutropenia resolved by 4/7/2023.    He completed oral vancomycin course on 4/19/2023 4/19/2023.  Cycle #3 of R-CHOP    5/5/2023.  PET CT neck chest abdomen and pelvis shows complete response to treatment per Lugano criteria    5/10/2023.  Cycle #4 of R-CHOP.    5/31/2023.  Cycle #5 of R-CHOP .      He developed hemorrhagic cystitis from cyclophosphamide . I reviewed note from urologist Dr Parra. He received Cycle #6 R-CHOP without Cytoxan on 6/21/2023.    After completing 6 cycles, repeat PET/CT on 7/21/2023 continues to show complete response.    Interval history.  He comes in today and tells me that overall he has been feeling tired which is a little better as compared to last time but still feels fatigued.  He denies B symptoms.  He still has tingling and numbness in the feet which is slightly better.  Hands are fine.  No new pain and no new swellings.  No GI problems.  No urinary complaints.    ECOG 1    ROS:  A comprehensive ROS was otherwise neg    I reviewed other history in Epic as before.      Past Medical/Surgical History:  Past Medical History:   Diagnosis Date    Cancer (H) 2-7-2023    just diagnosed with lymphona.    Carpal tunnel syndrome     Chronic rhinitis 10/23/2015    Degeneration of lumbar intervertebral disc 11/28/2023    Esophageal reflux 12/26/2013    Hand joint stiff, right    Shingles     Past Surgical History:   Procedure Laterality Date    BIOPSY  2-6-23    BIOPSY LYMPH NODE CERVICAL Left 2/16/2023    Procedure: excisional biopsy of a left cervical node;  Surgeon: Aung Tamayo MD;  Location:  OR     COLONOSCOPY  2019    clean, do again in 10 years    COLONOSCOPY WITH CO2 INSUFFLATION N/A 07/17/2019    Procedure: COLONOSCOPY, WITH CO2 INSUFFLATION;  Surgeon: Jaison Hammer MD;  Location: [facility] OR    DESTRUCTION OF PARAVERTEBRAL FACETCERVICAL / THORACIC SINGLE Left 5/16/2024    Procedure: DESTRUCTION, NERVE, FACET JOINT, CERVICOTHORACIC, C3/4/5;  Surgeon: Lb Carter MD;  Location: [facility] OR    ENT SURGERY  1979    Loss of hearing rt ear (tinnitis)    HERNIA REPAIR  2000    INJECT BLOCK MEDIAL BRANCH CERVICAL/THORACIC/LUMBAR Left 1/18/2024    Procedure: BLOCK, NERVE, FACET JOINT, MEDIAL BRANCH, DIAGNOSTIC C3/4/5;  Surgeon: Lb Carter MD;  Location: [facility] OR    INJECT BLOCK MEDIAL BRANCH CERVICAL/THORACIC/LUMBAR Left 4/25/2024    Procedure: BLOCK, NERVE, FACET JOINT, MEDIAL BRANCH, DIAGNOSTIC C3/4/5;  Surgeon: Lb Carter MD;  Location: [facility] OR    IR CHEST PORT PLACEMENT > 5 YRS OF AGE  3/3/2023    NO HISTORY OF SURGERY      ORTHOPEDIC SURGERY  1988,1992    rt knee, scope in 88; ACL repair Early 90s     Cancer History:   As above    Allergies:  Allergies as of 07/24/2024 - Reviewed 07/24/2024   Allergen Reaction Noted    Penicillins  12/23/2013    Allopurinol Rash 04/04/2023     Current Medications:  Current Outpatient Medications   Medication Sig Dispense Refill    atomoxetine (STRATTERA) 10 MG capsule Take 1 capsule (10 mg) by mouth daily (with lunch) 30 capsule 5    doxycycline monohydrate (MONODOX) 100 MG capsule Take 1 capsule (100 mg) by mouth 2 times daily 20 capsule 0    guaiFENesin (MUCINEX) 600 MG 12 hr tablet Take 1 tablet (600 mg) by mouth 2 times daily 40 tablet 2    predniSONE (DELTASONE) 5 MG tablet Take 1 tablet (5 mg) by mouth daily 15 tablet 2    molnupiravir (LAGEVRIO) 200 MG capsule LAGEVRIO 200 MG CAPS (Patient not taking: Reported on 6/3/2024)        Family History:  Family History   Problem Relation Age of Onset    Hyperlipidemia Mother         medication    Diabetes  Father     Other Cancer Father         Lung cancer    Other Cancer Maternal Grandmother         Unsure what type of cancer    Autoimmune Disease No family hx of      Maternal grand mother had some sort of a cancer  Dad had lung cancer  Has 4 kids- all healthy    Social History:  Social History     Socioeconomic History    Marital status:      Spouse name: Not on file    Number of children: Not on file    Years of education: Not on file    Highest education level: Not on file   Occupational History    Not on file   Tobacco Use    Smoking status: Never     Passive exposure: Never    Smokeless tobacco: Never   Vaping Use    Vaping status: Never Used   Substance and Sexual Activity    Alcohol use: Yes     Comment: occassional, 1- 2 per week    Drug use: No    Sexual activity: Yes     Partners: Female     Birth control/protection: Male Surgical, Female Surgical     Comment: vasectomy   Other Topics Concern    Parent/sibling w/ CABG, MI or angioplasty before 65F 55M? No   Social History Narrative    Not on file     Social Determinants of Health     Financial Resource Strain: Low Risk  (1/19/2024)    Financial Resource Strain     Within the past 12 months, have you or your family members you live with been unable to get utilities (heat, electricity) when it was really needed?: No   Food Insecurity: Low Risk  (1/19/2024)    Food Insecurity     Within the past 12 months, did you worry that your food would run out before you got money to buy more?: No     Within the past 12 months, did the food you bought just not last and you didn t have money to get more?: No   Transportation Needs: Low Risk  (1/19/2024)    Transportation Needs     Within the past 12 months, has lack of transportation kept you from medical appointments, getting your medicines, non-medical meetings or appointments, work, or from getting things that you need?: No   Physical Activity: Not on file   Stress: Not on file   Social Connections: Unknown  "(4/11/2023)    Received from [facility]    Social Connections     Frequency of Communication with Friends and Family: Not on file   Interpersonal Safety: Low Risk  (1/19/2024)    Interpersonal Safety     Do you feel physically and emotionally safe where you currently live?: Yes     Within the past 12 months, have you been hit, slapped, kicked or otherwise physically hurt by someone?: No     Within the past 12 months, have you been humiliated or emotionally abused in other ways by your partner or ex-partner?: No   Housing Stability: Low Risk  (1/19/2024)    Housing Stability     Do you have housing? : Yes     Are you worried about losing your housing?: No   no smoking. Drinks etoh wine 2-3 times / week.       Physical Exam:  /74 (BP Location: Left arm)   Pulse 67   Resp 16   Ht 1.803 m (5' 11\")   Wt 87.7 kg (193 lb 4.8 oz)   SpO2 97%   BMI 26.96 kg/m     Wt Readings from Last 4 Encounters:   07/24/24 87.7 kg (193 lb 4.8 oz)   01/24/24 83.9 kg (185 lb)   01/19/24 83.1 kg (183 lb 3.2 oz)   12/27/23 84.3 kg (185 lb 14.4 oz)     CONSTITUTIONAL: No apparent distress  EYES: PERRLA, without pallor or jaundice  ENT/MOUTH: Ears unremarkable. No oral lesions  CVS: s1s2 normal  RESPIRATORY: Chest is clear  GI: Abdomen is benign  NEURO: Alert and oriented ×3  INTEGUMENT: no concerning skin rashes   LYMPHATIC: no palpable lymphadenopathy  MUSCULOSKELETAL: Unremarkable. No bony tenderness.   EXTREMITIES: no pedal edema  PSYCH: Mentation, mood and affect are appropriate      Laboratory/Imaging Studies      Reviewed  7/19/2024  CBC unremarkable.    CMP unremarkable.    .      7/19/2024.  CT chest abdomen and pelvis    FINDINGS: Standard portal venous phase imaging acquired.     CHEST: Detail of the lungs shows mild biapical scarring. 3 x 2 mm  right upper lobe nodular density (4/94) it is unchanged. Superior  right middle lobe 2 mm " nodule (4/151) immediately adjacent to the  minor fissure is unchanged. Calcified right lower lobe nodule  measuring 3 mm (4/186) it is unchanged. Mild dependent atelectasis at  both lung bases. No suspicious-appearing left pulmonary opacities.  Major airways are nicely patent.  The included thyroid appears unremarkable. No enlarged axillary,  mediastinal or hilar lymph nodes. Partially calcified subcarinal lymph  node unchanged. Partially calcified right hilar lymph nodes unchanged.  Heart size normal. No pleural or pericardial effusion. Pulmonary  artery caliber normal. Thoracic aortic caliber normal. Coronary artery  calcifications. Normal aortic arch branch pattern.  Bone detail in the chest shows minimal upper thoracic spondylosis with  mild vertebral spurring. No suspicious sclerotic or lytic/destructive  lesion.     ABDOMEN/PELVIS: Normal liver. Gallstone. Normal spleen and accessory  splenule. Normal bilateral adrenals. Mildly atrophic pancreas. Normal  bilateral kidneys.  Portal vein patent. Mild abdominal aortic atherosclerosis and also in  the left common iliac artery and left internal iliac artery an right  internal iliac artery. Minimal calcification in the left common  femoral artery and right common femoral artery. Diffuse sigmoid colon  diverticulosis without inflammation. Incidentally, the cecum is just  left of midline in the mid to lower abdomen.  No free fluid. No free air.  Urinary bladder incompletely distended. No enlarged inguinal or other  deep pelvic lymph nodes.  Bone detail in the abdomen and pelvis shows mild L5-S1 disc space  narrowing and vacuum disc phenomenon. No suspicious sclerotic or  lytic/destructive lesion.                                                                      IMPRESSION:  1. No diffuse lymphadenopathy evident.  2. Unchanged benign-appearing pulmonary nodules.  3. Cholelithiasis.  4. Scattered atherosclerosis.  5. Extensive sigmoid colon diverticulosis without  acute inflammation.    ASSESSMENT/PLAN:    Diffuse large B-cell lymphoma presenting with diffuse lymphadenopathy.    He has marked FDG avid left supraclavicular, axillary, retroperitoneal lymph nodes.  No hepatosplenomegaly.  No evidence of bone marrow or extra lymphatic involvement.  FISH testing shows gains of BCL2 and BCL6 but no rearrangement.  No rearrangement of MYC or IGH::MYC fusion  No evidence of double hit or triple hit lymphoma.  Cytogenetics was complex.  No evidence of bone marrow involvement.  LDH is mildly elevated.      IPI is 3    We started R-CHOP on 3/8/2023 with curative intent.  Cycle #6 of R-CHOP was given without Cytoxan because of development of hemorrhagic cystitis.  He received cycle #6 on 6/21/2023.      After completing 6 cycles, repeat PET/CT on 7/21/2023 continues to show complete response.    He has been on observation since then.      He had repeat CT scan of the chest abdomen and pelvis on 7/19/2024 which I reviewed myself and does not show any evidence of recurrence.  Labs are normal.      I would recommend serial monitoring and repeat labs and CT scan of chest abdomen and pelvis in 6 months.        Neuropathy.    He was seen by neurologist Dr. Violette Cerda for chemotherapy-induced peripheral neuropathy.  He has tingling and dysesthesias in bilateral feet.  Hands are fine.   He was found to have severe subacute axonal sensorimotor polyneuropathy on EMG/NCS in August 2023.  He was started on alpha lipoic acid.  We again discussed that he can try acupuncture or laser therapy as sometimes it can help.  I also discussed with him about starting medications like Cymbalta or gabapentin but he is not willing to do that.       We did not address the following today      Leg swelling.  This has resolved. No more on lasix.     Port-A-Cath.  This was removed on 2/16/2024.        Thyroid nodule.  Ultrasound in October 2023 did not show any concerning findings and he is following with PCP  regarding this.        Hemorrhagic cystitis.  From cyclophosphamide.  He was seen by urology  on 6/15/2023.  As his symptoms have resolved, he has stopped oxybutynin.        History of chemotherapy induced neutropenic fever/typhlitis.  Was receiving G-CSF support.      Respiratory.  He was treated for 1 week of levofloxacin for cough and some shortness of breath and subtle groundglass opacities found on the CT scan.  Coughing and dyspnea on exertion have improved.  Continue to observe.       C. difficile colitis.  Symptoms have resolved.  We gave him 1 week of vancomycin when he was on levofloxacin to prevent reactivation of C. Difficile.  He did not get vancomycin with ciprofloxacin and currently bowel movements are normal.      Prevention of tumor lysis syndrome.  He was given allopurinol which he took for about 7 to 8 days but developed a rash from it.  Allopurinol was stopped.  He did not go into tumor lysis syndrome.      See me back in 6 months with labs/CT scan prior    All questions answered.  He is agreeable and comfortable with the plan.    Kiel Maurer MD

## 2024-07-24 NOTE — NURSING NOTE
"Oncology Rooming Note    July 24, 2024 8:12 AM   Geovanni Jasso is a 63 year old male who presents for:    Chief Complaint   Patient presents with    Oncology Clinic Visit     6 month follow up     Initial Vitals: /74 (BP Location: Left arm)   Pulse 67   Resp 16   Ht 1.803 m (5' 11\")   Wt 87.7 kg (193 lb 4.8 oz)   SpO2 97%   BMI 26.96 kg/m   Estimated body mass index is 26.96 kg/m  as calculated from the following:    Height as of this encounter: 1.803 m (5' 11\").    Weight as of this encounter: 87.7 kg (193 lb 4.8 oz). Body surface area is 2.1 meters squared.  No Pain (0) Comment: Data Unavailable   No LMP for male patient.  Allergies reviewed: Yes  Medications reviewed: Yes    Medications: Medication refills not needed today.  [facility] into crossvertise[id number] [place] [phone]    Frailty Screening:   Is the patient here for a new oncology consult visit in cancer care? 2. No      Clinical concerns: results. Occasional pain in abdomen.       Em Staton LPN              "

## 2024-07-24 NOTE — LETTER
7/24/2024      Geovanni Jasso  [address]  [facility] [address]      Dear Colleague,    Thank you for referring your patient, Geovanni Jasso, to the [facility]. Please see a copy of my visit note below.    Oncology follow-up visit:  Date on this visit: 7/24/24     Diagnoses.    Diffuse large B-cell lymphoma    Primary Physician: Paddy Holly     History Of Present Illness:  Mr. Jasso is a 63 year old  male who presents for follow-up of diffuse lymphadenopathy.  He initially saw me on 1/18/2022 for retroperitoneal lymphadenopathy.  Please see my previous note for details.  I have copied and updated from prior notes.        Over the last few months, since fall of 2022, off and on he has had upper abdominal pain which lasts few minutes. He thinks stress sometimes can bring it on. No relationship to food.  No nausea or vomiting. BMs have been fine. No bleeding. No fevers.   He had a CT Abd Pelvis on 1/6/2023 which showed enlarged retroperitoneal lymphadenopathy  He has noticed some fatigue. He has also noticed some lump in the throat just above the sternum at night. This is going on for a couple of months. He denies any dysphagia.     Currently he feels well. Currently denies any abdominal pain. No nausea or vomiting. BMs are fine. No recent weight loss. No SOB. No neuropathy. No skin flushing. No drenching night sweats.    In the summer, he noticed a rash on the face which has since resolved.  He was noted to have CLAIRE positive.     On 2/6/2023, he had FNA of the left supraclavicular lymph node.  Cytology showed atypical lymphoid cells.  Flow cytometry showed lambda monotypic B cells which are negative for CD5 and CD10.  This is consistent with a B-cell lymphoma.  We decided to go ahead with excisional lymph node biopsy as well as bone marrow biopsy for complete staging.    Excisional lymph node biopsy showed diffuse large B-cell lymphoma.    Bone marrow biopsy did not reveal  evidence of lymphoma.    He started R-CHOP on 3/8/2023.    Cycle #2 R-CHOP on 3/29/2023.        He was admitted to the [facility] from 4/4/2023 - 4/9/2023.  He was admitted for neutropenic fever, sepsis and C. difficile colitis.  He presented with multiple bouts of nonbloody diarrhea and fever of 101 with generalized fatigue and weakness.  He was neutropenic and was noted to have pancolitis.  C. difficile was positive.  Cryptosporidium was also positive.  He was started on IV cefepime and IV Flagyl initially.  Infectious disease was consulted and he was switched to oral vancomycin to complete 14 days.  Neutropenia resolved by 4/7/2023.    He completed oral vancomycin course on 4/19/2023 4/19/2023.  Cycle #3 of R-CHOP    5/5/2023.  PET CT neck chest abdomen and pelvis shows complete response to treatment per Lugano criteria    5/10/2023.  Cycle #4 of R-CHOP.    5/31/2023.  Cycle #5 of R-CHOP .      He developed hemorrhagic cystitis from cyclophosphamide . I reviewed note from urologist Dr Parra. He received Cycle #6 R-CHOP without Cytoxan on 6/21/2023.    After completing 6 cycles, repeat PET/CT on 7/21/2023 continues to show complete response.    Interval history.  He comes in today and tells me that overall he has been feeling tired which is a little better as compared to last time but still feels fatigued.  He denies B symptoms.  He still has tingling and numbness in the feet which is slightly better.  Hands are fine.  No new pain and no new swellings.  No GI problems.  No urinary complaints.    ECOG 1    ROS:  A comprehensive ROS was otherwise neg    I reviewed other history in Epic as before.      Past Medical/Surgical History:  Past Medical History:   Diagnosis Date     Cancer (H) 2-7-2023    just diagnosed with lymphona.     Carpal tunnel syndrome      Chronic rhinitis 10/23/2015     Degeneration of lumbar intervertebral disc 11/28/2023     Esophageal reflux 12/26/2013     Hand joint stiff, right     Shingles     Past Surgical History:   Procedure Laterality Date     BIOPSY  2-6-23     BIOPSY LYMPH NODE CERVICAL Left 2/16/2023    Procedure: excisional biopsy of a left cervical node;  Surgeon: Aung Tamayo MD;  Location: [facility] OR     COLONOSCOPY  2019    clean, do again in 10 years     COLONOSCOPY WITH CO2 INSUFFLATION N/A 07/17/2019    Procedure: COLONOSCOPY, WITH CO2 INSUFFLATION;  Surgeon: Jaison Hammer MD;  Location: [facility] OR     DESTRUCTION OF PARAVERTEBRAL FACETCERVICAL / THORACIC SINGLE Left 5/16/2024    Procedure: DESTRUCTION, NERVE, FACET JOINT, CERVICOTHORACIC, C3/4/5;  Surgeon: Lb Carter MD;  Location: [facility] OR     ENT SURGERY  1979    Loss of hearing rt ear (tinnitis)     HERNIA REPAIR  2000     INJECT BLOCK MEDIAL BRANCH CERVICAL/THORACIC/LUMBAR Left 1/18/2024    Procedure: BLOCK, NERVE, FACET JOINT, MEDIAL BRANCH, DIAGNOSTIC C3/4/5;  Surgeon: Lb Carter MD;  Location: [facility] OR     INJECT BLOCK MEDIAL BRANCH CERVICAL/THORACIC/LUMBAR Left 4/25/2024    Procedure: BLOCK, NERVE, FACET JOINT, MEDIAL BRANCH, DIAGNOSTIC C3/4/5;  Surgeon: Lb Carter MD;  Location: [facility] OR     IR CHEST PORT PLACEMENT > 5 YRS OF AGE  3/3/2023     NO HISTORY OF SURGERY       ORTHOPEDIC SURGERY  1988,1992    rt knee, scope in 88; ACL repair Early 90s     Cancer History:   As above    Allergies:  Allergies as of 07/24/2024 - Reviewed 07/24/2024   Allergen Reaction Noted     Penicillins  12/23/2013     Allopurinol Rash 04/04/2023     Current Medications:  Current Outpatient Medications   Medication Sig Dispense Refill     atomoxetine (STRATTERA) 10 MG capsule Take 1 capsule (10 mg) by mouth daily (with lunch) 30 capsule 5     doxycycline monohydrate (MONODOX) 100 MG capsule Take 1 capsule (100 mg) by mouth 2 times daily 20 capsule 0     guaiFENesin (MUCINEX) 600 MG 12 hr tablet Take 1 tablet (600 mg) by mouth 2 times daily 40 tablet 2     predniSONE (DELTASONE) 5 MG tablet Take 1 tablet (5 mg)  by mouth daily 15 tablet 2     molnupiravir (LAGEVRIO) 200 MG capsule LAGEVRIO 200 MG CAPS (Patient not taking: Reported on 6/3/2024)        Family History:  Family History   Problem Relation Age of Onset     Hyperlipidemia Mother         medication     Diabetes Father      Other Cancer Father         Lung cancer     Other Cancer Maternal Grandmother         Unsure what type of cancer     Autoimmune Disease No family hx of      Maternal grand mother had some sort of a cancer  Dad had lung cancer  Has 4 kids- all healthy    Social History:  Social History     Socioeconomic History     Marital status:      Spouse name: Not on file     Number of children: Not on file     Years of education: Not on file     Highest education level: Not on file   Occupational History     Not on file   Tobacco Use     Smoking status: Never     Passive exposure: Never     Smokeless tobacco: Never   Vaping Use     Vaping status: Never Used   Substance and Sexual Activity     Alcohol use: Yes     Comment: occassional, 1- 2 per week     Drug use: No     Sexual activity: Yes     Partners: Female     Birth control/protection: Male Surgical, Female Surgical     Comment: vasectomy   Other Topics Concern     Parent/sibling w/ CABG, MI or angioplasty before 65F 55M? No   Social History Narrative     Not on file     Social Determinants of Health     Financial Resource Strain: Low Risk  (1/19/2024)    Financial Resource Strain      Within the past 12 months, have you or your family members you live with been unable to get utilities (heat, electricity) when it was really needed?: No   Food Insecurity: Low Risk  (1/19/2024)    Food Insecurity      Within the past 12 months, did you worry that your food would run out before you got money to buy more?: No      Within the past 12 months, did the food you bought just not last and you didn t have money to get more?: No   Transportation Needs: Low Risk  (1/19/2024)    Transportation Needs      Within  "the past 12 months, has lack of transportation kept you from medical appointments, getting your medicines, non-medical meetings or appointments, work, or from getting things that you need?: No   Physical Activity: Not on file   Stress: Not on file   Social Connections: Unknown (4/11/2023)    [facility] from [facility], [facility]    Social Connections      Frequency of Communication with Friends and Family: Not on file   Interpersonal Safety: Low Risk  (1/19/2024)    Interpersonal Safety      Do you feel physically and emotionally safe where you currently live?: Yes      Within the past 12 months, have you been hit, slapped, kicked or otherwise physically hurt by someone?: No      Within the past 12 months, have you been humiliated or emotionally abused in other ways by your partner or ex-partner?: No   Housing Stability: Low Risk  (1/19/2024)    Housing Stability      Do you have housing? : Yes      Are you worried about losing your housing?: No   no smoking. Drinks etoh wine 2-3 times / week.       Physical Exam:  /74 (BP Location: Left arm)   Pulse 67   Resp 16   Ht 1.803 m (5' 11\")   Wt 87.7 kg (193 lb 4.8 oz)   SpO2 97%   BMI 26.96 kg/m     Wt Readings from Last 4 Encounters:   07/24/24 87.7 kg (193 lb 4.8 oz)   01/24/24 83.9 kg (185 lb)   01/19/24 83.1 kg (183 lb 3.2 oz)   12/27/23 84.3 kg (185 lb 14.4 oz)     CONSTITUTIONAL: No apparent distress  EYES: PERRLA, without pallor or jaundice  ENT/MOUTH: Ears unremarkable. No oral lesions  CVS: s1s2 normal  RESPIRATORY: Chest is clear  GI: Abdomen is benign  NEURO: Alert and oriented ×3  INTEGUMENT: no concerning skin rashes   LYMPHATIC: no palpable lymphadenopathy  MUSCULOSKELETAL: Unremarkable. No bony tenderness.   EXTREMITIES: no pedal edema  PSYCH: Mentation, mood and affect are appropriate      Laboratory/Imaging Studies      Reviewed  7/19/2024  CBC unremarkable.    CMP " unremarkable.    .      7/19/2024.  CT chest abdomen and pelvis    FINDINGS: Standard portal venous phase imaging acquired.     CHEST: Detail of the lungs shows mild biapical scarring. 3 x 2 mm  right upper lobe nodular density (4/94) it is unchanged. Superior  right middle lobe 2 mm nodule (4/151) immediately adjacent to the  minor fissure is unchanged. Calcified right lower lobe nodule  measuring 3 mm (4/186) it is unchanged. Mild dependent atelectasis at  both lung bases. No suspicious-appearing left pulmonary opacities.  Major airways are nicely patent.  The included thyroid appears unremarkable. No enlarged axillary,  mediastinal or hilar lymph nodes. Partially calcified subcarinal lymph  node unchanged. Partially calcified right hilar lymph nodes unchanged.  Heart size normal. No pleural or pericardial effusion. Pulmonary  artery caliber normal. Thoracic aortic caliber normal. Coronary artery  calcifications. Normal aortic arch branch pattern.  Bone detail in the chest shows minimal upper thoracic spondylosis with  mild vertebral spurring. No suspicious sclerotic or lytic/destructive  lesion.     ABDOMEN/PELVIS: Normal liver. Gallstone. Normal spleen and accessory  splenule. Normal bilateral adrenals. Mildly atrophic pancreas. Normal  bilateral kidneys.  Portal vein patent. Mild abdominal aortic atherosclerosis and also in  the left common iliac artery and left internal iliac artery an right  internal iliac artery. Minimal calcification in the left common  femoral artery and right common femoral artery. Diffuse sigmoid colon  diverticulosis without inflammation. Incidentally, the cecum is just  left of midline in the mid to lower abdomen.  No free fluid. No free air.  Urinary bladder incompletely distended. No enlarged inguinal or other  deep pelvic lymph nodes.  Bone detail in the abdomen and pelvis shows mild L5-S1 disc space  narrowing and vacuum disc phenomenon. No suspicious sclerotic  or  lytic/destructive lesion.                                                                      IMPRESSION:  1. No diffuse lymphadenopathy evident.  2. Unchanged benign-appearing pulmonary nodules.  3. Cholelithiasis.  4. Scattered atherosclerosis.  5. Extensive sigmoid colon diverticulosis without acute inflammation.    ASSESSMENT/PLAN:    Diffuse large B-cell lymphoma presenting with diffuse lymphadenopathy.    He has marked FDG avid left supraclavicular, axillary, retroperitoneal lymph nodes.  No hepatosplenomegaly.  No evidence of bone marrow or extra lymphatic involvement.  FISH testing shows gains of BCL2 and BCL6 but no rearrangement.  No rearrangement of MYC or IGH::MYC fusion  No evidence of double hit or triple hit lymphoma.  Cytogenetics was complex.  No evidence of bone marrow involvement.  LDH is mildly elevated.      IPI is 3    We started R-CHOP on 3/8/2023 with curative intent.  Cycle #6 of R-CHOP was given without Cytoxan because of development of hemorrhagic cystitis.  He received cycle #6 on 6/21/2023.      After completing 6 cycles, repeat PET/CT on 7/21/2023 continues to show complete response.    He has been on observation since then.      He had repeat CT scan of the chest abdomen and pelvis on 7/19/2024 which I reviewed myself and does not show any evidence of recurrence.  Labs are normal.      I would recommend serial monitoring and repeat labs and CT scan of chest abdomen and pelvis in 6 months.        Neuropathy.    He was seen by neurologist Dr. Violette Cerda for chemotherapy-induced peripheral neuropathy.  He has tingling and dysesthesias in bilateral feet.  Hands are fine.   He was found to have severe subacute axonal sensorimotor polyneuropathy on EMG/NCS in August 2023.  He was started on alpha lipoic acid.  We again discussed that he can try acupuncture or laser therapy as sometimes it can help.  I also discussed with him about starting medications like Cymbalta or gabapentin but he  is not willing to do that.       We did not address the following today      Leg swelling.  This has resolved. No more on lasix.     Port-A-Cath.  This was removed on 2/16/2024.        Thyroid nodule.  Ultrasound in October 2023 did not show any concerning findings and he is following with PCP regarding this.        Hemorrhagic cystitis.  From cyclophosphamide.  He was seen by urology  on 6/15/2023.  As his symptoms have resolved, he has stopped oxybutynin.        History of chemotherapy induced neutropenic fever/typhlitis.  Was receiving G-CSF support.      Respiratory.  He was treated for 1 week of levofloxacin for cough and some shortness of breath and subtle groundglass opacities found on the CT scan.  Coughing and dyspnea on exertion have improved.  Continue to observe.       C. difficile colitis.  Symptoms have resolved.  We gave him 1 week of vancomycin when he was on levofloxacin to prevent reactivation of C. Difficile.  He did not get vancomycin with ciprofloxacin and currently bowel movements are normal.      Prevention of tumor lysis syndrome.  He was given allopurinol which he took for about 7 to 8 days but developed a rash from it.  Allopurinol was stopped.  He did not go into tumor lysis syndrome.      See me back in 6 months with labs/CT scan prior    All questions answered.  He is agreeable and comfortable with the plan.    Kiel Maurer MD          Again, thank you for allowing me to participate in the care of your patient.        Sincerely,        Kiel Maurer MD

## 2024-08-07 ENCOUNTER — OFFICE VISIT (OUTPATIENT)
Dept: ALLERGY | Facility: OTHER | Age: 64
End: 2024-08-07
Attending: INTERNAL MEDICINE
Payer: COMMERCIAL

## 2024-08-07 VITALS — HEART RATE: 76 BPM | OXYGEN SATURATION: 96 % | SYSTOLIC BLOOD PRESSURE: 108 MMHG | DIASTOLIC BLOOD PRESSURE: 73 MMHG

## 2024-08-07 DIAGNOSIS — Z88.0 PENICILLIN ALLERGY: ICD-10-CM

## 2024-08-07 PROCEDURE — 99203 OFFICE O/P NEW LOW 30 MIN: CPT | Performed by: ALLERGY & IMMUNOLOGY

## 2024-08-07 NOTE — PROGRESS NOTES
[facility] FINAL DISCHARGE PLAN    NAME OF ACCEPTING FACILITY/AGENCY: [facility]  PHONE OF ACCEPTING FACILITY/AGENCY: [phone]  PATIENT FAMILY AWARE OF DISCHARGE PLANS: Yes     INTEGRATION  PAN FACILITY  Was PAN facility offered?: Yes  Was PAN facility chosen by patient/family?: No  If outside facility, Why?: Family choice    TRANSPORTATION NOTE    Transportation mode: Ambulance  Name of transportation service: [facility]/ALS w/vent  Phone number:   Transportation scheduled for (date/time): 12/4/2020 @ 4PM  Transportation confirmed with: [facility]    Transportation mode: Ambulance is on WILL CALL for (date) 12/3/2020       Per RN pt did not discharge yesterday due to thoracic wanting to monitor pt's xray and CT scans. Pt is ready to discharge today after dialysis. DONALD was able to change auth yesterday to start 12/3-12/9 with [employer]. Updated auth letter sent to [facility]. Rosina in agreement with dcp. SUAD.    SUBJECTIVE:                                                                   Geovanni Jasso is a 63-year-old male who presents today to our Allergy Clinic at [facility]; He is being seen in consultation at the request of Dr. Paddy Holly for an evaluation of penicillin allergy.  The patient is scheduled to undergo a dental procedure next week. He has a history of penicillin allergy, and his oral surgeon wants to determine if he still has this allergy. The patient was diagnosed with a penicillin allergy during childhood but does not recall the timing or nature of the reaction. He is also unsure whether he was ever hospitalized for the reaction.    Patient Active Problem List   Diagnosis    Esophageal reflux    Hyperlipidemia LDL goal <70    Neck pain    Chronic rhinitis    Bilateral low back pain without sciatica    Chondromalacia patellae, right    S/P ACL reconstruction    Atherosclerosis of native coronary artery of native heart without angina pectoris    SOB (shortness of breath)    Non-allergic rhinitis    Positive CLAIRE (antinuclear antibody)    Hand joint stiff, right    Carpal tunnel syndrome    Osteoarthritis of knee, unspecified laterality, unspecified osteoarthritis type    Diverticulosis of large intestine    Internal hemorrhoids    Diffuse large B-cell lymphoma, unspecified body region (H)    Chemotherapy-induced neutropenia (H24)    Esophageal spasm    Chemotherapy-induced peripheral neuropathy (H24)    History of Clostridioides difficile colitis    Cervical disc disorder with radiculopathy    Facet arthropathy, cervical    Cervical disc herniation    Facet arthritis of lumbar region    Lumbar disc herniation, L3-L4    Degeneration of lumbar intervertebral disc    Facet arthritis, degenerative, cervical spine    Ulcerative (chronic) pancolitis without complications (H)    Chemotherapy-induced fatigue    Cervical spondylosis without myelopathy       Past Medical History:    Diagnosis Date    Cancer (H) 2-7-2023    just diagnosed with lymphona.    Carpal tunnel syndrome     Chronic rhinitis 10/23/2015    Degeneration of lumbar intervertebral disc 11/28/2023    Esophageal reflux 12/26/2013    Hand joint stiff, right       Problem (# of Occurrences) Relation (Name,Age of Onset)    Diabetes (1) Father (Lobo Jasso)    Other Cancer (2) Father (Lobo Jasso): Lung cancer, Maternal Grandmother (Lyubov Linares): Unsure what type of cancer    Hyperlipidemia (1) Mother (Smita Jasso): medication           Negative family history of: Autoimmune Disease          Past Surgical History:   Procedure Laterality Date    BIOPSY  2-6-23    BIOPSY LYMPH NODE CERVICAL Left 2/16/2023    Procedure: excisional biopsy of a left cervical node;  Surgeon: Aung Tamayo MD;  Location: [facility] OR    COLONOSCOPY  2019    clean, do again in 10 years    COLONOSCOPY WITH CO2 INSUFFLATION N/A 07/17/2019    Procedure: COLONOSCOPY, WITH CO2 INSUFFLATION;  Surgeon: Jaison Hammer MD;  Location: [facility] OR    DESTRUCTION OF PARAVERTEBRAL FACETCERVICAL / THORACIC SINGLE Left 5/16/2024    Procedure: DESTRUCTION, NERVE, FACET JOINT, CERVICOTHORACIC, C3/4/5;  Surgeon: Lb Carter MD;  Location: [facility] OR    ENT SURGERY  1979    Loss of hearing rt ear (tinnitis)    HERNIA REPAIR  2000    INJECT BLOCK MEDIAL BRANCH CERVICAL/THORACIC/LUMBAR Left 1/18/2024    Procedure: BLOCK, NERVE, FACET JOINT, MEDIAL BRANCH, DIAGNOSTIC C3/4/5;  Surgeon: Lb Carter MD;  Location: [facility]    INJECT BLOCK MEDIAL BRANCH CERVICAL/THORACIC/LUMBAR Left 4/25/2024    Procedure: BLOCK, NERVE, FACET JOINT, MEDIAL BRANCH, DIAGNOSTIC C3/4/5;  Surgeon: Lb Carter MD;  Location: [facility] OR    IR CHEST PORT PLACEMENT > 5 YRS OF AGE  3/3/2023    NO HISTORY OF SURGERY      ORTHOPEDIC SURGERY  1988,1992    rt knee, scope in 88; ACL repair Early 90s     Social History     Socioeconomic History    Marital status:      Spouse name: None     Number of children: None    Years of education: None    Highest education level: None   Tobacco Use    Smoking status: Never     Passive exposure: Never    Smokeless tobacco: Never   Vaping Use    Vaping status: Never Used   Substance and Sexual Activity    Alcohol use: Yes     Comment: occassional, 1- 2 per week    Drug use: No    Sexual activity: Yes     Partners: Female     Birth control/protection: Male Surgical, Female Surgical     Comment: vasectomy   Other Topics Concern    Parent/sibling w/ CABG, MI or angioplasty before 65F 55M? No   Social History Narrative    August 7, 2024         ENVIRONMENTAL HISTORY: [place] The home is heated with a forced air. They does have central air conditioning. The patient's bedroom is furnished with carpeting in bedroom.  Pets inside the house include 1 dog(s), mainly lives in basement. There is no history of cockroach or mice infestation. There is/are 0 smokers in the house.  The house does not have a damp basement.      Social Determinants of Health     Financial Resource Strain: Low Risk  (1/19/2024)    Financial Resource Strain     Within the past 12 months, have you or your family members you live with been unable to get utilities (heat, electricity) when it was really needed?: No   Food Insecurity: Low Risk  (1/19/2024)    Food Insecurity     Within the past 12 months, did you worry that your food would run out before you got money to buy more?: No     Within the past 12 months, did the food you bought just not last and you didn t have money to get more?: No   Transportation Needs: Low Risk  (1/19/2024)    Transportation Needs     Within the past 12 months, has lack of transportation kept you from medical appointments, getting your medicines, non-medical meetings or appointments, work, or from getting things that you need?: No    [email or web address]    Social Connections   Interpersonal Safety: Low Risk  (1/19/2024)    Interpersonal Safety     Do you feel physically and emotionally safe where you currently live?: Yes     Within the past 12 months, have you been hit, slapped, kicked or otherwise physically hurt by someone?: No     Within the past 12 months, have you been humiliated or emotionally abused in other ways by your partner or ex-partner?: No   Housing Stability: Low Risk  (1/19/2024)    Housing Stability     Do you have housing? : Yes     Are you worried about losing your housing?: No           Current Outpatient Medications:     predniSONE (DELTASONE) 5 MG tablet, Take 1 tablet (5 mg) by mouth daily, Disp: 15 tablet, Rfl: 2    atomoxetine (STRATTERA) 10 MG capsule, Take 1 capsule (10 mg) by mouth daily (with lunch), Disp: 30 capsule, Rfl: 5    doxycycline monohydrate (MONODOX) 100 MG capsule, Take 1 capsule (100 mg) by mouth 2 times daily, Disp: 20 capsule, Rfl: 0    guaiFENesin (MUCINEX) 600 MG 12 hr tablet, Take 1 tablet (600 mg) by mouth 2 times daily, Disp: 40 tablet, Rfl: 2    molnupiravir (LAGEVRIO) 200 MG capsule, LAGEVRIO 200 MG CAPS (Patient not taking: Reported on 6/3/2024), Disp: , Rfl:   Immunization History   Administered Date(s) Administered    COVID-19 Bivalent 12+ (Pfizer) 12/11/2022    COVID-19 MONOVALENT 12+ (Pfizer) 03/23/2021, 04/13/2021, 11/11/2021    COVID-19 Monovalent 12+ (Pfizer 2022) 06/11/2022    Flu, Unspecified 10/26/2020, 09/15/2021    Influenza Vaccine 18-64 (Flublok) 09/25/2018    Influenza Vaccine >6 months,quad, PF 10/31/2014, 12/07/2015, 11/22/2016, 11/15/2017, 10/26/2020, 09/15/2021    Influenza Vaccine, 6+MO IM (QUADRIVALENT W/PRESERVATIVES) 09/15/2012    Influenza,INJ,MDCK,PF,Quad >6mo(Flucelvax) 12/11/2022    Pneumococcal 20 valent Conjugate (Prevnar 20) 01/19/2024    TDAP (Adacel,Boostrix) 06/05/2007    TDAP Vaccine (Adacel) 12/07/2015     Allergies   Allergen Reactions    Penicillins     Allopurinol  Rash     OBJECTIVE:                                                                 /73   Pulse 76   SpO2 96%             Physical Exam  Vitals and nursing note reviewed.   Constitutional:       General: He is not in acute distress.     Appearance: He is not diaphoretic.   HENT:      Head: Normocephalic and atraumatic.      Right Ear: Tympanic membrane, ear canal and external ear normal.      Left Ear: Tympanic membrane, ear canal and external ear normal.      Nose: No mucosal edema or rhinorrhea.      Right Turbinates: Not enlarged, swollen or pale.      Left Turbinates: Not enlarged, swollen or pale.      Mouth/Throat:      Lips: Pink.      Mouth: Mucous membranes are moist.      Pharynx: Oropharynx is clear. No pharyngeal swelling, oropharyngeal exudate or posterior oropharyngeal erythema.   Eyes:      General:         Right eye: No discharge.         Left eye: No discharge.      Conjunctiva/sclera: Conjunctivae normal.   Pulmonary:      Effort: Pulmonary effort is normal. No respiratory distress.      Breath sounds: Normal breath sounds and air entry. No stridor, decreased air movement or transmitted upper airway sounds. No decreased breath sounds, wheezing, rhonchi or rales.   Musculoskeletal:         General: Normal range of motion.   Neurological:      Mental Status: He is alert and oriented to person, place, and time.   Psychiatric:         Mood and Affect: Mood normal.         Behavior: Behavior normal.           ASSESSMENT/PLAN:         Penicillin allergy    Explained that the time elapsed since the last reaction is important because penicillin-specific IgE antibodies decrease over time. Approximately 80 percent of patients with IgE-mediated penicillin allergy lose sensitivity after 10 years.   Discussed skin test followed by an amoxicillin oral challenge in the office setting vs Graded challenge without any skin test.  The patient is interested in undergoing a skin test first.    -Scheduled the  patient for a skin test tomorrow at our [place] location.    Thank you for allowing us to participate in the care of this patient. Please feel free to contact us if there are any questions or concerns about the patient.    Disclaimer: This note consists of symbols derived from keyboarding, dictation and/or voice recognition software. As a result, there may be errors in the script that have gone undetected. Please consider this when interpreting information found in this chart.        Zenon Du MD, FAAAAI, FACAAI  Allergy and Asthma     [facility]

## 2024-08-07 NOTE — LETTER
8/7/2024      Geovanni Jasso  [address]  [place] [address]      Dear Colleague,    Thank you for referring your patient, Geovanni Jasso, to the [facility]. Please see a copy of my visit note below.    SUBJECTIVE:                                                                   Geovanni Jasso is a 63-year-old male who presents today to our Allergy Clinic at [facility]; He is being seen in consultation at the request of Dr. Paddy Holly for an evaluation of penicillin allergy.  The patient is scheduled to undergo a dental procedure next week. He has a history of penicillin allergy, and his oral surgeon wants to determine if he still has this allergy. The patient was diagnosed with a penicillin allergy during childhood but does not recall the timing or nature of the reaction. He is also unsure whether he was ever hospitalized for the reaction.    Patient Active Problem List   Diagnosis     Esophageal reflux     Hyperlipidemia LDL goal <70     Neck pain     Chronic rhinitis     Bilateral low back pain without sciatica     Chondromalacia patellae, right     S/P ACL reconstruction     Atherosclerosis of native coronary artery of native heart without angina pectoris     SOB (shortness of breath)     Non-allergic rhinitis     Positive CLAIRE (antinuclear antibody)     Hand joint stiff, right     Carpal tunnel syndrome     Osteoarthritis of knee, unspecified laterality, unspecified osteoarthritis type     Diverticulosis of large intestine     Internal hemorrhoids     Diffuse large B-cell lymphoma, unspecified body region (H)     Chemotherapy-induced neutropenia (H24)     Esophageal spasm     Chemotherapy-induced peripheral neuropathy (H24)     History of Clostridioides difficile colitis     Cervical disc disorder with radiculopathy     Facet arthropathy, cervical     Cervical disc herniation     Facet arthritis of lumbar region     Lumbar disc herniation, L3-L4      Degeneration of lumbar intervertebral disc     Facet arthritis, degenerative, cervical spine     Ulcerative (chronic) pancolitis without complications (H)     Chemotherapy-induced fatigue     Cervical spondylosis without myelopathy       Past Medical History:   Diagnosis Date     Cancer (H) 2-7-2023    just diagnosed with lymphona.     Carpal tunnel syndrome      Chronic rhinitis 10/23/2015     Degeneration of lumbar intervertebral disc 11/28/2023     Esophageal reflux 12/26/2013     Hand joint stiff, right       Problem (# of Occurrences) Relation (Name,Age of Onset)    Diabetes (1) Father (Lobo Jasso)    Other Cancer (2) Father (Lobo Jasso): Lung cancer, Maternal Grandmother (Lyubov Linares): Unsure what type of cancer    Hyperlipidemia (1) Mother (Smita Jasso): medication           Negative family history of: Autoimmune Disease          Past Surgical History:   Procedure Laterality Date     BIOPSY  2-6-23     BIOPSY LYMPH NODE CERVICAL Left 2/16/2023    Procedure: excisional biopsy of a left cervical node;  Surgeon: Aung Tamayo MD;  Location: [facility] OR     COLONOSCOPY  2019    clean, do again in 10 years     COLONOSCOPY WITH CO2 INSUFFLATION N/A 07/17/2019    Procedure: COLONOSCOPY, WITH CO2 INSUFFLATION;  Surgeon: Jaison Hammer MD;  Location: [facility]     DESTRUCTION OF PARAVERTEBRAL FACETCERVICAL / THORACIC SINGLE Left 5/16/2024    Procedure: DESTRUCTION, NERVE, FACET JOINT, CERVICOTHORACIC, C3/4/5;  Surgeon: Lb Carter MD;  Location: [facility]     ENT SURGERY  1979    Loss of hearing rt ear (tinnitis)     HERNIA REPAIR  2000     INJECT BLOCK MEDIAL BRANCH CERVICAL/THORACIC/LUMBAR Left 1/18/2024    Procedure: BLOCK, NERVE, FACET JOINT, MEDIAL BRANCH, DIAGNOSTIC C3/4/5;  Surgeon: Lb Carter MD;  Location: [facility]     INJECT BLOCK MEDIAL BRANCH CERVICAL/THORACIC/LUMBAR Left 4/25/2024    Procedure: BLOCK, NERVE, FACET JOINT, MEDIAL BRANCH, DIAGNOSTIC C3/4/5;  Surgeon: Emma  MD Lb;  Location: [facility]     IR CHEST PORT PLACEMENT > 5 YRS OF AGE  3/3/2023     NO HISTORY OF SURGERY       ORTHOPEDIC SURGERY  1988,1992    rt knee, scope in 88; ACL repair Early 90s     Social History     Socioeconomic History     Marital status:      Spouse name: None     Number of children: None     Years of education: None     Highest education level: None   Tobacco Use     Smoking status: Never     Passive exposure: Never     Smokeless tobacco: Never   Vaping Use     Vaping status: Never Used   Substance and Sexual Activity     Alcohol use: Yes     Comment: occassional, 1- 2 per week     Drug use: No     Sexual activity: Yes     Partners: Female     Birth control/protection: Male Surgical, Female Surgical     Comment: vasectomy   Other Topics Concern     Parent/sibling w/ CABG, MI or angioplasty before 65F 55M? No   Social History Narrative    August 7, 2024         ENVIRONMENTAL HISTORY: [place] The home is heated with a forced air. They does have central air conditioning. The patient's bedroom is furnished with carpeting in bedroom.  Pets inside the house include 1 dog(s), mainly lives in basement. There is no history of cockroach or mice infestation. There is/are 0 smokers in the house.  The house does not have a damp basement.      Social Determinants of Health     Financial Resource Strain: Low Risk  (1/19/2024)    Financial Resource Strain      Within the past 12 months, have you or your family members you live with been unable to get utilities (heat, electricity) when it was really needed?: No   Food Insecurity: Low Risk  (1/19/2024)    Food Insecurity      Within the past 12 months, did you worry that your food would run out before you got money to buy more?: No      Within the past 12 months, did the food you bought just not last and you didn t have money to get more?: No   Transportation Needs: Low Risk  (1/19/2024)    Transportation Needs       Within the past 12 months, has lack of transportation kept you from medical appointments, getting your medicines, non-medical meetings or appointments, work, or from getting things that you need?: No    [facility]    Social Connections   Interpersonal Safety: Low Risk  (1/19/2024)    Interpersonal Safety      Do you feel physically and emotionally safe where you currently live?: Yes      Within the past 12 months, have you been hit, slapped, kicked or otherwise physically hurt by someone?: No      Within the past 12 months, have you been humiliated or emotionally abused in other ways by your partner or ex-partner?: No   Housing Stability: Low Risk  (1/19/2024)    Housing Stability      Do you have housing? : Yes      Are you worried about losing your housing?: No           Current Outpatient Medications:      predniSONE (DELTASONE) 5 MG tablet, Take 1 tablet (5 mg) by mouth daily, Disp: 15 tablet, Rfl: 2     atomoxetine (STRATTERA) 10 MG capsule, Take 1 capsule (10 mg) by mouth daily (with lunch), Disp: 30 capsule, Rfl: 5     doxycycline monohydrate (MONODOX) 100 MG capsule, Take 1 capsule (100 mg) by mouth 2 times daily, Disp: 20 capsule, Rfl: 0     guaiFENesin (MUCINEX) 600 MG 12 hr tablet, Take 1 tablet (600 mg) by mouth 2 times daily, Disp: 40 tablet, Rfl: 2     molnupiravir (LAGEVRIO) 200 MG capsule, LAGEVRIO 200 MG CAPS (Patient not taking: Reported on 6/3/2024), Disp: , Rfl:   Immunization History   Administered Date(s) Administered     COVID-19 Bivalent 12+ (Pfizer) 12/11/2022     COVID-19 MONOVALENT 12+ (Pfizer) 03/23/2021, 04/13/2021, 11/11/2021     COVID-19 Monovalent 12+ (Pfizer 2022) 06/11/2022     Flu, Unspecified 10/26/2020, 09/15/2021     Influenza Vaccine 18-64 (Flublok) 09/25/2018     Influenza Vaccine >6 months,quad, PF 10/31/2014, 12/07/2015, 11/22/2016, 11/15/2017, 10/26/2020, 09/15/2021      Influenza Vaccine, 6+MO IM (QUADRIVALENT W/PRESERVATIVES) 09/15/2012     Influenza,INJ,MDCK,PF,Quad >6mo(Flucelvax) 12/11/2022     Pneumococcal 20 valent Conjugate (Prevnar 20) 01/19/2024     TDAP (Adacel,Boostrix) 06/05/2007     TDAP Vaccine (Adacel) 12/07/2015     Allergies   Allergen Reactions     Penicillins      Allopurinol Rash     OBJECTIVE:                                                                 /73   Pulse 76   SpO2 96%             Physical Exam  Vitals and nursing note reviewed.   Constitutional:       General: He is not in acute distress.     Appearance: He is not diaphoretic.   HENT:      Head: Normocephalic and atraumatic.      Right Ear: Tympanic membrane, ear canal and external ear normal.      Left Ear: Tympanic membrane, ear canal and external ear normal.      Nose: No mucosal edema or rhinorrhea.      Right Turbinates: Not enlarged, swollen or pale.      Left Turbinates: Not enlarged, swollen or pale.      Mouth/Throat:      Lips: Pink.      Mouth: Mucous membranes are moist.      Pharynx: Oropharynx is clear. No pharyngeal swelling, oropharyngeal exudate or posterior oropharyngeal erythema.   Eyes:      General:         Right eye: No discharge.         Left eye: No discharge.      Conjunctiva/sclera: Conjunctivae normal.   Pulmonary:      Effort: Pulmonary effort is normal. No respiratory distress.      Breath sounds: Normal breath sounds and air entry. No stridor, decreased air movement or transmitted upper airway sounds. No decreased breath sounds, wheezing, rhonchi or rales.   Musculoskeletal:         General: Normal range of motion.   Neurological:      Mental Status: He is alert and oriented to person, place, and time.   Psychiatric:         Mood and Affect: Mood normal.         Behavior: Behavior normal.           ASSESSMENT/PLAN:         Penicillin allergy    Explained that the time elapsed since the last reaction is important because penicillin-specific IgE antibodies  decrease over time. Approximately 80 percent of patients with IgE-mediated penicillin allergy lose sensitivity after 10 years.   Discussed skin test followed by an amoxicillin oral challenge in the office setting vs Graded challenge without any skin test.  The patient is interested in undergoing a skin test first.    -Scheduled the patient for a skin test tomorrow at our [place] location.    Thank you for allowing us to participate in the care of this patient. Please feel free to contact us if there are any questions or concerns about the patient.    Disclaimer: This note consists of symbols derived from keyboarding, dictation and/or voice recognition software. As a result, there may be errors in the script that have gone undetected. Please consider this when interpreting information found in this chart.        Zenon Du MD, FAANANCYI, FACNANCYI  Allergy and Asthma     [facility]        Again, thank you for allowing me to participate in the care of your patient.        Sincerely,        Zenon Du MD

## 2024-08-08 ENCOUNTER — OFFICE VISIT (OUTPATIENT)
Dept: ALLERGY | Facility: CLINIC | Age: 64
End: 2024-08-08
Payer: COMMERCIAL

## 2024-08-08 VITALS
HEART RATE: 69 BPM | DIASTOLIC BLOOD PRESSURE: 67 MMHG | SYSTOLIC BLOOD PRESSURE: 108 MMHG | OXYGEN SATURATION: 97 % | RESPIRATION RATE: 16 BRPM

## 2024-08-08 DIAGNOSIS — Z88.0 H/O ALLERGY TO PENICILLIN: Primary | ICD-10-CM

## 2024-08-08 PROCEDURE — 95076 INGEST CHALLENGE INI 120 MIN: CPT | Performed by: ALLERGY & IMMUNOLOGY

## 2024-08-08 PROCEDURE — 99207 PR DROP WITH A PROCEDURE: CPT | Performed by: ALLERGY & IMMUNOLOGY

## 2024-08-08 PROCEDURE — 95018 ALL TSTG PERQ&IQ DRUGS/BIOL: CPT | Performed by: ALLERGY & IMMUNOLOGY

## 2024-08-08 NOTE — LETTER
8/8/2024      Geovanni Jasso  [address]  [place] [facility] [address]      Dear Colleague,    Thank you for referring your patient, Geovanni Jasso, to the [facility]. Please see a copy of my visit note below.    SUBJECTIVE:                                                                 Geovanni Jasso is a 63 year old male presenting today to our Allergy Clinic at  [facility] for penicillin testing.       No baseline urticaria, angioedema, vomiting, nausea, diarrhea, wheezing, or  rhinoconjunctivitis symptoms.         Patient Active Problem List   Diagnosis     Esophageal reflux     Hyperlipidemia LDL goal <70     Neck pain     Chronic rhinitis     Bilateral low back pain without sciatica     Chondromalacia patellae, right     S/P ACL reconstruction     Atherosclerosis of native coronary artery of native heart without angina pectoris     SOB (shortness of breath)     Non-allergic rhinitis     Positive CLAIRE (antinuclear antibody)     Hand joint stiff, right     Carpal tunnel syndrome     Osteoarthritis of knee, unspecified laterality, unspecified osteoarthritis type     Diverticulosis of large intestine     Internal hemorrhoids     Diffuse large B-cell lymphoma, unspecified body region (H)     Chemotherapy-induced neutropenia (H24)     Esophageal spasm     Chemotherapy-induced peripheral neuropathy (H24)     History of Clostridioides difficile colitis     Cervical disc disorder with radiculopathy     Facet arthropathy, cervical     Cervical disc herniation     Facet arthritis of lumbar region     Lumbar disc herniation, L3-L4     Degeneration of lumbar intervertebral disc     Facet arthritis, degenerative, cervical spine     Ulcerative (chronic) pancolitis without complications (H)     Chemotherapy-induced fatigue     Cervical spondylosis without myelopathy       Past Medical History:   Diagnosis Date     Cancer (H) 2-7-2023    just diagnosed with lymphona.     Carpal tunnel  syndrome      Chronic rhinitis 10/23/2015     Degeneration of lumbar intervertebral disc 11/28/2023     Esophageal reflux 12/26/2013     Hand joint stiff, right       Problem (# of Occurrences) Relation (Name,Age of Onset)    Diabetes (1) Father (Lobo Jasso)    Other Cancer (2) Father (Lobo Jasso): Lung cancer, Maternal Grandmother (Lyubov Linares): Unsure what type of cancer    Hyperlipidemia (1) Mother (Smita Jasso): medication           Negative family history of: Autoimmune Disease          Past Surgical History:   Procedure Laterality Date     BIOPSY  2-6-23     BIOPSY LYMPH NODE CERVICAL Left 2/16/2023    Procedure: excisional biopsy of a left cervical node;  Surgeon: Aung Tamayo MD;  Location: [facility] OR     COLONOSCOPY  2019    clean, do again in 10 years     COLONOSCOPY WITH CO2 INSUFFLATION N/A 07/17/2019    Procedure: COLONOSCOPY, WITH CO2 INSUFFLATION;  Surgeon: Jaison Hammer MD;  Location: [facility] OR     DESTRUCTION OF PARAVERTEBRAL FACETCERVICAL / THORACIC SINGLE Left 5/16/2024    Procedure: DESTRUCTION, NERVE, FACET JOINT, CERVICOTHORACIC, C3/4/5;  Surgeon: bL Carter MD;  Location: [facility] OR     ENT SURGERY  1979    Loss of hearing rt ear (tinnitis)     HERNIA REPAIR  2000     INJECT BLOCK MEDIAL BRANCH CERVICAL/THORACIC/LUMBAR Left 1/18/2024    Procedure: BLOCK, NERVE, FACET JOINT, MEDIAL BRANCH, DIAGNOSTIC C3/4/5;  Surgeon: Lb Carter MD;  Location: [facility] OR     INJECT BLOCK MEDIAL BRANCH CERVICAL/THORACIC/LUMBAR Left 4/25/2024    Procedure: BLOCK, NERVE, FACET JOINT, MEDIAL BRANCH, DIAGNOSTIC C3/4/5;  Surgeon: Lb Carter MD;  Location: [facility] OR     IR CHEST PORT PLACEMENT > 5 YRS OF AGE  3/3/2023     NO HISTORY OF SURGERY       ORTHOPEDIC SURGERY  1988,1992    rt knee, scope in 88; ACL repair Early 90s     Social History     Socioeconomic History     Marital status:      Spouse name: None     Number of children: None     Years of education: None     Highest  education level: None   Tobacco Use     Smoking status: Never     Passive exposure: Never     Smokeless tobacco: Never   Vaping Use     Vaping status: Never Used   Substance and Sexual Activity     Alcohol use: Yes     Comment: occassional, 1- 2 per week     Drug use: No     Sexual activity: Yes     Partners: Female     Birth control/protection: Male Surgical, Female Surgical     Comment: vasectomy   Other Topics Concern     Parent/sibling w/ CABG, MI or angioplasty before 65F 55M? No   Social History Narrative    August 7, 2024         ENVIRONMENTAL HISTORY: [place] The home is heated with a forced air. They does have central air conditioning. The patient's bedroom is furnished with carpeting in bedroom.  Pets inside the house include 1 dog(s), mainly lives in basement. There is no history of cockroach or mice infestation. There is/are 0 smokers in the house.  The house does not have a damp basement.      Social Determinants of Health     Financial Resource Strain: Low Risk  (1/19/2024)    Financial Resource Strain      Within the past 12 months, have you or your family members you live with been unable to get utilities (heat, electricity) when it was really needed?: No   Food Insecurity: Low Risk  (1/19/2024)    Food Insecurity      Within the past 12 months, did you worry that your food would run out before you got money to buy more?: No      Within the past 12 months, did the food you bought just not last and you didn t have money to get more?: No   Transportation Needs: Low Risk  (1/19/2024)    Transportation Needs      Within the past 12 months, has lack of transportation kept you from medical appointments, getting your medicines, non-medical meetings or appointments, work, or from getting things that you need?: No    Received from [facility], [facility]    Social Connections   Interpersonal Safety:  Low Risk  (1/19/2024)    Interpersonal Safety      Do you feel physically and emotionally safe where you currently live?: Yes      Within the past 12 months, have you been hit, slapped, kicked or otherwise physically hurt by someone?: No      Within the past 12 months, have you been humiliated or emotionally abused in other ways by your partner or ex-partner?: No   Housing Stability: Low Risk  (1/19/2024)    Housing Stability      Do you have housing? : Yes      Are you worried about losing your housing?: No                   Current Outpatient Medications:      atomoxetine (STRATTERA) 10 MG capsule, Take 1 capsule (10 mg) by mouth daily (with lunch) (Patient not taking: Reported on 8/8/2024), Disp: 30 capsule, Rfl: 5     doxycycline monohydrate (MONODOX) 100 MG capsule, Take 1 capsule (100 mg) by mouth 2 times daily (Patient not taking: Reported on 8/8/2024), Disp: 20 capsule, Rfl: 0     guaiFENesin (MUCINEX) 600 MG 12 hr tablet, Take 1 tablet (600 mg) by mouth 2 times daily (Patient not taking: Reported on 8/8/2024), Disp: 40 tablet, Rfl: 2     molnupiravir (LAGEVRIO) 200 MG capsule, LAGEVRIO 200 MG CAPS (Patient not taking: Reported on 6/3/2024), Disp: , Rfl:      predniSONE (DELTASONE) 5 MG tablet, Take 1 tablet (5 mg) by mouth daily (Patient not taking: Reported on 8/8/2024), Disp: 15 tablet, Rfl: 2  Immunization History   Administered Date(s) Administered     COVID-19 Bivalent 12+ (Pfizer) 12/11/2022     COVID-19 MONOVALENT 12+ (Pfizer) 03/23/2021, 04/13/2021, 11/11/2021     COVID-19 Monovalent 12+ (Pfizer 2022) 06/11/2022     Flu, Unspecified 10/26/2020, 09/15/2021     Influenza Vaccine 18-64 (Flublok) 09/25/2018     Influenza Vaccine >6 months,quad, PF 10/31/2014, 12/07/2015, 11/22/2016, 11/15/2017, 10/26/2020, 09/15/2021     Influenza Vaccine, 6+MO IM (QUADRIVALENT W/PRESERVATIVES) 09/15/2012     Influenza,INJ,MDCK,PF,Quad >6mo(Flucelvax) 12/11/2022     Pneumococcal 20 valent Conjugate (Prevnar 20)  01/19/2024     TDAP (Adacel,Boostrix) 06/05/2007     TDAP Vaccine (Adacel) 12/07/2015     Allergies   Allergen Reactions     Penicillins      Allopurinol Rash     OBJECTIVE:                                                                 /67 (BP Location: Left arm, Patient Position: Sitting, Cuff Size: Adult Large)   Pulse 69   Resp 16   SpO2 97%         Physical Exam  Vitals and nursing note reviewed.   Constitutional:       General: He is not in acute distress.     Appearance: He is not diaphoretic.   HENT:      Head: Normocephalic and atraumatic.      Right Ear: Tympanic membrane, ear canal and external ear normal.      Left Ear: Tympanic membrane, ear canal and external ear normal.      Nose: No mucosal edema or rhinorrhea.      Right Turbinates: Not enlarged, swollen or pale.      Left Turbinates: Not enlarged, swollen or pale.      Mouth/Throat:      Lips: Pink.      Mouth: Mucous membranes are moist.      Pharynx: Oropharynx is clear. No pharyngeal swelling, oropharyngeal exudate or posterior oropharyngeal erythema.   Eyes:      General:         Right eye: No discharge.         Left eye: No discharge.      Conjunctiva/sclera: Conjunctivae normal.   Pulmonary:      Effort: Pulmonary effort is normal. No respiratory distress.      Breath sounds: Normal breath sounds and air entry. No stridor, decreased air movement or transmitted upper airway sounds. No decreased breath sounds, wheezing, rhonchi or rales.   Musculoskeletal:         General: Normal range of motion.   Neurological:      Mental Status: He is alert and oriented to person, place, and time.   Psychiatric:         Mood and Affect: Mood normal.         Behavior: Behavior normal.               WORKUP:     Instructions:  In quick sequence, apply prick skin tests with penicillin reagents, plus positive and negative controls.  If prick test is negative or equivocal, apply Penicillin intradermal tests (2-3mm blebs) in duplicate along with diluent  or saline control  Read intradermal tests at time of placement and after 15 minutes  Read skin prick testing 15 minutes after placement  Product Lot #/Exp. Date Time Placed Skin Prick  W (mm) F (mm) Time Placed Intradermal #1  W (mm) F (mm) Intradermal #2  W (mm) F (mm)  Results  +/-       Time Read      Pre-Pen  0.02 ml of 6X10-5 molar [id number]  4/30/2025 0715  0730 0 0 0738 5 5 5 5 -        0753 4 4 4 4    Penicillin G  10,000 units/ ml [id number]  8/31/2026 0715  0730 0 0 0738 6 6 5 4 -        0753 6 5 5 4    Diluent/Control [id number]  2/21/2026 0715  0730 0 0 0738 5 4  -        0753 0 0     Histamine [id number]  3/28/2026 0715  0730 3 3  +     Criteria:  Positive prick skin test:  Induration >3mm greater than control.  Positive ID skin test:  Significant increase in size of original bleb with wheal diameter 3mm or larger than control.  Negative ID skin test:  No increase in size of original bleb and no reaction greater than control site.  Equivocal ID skin test: Wheal only slightly larger than initial injection bleb and control site, with or without erythematous flare OR duplicates are discordant.  Control site: If wheal >2-3mm after 15 min, repeat skin test to look for dermatographism.  Oral Challenge  Amoxicillin  250mg chewable tablet  or  250mg/5mL oral suspension Lot #/Exp. Dose Time of Ingestion Time of Vitals BP Pulse  (bpm) Signs/Symptoms Result  +/-     Dose (125mg)  Wait 30 minutes [id number]  5/31/2025 1.   Tablet  2. 2.5mL 0800 0830 108/70 58  98% - -   Full dose (375mg)  Wait 60 minutes [id number]  5/31/2025 1.  1   Tablets  2.  7.5mL 0835 0905  0935 110/78  115/75   61 98%  61 98% -  - -  -         After explaining advantages and disadvantages, including the risks of oral challenge, and signing the consent, we proceeded with testing.      Percutaneous skin puncture testing for Pre-Pen and pen G was negative with appropriate responses to positive and negative controls. Intradermal testing for Pre-Pen and  pen G, in duplicates, was also negative.     The patient tolerated graded Amoxicillin oral challenge. he  was monitored for 1 hour after last dose.        Challenge START TIME: 08:00     END TIME: 0935      Total duration of the challenge including montorin hours and 35 min     ASSESSMENT/PLAN:    H/O allergy to penicillin      The patient tolerated the graded amoxicillin oral challenge without a problem.  Skin test results and oral challenge suggest against current IgE-mediated sensitivity to penicillin.  - I suggest the patient call us back or send us a YellowSchedule message in seven days to ensure that he does not get any delayed symptoms.  If asymptomatic, I will remove penicillin from the allergy list     - SD INGESTION CHALLENGE TEST INITIAL 120 MINUTES  - SD ALLG TEST PERQ & IC DRUG/BIOL IMMED REACT W/ I&R         Follow-up as needed.    Thank you for allowing us to participate in the care of this patient. Please feel free to contact us if there are any questions or concerns about the patient.    Disclaimer: This note consists of symbols derived from keyboarding, dictation and/or voice recognition software. As a result, there may be errors in the script that have gone undetected. Please consider this when interpreting information found in this chart.    Zenon Du MD, FAAAAI, FACAAI  Allergy and Asthma     [facility]       Again, thank you for allowing me to participate in the care of your patient.        Sincerely,        Zenon Du MD

## 2024-08-08 NOTE — PROGRESS NOTES
SUBJECTIVE:                                                                 Geovanni Jasso is a 63 year old male presenting today to our Allergy Clinic at  [facility] for penicillin testing.       No baseline urticaria, angioedema, vomiting, nausea, diarrhea, wheezing, or  rhinoconjunctivitis symptoms.         Patient Active Problem List   Diagnosis    Esophageal reflux    Hyperlipidemia LDL goal <70    Neck pain    Chronic rhinitis    Bilateral low back pain without sciatica    Chondromalacia patellae, right    S/P ACL reconstruction    Atherosclerosis of native coronary artery of native heart without angina pectoris    SOB (shortness of breath)    Non-allergic rhinitis    Positive CLAIRE (antinuclear antibody)    Hand joint stiff, right    Carpal tunnel syndrome    Osteoarthritis of knee, unspecified laterality, unspecified osteoarthritis type    Diverticulosis of large intestine    Internal hemorrhoids    Diffuse large B-cell lymphoma, unspecified body region (H)    Chemotherapy-induced neutropenia (H24)    Esophageal spasm    Chemotherapy-induced peripheral neuropathy (H24)    History of Clostridioides difficile colitis    Cervical disc disorder with radiculopathy    Facet arthropathy, cervical    Cervical disc herniation    Facet arthritis of lumbar region    Lumbar disc herniation, L3-L4    Degeneration of lumbar intervertebral disc    Facet arthritis, degenerative, cervical spine    Ulcerative (chronic) pancolitis without complications (H)    Chemotherapy-induced fatigue    Cervical spondylosis without myelopathy       Past Medical History:   Diagnosis Date    Cancer (H) 2-7-2023    just diagnosed with lymphona.    Carpal tunnel syndrome     Chronic rhinitis 10/23/2015    Degeneration of lumbar intervertebral disc 11/28/2023    Esophageal reflux 12/26/2013    Hand joint stiff, right       Problem (# of Occurrences) Relation (Name,Age of Onset)    Diabetes (1) Father (Ole Louisa)     Other Cancer (2) Father (Lobo Jasso): Lung cancer, Maternal Grandmother (Lyubov Linares): Unsure what type of cancer    Hyperlipidemia (1) Mother (Smita Jasso): medication           Negative family history of: Autoimmune Disease          Past Surgical History:   Procedure Laterality Date    BIOPSY  2-6-23    BIOPSY LYMPH NODE CERVICAL Left 2/16/2023    Procedure: excisional biopsy of a left cervical node;  Surgeon: Aung Tamayo MD;  Location: [facility] OR    COLONOSCOPY  2019    clean, do again in 10 years    COLONOSCOPY WITH CO2 INSUFFLATION N/A 07/17/2019    Procedure: COLONOSCOPY, WITH CO2 INSUFFLATION;  Surgeon: Jaison Hammer MD;  Location: [facility] OR    DESTRUCTION OF PARAVERTEBRAL FACETCERVICAL / THORACIC SINGLE Left 5/16/2024    Procedure: DESTRUCTION, NERVE, FACET JOINT, CERVICOTHORACIC, C3/4/5;  Surgeon: Lb Carter MD;  Location: [facility] OR    ENT SURGERY  1979    Loss of hearing rt ear (tinnitis)    HERNIA REPAIR  2000    INJECT BLOCK MEDIAL BRANCH CERVICAL/THORACIC/LUMBAR Left 1/18/2024    Procedure: BLOCK, NERVE, FACET JOINT, MEDIAL BRANCH, DIAGNOSTIC C3/4/5;  Surgeon: Lb Carter MD;  Location: [facility] OR    INJECT BLOCK MEDIAL BRANCH CERVICAL/THORACIC/LUMBAR Left 4/25/2024    Procedure: BLOCK, NERVE, FACET JOINT, MEDIAL BRANCH, DIAGNOSTIC C3/4/5;  Surgeon: Lb Carter MD;  Location: [facility] OR    IR CHEST PORT PLACEMENT > 5 YRS OF AGE  3/3/2023    NO HISTORY OF SURGERY      ORTHOPEDIC SURGERY  1988,1992    rt knee, scope in 88; ACL repair Early 90s     Social History     Socioeconomic History    Marital status:      Spouse name: None    Number of children: None    Years of education: None    Highest education level: None   Tobacco Use    Smoking status: Never     Passive exposure: Never    Smokeless tobacco: Never   Vaping Use    Vaping status: Never Used   Substance and Sexual Activity    Alcohol use: Yes     Comment: occassional, 1- 2 per week    Drug use: No    Sexual  activity: Yes     Partners: Female     Birth control/protection: Male Surgical, Female Surgical     Comment: vasectomy   Other Topics Concern    Parent/sibling w/ CABG, MI or angioplasty before 65F 55M? No   Social History Narrative    August 7, 2024         ENVIRONMENTAL HISTORY: [place] The home is heated with a forced air. They does have central air conditioning. The patient's bedroom is furnished with carpeting in bedroom.  Pets inside the house include 1 dog(s), mainly lives in basement. There is no history of cockroach or mice infestation. There is/are 0 smokers in the house.  The house does not have a damp basement.      Social Determinants of Health     Financial Resource Strain: Low Risk  (1/19/2024)    Financial Resource Strain     Within the past 12 months, have you or your family members you live with been unable to get utilities (heat, electricity) when it was really needed?: No   Food Insecurity: Low Risk  (1/19/2024)    Food Insecurity     Within the past 12 months, did you worry that your food would run out before you got money to buy more?: No     Within the past 12 months, did the food you bought just not last and you didn t have money to get more?: No   Transportation Needs: Low Risk  (1/19/2024)    Transportation Needs     Within the past 12 months, has lack of transportation kept you from medical appointments, getting your medicines, non-medical meetings or appointments, work, or from getting things that you need?: No    Received from [facility], [facility]    Social Connections   Interpersonal Safety: Low Risk  (1/19/2024)    Interpersonal Safety     Do you feel physically and emotionally safe where you currently live?: Yes     Within the past 12 months, have you been hit, slapped, kicked or otherwise physically hurt by someone?: No     Within the past 12 months, have you been  humiliated or emotionally abused in other ways by your partner or ex-partner?: No   Housing Stability: Low Risk  (1/19/2024)    Housing Stability     Do you have housing? : Yes     Are you worried about losing your housing?: No                   Current Outpatient Medications:     atomoxetine (STRATTERA) 10 MG capsule, Take 1 capsule (10 mg) by mouth daily (with lunch) (Patient not taking: Reported on 8/8/2024), Disp: 30 capsule, Rfl: 5    doxycycline monohydrate (MONODOX) 100 MG capsule, Take 1 capsule (100 mg) by mouth 2 times daily (Patient not taking: Reported on 8/8/2024), Disp: 20 capsule, Rfl: 0    guaiFENesin (MUCINEX) 600 MG 12 hr tablet, Take 1 tablet (600 mg) by mouth 2 times daily (Patient not taking: Reported on 8/8/2024), Disp: 40 tablet, Rfl: 2    molnupiravir (LAGEVRIO) 200 MG capsule, LAGEVRIO 200 MG CAPS (Patient not taking: Reported on 6/3/2024), Disp: , Rfl:     predniSONE (DELTASONE) 5 MG tablet, Take 1 tablet (5 mg) by mouth daily (Patient not taking: Reported on 8/8/2024), Disp: 15 tablet, Rfl: 2  Immunization History   Administered Date(s) Administered    COVID-19 Bivalent 12+ (Pfizer) 12/11/2022    COVID-19 MONOVALENT 12+ (Pfizer) 03/23/2021, 04/13/2021, 11/11/2021    COVID-19 Monovalent 12+ (Pfizer 2022) 06/11/2022    Flu, Unspecified 10/26/2020, 09/15/2021    Influenza Vaccine 18-64 (Flublok) 09/25/2018    Influenza Vaccine >6 months,quad, PF 10/31/2014, 12/07/2015, 11/22/2016, 11/15/2017, 10/26/2020, 09/15/2021    Influenza Vaccine, 6+MO IM (QUADRIVALENT W/PRESERVATIVES) 09/15/2012    Influenza,INJ,MDCK,PF,Quad >6mo(Flucelvax) 12/11/2022    Pneumococcal 20 valent Conjugate (Prevnar 20) 01/19/2024    TDAP (Adacel,Boostrix) 06/05/2007    TDAP Vaccine (Adacel) 12/07/2015     Allergies   Allergen Reactions    Penicillins     Allopurinol Rash     OBJECTIVE:                                                                 /67 (BP Location: Left arm, Patient Position: Sitting, Cuff Size:  Adult Large)   Pulse 69   Resp 16   SpO2 97%         Physical Exam  Vitals and nursing note reviewed.   Constitutional:       General: He is not in acute distress.     Appearance: He is not diaphoretic.   HENT:      Head: Normocephalic and atraumatic.      Right Ear: Tympanic membrane, ear canal and external ear normal.      Left Ear: Tympanic membrane, ear canal and external ear normal.      Nose: No mucosal edema or rhinorrhea.      Right Turbinates: Not enlarged, swollen or pale.      Left Turbinates: Not enlarged, swollen or pale.      Mouth/Throat:      Lips: Pink.      Mouth: Mucous membranes are moist.      Pharynx: Oropharynx is clear. No pharyngeal swelling, oropharyngeal exudate or posterior oropharyngeal erythema.   Eyes:      General:         Right eye: No discharge.         Left eye: No discharge.      Conjunctiva/sclera: Conjunctivae normal.   Pulmonary:      Effort: Pulmonary effort is normal. No respiratory distress.      Breath sounds: Normal breath sounds and air entry. No stridor, decreased air movement or transmitted upper airway sounds. No decreased breath sounds, wheezing, rhonchi or rales.   Musculoskeletal:         General: Normal range of motion.   Neurological:      Mental Status: He is alert and oriented to person, place, and time.   Psychiatric:         Mood and Affect: Mood normal.         Behavior: Behavior normal.               WORKUP:     Instructions:  In quick sequence, apply prick skin tests with penicillin reagents, plus positive and negative controls.  If prick test is negative or equivocal, apply Penicillin intradermal tests (2-3mm blebs) in duplicate along with diluent or saline control  Read intradermal tests at time of placement and after 15 minutes  Read skin prick testing 15 minutes after placement  Product Lot #/Exp. Date Time Placed Skin Prick  W (mm) F (mm) Time Placed Intradermal #1  W (mm) F (mm) Intradermal #2  W (mm) F (mm)  Results  +/-       Time Read       Pre-Pen  0.02 ml of 6X10-5 molar [id number]  2025 0715  0730 0 0 0738 5 5 5 5 -        0753 4 4 4 4    Penicillin G  10,000 units/ ml [id number]  2026 0715  0730 0 0 0738 6 6 5 4 -        0753 6 5 5 4    Diluent/Control [id number]  2026 0715  0730 0 0 0738 5 4  -        0753 0 0     Histamine [id number]  3/28/2026 0715  0730 3 3  +     Criteria:  Positive prick skin test:  Induration >3mm greater than control.  Positive ID skin test:  Significant increase in size of original bleb with wheal diameter 3mm or larger than control.  Negative ID skin test:  No increase in size of original bleb and no reaction greater than control site.  Equivocal ID skin test: Wheal only slightly larger than initial injection bleb and control site, with or without erythematous flare OR duplicates are discordant.  Control site: If wheal >2-3mm after 15 min, repeat skin test to look for dermatographism.  Oral Challenge  Amoxicillin  250mg chewable tablet  or  250mg/5mL oral suspension Lot #/Exp. Dose Time of Ingestion Time of Vitals BP Pulse  (bpm) Signs/Symptoms Result  +/-     Dose (125mg)  Wait 30 minutes [id number]  2025 1.   Tablet  2. 2.5mL 0800 0830 108/70 58  98% - -   Full dose (375mg)  Wait 60 minutes [id number]  2025 1.  1   Tablets  2.  7.5mL 0835 0905  0935 110/78  115/75   61 98%  61 98% -  - -  -         After explaining advantages and disadvantages, including the risks of oral challenge, and signing the consent, we proceeded with testing.      Percutaneous skin puncture testing for Pre-Pen and pen G was negative with appropriate responses to positive and negative controls. Intradermal testing for Pre-Pen and pen G, in duplicates, was also negative.     The patient tolerated graded Amoxicillin oral challenge. he  was monitored for 1 hour after last dose.        Challenge START TIME: 08:00     END TIME: 0935      Total duration of the challenge including montorin hours and 35 min     ASSESSMENT/PLAN:    H/O  allergy to penicillin      The patient tolerated the graded amoxicillin oral challenge without a problem.  Skin test results and oral challenge suggest against current IgE-mediated sensitivity to penicillin.  - I suggest the patient call us back or send us a Triductor message in seven days to ensure that he does not get any delayed symptoms.  If asymptomatic, I will remove penicillin from the allergy list     - [place] INGESTION CHALLENGE TEST INITIAL 120 MINUTES  - [place] ALLG TEST PERQ & IC DRUG/BIOL IMMED REACT W/ I&R         Follow-up as needed.    Thank you for allowing us to participate in the care of this patient. Please feel free to contact us if there are any questions or concerns about the patient.    Disclaimer: This note consists of symbols derived from keyboarding, dictation and/or voice recognition software. As a result, there may be errors in the script that have gone undetected. Please consider this when interpreting information found in this chart.    Zenon Du MD, FAAAAI, FACAAI  Allergy and Asthma     [facility]

## 2024-10-07 ASSESSMENT — ASTHMA QUESTIONNAIRES
QUESTION_1 LAST FOUR WEEKS HOW MUCH OF THE TIME DID YOUR ASTHMA KEEP YOU FROM GETTING AS MUCH DONE AT WORK, SCHOOL OR AT HOME: NONE OF THE TIME
ACT_TOTALSCORE: 25
QUESTION_5 LAST FOUR WEEKS HOW WOULD YOU RATE YOUR ASTHMA CONTROL: COMPLETELY CONTROLLED
QUESTION_3 LAST FOUR WEEKS HOW OFTEN DID YOUR ASTHMA SYMPTOMS (WHEEZING, COUGHING, SHORTNESS OF BREATH, CHEST TIGHTNESS OR PAIN) WAKE YOU UP AT NIGHT OR EARLIER THAN USUAL IN THE MORNING: NOT AT ALL
QUESTION_4 LAST FOUR WEEKS HOW OFTEN HAVE YOU USED YOUR RESCUE INHALER OR NEBULIZER MEDICATION (SUCH AS ALBUTEROL): NOT AT ALL
QUESTION_2 LAST FOUR WEEKS HOW OFTEN HAVE YOU HAD SHORTNESS OF BREATH: NOT AT ALL
ACT_TOTALSCORE: 25

## 2024-10-08 ENCOUNTER — OFFICE VISIT (OUTPATIENT)
Dept: FAMILY MEDICINE | Facility: CLINIC | Age: 64
End: 2024-10-08
Payer: COMMERCIAL

## 2024-10-08 VITALS
OXYGEN SATURATION: 98 % | SYSTOLIC BLOOD PRESSURE: 113 MMHG | BODY MASS INDEX: 26.46 KG/M2 | HEIGHT: 71 IN | TEMPERATURE: 97.2 F | WEIGHT: 189 LBS | HEART RATE: 76 BPM | DIASTOLIC BLOOD PRESSURE: 76 MMHG | RESPIRATION RATE: 18 BRPM

## 2024-10-08 DIAGNOSIS — E04.1 NODULE OF LEFT LOBE OF THYROID GLAND: ICD-10-CM

## 2024-10-08 DIAGNOSIS — M50.123 CERVICAL DISC DISORDER AT C6-C7 LEVEL WITH RADICULOPATHY: Primary | ICD-10-CM

## 2024-10-08 PROCEDURE — G2211 COMPLEX E/M VISIT ADD ON: HCPCS | Performed by: INTERNAL MEDICINE

## 2024-10-08 PROCEDURE — 99214 OFFICE O/P EST MOD 30 MIN: CPT | Performed by: INTERNAL MEDICINE

## 2024-10-08 ASSESSMENT — PAIN SCALES - GENERAL: PAINLEVEL: NO PAIN (0)

## 2024-10-08 NOTE — PROGRESS NOTES
[facility] Internal Medicine Progress Note           Assessment and Plan:     Cervical disc disorder at C6-C7 level with radiculopathy  - MR Cervical Spine w/o Contrast; Future    Nodule of left lobe of thyroid gland  - US Thyroid; Future          Interval History:     Reason for visit:  Irritations in upper back, lump in throat at rest  Symptom onset:  More than a month  Symptoms include:  Tingling in upper back. Feeling like a lump in throat at night when going to sleep  Symptom intensity:  Moderate  Symptom progression:  Staying the same  Had these symptoms before:  No  What makes it worse:  No  What makes it better:  No   He is taking medications regularly.           Significant Problems:     Patient Active Problem List   Diagnosis    Esophageal reflux    Hyperlipidemia LDL goal <70    Neck pain    Chronic rhinitis    Bilateral low back pain without sciatica    Chondromalacia patellae, right    S/P ACL reconstruction    Atherosclerosis of native coronary artery of native heart without angina pectoris    SOB (shortness of breath)    Non-allergic rhinitis    Positive CLAIRE (antinuclear antibody)    Hand joint stiff, right    Carpal tunnel syndrome    Osteoarthritis of knee, unspecified laterality, unspecified osteoarthritis type    Diverticulosis of large intestine    Internal hemorrhoids    Diffuse large B-cell lymphoma, unspecified body region (H)    Chemotherapy-induced neutropenia (H)    Esophageal spasm    Chemotherapy-induced peripheral neuropathy (H)    History of Clostridioides difficile colitis    Cervical disc disorder with radiculopathy    Facet arthropathy, cervical    Cervical disc herniation    Facet arthritis of lumbar region    Lumbar disc herniation, L3-L4    Degeneration of lumbar intervertebral disc    Facet arthritis, degenerative, cervical spine    Ulcerative (chronic) pancolitis without complications (H)    Chemotherapy-induced fatigue    Cervical spondylosis without myelopathy     "          Review of Systems:     CONSTITUTIONAL: NEGATIVE for fever, chills, change in weight  INTEGUMENTARY/SKIN: NEGATIVE for worrisome rashes, moles or lesions  EYES: NEGATIVE for vision changes or irritation  ENT/MOUTH: NEGATIVE for ear, mouth and throat problems  RESP: NEGATIVE for significant cough or SOB  CV: NEGATIVE for chest pain, palpitations or peripheral edema  GI: NEGATIVE for nausea, abdominal pain, heartburn, or change in bowel habits  : NEGATIVE for frequency, dysuria, or hematuria  MUSCULOSKELETAL: NEGATIVE for significant arthralgias or myalgia  NEURO: NEGATIVE for HX CVA and HX TIA  ENDOCRINE: NEGATIVE for temperature intolerance, skin/hair changes  HEME: NEGATIVE for bleeding problems  PSYCHIATRIC: NEGATIVE for changes in mood or affect            Medications:     No current outpatient medications on file.     No current facility-administered medications for this visit.             Physical Exam:   /76 (BP Location: Left arm, Patient Position: Sitting, Cuff Size: Adult Large)   Pulse 76   Temp 97.2  F (36.2  C) (Temporal)   Resp 18   Ht 1.799 m (5' 10.83\")   Wt 85.7 kg (189 lb)   SpO2 98%   BMI 26.49 kg/m     Constitutional:   awake, alert, cooperative, no apparent distress, and appears stated age     Eyes:   extra-ocular muscles intact     ENT:   normocephalic, without obvious abnormality     Lungs:   no increased work of breathing, no retractions, and clear to auscultation     Cardiovascular:   regular rate and rhythm     Musculoskeletal:   no lower extremity pitting edema present     Neurologic:   Motor Exam:  moves all extremities well and symmetrically     Neuropsychiatric:   Affect: normal             Data:     Narrative & Impression   US THYROID 10/6/2023 7:17 AM     COMPARISON: PET CT 7/21/2023     HISTORY: Thyroid nodule mildly FDG avid.     FINDINGS:   Thyroid parenchyma: homogenous  The right lobe of the thyroid measures: 4.7 x 1.7 x 1.7 cm  The left lobe of the thyroid " measures: 4.6 x 1.4 x 1.3 cm  The thyroid isthmus measures: 0.2 cm     Nodule 1:  Lobe: Left  Location: Mid posteriorly  Size: 0.9 x 0.5 x 0.8 cm  Composition: Solid or almost completely solid (2 points)  Echogenicity: Very hypoechoic (3 points)  Shape: Wider than tall (0 points)  Margin: Smooth (0 points)  Echogenic Foci: None or large comet tail artifact (0 points)  Stability: No significant change in size  TIRADS: TR4 (4-6 points)                                                                          Impression:  0.9 cm left TR4 nodule as detailed above, corresponding to the mildly  FDG-avid lesion seen on PET CT, which may be in the thyroid or a  parathyroid adenoma. Although per the ACR TI-RADS guidelines below, no  further follow-up is necessary. However, given the focal FDG uptake,  FNA could be considered.        ACR TI-RADS recommendations  TR2 (2 points) & TR1 (0 points) -No FNA or follow-up  TR3 (3 points) - FNA if ? 2.5cm, follow-up if 1.5 -2.4 cm in 1, 3 and  5 years  TR4 (4-6 points) - FNA if ? 1.5cm, follow-up if 1 -1.4 cm in 1, 2, 3  and 5 years  TR5 (?7 points) - FNA if ? 1cm, follow-up if 0.5 -0.9 cm every year  for 5 years      I have personally reviewed the examination and initial interpretation  and I agree with the findings.     GIOVANI GUERRERO MD          Disposition:  Follow-up in 4 weeks or as needed.    Paddy Holly MD  Internal Medicine  [facility] Team

## 2024-10-08 NOTE — PATIENT INSTRUCTIONS
At [facility], we strive to deliver an exceptional experience to you, every time we see you. If you receive a survey, please let us know what we are doing well and/or what we could improve upon, as we do value your feedback.  If you have MyChart, you can expect to receive results automatically within 24 hours of their completion.  Your provider will send a note interpreting your results as well.   If you do not have MyChart, you should receive your results in about a week by mail.    Your care team:                            Family Medicine Internal Medicine   MD Paddy Merrill, MD Chacha Mascorro, MD Lui Ashton, MD Aruna Tavera, PAMagdalenaC    Bishnu Sawant, MD Pediatrics   Shabnam Ruiz, MD Cynthia Hercules, MD Hailey Persaud, APRN CNP Odessa Brooke APRN CNP   MD Dedra Barakat, MD Claudine Burleson, CNP     Angel Starks, CNP Same-Day Provider (No follow-up visits)   PURNIMA Ash, DNP Michaela Ling, PURNIMA Rodriguez, FNP, BC HOLGER GranadoC     Clinic hours: Monday - Thursday 7 am-6 pm; Fridays 7 am-5 pm.   Urgent care: Monday - Friday 10 am- 8 pm; Saturday and Sunday 9 am-5 pm.    Clinic: [phone]       [facility]: Monday - Thursday 8 am - 7 pm; Friday 8 am - 6 pm  [facility]: [phone]

## 2024-11-13 ENCOUNTER — ANCILLARY PROCEDURE (OUTPATIENT)
Dept: MRI IMAGING | Facility: CLINIC | Age: 64
End: 2024-11-13
Attending: INTERNAL MEDICINE
Payer: COMMERCIAL

## 2024-11-13 ENCOUNTER — ANCILLARY PROCEDURE (OUTPATIENT)
Dept: ULTRASOUND IMAGING | Facility: CLINIC | Age: 64
End: 2024-11-13
Attending: INTERNAL MEDICINE
Payer: COMMERCIAL

## 2024-11-13 DIAGNOSIS — E04.1 NODULE OF LEFT LOBE OF THYROID GLAND: ICD-10-CM

## 2024-11-13 DIAGNOSIS — M50.123 CERVICAL DISC DISORDER AT C6-C7 LEVEL WITH RADICULOPATHY: ICD-10-CM

## 2024-11-13 PROCEDURE — 72141 MRI NECK SPINE W/O DYE: CPT | Performed by: RADIOLOGY

## 2024-11-18 ENCOUNTER — ANCILLARY PROCEDURE (OUTPATIENT)
Dept: GENERAL RADIOLOGY | Facility: CLINIC | Age: 64
End: 2024-11-18
Attending: PHYSICIAN ASSISTANT
Payer: COMMERCIAL

## 2024-11-18 ENCOUNTER — OFFICE VISIT (OUTPATIENT)
Dept: URGENT CARE | Facility: URGENT CARE | Age: 64
End: 2024-11-18
Payer: COMMERCIAL

## 2024-11-18 VITALS
SYSTOLIC BLOOD PRESSURE: 110 MMHG | TEMPERATURE: 98.4 F | OXYGEN SATURATION: 98 % | WEIGHT: 195.1 LBS | RESPIRATION RATE: 16 BRPM | BODY MASS INDEX: 27.34 KG/M2 | HEART RATE: 65 BPM | DIASTOLIC BLOOD PRESSURE: 66 MMHG

## 2024-11-18 DIAGNOSIS — S20.212A CONTUSION OF LEFT CHEST WALL, INITIAL ENCOUNTER: Primary | ICD-10-CM
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
## 2024-11-18 DIAGNOSIS — R07.81 PAIN IN RIB: ICD-10-CM

## 2024-11-18 NOTE — PROGRESS NOTES
"Assessment & Plan   Contusion of left chest wall, initial encounter    Fall down stairs, initial encounter  - XR Ribs & Chest Left G/E 3 Views; Future    Pain in rib  - XR Ribs & Chest Left G/E 3 Views; Future    CXR with clear lung field without infiltrate or effusion, Ribs without fracture or displacement by my read, awaiting read by radiology. Discussed taking a deep breath once an hour, ice, Tylenol and ibuprofen. Add heat in 24-48 hours.     Return in about 2 weeks (around 12/2/2024) for visit with primary care provider if not improving.     Venus Pisano PA-C  [facility]RG[facility]    Subjective   Geovanni Jasso is a 64 year old who presents for the following health issues     Patient presents with:  Urgent Care  Fall: After doing all the [place] lights, talking to wife and as turned didn't know there's a step, \"tumble\" and landed on left shoulder, painful in left side chest area when breathe and when cough, it happened yesterday early evening       HPI    Bill presents to clinic for evaluation of pain in his left chest.  Yesterday, he was standing on a step talking with his wife.  He stepped to the side, stepping off of the step tumbling and falling on his left shoulder left back and chest.  He has pain in his anterior chest, just under his pectoral muscles.  Point tenderness.  No shortness of breath.    Review of Systems   ROS negative except as stated above.      Objective    /66 (BP Location: Left arm, Patient Position: Sitting, Cuff Size: Adult Regular)   Pulse 65   Temp 98.4  F (36.9  C) (Oral)   Resp 16   Wt 88.5 kg (195 lb 1.6 oz)   SpO2 98%   BMI 27.34 kg/m    Physical Exam   GENERAL: alert and no distress  RESP: lungs clear to auscultation - no rales, rhonchi or wheezes  CV: regular rate and rhythm, normal S1 S2, no S3 or S4, no murmur, click or rub, no peripheral edema  MS: point tenderness to palpation of ribs/muscles of left chest wall, anteriorly.    No results " found for any visits on 11/18/24.

## 2024-12-09 NOTE — PROGRESS NOTES
[facility] Dermatology Note  Encounter Date: Dec 12, 2024  Office Visit     Dermatology Problem List:  Last TBSE skin check 6/3/24, upper body 12/12/24  1. Possible onychomycosis  - nail clippings 4/16/21 and 7/21/21 both negative.   - current tx: Jublia daily.  -previous tx: terbinafine with improvement  2.Acne Rosacea    - Apply metrocream BID to the face      Relevant medical hx: non Hodgkins lymphoma    ____________________________________________    Assessment & Plan:    # Intermittent pain, upper back/neck post chemotherapy. Patient does report previously having shingles. Skin appears to be normal on the L shoulder. Possible musculoskeletal issue  - see PCP, consider post herpetic neuralgia or muscular issues,    -reviewed MR, multiple bulging disks so placed ortho spine referral. Has had a neck procedure in the past    # History of non-Hodgkins lymphoma  - Reviewed importance of regular skin exams   - ABCDEs: Counseled ABCDEs of melanoma: Asymmetry, Border (irregularity), Color (not uniform, changes in color), Diameter (greater than 6 mm which is about the size of a pencil eraser), and Evolving (any changes in preexisting moles).  - Sun protection: Counseled SPF30+ sunscreen, UPF clothing, sun avoidance, tanning bed avoidance.    #Onychomycosis.   -rechecked today       #ancieform papule, right cheek, If not resolved in 3 weeks, call clinic. Present 3 days.          Procedures Performed:   none    Follow-up:  1 year dermatology    Staff and Scribe:     Scribe Disclosure:   I, Ankita Peña, am serving as a scribe to document services personally performed by Marbella Castro MD based on data collection and the provider's statements to me.       Provider Disclosure:   The documentation recorded by the scribe accurately reflects the services I personally performed and the decisions made by me.    Marbella Castro MD    Department of Dermatology  [facility]  [facility]: Phone: [phone], Fax:[phone]  [facility]: Phone: [phone], Fax: [phone] ____________________________________________    CC: Derm Problem (Would like upper body skin check. Spot on upper left back that intermittently hurts after chemo treatment. Spot on right cheek. )    HPI:  Mr. Geovanni Jasso is a(n) 64 year old male who presents today as a return patient for spot check    Today, patient reports area of concern on the upper left back that is sometimes painful after his chemo treatment. Also reports a spot on R cheek he would like checked. He would like an upper body skin check today.       Patient is otherwise feeling well, without additional skin concerns.    Labs Reviewed:  N/A    Physical Exam:  Vitals: There were no vitals taken for this visit.  SKIN: Waist-up skin, which includes the head/face, neck, both arms, chest, back, abdomen, digits and/or nails was examined.  - Skin appears to be normal on the R shoulder/neck  - acneiform papule R cheek    - No other lesions of concern on areas examined.     Medications:  No current outpatient medications on file.     No current facility-administered medications for this visit.      Past Medical History:   Patient Active Problem List   Diagnosis    Esophageal reflux    Hyperlipidemia LDL goal <70    Neck pain    Chronic rhinitis    Bilateral low back pain without sciatica    Chondromalacia patellae, right    S/P ACL reconstruction    Atherosclerosis of native coronary artery of native heart without angina pectoris    SOB (shortness of breath)    Non-allergic rhinitis    Positive CLAIRE (antinuclear antibody)    Hand joint stiff, right    Carpal tunnel syndrome    Osteoarthritis of knee, unspecified laterality, unspecified osteoarthritis type    Diverticulosis of large intestine    Internal hemorrhoids    Diffuse large B-cell lymphoma, unspecified body region (H)     Chemotherapy-induced neutropenia (H)    Esophageal spasm    Chemotherapy-induced peripheral neuropathy (H)    History of Clostridioides difficile colitis    Cervical disc disorder with radiculopathy    Facet arthropathy, cervical    Cervical disc herniation    Facet arthritis of lumbar region    Lumbar disc herniation, L3-L4    Degeneration of lumbar intervertebral disc    Facet arthritis, degenerative, cervical spine    Ulcerative (chronic) pancolitis without complications (H)    Chemotherapy-induced fatigue    Cervical spondylosis without myelopathy     Past Medical History:   Diagnosis Date    Cancer (H) 2-7-2023    just diagnosed with lymphona.    Carpal tunnel syndrome     Chronic rhinitis 10/23/2015    Degeneration of lumbar intervertebral disc 11/28/2023    Esophageal reflux 12/26/2013    Hand joint stiff, right         CC Referred Self, MD  No address on file on close of this encounter.

## 2024-12-12 ENCOUNTER — OFFICE VISIT (OUTPATIENT)
Dept: DERMATOLOGY | Facility: CLINIC | Age: 64
End: 2024-12-12
Payer: COMMERCIAL

## 2024-12-12 DIAGNOSIS — M51.369 BULGING LUMBAR DISC: Primary | ICD-10-CM

## 2024-12-12 ASSESSMENT — PAIN SCALES - GENERAL: PAINLEVEL_OUTOF10: MILD PAIN (2)

## 2024-12-12 NOTE — PROGRESS NOTES
Geovanni Jasso's goals for this visit include:   Chief Complaint   Patient presents with    Derm Problem     Would like upper body skin check. Spot on upper left back that intermittently hurts after chemo treatment. Spot on right cheek.        He requests these members of his care team be copied on today's visit information: no    PCP: Paddy Holly    Referring Provider:  Referred Self, MD  No address on file    There were no vitals taken for this visit.    Do you need any medication refills at today's visit? No  Aileen Matamoros LPN

## 2024-12-12 NOTE — PATIENT INSTRUCTIONS

## 2024-12-12 NOTE — LETTER
12/12/2024      Geovanni Jasso  [address]  [place] [address]      Dear Colleague,    Thank you for referring your patient, Geovanni Jasso, to the [facility]. Please see a copy of my visit note below.      [facility] Dermatology Note  Encounter Date: Dec 12, 2024  Office Visit     Dermatology Problem List:  Last TBSE skin check 6/3/24, upper body 12/12/24  1. Possible onychomycosis  - nail clippings 4/16/21 and 7/21/21 both negative.   - current tx: Jublia daily.  -previous tx: terbinafine with improvement  2.Acne Rosacea    - Apply metrocream BID to the face      Relevant medical hx: non Hodgkins lymphoma    ____________________________________________    Assessment & Plan:    # Intermittent pain, upper back/neck post chemotherapy. Patient does report previously having shingles. Skin appears to be normal on the L shoulder. Possible musculoskeletal issue  - see PCP, consider post herpetic neuralgia or muscular issues,    -reviewed MR, multiple bulging disks so placed ortho spine referral. Has had a neck procedure in the past    # History of non-Hodgkins lymphoma  - Reviewed importance of regular skin exams   - ABCDEs: Counseled ABCDEs of melanoma: Asymmetry, Border (irregularity), Color (not uniform, changes in color), Diameter (greater than 6 mm which is about the size of a pencil eraser), and Evolving (any changes in preexisting moles).  - Sun protection: Counseled SPF30+ sunscreen, UPF clothing, sun avoidance, tanning bed avoidance.    #Onychomycosis.   -rechecked today       #ancieform papule, right cheek, If not resolved in 3 weeks, call clinic. Present 3 days.          Procedures Performed:   none    Follow-up:  1 year dermatology    Staff and Scribe:     Scribe Disclosure:   FELISHA, Ankita Peña, am serving as a scribe to document services personally performed by Marbella Castro MD based on data collection and the provider's statements to me.       Provider Disclosure:    The documentation recorded by the scribe accurately reflects the services I personally performed and the decisions made by me.    Marbella Castro MD    Department of Dermatology  [facility] [place]: Phone: [phone], Fax:[phone]  [facility]: Phone: [phone], Fax: [phone] ____________________________________________    CC: Derm Problem (Would like upper body skin check. Spot on upper left back that intermittently hurts after chemo treatment. Spot on right cheek. )    HPI:  Mr. Geovanni Jasso is a(n) 64 year old male who presents today as a return patient for spot check    Today, patient reports area of concern on the upper left back that is sometimes painful after his chemo treatment. Also reports a spot on R cheek he would like checked. He would like an upper body skin check today.       Patient is otherwise feeling well, without additional skin concerns.    Labs Reviewed:  N/A    Physical Exam:  Vitals: There were no vitals taken for this visit.  SKIN: Waist-up skin, which includes the head/face, neck, both arms, chest, back, abdomen, digits and/or nails was examined.  - Skin appears to be normal on the R shoulder/neck  - acneiform papule R cheek    - No other lesions of concern on areas examined.     Medications:  No current outpatient medications on file.     No current facility-administered medications for this visit.      Past Medical History:   Patient Active Problem List   Diagnosis     Esophageal reflux     Hyperlipidemia LDL goal <70     Neck pain     Chronic rhinitis     Bilateral low back pain without sciatica     Chondromalacia patellae, right     S/P ACL reconstruction     Atherosclerosis of native coronary artery of native heart without angina pectoris     SOB (shortness of breath)     Non-allergic rhinitis     Positive CLAIRE (antinuclear antibody)     Hand joint stiff,  right     Carpal tunnel syndrome     Osteoarthritis of knee, unspecified laterality, unspecified osteoarthritis type     Diverticulosis of large intestine     Internal hemorrhoids     Diffuse large B-cell lymphoma, unspecified body region (H)     Chemotherapy-induced neutropenia (H)     Esophageal spasm     Chemotherapy-induced peripheral neuropathy (H)     History of Clostridioides difficile colitis     Cervical disc disorder with radiculopathy     Facet arthropathy, cervical     Cervical disc herniation     Facet arthritis of lumbar region     Lumbar disc herniation, L3-L4     Degeneration of lumbar intervertebral disc     Facet arthritis, degenerative, cervical spine     Ulcerative (chronic) pancolitis without complications (H)     Chemotherapy-induced fatigue     Cervical spondylosis without myelopathy     Past Medical History:   Diagnosis Date     Cancer (H) 2-7-2023    just diagnosed with lymphona.     Carpal tunnel syndrome      Chronic rhinitis 10/23/2015     Degeneration of lumbar intervertebral disc 11/28/2023     Esophageal reflux 12/26/2013     Hand joint stiff, right         CC Referred Self, MD  No address on file on close of this encounter.      Geovanni Jasso's goals for this visit include:   Chief Complaint   Patient presents with     Derm Problem     Would like upper body skin check. Spot on upper left back that intermittently hurts after chemo treatment. Spot on right cheek.        He requests these members of his care team be copied on today's visit information: no    PCP: Paddy Holly    Referring Provider:  Referred Self, MD  No address on file    There were no vitals taken for this visit.    Do you need any medication refills at today's visit? No  Aileen Matamoros LPN        Again, thank you for allowing me to participate in the care of your patient.        Sincerely,        Marbella Castro MD

## 2024-12-16 ENCOUNTER — PATIENT OUTREACH (OUTPATIENT)
Dept: CARE COORDINATION | Facility: CLINIC | Age: 64
End: 2024-12-16
Payer: COMMERCIAL

## 2025-01-12 NOTE — PROGRESS NOTES
Hematology/Medical Oncology Follow-up Note      January 22, 2025    Reason for visit:  Geovanni Jasso is a 64 year old [employer] ) from [place] who presents for oncologic reevaluation with a 2023 history of treated diffuse large B-cell lymphoma    Impression:  February, 2023 history of stage III-A diffuse large B-cell lymphoma, activated B-cell type  S/p R-CHOP x 6 completed 6/21/2023 complicated by neutropenic fever, C. difficile gastroenteritis and cyclophosphamide induced hemorrhagic cystitis  No clinical or radiographic evidence of recurrent disease    Recommendation, plan, instructions:  Reviewed current NCCN guidelines regarding diffuse large B-cell lymphoma follow-up  Follow-up with Dr. Maurer in 6 months with CT CAP, CBC, CMP, LDH before appointment    Time with patient including review, documentation, history, examination, coordination of care and counseling was 25 minutes.    History of present illness:  Patient was initially evaluated in January, 2022 for widespread lymphadenopathy with subsequent supraclavicular FNA and then excisional biopsy revealing a diffuse large B-cell lymphoma.  Bone marrow biopsy was negative and he was started on R-CHOP chemotherapy on 3/8/2023-6/21/2023 with interval and posttreatment PET evidence of a complete response.    Treatment course was complicated by neutropenic fever, later C. difficile gastroenteritis and prior to last treatment cyclophosphamide induced hemorrhagic cystitis.    His last CT CAP imaging on 7/19/2024 was unremarkable.  Restaging 1/16/2025 CT CAP and blood tests were unremarkable including no evidence of recurrent/residual lymphoma.    2019 screening colonoscopy revealed only diverticulosis.    Past medical history:  Past Medical History:   Diagnosis Date    Cancer (H) 2-7-2023    just diagnosed with lymphona.    Carpal tunnel syndrome     Chronic rhinitis 10/23/2015    Degeneration of lumbar intervertebral disc 11/28/2023     Esophageal reflux 12/26/2013    Hand joint stiff, right         Family history:  I have reviewed this patient's family history and updated it with pertinent information if needed.  Family History   Problem Relation Age of Onset    Hyperlipidemia Mother         medication    Diabetes Father     Other Cancer Father         Lung cancer    Other Cancer Maternal Grandmother         Unsure what type of cancer    Autoimmune Disease No family hx of          Medications:  Current Outpatient Medications   Medication Sig Dispense Refill    meloxicam (MOBIC) 15 MG tablet Take 1 tablet (15 mg) by mouth daily as needed. 30 tablet 1     No current facility-administered medications for this visit.       Allergies:  Allergies   Allergen Reactions    Allopurinol Rash       Review of systems:  Right ear hard of hearing after apparent 1978 viral infection  He does not use tobacco  Mild persistent numbness in his toes following 2023 chemotherapy    Except as note above, the patient denies:  Headache, double vision, change in vision, hearing loss, tinnitus;  Dyspnea, chest pain, palpitations, pleurisy or hemoptysis;  Anorexia, nausea, vomiting, diarrhea, constipation, rectal bleeding bleeding, change in bowel habits;  Urinary frequency, burning or hematuria;  Fever, chills, sweats, change in appetite;  Bone or back pain; skin rash, joint swelling, new lumps;  Unexplained, bleeding or bruising, tingling numbness, change in gait;    Physical examination:  /72 (BP Location: Left arm)   Pulse 64   Wt 89 kg (196 lb 3.2 oz)   SpO2 96%   BMI 27.50 kg/m      The patient is alert and oriented.   Throat and oral mucosa are normal-appearing.   Adenopathy is absent in the neck, axilla, groin and supraclavicular fossa;   Lungs are clear to auscultation and percussion without rales, wheezes or rubs; Heart rhythm is regular, heart sounds are without murmurs or gallops;   Abdomen is soft and flat without hepatosplenomegaly, masses, ascites  or tenderness;   Extremities and skin reveal no unusual skin lesions, joint swelling or edema;      Antonino Newton MD

## 2025-01-13 ENCOUNTER — OFFICE VISIT (OUTPATIENT)
Dept: ORTHOPEDICS | Facility: CLINIC | Age: 65
End: 2025-01-13
Attending: DERMATOLOGY
Payer: COMMERCIAL

## 2025-01-13 DIAGNOSIS — M54.12 CERVICAL RADICULOPATHY: Primary | ICD-10-CM

## 2025-01-13 DIAGNOSIS — M51.369 BULGING LUMBAR DISC: ICD-10-CM

## 2025-01-13 PROCEDURE — 99203 OFFICE O/P NEW LOW 30 MIN: CPT | Performed by: FAMILY MEDICINE

## 2025-01-13 RX ORDER — MELOXICAM 15 MG/1
15 TABLET ORAL DAILY PRN
Qty: 30 TABLET | Refills: 1 | Status: SHIPPED | OUTPATIENT
Start: 2025-01-13

## 2025-01-13 ASSESSMENT — PAIN SCALES - GENERAL: PAINLEVEL_OUTOF10: MODERATE PAIN (5)

## 2025-01-13 NOTE — LETTER
1/13/2025      Geovanni Jasso  [address]  [place] [address]      Dear Colleague,    Thank you for referring your patient, Geovanni Jasso, to the [facility] [place]. Please see a copy of my visit note below.    CHIEF COMPLAINT:  Pain and New Patient of the Left Shoulder (Left shoulder pain)       HISTORY OF PRESENT ILLNESS  Mr. Jasso is a 64 year old male who presents to clinic today with neck and left shoulder pain.  Bill feels a tingling, burning sensation in the back of his shoulder at the shoulder blade.  This started over the past 3-4 months.  He does not have any pain with motion of his shoulder.    Bill has a history of NHL, had a 3cm lymph node removed from his neck in the trapezius region in the recent past.    Additional history: as documented    MEDICAL HISTORY  Patient Active Problem List   Diagnosis     Esophageal reflux     Hyperlipidemia LDL goal <70     Neck pain     Chronic rhinitis     Bilateral low back pain without sciatica     Chondromalacia patellae, right     S/P ACL reconstruction     Atherosclerosis of native coronary artery of native heart without angina pectoris     SOB (shortness of breath)     Non-allergic rhinitis     Positive CLAIRE (antinuclear antibody)     Hand joint stiff, right     Carpal tunnel syndrome     Osteoarthritis of knee, unspecified laterality, unspecified osteoarthritis type     Diverticulosis of large intestine     Internal hemorrhoids     Diffuse large B-cell lymphoma, unspecified body region (H)     Chemotherapy-induced neutropenia     Esophageal spasm     Chemotherapy-induced peripheral neuropathy     History of Clostridioides difficile colitis     Cervical disc disorder with radiculopathy     Facet arthropathy, cervical     Cervical disc herniation     Facet arthritis of lumbar region     Lumbar disc herniation, L3-L4     Degeneration of lumbar intervertebral disc     Facet arthritis, degenerative, cervical spine     Ulcerative  (chronic) pancolitis without complications (H)     Chemotherapy-induced fatigue     Cervical spondylosis without myelopathy       No current outpatient medications on file.       Allergies   Allergen Reactions     Allopurinol Rash       Family History   Problem Relation Age of Onset     Hyperlipidemia Mother         medication     Diabetes Father      Other Cancer Father         Lung cancer     Other Cancer Maternal Grandmother         Unsure what type of cancer     Autoimmune Disease No family hx of        Additional medical/Social/Surgical histories reviewed in EPIC and updated as appropriate.        PHYSICAL EXAM  General  - normal appearance, in no obvious distress  Musculoskeletal - cervical spine  - inspection: normal bone and joint alignment, no obvious kyphosis  - palpation: no paravertebral or bony tenderness  - ROM: pain with left rotation   - strength: upper extremities 5/5 in all planes  Neuro  - C5-7 DTRs 2+ bilaterally, no sensory or motor deficit, grossly normal coordination, normal muscle tone             ASSESSMENT & PLAN  Mr. Jasso is a 64 year old male who presents to clinic today with neck and shoulder pain.    Bill has pain that does seem to be from a cervical source, it does seem much less likely that this is arising from his shoulder.  Also, I am interested in what his upcoming scan shows with regards to his cancer.    I do think it would be most reasonable to treat for a cervical radiculopathy, I am prescribing meloxicam.  I would have a low threshold to refer him for diagnostic and therapeutic cervical spine injection if his symptoms or not improving in the coming weeks, or if they are worsening.    It was a pleasure seeing Geovanni today.    Laron Diaz DO, [facility]  Primary Care Sports Medicine      This note was constructed using Dragon dictation software, please excuse any minor errors in spelling, grammar, or syntax.      Again, thank you for allowing me to participate in the care of your  patient.        Sincerely,    Laron Diaz, DO    Electronically signed

## 2025-01-13 NOTE — PROGRESS NOTES
CHIEF COMPLAINT:  Pain and New Patient of the Left Shoulder (Left shoulder pain)       HISTORY OF PRESENT ILLNESS  Mr. Jasso is a 64 year old male who presents to clinic today with neck and left shoulder pain.  Bill feels a tingling, burning sensation in the back of his shoulder at the shoulder blade.  This started over the past 3-4 months.  He does not have any pain with motion of his shoulder.    Bill has a history of NHL, had a 3cm lymph node removed from his neck in the trapezius region in the recent past.    Additional history: as documented    MEDICAL HISTORY  Patient Active Problem List   Diagnosis    Esophageal reflux    Hyperlipidemia LDL goal <70    Neck pain    Chronic rhinitis    Bilateral low back pain without sciatica    Chondromalacia patellae, right    S/P ACL reconstruction    Atherosclerosis of native coronary artery of native heart without angina pectoris    SOB (shortness of breath)    Non-allergic rhinitis    Positive CLAIRE (antinuclear antibody)    Hand joint stiff, right    Carpal tunnel syndrome    Osteoarthritis of knee, unspecified laterality, unspecified osteoarthritis type    Diverticulosis of large intestine    Internal hemorrhoids    Diffuse large B-cell lymphoma, unspecified body region (H)    Chemotherapy-induced neutropenia    Esophageal spasm    Chemotherapy-induced peripheral neuropathy    History of Clostridioides difficile colitis    Cervical disc disorder with radiculopathy    Facet arthropathy, cervical    Cervical disc herniation    Facet arthritis of lumbar region    Lumbar disc herniation, L3-L4    Degeneration of lumbar intervertebral disc    Facet arthritis, degenerative, cervical spine    Ulcerative (chronic) pancolitis without complications (H)    Chemotherapy-induced fatigue    Cervical spondylosis without myelopathy       No current outpatient medications on file.       Allergies   Allergen Reactions    Allopurinol Rash       Family History   Problem Relation Age of Onset     Hyperlipidemia Mother         medication    Diabetes Father     Other Cancer Father         Lung cancer    Other Cancer Maternal Grandmother         Unsure what type of cancer    Autoimmune Disease No family hx of        Additional medical/Social/Surgical histories reviewed in EPIC and updated as appropriate.        PHYSICAL EXAM  General  - normal appearance, in no obvious distress  Musculoskeletal - cervical spine  - inspection: normal bone and joint alignment, no obvious kyphosis  - palpation: no paravertebral or bony tenderness  - ROM: pain with left rotation   - strength: upper extremities 5/5 in all planes  Neuro  - C5-7 DTRs 2+ bilaterally, no sensory or motor deficit, grossly normal coordination, normal muscle tone             ASSESSMENT & PLAN  Mr. Jasso is a 64 year old male who presents to clinic today with neck and shoulder pain.    Bill has pain that does seem to be from a cervical source, it does seem much less likely that this is arising from his shoulder.  Also, I am interested in what his upcoming scan shows with regards to his cancer.    I do think it would be most reasonable to treat for a cervical radiculopathy, I am prescribing meloxicam.  I would have a low threshold to refer him for diagnostic and therapeutic cervical spine injection if his symptoms or not improving in the coming weeks, or if they are worsening.    It was a pleasure seeing Geovanni today.    Laron Diaz DO, [facility]  Primary Care Sports Medicine      This note was constructed using Dragon dictation software, please excuse any minor errors in spelling, grammar, or syntax.

## 2025-01-13 NOTE — NURSING NOTE
Chief Complaint   Patient presents with    Left Shoulder - Pain, New Patient     Left shoulder pain       There were no vitals filed for this visit.    There is no height or weight on file to calculate BMI.      JORGE LUIS Wu NREMT

## 2025-01-16 ENCOUNTER — LAB (OUTPATIENT)
Dept: LAB | Facility: CLINIC | Age: 65
End: 2025-01-16
Payer: COMMERCIAL

## 2025-01-16 ENCOUNTER — ANCILLARY PROCEDURE (OUTPATIENT)
Dept: CT IMAGING | Facility: CLINIC | Age: 65
End: 2025-01-16
Attending: INTERNAL MEDICINE
Payer: COMMERCIAL

## 2025-01-16 DIAGNOSIS — C83.30 DIFFUSE LARGE B-CELL LYMPHOMA, UNSPECIFIED BODY REGION (H): ICD-10-CM
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
## 2025-01-16 DIAGNOSIS — I25.10 ATHEROSCLEROSIS OF NATIVE CORONARY ARTERY OF NATIVE HEART WITHOUT ANGINA PECTORIS: Primary | ICD-10-CM

## 2025-01-16 LAB
ALBUMIN SERPL BCG-MCNC: 4.1 G/DL (ref 3.5–5.2)
ALP SERPL-CCNC: 102 U/L (ref 40–150)
ALT SERPL W P-5'-P-CCNC: 16 U/L (ref 0–70)
ANION GAP SERPL CALCULATED.3IONS-SCNC: 11 MMOL/L (ref 7–15)
AST SERPL W P-5'-P-CCNC: 21 U/L (ref 0–45)
B BURGDOR IGG+IGM SER QL: 0.03
BASOPHILS # BLD AUTO: 0.1 10E3/UL (ref 0–0.2)
BASOPHILS NFR BLD AUTO: 1 %
BILIRUB SERPL-MCNC: 0.6 MG/DL
BUN SERPL-MCNC: 14.8 MG/DL (ref 8–23)
CALCIUM SERPL-MCNC: 9.9 MG/DL (ref 8.8–10.4)
CHLORIDE SERPL-SCNC: 107 MMOL/L (ref 98–107)
CHOLEST SERPL-MCNC: 227 MG/DL
CREAT SERPL-MCNC: 0.97 MG/DL (ref 0.67–1.17)
EGFRCR SERPLBLD CKD-EPI 2021: 86 ML/MIN/1.73M2
EOSINOPHIL # BLD AUTO: 0.3 10E3/UL (ref 0–0.7)
EOSINOPHIL NFR BLD AUTO: 5 %
ERYTHROCYTE [DISTWIDTH] IN BLOOD BY AUTOMATED COUNT: 12.9 % (ref 10–15)
FASTING STATUS PATIENT QL REPORTED: NO
FASTING STATUS PATIENT QL REPORTED: NO
GLUCOSE SERPL-MCNC: 121 MG/DL (ref 70–99)
HCO3 SERPL-SCNC: 22 MMOL/L (ref 22–29)
HCT VFR BLD AUTO: 45.5 % (ref 40–53)
HDLC SERPL-MCNC: 40 MG/DL
HGB BLD-MCNC: 15.7 G/DL (ref 13.3–17.7)
IMM GRANULOCYTES # BLD: 0 10E3/UL
IMM GRANULOCYTES NFR BLD: 0 %
LDH SERPL L TO P-CCNC: 140 U/L (ref 0–250)
LDLC SERPL CALC-MCNC: 162 MG/DL
LYMPHOCYTES # BLD AUTO: 1.3 10E3/UL (ref 0.8–5.3)
LYMPHOCYTES NFR BLD AUTO: 22 %
MCH RBC QN AUTO: 33.1 PG (ref 26.5–33)
MCHC RBC AUTO-ENTMCNC: 34.5 G/DL (ref 31.5–36.5)
MCV RBC AUTO: 96 FL (ref 78–100)
MONOCYTES # BLD AUTO: 0.5 10E3/UL (ref 0–1.3)
MONOCYTES NFR BLD AUTO: 9 %
NEUTROPHILS # BLD AUTO: 3.6 10E3/UL (ref 1.6–8.3)
NEUTROPHILS NFR BLD AUTO: 63 %
NONHDLC SERPL-MCNC: 187 MG/DL
NRBC # BLD AUTO: 0 10E3/UL
NRBC BLD AUTO-RTO: 0 /100
PLATELET # BLD AUTO: 221 10E3/UL (ref 150–450)
POTASSIUM SERPL-SCNC: 4.1 MMOL/L (ref 3.4–5.3)
PROT SERPL-MCNC: 7 G/DL (ref 6.4–8.3)
RBC # BLD AUTO: 4.74 10E6/UL (ref 4.4–5.9)
SODIUM SERPL-SCNC: 140 MMOL/L (ref 135–145)
TRIGL SERPL-MCNC: 127 MG/DL
WBC # BLD AUTO: 5.7 10E3/UL (ref 4–11)

## 2025-01-16 PROCEDURE — 74177 CT ABD & PELVIS W/CONTRAST: CPT | Performed by: STUDENT IN AN ORGANIZED HEALTH CARE EDUCATION/TRAINING PROGRAM

## 2025-01-16 PROCEDURE — 71260 CT THORAX DX C+: CPT | Performed by: STUDENT IN AN ORGANIZED HEALTH CARE EDUCATION/TRAINING PROGRAM

## 2025-01-16 RX ORDER — IOPAMIDOL 755 MG/ML
107 INJECTION, SOLUTION INTRAVASCULAR ONCE
Status: COMPLETED | OUTPATIENT
Start: 2025-01-16 | End: 2025-01-16

## 2025-01-16 RX ADMIN — IOPAMIDOL 107 ML: 755 INJECTION, SOLUTION INTRAVASCULAR at 08:50

## 2025-01-22 ENCOUNTER — ONCOLOGY VISIT (OUTPATIENT)
Dept: ONCOLOGY | Facility: CLINIC | Age: 65
End: 2025-01-22
Attending: INTERNAL MEDICINE
Payer: COMMERCIAL

## 2025-01-22 VITALS
OXYGEN SATURATION: 96 % | BODY MASS INDEX: 27.5 KG/M2 | WEIGHT: 196.2 LBS | HEART RATE: 64 BPM | SYSTOLIC BLOOD PRESSURE: 105 MMHG | DIASTOLIC BLOOD PRESSURE: 72 MMHG

## 2025-01-22 DIAGNOSIS — C83.30 DIFFUSE LARGE B-CELL LYMPHOMA, UNSPECIFIED BODY REGION (H): Primary | ICD-10-CM

## 2025-01-22 PROCEDURE — 99213 OFFICE O/P EST LOW 20 MIN: CPT | Performed by: INTERNAL MEDICINE

## 2025-01-22 ASSESSMENT — PAIN SCALES - GENERAL: PAINLEVEL_OUTOF10: NO PAIN (0)

## 2025-01-22 NOTE — PATIENT INSTRUCTIONS
1. Follow-up Dr. Maurer in 6 months with CT chest, abdomen and pelvis and blood tests before appointment

## 2025-01-22 NOTE — LETTER
1/22/2025      Geovanni Jasso  [address]  [facility] [address]      Dear Colleague,    Thank you for referring your patient, Geovanni Jasso, to the [facility]. Please see a copy of my visit note below.    Hematology/Medical Oncology Follow-up Note      January 22, 2025    Reason for visit:  Geovanni Jasso is a 64 year old N[place] (biologic safety hoods ) from [place] who presents for oncologic reevaluation with a 2023 history of treated diffuse large B-cell lymphoma    Impression:  February, 2023 history of stage III-A diffuse large B-cell lymphoma, activated B-cell type  S/p R-CHOP x 6 completed 6/21/2023 complicated by neutropenic fever, C. difficile gastroenteritis and cyclophosphamide induced hemorrhagic cystitis  No clinical or radiographic evidence of recurrent disease    Recommendation, plan, instructions:  Reviewed current NCCN guidelines regarding diffuse large B-cell lymphoma follow-up  Follow-up with Dr. Maurer in 6 months with CT CAP, CBC, CMP, LDH before appointment    Time with patient including review, documentation, history, examination, coordination of care and counseling was 25 minutes.    History of present illness:  Patient was initially evaluated in January, 2022 for widespread lymphadenopathy with subsequent supraclavicular FNA and then excisional biopsy revealing a diffuse large B-cell lymphoma.  Bone marrow biopsy was negative and he was started on R-CHOP chemotherapy on 3/8/2023-6/21/2023 with interval and posttreatment PET evidence of a complete response.    Treatment course was complicated by neutropenic fever, later C. difficile gastroenteritis and prior to last treatment cyclophosphamide induced hemorrhagic cystitis.    His last CT CAP imaging on 7/19/2024 was unremarkable.  Restaging 1/16/2025 CT CAP and blood tests were unremarkable including no evidence of recurrent/residual lymphoma.    2019 screening colonoscopy revealed only  diverticulosis.    Past medical history:  Past Medical History:   Diagnosis Date     Cancer (H) 2-7-2023    just diagnosed with lymphona.     Carpal tunnel syndrome      Chronic rhinitis 10/23/2015     Degeneration of lumbar intervertebral disc 11/28/2023     Esophageal reflux 12/26/2013     Hand joint stiff, right         Family history:  I have reviewed this patient's family history and updated it with pertinent information if needed.  Family History   Problem Relation Age of Onset     Hyperlipidemia Mother         medication     Diabetes Father      Other Cancer Father         Lung cancer     Other Cancer Maternal Grandmother         Unsure what type of cancer     Autoimmune Disease No family hx of          Medications:  Current Outpatient Medications   Medication Sig Dispense Refill     meloxicam (MOBIC) 15 MG tablet Take 1 tablet (15 mg) by mouth daily as needed. 30 tablet 1     No current facility-administered medications for this visit.       Allergies:  Allergies   Allergen Reactions     Allopurinol Rash       Review of systems:  Right ear hard of hearing after apparent 1978 viral infection  He does not use tobacco  Mild persistent numbness in his toes following 2023 chemotherapy    Except as note above, the patient denies:  Headache, double vision, change in vision, hearing loss, tinnitus;  Dyspnea, chest pain, palpitations, pleurisy or hemoptysis;  Anorexia, nausea, vomiting, diarrhea, constipation, rectal bleeding bleeding, change in bowel habits;  Urinary frequency, burning or hematuria;  Fever, chills, sweats, change in appetite;  Bone or back pain; skin rash, joint swelling, new lumps;  Unexplained, bleeding or bruising, tingling numbness, change in gait;    Physical examination:  /72 (BP Location: Left arm)   Pulse 64   Wt 89 kg (196 lb 3.2 oz)   SpO2 96%   BMI 27.50 kg/m      The patient is alert and oriented.   Throat and oral mucosa are normal-appearing.   Adenopathy is absent in the  neck, axilla, groin and supraclavicular fossa;   Lungs are clear to auscultation and percussion without rales, wheezes or rubs; Heart rhythm is regular, heart sounds are without murmurs or gallops;   Abdomen is soft and flat without hepatosplenomegaly, masses, ascites or tenderness;   Extremities and skin reveal no unusual skin lesions, joint swelling or edema;      Antonino Newton MD      Again, thank you for allowing me to participate in the care of your patient.        Sincerely,        Antonino Newton MD    Electronically signed

## 2025-01-22 NOTE — NURSING NOTE
"Oncology Rooming Note    January 22, 2025 8:20 AM   Geovanni Jasso is a 64 year old male who presents for:    Chief Complaint   Patient presents with    Oncology Clinic Visit     Initial Vitals: /72 (BP Location: Left arm)   Pulse 64   Wt 89 kg (196 lb 3.2 oz)   SpO2 96%   BMI 27.50 kg/m   Estimated body mass index is 27.5 kg/m  as calculated from the following:    Height as of 10/8/24: 1.799 m (5' 10.83\").    Weight as of this encounter: 89 kg (196 lb 3.2 oz). Body surface area is 2.11 meters squared.  No Pain (0) Comment: Data Unavailable   No LMP for male patient.  Allergies reviewed: Yes  Medications reviewed: Yes    Medications: Medication refills not needed today.  Pharmacy name entered into [facility]: [id number] [id number] [address]    Frailty Screening:   Is the patient here for a new oncology consult visit in cancer care? 2. No      Clinical concerns: Patient will discuss concerns with provider.       Stefanie Live MA            "

## 2025-01-29 SDOH — HEALTH STABILITY: PHYSICAL HEALTH: ON AVERAGE, HOW MANY DAYS PER WEEK DO YOU ENGAGE IN MODERATE TO STRENUOUS EXERCISE (LIKE A BRISK WALK)?: 2 DAYS

## 2025-01-29 SDOH — HEALTH STABILITY: PHYSICAL HEALTH: ON AVERAGE, HOW MANY MINUTES DO YOU ENGAGE IN EXERCISE AT THIS LEVEL?: 20 MIN

## 2025-01-29 ASSESSMENT — ASTHMA QUESTIONNAIRES
QUESTION_4 LAST FOUR WEEKS HOW OFTEN HAVE YOU USED YOUR RESCUE INHALER OR NEBULIZER MEDICATION (SUCH AS ALBUTEROL): NOT AT ALL
QUESTION_3 LAST FOUR WEEKS HOW OFTEN DID YOUR ASTHMA SYMPTOMS (WHEEZING, COUGHING, SHORTNESS OF BREATH, CHEST TIGHTNESS OR PAIN) WAKE YOU UP AT NIGHT OR EARLIER THAN USUAL IN THE MORNING: NOT AT ALL
QUESTION_2 LAST FOUR WEEKS HOW OFTEN HAVE YOU HAD SHORTNESS OF BREATH: ONCE OR TWICE A WEEK
QUESTION_5 LAST FOUR WEEKS HOW WOULD YOU RATE YOUR ASTHMA CONTROL: COMPLETELY CONTROLLED
ACT_TOTALSCORE: 24
QUESTION_1 LAST FOUR WEEKS HOW MUCH OF THE TIME DID YOUR ASTHMA KEEP YOU FROM GETTING AS MUCH DONE AT WORK, SCHOOL OR AT HOME: NONE OF THE TIME
ACT_TOTALSCORE: 24

## 2025-01-29 ASSESSMENT — SOCIAL DETERMINANTS OF HEALTH (SDOH): HOW OFTEN DO YOU GET TOGETHER WITH FRIENDS OR RELATIVES?: ONCE A WEEK

## 2025-01-30 ENCOUNTER — OFFICE VISIT (OUTPATIENT)
Dept: FAMILY MEDICINE | Facility: CLINIC | Age: 65
End: 2025-01-30
Attending: INTERNAL MEDICINE
Payer: COMMERCIAL

## 2025-01-30 VITALS
SYSTOLIC BLOOD PRESSURE: 117 MMHG | DIASTOLIC BLOOD PRESSURE: 75 MMHG | WEIGHT: 196.8 LBS | HEIGHT: 72 IN | OXYGEN SATURATION: 98 % | TEMPERATURE: 97 F | RESPIRATION RATE: 18 BRPM | BODY MASS INDEX: 26.66 KG/M2 | HEART RATE: 65 BPM

## 2025-01-30 DIAGNOSIS — Z12.5 SCREENING FOR PROSTATE CANCER: ICD-10-CM

## 2025-01-30 DIAGNOSIS — J02.8 ACUTE PHARYNGITIS DUE TO OTHER SPECIFIED ORGANISMS: ICD-10-CM

## 2025-01-30 DIAGNOSIS — Z13.6 CARDIOVASCULAR SCREENING; LDL GOAL LESS THAN 130: ICD-10-CM

## 2025-01-30 DIAGNOSIS — R68.89 FLU-LIKE SYMPTOMS: ICD-10-CM

## 2025-01-30 DIAGNOSIS — R19.4 CHANGE IN BOWEL HABITS: ICD-10-CM

## 2025-01-30 DIAGNOSIS — Z00.00 ROUTINE GENERAL MEDICAL EXAMINATION AT A HEALTH CARE FACILITY: Primary | ICD-10-CM

## 2025-01-30 LAB
ALBUMIN SERPL BCG-MCNC: 4.2 G/DL (ref 3.5–5.2)
ALP SERPL-CCNC: 95 U/L (ref 40–150)
ALT SERPL W P-5'-P-CCNC: 18 U/L (ref 0–70)
ANION GAP SERPL CALCULATED.3IONS-SCNC: 7 MMOL/L (ref 7–15)
AST SERPL W P-5'-P-CCNC: 23 U/L (ref 0–45)
BASOPHILS # BLD AUTO: 0 10E3/UL (ref 0–0.2)
BASOPHILS NFR BLD AUTO: 1 %
BILIRUB SERPL-MCNC: 0.6 MG/DL
BUN SERPL-MCNC: 15 MG/DL (ref 8–23)
CALCIUM SERPL-MCNC: 9.8 MG/DL (ref 8.8–10.4)
CHLORIDE SERPL-SCNC: 107 MMOL/L (ref 98–107)
CHOLEST SERPL-MCNC: 223 MG/DL
CREAT SERPL-MCNC: 1.03 MG/DL (ref 0.67–1.17)
DEPRECATED S PYO AG THROAT QL EIA: NEGATIVE
EGFRCR SERPLBLD CKD-EPI 2021: 80 ML/MIN/1.73M2
EOSINOPHIL # BLD AUTO: 0.2 10E3/UL (ref 0–0.7)
EOSINOPHIL NFR BLD AUTO: 3 %
ERYTHROCYTE [DISTWIDTH] IN BLOOD BY AUTOMATED COUNT: 12.4 % (ref 10–15)
FASTING STATUS PATIENT QL REPORTED: YES
FASTING STATUS PATIENT QL REPORTED: YES
FLUAV AG SPEC QL IA: NEGATIVE
FLUBV AG SPEC QL IA: NEGATIVE
GLUCOSE SERPL-MCNC: 92 MG/DL (ref 70–99)
HCO3 SERPL-SCNC: 26 MMOL/L (ref 22–29)
HCT VFR BLD AUTO: 42.5 % (ref 40–53)
HDLC SERPL-MCNC: 37 MG/DL
HGB BLD-MCNC: 15.6 G/DL (ref 13.3–17.7)
IMM GRANULOCYTES # BLD: 0 10E3/UL
IMM GRANULOCYTES NFR BLD: 0 %
LDLC SERPL CALC-MCNC: 159 MG/DL
LYMPHOCYTES # BLD AUTO: 1.1 10E3/UL (ref 0.8–5.3)
LYMPHOCYTES NFR BLD AUTO: 20 %
MCH RBC QN AUTO: 33 PG (ref 26.5–33)
MCHC RBC AUTO-ENTMCNC: 36.7 G/DL (ref 31.5–36.5)
MCV RBC AUTO: 90 FL (ref 78–100)
MONOCYTES # BLD AUTO: 0.6 10E3/UL (ref 0–1.3)
MONOCYTES NFR BLD AUTO: 11 %
NEUTROPHILS # BLD AUTO: 3.7 10E3/UL (ref 1.6–8.3)
NEUTROPHILS NFR BLD AUTO: 66 %
NONHDLC SERPL-MCNC: 186 MG/DL
PLATELET # BLD AUTO: 193 10E3/UL (ref 150–450)
POTASSIUM SERPL-SCNC: 4.5 MMOL/L (ref 3.4–5.3)
PROT SERPL-MCNC: 6.9 G/DL (ref 6.4–8.3)
PSA SERPL DL<=0.01 NG/ML-MCNC: 1.33 NG/ML (ref 0–4.5)
RBC # BLD AUTO: 4.73 10E6/UL (ref 4.4–5.9)
S PYO DNA THROAT QL NAA+PROBE: NOT DETECTED
SODIUM SERPL-SCNC: 140 MMOL/L (ref 135–145)
TRIGL SERPL-MCNC: 135 MG/DL
WBC # BLD AUTO: 5.6 10E3/UL (ref 4–11)

## 2025-01-30 PROCEDURE — 80053 COMPREHEN METABOLIC PANEL: CPT | Performed by: INTERNAL MEDICINE

## 2025-01-30 PROCEDURE — 87804 INFLUENZA ASSAY W/OPTIC: CPT | Performed by: INTERNAL MEDICINE

## 2025-01-30 PROCEDURE — 87651 STREP A DNA AMP PROBE: CPT | Performed by: INTERNAL MEDICINE

## 2025-01-30 PROCEDURE — 80061 LIPID PANEL: CPT | Performed by: INTERNAL MEDICINE

## 2025-01-30 PROCEDURE — 99214 OFFICE O/P EST MOD 30 MIN: CPT | Mod: 25 | Performed by: INTERNAL MEDICINE

## 2025-01-30 PROCEDURE — G0103 PSA SCREENING: HCPCS | Performed by: INTERNAL MEDICINE

## 2025-01-30 PROCEDURE — 85025 COMPLETE CBC W/AUTO DIFF WBC: CPT | Performed by: INTERNAL MEDICINE

## 2025-01-30 PROCEDURE — 36415 COLL VENOUS BLD VENIPUNCTURE: CPT | Performed by: INTERNAL MEDICINE

## 2025-01-30 PROCEDURE — 99397 PER PM REEVAL EST PAT 65+ YR: CPT | Performed by: INTERNAL MEDICINE

## 2025-01-30 ASSESSMENT — PAIN SCALES - GENERAL: PAINLEVEL_OUTOF10: MILD PAIN (2)

## 2025-01-30 NOTE — PROGRESS NOTES
Health Care Directive  Patient does not have a Health Care Directive: Not discussed during today's visit.      1/29/2025   General Health   How would you rate your overall physical health? Good   Feel stress (tense, anxious, or unable to sleep) Only a little   (!) STRESS CONCERN      1/29/2025   Nutrition   Diet: Regular (no restrictions)         1/29/2025   Exercise   Days per week of moderate/strenous exercise 2 days   Average minutes spent exercising at this level 20 min   (!) EXERCISE CONCERN      1/29/2025   Social Factors   Frequency of gathering with friends or relatives Once a week   Worry food won't last until get money to buy more No    No   Food not last or not have enough money for food? No    No   Do you have housing? (Housing is defined as stable permanent housing and does not include staying ouside in a car, in a tent, in an abandoned building, in an overnight shelter, or couch-surfing.) Yes    Yes   Are you worried about losing your housing? No    No   Lack of transportation? No    No   Unable to get utilities (heat,electricity)? No    No       Multiple values from one day are sorted in reverse-chronological order         1/29/2025   Fall Risk   Fallen 2 or more times in the past year? No    No   Trouble with walking or balance? No    No       Multiple values from one day are sorted in reverse-chronological order          1/29/2025   Activities of Daily Living- Home Safety   Needs help with the following daily activites None of the above   Safety concerns in the home None of the above         1/29/2025   Dental   Dentist two times every year? Yes         1/29/2025   Hearing Screening   Hearing concerns? (!) I FEEL THAT PEOPLE ARE MUMBLING OR NOT SPEAKING CLEARLY.    (!) I NEED TO ASK PEOPLE TO SPEAK UP OR REPEAT THEMSELVES.    (!) IT'S HARDER TO UNDERSTAND WOMEN'S VOICES THAN MEN'S VOICES.    (!) IT'S HARD TO FOLLOW A CONVERSATION IN A NOISY RESTAURANT OR CROWDED ROOM.    (!) TROUBLE UNDERSTANDING  SOFT OR WHISPERED SPEECH.       Multiple values from one day are sorted in reverse-chronological order         1/29/2025   Driving Risk Screening   Patient/family members have concerns about driving No         1/29/2025   General Alertness/Fatigue Screening   Have you been more tired than usual lately? No         1/29/2025   Urinary Incontinence Screening   Bothered by leaking urine in past 6 months No         1/29/2025   TB Screening   Were you born outside of the [place]? No         Today's PHQ-2 Score:       1/29/2025    11:03 AM   PHQ-2 ( 1999 [employer])   Q1: Little interest or pleasure in doing things 0   Q2: Feeling down, depressed or hopeless 0   PHQ-2 Score 0    Q1: Little interest or pleasure in doing things Not at all   Q2: Feeling down, depressed or hopeless Not at all   PHQ-2 Score 0       Patient-reported           1/29/2025   Substance Use   Alcohol more than 3/day or more than 7/wk No   Do you have a current opioid prescription? No   How severe/bad is pain from 1 to 10? 3/10   Do you use any other substances recreationally? No     Social History     Tobacco Use    Smoking status: Never     Passive exposure: Never    Smokeless tobacco: Never   Vaping Use    Vaping status: Never Used   Substance Use Topics    Alcohol use: Yes     Comment: occassional, 1- 2 per week    Drug use: No     {Provider  If there are gaps in the social history shown above, please follow the link to update and then refresh the note Link to Social and Substance History :363928}      1/29/2025   AAA Screening   Family history of Abdominal Aortic Aneurysm (AAA)? Unsure   Last PSA:   PSA   Date Value Ref Range Status   12/12/2018 1.20 0 - 4 ug/L Final     Comment:     Assay Method:  Chemiluminescence using Siemens Vista analyzer     Prostate Specific Antigen Screen   Date Value Ref Range Status   01/19/2024 0.88 0.00 - 4.50 ng/mL Final   12/02/2022 1.18 0.00 - 4.00 ug/L Final     ASCVD Risk   The 10-year ASCVD risk score (Anthony  ERNESTO, et al., 2019) is: 14.5%    Values used to calculate the score:      Age: 64 years      Sex: Male      Is Non- : No      Diabetic: No      Tobacco smoker: No      Systolic Blood Pressure: 117 mmHg      Is BP treated: No      HDL Cholesterol: 40 mg/dL      Total Cholesterol: 227 mg/dL    {Link to Fracture Risk Assessment Tool (Optional):767282}    {Provider  REQUIRED FOR AWV Use the storyboard to review patient history, after sections have been marked as reviewed, refresh note to capture documentation:177388}  {Provider   REQUIRED AWV use this link to review and update sexual activity history  after section has been marked as reviewed, refresh note to capture documentation:081885}  Reviewed and updated as needed this visit by Provider                    {HISTORY OPTIONS (Optional):885434}  Current providers sharing in care for this patient include:  Patient Care Team:  Paddy Holly MD as PCP - General (Internal Medicine)  Paddy Holly MD as Assigned PCP  Kiel Maurer MD as MD (Hematology & Oncology)  Paddy Holly MD as MD (Internal Medicine)  Kiel Maurer MD as Assigned Cancer Care Provider  Sydni Renae, RN as Specialty Care Coordinator (Hematology & Oncology)  Laron Mcduffie MD as MD (Neurology)  Ray Matamoros AuD (Audiology)  Cristiana Connell MD as MD (Otolaryngology)  Cristiana Connell MD as MD (Otolaryngology)  Zenon Du MD as MD (Allergy & Immunology)  Marbella Castro MD as Assigned Surgical Provider  Zenon Du MD as Assigned Allergy Provider  Marbella Castro MD as MD (Dermatology)    The following health maintenance items are reviewed in Epic and correct as of today:  Health Maintenance   Topic Date Due    ASTHMA ACTION PLAN  Never done    ZOSTER IMMUNIZATION (1 of 2) Never done    RSV VACCINE (1 - Risk 60-74 years 1-dose series) Never done    INFLUENZA VACCINE (1) 09/01/2024    COVID-19 Vaccine (6 - 2024-25 season) 09/01/2024     "ANNUAL REVIEW OF HM ORDERS  01/19/2025    ASTHMA CONTROL TEST  07/30/2025    DTAP/TDAP/TD IMMUNIZATION (4 - Td or Tdap) 12/07/2025    LIPID  01/16/2026    MEDICARE ANNUAL WELLNESS VISIT  01/30/2026    FALL RISK ASSESSMENT  01/30/2026    GLUCOSE  01/16/2028    ADVANCE CARE PLANNING  01/29/2029    COLORECTAL CANCER SCREENING  07/17/2029    HEPATITIS C SCREENING  Completed    PHQ-2 (once per calendar year)  Completed    Pneumococcal Vaccine: 50+ Years  Completed    HPV IMMUNIZATION  Aged Out    MENINGITIS IMMUNIZATION  Aged Out    RSV MONOCLONAL ANTIBODY  Aged Out       {ROS Picklists (Optional):794013}     Objective    Exam  /75 (BP Location: Left arm, Patient Position: Sitting, Cuff Size: Adult Regular)   Pulse 65   Temp 97  F (36.1  C) (Temporal)   Resp 18   Ht 1.816 m (5' 11.5\")   Wt 89.3 kg (196 lb 12.8 oz)   SpO2 98%   BMI 27.07 kg/m     Estimated body mass index is 27.07 kg/m  as calculated from the following:    Height as of this encounter: 1.816 m (5' 11.5\").    Weight as of this encounter: 89.3 kg (196 lb 12.8 oz).    Physical Exam  {Exam Choices (Optional):525436}        1/30/2025   Mini Cog   Clock Draw Score 2 Normal   3 Item Recall 3 objects recalled   Mini Cog Total Score 5     {A Mini-Cog total score of 0-2 suggests the possibility of dementia, score of 3-5 suggests no dementia:421600}        Signed Electronically by: Paddy Holly MD  {Email feedback regarding this note to [email or web address]   :412736}  "

## 2025-01-30 NOTE — PATIENT INSTRUCTIONS
Patient Education   Preventive Care Advice   This is general advice given by our system to help you stay healthy. However, your care team may have specific advice just for you. Please talk to your care team about your preventive care needs.  Nutrition  Eat 5 or more servings of fruits and vegetables each day.  Try wheat bread, brown rice and whole grain pasta (instead of white bread, rice, and pasta).  Get enough calcium and vitamin D. Check the label on foods and aim for 100% of the RDA (recommended daily allowance).  Lifestyle  Exercise at least 150 minutes each week  (30 minutes a day, 5 days a week).  Do muscle strengthening activities 2 days a week. These help control your weight and prevent disease.  No smoking.  Wear sunscreen to prevent skin cancer.  Have a dental exam and cleaning every 6 months.  Yearly exams  See your health care team every year to talk about:  Any changes in your health.  Any medicines your care team has prescribed.  Preventive care, family planning, and ways to prevent chronic diseases.  Shots (vaccines)   HPV shots (up to age 26), if you've never had them before.  Hepatitis B shots (up to age 59), if you've never had them before.  COVID-19 shot: Get this shot when it's due.  Flu shot: Get a flu shot every year.  Tetanus shot: Get a tetanus shot every 10 years.  Pneumococcal, hepatitis A, and RSV shots: Ask your care team if you need these based on your risk.  Shingles shot (for age 50 and up)  General health tests  Diabetes screening:  Starting at age 35, Get screened for diabetes at least every 3 years.  If you are younger than age 35, ask your care team if you should be screened for diabetes.  Cholesterol test: At age 39, start having a cholesterol test every 5 years, or more often if advised.  Bone density scan (DEXA): At age 50, ask your care team if you should have this scan for osteoporosis (brittle bones).  Hepatitis C: Get tested at least once in your life.  STIs (sexually  transmitted infections)  Before age 24: Ask your care team if you should be screened for STIs.  After age 24: Get screened for STIs if you're at risk. You are at risk for STIs (including HIV) if:  You are sexually active with more than one person.  You don't use condoms every time.  You or a partner was diagnosed with a sexually transmitted infection.  If you are at risk for HIV, ask about PrEP medicine to prevent HIV.  Get tested for HIV at least once in your life, whether you are at risk for HIV or not.  Cancer screening tests  Cervical cancer screening: If you have a cervix, begin getting regular cervical cancer screening tests starting at age 21.  Breast cancer scan (mammogram): If you've ever had breasts, begin having regular mammograms starting at age 40. This is a scan to check for breast cancer.  Colon cancer screening: It is important to start screening for colon cancer at age 45.  Have a colonoscopy test every 10 years (or more often if you're at risk) Or, ask your provider about stool tests like a FIT test every year or Cologuard test every 3 years.  To learn more about your testing options, visit:   .  For help making a decision, visit:   https://bit.ly/ze85385.  Prostate cancer screening test: If you have a prostate, ask your care team if a prostate cancer screening test (PSA) at age 55 is right for you.  Lung cancer screening: If you are a current or former smoker ages 50 to 80, ask your care team if ongoing lung cancer screenings are right for you.  For informational purposes only. Not to replace the advice of your health care provider. Copyright   2023 [facility]. All rights reserved. Clinically reviewed by the [facility] Transitions Program. [employer] 860159 - REV 01/24.  Hearing Loss: Care Instructions  Overview     Hearing loss is a sudden or slow decrease in how well you hear. It can range from slight to profound. Permanent hearing loss can occur with aging. It also can  happen when you are exposed long-term to loud noise. Examples include listening to loud music, riding motorcycles, or being around other loud machines.  Hearing loss can affect your work and home life. It can make you feel lonely or depressed. You may feel that you have lost your independence. But hearing aids and other devices can help you hear better and feel connected to others.  Follow-up care is a key part of your treatment and safety. Be sure to make and go to all appointments, and call your doctor if you are having problems. It's also a good idea to know your test results and keep a list of the medicines you take.  How can you care for yourself at home?  Avoid loud noises whenever possible. This helps keep your hearing from getting worse.  Always wear hearing protection around loud noises.  Wear a hearing aid as directed.  A professional can help you pick a hearing aid that will work best for you.  You can also get hearing aids over the counter for mild to moderate hearing loss.  Have hearing tests as your doctor suggests. They can show whether your hearing has changed. Your hearing aid may need to be adjusted.  Use other devices as needed. These may include:  Telephone amplifiers and hearing aids that can connect to a television, stereo, radio, or microphone.  Devices that use lights or vibrations. These alert you to the doorbell, a ringing telephone, or a baby monitor.  Television closed-captioning. This shows the words at the bottom of the screen. Most new TVs can do this.  TTY (text telephone). This lets you type messages back and forth on the telephone instead of talking or listening. These devices are also called TDD. When messages are typed on the keyboard, they are sent over the phone line to a receiving TTY. The message is shown on a monitor.  Use text messaging, social media, and email if it is hard for you to communicate by telephone.  Try to learn a listening technique called speechreading. It is  "not lipreading. You pay attention to people's gestures, expressions, posture, and tone of voice. These clues can help you understand what a person is saying. Face the person you are talking to, and have them face you. Make sure the lighting is good. You need to see the other person's face clearly.  Think about counseling if you need help to adjust to your hearing loss.  When should you call for help?  Watch closely for changes in your health, and be sure to contact your doctor if:    You think your hearing is getting worse.     You have new symptoms, such as dizziness or nausea.   Where can you learn more?  Go to https://www.[employer].net/patiented  Enter R798 in the search box to learn more about \"Hearing Loss: Care Instructions.\"  Current as of: September 27, 2023  Content Version: 14.3    2024 [employer].   Care instructions adapted under license by your healthcare professional. If you have questions about a medical condition or this instruction, always ask your healthcare professional. [employer] disclaims any warranty or liability for your use of this information.       "

## 2025-01-31 LAB — C DIFF TOX B STL QL: NEGATIVE

## 2025-01-31 PROCEDURE — 83993 ASSAY FOR CALPROTECTIN FECAL: CPT | Performed by: INTERNAL MEDICINE

## 2025-01-31 PROCEDURE — 87493 C DIFF AMPLIFIED PROBE: CPT | Performed by: INTERNAL MEDICINE

## 2025-02-03 LAB — CALPROTECTIN STL-MCNT: 15.4 MG/KG (ref 0–49.9)

## 2025-02-09 ENCOUNTER — MYC MEDICAL ADVICE (OUTPATIENT)
Dept: FAMILY MEDICINE | Facility: CLINIC | Age: 65
End: 2025-02-09
Payer: COMMERCIAL

## 2025-02-09 DIAGNOSIS — R19.7 DIARRHEA OF PRESUMED INFECTIOUS ORIGIN: Primary | ICD-10-CM

## 2025-02-09 PROBLEM — K51.00 ULCERATIVE (CHRONIC) PANCOLITIS WITHOUT COMPLICATIONS (H): Status: RESOLVED | Noted: 2024-01-19 | Resolved: 2025-02-09

## 2025-02-09 NOTE — PROGRESS NOTES
SUBJECTIVE:   Bill is a 64 year old male who comes in today for an annual physical exam.    Health Care Directive  Patient does not have a Health Care Directive: Not discussed during today's visit.      1/29/2025   General Health   How would you rate your overall physical health? Good   Feel stress (tense, anxious, or unable to sleep) Only a little   (!) STRESS CONCERN      1/29/2025   Nutrition   Diet: Regular (no restrictions)         1/29/2025   Exercise   Days per week of moderate/strenous exercise 2 days   Average minutes spent exercising at this level 20 min   (!) EXERCISE CONCERN      1/29/2025   Social Factors   Frequency of gathering with friends or relatives Once a week   Worry food won't last until get money to buy more No    No   Food not last or not have enough money for food? No    No   Do you have housing? (Housing is defined as stable permanent housing and does not include staying ouside in a car, in a tent, in an abandoned building, in an overnight shelter, or couch-surfing.) Yes    Yes   Are you worried about losing your housing? No    No   Lack of transportation? No    No   Unable to get utilities (heat,electricity)? No    No       Multiple values from one day are sorted in reverse-chronological order         1/29/2025   Fall Risk   Fallen 2 or more times in the past year? No    No   Trouble with walking or balance? No    No       Multiple values from one day are sorted in reverse-chronological order          1/29/2025   Activities of Daily Living- Home Safety   Needs help with the following daily activites None of the above   Safety concerns in the home None of the above         1/29/2025   Dental   Dentist two times every year? Yes         1/29/2025   Hearing Screening   Hearing concerns? (!) I FEEL THAT PEOPLE ARE MUMBLING OR NOT SPEAKING CLEARLY.    (!) I NEED TO ASK PEOPLE TO SPEAK UP OR REPEAT THEMSELVES.    (!) IT'S HARDER TO UNDERSTAND WOMEN'S VOICES THAN MEN'S VOICES.    (!) IT'S HARD TO  FOLLOW A CONVERSATION IN A NOISY RESTAURANT OR CROWDED ROOM.    (!) TROUBLE UNDERSTANDING SOFT OR WHISPERED SPEECH.       Multiple values from one day are sorted in reverse-chronological order         1/29/2025   Driving Risk Screening   Patient/family members have concerns about driving No         1/29/2025   General Alertness/Fatigue Screening   Have you been more tired than usual lately? No         1/29/2025   Urinary Incontinence Screening   Bothered by leaking urine in past 6 months No         1/29/2025   TB Screening   Were you born outside of the [place]? No         Today's PHQ-2 Score:       1/29/2025    11:03 AM   PHQ-2 ( 1999 Pf[employer])   Q1: Little interest or pleasure in doing things 0   Q2: Feeling down, depressed or hopeless 0   PHQ-2 Score 0    Q1: Little interest or pleasure in doing things Not at all   Q2: Feeling down, depressed or hopeless Not at all   PHQ-2 Score 0       Patient-reported           1/29/2025   Substance Use   Alcohol more than 3/day or more than 7/wk No   Do you have a current opioid prescription? No   How severe/bad is pain from 1 to 10? 3/10   Do you use any other substances recreationally? No     Social History     Tobacco Use    Smoking status: Never     Passive exposure: Never    Smokeless tobacco: Never   Vaping Use    Vaping status: Never Used   Substance Use Topics    Alcohol use: Yes     Comment: occassional, 1- 2 per week    Drug use: No           1/29/2025   AAA Screening   Family history of Abdominal Aortic Aneurysm (AAA)? Unsure   Last PSA:   PSA   Date Value Ref Range Status   12/12/2018 1.20 0 - 4 ug/L Final     Comment:     Assay Method:  Chemiluminescence using Siemens Vista analyzer     Prostate Specific Antigen Screen   Date Value Ref Range Status   01/19/2024 0.88 0.00 - 4.50 ng/mL Final   12/02/2022 1.18 0.00 - 4.00 ug/L Final     ASCVD Risk   The 10-year ASCVD risk score (Anthony OROZCO, et al., 2019) is: 14.5%    Values used to calculate the score:      Age: 64  years      Sex: Male      Is Non- : No      Diabetic: No      Tobacco smoker: No      Systolic Blood Pressure: 117 mmHg      Is BP treated: No      HDL Cholesterol: 40 mg/dL      Total Cholesterol: 227 mg/dL      Labs reviewed in EPIC  BP Readings from Last 3 Encounters:   01/30/25 117/75   01/22/25 105/72   11/18/24 110/66    Wt Readings from Last 3 Encounters:   01/30/25 89.3 kg (196 lb 12.8 oz)   01/22/25 89 kg (196 lb 3.2 oz)   11/18/24 88.5 kg (195 lb 1.6 oz)                  Patient Active Problem List   Diagnosis    Esophageal reflux    Hyperlipidemia LDL goal <70    Neck pain    Chronic rhinitis    Bilateral low back pain without sciatica    Chondromalacia patellae, right    S/P ACL reconstruction    Atherosclerosis of native coronary artery of native heart without angina pectoris    SOB (shortness of breath)    Non-allergic rhinitis    Positive CLAIRE (antinuclear antibody)    Hand joint stiff, right    Carpal tunnel syndrome    Osteoarthritis of knee, unspecified laterality, unspecified osteoarthritis type    Diverticulosis of large intestine    Internal hemorrhoids    Diffuse large B-cell lymphoma, unspecified body region (H)    Chemotherapy-induced neutropenia    Esophageal spasm    Chemotherapy-induced peripheral neuropathy    History of Clostridioides difficile colitis    Cervical disc disorder with radiculopathy    Facet arthropathy, cervical    Cervical disc herniation    Facet arthritis of lumbar region    Lumbar disc herniation, L3-L4    Degeneration of lumbar intervertebral disc    Facet arthritis, degenerative, cervical spine    Ulcerative (chronic) pancolitis without complications (H)    Chemotherapy-induced fatigue    Cervical spondylosis without myelopathy     Past Surgical History:   Procedure Laterality Date    BIOPSY  2-6-23    BIOPSY LYMPH NODE CERVICAL Left 2/16/2023    Procedure: excisional biopsy of a left cervical node;  Surgeon: Aung Tamayo MD;   Location: [facility] OR    COLONOSCOPY  2019    clean, do again in 10 years    COLONOSCOPY WITH CO2 INSUFFLATION N/A 07/17/2019    Procedure: COLONOSCOPY, WITH CO2 INSUFFLATION;  Surgeon: Jaison Hammer MD;  Location: [facility] OR    DESTRUCTION OF PARAVERTEBRAL FACETCERVICAL / THORACIC SINGLE Left 5/16/2024    Procedure: DESTRUCTION, NERVE, FACET JOINT, CERVICOTHORACIC, C3/4/5;  Surgeon: Lb Carter MD;  Location: [facility] OR    ENT SURGERY  1979    Loss of hearing rt ear (tinnitis)    HERNIA REPAIR  2000    INJECT BLOCK MEDIAL BRANCH CERVICAL/THORACIC/LUMBAR Left 1/18/2024    Procedure: BLOCK, NERVE, FACET JOINT, MEDIAL BRANCH, DIAGNOSTIC C3/4/5;  Surgeon: Lb Carter MD;  Location: [facility] OR    INJECT BLOCK MEDIAL BRANCH CERVICAL/THORACIC/LUMBAR Left 4/25/2024    Procedure: BLOCK, NERVE, FACET JOINT, MEDIAL BRANCH, DIAGNOSTIC C3/4/5;  Surgeon: Lb Carter MD;  Location: [facility] OR    IR CHEST PORT PLACEMENT > 5 YRS OF AGE  3/3/2023    NO HISTORY OF SURGERY      ORTHOPEDIC SURGERY  1988,1992    rt knee, scope in 88; ACL repair Early 90s       Social History     Tobacco Use    Smoking status: Never     Passive exposure: Never    Smokeless tobacco: Never   Substance Use Topics    Alcohol use: Yes     Comment: occassional, 1- 2 per week     Family History   Problem Relation Age of Onset    Hyperlipidemia Mother         medication    Diabetes Father     Other Cancer Father         Lung cancer    Other Cancer Maternal Grandmother         Unsure what type of cancer    Autoimmune Disease No family hx of          Allergies   Allergen Reactions    Allopurinol Rash     Recent Labs   Lab Test 01/30/25  0842 01/16/25  0838 07/19/24  0818 07/19/24  0736 01/19/24  0901 12/08/23  0914 12/12/18  0728 11/12/18  1215   * 162*  --   --   --  137*   < >  --    HDL 37* 40  --   --   --  45   < >  --    TRIG 135 127  --   --  86 93   < >  --    ALT 18 16 11  --  14  --    < >  --    CR 1.03 0.97 0.94   < > 0.94  --    < >   --    GFRESTIMATED 80 86 90   < > 90  --    < >  --    POTASSIUM 4.5 4.1 4.0  --  4.8  --    < >  --    TSH  --   --   --   --   --   --   --  2.01    < > = values in this interval not displayed.      Do you have a current opioid prescription? no  Do you use any other controlled substances or medications that are not prescribed by a provider? none   Current providers sharing in care for this patient include:  Patient Care Team:  Paddy Holly MD as PCP - General (Internal Medicine)  Paddy Holly MD as Assigned PCP  Kiel Maurer MD as MD (Hematology & Oncology)  Paddy Holly MD as MD (Internal Medicine)  Kiel Maurer MD as Assigned Cancer Care Provider  Sydni Renae, RN as Specialty Care Coordinator (Hematology & Oncology)  Laron Mcduffie MD as MD (Neurology)  Ray Matamoros AuD (Audiology)  Cristiana Connell MD as MD (Otolaryngology)  Cristiana Connell MD as MD (Otolaryngology)  Zenon Du MD as MD (Allergy & Immunology)  Marbella Castro MD as Assigned Surgical Provider  Zenon Du MD as Assigned Allergy Provider  Marbella Castro MD as MD (Dermatology)    The following health maintenance items are reviewed in Epic and correct as of today:  Health Maintenance   Topic Date Due    ASTHMA ACTION PLAN  Never done    ZOSTER IMMUNIZATION (1 of 2) Never done    RSV VACCINE (1 - Risk 60-74 years 1-dose series) Never done    INFLUENZA VACCINE (1) 09/01/2024    COVID-19 Vaccine (6 - 2024-25 season) 09/01/2024    ANNUAL REVIEW OF HM ORDERS  01/19/2025    ASTHMA CONTROL TEST  07/30/2025    DTAP/TDAP/TD IMMUNIZATION (4 - Td or Tdap) 12/07/2025    LIPID  01/16/2026    MEDICARE ANNUAL WELLNESS VISIT  01/30/2026    FALL RISK ASSESSMENT  01/30/2026    GLUCOSE  01/16/2028    ADVANCE CARE PLANNING  01/29/2029    COLORECTAL CANCER SCREENING  07/17/2029    HEPATITIS C SCREENING  Completed    PHQ-2 (once per calendar year)  Completed    Pneumococcal Vaccine: 50+ Years  Completed    HPV  "IMMUNIZATION  Aged Out    MENINGITIS IMMUNIZATION  Aged Out    RSV MONOCLONAL ANTIBODY  Aged Out           1/30/2025   Mini Cog   Clock Draw Score 2 Normal   3 Item Recall 3 objects recalled   Mini Cog Total Score 5     Review of Systems  CONSTITUTIONAL: NEGATIVE for fever, chills, change in weight  INTEGUMENTARY/SKIN: NEGATIVE for worrisome rashes, moles or lesions  EYES: NEGATIVE for vision changes or irritation  ENT/MOUTH: POSITIVE for sore throat.  RESP: NEGATIVE for significant cough or SOB  BREAST: NEGATIVE for masses, tenderness or discharge  CV: NEGATIVE for chest pain, palpitations or peripheral edema  GI: POSITIVE for change in bowel habits.  : NEGATIVE for frequency, dysuria, or hematuria  MUSCULOSKELETAL: NEGATIVE for significant arthralgias or myalgia  NEURO: NEGATIVE for weakness, dizziness or paresthesias  ENDOCRINE: NEGATIVE for temperature intolerance, skin/hair changes  HEME: NEGATIVE for bleeding problems  PSYCHIATRIC: NEGATIVE for changes in mood or affect    OBJECTIVE:   /75 (BP Location: Left arm, Patient Position: Sitting, Cuff Size: Adult Regular)   Pulse 65   Temp 97  F (36.1  C) (Temporal)   Resp 18   Ht 1.816 m (5' 11.5\")   Wt 89.3 kg (196 lb 12.8 oz)   SpO2 98%   BMI 27.07 kg/m     Estimated body mass index is 27.07 kg/m  as calculated from the following:    Height as of this encounter: 1.816 m (5' 11.5\").    Weight as of this encounter: 89.3 kg (196 lb 12.8 oz).  Physical Exam  GENERAL: alert, not in distress, oriented to time, place and person.  EYES: Eyes grossly normal to inspection  HENT: normal cephalic/atraumatic, nose and mouth without ulcers or lesions, nasal mucosa edematous , oropharynx clear and oral mucous membranes moist  NECK: no adenopathy  RESP: Symmetrical chest expansion, harsh breath sounds bilaterally without any adventitious sounds.  CV: regular rate and rhythm, normal S1 S2, no S3 or S4, no murmur, click or rub, no peripheral edema and peripheral " pulses strong  MS: no gross musculoskeletal defects noted, no edema  SKIN: no suspicious lesions or rashes  NEURO: Normal strength and tone, mentation intact and speech normal  PSYCH: mentation appears normal, affect normal/bright     Diagnostic Test Results:  Results for orders placed or performed in visit on 01/30/25   Lipid panel reflex to direct LDL Fasting     Status: Abnormal   Result Value Ref Range    Cholesterol 223 (H) <200 mg/dL    Triglycerides 135 <150 mg/dL    Direct Measure HDL 37 (L) >=40 mg/dL    LDL Cholesterol Calculated 159 (H) <100 mg/dL    Non HDL Cholesterol 186 (H) <130 mg/dL    Patient Fasting > 8hrs? Yes     Narrative    Cholesterol  Desirable: < 200 mg/dL  Borderline High: 200 - 239 mg/dL  High: >= 240 mg/dL    Triglycerides  Normal: < 150 mg/dL  Borderline High: 150 - 199 mg/dL  High: 200-499 mg/dL  Very High: >= 500 mg/dL    Direct Measure HDL  Female: >= 50 mg/dL   Male: >= 40 mg/dL    LDL Cholesterol  Desirable: < 100 mg/dL  Above Desirable: 100 - 129 mg/dL   Borderline High: 130 - 159 mg/dL   High:  160 - 189 mg/dL   Very High: >= 190 mg/dL    Non HDL Cholesterol  Desirable: < 130 mg/dL  Above Desirable: 130 - 159 mg/dL  Borderline High: 160 - 189 mg/dL  High: 190 - 219 mg/dL  Very High: >= 220 mg/dL   Comprehensive metabolic panel     Status: None   Result Value Ref Range    Sodium 140 135 - 145 mmol/L    Potassium 4.5 3.4 - 5.3 mmol/L    Carbon Dioxide (CO2) 26 22 - 29 mmol/L    Anion Gap 7 7 - 15 mmol/L    Urea Nitrogen 15.0 8.0 - 23.0 mg/dL    Creatinine 1.03 0.67 - 1.17 mg/dL    GFR Estimate 80 >60 mL/min/1.73m2    Calcium 9.8 8.8 - 10.4 mg/dL    Chloride 107 98 - 107 mmol/L    Glucose 92 70 - 99 mg/dL    Alkaline Phosphatase 95 40 - 150 U/L    AST 23 0 - 45 U/L    ALT 18 0 - 70 U/L    Protein Total 6.9 6.4 - 8.3 g/dL    Albumin 4.2 3.5 - 5.2 g/dL    Bilirubin Total 0.6 <=1.2 mg/dL    Patient Fasting > 8hrs? Yes    PSA, screen     Status: Normal   Result Value Ref Range     Prostate Specific Antigen Screen 1.33 0.00 - 4.50 ng/mL    Narrative    This result is obtained using the Roche Elecsys total PSA method on the evangelist [id number] immunoassay analyzer, which is an ultrasensitive method. Results obtained with different assay methods or kits cannot be used interchangeably.  This test is intended for initial prostate cancer screening. PSA values exceeding the age-specific limits are suspicious for prostate disease, but additional testing, such as prostate biopsy, is needed to diagnose prostate pathology. The American Cancer Society recommends annual examination with digital rectal examination and serum PSA beginning at age 50 and for men with a life expectancy of at least 10 years after detection of prostate cancer. For men in high-risk groups, such as  Americans or men with a first-degree relative diagnosed at a younger age, testing should begin at a younger age. It is generally recommended that information be provided to patients about the benefits and limitations of testing and treatment so they can make informed decisions.   Calprotectin Feces     Status: Normal   Result Value Ref Range    Calprotectin Feces 15.4 0.0 - 49.9 mg/kg   CBC with platelets and differential     Status: Abnormal   Result Value Ref Range    WBC Count 5.6 4.0 - 11.0 10e3/uL    RBC Count 4.73 4.40 - 5.90 10e6/uL    Hemoglobin 15.6 13.3 - 17.7 g/dL    Hematocrit 42.5 40.0 - 53.0 %    MCV 90 78 - 100 fL    MCH 33.0 26.5 - 33.0 pg    MCHC 36.7 (H) 31.5 - 36.5 g/dL    RDW 12.4 10.0 - 15.0 %    Platelet Count 193 150 - 450 10e3/uL    % Neutrophils 66 %    % Lymphocytes 20 %    % Monocytes 11 %    % Eosinophils 3 %    % Basophils 1 %    % Immature Granulocytes 0 %    Absolute Neutrophils 3.7 1.6 - 8.3 10e3/uL    Absolute Lymphocytes 1.1 0.8 - 5.3 10e3/uL    Absolute Monocytes 0.6 0.0 - 1.3 10e3/uL    Absolute Eosinophils 0.2 0.0 - 0.7 10e3/uL    Absolute Basophils 0.0 0.0 - 0.2 10e3/uL    Absolute Immature  Granulocytes 0.0 <=0.4 10e3/uL   Streptococcus A Rapid Screen w/Reflex to PCR - Clinic Collect     Status: Normal    Specimen: Throat; Swab   Result Value Ref Range    Group A Strep antigen Negative Negative   Influenza A & B Antigen - Clinic Collect     Status: Normal    Specimen: Nose; Swab   Result Value Ref Range    Influenza A antigen Negative Negative    Influenza B antigen Negative Negative    Narrative    Test results must be correlated with clinical data. If necessary, results should be confirmed by a molecular assay or viral culture.   C. difficile Toxin B PCR with reflex to C. difficile Antigen and Toxins A/B EIA     Status: Normal    Specimen: Per Rectum; Stool   Result Value Ref Range    C Difficile Toxin B by PCR Negative Negative    Narrative    The [employer] Xpert C. difficile Assay, performed on the [employer]  Instrument Systems, is a qualitative in vitro diagnostic test for rapid detection of toxin B gene sequences from unformed (liquid or soft) stool specimens collected from patients suspected of having Clostridioides difficile infection (CDI). The test utilizes automated real-time polymerase chain reaction (PCR) to detect toxin gene sequences associated with toxin producing C. difficile. The Xpert C. difficile Assay is intended as an aid in the diagnosis of CDI.   Group A Streptococcus PCR Throat Swab     Status: Normal    Specimen: Throat; Swab   Result Value Ref Range    Group A strep by PCR Not Detected Not Detected    Narrative    The Xpert Xpress Strep A test, performed on the [employer]  Instrument Systems, is a rapid, qualitative in vitro diagnostic test for the detection of Streptococcus pyogenes (Group A ß-hemolytic Streptococcus, Strep A) in throat swab specimens from patients with signs and symptoms of pharyngitis. The Xpert Xpress Strep A test can be used as an aid in the diagnosis of Group A Streptococcal pharyngitis. The assay is not intended to monitor treatment for Group A  Streptococcus infections. The Xpert Xpress Strep A test utilizes an automated real-time polymerase chain reaction (PCR) to detect Streptococcus pyogenes DNA.   CBC with platelets and differential     Status: Abnormal    Narrative    The following orders were created for panel order CBC with platelets and differential.  Procedure                               Abnormality         Status                     ---------                               -----------         ------                     CBC with platelets and d...[[id number]]  Abnormal            Final result                 Please view results for these tests on the individual orders.       ASSESSMENT/PLAN:     Routine general medical examination at a [facility]  - Lipid panel reflex to direct LDL Fasting  - CBC with platelets and differential  - Comprehensive metabolic panel    CARDIOVASCULAR SCREENING; LDL GOAL LESS THAN 130  - Lipid panel reflex to direct LDL Fasting    Change in bowel habits  - Calprotectin Feces; Future  - C. difficile Toxin B PCR with reflex to C. difficile Antigen and Toxins A/B EIA; Future  - Calprotectin Feces  - C. difficile Toxin B PCR with reflex to C. difficile Antigen and Toxins A/B EIA    Acute pharyngitis due to other specified organisms  - Streptococcus A Rapid Screen w/Reflex to PCR - Clinic Collect  - Group A Streptococcus PCR Throat Swab    Flu-like symptoms  - Influenza A & B Antigen - Clinic Collect    Screening for prostate cancer  - PSA, screen     Patient has been advised of split billing requirements and indicates understanding: Yes      Counseling  Special attention given to:        Regular exercise       Healthy diet/nutrition       Prostate cancer screening  Appropriate preventive services were addressed with this patient via screening, questionnaire, or discussion as appropriate for fall prevention, nutrition, physical activity, tobacco-use cessation, social engagement, weight loss and cognition. Checklist  "reviewing preventive services available has been given to the patient.        The 10-year ASCVD risk score (Anthony OROZCO, et al., 2019) is: 14.9%    Values used to calculate the score:      Age: 64 years      Sex: Male      Is Non- : No      Diabetic: No      Tobacco smoker: No      Systolic Blood Pressure: 117 mmHg      Is BP treated: No      HDL Cholesterol: 37 mg/dL      Total Cholesterol: 223 mg/dL      BMI  Estimated body mass index is 27.07 kg/m  as calculated from the following:    Height as of this encounter: 1.816 m (5' 11.5\").    Weight as of this encounter: 89.3 kg (196 lb 12.8 oz).   Weight management plan: diet and exercise.      He reports that he has never smoked. He has never been exposed to tobacco smoke. He has never used smokeless tobacco.            Signed Electronically by: Paddy Holly MD  "

## 2025-03-04 ENCOUNTER — VIRTUAL VISIT (OUTPATIENT)
Dept: FAMILY MEDICINE | Facility: CLINIC | Age: 65
End: 2025-03-04
Payer: COMMERCIAL

## 2025-03-04 DIAGNOSIS — K21.9 GASTROESOPHAGEAL REFLUX DISEASE WITHOUT ESOPHAGITIS: Primary | ICD-10-CM

## 2025-03-04 DIAGNOSIS — Z13.0 SCREENING, IRON DEFICIENCY ANEMIA: ICD-10-CM

## 2025-03-04 DIAGNOSIS — R09.A2 GLOBUS SENSATION: ICD-10-CM

## 2025-03-04 DIAGNOSIS — K52.9 POSTPRANDIAL DIARRHEA: ICD-10-CM

## 2025-03-04 DIAGNOSIS — Z13.21 ENCOUNTER FOR VITAMIN DEFICIENCY SCREENING: ICD-10-CM

## 2025-03-04 DIAGNOSIS — K90.0 CELIAC DISEASE: ICD-10-CM

## 2025-03-04 PROCEDURE — 98006 SYNCH AUDIO-VIDEO EST MOD 30: CPT | Performed by: INTERNAL MEDICINE

## 2025-03-04 NOTE — PROGRESS NOTES
Bill is a 64 year old who is being evaluated via a billable video visit.    How would you like to obtain your AVS? MyChart  If the video visit is dropped, the invitation should be resent by: Text to cell phone: [phone]  Will anyone else be joining your video visit? No        Diarrhea  Onset/Duration: a few weeks  Description:       Consistency of stool: loose       Blood in stool: No       Number of loose stools past 24 hours: 5  Progression of Symptoms: same  Accompanying signs and symptoms:       Fever: No       Nausea/Vomiting: No       Abdominal pain: No       Weight loss: No       Episodes of constipation: No  History   Ill contacts: No  Recent use of antibiotics: No  Recent travels: No  Recent medication-new or changes(Rx or OTC): No  Precipitating or alleviating factors: None  Therapies tried and outcome: none    ASSESSMENT/PLAN  Gastroesophageal reflux disease without esophagitis  Most probable cause of nocturnal globus sensation. However, patient is hesitant to use proton pump inhibitors. Advised to use over-the-counter TUMS or Maalox until he undergoes EGD.  - REVIEW OF HEALTH MAINTENANCE PROTOCOL ORDERS  - Adult GI  Referral - Procedure Only; Future    Postprandial diarrhea  Unknown cause but unlikely infectious nor inflammatory based on normal fecal elastase.  - REVIEW OF HEALTH MAINTENANCE PROTOCOL ORDERS  - Elastase Fecal; Standing  - Adult GI  Referral - Procedure Only; Future    Globus sensation  Most likely due to GERD.    Celiac disease  Suspected due to postprandial diarrhea. Advised to continue regular diet until he undergoes EGD.  - Adult GI  Referral - Procedure Only; Future    Screening, iron deficiency anemia  Suspected due to fatigue.   - Ferritin; Standing  - Iron and iron binding capacity; Standing    Encounter for vitamin deficiency screening  High probability if patient has intestinal malabsorption if he truly has celiac disease.  - Vitamin D Deficiency;  Standing  - Vitamin B12; Standing       Video-Visit Details    Type of service:  Video Visit   Start: 0710  End: 0720  Originating Location (pt. Location): Home  Distant Location (provider location):  On-site  Platform used for Video Visit: Lainey    Signed Electronically by: Paddy Holly MD

## 2025-03-04 NOTE — PATIENT INSTRUCTIONS
At [facility], we strive to deliver an exceptional experience to you, every time we see you. If you receive a survey, please let us know what we are doing well and/or what we could improve upon, as we do value your feedback.  If you have MyChart, you can expect to receive results automatically within 24 hours of their completion.  Your provider will send a note interpreting your results as well.   If you do not have MyChart, you should receive your results in about a week by mail.    Your care team:                            Family Medicine Internal Medicine   MD Paddy Merrill, MD Chacha Mascorro, MD Lui Ashton, MD Aruna Tavera, PAMagdalenaC    Bishnu Sawant, MD Pediatrics   Shabnam Ruiz, MD Cynthia Hercules, MD Hailey Persaud, APRN CNP Odessa Brooke APRN CNP   MD Dedra Barakat, MD Claudine Burleson, CNP     Angel Starks, CNP Same-Day Provider (No follow-up visits)   PURNIMA Ash, DNP Michaela Ling, PURNIMA Rodriguez, FNP, BC HOLGER GranadoC     Clinic hours: Monday - Thursday 7 am-6 pm; Fridays 7 am-5 pm.   Urgent care: Monday - Friday 10 am- 8 pm; Saturday and Sunday 9 am-5 pm.    Clinic: [phone]       [facility]: Monday - Thursday 8 am - 7 pm; Friday 8 am - 6 pm  [facility]: [phone]

## 2025-03-17 ENCOUNTER — TELEPHONE (OUTPATIENT)
Dept: GASTROENTEROLOGY | Facility: CLINIC | Age: 65
End: 2025-03-17
Payer: COMMERCIAL

## 2025-03-19 ENCOUNTER — ANESTHESIA EVENT (OUTPATIENT)
Dept: SURGERY | Facility: AMBULATORY SURGERY CENTER | Age: 65
End: 2025-03-19
Payer: COMMERCIAL

## 2025-03-19 ENCOUNTER — HOSPITAL ENCOUNTER (OUTPATIENT)
Facility: AMBULATORY SURGERY CENTER | Age: 65
Discharge: HOME | End: 2025-03-19
Attending: INTERNAL MEDICINE
Payer: COMMERCIAL

## 2025-03-19 ENCOUNTER — ANESTHESIA (OUTPATIENT)
Dept: SURGERY | Facility: AMBULATORY SURGERY CENTER | Age: 65
End: 2025-03-19
Payer: COMMERCIAL

## 2025-03-19 VITALS
DIASTOLIC BLOOD PRESSURE: 76 MMHG | HEART RATE: 58 BPM | OXYGEN SATURATION: 97 % | RESPIRATION RATE: 16 BRPM | SYSTOLIC BLOOD PRESSURE: 119 MMHG | TEMPERATURE: 97.2 F

## 2025-03-19 VITALS — HEART RATE: 77 BPM

## 2025-03-19 LAB — UPPER GI ENDOSCOPY: NORMAL

## 2025-03-19 RX ORDER — PROPOFOL 10 MG/ML
INJECTION, EMULSION INTRAVENOUS CONTINUOUS PRN
Status: DISCONTINUED | OUTPATIENT
Start: 2025-03-19 | End: 2025-03-19

## 2025-03-19 RX ORDER — NALOXONE HYDROCHLORIDE 0.4 MG/ML
0.2 INJECTION, SOLUTION INTRAMUSCULAR; INTRAVENOUS; SUBCUTANEOUS
Status: DISCONTINUED | OUTPATIENT
Start: 2025-03-19 | End: 2025-03-20 | Stop reason: HOSPADM

## 2025-03-19 RX ORDER — SODIUM CHLORIDE, SODIUM LACTATE, POTASSIUM CHLORIDE, CALCIUM CHLORIDE 600; 310; 30; 20 MG/100ML; MG/100ML; MG/100ML; MG/100ML
INJECTION, SOLUTION INTRAVENOUS CONTINUOUS
Status: DISCONTINUED | OUTPATIENT
Start: 2025-03-19 | End: 2025-03-20 | Stop reason: HOSPADM

## 2025-03-19 RX ORDER — ONDANSETRON 2 MG/ML
4 INJECTION INTRAMUSCULAR; INTRAVENOUS
Status: DISCONTINUED | OUTPATIENT
Start: 2025-03-19 | End: 2025-03-20 | Stop reason: HOSPADM

## 2025-03-19 RX ORDER — PROCHLORPERAZINE MALEATE 5 MG/1
5 TABLET ORAL EVERY 6 HOURS PRN
Status: DISCONTINUED | OUTPATIENT
Start: 2025-03-19 | End: 2025-03-20 | Stop reason: HOSPADM

## 2025-03-19 RX ORDER — NALOXONE HYDROCHLORIDE 0.4 MG/ML
0.4 INJECTION, SOLUTION INTRAMUSCULAR; INTRAVENOUS; SUBCUTANEOUS
Status: DISCONTINUED | OUTPATIENT
Start: 2025-03-19 | End: 2025-03-20 | Stop reason: HOSPADM

## 2025-03-19 RX ORDER — FLUMAZENIL 0.1 MG/ML
0.2 INJECTION, SOLUTION INTRAVENOUS
Status: ACTIVE | OUTPATIENT
Start: 2025-03-19 | End: 2025-03-19

## 2025-03-19 RX ORDER — LIDOCAINE HYDROCHLORIDE 20 MG/ML
INJECTION, SOLUTION INFILTRATION; PERINEURAL PRN
Status: DISCONTINUED | OUTPATIENT
Start: 2025-03-19 | End: 2025-03-19

## 2025-03-19 RX ORDER — ONDANSETRON 2 MG/ML
4 INJECTION INTRAMUSCULAR; INTRAVENOUS EVERY 6 HOURS PRN
Status: DISCONTINUED | OUTPATIENT
Start: 2025-03-19 | End: 2025-03-20 | Stop reason: HOSPADM

## 2025-03-19 RX ORDER — ONDANSETRON 4 MG/1
4 TABLET, ORALLY DISINTEGRATING ORAL EVERY 6 HOURS PRN
Status: DISCONTINUED | OUTPATIENT
Start: 2025-03-19 | End: 2025-03-20 | Stop reason: HOSPADM

## 2025-03-19 RX ORDER — LIDOCAINE 40 MG/G
CREAM TOPICAL
Status: DISCONTINUED | OUTPATIENT
Start: 2025-03-19 | End: 2025-03-20 | Stop reason: HOSPADM

## 2025-03-19 RX ADMIN — PROPOFOL 100 MG: 10 INJECTION, EMULSION INTRAVENOUS at 09:36

## 2025-03-19 RX ADMIN — LIDOCAINE HYDROCHLORIDE 60 MG: 20 INJECTION, SOLUTION INFILTRATION; PERINEURAL at 09:35

## 2025-03-19 RX ADMIN — PROPOFOL 175 MCG/KG/MIN: 10 INJECTION, EMULSION INTRAVENOUS at 09:35

## 2025-03-19 RX ADMIN — SODIUM CHLORIDE, SODIUM LACTATE, POTASSIUM CHLORIDE, CALCIUM CHLORIDE: 600; 310; 30; 20 INJECTION, SOLUTION INTRAVENOUS at 09:28

## 2025-03-19 NOTE — ANESTHESIA CARE TRANSFER NOTE
Patient: Geovanni Jasso    Procedure: Procedure(s):  Combined Esophagoscopy, Gastroscopy, Duodenoscopy (Egd) With Co2 Insufflation  ESOPHAGOGASTRODUODENOSCOPY, WITH BIOPSY       Diagnosis: Gastroesophageal reflux disease without esophagitis [K21.9]  Postprandial diarrhea [K52.9]  Celiac disease [K90.0]  Diagnosis Additional Information: No value filed.    Anesthesia Type:   MAC     Note:    Oropharynx: oropharynx clear of all foreign objects and spontaneously breathing  Level of Consciousness: drowsy  Oxygen Supplementation: room air    Independent Airway: airway patency satisfactory and stable  Dentition: dentition unchanged  Vital Signs Stable: post-procedure vital signs reviewed and stable  Report to RN Given: handoff report given  Patient transferred to: Phase II    Handoff Report: Identifed the Patient, Identified the Reponsible Provider, Reviewed the pertinent medical history, Discussed the surgical course, Reviewed Intra-OP anesthesia mangement and issues during anesthesia, Set expectations for post-procedure period and Allowed opportunity for questions and acknowledgement of understanding      Vitals:  Vitals Value Taken Time   BP     Temp     Pulse     Resp     SpO2         Electronically Signed By: PURNIMA Hardin CRNA  March 19, 2025  9:51 AM

## 2025-03-19 NOTE — ANESTHESIA PREPROCEDURE EVALUATION
Anesthesia Pre-Procedure Evaluation    Patient: Geovanni Jasso   MRN: [id number] : 1960        Procedure : Procedure(s):  Combined Esophagoscopy, Gastroscopy, Duodenoscopy (Egd) With Co2 Insufflation          Past Medical History:   Diagnosis Date    Cancer (H) 2023    just diagnosed with lymphona.    Carpal tunnel syndrome     Chronic rhinitis 10/23/2015    Degeneration of lumbar intervertebral disc 2023    Esophageal reflux 2013    Hand joint stiff, right       Past Surgical History:   Procedure Laterality Date    BIOPSY  23    BIOPSY LYMPH NODE CERVICAL Left 2023    Procedure: excisional biopsy of a left cervical node;  Surgeon: Aung Tamayo MD;  Location: [facility]    COLONOSCOPY      clean, do again in 10 years    COLONOSCOPY WITH CO2 INSUFFLATION N/A 2019    Procedure: COLONOSCOPY, WITH CO2 INSUFFLATION;  Surgeon: Jaison Hammer MD;  Location: [facility]    DESTRUCTION OF PARAVERTEBRAL FACETCERVICAL / THORACIC SINGLE Left 2024    Procedure: DESTRUCTION, NERVE, FACET JOINT, CERVICOTHORACIC, C3/4/5;  Surgeon: Lb Carter MD;  Location: [facility]    ENT SURGERY      Loss of hearing rt ear (tinnitis)    HERNIA REPAIR      INJECT BLOCK MEDIAL BRANCH CERVICAL/THORACIC/LUMBAR Left 2024    Procedure: BLOCK, NERVE, FACET JOINT, MEDIAL BRANCH, DIAGNOSTIC C3/4/5;  Surgeon: Lb Carter MD;  Location[facility]    INJECT BLOCK MEDIAL BRANCH CERVICAL/THORACIC/LUMBAR Left 2024    Procedure: BLOCK, NERVE, FACET JOINT, MEDIAL BRANCH, DIAGNOSTIC C3/4/5;  Surgeon: Lb Carter MD;  Location: [facility]    IR CHEST PORT PLACEMENT > 5 YRS OF AGE  3/3/2023    NO HISTORY OF SURGERY      ORTHOPEDIC SURGERY  ,    rt knee, scope in 86; ACL repair Early       Allergies   Allergen Reactions    Allopurinol Rash      Social History     Tobacco Use    Smoking status: Never     Passive exposure: Never    Smokeless tobacco: Never   Substance Use  "Topics    Alcohol use: Yes     Comment: occassional, 1- 2 per week      Wt Readings from Last 1 Encounters:   01/30/25 89.3 kg (196 lb 12.8 oz)           Physical Exam    Airway        Mallampati: II   TM distance: > 3 FB   Neck ROM: full   Mouth opening: > 3 cm    Respiratory Devices and Support         Dental     Comment: No reported loose or chipped teeth    (+) Minor Abnormalities - some fillings, tiny chips      Cardiovascular   cardiovascular exam normal          Pulmonary   pulmonary exam normal                OUTSIDE LABS:  CBC:   Lab Results   Component Value Date    WBC 5.6 01/30/2025    WBC 5.7 01/16/2025    HGB 15.6 01/30/2025    HGB 15.7 01/16/2025    HCT 42.5 01/30/2025    HCT 45.5 01/16/2025     01/30/2025     01/16/2025     BMP:   Lab Results   Component Value Date     01/30/2025     01/16/2025    POTASSIUM 4.5 01/30/2025    POTASSIUM 4.1 01/16/2025    CHLORIDE 107 01/30/2025    CHLORIDE 107 01/16/2025    CO2 26 01/30/2025    CO2 22 01/16/2025    BUN 15.0 01/30/2025    BUN 14.8 01/16/2025    CR 1.03 01/30/2025    CR 0.97 01/16/2025    GLC 92 01/30/2025     (H) 01/16/2025     COAGS:   Lab Results   Component Value Date    PTT 35 03/22/2023    INR 1.10 03/22/2023     POC: No results found for: \"BGM\", \"HCG\", \"HCGS\"  HEPATIC:   Lab Results   Component Value Date    ALBUMIN 4.2 01/30/2025    PROTTOTAL 6.9 01/30/2025    ALT 18 01/30/2025    AST 23 01/30/2025    ALKPHOS 95 01/30/2025    BILITOTAL 0.6 01/30/2025     OTHER:   Lab Results   Component Value Date    LACT 0.9 03/22/2023    SHIV 9.8 01/30/2025    TSH 2.01 11/12/2018    CRP <2.9 01/09/2023    SED 8 01/09/2023       Anesthesia Plan    ASA Status:  2    NPO Status:  NPO Appropriate    Anesthesia Type: MAC.     - Reason for MAC: straight local not clinically adequate   Induction: Intravenous, Propofol.   Maintenance: TIVA.        Consents    Anesthesia Plan(s) and associated risks, benefits, and realistic alternatives " discussed. Questions answered and patient/representative(s) expressed understanding.     - Discussed: Risks, Benefits and Alternatives for BOTH SEDATION and the PROCEDURE were discussed     - Discussed with:  Patient      - Extended Intubation/Ventilatory Support Discussed: No.      - Patient is DNR/DNI Status: No     Use of blood products discussed: No .     Postoperative Care       PONV prophylaxis: Ondansetron (or other 5HT-3), Background Propofol Infusion     Comments:               Armand Verdin MD    Clinically Significant Risk Factors Present on Admission

## 2025-03-19 NOTE — H&P
ENDOSCOPY PRE-SEDATION H&P FOR OUTPATIENT PROCEDURES    Geovanni Jasso  [id number]  1960    Procedure: EGD    Pre-procedure diagnosis: globus, change in bowel habits    Past medical history:   Past Medical History:   Diagnosis Date    Cancer (H) 2-7-2023    just diagnosed with lymphona.    Carpal tunnel syndrome     Chronic rhinitis 10/23/2015    Degeneration of lumbar intervertebral disc 11/28/2023    Esophageal reflux 12/26/2013    Hand joint stiff, right        Past surgical history:   Past Surgical History:   Procedure Laterality Date    BIOPSY  2-6-23    BIOPSY LYMPH NODE CERVICAL Left 2/16/2023    Procedure: excisional biopsy of a left cervical node;  Surgeon: Aung Tamayo MD;  Location: [facility]    COLONOSCOPY  2019    clean, do again in 10 years    COLONOSCOPY WITH CO2 INSUFFLATION N/A 07/17/2019    Procedure: COLONOSCOPY, WITH CO2 INSUFFLATION;  Surgeon: Jaison Hammer MD;  [facility]    DESTRUCTION OF PARAVERTEBRAL FACETCERVICAL / THORACIC SINGLE Left 5/16/2024    Procedure: DESTRUCTION, NERVE, FACET JOINT, CERVICOTHORACIC, C3/4/5;  Surgeon: Lb Carter MD;  Location: [facility]    ENT SURGERY  1979    Loss of hearing rt ear (tinnitis)    HERNIA REPAIR  2000    INJECT BLOCK MEDIAL BRANCH CERVICAL/THORACIC/LUMBAR Left 1/18/2024    Procedure: BLOCK, NERVE, FACET JOINT, MEDIAL BRANCH, DIAGNOSTIC C3/4/5;  Surgeon: Lb Carter MD;  Location: [facility]    INJECT BLOCK MEDIAL BRANCH CERVICAL/THORACIC/LUMBAR Left 4/25/2024    Procedure: BLOCK, NERVE, FACET JOINT, MEDIAL BRANCH, DIAGNOSTIC C3/4/5;  Surgeon: Lb Carter MD;  Location: [facility]    IR CHEST PORT PLACEMENT > 5 YRS OF AGE  3/3/2023    NO HISTORY OF SURGERY      ORTHOPEDIC SURGERY  1988,1992    rt knee, scope in 86; ACL repair Early 90s       Current Outpatient Medications   Medication Sig Dispense Refill    meloxicam (MOBIC) 15 MG tablet Take 1 tablet (15 mg) by mouth daily as needed. 30 tablet 1     Current  Facility-Administered Medications   Medication Dose Route Frequency Provider Last Rate Last Admin    lactated ringers infusion   Intravenous Continuous Armand Verdin MD 10 mL/hr at 03/19/25 0928 Restarted at 03/19/25 0931    lidocaine (LMX4) kit   Topical Q1H PRN Jaison Hammer MD        lidocaine 1 % 0.1-1 mL  0.1-1 mL Other Q1H PRN Jaison Hammer MD        ondansetron (ZOFRAN) injection 4 mg  4 mg Intravenous Once PRN Jaison Hammer MD        sodium chloride (PF) 0.9% PF flush 3 mL  3 mL Intracatheter Q8H Jaison Hammer MD        sodium chloride (PF) 0.9% PF flush 3 mL  3 mL Intracatheter q1 min prn Jaison Hammer MD           Allergies   Allergen Reactions    Allopurinol Rash       History of Anesthesia/Sedation Problems: no    PHYSICAL EXAMINATION:  Constitutional: aaox3, cooperative, pleasant  Vitals reviewed: /65 (BP Location: Left arm)   Pulse 68   Temp 97.4  F (36.3  C) (Temporal)   Resp 16   SpO2 98%   Wt:   Wt Readings from Last 2 Encounters:   01/30/25 89.3 kg (196 lb 12.8 oz)   01/22/25 89 kg (196 lb 3.2 oz)      Eyes: Sclera anicteric/injected  Ears/nose/mouth/throat: Normal oropharynx without ulcers or exudate, mucus membranes moist, hearing intact  Neck: supple, thyroid normal size  CV: No edema  Respiratory: Unlabored breathing  Lymph: No submandibular, supraclavicular or inguinal lymphadenopathy  Abd: Nondistended, no masses, nontender  Skin: warm, perfused, no jaundice  Psych: Normal affect  MSK: normal movement on limited exam.    ASA Score: See Provation note    Assessment/Plan:     The patient is an appropriate candidate to receive sedation.    Informed consent was discussed with the patient/family, including the risks, benefits, potential complications and any alternative options associated with sedation.    Patient assessment completed just prior to sedation and while under constant observation by the provider.  Condition determined to be adequate for proceeding with sedation.    The specific risks for the procedure were discussed with the patient at the time of informed consent and include but are not limited to perforation which could require surgery, missing significant neoplasm or lesion, hemorrhage and adverse sedative complication.      Jaison Hammer MD

## 2025-03-19 NOTE — ANESTHESIA POSTPROCEDURE EVALUATION
Patient: Geovanni Jasso    Procedure: Procedure(s):  Combined Esophagoscopy, Gastroscopy, Duodenoscopy (Egd) With Co2 Insufflation  ESOPHAGOGASTRODUODENOSCOPY, WITH BIOPSY       Anesthesia Type:  MAC    Note:  Disposition: Outpatient   Postop Pain Control: Uneventful            Sign Out: Well controlled pain   PONV: No   Neuro/Psych: Uneventful            Sign Out: Acceptable/Baseline neuro status   Airway/Respiratory: Uneventful            Sign Out: Acceptable/Baseline resp. status   CV/Hemodynamics: Uneventful            Sign Out: Acceptable CV status; No obvious hypovolemia; No obvious fluid overload   Other NRE:    DID A NON-ROUTINE EVENT OCCUR?            Last vitals:  Vitals Value Taken Time   /76 03/19/25 1025   Temp 97.2  F (36.2  C) 03/19/25 1025   Pulse 58 03/19/25 1025   Resp 16 03/19/25 1025   SpO2 97 % 03/19/25 1025       Electronically Signed By: Armand Verdin MD  March 19, 2025  12:05 PM

## 2025-03-20 LAB
PATH REPORT.COMMENTS IMP SPEC: NORMAL
PATH REPORT.COMMENTS IMP SPEC: NORMAL
PATH REPORT.FINAL DX SPEC: NORMAL
PATH REPORT.GROSS SPEC: NORMAL
PATH REPORT.MICROSCOPIC SPEC OTHER STN: NORMAL
PATH REPORT.RELEVANT HX SPEC: NORMAL
PHOTO IMAGE: NORMAL

## 2025-06-11 ENCOUNTER — OFFICE VISIT (OUTPATIENT)
Dept: AUDIOLOGY | Facility: CLINIC | Age: 65
End: 2025-06-11
Payer: COMMERCIAL

## 2025-06-11 DIAGNOSIS — H90.3 SNHL (SENSORY-NEURAL HEARING LOSS), ASYMMETRICAL: Primary | ICD-10-CM

## 2025-06-11 PROCEDURE — V5010 ASSESSMENT FOR HEARING AID: HCPCS | Performed by: AUDIOLOGIST

## 2025-06-11 NOTE — PROGRESS NOTES
AUDIOLOGY REPORT    BACKGROUND INFORMATION: Geovanni Jasso was seen in the Audiology Clinic at [facility] on 6/11/2025 for follow-up.  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  The patient was fit with a [employer]on[employer] Jotkyeo L70-R Bi[employer] hearing aid on 12/20/2023.  The patient reports that he still struggles in background noise and in large groups.    TEST RESULTS AND PROCEDURES: An electroacoustic hearing aid check was performed.  Adjustments were made including cleaning the devices and slightly increasing gain I the mid high frequency range and the patient reported good audibility. The hearing aid conformity evaluation was completed. This includes initially evaluating the devices electroacoustically and later matching NAL-NL2 target with soft sounds audible, moderate sounds comfortable and clear, and loud sounds below discomfort. Patient was counseled on using the phone joon.     SUMMARY AND RECOMMENDATIONS: A hearing aid check was performed today and the patient reports good sound following cleaning and adjustment.  Adjustments were made as noted above and the patient will return as needed or at least every 4-6 months for cleaning and assessment of hearing aid.  Call this clinic with questions regarding today s visit.     Michelle Interiano.  Doctor of Audiology  [place] License # [id number]

## 2025-07-15 ENCOUNTER — DOCUMENTATION ONLY (OUTPATIENT)
Dept: LAB | Facility: CLINIC | Age: 65
End: 2025-07-15
Payer: COMMERCIAL

## 2025-07-15 DIAGNOSIS — C83.30 DIFFUSE LARGE B-CELL LYMPHOMA, UNSPECIFIED BODY REGION (H): Primary | ICD-10-CM

## 2025-07-15 NOTE — PROGRESS NOTES
Geovanni Jasso has an upcoming lab appointment:    Future Appointments   Date Time Provider Department Center   7/21/2025  8:15 AM [facility] [place]   7/21/2025  8:40 AM [facility] [place]   7/23/2025  8:30 AM Kiel Maurer MD [facility]     Patient is scheduled for the following lab(s):     monty       There is no order available. Please review and place future orders as appropriate.      Thank you,  Kitty Sage

## 2025-07-21 ENCOUNTER — LAB (OUTPATIENT)
Dept: INFUSION THERAPY | Facility: CLINIC | Age: 65
End: 2025-07-21
Attending: INTERNAL MEDICINE
Payer: COMMERCIAL

## 2025-07-21 DIAGNOSIS — C83.30 DIFFUSE LARGE B-CELL LYMPHOMA, UNSPECIFIED BODY REGION (H): ICD-10-CM

## 2025-07-21 LAB
ALBUMIN SERPL BCG-MCNC: 4.2 G/DL (ref 3.5–5.2)
ALP SERPL-CCNC: 91 U/L (ref 40–150)
ALT SERPL W P-5'-P-CCNC: 15 U/L (ref 0–70)
ANION GAP SERPL CALCULATED.3IONS-SCNC: 12 MMOL/L (ref 7–15)
AST SERPL W P-5'-P-CCNC: 28 U/L (ref 0–45)
BASOPHILS # BLD AUTO: 0.1 10E3/UL (ref 0–0.2)
BASOPHILS NFR BLD AUTO: 1 %
BILIRUB SERPL-MCNC: 0.8 MG/DL
BUN SERPL-MCNC: 13.9 MG/DL (ref 8–23)
CALCIUM SERPL-MCNC: 9.7 MG/DL (ref 8.8–10.4)
CHLORIDE SERPL-SCNC: 105 MMOL/L (ref 98–107)
CREAT SERPL-MCNC: 0.89 MG/DL (ref 0.67–1.17)
EGFRCR SERPLBLD CKD-EPI 2021: >90 ML/MIN/1.73M2
EOSINOPHIL # BLD AUTO: 0.2 10E3/UL (ref 0–0.7)
EOSINOPHIL NFR BLD AUTO: 4 %
ERYTHROCYTE [DISTWIDTH] IN BLOOD BY AUTOMATED COUNT: 13.1 % (ref 10–15)
GLUCOSE SERPL-MCNC: 115 MG/DL (ref 70–99)
HCO3 SERPL-SCNC: 21 MMOL/L (ref 22–29)
HCT VFR BLD AUTO: 45.6 % (ref 40–53)
HGB BLD-MCNC: 15.7 G/DL (ref 13.3–17.7)
HOLD SPECIMEN: NORMAL
IMM GRANULOCYTES # BLD: 0 10E3/UL
IMM GRANULOCYTES NFR BLD: 0 %
LDH SERPL L TO P-CCNC: 211 U/L (ref 0–250)
LYMPHOCYTES # BLD AUTO: 1.3 10E3/UL (ref 0.8–5.3)
LYMPHOCYTES NFR BLD AUTO: 23 %
MCH RBC QN AUTO: 33.2 PG (ref 26.5–33)
MCHC RBC AUTO-ENTMCNC: 34.4 G/DL (ref 31.5–36.5)
MCV RBC AUTO: 96 FL (ref 78–100)
MONOCYTES # BLD AUTO: 0.6 10E3/UL (ref 0–1.3)
MONOCYTES NFR BLD AUTO: 10 %
NEUTROPHILS # BLD AUTO: 3.4 10E3/UL (ref 1.6–8.3)
NEUTROPHILS NFR BLD AUTO: 62 %
NRBC # BLD AUTO: 0 10E3/UL
NRBC BLD AUTO-RTO: 0 /100
PLATELET # BLD AUTO: 201 10E3/UL (ref 150–450)
POTASSIUM SERPL-SCNC: 3.9 MMOL/L (ref 3.4–5.3)
PROT SERPL-MCNC: 7 G/DL (ref 6.4–8.3)
RBC # BLD AUTO: 4.73 10E6/UL (ref 4.4–5.9)
SODIUM SERPL-SCNC: 138 MMOL/L (ref 135–145)
WBC # BLD AUTO: 5.5 10E3/UL (ref 4–11)

## 2025-07-21 PROCEDURE — 83615 LACTATE (LD) (LDH) ENZYME: CPT

## 2025-07-21 PROCEDURE — 80053 COMPREHEN METABOLIC PANEL: CPT

## 2025-07-21 PROCEDURE — 85004 AUTOMATED DIFF WBC COUNT: CPT

## 2025-07-21 PROCEDURE — 36415 COLL VENOUS BLD VENIPUNCTURE: CPT

## 2025-07-28 ENCOUNTER — HOSPITAL ENCOUNTER (OUTPATIENT)
Dept: CT IMAGING | Facility: CLINIC | Age: 65
Discharge: HOME | End: 2025-07-28
Attending: INTERNAL MEDICINE | Admitting: INTERNAL MEDICINE
Payer: COMMERCIAL

## 2025-07-28 DIAGNOSIS — C83.30 DIFFUSE LARGE B-CELL LYMPHOMA, UNSPECIFIED BODY REGION (H): ICD-10-CM

## 2025-07-28 PROCEDURE — 250N000011 HC RX IP 250 OP 636: Performed by: INTERNAL MEDICINE

## 2025-07-28 PROCEDURE — 250N000009 HC RX 250: Performed by: INTERNAL MEDICINE

## 2025-07-28 PROCEDURE — 71260 CT THORAX DX C+: CPT

## 2025-07-28 RX ORDER — IOPAMIDOL 755 MG/ML
97 INJECTION, SOLUTION INTRAVASCULAR ONCE
Status: COMPLETED | OUTPATIENT
Start: 2025-07-28 | End: 2025-07-28

## 2025-07-28 RX ADMIN — IOPAMIDOL 97 ML: 755 INJECTION, SOLUTION INTRAVENOUS at 08:43

## 2025-07-28 RX ADMIN — SODIUM CHLORIDE 68 ML: 9 INJECTION, SOLUTION INTRAVENOUS at 08:43

## 2025-07-31 ENCOUNTER — VIRTUAL VISIT (OUTPATIENT)
Dept: ONCOLOGY | Facility: CLINIC | Age: 65
End: 2025-07-31
Attending: INTERNAL MEDICINE
Payer: COMMERCIAL

## 2025-07-31 VITALS — BODY MASS INDEX: 26.6 KG/M2 | WEIGHT: 190 LBS | HEIGHT: 71 IN

## 2025-07-31 DIAGNOSIS — C83.30 DIFFUSE LARGE B-CELL LYMPHOMA, UNSPECIFIED BODY REGION (H): Primary | ICD-10-CM

## 2025-07-31 ASSESSMENT — PAIN SCALES - GENERAL: PAINLEVEL_OUTOF10: NO PAIN (0)

## 2025-07-31 NOTE — PROGRESS NOTES
Oncology follow-up visit:  Date on this visit: 7/31/25     Diagnoses.    Diffuse large B-cell lymphoma    Primary Physician: Paddy Holly     History Of Present Illness:  Mr. Jasso is a 64 year old  male who presents for follow-up of diffuse lymphadenopathy.  He initially saw me on 1/18/2022 for retroperitoneal lymphadenopathy.  Please see my previous note for details.  I have copied and updated from prior notes.        Over the last few months, since fall of 2022, off and on he has had upper abdominal pain which lasts few minutes. He thinks stress sometimes can bring it on. No relationship to food.  No nausea or vomiting. BMs have been fine. No bleeding. No fevers.   He had a CT Abd Pelvis on 1/6/2023 which showed enlarged retroperitoneal lymphadenopathy  He has noticed some fatigue. He has also noticed some lump in the throat just above the sternum at night. This is going on for a couple of months. He denies any dysphagia.     Currently he feels well. Currently denies any abdominal pain. No nausea or vomiting. BMs are fine. No recent weight loss. No SOB. No neuropathy. No skin flushing. No drenching night sweats.    In the summer, he noticed a rash on the face which has since resolved.  He was noted to have CLAIRE positive.     On 2/6/2023, he had FNA of the left supraclavicular lymph node.  Cytology showed atypical lymphoid cells.  Flow cytometry showed lambda monotypic B cells which are negative for CD5 and CD10.  This is consistent with a B-cell lymphoma.  We decided to go ahead with excisional lymph node biopsy as well as bone marrow biopsy for complete staging.    Excisional lymph node biopsy showed diffuse large B-cell lymphoma.    Bone marrow biopsy did not reveal evidence of lymphoma.    He started R-CHOP on 3/8/2023.    Cycle #2 R-CHOP on 3/29/2023.        He was admitted to the [facility] from 4/4/2023 - 4/9/2023.  He was admitted for neutropenic fever, sepsis and C. difficile colitis.  He  presented with multiple bouts of nonbloody diarrhea and fever of 101 with generalized fatigue and weakness.  He was neutropenic and was noted to have pancolitis.  C. difficile was positive.  Cryptosporidium was also positive.  He was started on IV cefepime and IV Flagyl initially.  Infectious disease was consulted and he was switched to oral vancomycin to complete 14 days.  Neutropenia resolved by 4/7/2023.    He completed oral vancomycin course on 4/19/2023 4/19/2023.  Cycle #3 of R-CHOP    5/5/2023.  PET CT neck chest abdomen and pelvis shows complete response to treatment per Lug[place] criteria    5/10/2023.  Cycle #4 of R-CHOP.    5/31/2023.  Cycle #5 of R-CHOP .      He developed hemorrhagic cystitis from cyclophosphamide . I reviewed note from urologist Dr Parra. He received Cycle #6 R-CHOP without Cytoxan on 6/21/2023.    After completing 6 cycles, repeat PET/CT on 7/21/2023 continues to show complete response.    Interval history.  This is a video visit.    Overall he feels well.  He does have mild stable fatigue.  He plans to retire in 1 month.  He denies any fevers infections or shortness of breath.  He denies any nausea vomiting diarrhea constipation or any bleeding.  Denies any B symptoms.  He tells me that neuropathy over time has improved and he does not have any neuropathy in his hands anymore.  He has some numbness in his toes but it is not hampering his activities.  Currently not taking any medications.      ECOG 1    ROS:  A comprehensive ROS was otherwise neg    I reviewed other history in Epic as before.      Past Medical/Surgical History:  Past Medical History:   Diagnosis Date    Cancer (H) 2-7-2023    just diagnosed with lymphona.    Carpal tunnel syndrome     Chronic rhinitis 10/23/2015    Degeneration of lumbar intervertebral disc 11/28/2023    Esophageal reflux 12/26/2013    Hand joint stiff, right    Shingles     Past Surgical History:   Procedure Laterality Date    BIOPSY  2-6-23     BIOPSY LYMPH NODE CERVICAL Left 2/16/2023    Procedure: excisional biopsy of a left cervical node;  Surgeon: Aung Tamayo MD;  Location: [facility] OR    COLONOSCOPY  2019    clean, do again in 10 years    COLONOSCOPY WITH CO2 INSUFFLATION N/A 07/17/2019    Procedure: COLONOSCOPY, WITH CO2 INSUFFLATION;  Surgeon: Jaison Hammer MD;  Location: [facility] OR    COMBINED ESOPHAGOSCOPY, GASTROSCOPY, DUODENOSCOPY (EGD) WITH CO2 INSUFFLATION N/A 3/19/2025    Procedure: Combined Esophagoscopy, Gastroscopy, Duodenoscopy (Egd) With Co2 Insufflation;  Surgeon: Jaison Hammer MD;  Location: [facility] OR    DESTRUCTION OF PARAVERTEBRAL FACETCERVICAL / THORACIC SINGLE Left 5/16/2024    Procedure: DESTRUCTION, NERVE, FACET JOINT, CERVICOTHORACIC, C3/4/5;  Surgeon: Lb Carter MD;  Location: [facility] OR    ENT SURGERY  1979    Loss of hearing rt ear (tinnitis)    ESOPHAGOSCOPY, GASTROSCOPY, DUODENOSCOPY (EGD), COMBINED N/A 3/19/2025    Procedure: ESOPHAGOGASTRODUODENOSCOPY, WITH BIOPSY;  Surgeon: Jaison Hammer MD;  Location: [facility] OR    HERNIA REPAIR  2000    INJECT BLOCK MEDIAL BRANCH CERVICAL/THORACIC/LUMBAR Left 1/18/2024    Procedure: BLOCK, NERVE, FACET JOINT, MEDIAL BRANCH, DIAGNOSTIC C3/4/5;  Surgeon: Lb Carter MD;  Location: [facility]    INJECT BLOCK MEDIAL BRANCH CERVICAL/THORACIC/LUMBAR Left 4/25/2024    Procedure: BLOCK, NERVE, FACET JOINT, MEDIAL BRANCH, DIAGNOSTIC C3/4/5;  Surgeon: Lb Carter MD;  Location: [facility] OR    IR CHEST PORT PLACEMENT > 5 YRS OF AGE  3/3/2023    NO HISTORY OF SURGERY      ORTHOPEDIC SURGERY  1988,1992    rt knee, scope in 88; ACL repair Early 90s     Cancer History:   As above    Allergies:  Allergies as of 07/31/2025 - Reviewed 07/31/2025   Allergen Reaction Noted    Allopurinol Rash 04/04/2023     Current Medications:  Current Outpatient Medications   Medication Sig Dispense Refill    meloxicam (MOBIC) 15 MG tablet Take 1 tablet (15 mg) by mouth daily as  needed. 30 tablet 1      Family History:  Family History   Problem Relation Age of Onset    Hyperlipidemia Mother         medication    Diabetes Father     Other Cancer Father         Lung cancer    Other Cancer Maternal Grandmother         Unsure what type of cancer    Autoimmune Disease No family hx of      Maternal grand mother had some sort of a cancer  Dad had lung cancer  Has 4 kids- all healthy    Social History:  Social History     Socioeconomic History    Marital status:      Spouse name: Not on file    Number of children: Not on file    Years of education: Not on file    Highest education level: Not on file   Occupational History    Not on file   Tobacco Use    Smoking status: Never     Passive exposure: Never    Smokeless tobacco: Never   Vaping Use    Vaping status: Never Used   Substance and Sexual Activity    Alcohol use: Yes     Comment: occassional, 1- 2 per week    Drug use: No    Sexual activity: Yes     Partners: Female     Birth control/protection: Male Surgical, Female Surgical     Comment: vasectomy   Other Topics Concern    Parent/sibling w/ CABG, MI or angioplasty before 65F 55M? No   Social History Narrative    August 7, 2024         ENVIRONMENTAL HISTORY: [place] The home is heated with a forced air. They does have central air conditioning. The patient's bedroom is furnished with carpeting in bedroom.  Pets inside the house include 1 dog(s), mainly lives in basement. There is no history of cockroach or mice infestation. There is/are 0 smokers in the house.  The house does not have a damp basement.      Social Drivers of Health     Financial Resource Strain: Low Risk  (1/29/2025)    Financial Resource Strain     Within the past 12 months, have you or your family members you live with been unable to get utilities (heat, electricity) when it was really needed?: No   Food Insecurity: Low Risk  (1/29/2025)    Food Insecurity     Within the past  "12 months, did you worry that your food would run out before you got money to buy more?: No     Within the past 12 months, did the food you bought just not last and you didn t have money to get more?: No   Transportation Needs: Low Risk  (1/29/2025)    Transportation Needs     Within the past 12 months, has lack of transportation kept you from medical appointments, getting your medicines, non-medical meetings or appointments, work, or from getting things that you need?: No   Physical Activity: Insufficiently Active (1/29/2025)    Exercise Vital Sign     Days of Exercise per Week: 2 days     Minutes of Exercise per Session: 20 min   Stress: No Stress Concern Present (1/29/2025)    [employer] - Occupational Stress Questionnaire     Feeling of Stress : Only a little   Social Connections: Unknown (1/29/2025)    Social Connection and Isolation Panel [NHANES]     Frequency of Communication with Friends and Family: Not on file     Frequency of Social Gatherings with Friends and Family: Once a week     Attends Synagogue Services: Not on file     Active Member of Clubs or Organizations: Not on file     Attends Club or Organization Meetings: Not on file     Marital Status: Not on file   Interpersonal Safety: Low Risk  (3/19/2025)    Interpersonal Safety     Do you feel physically and emotionally safe where you currently live?: Yes     Within the past 12 months, have you been hit, slapped, kicked or otherwise physically hurt by someone?: No     Within the past 12 months, have you been humiliated or emotionally abused in other ways by your partner or ex-partner?: No   Housing Stability: Low Risk  (1/29/2025)    Housing Stability     Do you have housing? : Yes     Are you worried about losing your housing?: No   no smoking. Drinks etoh wine 2-3 times / week.       Physical Exam:  Ht 1.803 m (5' 11\")   Wt 86.2 kg (190 lb)   BMI 26.50 kg/m     Wt Readings from Last 4 Encounters:   07/31/25 86.2 kg " (190 lb)   01/30/25 89.3 kg (196 lb 12.8 oz)   01/22/25 89 kg (196 lb 3.2 oz)   11/18/24 88.5 kg (195 lb 1.6 oz)       Constitutional.  Does not seem to be in any acute distress.  Eyes.  No redness or discharge noted.  Respiratory.  Speaking in full sentences.  Breathing seems comfortable without any accessory use of muscles.    Skin.  Visualized skin does not show any obvious rashes.  Musculoskeletal.  Range of motion for visualized areas is intact.  Neurological.  Alert and oriented x3.  Psychiatric.  Mood, mentation and affect are normal.  Decision making capacity is intact.      Laboratory/Imaging Studies      Reviewed  7/21/2025  CBC unremarkable.    CMP unremarkable except bicarb 21.    .      7/28/2025.  CT chest abdomen and pelvis    FINDINGS:   LUNGS AND PLEURA: Stable scattered small pulmonary nodules such as a 3 mm nodule along the minor fissure (series 4, image 144). No pleural effusion.     MEDIASTINUM/AXILLAE: Normal heart size without pericardial effusion. No thoracic aneurysm. No thoracic adenopathy. Calcified subcarinal and right hilar lymph nodes compatible with remote granulomatous disease.     CORONARY ARTERY CALCIFICATION: Mild.     HEPATOBILIARY: Cholelithiasis. No biliary ductal dilatation. No focal liver lesion.     PANCREAS: Normal.     SPLEEN: Normal size. No focal lesion.     ADRENAL GLANDS: Normal.     KIDNEYS/BLADDER: Normal.     BOWEL: Colonic diverticulosis without evidence of acute diverticulitis. No findings for bowel obstruction. Mild diffuse wall thickening of the proximal to mid colon. Mild diffuse wall thickening of duodenal and jejunal loops.     LYMPH NODES: Normal.     VASCULATURE: Mild burden of atherosclerotic disease without abdominal aortic aneurysm.     PELVIC ORGANS: No pelvic masses.     MUSCULOSKELETAL: No aggressive osseous lesion. Degenerative changes of the spine and hips. Small fat-containing umbilical hernia. Prior right inguinal hernia repair with mesh.                                                                       IMPRESSION:  1.  No lymphadenopathy in the chest, abdomen, or pelvis. Normal size spleen.   2.  Mild diffuse wall thickening of the proximal to mid colon and multiple small bowel loops. Findings suggest a nonspecific enterocolitis.        ASSESSMENT/PLAN:    Diffuse large B-cell lymphoma presenting with diffuse lymphadenopathy.    He has marked FDG avid left supraclavicular, axillary, retroperitoneal lymph nodes.  No hepatosplenomegaly.  No evidence of bone marrow or extra lymphatic involvement.  FISH testing shows gains of BCL2 and BCL6 but no rearrangement.  No rearrangement of MYC or IGH::MYC fusion  No evidence of double hit or triple hit lymphoma.  Cytogenetics was complex.  No evidence of bone marrow involvement.  LDH is mildly elevated.      IPI is 3    We started R-CHOP on 3/8/2023 with curative intent.  Cycle #6 of R-CHOP was given without Cytoxan because of development of hemorrhagic cystitis.  He received cycle #6 on 6/21/2023.      After completing 6 cycles, repeat PET/CT on 7/21/2023 continues to show complete response.    He has been on observation since then and has done well.      Currently no evidence of recurrence.    Plan to repeat labs and CT scan in 6 months.      Neuropathy.    He was seen by neurologist Dr. Violette Cerda for chemotherapy-induced peripheral neuropathy.   He was found to have severe subacute axonal sensorimotor polyneuropathy on EMG/NCS in August 2023.  He was started on alpha lipoic acid but currently is not taking any medications.  The numbness in his toes is not hampering his activities.  Hands are good.  Continue to observe.      Finding of enterocolitis of the CT scan.  It is a nonspecific finding.  He does not have any GI symptoms.  We discussed that should he develop any GI problems like abdominal pain, nausea vomiting diarrhea, then he should let us know.  Otherwise it is reasonable to continue to  observe.      We did not address the following today      Leg swelling.  This has resolved. No more on lasix.     Port-A-Cath.  This was removed on 2/16/2024.        Thyroid nodule.  Ultrasound in October 2023 did not show any concerning findings and he is following with PCP regarding this.        Hemorrhagic cystitis.  From cyclophosphamide.  He was seen by urology  on 6/15/2023.  As his symptoms have resolved, he has stopped oxybutynin.        History of chemotherapy induced neutropenic fever/typhlitis.  Was receiving G-CSF support.      Respiratory.  He was treated for 1 week of levofloxacin for cough and some shortness of breath and subtle groundglass opacities found on the CT scan.  Coughing and dyspnea on exertion have improved.  Continue to observe.       C. difficile colitis.  Symptoms have resolved.  We gave him 1 week of vancomycin when he was on levofloxacin to prevent reactivation of C. Difficile.  He did not get vancomycin with ciprofloxacin and currently bowel movements are normal.        See me back in 6 months with labs/CT scan prior    All questions answered.  He is agreeable and comfortable with the plan.    Kiel Maurer MD

## 2025-07-31 NOTE — NURSING NOTE
Current patient location: [address] [place] [address]     Is the patient currently in the state of [place]? YES    Visit mode: VIDEO    If the visit is dropped, the patient can be reconnected by:VIDEO VISIT: Send to e-mail at: [email or web address]    Will anyone else be joining the visit? NO  (If patient encounters technical issues they should call [phone]    Are changes needed to the allergy or medication list? Pt confirms medications and allergies are correct, echeck-in completed.     Are refills needed on medications prescribed by this physician? NO    Rooming Documentation:  Patient declined to complete questionnaire(s)    Reason for visit: ARACELY BENAVIDES

## 2025-07-31 NOTE — LETTER
7/31/2025      Geovanni Jasso  [address]  [facility] [address]      Dear Colleague,    Thank you for referring your patient, Geovanni Jasso, to the [facility]. Please see a copy of my visit note below.    Virtual Visit Details    Type of service:  Video Visit     Originating Location (pt. Location): Home    Distant Location (provider location):  On-site  Platform used for Video Visit: BookBub  Video start time. 11:52 AM  Video stop time. 12:03 PM       Oncology follow-up visit:  Date on this visit: 7/31/25     Diagnoses.    Diffuse large B-cell lymphoma    Primary Physician: Paddy Holly     History Of Present Illness:  Mr. Jasso is a 64 year old  male who presents for follow-up of diffuse lymphadenopathy.  He initially saw me on 1/18/2022 for retroperitoneal lymphadenopathy.  Please see my previous note for details.  I have copied and updated from prior notes.        Over the last few months, since fall of 2022, off and on he has had upper abdominal pain which lasts few minutes. He thinks stress sometimes can bring it on. No relationship to food.  No nausea or vomiting. BMs have been fine. No bleeding. No fevers.   He had a CT Abd Pelvis on 1/6/2023 which showed enlarged retroperitoneal lymphadenopathy  He has noticed some fatigue. He has also noticed some lump in the throat just above the sternum at night. This is going on for a couple of months. He denies any dysphagia.     Currently he feels well. Currently denies any abdominal pain. No nausea or vomiting. BMs are fine. No recent weight loss. No SOB. No neuropathy. No skin flushing. No drenching night sweats.    In the summer, he noticed a rash on the face which has since resolved.  He was noted to have CLAIRE positive.     On 2/6/2023, he had FNA of the left supraclavicular lymph node.  Cytology showed atypical lymphoid cells.  Flow cytometry showed lambda monotypic B cells which are negative for CD5 and CD10.  This is  consistent with a B-cell lymphoma.  We decided to go ahead with excisional lymph node biopsy as well as bone marrow biopsy for complete staging.    Excisional lymph node biopsy showed diffuse large B-cell lymphoma.    Bone marrow biopsy did not reveal evidence of lymphoma.    He started R-CHOP on 3/8/2023.    Cycle #2 R-CHOP on 3/29/2023.        He was admitted to the [facility] from 4/4/2023 - 4/9/2023.  He was admitted for neutropenic fever, sepsis and C. difficile colitis.  He presented with multiple bouts of nonbloody diarrhea and fever of 101 with generalized fatigue and weakness.  He was neutropenic and was noted to have pancolitis.  C. difficile was positive.  Cryptosporidium was also positive.  He was started on IV cefepime and IV Flagyl initially.  Infectious disease was consulted and he was switched to oral vancomycin to complete 14 days.  Neutropenia resolved by 4/7/2023.    He completed oral vancomycin course on 4/19/2023 4/19/2023.  Cycle #3 of R-CHOP    5/5/2023.  PET CT neck chest abdomen and pelvis shows complete response to treatment per Lugano criteria    5/10/2023.  Cycle #4 of R-CHOP.    5/31/2023.  Cycle #5 of R-CHOP .      He developed hemorrhagic cystitis from cyclophosphamide . I reviewed note from urologist Dr Parra. He received Cycle #6 R-CHOP without Cytoxan on 6/21/2023.    After completing 6 cycles, repeat PET/CT on 7/21/2023 continues to show complete response.    Interval history.  This is a video visit.    Overall he feels well.  He does have mild stable fatigue.  He plans to retire in 1 month.  He denies any fevers infections or shortness of breath.  He denies any nausea vomiting diarrhea constipation or any bleeding.  Denies any B symptoms.  He tells me that neuropathy over time has improved and he does not have any neuropathy in his hands anymore.  He has some numbness in his toes but it is not hampering his activities.  Currently not taking any medications.      ECOG  1    ROS:  A comprehensive ROS was otherwise neg    I reviewed other history in Epic as before.      Past Medical/Surgical History:  Past Medical History:   Diagnosis Date     Cancer (H) 2-7-2023    just diagnosed with lymphona.     Carpal tunnel syndrome      Chronic rhinitis 10/23/2015     Degeneration of lumbar intervertebral disc 11/28/2023     Esophageal reflux 12/26/2013     Hand joint stiff, right    Shingles     Past Surgical History:   Procedure Laterality Date     BIOPSY  2-6-23     BIOPSY LYMPH NODE CERVICAL Left 2/16/2023    Procedure: excisional biopsy of a left cervical node;  Surgeon: Aung Tamayo MD;  Location: [facility] OR     COLONOSCOPY  2019    clean, do again in 10 years     COLONOSCOPY WITH CO2 INSUFFLATION N/A 07/17/2019    Procedure: COLONOSCOPY, WITH CO2 INSUFFLATION;  Surgeon: Jaison Hammer MD;  Location: [facility] OR     COMBINED ESOPHAGOSCOPY, GASTROSCOPY, DUODENOSCOPY (EGD) WITH CO2 INSUFFLATION N/A 3/19/2025    Procedure: Combined Esophagoscopy, Gastroscopy, Duodenoscopy (Egd) With Co2 Insufflation;  Surgeon: Jaison Hammer MD;  Location: [facility] OR     DESTRUCTION OF PARAVERTEBRAL FACETCERVICAL / THORACIC SINGLE Left 5/16/2024    Procedure: DESTRUCTION, NERVE, FACET JOINT, CERVICOTHORACIC, C3/4/5;  Surgeon: Lb Carter MD;  Location: [facility] OR     ENT SURGERY  1979    Loss of hearing rt ear (tinnitis)     ESOPHAGOSCOPY, GASTROSCOPY, DUODENOSCOPY (EGD), COMBINED N/A 3/19/2025    Procedure: ESOPHAGOGASTRODUODENOSCOPY, WITH BIOPSY;  Surgeon: Jaison Hammer MD;  Location: [facility] OR     HERNIA REPAIR  2000     INJECT BLOCK MEDIAL BRANCH CERVICAL/THORACIC/LUMBAR Left 1/18/2024    Procedure: BLOCK, NERVE, FACET JOINT, MEDIAL BRANCH, DIAGNOSTIC C3/4/5;  Surgeon: Lb Carter MD;  Location: [facility] OR     INJECT BLOCK MEDIAL BRANCH CERVICAL/THORACIC/LUMBAR Left 4/25/2024    Procedure: BLOCK, NERVE, FACET JOINT, MEDIAL BRANCH, DIAGNOSTIC C3/4/5;  Surgeon: Emma  MD Lb;  Location: [facility] OR     IR CHEST PORT PLACEMENT > 5 YRS OF AGE  3/3/2023     NO HISTORY OF SURGERY       ORTHOPEDIC SURGERY  1988,1992    rt knee, scope in 88; ACL repair Early 90s     Cancer History:   As above    Allergies:  Allergies as of 07/31/2025 - Reviewed 07/31/2025   Allergen Reaction Noted     Allopurinol Rash 04/04/2023     Current Medications:  Current Outpatient Medications   Medication Sig Dispense Refill     meloxicam (MOBIC) 15 MG tablet Take 1 tablet (15 mg) by mouth daily as needed. 30 tablet 1      Family History:  Family History   Problem Relation Age of Onset     Hyperlipidemia Mother         medication     Diabetes Father      Other Cancer Father         Lung cancer     Other Cancer Maternal Grandmother         Unsure what type of cancer     Autoimmune Disease No family hx of      Maternal grand mother had some sort of a cancer  Dad had lung cancer  Has 4 kids- all healthy    Social History:  Social History     Socioeconomic History     Marital status:      Spouse name: Not on file     Number of children: Not on file     Years of education: Not on file     Highest education level: Not on file   Occupational History     Not on file   Tobacco Use     Smoking status: Never     Passive exposure: Never     Smokeless tobacco: Never   Vaping Use     Vaping status: Never Used   Substance and Sexual Activity     Alcohol use: Yes     Comment: occassional, 1- 2 per week     Drug use: No     Sexual activity: Yes     Partners: Female     Birth control/protection: Male Surgical, Female Surgical     Comment: vasectomy   Other Topics Concern     Parent/sibling w/ CABG, MI or angioplasty before 65F 55M? No   Social History Narrative    August 7, 2024         ENVIRONMENTAL HISTORY: The family lives in a [facility] home [place]. The home is heated with a forced air. They does have central air conditioning. The patient's bedroom is furnished with carpeting in bedroom.  Pets inside the  house include 1 dog(s), mainly lives in basement. There is no history of cockroach or mice infestation. There is/are 0 smokers in the house.  The house does not have a damp basement.      Social Drivers of Health     Financial Resource Strain: Low Risk  (1/29/2025)    Financial Resource Strain      Within the past 12 months, have you or your family members you live with been unable to get utilities (heat, electricity) when it was really needed?: No   Food Insecurity: Low Risk  (1/29/2025)    Food Insecurity      Within the past 12 months, did you worry that your food would run out before you got money to buy more?: No      Within the past 12 months, did the food you bought just not last and you didn t have money to get more?: No   Transportation Needs: Low Risk  (1/29/2025)    Transportation Needs      Within the past 12 months, has lack of transportation kept you from medical appointments, getting your medicines, non-medical meetings or appointments, work, or from getting things that you need?: No   Physical Activity: Insufficiently Active (1/29/2025)    Exercise Vital Sign      Days of Exercise per Week: 2 days      Minutes of Exercise per Session: 20 min   Stress: No Stress Concern Present (1/29/2025)    [place] [employer] - Occupational Stress Questionnaire      Feeling of Stress : Only a little   Social Connections: Unknown (1/29/2025)    Social Connection and Isolation Panel [NHANES]      Frequency of Communication with Friends and Family: Not on file      Frequency of Social Gatherings with Friends and Family: Once a week      Attends Buddhism Services: Not on file      Active Member of Clubs or Organizations: Not on file      Attends Club or Organization Meetings: Not on file      Marital Status: Not on file   Interpersonal Safety: Low Risk  (3/19/2025)    Interpersonal Safety      Do you feel physically and emotionally safe where you currently live?: Yes      Within the past 12  "months, have you been hit, slapped, kicked or otherwise physically hurt by someone?: No      Within the past 12 months, have you been humiliated or emotionally abused in other ways by your partner or ex-partner?: No   Housing Stability: Low Risk  (1/29/2025)    Housing Stability      Do you have housing? : Yes      Are you worried about losing your housing?: No   no smoking. Drinks etoh wine 2-3 times / week.       Physical Exam:  Ht 1.803 m (5' 11\")   Wt 86.2 kg (190 lb)   BMI 26.50 kg/m     Wt Readings from Last 4 Encounters:   07/31/25 86.2 kg (190 lb)   01/30/25 89.3 kg (196 lb 12.8 oz)   01/22/25 89 kg (196 lb 3.2 oz)   11/18/24 88.5 kg (195 lb 1.6 oz)       Constitutional.  Does not seem to be in any acute distress.  Eyes.  No redness or discharge noted.  Respiratory.  Speaking in full sentences.  Breathing seems comfortable without any accessory use of muscles.    Skin.  Visualized skin does not show any obvious rashes.  Musculoskeletal.  Range of motion for visualized areas is intact.  Neurological.  Alert and oriented x3.  Psychiatric.  Mood, mentation and affect are normal.  Decision making capacity is intact.      Laboratory/Imaging Studies      Reviewed  7/21/2025  CBC unremarkable.    CMP unremarkable except bicarb 21.    .      7/28/2025.  CT chest abdomen and pelvis    FINDINGS:   LUNGS AND PLEURA: Stable scattered small pulmonary nodules such as a 3 mm nodule along the minor fissure (series 4, image 144). No pleural effusion.     MEDIASTINUM/AXILLAE: Normal heart size without pericardial effusion. No thoracic aneurysm. No thoracic adenopathy. Calcified subcarinal and right hilar lymph nodes compatible with remote granulomatous disease.     CORONARY ARTERY CALCIFICATION: Mild.     HEPATOBILIARY: Cholelithiasis. No biliary ductal dilatation. No focal liver lesion.     PANCREAS: Normal.     SPLEEN: Normal size. No focal lesion.     ADRENAL GLANDS: Normal.     KIDNEYS/BLADDER: Normal.     BOWEL: " Colonic diverticulosis without evidence of acute diverticulitis. No findings for bowel obstruction. Mild diffuse wall thickening of the proximal to mid colon. Mild diffuse wall thickening of duodenal and jejunal loops.     LYMPH NODES: Normal.     VASCULATURE: Mild burden of atherosclerotic disease without abdominal aortic aneurysm.     PELVIC ORGANS: No pelvic masses.     MUSCULOSKELETAL: No aggressive osseous lesion. Degenerative changes of the spine and hips. Small fat-containing umbilical hernia. Prior right inguinal hernia repair with mesh.                                                                      IMPRESSION:  1.  No lymphadenopathy in the chest, abdomen, or pelvis. Normal size spleen.   2.  Mild diffuse wall thickening of the proximal to mid colon and multiple small bowel loops. Findings suggest a nonspecific enterocolitis.        ASSESSMENT/PLAN:    Diffuse large B-cell lymphoma presenting with diffuse lymphadenopathy.    He has marked FDG avid left supraclavicular, axillary, retroperitoneal lymph nodes.  No hepatosplenomegaly.  No evidence of bone marrow or extra lymphatic involvement.  FISH testing shows gains of BCL2 and BCL6 but no rearrangement.  No rearrangement of MYC or IGH::MYC fusion  No evidence of double hit or triple hit lymphoma.  Cytogenetics was complex.  No evidence of bone marrow involvement.  LDH is mildly elevated.      IPI is 3    We started R-CHOP on 3/8/2023 with curative intent.  Cycle #6 of R-CHOP was given without Cytoxan because of development of hemorrhagic cystitis.  He received cycle #6 on 6/21/2023.      After completing 6 cycles, repeat PET/CT on 7/21/2023 continues to show complete response.    He has been on observation since then and has done well.      Currently no evidence of recurrence.    Plan to repeat labs and CT scan in 6 months.      Neuropathy.    He was seen by neurologist Dr. Violette Cerda for chemotherapy-induced peripheral neuropathy.   He was found to  have severe subacute axonal sensorimotor polyneuropathy on EMG/NCS in August 2023.  He was started on alpha lipoic acid but currently is not taking any medications.  The numbness in his toes is not hampering his activities.  Hands are good.  Continue to observe.      Finding of enterocolitis of the CT scan.  It is a nonspecific finding.  He does not have any GI symptoms.  We discussed that should he develop any GI problems like abdominal pain, nausea vomiting diarrhea, then he should let us know.  Otherwise it is reasonable to continue to observe.      We did not address the following today      Leg swelling.  This has resolved. No more on lasix.     Port-A-Cath.  This was removed on 2/16/2024.        Thyroid nodule.  Ultrasound in October 2023 did not show any concerning findings and he is following with PCP regarding this.        Hemorrhagic cystitis.  From cyclophosphamide.  He was seen by urology  on 6/15/2023.  As his symptoms have resolved, he has stopped oxybutynin.        History of chemotherapy induced neutropenic fever/typhlitis.  Was receiving G-CSF support.      Respiratory.  He was treated for 1 week of levofloxacin for cough and some shortness of breath and subtle groundglass opacities found on the CT scan.  Coughing and dyspnea on exertion have improved.  Continue to observe.       C. difficile colitis.  Symptoms have resolved.  We gave him 1 week of vancomycin when he was on levofloxacin to prevent reactivation of C. Difficile.  He did not get vancomycin with ciprofloxacin and currently bowel movements are normal.        See me back in 6 months with labs/CT scan prior    All questions answered.  He is agreeable and comfortable with the plan.    Kiel Maurer MD          Again, thank you for allowing me to participate in the care of your patient.        Sincerely,        Kiel Maurer MD    Electronically signed

## 2025-07-31 NOTE — PROGRESS NOTES
Virtual Visit Details    Type of service:  Video Visit     Originating Location (pt. Location): Home    Distant Location (provider location):  On-site  Platform used for Video Visit: IQuum  Video start time. 11:52 AM  Video stop time. 12:03 PM

## (undated) DEVICE — NDL SPINAL 22GA 3.5" QUINCKE 405181

## (undated) DEVICE — TUBING IV EXTENSION SET 60" HI FLOW 2N3349

## (undated) DEVICE — SU SILK 2-0 SH CR 5X18" C0125

## (undated) DEVICE — PREP SKIN SCRUB TRAY 4461A

## (undated) DEVICE — PAD CHUX UNDERPAD 23X24" 7136

## (undated) DEVICE — TAPE CLOTH 3" CARDINAL 3TRCL03

## (undated) DEVICE — ESU GROUND PAD ADULT W/CORD E7507

## (undated) DEVICE — NDL SPINAL 25GA 3.5" QUINCKE 405180

## (undated) DEVICE — PREP CHLORAPREP W/ORANGE TINT 10.5ML 930715

## (undated) DEVICE — Device

## (undated) DEVICE — ENDO TUBING CO2 SMARTCAP STERILE DISP 100145CO2EXT

## (undated) DEVICE — SOL WATER IRRIG 1000ML BOTTLE 2F7114

## (undated) DEVICE — PACK NEURO MINOR UMMC SNE32MNMU4

## (undated) DEVICE — GLOVE BIOGEL PI ULTRATOUCH G SZ 8.0 42180

## (undated) DEVICE — DRSG TELFA 3X8" 1238

## (undated) DEVICE — TRAY PAIN INJECTION 97A 640

## (undated) DEVICE — DRSG STERI STRIP 1/2X4" R1547

## (undated) DEVICE — DRSG PRIMAPORE 03 1/8X6" 66000318

## (undated) DEVICE — RETR ELASTIC STAYS LONE STAR BLUNT DUAL LEAD 3550-1G

## (undated) DEVICE — SUCTION MANIFOLD NEPTUNE 2 SYS 4 PORT 0702-020-000

## (undated) DEVICE — SU SILK 0 TIE 6X30" A306H

## (undated) DEVICE — NDL SPINAL 22GA 5" QUINCKE 405148

## (undated) DEVICE — LINEN TOWEL PACK X5 5464

## (undated) DEVICE — SU SILK 4-0 TIE 12X30" A303H

## (undated) DEVICE — PACK ENDOSCOPY GI CUSTOM UMMC

## (undated) DEVICE — KIT ENDO FIRST STEP DISINFECTANT 200ML W/POUCH EP-4

## (undated) DEVICE — BLADE KNIFE SURG 15 371115

## (undated) DEVICE — SU VICRYL 3-0 SH 8X18" UND J864D

## (undated) DEVICE — CLIP HORIZON MED BLUE 002200

## (undated) DEVICE — KIT CONNECTOR FOR OLYMPUS ENDOSCOPES DEFENDO 100310

## (undated) DEVICE — GLOVE BIOGEL PI MICRO SZ 7.5 48575

## (undated) DEVICE — SPONGE KITTNER 30-101

## (undated) DEVICE — SPONGE LAP 18X18" X8435

## (undated) DEVICE — WIPE PREMOIST CLEANSING WASHCLOTHS 7988

## (undated) DEVICE — PREP POVIDONE-IODINE 7.5% SCRUB 4OZ BOTTLE MDS093945

## (undated) DEVICE — ENDO CAP AND TUBING STERILE FOR ENDOGATOR  100130

## (undated) DEVICE — SU SILK 2-0 TIE 12X30" A305H

## (undated) DEVICE — SUCTION MANIFOLD DORNOCH ULTRA CART UL-CL500

## (undated) DEVICE — SU MONOCRYL 4-0 PS-2 27" UND Y426H

## (undated) DEVICE — STRAP UNIVERSAL POSITIONING 2-PIECE 4X47X76" 91-287

## (undated) DEVICE — PREP POVIDONE-IODINE 10% SOLUTION 4OZ BOTTLE MDS093944

## (undated) DEVICE — SU SILK 3-0 TIE 12X30" A304H

## (undated) DEVICE — PREP CHLORAPREP 26ML TINTED ORANGE  260815

## (undated) DEVICE — SOL NACL 0.9% IRRIG 1000ML BOTTLE 2F7124

## (undated) DEVICE — DECANTER VIAL 2006S

## (undated) DEVICE — CLIP HORIZON SM RED WIDE SLOT 001201

## (undated) RX ORDER — LIDOCAINE HYDROCHLORIDE 10 MG/ML
INJECTION, SOLUTION EPIDURAL; INFILTRATION; INTRACAUDAL; PERINEURAL
Status: DISPENSED
Start: 2024-05-16

## (undated) RX ORDER — LIDOCAINE HYDROCHLORIDE AND EPINEPHRINE 10; 10 MG/ML; UG/ML
INJECTION, SOLUTION INFILTRATION; PERINEURAL
Status: DISPENSED
Start: 2023-02-16

## (undated) RX ORDER — FENTANYL CITRATE 50 UG/ML
INJECTION, SOLUTION INTRAMUSCULAR; INTRAVENOUS
Status: DISPENSED
Start: 2023-02-16

## (undated) RX ORDER — METHYLPREDNISOLONE ACETATE 40 MG/ML
INJECTION, SUSPENSION INTRA-ARTICULAR; INTRALESIONAL; INTRAMUSCULAR; SOFT TISSUE
Status: DISPENSED
Start: 2024-05-16

## (undated) RX ORDER — FENTANYL CITRATE 50 UG/ML
INJECTION, SOLUTION INTRAMUSCULAR; INTRAVENOUS
Status: DISPENSED
Start: 2024-05-16

## (undated) RX ORDER — FENTANYL CITRATE 50 UG/ML
INJECTION, SOLUTION INTRAMUSCULAR; INTRAVENOUS
Status: DISPENSED
Start: 2019-07-17

## (undated) RX ORDER — PROPOFOL 10 MG/ML
INJECTION, EMULSION INTRAVENOUS
Status: DISPENSED
Start: 2023-02-16

## (undated) RX ORDER — IOPAMIDOL 408 MG/ML
INJECTION, SOLUTION INTRATHECAL
Status: DISPENSED
Start: 2024-05-16

## (undated) RX ORDER — ACETAMINOPHEN 325 MG/1
TABLET ORAL
Status: DISPENSED
Start: 2023-02-16

## (undated) RX ORDER — BUPIVACAINE HYDROCHLORIDE 2.5 MG/ML
INJECTION, SOLUTION EPIDURAL; INFILTRATION; INTRACAUDAL
Status: DISPENSED
Start: 2024-05-16

## (undated) RX ORDER — ONDANSETRON 2 MG/ML
INJECTION INTRAMUSCULAR; INTRAVENOUS
Status: DISPENSED
Start: 2023-02-16

## (undated) RX ORDER — SIMETHICONE 40MG/0.6ML
SUSPENSION, DROPS(FINAL DOSAGE FORM)(ML) ORAL
Status: DISPENSED
Start: 2019-07-17

## (undated) RX ORDER — FENTANYL CITRATE-0.9 % NACL/PF 10 MCG/ML
PLASTIC BAG, INJECTION (ML) INTRAVENOUS
Status: DISPENSED
Start: 2023-02-16

## (undated) RX ORDER — HYDROMORPHONE HCL IN WATER/PF 6 MG/30 ML
PATIENT CONTROLLED ANALGESIA SYRINGE INTRAVENOUS
Status: DISPENSED
Start: 2023-02-16

## (undated) RX ORDER — OXYCODONE HYDROCHLORIDE 10 MG/1
TABLET ORAL
Status: DISPENSED
Start: 2023-02-16